# Patient Record
Sex: MALE | Race: WHITE | Employment: OTHER | URBAN - METROPOLITAN AREA
[De-identification: names, ages, dates, MRNs, and addresses within clinical notes are randomized per-mention and may not be internally consistent; named-entity substitution may affect disease eponyms.]

---

## 2017-01-07 ENCOUNTER — OFFICE VISIT (OUTPATIENT)
Dept: INTERNAL MEDICINE CLINIC | Facility: CLINIC | Age: 52
End: 2017-01-07

## 2017-01-07 VITALS — TEMPERATURE: 99 F | HEART RATE: 72 BPM | RESPIRATION RATE: 17 BRPM

## 2017-01-07 DIAGNOSIS — M25.571 ACUTE BILATERAL ANKLE PAIN: Primary | ICD-10-CM

## 2017-01-07 DIAGNOSIS — M25.473 ANKLE SWELLING, UNSPECIFIED LATERALITY: ICD-10-CM

## 2017-01-07 DIAGNOSIS — M25.572 ACUTE BILATERAL ANKLE PAIN: Primary | ICD-10-CM

## 2017-01-07 DIAGNOSIS — Z91.81 RISK FOR FALLS: ICD-10-CM

## 2017-01-07 DIAGNOSIS — R26.89 FUNCTIONAL GAIT ABNORMALITY: ICD-10-CM

## 2017-01-07 PROCEDURE — 99214 OFFICE O/P EST MOD 30 MIN: CPT | Performed by: INTERNAL MEDICINE

## 2017-01-07 PROCEDURE — 96372 THER/PROPH/DIAG INJ SC/IM: CPT | Performed by: INTERNAL MEDICINE

## 2017-01-07 RX ORDER — METHYLPREDNISOLONE SODIUM SUCCINATE 125 MG/2ML
125 INJECTION, POWDER, LYOPHILIZED, FOR SOLUTION INTRAMUSCULAR; INTRAVENOUS ONCE
Status: COMPLETED | OUTPATIENT
Start: 2017-01-07 | End: 2017-01-07

## 2017-01-07 RX ORDER — KETOROLAC TROMETHAMINE 30 MG/ML
60 INJECTION, SOLUTION INTRAMUSCULAR; INTRAVENOUS ONCE
Status: COMPLETED | OUTPATIENT
Start: 2017-01-07 | End: 2017-01-07

## 2017-01-07 RX ORDER — METHYLPREDNISOLONE 4 MG/1
TABLET ORAL
Qty: 1 KIT | Refills: 0 | Status: SHIPPED | OUTPATIENT
Start: 2017-01-07 | End: 2017-02-07

## 2017-01-07 RX ADMIN — KETOROLAC TROMETHAMINE 60 MG: 30 INJECTION, SOLUTION INTRAMUSCULAR; INTRAVENOUS at 13:10:00

## 2017-01-07 RX ADMIN — METHYLPREDNISOLONE SODIUM SUCCINATE 125 MG: 125 INJECTION, POWDER, LYOPHILIZED, FOR SOLUTION INTRAMUSCULAR; INTRAVENOUS at 13:11:00

## 2017-01-07 NOTE — PROGRESS NOTES
Patient presents with: Ankle Pain: R ankle pain,   Edema: edema of b\legs      HPI: The pt presents today for acute onset of B ankle pain and swelling over the last few days. It's gotten to the point that it's difficult to walk b/c of pain and swelling. Swelling    Comment:TABS  Toprol Xl [Metoprol*    Hives, Itching  Toprol Xl [Metoprol*    Swelling    Comment:TB24  Hydralazine Hcl             Comment:TABS Drowsiness  Pravachol [Pravasta*    Hives, Itching    Medications:  Reviewed and per the scanned E

## 2017-01-09 ENCOUNTER — OFFICE VISIT (OUTPATIENT)
Dept: NEPHROLOGY | Facility: CLINIC | Age: 52
End: 2017-01-09

## 2017-01-09 VITALS
BODY MASS INDEX: 42 KG/M2 | WEIGHT: 315 LBS | DIASTOLIC BLOOD PRESSURE: 109 MMHG | SYSTOLIC BLOOD PRESSURE: 164 MMHG | RESPIRATION RATE: 18 BRPM | HEART RATE: 76 BPM

## 2017-01-09 DIAGNOSIS — R60.0 LOCALIZED EDEMA: ICD-10-CM

## 2017-01-09 DIAGNOSIS — I10 ESSENTIAL HYPERTENSION: ICD-10-CM

## 2017-01-09 DIAGNOSIS — Z94.0 STATUS POST KIDNEY TRANSPLANT: ICD-10-CM

## 2017-01-09 DIAGNOSIS — N18.3 CKD (CHRONIC KIDNEY DISEASE), STAGE 3 (MODERATE): Primary | ICD-10-CM

## 2017-01-09 PROCEDURE — 99214 OFFICE O/P EST MOD 30 MIN: CPT | Performed by: INTERNAL MEDICINE

## 2017-01-09 RX ORDER — FUROSEMIDE 40 MG/1
40 TABLET ORAL DAILY
Qty: 30 TABLET | Refills: 11 | Status: SHIPPED | OUTPATIENT
Start: 2017-01-09 | End: 2017-12-07

## 2017-01-09 NOTE — PROGRESS NOTES
Nephrology Progress Note      Kevin Eden is a 46year old male. HPI:   Patient presents with:  Chronic Kidney Disease  Hypertension  Renal Transplant    Mr. Frances Appiah was seen in the nephrology clinic today follow-up for management of chronic kidn 2/4/2014   • Spinal stenosis in cervical region 10/17/2011   • Status post cervical spinal fusion 9/20/2012   • Hearing impairment      bilateral hearing aides   • Papillary thyroid carcinoma ([facility] [address]      Dx in 1/2015: tx with surgery and MANZO   • Postsurgica TABLET BY MOUTH TWICE DAILY Disp: 180 tablet Rfl: 1   DOXAZOSIN MESYLATE 8 MG Oral Tab TAKE 2 TABLETS BY MOUTH DAILY Disp: 180 tablet Rfl: 1   LOSARTAN POTASSIUM-HCTZ 100-25 MG Oral Tab TAKE 1 TABLET BY MOUTH EVERY DAY Disp: 90 tablet Rfl: 1   AMLODIPINE B capsules (2 mg total) by mouth 2 (two) times daily. Disp: 120 capsule Rfl: 11   Atorvastatin Calcium (LIPITOR) 10 MG Oral Tab Take 10 mg by mouth nightly. Disp:  Rfl:    Ranitidine HCl (ZANTAC) 150 MG Oral Tab Take 150 mg by mouth daily.  Disp:  Rfl: Results  Component Value Date   BUN 55* 12/02/2016   CREATSERUM 2.47* 12/02/2016   GFR 41* 05/24/2016   GFRNAA 29* 12/02/2016   GFRAA 34* 12/02/2016   CA 9.4 12/02/2016   ALKPHO 45 12/02/2016   AST 10 12/02/2016   ALT 12 12/02/2016   BILT 0.6 12/02/2016 have fluctuated although more recently his creatinine levels have been closer to 2.4-2.5 mg/dL or so over the last several months. 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   The mainstay of therapy remains o

## 2017-01-10 ENCOUNTER — HOSPITAL ENCOUNTER (OUTPATIENT)
Dept: GENERAL RADIOLOGY | Age: 52
Discharge: HOME | End: 2017-01-10
Attending: INTERNAL MEDICINE
Payer: MEDICARE

## 2017-01-10 DIAGNOSIS — M25.473 ANKLE SWELLING, UNSPECIFIED LATERALITY: ICD-10-CM

## 2017-01-10 DIAGNOSIS — M25.572 ACUTE BILATERAL ANKLE PAIN: ICD-10-CM

## 2017-01-10 DIAGNOSIS — M25.571 ACUTE BILATERAL ANKLE PAIN: ICD-10-CM

## 2017-01-10 PROCEDURE — 73610 X-RAY EXAM OF ANKLE: CPT

## 2017-02-07 ENCOUNTER — PATIENT MESSAGE (OUTPATIENT)
Dept: INTERNAL MEDICINE CLINIC | Facility: CLINIC | Age: 52
End: 2017-02-07

## 2017-02-07 DIAGNOSIS — M25.473 ANKLE SWELLING, UNSPECIFIED LATERALITY: ICD-10-CM

## 2017-02-07 DIAGNOSIS — M25.572 ACUTE BILATERAL ANKLE PAIN: Primary | ICD-10-CM

## 2017-02-07 DIAGNOSIS — M25.571 ACUTE BILATERAL ANKLE PAIN: Primary | ICD-10-CM

## 2017-02-07 RX ORDER — METHYLPREDNISOLONE 4 MG/1
TABLET ORAL
Qty: 1 KIT | Refills: 0 | Status: SHIPPED | OUTPATIENT
Start: 2017-02-07 | End: 2017-02-13 | Stop reason: ALTCHOICE

## 2017-02-07 NOTE — TELEPHONE ENCOUNTER
From: Roula Matta  To: Michelle Pineda MD  Sent: 2/7/2017 11:12 AM CST  Subject: Prescription Question    Hello Dr Gwendlyn Jeans,  I have a question. Unfortunately the gout that you treated back on January 7th has returned and it's hit me hard again.  Yesterday I

## 2017-02-09 ENCOUNTER — PRIOR ORIGINAL RECORDS (OUTPATIENT)
Dept: OTHER | Age: 52
End: 2017-02-09

## 2017-02-13 ENCOUNTER — OFFICE VISIT (OUTPATIENT)
Dept: INTERNAL MEDICINE CLINIC | Facility: CLINIC | Age: 52
End: 2017-02-13

## 2017-02-13 VITALS
OXYGEN SATURATION: 98 % | SYSTOLIC BLOOD PRESSURE: 140 MMHG | DIASTOLIC BLOOD PRESSURE: 88 MMHG | WEIGHT: 308.5 LBS | TEMPERATURE: 98 F | RESPIRATION RATE: 15 BRPM | HEIGHT: 74 IN | HEART RATE: 67 BPM | BODY MASS INDEX: 39.59 KG/M2

## 2017-02-13 DIAGNOSIS — Z96.652 HISTORY OF TOTAL LEFT KNEE REPLACEMENT: ICD-10-CM

## 2017-02-13 DIAGNOSIS — K21.9 GASTROESOPHAGEAL REFLUX DISEASE WITHOUT ESOPHAGITIS: ICD-10-CM

## 2017-02-13 DIAGNOSIS — I25.10 CORONARY ARTERY DISEASE INVOLVING NATIVE CORONARY ARTERY OF NATIVE HEART WITHOUT ANGINA PECTORIS: ICD-10-CM

## 2017-02-13 DIAGNOSIS — M1A.09X0 IDIOPATHIC CHRONIC GOUT OF MULTIPLE SITES WITHOUT TOPHUS: ICD-10-CM

## 2017-02-13 DIAGNOSIS — Z92.25 PERSONAL HISTORY OF IMMUNOSUPRESSION THERAPY: ICD-10-CM

## 2017-02-13 DIAGNOSIS — Z79.02 LONG TERM CURRENT USE OF ANTITHROMBOTICS/ANTIPLATELETS: ICD-10-CM

## 2017-02-13 DIAGNOSIS — N18.30 CKD (CHRONIC KIDNEY DISEASE) STAGE 3, GFR 30-59 ML/MIN (HCC): ICD-10-CM

## 2017-02-13 DIAGNOSIS — M17.11 PRIMARY OSTEOARTHRITIS OF RIGHT KNEE: ICD-10-CM

## 2017-02-13 DIAGNOSIS — D63.8 ANEMIA OF CHRONIC DISEASE: ICD-10-CM

## 2017-02-13 DIAGNOSIS — Z91.81 RISK FOR FALLS: ICD-10-CM

## 2017-02-13 DIAGNOSIS — G47.33 OSA ON CPAP: ICD-10-CM

## 2017-02-13 DIAGNOSIS — M48.02 SPINAL STENOSIS IN CERVICAL REGION: ICD-10-CM

## 2017-02-13 DIAGNOSIS — M17.12 PRIMARY OSTEOARTHRITIS OF LEFT KNEE: ICD-10-CM

## 2017-02-13 DIAGNOSIS — R60.9 PERIPHERAL EDEMA: ICD-10-CM

## 2017-02-13 DIAGNOSIS — M25.462 KNEE EFFUSION, LEFT: ICD-10-CM

## 2017-02-13 DIAGNOSIS — R53.82 CHRONIC FATIGUE SYNDROME: ICD-10-CM

## 2017-02-13 DIAGNOSIS — C73 PAPILLARY THYROID CARCINOMA (HCC): ICD-10-CM

## 2017-02-13 DIAGNOSIS — I10 ESSENTIAL HYPERTENSION, BENIGN: Primary | ICD-10-CM

## 2017-02-13 DIAGNOSIS — M06.9 RHEUMATOID ARTHRITIS INVOLVING MULTIPLE SITES, UNSPECIFIED RHEUMATOID FACTOR PRESENCE: ICD-10-CM

## 2017-02-13 DIAGNOSIS — E89.0 POSTSURGICAL HYPOTHYROIDISM: ICD-10-CM

## 2017-02-13 DIAGNOSIS — E66.9 OBESITY (BMI 30-39.9): ICD-10-CM

## 2017-02-13 DIAGNOSIS — Z94.0 RENAL TRANSPLANT RECIPIENT: ICD-10-CM

## 2017-02-13 DIAGNOSIS — Z99.89 OSA ON CPAP: ICD-10-CM

## 2017-02-13 DIAGNOSIS — R26.89 FUNCTIONAL GAIT ABNORMALITY: ICD-10-CM

## 2017-02-13 DIAGNOSIS — I27.20 PULMONARY HTN (HCC): ICD-10-CM

## 2017-02-13 DIAGNOSIS — M54.12 CHRONIC CERVICAL RADICULOPATHY: ICD-10-CM

## 2017-02-13 DIAGNOSIS — Z79.891 LONG-TERM CURRENT USE OF OPIATE ANALGESIC: ICD-10-CM

## 2017-02-13 PROBLEM — M10.9 GOUT: Status: ACTIVE | Noted: 2017-02-13

## 2017-02-13 PROCEDURE — 99214 OFFICE O/P EST MOD 30 MIN: CPT | Performed by: INTERNAL MEDICINE

## 2017-02-13 RX ORDER — BUPRENORPHINE HYDROCHLORIDE AND NALOXONE HYDROCHLORIDE 1.4; .36 MG/1; MG/1
TABLET, ORALLY DISINTEGRATING SUBLINGUAL
Refills: 0 | COMMUNITY
Start: 2017-02-10 | End: 2017-03-20

## 2017-02-13 NOTE — PROGRESS NOTES
Patient presents with: Follow - Up: 6 month follow up chronic medical issues      HPI: The pt presents today for 6-month f/u chronic medical conditions and disease burden status. In particular, his chronic medical conditions are as follows:    1.  HTN - S cane to walk. No recent falls. ROS: No CP, SOB, palps, abd pain. No fevers, chills. Stable mood. All other systems reviewed and negative.      Patient Active Problem List:     Spinal stenosis in cervical region     Esophageal reflux     Long-term cur furosemide 40 MG Oral Tab, Take 1 tablet (40 mg total) by mouth daily. , Disp: 30 tablet, Rfl: 11  •  CLONIDINE HCL 0.3 MG Oral Tab, TAKE 1 TABLET BY MOUTH TWICE DAILY, Disp: 180 tablet, Rfl: 1  •  DOXAZOSIN MESYLATE 8 MG Oral Tab, TAKE 2 TABLETS BY MOUTH D Alcohol Use: No                PE:  /88 mmHg  Pulse 67  Temp(Src) 98 °F (36.7 °C) (Oral)  Resp 15  Ht 74\"  Wt 308 lb 8 oz  BMI 39.59 kg/m2  SpO2 98%   Wt Readings from Last 6 Encounters:  02/13/17 : 308 lb 8 oz  02/08/17 : 300 lb  01/11/17 : 322 lb Cardiology. 2. CAD and long-term use of Plavix - Stable.  Continues on prescription medication w/ Dr. Luis Astorga. 3. Stage 3 CKD/Renal transplant recipient/Long-term immunosuppression - Stable.  Continues to see Dr. Taylor Anderson from Renal.  No new issues.   4. Group  [address] [facility],[address] Floor, [address], [facility], [address]  T: [id number]; F: [phone]     Meds & Refills for this Visit:  No prescriptions requested or ordered in this encounter       Imaging & Consults:  None

## 2017-02-13 NOTE — PATIENT INSTRUCTIONS
Gout Diet  Gout is a painful condition caused by an excess of uric acid, a waste product made by the body. Uric acid forms crystals that collect in the joints. The immune response to these crystals brings on symptoms of joint pain and swelling.  This is c · Dairy products that are low-fat or fat-free, such as cheese and yogurt  · Complex carbohydrate foods, including whole grains, brown rice, oats, and beans  · Coffee, in moderation  · Water, approximately 64 ounces per day  Follow-up care  Follow up with gina · Certain fish (anchovies, sardines, fish roes, herring, tuna, mussels, codfish, scallops, trout, and zoraida)  · Certain meats (red meat, processed meat, gan, turkey, wild game, and goose)  · Sauces and gravies made with meat  · Organ meats (such as lucy

## 2017-02-22 ENCOUNTER — LAB ENCOUNTER (OUTPATIENT)
Dept: LAB | Age: 52
End: 2017-02-22
Attending: INTERNAL MEDICINE
Payer: MEDICARE

## 2017-02-22 DIAGNOSIS — E89.0 POSTSURGICAL HYPOTHYROIDISM: ICD-10-CM

## 2017-02-22 DIAGNOSIS — C73 MALIGNANT NEOPLASM OF THYROID GLAND (HCC): Primary | ICD-10-CM

## 2017-02-22 PROCEDURE — 86800 THYROGLOBULIN ANTIBODY: CPT

## 2017-02-22 PROCEDURE — 36415 COLL VENOUS BLD VENIPUNCTURE: CPT

## 2017-02-22 PROCEDURE — 84443 ASSAY THYROID STIM HORMONE: CPT | Performed by: INTERNAL MEDICINE

## 2017-02-22 PROCEDURE — 84439 ASSAY OF FREE THYROXINE: CPT | Performed by: INTERNAL MEDICINE

## 2017-02-22 PROCEDURE — 84432 ASSAY OF THYROGLOBULIN: CPT

## 2017-02-23 LAB
ANTI-THYROGLOBULIN: <15 U/ML (ref ?–60)
THYROGLOBULIN, TUMOR MARKER: 2.33 NG/ML (ref 1.7–55.6)

## 2017-03-04 LAB
ABSOLUTE BASOPHILS: 49 CELLS/UL (ref 0–200)
ABSOLUTE EOSINOPHILS: 356 CELLS/UL (ref 15–500)
ABSOLUTE LYMPHOCYTES: 1936 CELLS/UL (ref 850–3900)
ABSOLUTE MONOCYTES: 648 CELLS/UL (ref 200–950)
ABSOLUTE NEUTROPHILS: 5111 CELLS/UL (ref 1500–7800)
ALBUMIN/GLOBULIN RATIO: 1.2 (CALC) (ref 1–2.5)
ALBUMIN: 3.9 G/DL (ref 3.6–5.1)
ALKALINE PHOSPHATASE: 49 U/L (ref 40–115)
ALT: 15 U/L (ref 9–46)
AST: 13 U/L (ref 10–35)
BASOPHILS: 0.6 %
BILIRUBIN, TOTAL: 0.7 MG/DL (ref 0.2–1.2)
BUN/CREATININE RATIO: 21 (CALC) (ref 6–22)
BUN: 54 MG/DL (ref 7–25)
CALCIUM: 9.6 MG/DL (ref 8.6–10.3)
CARBON DIOXIDE: 31 MMOL/L (ref 20–31)
CHLORIDE: 102 MMOL/L (ref 98–110)
CREATININE: 2.53 MG/DL (ref 0.7–1.33)
EGFR IF AFRICN AM: 33 ML/MIN/1.73M2
EGFR IF NONAFRICN AM: 28 ML/MIN/1.73M2
EOSINOPHILS: 4.4 %
GLOBULIN: 3.3 G/DL (CALC) (ref 1.9–3.7)
GLUCOSE: 99 MG/DL (ref 65–99)
HEMATOCRIT: 37.7 % (ref 38.5–50)
HEMOGLOBIN: 11.5 G/DL (ref 13.2–17.1)
LYMPHOCYTES: 23.9 %
MCH: 26.9 PG (ref 27–33)
MCHC: 30.5 G/DL (ref 32–36)
MCV: 88 FL (ref 80–100)
MONOCYTES: 8 %
MPV: 9.8 FL (ref 7.5–12.5)
NEUTROPHILS: 63.1 %
PLATELET COUNT: 128 THOUSAND/UL (ref 140–400)
POTASSIUM: 3.6 MMOL/L (ref 3.5–5.3)
PROTEIN, TOTAL: 7.2 G/DL (ref 6.1–8.1)
RDW: 16 % (ref 11–15)
RED BLOOD CELL COUNT: 4.29 MILLION/UL (ref 4.2–5.8)
SODIUM: 140 MMOL/L (ref 135–146)
TACROLIMUS, HIGHLY SENSITIVE, /MS/MS: 4.5 MCG/L
WHITE BLOOD CELL COUNT: 8.1 THOUSAND/UL (ref 3.8–10.8)

## 2017-03-17 PROCEDURE — 84432 ASSAY OF THYROGLOBULIN: CPT | Performed by: INTERNAL MEDICINE

## 2017-03-17 PROCEDURE — 86800 THYROGLOBULIN ANTIBODY: CPT | Performed by: INTERNAL MEDICINE

## 2017-03-20 PROCEDURE — 80307 DRUG TEST PRSMV CHEM ANLYZR: CPT | Performed by: INTERNAL MEDICINE

## 2017-04-10 ENCOUNTER — OFFICE VISIT (OUTPATIENT)
Dept: NEPHROLOGY | Facility: CLINIC | Age: 52
End: 2017-04-10

## 2017-04-10 VITALS
RESPIRATION RATE: 18 BRPM | BODY MASS INDEX: 41 KG/M2 | DIASTOLIC BLOOD PRESSURE: 112 MMHG | HEART RATE: 74 BPM | SYSTOLIC BLOOD PRESSURE: 144 MMHG | WEIGHT: 315 LBS

## 2017-04-10 DIAGNOSIS — I10 ESSENTIAL HYPERTENSION: ICD-10-CM

## 2017-04-10 DIAGNOSIS — N18.3 CKD (CHRONIC KIDNEY DISEASE), STAGE 3 (MODERATE): Primary | ICD-10-CM

## 2017-04-10 DIAGNOSIS — Z94.0 STATUS POST KIDNEY TRANSPLANT: ICD-10-CM

## 2017-04-10 PROCEDURE — 99214 OFFICE O/P EST MOD 30 MIN: CPT | Performed by: INTERNAL MEDICINE

## 2017-04-10 RX ORDER — ALLOPURINOL 100 MG/1
100 TABLET ORAL DAILY
Qty: 30 TABLET | Refills: 11 | Status: SHIPPED | OUTPATIENT
Start: 2017-04-10 | End: 2018-04-04

## 2017-04-10 RX ORDER — METHYLPREDNISOLONE 4 MG/1
4 TABLET ORAL DAILY
Qty: 21 TABLET | Refills: 0 | Status: SHIPPED | OUTPATIENT
Start: 2017-04-10 | End: 2017-05-12 | Stop reason: ALTCHOICE

## 2017-04-10 NOTE — PROGRESS NOTES
Nephrology Progress Note      Chelsea Mathis is a 46year old male. HPI:   Patient presents with:  Chronic Kidney Disease  Hypertension  Renal Transplant      Lucy Sarmientobret Hermelinda was seen in the nephrology clinic today follow-up for management of chronic ki • PONV (postoperative nausea and vomiting) 1997     s/p kidney transplant   • Coronary atherosclerosis 12/11/15     per angiogram   • Renal disorder    • Hyperlipidemia    • Atherosclerosis of coronary artery           Past Surgical History    APPENDECTO Sodium 200 MCG Oral Tab Take 1 tablet (200 mcg total) by mouth daily. Disp: 90 tablet Rfl: 1   furosemide 40 MG Oral Tab Take 1 tablet (40 mg total) by mouth daily.  Disp: 30 tablet Rfl: 11   CLONIDINE HCL 0.3 MG Oral Tab TAKE 1 TABLET BY MOUTH TWICE DAILY Swelling    Comment:TABS  Pravastatin Sodium      Swelling    Comment:TABS  Toprol Xl [Metoprol*    Hives, Itching  Toprol Xl [Metoprol*    Swelling    Comment:TB24  Hydralazine Hcl             Comment:TABS Drowsiness  Pravachol [Pravasta*    Hives, Itch 88.0 03/03/2017   MCH 26.9* 03/03/2017   MCHC 30.5* 03/03/2017   RDW 16.0* 03/03/2017   NEPRELIM 4.93 03/25/2016   NEUTABS 5111 03/03/2017   LYMPHABS 1936 03/03/2017   EOSABS 356 03/03/2017   BASABS 49 03/03/2017   NEUT 63.1 03/03/2017   LYMPH 23.9 03/03/2 regimen. #3.  Status post renal transplant-the creatinine has recently been closer to 2.5 mg/dL or so.  He will continue his current antirejection medication-Prograf and prednisone. #4. Gouty arthritis-Mr. Alma Rosa Bocanegra notes worsening left foot pain ove

## 2017-05-03 ENCOUNTER — TELEPHONE (OUTPATIENT)
Dept: NEPHROLOGY | Facility: CLINIC | Age: 52
End: 2017-05-03

## 2017-05-12 ENCOUNTER — OFFICE VISIT (OUTPATIENT)
Dept: INTERNAL MEDICINE CLINIC | Facility: CLINIC | Age: 52
End: 2017-05-12

## 2017-05-12 ENCOUNTER — TELEPHONE (OUTPATIENT)
Dept: NEPHROLOGY | Facility: CLINIC | Age: 52
End: 2017-05-12

## 2017-05-12 VITALS
TEMPERATURE: 98 F | OXYGEN SATURATION: 98 % | SYSTOLIC BLOOD PRESSURE: 164 MMHG | WEIGHT: 310 LBS | HEART RATE: 107 BPM | DIASTOLIC BLOOD PRESSURE: 100 MMHG | BODY MASS INDEX: 40 KG/M2

## 2017-05-12 DIAGNOSIS — M25.511 ACUTE PAIN OF RIGHT SHOULDER: ICD-10-CM

## 2017-05-12 DIAGNOSIS — M54.2 CERVICAL PAIN: Primary | ICD-10-CM

## 2017-05-12 PROCEDURE — 99214 OFFICE O/P EST MOD 30 MIN: CPT | Performed by: INTERNAL MEDICINE

## 2017-05-12 RX ORDER — CYCLOBENZAPRINE HCL 10 MG
10 TABLET ORAL 3 TIMES DAILY PRN
Qty: 30 TABLET | Refills: 0 | Status: SHIPPED | OUTPATIENT
Start: 2017-05-12 | End: 2017-06-01

## 2017-05-12 NOTE — PROGRESS NOTES
Kamlesh Ott is a 46year old male. HPI:   Patient presents with:  Neck Pain: since Monday (5/8/17). Shoulder Pain: since Monday (5/8/17). Patient presents with an acute musculoskeletal complaint.   Patient has been dealing with pain in right asia ZUBSOLV 1.4-0.36 MG Sublingual SL Tab, Place 1.4 mg under the tongue 3 (three) times daily. , Disp: 90 tablet, Rfl: 0  •  Levothyroxine Sodium 25 MCG Oral Tab, Take 200mcg tablet plus 25mcg tablet for a total of 225mcg daily, Disp: 90 tablet, Rfl: 1  •  Mota Core predniSONE 5 MG Oral Tab, Take 7.5 mg by mouth daily. , Disp: , Rfl:   •  Ferrous Sulfate 325 (65 FE) MG Oral Tab, Take 1 tablet (325 mg total) by mouth daily. , Disp: 30 tablet, Rfl: 0  •  Atorvastatin Calcium (LIPITOR) 10 MG Oral Tab, Take 10 mg by mouth n history; other surgical history (Right, 1997); colonoscopy; thyroidectomy; colonoscopy (N/A, 5/1/2015); cath pv (12/11/15); and knee surgery.   Family: family history includes bipolar disorder in his mother; glomerulonephritis in his maternal grandmother an patient indicates understanding of these issues and agrees to the plan. The patient is asked to return to clinic as needed with Dr. Robert Sam MD for follow up on chronic issues, or earlier if acute issues arise.     Severa Boop, MD

## 2017-05-12 NOTE — PATIENT INSTRUCTIONS
- Take cyclobenzaprine (muscle relaxant) every 8 hours as needed. It can make you drowsy, so be careful when driving.  - If you are not better within a few hours, go to the nearest emergency department. It was a pleasure seeing you in the clinic today.

## 2017-05-13 ENCOUNTER — HOSPITAL ENCOUNTER (EMERGENCY)
Facility: HOSPITAL | Age: 52
Discharge: HOME | End: 2017-05-13
Attending: EMERGENCY MEDICINE
Payer: MEDICARE

## 2017-05-13 ENCOUNTER — APPOINTMENT (OUTPATIENT)
Dept: GENERAL RADIOLOGY | Facility: HOSPITAL | Age: 52
End: 2017-05-13
Attending: EMERGENCY MEDICINE
Payer: MEDICARE

## 2017-05-13 VITALS
SYSTOLIC BLOOD PRESSURE: 156 MMHG | RESPIRATION RATE: 18 BRPM | HEART RATE: 88 BPM | BODY MASS INDEX: 40 KG/M2 | TEMPERATURE: 99 F | OXYGEN SATURATION: 98 % | WEIGHT: 308.63 LBS | DIASTOLIC BLOOD PRESSURE: 98 MMHG

## 2017-05-13 DIAGNOSIS — S16.1XXA CERVICAL STRAIN, INITIAL ENCOUNTER: Primary | ICD-10-CM

## 2017-05-13 PROCEDURE — 93010 ELECTROCARDIOGRAM REPORT: CPT

## 2017-05-13 PROCEDURE — 99285 EMERGENCY DEPT VISIT HI MDM: CPT

## 2017-05-13 PROCEDURE — 72050 X-RAY EXAM NECK SPINE 4/5VWS: CPT | Performed by: EMERGENCY MEDICINE

## 2017-05-13 PROCEDURE — 99284 EMERGENCY DEPT VISIT MOD MDM: CPT

## 2017-05-13 PROCEDURE — 93005 ELECTROCARDIOGRAM TRACING: CPT

## 2017-05-13 RX ORDER — HYDROCODONE BITARTRATE AND ACETAMINOPHEN 10; 325 MG/1; MG/1
1 TABLET ORAL EVERY 4 HOURS PRN
Qty: 20 TABLET | Refills: 0 | Status: SHIPPED | OUTPATIENT
Start: 2017-05-13 | End: 2017-05-31

## 2017-05-13 RX ORDER — HYDROCODONE BITARTRATE AND ACETAMINOPHEN 5; 325 MG/1; MG/1
2 TABLET ORAL ONCE
Status: COMPLETED | OUTPATIENT
Start: 2017-05-13 | End: 2017-05-13

## 2017-05-13 NOTE — ED PROVIDER NOTES
Patient Seen in: [facility] Emergency Department    History   Patient presents with:  Back Pain (musculoskeletal)    Stated Complaint: back pain/neck pain    HPI    This is a 22-year-old male who presents with right-sided posterior neck pain rating Osteoarthritis, knee 12/23/2013   • Postlaminectomy syndrome, cervical region 6/20/2013     Log Date: 12/12/2012    • Pulmonary HTN (Via Echo 1/28/14) 2/4/2014   • Spinal stenosis in cervical region 10/17/2011   • Status post cervical spinal fusion 9/20/20 tongue 3 (three) times daily. Levothyroxine Sodium 25 MCG Oral Tab,  Take 200mcg tablet plus 25mcg tablet for a total of 225mcg daily   Levothyroxine Sodium 200 MCG Oral Tab,  Take 1 tablet (200 mcg total) by mouth daily.    allopurinol 100 MG Oral Tab, Grandmother          Smoking Status: Never Smoker                      Smokeless Status: Never Used                        Alcohol Use: No                Review of Systems    Positive for stated complaint: back pain/neck pain  Other systems are as noted in A cervical spine x-rays, EKG, Norco was given I discussed if he is going to take Norco he should stop his other other chronic narcotic pain meds. The EKG shows normal sinus rhythm. There is no acute ST elevations or ischemic findings.   The rest of

## 2017-05-19 ENCOUNTER — PRIOR ORIGINAL RECORDS (OUTPATIENT)
Dept: OTHER | Age: 52
End: 2017-05-19

## 2017-05-25 DIAGNOSIS — Z94.0 RENAL TRANSPLANT RECIPIENT: Primary | ICD-10-CM

## 2017-05-25 LAB
CHOLESTEROL, TOTAL: 124 MG/DL
HDL CHOLESTEROL: 36 MG/DL
LDL CHOLESTEROL: 74 MG/DL
TRIGLYCERIDES: 70 MG/DL

## 2017-05-31 PROBLEM — Z98.1 HISTORY OF FUSION OF CERVICAL SPINE: Status: ACTIVE | Noted: 2017-05-31

## 2017-05-31 PROBLEM — T84.84XS PAINFUL TOTAL KNEE REPLACEMENT, SEQUELA: Status: ACTIVE | Noted: 2017-05-31

## 2017-05-31 PROBLEM — Z96.659 PAINFUL TOTAL KNEE REPLACEMENT, SEQUELA: Status: ACTIVE | Noted: 2017-05-31

## 2017-05-31 PROBLEM — M23.8X2: Status: ACTIVE | Noted: 2017-05-31

## 2017-06-06 ENCOUNTER — HOSPITAL ENCOUNTER (OUTPATIENT)
Dept: CT IMAGING | Age: 52
Discharge: HOME | End: 2017-06-06
Attending: INTERNAL MEDICINE
Payer: MEDICARE

## 2017-06-06 DIAGNOSIS — Z98.1 HISTORY OF FUSION OF CERVICAL SPINE: ICD-10-CM

## 2017-06-06 DIAGNOSIS — M54.2 CERVICALGIA: ICD-10-CM

## 2017-06-06 PROCEDURE — 72125 CT NECK SPINE W/O DYE: CPT | Performed by: INTERNAL MEDICINE

## 2017-06-13 ENCOUNTER — OFFICE VISIT (OUTPATIENT)
Dept: INTERNAL MEDICINE CLINIC | Facility: CLINIC | Age: 52
End: 2017-06-13

## 2017-06-13 VITALS
HEIGHT: 74 IN | RESPIRATION RATE: 17 BRPM | TEMPERATURE: 98 F | HEART RATE: 72 BPM | WEIGHT: 313 LBS | SYSTOLIC BLOOD PRESSURE: 148 MMHG | BODY MASS INDEX: 40.17 KG/M2 | DIASTOLIC BLOOD PRESSURE: 96 MMHG

## 2017-06-13 DIAGNOSIS — Z92.25 PERSONAL HISTORY OF IMMUNOSUPRESSION THERAPY: ICD-10-CM

## 2017-06-13 DIAGNOSIS — Z13.31 DEPRESSION SCREENING: ICD-10-CM

## 2017-06-13 DIAGNOSIS — M1A.09X0 IDIOPATHIC CHRONIC GOUT OF MULTIPLE SITES WITHOUT TOPHUS: ICD-10-CM

## 2017-06-13 DIAGNOSIS — M06.9 RHEUMATOID ARTHRITIS INVOLVING MULTIPLE SITES, UNSPECIFIED RHEUMATOID FACTOR PRESENCE: ICD-10-CM

## 2017-06-13 DIAGNOSIS — F11.20 OPIOID DEPENDENCE ON AGONIST THERAPY (HCC): ICD-10-CM

## 2017-06-13 DIAGNOSIS — M54.2 CHRONIC NECK PAIN: ICD-10-CM

## 2017-06-13 DIAGNOSIS — Z96.659 PAINFUL TOTAL KNEE REPLACEMENT, SEQUELA: ICD-10-CM

## 2017-06-13 DIAGNOSIS — N18.30 CKD (CHRONIC KIDNEY DISEASE) STAGE 3, GFR 30-59 ML/MIN (HCC): ICD-10-CM

## 2017-06-13 DIAGNOSIS — M54.12 CHRONIC CERVICAL RADICULOPATHY: ICD-10-CM

## 2017-06-13 DIAGNOSIS — M17.11 PRIMARY OSTEOARTHRITIS OF RIGHT KNEE: ICD-10-CM

## 2017-06-13 DIAGNOSIS — M48.02 SPINAL STENOSIS IN CERVICAL REGION: ICD-10-CM

## 2017-06-13 DIAGNOSIS — C73 PAPILLARY THYROID CARCINOMA (HCC): ICD-10-CM

## 2017-06-13 DIAGNOSIS — E66.01 MORBID OBESITY WITH BMI OF 40.0-44.9, ADULT (HCC): ICD-10-CM

## 2017-06-13 DIAGNOSIS — E89.0 POSTSURGICAL HYPOTHYROIDISM: ICD-10-CM

## 2017-06-13 DIAGNOSIS — Z94.0 RENAL TRANSPLANT RECIPIENT: ICD-10-CM

## 2017-06-13 DIAGNOSIS — Z91.81 RISK FOR FALLS: ICD-10-CM

## 2017-06-13 DIAGNOSIS — K21.9 GASTROESOPHAGEAL REFLUX DISEASE WITHOUT ESOPHAGITIS: ICD-10-CM

## 2017-06-13 DIAGNOSIS — M50.20 BULGING OF CERVICAL INTERVERTEBRAL DISC: ICD-10-CM

## 2017-06-13 DIAGNOSIS — Z98.1 HISTORY OF FUSION OF CERVICAL SPINE: ICD-10-CM

## 2017-06-13 DIAGNOSIS — D63.8 ANEMIA OF CHRONIC DISEASE: ICD-10-CM

## 2017-06-13 DIAGNOSIS — R60.9 PERIPHERAL EDEMA: ICD-10-CM

## 2017-06-13 DIAGNOSIS — Z99.89 OSA ON CPAP: ICD-10-CM

## 2017-06-13 DIAGNOSIS — R53.82 CHRONIC FATIGUE SYNDROME: ICD-10-CM

## 2017-06-13 DIAGNOSIS — G47.33 OSA ON CPAP: ICD-10-CM

## 2017-06-13 DIAGNOSIS — Z79.02 LONG TERM CURRENT USE OF ANTITHROMBOTICS/ANTIPLATELETS: ICD-10-CM

## 2017-06-13 DIAGNOSIS — I25.10 CORONARY ARTERY DISEASE INVOLVING NATIVE CORONARY ARTERY OF NATIVE HEART WITHOUT ANGINA PECTORIS: ICD-10-CM

## 2017-06-13 DIAGNOSIS — I27.20 PULMONARY HTN (HCC): ICD-10-CM

## 2017-06-13 DIAGNOSIS — I10 ESSENTIAL HYPERTENSION, BENIGN: ICD-10-CM

## 2017-06-13 DIAGNOSIS — M48.02 NEUROFORAMINAL STENOSIS OF CERVICAL SPINE: ICD-10-CM

## 2017-06-13 DIAGNOSIS — T84.84XS PAINFUL TOTAL KNEE REPLACEMENT, SEQUELA: ICD-10-CM

## 2017-06-13 DIAGNOSIS — R26.89 FUNCTIONAL GAIT ABNORMALITY: ICD-10-CM

## 2017-06-13 DIAGNOSIS — G89.29 CHRONIC NECK PAIN: ICD-10-CM

## 2017-06-13 DIAGNOSIS — M25.462 KNEE EFFUSION, LEFT: ICD-10-CM

## 2017-06-13 DIAGNOSIS — M47.812 CERVICAL SPINE ARTHRITIS: ICD-10-CM

## 2017-06-13 DIAGNOSIS — Z00.00 ENCOUNTER FOR ANNUAL HEALTH EXAMINATION: Primary | ICD-10-CM

## 2017-06-13 PROBLEM — M50.30 BULGING OF CERVICAL INTERVERTEBRAL DISC: Status: ACTIVE | Noted: 2017-06-13

## 2017-06-13 PROBLEM — M23.8X2: Status: RESOLVED | Noted: 2017-05-31 | Resolved: 2017-06-13

## 2017-06-13 PROCEDURE — G0444 DEPRESSION SCREEN ANNUAL: HCPCS | Performed by: INTERNAL MEDICINE

## 2017-06-13 PROCEDURE — 99214 OFFICE O/P EST MOD 30 MIN: CPT | Performed by: INTERNAL MEDICINE

## 2017-06-13 PROCEDURE — G0439 PPPS, SUBSEQ VISIT: HCPCS | Performed by: INTERNAL MEDICINE

## 2017-06-13 NOTE — PROGRESS NOTES
HPI:   Everett Washington is a 46year old male who presents for a Medicare Subsequent Annual Wellness visit (Pt already had Initial Annual Wellness) and 6-month f/u chronic medical conditions and disease burden status eval.  Specifically, his chronic medi Peripheral edema - Stable and no new issues. 16. Functional gait abnormality/fall risk - Uses a cane to walk.  No recent falls. 17. Opioid agonist therapy - Stable and continues to work w/ Dr. Mk Norton from [place] [address] [facility] [address] [place] Leelee Vasquez.     His last annual assessment Cholesterol Labs:     Lab Results  Component Value Date   CHOLEST 126 05/23/2016   HDL 43* 05/23/2016   LDL 69 05/23/2016   TRIG 69 05/23/2016          Last Chemistry Labs:     Lab Results  Component Value Date   AST 11 05/26/2017   ALT 11 05/26/2017   CA Oral Tablet Dispersible Take 1 tablet (4 mg total) by mouth daily as needed for Nausea. Cholecalciferol (VITAMIN D) 1000 UNITS Oral Tab Take 1,000 Int'l Units/day by mouth daily. Clopidogrel Bisulfate 75 MG Oral Tab Take 75 mg by mouth daily.    docusat (12/11/15); Renal disorder; Hyperlipidemia; Atherosclerosis of coronary artery; History of fusion of cervical spine (5/31/2017); Painful total knee replacement, sequela (5/31/2017); and Laxity of knee joint, left (5/31/2017).     He  has past surgical histo Left Eye Chart Acuity: 20/30   Both Eyes Visual Acuity: Uncorrected Both Eyes Chart Acuity: 20/25   Able To Tolerate Visual Acuity: Yes      General Appearance:  Alert, cooperative, no distress, appears stated age, morbidly obese   Head:  Normocephalic, wi OTHER RELEVANT CHRONIC CONDITIONS:   Orly Ochoa is a 46year old male who presents for a Medicare Assessment and 6-month f/u chronic medical conditions.     PLAN SUMMARY:   Diagnoses and all orders for this visit:    Essential hypertension, benign Epic. Discussed with patient and provided information      [address] [facility] on file in Epic:    Brien Recio does not have a Power of  for [employer] on file in [place].  Discussed with patient and provided information          PLAN: Peripheral edema - Stable and no new issues. 16. Functional gait abnormality/fall risk - Uses a cane to walk.  No recent falls. 17. Opioid agonist therapy - Stable and continues to work w/ Dr. Gayathri Varghese from [place] [address] [place] [address] [place]. Leelee Vasquez. 18. RTC 6 months.   Patient ve the last 12 months?: 1-Yes (knee issues)    Do you accidently lose urine?: 0-No    Do you have difficulty seeing?: 0-No    Do you have any difficulty walking or getting up?: 1-Yes    Do you have any tripping hazards?: 1-Yes (small dogs)    Are you on multi Colonoscopy Screen every 10 years Colonoscopy,10 Years due on 05/01/2025 Update Health Maintenance if applicable    Flex Sigmoidoscopy Screen every 10 years No results found for this or any previous visit. No flowsheet data found.      Fecal Occult Blood An Annually HGBA1C (%)   Date Value   06/17/2015 5.5       No flowsheet data found.     Creat/alb ratio  Annually      LDL  Annually LDL CHOLESTEROL (mg/dL)   Date Value   05/23/2016 69     LDL-CHOLESTEROL (mg/dL (calc))   Date Value   03/14/2016 61    No flow

## 2017-06-28 ENCOUNTER — TELEPHONE (OUTPATIENT)
Dept: NEPHROLOGY | Facility: CLINIC | Age: 52
End: 2017-06-28

## 2017-07-05 RX ORDER — PREDNISONE 1 MG/1
TABLET ORAL
Qty: 135 TABLET | Refills: 1 | Status: SHIPPED | OUTPATIENT
Start: 2017-07-05 | End: 2017-08-07

## 2017-07-14 DIAGNOSIS — Z94.0 RENAL TRANSPLANT RECIPIENT: Primary | ICD-10-CM

## 2017-07-31 PROCEDURE — 84432 ASSAY OF THYROGLOBULIN: CPT | Performed by: INTERNAL MEDICINE

## 2017-07-31 PROCEDURE — 86800 THYROGLOBULIN ANTIBODY: CPT | Performed by: INTERNAL MEDICINE

## 2017-08-02 LAB
ABSOLUTE BASOPHILS: 41 CELLS/UL (ref 0–200)
ABSOLUTE EOSINOPHILS: 583 CELLS/UL (ref 15–500)
ABSOLUTE LYMPHOCYTES: 1636 CELLS/UL (ref 850–3900)
ABSOLUTE MONOCYTES: 826 CELLS/UL (ref 200–950)
ABSOLUTE NEUTROPHILS: 5014 CELLS/UL (ref 1500–7800)
ALBUMIN/GLOBULIN RATIO: 1.3 (CALC) (ref 1–2.5)
ALBUMIN: 3.7 G/DL (ref 3.6–5.1)
ALKALINE PHOSPHATASE: 51 U/L (ref 40–115)
ALT: 11 U/L (ref 9–46)
AST: 12 U/L (ref 10–35)
BASOPHILS: 0.5 %
BILIRUBIN, TOTAL: 0.6 MG/DL (ref 0.2–1.2)
BUN/CREATININE RATIO: 27 (CALC) (ref 6–22)
BUN: 65 MG/DL (ref 7–25)
CALCIUM: 9.3 MG/DL (ref 8.6–10.3)
CARBON DIOXIDE: 26 MMOL/L (ref 20–31)
CHLORIDE: 104 MMOL/L (ref 98–110)
CREATININE: 2.45 MG/DL (ref 0.7–1.33)
EGFR IF AFRICN AM: 34 ML/MIN/1.73M2
EGFR IF NONAFRICN AM: 29 ML/MIN/1.73M2
EOSINOPHILS: 7.2 %
GLOBULIN: 2.9 G/DL (CALC) (ref 1.9–3.7)
GLUCOSE: 90 MG/DL (ref 65–99)
HEMATOCRIT: 29.7 % (ref 38.5–50)
HEMOGLOBIN: 9.6 G/DL (ref 13.2–17.1)
LYMPHOCYTES: 20.2 %
MCH: 28.4 PG (ref 27–33)
MCHC: 32.3 G/DL (ref 32–36)
MCV: 87.9 FL (ref 80–100)
MONOCYTES: 10.2 %
MPV: 11.1 FL (ref 7.5–12.5)
NEUTROPHILS: 61.9 %
PLATELET COUNT: 137 THOUSAND/UL (ref 140–400)
POTASSIUM: 3.5 MMOL/L (ref 3.5–5.3)
PROTEIN, TOTAL: 6.6 G/DL (ref 6.1–8.1)
RDW: 14.2 % (ref 11–15)
RED BLOOD CELL COUNT: 3.38 MILLION/UL (ref 4.2–5.8)
SODIUM: 140 MMOL/L (ref 135–146)
TACROLIMUS, HIGHLY SENSITIVE, /MS/MS: 4.3 MCG/L
WHITE BLOOD CELL COUNT: 8.1 THOUSAND/UL (ref 3.8–10.8)

## 2017-08-07 ENCOUNTER — OFFICE VISIT (OUTPATIENT)
Dept: NEPHROLOGY | Facility: CLINIC | Age: 52
End: 2017-08-07

## 2017-08-07 VITALS
HEART RATE: 76 BPM | RESPIRATION RATE: 20 BRPM | WEIGHT: 315 LBS | SYSTOLIC BLOOD PRESSURE: 150 MMHG | BODY MASS INDEX: 43 KG/M2 | DIASTOLIC BLOOD PRESSURE: 102 MMHG

## 2017-08-07 DIAGNOSIS — N18.30 ANEMIA IN STAGE 3 CHRONIC KIDNEY DISEASE (HCC): ICD-10-CM

## 2017-08-07 DIAGNOSIS — N18.30 CHRONIC KIDNEY DISEASE, STAGE III (MODERATE) (HCC): Primary | ICD-10-CM

## 2017-08-07 DIAGNOSIS — Z94.0 RENAL TRANSPLANT RECIPIENT: ICD-10-CM

## 2017-08-07 DIAGNOSIS — D63.8 ANEMIA OF CHRONIC DISEASE: ICD-10-CM

## 2017-08-07 DIAGNOSIS — I10 ESSENTIAL HYPERTENSION: ICD-10-CM

## 2017-08-07 DIAGNOSIS — D63.1 ANEMIA IN STAGE 3 CHRONIC KIDNEY DISEASE (HCC): ICD-10-CM

## 2017-08-07 PROCEDURE — 99214 OFFICE O/P EST MOD 30 MIN: CPT | Performed by: INTERNAL MEDICINE

## 2017-08-07 PROCEDURE — 96372 THER/PROPH/DIAG INJ SC/IM: CPT | Performed by: INTERNAL MEDICINE

## 2017-08-07 NOTE — PROGRESS NOTES
Nephrology Progress Note      Domenic Cantrell is a 46year old male. HPI:   Patient presents with:  Chronic Kidney Disease  Hypertension      Mr. Gonzalez Kat was seen in the nephrology clinic today in follow-up for management of chronic kidney disease st PONV (postoperative nausea and vomiting) 1997    s/p kidney transplant   • Postlaminectomy syndrome, cervical region 6/20/2013    Log Date: 12/12/2012    • Postsurgical hypothyroidism     Dx in 1/2015: tx with surgery and MANZO   • Pulmonary HTN (Via Echo 1/ (Active prior to today's visit):    Current Outpatient Prescriptions:  chlorzoxazone 500 MG Oral Tab Take 1 tablet (500 mg total) by mouth 3 (three) times daily as needed for Muscle spasms.  Disp: 40 tablet Rfl: 0   Chlorzoxazone (LORZONE) 750 MG Oral Tab 1 Take 100 mg by mouth 2 (two) times daily. Disp:  Rfl:    aspirin 81 MG Oral Tab EC Take 81 mg by mouth daily. Disp:  Rfl:    predniSONE 5 MG Oral Tab Take 7.5 mg by mouth daily.  Disp:  Rfl:    Ferrous Sulfate 325 (65 FE) MG Oral Tab Take 1 tablet (325 mg t organomegaly  Extremities: Trace edema of the left foot, left knee has a brace in place and is swollen.   Neurologic:  moving all extremities  Skin: Warm and dry, no rashes      LABS:       Lab Results  Component Value Date   BUN 65 (H) 08/01/2017   CORINE 03/14/2016   PEGGY NONE SEEN 03/14/2016   VARUNLUR NONE SEEN 03/14/2016     ASSESSMENT/PLAN:     #1.  Chronic kidney disease stage III-IV-Mr. Margarette Melgar has had long-standing chronic kidney disease since his renal transplant and although his creatinine levels

## 2017-08-22 ENCOUNTER — PRIOR ORIGINAL RECORDS (OUTPATIENT)
Dept: OTHER | Age: 52
End: 2017-08-22

## 2017-09-01 ENCOUNTER — PRIOR ORIGINAL RECORDS (OUTPATIENT)
Dept: OTHER | Age: 52
End: 2017-09-01

## 2017-09-06 ENCOUNTER — OFFICE VISIT (OUTPATIENT)
Dept: INTERNAL MEDICINE CLINIC | Facility: CLINIC | Age: 52
End: 2017-09-06

## 2017-09-06 ENCOUNTER — TELEPHONE (OUTPATIENT)
Dept: NEPHROLOGY | Facility: CLINIC | Age: 52
End: 2017-09-06

## 2017-09-06 VITALS
TEMPERATURE: 98 F | OXYGEN SATURATION: 99 % | RESPIRATION RATE: 20 BRPM | HEIGHT: 75 IN | WEIGHT: 315 LBS | SYSTOLIC BLOOD PRESSURE: 150 MMHG | DIASTOLIC BLOOD PRESSURE: 90 MMHG | HEART RATE: 70 BPM | BODY MASS INDEX: 39.17 KG/M2

## 2017-09-06 DIAGNOSIS — M06.9 RHEUMATOID ARTHRITIS INVOLVING MULTIPLE SITES, UNSPECIFIED RHEUMATOID FACTOR PRESENCE: ICD-10-CM

## 2017-09-06 DIAGNOSIS — F11.20 OPIOID DEPENDENCE ON AGONIST THERAPY (HCC): ICD-10-CM

## 2017-09-06 DIAGNOSIS — K21.9 GASTROESOPHAGEAL REFLUX DISEASE WITHOUT ESOPHAGITIS: ICD-10-CM

## 2017-09-06 DIAGNOSIS — I10 ESSENTIAL HYPERTENSION, BENIGN: ICD-10-CM

## 2017-09-06 DIAGNOSIS — J06.9 VIRAL UPPER RESPIRATORY ILLNESS: Primary | ICD-10-CM

## 2017-09-06 DIAGNOSIS — M54.12 CHRONIC CERVICAL RADICULOPATHY: ICD-10-CM

## 2017-09-06 LAB
ABSOLUTE BASOPHILS: 54 CELLS/UL (ref 0–200)
ABSOLUTE EOSINOPHILS: 549 CELLS/UL (ref 15–500)
ABSOLUTE LYMPHOCYTES: 1782 CELLS/UL (ref 850–3900)
ABSOLUTE MONOCYTES: 918 CELLS/UL (ref 200–950)
ABSOLUTE NEUTROPHILS: 5697 CELLS/UL (ref 1500–7800)
BASOPHILS: 0.6 %
EOSINOPHILS: 6.1 %
HEMATOCRIT: 32 % (ref 38.5–50)
HEMOGLOBIN: 10.6 G/DL (ref 13.2–17.1)
LYMPHOCYTES: 19.8 %
MCH: 28.4 PG (ref 27–33)
MCHC: 33.1 G/DL (ref 32–36)
MCV: 85.8 FL (ref 80–100)
MONOCYTES: 10.2 %
MPV: 11.1 FL (ref 7.5–12.5)
NEUTROPHILS: 63.3 %
PLATELET COUNT: 132 THOUSAND/UL (ref 140–400)
RDW: 14.4 % (ref 11–15)
RED BLOOD CELL COUNT: 3.73 MILLION/UL (ref 4.2–5.8)
WHITE BLOOD CELL COUNT: 9 THOUSAND/UL (ref 3.8–10.8)

## 2017-09-06 PROCEDURE — 99214 OFFICE O/P EST MOD 30 MIN: CPT | Performed by: INTERNAL MEDICINE

## 2017-09-06 RX ORDER — AZITHROMYCIN 250 MG/1
TABLET, FILM COATED ORAL
Qty: 6 TABLET | Refills: 0 | Status: SHIPPED | OUTPATIENT
Start: 2017-09-06 | End: 2017-09-26 | Stop reason: ALTCHOICE

## 2017-09-06 NOTE — PATIENT INSTRUCTIONS
- Your examination was normal today.   You likely have a viral infection.    - Your repeat blood pressure was 150/90.    -  If your symptoms worsen by the weekend, go ahead and fill the antibiotic prescription (azithromycin)  - You should be ok around your

## 2017-09-06 NOTE — PROGRESS NOTES
Shea Berumen is a 46year old male. HPI:   Patient presents with: Follow - Up: Not feeling well  Patient presents with several issues. He has been dealing with cough, congestion, especially in the morning. No fevers/chills/sweats.   His puppy was ZUBSOLV 1.4-0.36 MG Sublingual SL Tab, Place 1.4 mg of buprenorphine under the tongue 3 (three) times daily. , Disp: 90 tablet, Rfl: 0  •  chlorzoxazone 500 MG Oral Tab, Take 1 tablet (500 mg total) by mouth 3 (three) times daily as needed for Muscle spasms Tab, Take 7.5 mg by mouth daily. , Disp: , Rfl:   •  Ferrous Sulfate 325 (65 FE) MG Oral Tab, Take 1 tablet (325 mg total) by mouth daily. , Disp: 30 tablet, Rfl: 0  •  Atorvastatin Calcium (LIPITOR) 10 MG Oral Tab, Take 10 mg by mouth nightly.  , Disp: , Rf laminectomy,facetectomy,lumbar (05/29/2012); revise median n/carpal tunnel surg (2009); femur/knee surg unlisted (1994); other surgical history; other surgical history (Right, 1997); colonoscopy; thyroidectomy; colonoscopy (N/A, 5/1/2015); cath pv (12/11/1 radiculopathy, rheumatoid arthritis, chronic opioid agonist therapy  On Zubsolv. Following with Pain Management. Continue.   Was prescribed Norco by another physician at some point recently, ideally all narcotic/opioid medications should be prescribed by

## 2017-09-14 ENCOUNTER — PATIENT MESSAGE (OUTPATIENT)
Dept: INTERNAL MEDICINE CLINIC | Facility: CLINIC | Age: 52
End: 2017-09-14

## 2017-09-25 ENCOUNTER — NURSE ONLY (OUTPATIENT)
Dept: INTERNAL MEDICINE CLINIC | Facility: CLINIC | Age: 52
End: 2017-09-25

## 2017-09-25 DIAGNOSIS — Z23 NEED FOR VACCINATION: ICD-10-CM

## 2017-09-25 PROCEDURE — G0008 ADMIN INFLUENZA VIRUS VAC: HCPCS | Performed by: INTERNAL MEDICINE

## 2017-09-25 PROCEDURE — 90686 IIV4 VACC NO PRSV 0.5 ML IM: CPT | Performed by: INTERNAL MEDICINE

## 2017-10-02 RX ORDER — PREDNISONE 1 MG/1
7.5 TABLET ORAL DAILY
Qty: 135 TABLET | Refills: 3 | Status: SHIPPED | OUTPATIENT
Start: 2017-10-02 | End: 2017-12-07 | Stop reason: ALTCHOICE

## 2017-10-12 ENCOUNTER — OFFICE VISIT (OUTPATIENT)
Dept: INTERNAL MEDICINE CLINIC | Facility: CLINIC | Age: 52
End: 2017-10-12

## 2017-10-12 ENCOUNTER — APPOINTMENT (OUTPATIENT)
Dept: LAB | Age: 52
End: 2017-10-12
Attending: INTERNAL MEDICINE
Payer: MEDICARE

## 2017-10-12 VITALS
WEIGHT: 315 LBS | TEMPERATURE: 99 F | RESPIRATION RATE: 8 BRPM | SYSTOLIC BLOOD PRESSURE: 152 MMHG | BODY MASS INDEX: 39.17 KG/M2 | HEIGHT: 75 IN | DIASTOLIC BLOOD PRESSURE: 98 MMHG | HEART RATE: 66 BPM

## 2017-10-12 DIAGNOSIS — F11.20 OPIOID DEPENDENCE ON AGONIST THERAPY (HCC): ICD-10-CM

## 2017-10-12 DIAGNOSIS — R35.89 POLYURIA: ICD-10-CM

## 2017-10-12 DIAGNOSIS — R73.01 ELEVATED FASTING GLUCOSE: ICD-10-CM

## 2017-10-12 DIAGNOSIS — N18.30 CKD (CHRONIC KIDNEY DISEASE) STAGE 3, GFR 30-59 ML/MIN (HCC): ICD-10-CM

## 2017-10-12 DIAGNOSIS — I10 ESSENTIAL HYPERTENSION, BENIGN: ICD-10-CM

## 2017-10-12 DIAGNOSIS — R20.2 PARESTHESIA OF BOTH FEET: ICD-10-CM

## 2017-10-12 DIAGNOSIS — R63.2 POLYPHAGIA: ICD-10-CM

## 2017-10-12 DIAGNOSIS — R20.2 PARESTHESIA OF BOTH FEET: Primary | ICD-10-CM

## 2017-10-12 PROCEDURE — 83036 HEMOGLOBIN GLYCOSYLATED A1C: CPT

## 2017-10-12 PROCEDURE — 80053 COMPREHEN METABOLIC PANEL: CPT

## 2017-10-12 PROCEDURE — 82746 ASSAY OF FOLIC ACID SERUM: CPT

## 2017-10-12 PROCEDURE — 36415 COLL VENOUS BLD VENIPUNCTURE: CPT

## 2017-10-12 PROCEDURE — 99214 OFFICE O/P EST MOD 30 MIN: CPT | Performed by: INTERNAL MEDICINE

## 2017-10-12 PROCEDURE — 82607 VITAMIN B-12: CPT

## 2017-10-12 NOTE — PROGRESS NOTES
Helen Dumont is a 46year old male. HPI:   Patient presents with:  Tingling: both feet  Patient presents with complaint of paresthesias in his feet. This has been worsening over the past three weeks. Mostly affecting toes of both feet.   Denies nu tablet, Rfl: 1  •  DOXAZOSIN MESYLATE 8 MG Oral Tab, TAKE 2 TABLETS BY MOUTH DAILY, Disp: 180 tablet, Rfl: 1  •  KLOR-CON 10 10 MEQ Oral Tab CR, TAKE 2 TABLETS BY MOUTH THREE TIMES DAILY, Disp: 540 tablet, Rfl: 1  •  predniSONE 5 MG Oral Tab, Take 1.5 tabl history of Atherosclerosis of coronary artery; Brachial neuritis or radiculitis NOS (6/29/2012); Cervical radiculitis (9/20/2012); Cervical spondylosis with myelopathy (1/6/2014); Chronic kidney disease, stage III (moderate) (6/29/2012);  Chronic pain syndr mother. Social:  reports that he has never smoked. He has never used smokeless tobacco. He reports that he does not drink alcohol or use drugs.   Wt Readings from Last 6 Encounters:  10/12/17 : (!) 328 lb  09/29/17 : (!) 325 lb  09/26/17 : (!) 330 lb  09/0 MD as Consulting Physician ([id number] [facility])  Andrei Atwood MD as Consulting Physician (ENDOCRINOLOGY)  [facility]ide [facility] as Consulting Physician (CHIROPRACTOR)  Saritha Hernandez MD as Consulting Physician (ADDICTION MEDICINE)  The patient indicates understandi

## 2017-10-12 NOTE — PATIENT INSTRUCTIONS
- Get blood work done when possible. You do not need to be fasting for this  - Based on results, we may do an ultrasound of your legs to look at the blood flow. It was a pleasure seeing you in the clinic today.   Thank you for choosing the [address] [place] [address]

## 2017-10-16 ENCOUNTER — TELEPHONE (OUTPATIENT)
Dept: NEPHROLOGY | Facility: CLINIC | Age: 52
End: 2017-10-16

## 2017-10-23 PROCEDURE — 84432 ASSAY OF THYROGLOBULIN: CPT | Performed by: INTERNAL MEDICINE

## 2017-10-23 PROCEDURE — 86800 THYROGLOBULIN ANTIBODY: CPT | Performed by: INTERNAL MEDICINE

## 2017-10-30 ENCOUNTER — OFFICE VISIT (OUTPATIENT)
Dept: NEUROLOGY | Facility: CLINIC | Age: 52
End: 2017-10-30

## 2017-10-30 ENCOUNTER — TELEPHONE (OUTPATIENT)
Dept: NEPHROLOGY | Facility: CLINIC | Age: 52
End: 2017-10-30

## 2017-10-30 VITALS
RESPIRATION RATE: 18 BRPM | DIASTOLIC BLOOD PRESSURE: 96 MMHG | BODY MASS INDEX: 39.17 KG/M2 | SYSTOLIC BLOOD PRESSURE: 184 MMHG | HEIGHT: 75 IN | WEIGHT: 315 LBS | HEART RATE: 84 BPM

## 2017-10-30 DIAGNOSIS — R20.0 NUMBNESS AND TINGLING OF BOTH FEET: ICD-10-CM

## 2017-10-30 DIAGNOSIS — R20.2 NUMBNESS AND TINGLING OF BOTH FEET: ICD-10-CM

## 2017-10-30 DIAGNOSIS — G62.9 PERIPHERAL POLYNEUROPATHY: Primary | ICD-10-CM

## 2017-10-30 DIAGNOSIS — M54.12 CHRONIC CERVICAL RADICULOPATHY: ICD-10-CM

## 2017-10-30 PROCEDURE — 99205 OFFICE O/P NEW HI 60 MIN: CPT | Performed by: OTHER

## 2017-10-30 RX ORDER — NORTRIPTYLINE HYDROCHLORIDE 25 MG/1
25 CAPSULE ORAL NIGHTLY
Qty: 30 CAPSULE | Refills: 2 | Status: SHIPPED | OUTPATIENT
Start: 2017-10-30 | End: 2017-11-07

## 2017-10-30 NOTE — PATIENT INSTRUCTIONS
Refill policies:    • Allow 2-3 business days for refills; controlled substances may take longer.   • Contact your pharmacy at least 5 days prior to running out of medication and have them send an electronic request or submit request through the [facility] have a procedure or additional testing performed. [facility]) will contact your insurance carrier to obtain pre-certification or prior authorization.     Unfortunately, [employer] has seen an increase in denial of payment even though the p

## 2017-10-30 NOTE — PROGRESS NOTES
HPI:    Patient ID: Jorge Lai is a 46year old male.   Referring provider: Dr Gene York    HPI    Patient is a 46year old male with chronic kidney disease, status post renal transplant 20 years ago, cervical fusion surgery, chronic pain disorder, hype or chronic, with unspecified pathological lesion in kidney    • Osteoarthritis, knee 12/23/2013   • Painful total knee replacement, sequela 5/31/2017   • Papillary thyroid carcinoma ([facility] [address]     Dx in 1/2015: tx with surgery and MANZO   • PONV (postoperative na Grandmother       Smoking status: Never Smoker                                                              Smokeless tobacco: Never Used                      Alcohol use: No                 Review of Systems   Constitutional: Negative. HENT: Negative. Take 1 tablet (200 mcg total) by mouth daily. Disp: 90 tablet Rfl: 1   allopurinol 100 MG Oral Tab Take 1 tablet (100 mg total) by mouth daily. Disp: 30 tablet Rfl: 11   furosemide 40 MG Oral Tab Take 1 tablet (40 mg total) by mouth daily.  Disp: 30 tablet signs of inflammation  Neurological  Awake, alert and oriented to time, place and person. Normal speech and language. Cranial nerves:   II, III, IV, VI :Pupils round, equal and reactive to light  and accommodation bilaterally. Extraocular muscle intact. Sig: Take 1 capsule (25 mg total) by mouth nightly.            Imaging & Referrals:  None     XI#[id number]

## 2017-11-05 ENCOUNTER — HOSPITAL ENCOUNTER (INPATIENT)
Facility: HOSPITAL | Age: 52
LOS: 1 days | Discharge: HOME | DRG: 683 | End: 2017-11-07
Attending: EMERGENCY MEDICINE | Admitting: INTERNAL MEDICINE
Payer: MEDICARE

## 2017-11-05 DIAGNOSIS — R11.2 INTRACTABLE VOMITING WITH NAUSEA, UNSPECIFIED VOMITING TYPE: ICD-10-CM

## 2017-11-05 DIAGNOSIS — K52.9 GASTROENTERITIS: Primary | ICD-10-CM

## 2017-11-05 DIAGNOSIS — Z94.0 RENAL TRANSPLANT RECIPIENT: ICD-10-CM

## 2017-11-05 PROBLEM — E86.0 DEHYDRATION: Status: ACTIVE | Noted: 2017-11-05

## 2017-11-05 PROCEDURE — 99220 INITIAL OBSERVATION CARE,LEVL III: CPT | Performed by: INTERNAL MEDICINE

## 2017-11-05 RX ORDER — AMLODIPINE BESYLATE 5 MG/1
10 TABLET ORAL
Status: DISCONTINUED | OUTPATIENT
Start: 2017-11-06 | End: 2017-11-07

## 2017-11-05 RX ORDER — METOCLOPRAMIDE HYDROCHLORIDE 5 MG/ML
5 INJECTION INTRAMUSCULAR; INTRAVENOUS EVERY 8 HOURS PRN
Status: DISCONTINUED | OUTPATIENT
Start: 2017-11-05 | End: 2017-11-07

## 2017-11-05 RX ORDER — ALLOPURINOL 100 MG/1
100 TABLET ORAL DAILY
Status: DISCONTINUED | OUTPATIENT
Start: 2017-11-06 | End: 2017-11-07

## 2017-11-05 RX ORDER — NORTRIPTYLINE HYDROCHLORIDE 25 MG/1
25 CAPSULE ORAL NIGHTLY
Status: DISCONTINUED | OUTPATIENT
Start: 2017-11-05 | End: 2017-11-06

## 2017-11-05 RX ORDER — MELATONIN
325 DAILY
Status: DISCONTINUED | OUTPATIENT
Start: 2017-11-06 | End: 2017-11-07

## 2017-11-05 RX ORDER — TERAZOSIN 5 MG/1
10 CAPSULE ORAL NIGHTLY
Status: DISCONTINUED | OUTPATIENT
Start: 2017-11-06 | End: 2017-11-07

## 2017-11-05 RX ORDER — ATORVASTATIN CALCIUM 10 MG/1
10 TABLET, FILM COATED ORAL NIGHTLY
Status: DISCONTINUED | OUTPATIENT
Start: 2017-11-06 | End: 2017-11-07

## 2017-11-05 RX ORDER — ONDANSETRON 2 MG/ML
4 INJECTION INTRAMUSCULAR; INTRAVENOUS ONCE
Status: COMPLETED | OUTPATIENT
Start: 2017-11-05 | End: 2017-11-05

## 2017-11-05 RX ORDER — HEPARIN SODIUM 5000 [USP'U]/ML
5000 INJECTION, SOLUTION INTRAVENOUS; SUBCUTANEOUS EVERY 8 HOURS SCHEDULED
Status: DISCONTINUED | OUTPATIENT
Start: 2017-11-05 | End: 2017-11-06

## 2017-11-05 RX ORDER — MORPHINE SULFATE 4 MG/ML
1 INJECTION, SOLUTION INTRAMUSCULAR; INTRAVENOUS EVERY 2 HOUR PRN
Status: DISCONTINUED | OUTPATIENT
Start: 2017-11-05 | End: 2017-11-07

## 2017-11-05 RX ORDER — MORPHINE SULFATE 4 MG/ML
2 INJECTION, SOLUTION INTRAMUSCULAR; INTRAVENOUS EVERY 2 HOUR PRN
Status: DISCONTINUED | OUTPATIENT
Start: 2017-11-05 | End: 2017-11-07

## 2017-11-05 RX ORDER — ASPIRIN 81 MG/1
81 TABLET ORAL DAILY
Status: DISCONTINUED | OUTPATIENT
Start: 2017-11-06 | End: 2017-11-07

## 2017-11-05 RX ORDER — MORPHINE SULFATE 4 MG/ML
4 INJECTION, SOLUTION INTRAMUSCULAR; INTRAVENOUS EVERY 2 HOUR PRN
Status: DISCONTINUED | OUTPATIENT
Start: 2017-11-05 | End: 2017-11-07

## 2017-11-05 RX ORDER — LABETALOL HYDROCHLORIDE 5 MG/ML
10 INJECTION, SOLUTION INTRAVENOUS EVERY 4 HOURS PRN
Status: DISCONTINUED | OUTPATIENT
Start: 2017-11-05 | End: 2017-11-06

## 2017-11-05 RX ORDER — SODIUM CHLORIDE 9 MG/ML
INJECTION, SOLUTION INTRAVENOUS CONTINUOUS
Status: DISCONTINUED | OUTPATIENT
Start: 2017-11-05 | End: 2017-11-07

## 2017-11-05 RX ORDER — OXYCODONE HYDROCHLORIDE 5 MG/1
10 TABLET ORAL EVERY 4 HOURS PRN
Status: DISCONTINUED | OUTPATIENT
Start: 2017-11-05 | End: 2017-11-07

## 2017-11-05 RX ORDER — ONDANSETRON 2 MG/ML
4 INJECTION INTRAMUSCULAR; INTRAVENOUS EVERY 6 HOURS PRN
Status: DISCONTINUED | OUTPATIENT
Start: 2017-11-05 | End: 2017-11-07

## 2017-11-05 RX ORDER — TACROLIMUS 1 MG/1
2 CAPSULE ORAL 2 TIMES DAILY
Status: DISCONTINUED | OUTPATIENT
Start: 2017-11-05 | End: 2017-11-07

## 2017-11-05 RX ORDER — FAMOTIDINE 20 MG/1
20 TABLET ORAL DAILY
Status: DISCONTINUED | OUTPATIENT
Start: 2017-11-06 | End: 2017-11-07

## 2017-11-05 RX ORDER — LEVOTHYROXINE SODIUM 0.2 MG/1
200 TABLET ORAL
Status: DISCONTINUED | OUTPATIENT
Start: 2017-11-05 | End: 2017-11-07

## 2017-11-06 ENCOUNTER — APPOINTMENT (OUTPATIENT)
Dept: GENERAL RADIOLOGY | Facility: HOSPITAL | Age: 52
DRG: 683 | End: 2017-11-06
Attending: HOSPITALIST
Payer: MEDICARE

## 2017-11-06 PROCEDURE — 99225 SUBSEQUENT OBSERVATION CARE: CPT | Performed by: HOSPITALIST

## 2017-11-06 PROCEDURE — 99222 1ST HOSP IP/OBS MODERATE 55: CPT | Performed by: INTERNAL MEDICINE

## 2017-11-06 PROCEDURE — 74020 XR ABDOMEN, OBSTRUCTIVE SERIES (CPT=74020): CPT | Performed by: HOSPITALIST

## 2017-11-06 RX ORDER — HYDRALAZINE HYDROCHLORIDE 20 MG/ML
10 INJECTION INTRAMUSCULAR; INTRAVENOUS EVERY 4 HOURS PRN
Status: DISCONTINUED | OUTPATIENT
Start: 2017-11-06 | End: 2017-11-06

## 2017-11-06 RX ORDER — HEPARIN SODIUM 5000 [USP'U]/ML
7500 INJECTION, SOLUTION INTRAVENOUS; SUBCUTANEOUS EVERY 8 HOURS SCHEDULED
Status: DISCONTINUED | OUTPATIENT
Start: 2017-11-06 | End: 2017-11-07

## 2017-11-06 NOTE — ED PROVIDER NOTES
Patient Seen in: [facility] Emergency Department    History   Patient presents with:  Nausea/Vomiting/Diarrhea (gastrointestinal)    Stated Complaint: NV    HPI    31-year-old male presents emergency department who started having nausea vomiting and (Via Echo 1/28/14) 2/4/2014   • RA (rheumatoid arthritis) ([facility] [address]    • Renal disorder    • Spinal stenosis in cervical region 10/17/2011   • Status post cervical spinal fusion 9/20/2012   • Trigger finger (acquired)    • Unspecified essential hypertension HPI.    Physical Exam   ED Triage Vitals  BP: (!) 165/98 [11/05/17 2106]  Pulse: 89 [11/05/17 2101]  Resp: (P) 20 [11/05/17 2049]  Temp: (P) 99.9 °F (37.7 °C) [11/05/17 2049]  Temp src: (P) Temporal [11/05/17 2049]  SpO2: 99 % [11/05/17 2101]  O2 Device: N WITH DIFFERENTIAL WITH PLATELET.   Procedure                               Abnormality         Status                     ---------                               -----------         ------                     CBC W/ DIFFERENTIAL[[id number]]          Abnormal

## 2017-11-06 NOTE — PLAN OF CARE
[employer]. [place], bilateral hearing aids. Room air, lungs clear bilaterally. On tele, NSR with occasional PVCs. BP elevated, MD aware, medication adjustment. LLE non-pitting edema, pt states this is baseline with some tingling. Had BM this AM, soft and formed.  C/ dependence on agonist therapy ([facility] [address]     Gastroenteritis     Dehydration     Intractable vomiting with nausea, unspecified vomiting type       Active issue(s) requiring resolution prior to discharge: gastroenteritis    Anticipated discharge date: TBD Cur

## 2017-11-06 NOTE — PROGRESS NOTES
NURSING ADMISSION NOTE      Patient RECEIVED FROM THE ED via Cart AND ADMITTED TO ROOM 529 WITH DX OF GASTROENTERITIS. Oriented to room. Safety precautions initiated. Bed in low position. Call light in reach.

## 2017-11-06 NOTE — PROGRESS NOTES
[facility] Pharmacy Note:  Renal Dose Adjustment for Metoclopramide (REGLAN)    Everett Washington has been prescribed Metoclopramide (REGLAN) 10 mg every 8 hours as needed for nausea. Estimated Creatinine Clearance: 38.5 mL/min (based on SCr of 2.68 mg/dL (H)).

## 2017-11-06 NOTE — PROGRESS NOTES
[facility] Pharmacy Progress Note:  Anticoagulation Weight Dose Adjustment for heparin    Jackson  is a 46year old male who has been prescribed heparin 5000 units every 8 hrs for VTE prophylaxis.       Estimated Creatinine Clearance: 44.5 mL/min (based on

## 2017-11-06 NOTE — CONSULTS
[facility]  Report of Consultation    Shea Berumen Patient Status:  Observation    3/31/1965 MRN [id number]   [facility] 5NW-A Attending Yared Becker MD   Hosp Day # 0 PCP Erica Kat MD     Reason for Consultation:  ckd 4/renal Postsurgical hypothyroidism     Dx in 1/2015: tx with surgery and MANZO   • Pulmonary HTN (Via Echo 1/28/14) 2/4/2014   • RA (rheumatoid arthritis) ([facility] [address]    • Renal disorder    • Spinal stenosis in cervical region 10/17/2011   • Status post cervical spinal f swelling  Hydralazine             Hives  Monopril [Fosinopri*    Hives, Itching  Monopril [Fosinopri*    Swelling    Comment:TABS  Pravastatin Sodium      Swelling    Comment:TABS  Toprol Xl [Metoprol*    Hives, Itching  Toprol Xl [Metoprol*    Swelling prescriptions on file.     Review of Systems:  Denies fever/chills  Denies wt loss/gain  Denies HA or visual changes  Denies CP or palpitations  Denies SOB/cough/hemoptysis  Denies abd or flank pain  + N/V/D  Denies change in urinary habits or gross hematur (H)   11/05/2017 42 (H)   10/12/2017 45 (H)   ----------  Blood Urea Nitrogen (mg/dL)   Date Value   09/10/2015 48 (H)   ----------  UREA NITROGEN (BUN) (mg/dL)   Date Value   08/01/2017 65 (H)   05/26/2017 50 (H)   03/03/2017 54 (H)   ----------  Creatini

## 2017-11-06 NOTE — PROGRESS NOTES
KUSHAL'S B/P REMAINS ELEVATED /101 ON THE RIGHT ARM /109 ON HIS LEFT ARM. HE DENIES DIZZINESS AND/OR HEADACHE. PHARMACY WAS ASKED TO RESCHEDULE HIS NORVASC AND COZAAR FOR 0700. DR Anastasiya Lee WAS PAGED AND NOTIFIED AT 1153.   AWAITING CA

## 2017-11-06 NOTE — ED INITIAL ASSESSMENT (HPI)
Pt arrives from home with nausea, vomiting, and weakness since 1200 today. Denies fever at home, + chills. Denies any pain. Pt reports 2 episodes of loose stools today. Denies any pain. A&A.  Pt has hx of kidney transplant in 1997

## 2017-11-06 NOTE — PROGRESS NOTES
PATIENT WITH ELEVATED BLOOD PRESSURES - 201/114; 188/98. DISCUSSED WITH DR Shankar You AND JEREMY, PHARMACIST DUE TO PATIENT'S MULTIPLE ALLERGIES. WILL APPLY CATAPRES PATCH AS ORDERED.   WILL ALSO TRY TO MANAGE HIS PAIN TO HELP BRING HIS BLOOD PRESSURE

## 2017-11-06 NOTE — PROGRESS NOTES
[facility]  Progress Note     Adis Ovi Patient Status:  Observation    3/31/1965 MRN [id number]   [facility] 5NW-A Attending Babar Kurtz MD   Hosp Day # 0 PCP Robert Sam MD     Chief Complaint: V/D    S: Patient with per Sodium  200 mcg Oral Before breakfast   • ferrous sulfate  325 mg Oral Daily   • losartan-hydrochlorothiazide (HYZAAR 100/25) combination tablet   Oral Daily   • predniSONE  7.5 mg Oral Daily   • famoTIDine  20 mg Oral Daily   • tacrolimus  2 mg Oral BID

## 2017-11-06 NOTE — H&P
[facility]  History and Physical     Helen Tim Patient Status:  Emergency    3/31/1965 MRN [id number]   Location [address] [facility] Attending Nitesh Lu MD   Hosp Day # 0 PCP Major Laboy MD     Chief Complaint: N/V s/p kidney transplant   • Postlaminectomy syndrome, cervical region 6/20/2013    Log Date: 12/12/2012    • Postsurgical hypothyroidism     Dx in 1/2015: tx with surgery and MANZO   • Pulmonary HTN (Via Echo 1/28/14) 2/4/2014   • RA (rheumatoid arthritis) Swelling    Comment:TABS  Ciprofloxacin           Hives  Ciprofloxacin Hcl       Hives, Itching    Comment:TABS  Gabapentin              Swelling    Comment:Foot swelling  Hydralazine             Hives  Monopril [Fosinopri*    Hives, Itching  Monopril [Fos MG Oral Tab Take 1 tablet (100 mg total) by mouth daily. Disp: 30 tablet Rfl: 11   furosemide 40 MG Oral Tab Take 1 tablet (40 mg total) by mouth daily.  Disp: 30 tablet Rfl: 11   ondansetron 4 MG Oral Tablet Dispersible Take 1 tablet (4 mg total) by mouth PLT  120.0*       Recent Labs   Lab  11/05/17   2100   GLU  123*   BUN  42*   CREATSERUM  2.68*   CA  8.9   ALB  3.7   NA  141   K  4.5   CL  109   CO2  24.0   ALKPHO  64   AST  19   ALT  21   BILT  0.8   TP  7.6       Estimated Creatinine Clearance: 38.

## 2017-11-07 VITALS
BODY MASS INDEX: 39.17 KG/M2 | SYSTOLIC BLOOD PRESSURE: 178 MMHG | HEIGHT: 75 IN | WEIGHT: 315 LBS | HEART RATE: 92 BPM | OXYGEN SATURATION: 98 % | TEMPERATURE: 99 F | RESPIRATION RATE: 18 BRPM | DIASTOLIC BLOOD PRESSURE: 102 MMHG

## 2017-11-07 PROCEDURE — 99239 HOSP IP/OBS DSCHRG MGMT >30: CPT | Performed by: HOSPITALIST

## 2017-11-07 PROCEDURE — 99232 SBSQ HOSP IP/OBS MODERATE 35: CPT | Performed by: INTERNAL MEDICINE

## 2017-11-07 RX ORDER — METOCLOPRAMIDE HYDROCHLORIDE 5 MG/ML
10 INJECTION INTRAMUSCULAR; INTRAVENOUS EVERY 8 HOURS PRN
Status: DISCONTINUED | OUTPATIENT
Start: 2017-11-07 | End: 2017-11-07

## 2017-11-07 RX ORDER — POTASSIUM CHLORIDE 20 MEQ/1
40 TABLET, EXTENDED RELEASE ORAL ONCE
Status: COMPLETED | OUTPATIENT
Start: 2017-11-07 | End: 2017-11-07

## 2017-11-07 NOTE — PROGRESS NOTES
Pharmacy Note: Renal dose adjustment   Kevin Eden was originally prescribed Metoclopramide 10 mg every 8 hrs as needed which was renally dose adjusted at the time of the original order per P&T approved renal dosing protocol.   Renal function has now i

## 2017-11-07 NOTE — RESPIRATORY THERAPY NOTE
RAQUEL Equipment Usage Summary :            Set Mode :CPAP W FLEX          Usage in Hours:4;36          90% Pressure (EPAP) : 11           90% Insp Pressure (IPAP);           AHI : 5.4          Supplemental Oxygen :      LPM

## 2017-11-07 NOTE — PLAN OF CARE
CARDIOVASCULAR - ADULT    • Maintains optimal cardiac output and hemodynamic stability Progressing        GASTROINTESTINAL - ADULT    • Minimal or absence of nausea and vomiting Progressing        Patient/Family Goals    • Patient/Family Long Term Goal Pro

## 2017-11-07 NOTE — PROGRESS NOTES
[facility]  Nephrology Progress Note    Franklin Yoo Patient Status:  Inpatient    3/31/1965 MRN [id number]   [facility] 5NW-A Attending Deana Canada MD   Hosp Day # 1 PCP Annel Vo MD       SUBJECTIVE:  Feels better today, BP Medications:  CloNIDine HCl (CATAPRES) 0.3 MG/24HR 1 patch 1 patch Transdermal Weekly   Heparin Sodium (Porcine) 5000 UNIT/ML injection 7,500 Units 7,500 Units Subcutaneous Q8H [address]   Prochlorperazine Edisylate (COMPAZINE) injection 5 mg 5 mg Intravenous Q6 HTN- cont usual meds incl catapress patch. Limited by IV meds with allergy to beta blockers/hydralazine/ACE.  meds resumed and will follow    Questions/concerns were discussed with patient and/or family by bedside.           Ursula Or  11/7/2017  11:

## 2017-11-07 NOTE — PROGRESS NOTES
[facility]  Progress Note     Roula Matta Patient Status:  Observation    3/31/1965 MRN [id number]   [facility] 5NW-A Attending Alexa Arriaga MD   Hosp Day # 1 PCP Patsy Sosa MD     Chief Complaint: V/D    S: Patient without [address]   • allopurinol  100 mg Oral Daily   • AmLODIPine Besylate  10 mg Oral Daily   • aspirin  81 mg Oral Daily   • atorvastatin  10 mg Oral Nightly   • Terazosin HCl  10 mg Oral Nightly   • Levothyroxine Sodium  200 mcg Oral Before breakfast   • ferrous duckworth

## 2017-11-08 ENCOUNTER — PATIENT OUTREACH (OUTPATIENT)
Dept: CASE MANAGEMENT | Age: 52
End: 2017-11-08

## 2017-11-08 ENCOUNTER — TELEPHONE (OUTPATIENT)
Dept: NEUROLOGY | Facility: CLINIC | Age: 52
End: 2017-11-08

## 2017-11-08 DIAGNOSIS — Z02.9 ENCOUNTERS FOR ADMINISTRATIVE PURPOSE: ICD-10-CM

## 2017-11-08 RX ORDER — DULOXETIN HYDROCHLORIDE 20 MG/1
20 CAPSULE, DELAYED RELEASE ORAL 2 TIMES DAILY
Qty: 60 CAPSULE | Refills: 1 | Status: SHIPPED | OUTPATIENT
Start: 2017-11-08 | End: 2017-11-21

## 2017-11-08 NOTE — PROGRESS NOTES
Initial Post Discharge Follow Up   Discharge Date: 11/7/17  Contact Date: 11/8/2017    Consent Verification:  Assessment Completed With: Patient  HIPAA Verified?   Yes    Discharge Dx:  Gastroenteritis     General:   • How have you been since your discha ZUBSOLV 1.4-0.36 MG Sublingual SL Tab Place 1.4 mg of buprenorphine under the tongue 3 (three) times daily.  Disp: 90 tablet Rfl: 1   Levothyroxine Sodium 25 MCG Oral Tab Take 200mcg tablet plus 25mcg tablet for a total of 225mcg daily Disp: 90 tablet Rfl Sosa Rincon TCMCHFTEMP  COPD: .TCMCOPDTEMP  CVA: .TCMCVATEMP  CV Surgery:  . TCMCVSURGERY  Pneumonia: . TCMPNEUMONIATEMP  Ortho/Spine:  TCMORTHOSPINETEMP  Re-admit:  . TCMREADMITTEMP       Needs post D/C:   Now that you are home, are there any needs or concerns you need ([facility])    Dec 11, 2017  9:00 AM CST Exam - Establ[facility]    Dec 11, 2017 11:20 AM CST FOLLOW UP with Ton Guzman MD 31 Irena Uriostegui Yes      Have you made all of your follow up appointments? yes    Is there any reason as to why you cannot make your appointments? No     NCM Reviewed upcoming Specialist Appt with patient     Yes     Overall Rating:    How would you rate the care you rec

## 2017-11-08 NOTE — DISCHARGE SUMMARY
[facility] [place] [facility]OSPITAL[facility]  DISCHARGE SUMMARY     Yoni Morales Patient Status:  Inpatient    3/31/1965 MRN [id number]   [facility] 5NW-A Attending No att. providers found   [facility] Day # 1 PCP Christi Davis MD     Date of Admission: 2017  Date Prescription details   allopurinol 100 MG Tabs  Commonly known as:  ZYLOPRIM      Take 1 tablet (100 mg total) by mouth daily.    Quantity:  30 tablet  Refills:  11     AmLODIPine Besylate 10 MG Tabs  Commonly known as:  NORVASC      TAKE 1 TABLET BY MOUTH HCl IR 10 MG Tabs  Commonly known as:  ROXICODONE      1 tab po q6 hr prn sever breakthrough right knee pain   Quantity:  45 tablet  Refills:  0     predniSONE 5 MG Tabs  Commonly known as:  DELTASONE      Take 1.5 tablets (7.5 mg total) by mouth daily.

## 2017-11-09 RX ORDER — DULOXETIN HYDROCHLORIDE 20 MG/1
CAPSULE, DELAYED RELEASE ORAL
Qty: 180 CAPSULE | Refills: 1 | OUTPATIENT
Start: 2017-11-09

## 2017-11-09 NOTE — TELEPHONE ENCOUNTER
New medication, so 90-day supply not appropriate. Pt call about medication making him ill addressed in medication question TE dated 11/8/17.

## 2017-11-09 NOTE — TELEPHONE ENCOUNTER
Per separate TE, pt called the morning of 11/9/17 and stated that he is a kidney patient (had kidney transplant), and that the Cymbalta is made him ill. Pt states he took this medication in 2011 for about 17 days, and he had terrible hallucinations.   Pt d

## 2017-11-14 ENCOUNTER — OFFICE VISIT (OUTPATIENT)
Dept: INTERNAL MEDICINE CLINIC | Facility: CLINIC | Age: 52
End: 2017-11-14

## 2017-11-14 VITALS
OXYGEN SATURATION: 98 % | TEMPERATURE: 98 F | BODY MASS INDEX: 39.17 KG/M2 | HEART RATE: 66 BPM | RESPIRATION RATE: 13 BRPM | DIASTOLIC BLOOD PRESSURE: 94 MMHG | WEIGHT: 315 LBS | HEIGHT: 75 IN | SYSTOLIC BLOOD PRESSURE: 156 MMHG

## 2017-11-14 DIAGNOSIS — D69.6 THROMBOCYTOPENIA (HCC): ICD-10-CM

## 2017-11-14 DIAGNOSIS — N18.30 CKD (CHRONIC KIDNEY DISEASE) STAGE 3, GFR 30-59 ML/MIN (HCC): ICD-10-CM

## 2017-11-14 DIAGNOSIS — K52.9 GASTROENTERITIS: Primary | ICD-10-CM

## 2017-11-14 PROBLEM — E86.0 DEHYDRATION: Status: RESOLVED | Noted: 2017-11-05 | Resolved: 2017-11-14

## 2017-11-14 PROBLEM — R11.2 INTRACTABLE VOMITING WITH NAUSEA, UNSPECIFIED VOMITING TYPE: Status: RESOLVED | Noted: 2017-11-05 | Resolved: 2017-11-14

## 2017-11-14 PROCEDURE — 99496 TRANSJ CARE MGMT HIGH F2F 7D: CPT | Performed by: INTERNAL MEDICINE

## 2017-11-15 PROBLEM — D69.6 THROMBOCYTOPENIA: Status: ACTIVE | Noted: 2017-11-15

## 2017-11-15 PROBLEM — D69.6 THROMBOCYTOPENIA (HCC): Status: ACTIVE | Noted: 2017-11-15

## 2017-11-15 NOTE — PROGRESS NOTES
HPI:    Tien Frazier is a 46year old male here today for hospital follow up.    He was discharged from [facility] to Home  Admission Date: 11/5/17   Discharge Date: 11/7/17  Hospital Discharge Diagnosis: Gastroenteritis, Acute kidney insufficiency issues.     Allergies:  He is allergic to carvedilol; carvedilol; ciprofloxacin; ciprofloxacin hcl; gabapentin; hydralazine; monopril [fosinopril sodium]; monopril [fosinopril sodium]; pravastatin sodium; toprol xl [metoprolol succinate]; toprol xl [metopro mouth daily. Atorvastatin Calcium (LIPITOR) 10 MG Oral Tab Take 10 mg by mouth nightly. Ranitidine HCl (ZANTAC) 150 MG Oral Tab Take 150 mg by mouth daily. No current facility-administered medications on file prior to visit.        HISTORY: reconc femur/knee surg unlisted (1994); other surgical history; other surgical history (Right, 1997); colonoscopy; thyroidectomy; colonoscopy (N/A, 5/1/2015); cath pv (12/11/15); and knee surgery.     He family history includes bipolar disorder in his mother; braeden nausea, no vomiting, and minimal diarrhea. Trying to keep himself hydrated and recover his energy. 2. Acute kidney insufficiency on top of Stage 3 CKD - He briefly had Stage 4 CKD, but this improved back to stage 3 CKD w/ proper IVF hydration.   He was cl

## 2017-11-20 ENCOUNTER — OFFICE VISIT (OUTPATIENT)
Dept: ELECTROPHYSIOLOGY | Facility: HOSPITAL | Age: 52
End: 2017-11-20
Attending: Other
Payer: MEDICARE

## 2017-11-20 DIAGNOSIS — G62.9 PERIPHERAL POLYNEUROPATHY: ICD-10-CM

## 2017-11-20 PROCEDURE — 95885 MUSC TST DONE W/NERV TST LIM: CPT | Performed by: OTHER

## 2017-11-20 PROCEDURE — 95886 MUSC TEST DONE W/N TEST COMP: CPT | Performed by: OTHER

## 2017-11-20 PROCEDURE — 95909 NRV CNDJ TST 5-6 STUDIES: CPT | Performed by: OTHER

## 2017-11-20 NOTE — PROCEDURES
[address] [facility], [address] [facility]  [phone]            Patient: Katie Arechiga Sex: Male  Patient ID: [id number] YOB: 1965      Visit Date: 11/20/2017 09:03  Patient Age On Visit Date: Needle EMG (Lower limb)         EMG Summary Table     Spontaneous MUAP Recruitment   Muscle Nerve Roots IA Fib PSW Fasc H.F. Amp Dur. PPP Pattern   R. Tibialis anterior Deep peroneal (Fibular) L4-L5 N None None None None N N N N   R.  Gastrocnemius (Medial

## 2017-11-27 ENCOUNTER — TELEPHONE (OUTPATIENT)
Dept: NEUROLOGY | Facility: CLINIC | Age: 52
End: 2017-11-27

## 2017-11-27 NOTE — TELEPHONE ENCOUNTER
----- Message from Alex Whittington MD sent at 11/22/2017  9:21 AM CST -----  EMG/NCS shows BLE axonal sensorimotor polyneuropathy. Likely related to CKD and RA      Relayed above to patient.  Will discuss results in more detail and next steps at upcoming

## 2017-12-04 ENCOUNTER — TELEPHONE (OUTPATIENT)
Dept: NEPHROLOGY | Facility: CLINIC | Age: 52
End: 2017-12-04

## 2017-12-04 ENCOUNTER — OFFICE VISIT (OUTPATIENT)
Dept: INTERNAL MEDICINE CLINIC | Facility: CLINIC | Age: 52
End: 2017-12-04

## 2017-12-04 ENCOUNTER — OFFICE VISIT (OUTPATIENT)
Dept: NEPHROLOGY | Facility: CLINIC | Age: 52
End: 2017-12-04

## 2017-12-04 VITALS — WEIGHT: 315 LBS | SYSTOLIC BLOOD PRESSURE: 176 MMHG | DIASTOLIC BLOOD PRESSURE: 100 MMHG | BODY MASS INDEX: 45 KG/M2

## 2017-12-04 VITALS
DIASTOLIC BLOOD PRESSURE: 110 MMHG | RESPIRATION RATE: 16 BRPM | WEIGHT: 315 LBS | HEIGHT: 75 IN | TEMPERATURE: 99 F | SYSTOLIC BLOOD PRESSURE: 180 MMHG | BODY MASS INDEX: 39.17 KG/M2 | HEART RATE: 85 BPM | OXYGEN SATURATION: 98 %

## 2017-12-04 DIAGNOSIS — I10 ESSENTIAL HYPERTENSION, BENIGN: ICD-10-CM

## 2017-12-04 DIAGNOSIS — F11.20 OPIOID DEPENDENCE ON AGONIST THERAPY (HCC): ICD-10-CM

## 2017-12-04 DIAGNOSIS — L02.419 AXILLARY ABSCESS: Primary | ICD-10-CM

## 2017-12-04 DIAGNOSIS — I10 ESSENTIAL HYPERTENSION: ICD-10-CM

## 2017-12-04 DIAGNOSIS — N18.30 CKD (CHRONIC KIDNEY DISEASE) STAGE 3, GFR 30-59 ML/MIN (HCC): ICD-10-CM

## 2017-12-04 DIAGNOSIS — Z94.0 RENAL TRANSPLANT RECIPIENT: ICD-10-CM

## 2017-12-04 DIAGNOSIS — R60.0 LOCALIZED EDEMA: ICD-10-CM

## 2017-12-04 DIAGNOSIS — N18.30 CHRONIC KIDNEY DISEASE, STAGE III (MODERATE) (HCC): Primary | ICD-10-CM

## 2017-12-04 PROCEDURE — 99214 OFFICE O/P EST MOD 30 MIN: CPT | Performed by: INTERNAL MEDICINE

## 2017-12-04 RX ORDER — AMOXICILLIN AND CLAVULANATE POTASSIUM 875; 125 MG/1; MG/1
1 TABLET, FILM COATED ORAL 2 TIMES DAILY
Qty: 20 TABLET | Refills: 0 | Status: SHIPPED | OUTPATIENT
Start: 2017-12-04 | End: 2017-12-14

## 2017-12-04 RX ORDER — FUROSEMIDE 40 MG/1
80 TABLET ORAL 2 TIMES DAILY
Qty: 120 TABLET | Refills: 11 | Status: SHIPPED | OUTPATIENT
Start: 2017-12-04 | End: 2018-02-15 | Stop reason: DRUGHIGH

## 2017-12-04 NOTE — PATIENT INSTRUCTIONS
- Start antibiotic today (Augmentin). Take 1 tablet twice daily with food. - I sent in 10 days worth; take at least 7 days worth. If in 1 week, the swelling is gone, you can stop antibiotics.     - If your swelling is not improving in 3-4 days, follow

## 2017-12-04 NOTE — PROGRESS NOTES
Leigha Lund is a 46year old male. HPI:   Patient presents with:  Abscess (integumentary): under left auxillary, x5 days   Patient presents with left axillary swelling. First noticed it five days ago.   Swelling, discomfort, erythema in left lower Therapy Pack, As directed., Disp: 1 kit, Rfl: 0  •  Elastic Bandages & Supports (KNEE BRACE/HINGED BARS 3XL) Does not apply Misc, Use as directed to left knee when ambulating only, Disp: 1 each, Rfl: 0  •  ZUBSOLV 1.4-0.36 MG Sublingual SL Tab, Place 1.4 m •  aspirin 81 MG Oral Tab EC, Take 81 mg by mouth daily. , Disp: , Rfl:   •  Ferrous Sulfate 325 (65 FE) MG Oral Tab, Take 1 tablet (325 mg total) by mouth daily. , Disp: 30 tablet, Rfl: 0  •  Atorvastatin Calcium (LIPITOR) 10 MG Oral Tab, Take 10 mg by mo and Unspecified sleep apnea.   Surgical:  has a past surgical history that includes appendectomy (2003); cholecystectomy (2003); laminectomy,facetectomy,lumbar (05/29/2012); revise median n/carpal tunnel surg (2009); femur/knee surg unlisted (1994); other s 10 mg qd, losartan-hctz 100-25 mg qd.      3/4. Chronic kidney disease, renal transplant recipient  Saw Nephrology today. As above, diuretics were increased. Also follows with transplant team.  On immunosuppression. GFR 30+ last month.       5.  Opioid

## 2017-12-04 NOTE — PROGRESS NOTES
Nephrology Progress Note      John Godinez is a 46year old male. HPI:   Patient presents with:  Chronic Kidney Disease  Renal Transplant  Hypertension    Mr. Margarette Melgar was seen in the nephrology clinic today in follow-up for management of chronic k PONV (postoperative nausea and vomiting) 1997    s/p kidney transplant   • Postlaminectomy syndrome, cervical region 6/20/2013    Log Date: 12/12/2012    • Postsurgical hypothyroidism     Dx in 1/2015: tx with surgery and MANZO   • Pulmonary HTN (Via Echo 1/ today's visit):    Current Outpatient Prescriptions:  methylPREDNISolone (MEDROL) 4 MG Oral Tablet Therapy Pack As directed.  Disp: 1 kit Rfl: 0   Elastic Bandages & Supports (KNEE BRACE/HINGED BARS 3XL) Does not apply Misc Use as directed to left knee when Disp:  Rfl:    aspirin 81 MG Oral Tab EC Take 81 mg by mouth daily. Disp:  Rfl:    Ferrous Sulfate 325 (65 FE) MG Oral Tab Take 1 tablet (325 mg total) by mouth daily.  Disp: 30 tablet Rfl: 0   Atorvastatin Calcium (LIPITOR) 10 MG Oral Tab Take 10 mg by devyn with area of tenderness and fluctuance approximately 3 x 2 cm  Extremities: 2-3+ edema to the knees bilaterally  Neurologic: moving all extremities, he ambulate with a cane  Skin: Warm and dry, no rashes      LABS:       Lab Results  Component Value Date 03/14/2016   RBCUR NONE SEEN 03/14/2016   EPIUR NONE SEEN 03/14/2016   BACUR NONE SEEN 03/14/2016   HYLUR NONE SEEN 03/14/2016     ASSESSMENT/PLAN:     #1.  Chronic kidney disease stage III-IV-Mr. Ricci Shen has had long-standing chronic kidney disease [facility]

## 2017-12-07 ENCOUNTER — OFFICE VISIT (OUTPATIENT)
Dept: NEUROLOGY | Facility: CLINIC | Age: 52
End: 2017-12-07

## 2017-12-07 VITALS
RESPIRATION RATE: 16 BRPM | HEIGHT: 73 IN | SYSTOLIC BLOOD PRESSURE: 178 MMHG | WEIGHT: 315 LBS | TEMPERATURE: 98 F | DIASTOLIC BLOOD PRESSURE: 110 MMHG | BODY MASS INDEX: 41.75 KG/M2 | HEART RATE: 88 BPM | OXYGEN SATURATION: 96 %

## 2017-12-07 DIAGNOSIS — G62.89 AXONAL SENSORIMOTOR NEUROPATHY: Primary | ICD-10-CM

## 2017-12-07 PROCEDURE — 99213 OFFICE O/P EST LOW 20 MIN: CPT | Performed by: OTHER

## 2017-12-07 NOTE — PROGRESS NOTES
HPI:    Patient ID: Trey London is a 46year old male. HPI      Interim history  Lolita Shanks returns to the clinic for follow up for peripheral neuropathy. EMG/NCS confirms bilateral LE sensorimotor axonal neuropathy.  He was given Nortriptyline for neur mary   • History of fusion of cervical spine 5/31/2017   • Hyperlipidemia    • Kidney replaced by transplant 1997   • Laxity of knee joint, left 5/31/2017   • Long-term current use of opiate analgesic 8/23/2012   • Mood disorder due to a general medical c Comment: renal transplant - Done at [facility]  2009: REVISE MEDIAN N/CARPAL TUNNEL SURG      Comment: neuroplasty decompression median   No date: THYROIDECTOMY      Comment: On 2/2/2015: total thyroidectomy for papillary               thyroid carcinoma   Famil CLONIDINE HCL 0.3 MG Oral Tab TAKE 1 TABLET BY MOUTH TWICE DAILY Disp: 180 tablet Rfl: 1   DOXAZOSIN MESYLATE 8 MG Oral Tab TAKE 2 TABLETS BY MOUTH DAILY Disp: 180 tablet Rfl: 1   KLOR-CON 10 10 MEQ Oral Tab CR TAKE 2 TABLETS BY MOUTH THREE TIMES DAILY D Comment:TB24  Duloxetine Hcl          Confusion, Hallucinations  Hydralazine Hcl             Comment:TABS Drowsiness  Nortriptyline Hcl       Diarrhea, Nausea and vomiting  Pravachol [Pravasta*    Hives, Itching   PHYSICAL EXAM:   Physical Exam      Vitals

## 2017-12-14 ENCOUNTER — TELEPHONE (OUTPATIENT)
Dept: INTERNAL MEDICINE CLINIC | Facility: CLINIC | Age: 52
End: 2017-12-14

## 2017-12-14 ENCOUNTER — OFFICE VISIT (OUTPATIENT)
Dept: INTERNAL MEDICINE CLINIC | Facility: CLINIC | Age: 52
End: 2017-12-14

## 2017-12-14 VITALS
TEMPERATURE: 98 F | SYSTOLIC BLOOD PRESSURE: 168 MMHG | HEIGHT: 75 IN | RESPIRATION RATE: 16 BRPM | BODY MASS INDEX: 39.17 KG/M2 | DIASTOLIC BLOOD PRESSURE: 98 MMHG | HEART RATE: 72 BPM | WEIGHT: 315 LBS | OXYGEN SATURATION: 98 %

## 2017-12-14 DIAGNOSIS — M50.20 BULGING OF CERVICAL INTERVERTEBRAL DISC: ICD-10-CM

## 2017-12-14 DIAGNOSIS — M54.2 CHRONIC NECK PAIN: ICD-10-CM

## 2017-12-14 DIAGNOSIS — Z79.02 LONG TERM CURRENT USE OF ANTITHROMBOTICS/ANTIPLATELETS: ICD-10-CM

## 2017-12-14 DIAGNOSIS — T84.84XS PAINFUL TOTAL KNEE REPLACEMENT, SEQUELA: ICD-10-CM

## 2017-12-14 DIAGNOSIS — Z91.81 RISK FOR FALLS: ICD-10-CM

## 2017-12-14 DIAGNOSIS — Z98.1 HISTORY OF FUSION OF CERVICAL SPINE: ICD-10-CM

## 2017-12-14 DIAGNOSIS — R60.9 PERIPHERAL EDEMA: ICD-10-CM

## 2017-12-14 DIAGNOSIS — G62.9 PERIPHERAL POLYNEUROPATHY: ICD-10-CM

## 2017-12-14 DIAGNOSIS — Z94.0 RENAL TRANSPLANT RECIPIENT: ICD-10-CM

## 2017-12-14 DIAGNOSIS — M54.12 CHRONIC CERVICAL RADICULOPATHY: ICD-10-CM

## 2017-12-14 DIAGNOSIS — F11.20 OPIOID DEPENDENCE ON AGONIST THERAPY (HCC): ICD-10-CM

## 2017-12-14 DIAGNOSIS — M06.9 RHEUMATOID ARTHRITIS INVOLVING MULTIPLE SITES, UNSPECIFIED RHEUMATOID FACTOR PRESENCE: ICD-10-CM

## 2017-12-14 DIAGNOSIS — E66.01 MORBID OBESITY WITH BMI OF 40.0-44.9, ADULT (HCC): ICD-10-CM

## 2017-12-14 DIAGNOSIS — M48.02 NEUROFORAMINAL STENOSIS OF CERVICAL SPINE: ICD-10-CM

## 2017-12-14 DIAGNOSIS — Z23 NEED FOR VACCINATION FOR STREP PNEUMONIAE: ICD-10-CM

## 2017-12-14 DIAGNOSIS — M47.812 CERVICAL SPINE ARTHRITIS: ICD-10-CM

## 2017-12-14 DIAGNOSIS — M17.11 PRIMARY OSTEOARTHRITIS OF RIGHT KNEE: ICD-10-CM

## 2017-12-14 DIAGNOSIS — Z96.659 PAINFUL TOTAL KNEE REPLACEMENT, SEQUELA: ICD-10-CM

## 2017-12-14 DIAGNOSIS — C73 PAPILLARY THYROID CARCINOMA (HCC): ICD-10-CM

## 2017-12-14 DIAGNOSIS — M48.02 SPINAL STENOSIS IN CERVICAL REGION: ICD-10-CM

## 2017-12-14 DIAGNOSIS — K21.9 GASTROESOPHAGEAL REFLUX DISEASE WITHOUT ESOPHAGITIS: ICD-10-CM

## 2017-12-14 DIAGNOSIS — E89.0 POSTSURGICAL HYPOTHYROIDISM: ICD-10-CM

## 2017-12-14 DIAGNOSIS — I27.20 PULMONARY HTN (HCC): ICD-10-CM

## 2017-12-14 DIAGNOSIS — Z92.25 PERSONAL HISTORY OF IMMUNOSUPRESSION THERAPY: ICD-10-CM

## 2017-12-14 DIAGNOSIS — R26.89 FUNCTIONAL GAIT ABNORMALITY: ICD-10-CM

## 2017-12-14 DIAGNOSIS — R53.82 CHRONIC FATIGUE SYNDROME: ICD-10-CM

## 2017-12-14 DIAGNOSIS — I10 ESSENTIAL HYPERTENSION, BENIGN: Primary | ICD-10-CM

## 2017-12-14 DIAGNOSIS — D69.6 THROMBOCYTOPENIA (HCC): ICD-10-CM

## 2017-12-14 DIAGNOSIS — I25.10 CORONARY ARTERY DISEASE INVOLVING NATIVE CORONARY ARTERY OF NATIVE HEART WITHOUT ANGINA PECTORIS: ICD-10-CM

## 2017-12-14 DIAGNOSIS — G89.29 CHRONIC NECK PAIN: ICD-10-CM

## 2017-12-14 DIAGNOSIS — G47.33 OSA ON CPAP: ICD-10-CM

## 2017-12-14 DIAGNOSIS — N18.30 CKD (CHRONIC KIDNEY DISEASE) STAGE 3, GFR 30-59 ML/MIN (HCC): ICD-10-CM

## 2017-12-14 DIAGNOSIS — Z99.89 OSA ON CPAP: ICD-10-CM

## 2017-12-14 DIAGNOSIS — D63.8 ANEMIA OF CHRONIC DISEASE: ICD-10-CM

## 2017-12-14 DIAGNOSIS — M1A.09X0 IDIOPATHIC CHRONIC GOUT OF MULTIPLE SITES WITHOUT TOPHUS: ICD-10-CM

## 2017-12-14 PROCEDURE — G0009 ADMIN PNEUMOCOCCAL VACCINE: HCPCS | Performed by: INTERNAL MEDICINE

## 2017-12-14 PROCEDURE — 99214 OFFICE O/P EST MOD 30 MIN: CPT | Performed by: INTERNAL MEDICINE

## 2017-12-14 PROCEDURE — 90670 PCV13 VACCINE IM: CPT | Performed by: INTERNAL MEDICINE

## 2017-12-14 NOTE — PATIENT INSTRUCTIONS
Shy Lyon, in 8 weeks from now you will need a Pneumonia-23 vaccine. Then you will see me again in about 4 months.

## 2017-12-14 NOTE — PROGRESS NOTES
Patient presents with: Follow - Up: 6 months       HPI: The pt presents today for 6-month f/u chronic medical conditions as follows:    1. HTN - Stable on prescription medication.  No medication SEs.   Continues to work with Dr. Linda Schumacher from Cardiology.   2. Stable and per Neurology. No new issues. 20. RTC 6 months. Patient verbally agrees to and understands the plan as outlined above. Patient was also afforded the time and opportunity to ask questions, which were then answered to the best of my ability. polyneuropathy        Carvedilol              Hives, Itching  Carvedilol              Swelling    Comment:TABS  Ciprofloxacin           Hives  Ciprofloxacin Hcl       Hives, Itching    Comment:TABS  Gabapentin              Swelling    Comment:Foot swelling TAKE 2 TABLETS BY MOUTH DAILY, Disp: 180 tablet, Rfl: 1  •  KLOR-CON 10 10 MEQ Oral Tab CR, TAKE 2 TABLETS BY MOUTH THREE TIMES DAILY, Disp: 540 tablet, Rfl: 1  •  allopurinol 100 MG Oral Tab, Take 1 tablet (100 mg total) by mouth daily. , Disp: 30 tablet, artery disease involving native coronary artery of native heart without angina pectoris  Long term current use of antithrombotics/antiplatelets  Ckd (chronic kidney disease) stage 3, gfr 30-59 ml/min  Renal transplant recipient  Personal history of immunos syndrome - Still tired and continues w/ global support measures.  No new issues. 8. RAQUEL on CPAP - Stable and no new issues.   9. GERD - Stable on prescription medication.  Reports less AM nausea than in the past.  10. Papillary thyroid CA and post-surgical

## 2017-12-15 NOTE — TELEPHONE ENCOUNTER
Order placed. Bethany Nielson. Severo Obey, MD  Diplomate, American Board of Internal Medicine  [facility]  [address] [facility],[address], [address], [facility], [address] [place] [address]  T: [id number]; F: Fermin 5

## 2017-12-26 ENCOUNTER — APPOINTMENT (OUTPATIENT)
Dept: LAB | Facility: HOSPITAL | Age: 52
End: 2017-12-26
Attending: INTERNAL MEDICINE
Payer: MEDICARE

## 2017-12-26 ENCOUNTER — APPOINTMENT (OUTPATIENT)
Dept: NUCLEAR MEDICINE | Facility: HOSPITAL | Age: 52
End: 2017-12-26
Attending: EMERGENCY MEDICINE
Payer: MEDICARE

## 2017-12-26 ENCOUNTER — HOSPITAL ENCOUNTER (EMERGENCY)
Facility: HOSPITAL | Age: 52
Discharge: HOME | End: 2017-12-26
Attending: EMERGENCY MEDICINE
Payer: MEDICARE

## 2017-12-26 ENCOUNTER — OFFICE VISIT (OUTPATIENT)
Dept: INTERNAL MEDICINE CLINIC | Facility: CLINIC | Age: 52
End: 2017-12-26

## 2017-12-26 ENCOUNTER — HOSPITAL ENCOUNTER (OUTPATIENT)
Dept: GENERAL RADIOLOGY | Facility: HOSPITAL | Age: 52
Discharge: HOME | End: 2017-12-26
Attending: NURSE PRACTITIONER
Payer: MEDICARE

## 2017-12-26 VITALS
WEIGHT: 315 LBS | BODY MASS INDEX: 42 KG/M2 | SYSTOLIC BLOOD PRESSURE: 160 MMHG | TEMPERATURE: 98 F | DIASTOLIC BLOOD PRESSURE: 96 MMHG | OXYGEN SATURATION: 99 % | HEART RATE: 82 BPM | RESPIRATION RATE: 20 BRPM

## 2017-12-26 VITALS
HEIGHT: 76 IN | RESPIRATION RATE: 13 BRPM | HEART RATE: 78 BPM | BODY MASS INDEX: 38.36 KG/M2 | TEMPERATURE: 99 F | OXYGEN SATURATION: 98 % | DIASTOLIC BLOOD PRESSURE: 93 MMHG | SYSTOLIC BLOOD PRESSURE: 164 MMHG | WEIGHT: 315 LBS

## 2017-12-26 DIAGNOSIS — N18.9 ACUTE RENAL FAILURE SUPERIMPOSED ON CHRONIC KIDNEY DISEASE, UNSPECIFIED CKD STAGE, UNSPECIFIED ACUTE RENAL FAILURE TYPE (HCC): Primary | ICD-10-CM

## 2017-12-26 DIAGNOSIS — R06.02 SHORTNESS OF BREATH: ICD-10-CM

## 2017-12-26 DIAGNOSIS — R06.00 DYSPNEA, UNSPECIFIED TYPE: ICD-10-CM

## 2017-12-26 DIAGNOSIS — R06.02 SHORTNESS OF BREATH: Primary | ICD-10-CM

## 2017-12-26 DIAGNOSIS — N17.9 ACUTE RENAL FAILURE SUPERIMPOSED ON CHRONIC KIDNEY DISEASE, UNSPECIFIED CKD STAGE, UNSPECIFIED ACUTE RENAL FAILURE TYPE (HCC): Primary | ICD-10-CM

## 2017-12-26 PROCEDURE — 99285 EMERGENCY DEPT VISIT HI MDM: CPT

## 2017-12-26 PROCEDURE — 71020 XR CHEST PA + LAT CHEST (CPT=71020): CPT | Performed by: NURSE PRACTITIONER

## 2017-12-26 PROCEDURE — 84484 ASSAY OF TROPONIN QUANT: CPT | Performed by: EMERGENCY MEDICINE

## 2017-12-26 PROCEDURE — 93010 ELECTROCARDIOGRAM REPORT: CPT

## 2017-12-26 PROCEDURE — 78582 LUNG VENTILAT&PERFUS IMAGING: CPT | Performed by: EMERGENCY MEDICINE

## 2017-12-26 PROCEDURE — 99213 OFFICE O/P EST LOW 20 MIN: CPT | Performed by: NURSE PRACTITIONER

## 2017-12-26 PROCEDURE — 85025 COMPLETE CBC W/AUTO DIFF WBC: CPT | Performed by: EMERGENCY MEDICINE

## 2017-12-26 PROCEDURE — 80053 COMPREHEN METABOLIC PANEL: CPT | Performed by: EMERGENCY MEDICINE

## 2017-12-26 PROCEDURE — 36415 COLL VENOUS BLD VENIPUNCTURE: CPT

## 2017-12-26 PROCEDURE — 83880 ASSAY OF NATRIURETIC PEPTIDE: CPT

## 2017-12-26 PROCEDURE — 85378 FIBRIN DEGRADE SEMIQUANT: CPT

## 2017-12-26 PROCEDURE — 93005 ELECTROCARDIOGRAM TRACING: CPT

## 2017-12-26 RX ORDER — CLONIDINE HYDROCHLORIDE 0.1 MG/1
0.3 TABLET ORAL 2 TIMES DAILY
Status: DISCONTINUED | OUTPATIENT
Start: 2017-12-26 | End: 2017-12-26

## 2017-12-26 NOTE — PROGRESS NOTES
CHIEF COMPLAINT:     Patient presents with:  Breathing Problem: Pt states he can't catch his breath unless he yawns. pt breathes heavily through the nose      HPI:   Alisson Maynard is a 46year old male with significant medical history.  Pt states he has by mouth daily. Disp: 30 tablet Rfl: 11   ondansetron 4 MG Oral Tablet Dispersible Take 1 tablet (4 mg total) by mouth daily as needed for Nausea.  Disp: 90 tablet Rfl: 1   Cholecalciferol (VITAMIN D) 1000 UNITS Oral Tab Take 1,000 Int'l Units/day by mouth cervical region 6/20/2013    Log Date: 12/12/2012    • Postsurgical hypothyroidism     Dx in 1/2015: tx with surgery and MANZO   • Pulmonary HTN (Via Echo 1/28/14) 2/4/2014   • RA (rheumatoid arthritis) ([facility] [address]    • Renal disorder    • Spinal stenosis in cervi murmur  EXTREMITIES: no cyanosis, clubbing or edema  LYMPH:  No lymphadenopathy. Physical Exam    ASSESSMENT AND PLAN:   Medical hx + for RAQUEL, CAD, Pulmonary HTN, uncontrolled HTN, CKD and chronic pain    1. Shortness of breath  - D-DIMER;  Future  - X

## 2017-12-27 ENCOUNTER — OFFICE VISIT (OUTPATIENT)
Dept: NEPHROLOGY | Facility: CLINIC | Age: 52
End: 2017-12-27

## 2017-12-27 ENCOUNTER — TELEPHONE (OUTPATIENT)
Dept: NEPHROLOGY | Facility: CLINIC | Age: 52
End: 2017-12-27

## 2017-12-27 VITALS — SYSTOLIC BLOOD PRESSURE: 172 MMHG | DIASTOLIC BLOOD PRESSURE: 96 MMHG | WEIGHT: 315 LBS | BODY MASS INDEX: 41 KG/M2

## 2017-12-27 DIAGNOSIS — N18.30 CHRONIC KIDNEY DISEASE, STAGE III (MODERATE) (HCC): ICD-10-CM

## 2017-12-27 DIAGNOSIS — I10 ESSENTIAL HYPERTENSION: ICD-10-CM

## 2017-12-27 DIAGNOSIS — N17.9 ACUTE KIDNEY INJURY (HCC): Primary | ICD-10-CM

## 2017-12-27 DIAGNOSIS — R60.0 LOCALIZED EDEMA: ICD-10-CM

## 2017-12-27 DIAGNOSIS — Z94.0 RENAL TRANSPLANT RECIPIENT: ICD-10-CM

## 2017-12-27 PROCEDURE — 99214 OFFICE O/P EST MOD 30 MIN: CPT | Performed by: INTERNAL MEDICINE

## 2017-12-27 NOTE — ED PROVIDER NOTES
Patient Seen in: [facility] Emergency Department    History   Patient presents with:  Abnormal Result (metabolic, cardiac)    Stated Complaint: abd normal labs elevated dimer     HPI    22-year-old male complaining of shortness of breath.   The patie vomiting) 1997    s/p kidney transplant   • Postlaminectomy syndrome, cervical region 6/20/2013    Log Date: 12/12/2012    • Postsurgical hypothyroidism     Dx in 1/2015: tx with surgery and MANZO   • Pulmonary HTN (Via Echo 1/28/14) 2/4/2014   • RA (rheumat Vitals [12/26/17 2002]  BP: (!) 175/102  Pulse: 102  Resp: 19  Temp: 99.3 °F (37.4 °C)  Temp src: Temporal  SpO2: 99 %  O2 Device: None (Room air)    Current:BP (!) 164/93   Pulse 78   Temp 99.3 °F (37.4 °C) (Temporal)   Resp 13   Ht 193 cm (6' 4\")   Wt ( infarct age undetermined this is an abnormal EKG           ED Course as of Dec 26 2233  ------------------------------------------------------------       MDM   The patient's BUN was 80 and creatinine was 4.23 prior creatinine was around 2 the patient's ha

## 2017-12-27 NOTE — ED INITIAL ASSESSMENT (HPI)
Complaint of SOB x 4 days. Denies chest pain. Saw his PCP. Had blood work done. Was told his D dimer was elevated.

## 2017-12-27 NOTE — PROGRESS NOTES
Nephrology Progress Note      Domenic Cantrell is a 46year old male.     HPI:   Patient presents with:  Chronic Kidney Disease  Hypertension  Renal Transplant    Queta Paz was seen in the nephrology clinic today in follow-up for management of chronic current use of opiate analgesic 8/23/2012   • Mood disorder due to a general medical condition 8/23/2012   • Morbid obesity with BMI of 40.0-44.9, adult ([facility] [address] 1/21/2014   • Needs flu shot 10/10/2012   • Nephritis and nephropathy, not specified as acute or On 2/2/2015: total thyroidectomy for papillary               thyroid carcinoma   Family History   Problem Relation Age of Onset   • bipolar disorder [OTHER] Mother    • glomerulonephritis [OTHER] Mother    • glomerulonephritis [OTHER] Maternal Grandmother Calcium (LIPITOR) 10 MG Oral Tab Take 10 mg by mouth nightly.    Disp:  Rfl:    Elastic Bandages & Supports (KNEE BRACE/HINGED BARS 3XL) Does not apply Misc Use as directed to left knee when ambulating only Disp: 1 each Rfl: 0   ZUBSOLV 1.4-0.36 MG Sublingu 339 lb  12/14/17 : (!) 345 lb 4 oz  12/11/17 : (!) 340 lb  12/07/17 : (!) 351 lb      General: Alert and oriented in no apparent distress, he appears weak  HEENT: No scleral icterus, MMM  Neck: Supple, no LISY or thyromegaly  Cardiac: Regular rate and rhyth 11/06/2017   SPECGRAVITY 1.016 11/06/2017   GLUUR Negative 11/06/2017   BILUR Negative 11/06/2017   KETUR Negative 11/06/2017   BLOODURINE Negative 11/06/2017   PHURINE 5.0 11/06/2017   PROUR Negative 11/06/2017   UROBILINOGEN <2.0 11/06/2017   NITRITE Neg with shortness of breath. He wears CPAP at night and has been compliant with this.   There is a degree of anemia but not so severe that I would have anticipated significant shortness of breath and he would not be a candidate for erythropoietin stimulating

## 2018-01-04 LAB
ABSOLUTE BASOPHILS: 38 CELLS/UL (ref 0–200)
ABSOLUTE EOSINOPHILS: 778 CELLS/UL (ref 15–500)
ABSOLUTE LYMPHOCYTES: 1997 CELLS/UL (ref 850–3900)
ABSOLUTE MONOCYTES: 979 CELLS/UL (ref 200–950)
ABSOLUTE NEUTROPHILS: 5808 CELLS/UL (ref 1500–7800)
BASOPHILS: 0.4 %
EOSINOPHILS: 8.1 %
HEMATOCRIT: 28.5 % (ref 38.5–50)
HEMOGLOBIN: 9.5 G/DL (ref 13.2–17.1)
LYMPHOCYTES: 20.8 %
MCH: 28.3 PG (ref 27–33)
MCHC: 33.3 G/DL (ref 32–36)
MCV: 84.8 FL (ref 80–100)
MONOCYTES: 10.2 %
MPV: 11.6 FL (ref 7.5–12.5)
NEUTROPHILS: 60.5 %
PLATELET COUNT: 127 THOUSAND/UL (ref 140–400)
RDW: 15.2 % (ref 11–15)
RED BLOOD CELL COUNT: 3.36 MILLION/UL (ref 4.2–5.8)
WHITE BLOOD CELL COUNT: 9.6 THOUSAND/UL (ref 3.8–10.8)

## 2018-01-08 ENCOUNTER — LAB ENCOUNTER (OUTPATIENT)
Dept: LAB | Facility: HOSPITAL | Age: 53
End: 2018-01-08
Attending: INTERNAL MEDICINE
Payer: MEDICARE

## 2018-01-08 ENCOUNTER — OFFICE VISIT (OUTPATIENT)
Dept: NEPHROLOGY | Facility: CLINIC | Age: 53
End: 2018-01-08

## 2018-01-08 VITALS — SYSTOLIC BLOOD PRESSURE: 168 MMHG | BODY MASS INDEX: 45 KG/M2 | DIASTOLIC BLOOD PRESSURE: 96 MMHG | WEIGHT: 315 LBS

## 2018-01-08 DIAGNOSIS — Z94.0 RENAL TRANSPLANT RECIPIENT: ICD-10-CM

## 2018-01-08 DIAGNOSIS — N18.4 ANEMIA IN STAGE 4 CHRONIC KIDNEY DISEASE (HCC): ICD-10-CM

## 2018-01-08 DIAGNOSIS — N18.4 CHRONIC KIDNEY DISEASE, STAGE IV (SEVERE) (HCC): ICD-10-CM

## 2018-01-08 DIAGNOSIS — N18.30 CHRONIC KIDNEY DISEASE, STAGE III (MODERATE) (HCC): ICD-10-CM

## 2018-01-08 DIAGNOSIS — R60.0 LOCALIZED EDEMA: ICD-10-CM

## 2018-01-08 DIAGNOSIS — I10 ESSENTIAL HYPERTENSION: ICD-10-CM

## 2018-01-08 DIAGNOSIS — N17.9 ACUTE KIDNEY INJURY (HCC): Primary | ICD-10-CM

## 2018-01-08 DIAGNOSIS — N17.9 ACUTE KIDNEY INJURY (HCC): ICD-10-CM

## 2018-01-08 DIAGNOSIS — D63.1 ANEMIA IN STAGE 4 CHRONIC KIDNEY DISEASE (HCC): ICD-10-CM

## 2018-01-08 LAB
ALBUMIN SERPL-MCNC: 3.4 G/DL (ref 3.5–4.8)
ALP LIVER SERPL-CCNC: 50 U/L (ref 45–117)
ALT SERPL-CCNC: 18 U/L (ref 17–63)
AST SERPL-CCNC: 9 U/L (ref 15–41)
BASOPHILS # BLD AUTO: 0.03 X10(3) UL (ref 0–0.1)
BASOPHILS NFR BLD AUTO: 0.3 %
BILIRUB SERPL-MCNC: 0.5 MG/DL (ref 0.1–2)
BUN BLD-MCNC: 65 MG/DL (ref 8–20)
CALCIUM BLD-MCNC: 9.1 MG/DL (ref 8.3–10.3)
CHLORIDE: 106 MMOL/L (ref 101–111)
CO2: 25 MMOL/L (ref 22–32)
CREAT BLD-MCNC: 2.84 MG/DL (ref 0.7–1.3)
EOSINOPHIL # BLD AUTO: 0.62 X10(3) UL (ref 0–0.3)
EOSINOPHIL NFR BLD AUTO: 6.8 %
ERYTHROCYTE [DISTWIDTH] IN BLOOD BY AUTOMATED COUNT: 14.7 % (ref 11.5–16)
GLUCOSE BLD-MCNC: 99 MG/DL (ref 70–99)
HCT VFR BLD AUTO: 29 % (ref 37–53)
HGB BLD-MCNC: 9.3 G/DL (ref 13–17)
IMMATURE GRANULOCYTE COUNT: 0.04 X10(3) UL (ref 0–1)
IMMATURE GRANULOCYTE RATIO %: 0.4 %
LYMPHOCYTES # BLD AUTO: 1.69 X10(3) UL (ref 0.9–4)
LYMPHOCYTES NFR BLD AUTO: 18.4 %
M PROTEIN MFR SERPL ELPH: 7.5 G/DL (ref 6.1–8.3)
MCH RBC QN AUTO: 28.1 PG (ref 27–33.2)
MCHC RBC AUTO-ENTMCNC: 32.1 G/DL (ref 31–37)
MCV RBC AUTO: 87.6 FL (ref 80–99)
MONOCYTES # BLD AUTO: 1.12 X10(3) UL (ref 0.1–0.6)
MONOCYTES NFR BLD AUTO: 12.2 %
NEUTROPHIL ABS PRELIM: 5.67 X10 (3) UL (ref 1.3–6.7)
NEUTROPHILS # BLD AUTO: 5.67 X10(3) UL (ref 1.3–6.7)
NEUTROPHILS NFR BLD AUTO: 61.9 %
PLATELET # BLD AUTO: 127 10(3)UL (ref 150–450)
POTASSIUM SERPL-SCNC: 3.5 MMOL/L (ref 3.6–5.1)
RBC # BLD AUTO: 3.31 X10(6)UL (ref 4.3–5.7)
RED CELL DISTRIBUTION WIDTH-SD: 47.8 FL (ref 35.1–46.3)
SODIUM SERPL-SCNC: 142 MMOL/L (ref 136–144)
WBC # BLD AUTO: 9.2 X10(3) UL (ref 4–13)

## 2018-01-08 PROCEDURE — 80053 COMPREHEN METABOLIC PANEL: CPT

## 2018-01-08 PROCEDURE — 80197 ASSAY OF TACROLIMUS: CPT

## 2018-01-08 PROCEDURE — 36415 COLL VENOUS BLD VENIPUNCTURE: CPT

## 2018-01-08 PROCEDURE — 85025 COMPLETE CBC W/AUTO DIFF WBC: CPT

## 2018-01-08 PROCEDURE — 99214 OFFICE O/P EST MOD 30 MIN: CPT | Performed by: INTERNAL MEDICINE

## 2018-01-08 PROCEDURE — 96372 THER/PROPH/DIAG INJ SC/IM: CPT | Performed by: INTERNAL MEDICINE

## 2018-01-08 NOTE — PROGRESS NOTES
Nephrology Progress Note      Lalito Christy is a 46year old male. HPI:   Patient presents with:  Chronic Kidney Disease  Hypertension  Anemia  Renal Transplant      Mr. Wing Alva was seen in the nephrology clinic today in follow-up for management of unspecified pathological lesion in kidney    • Osteoarthritis, knee 12/23/2013   • Painful total knee replacement, sequela 5/31/2017   • Papillary thyroid carcinoma ([facility])     Dx in 1/2015: tx with surgery and MANZO   • PONV (postoperative nausea and vomiting Paternal Uncle      prostate CA   • Heart Disorder Paternal Uncle      CAD/Aortic disease      Social History: Smoking status: Never Smoker                                                              Smokeless tobacco: Never Used                      Alco Tab Take 1,000 Int'l Units/day by mouth daily. Disp:  Rfl:    docusate sodium 100 MG Oral Cap Take 100 mg by mouth 2 (two) times daily. Disp:  Rfl:    aspirin 81 MG Oral Tab EC Take 81 mg by mouth daily.  Disp:  Rfl:    Ferrous Sulfate 325 (65 FE) MG Oral T wheezes, rales, rhonchi. Abdomen: Soft, non-tender. + bowel sounds, no palpable organomegaly  Extremities: 1-2+ bilateral lower extremity edema.   Left arm AV fistula has an excellent bruit and thrill  Neurologic: Alert and oriented, normal affect, crani <2.0 11/06/2017   NITRITE Negative 11/06/2017   LEUUR Negative 11/06/2017   NMIC Microscopic not indicated 11/06/2017   WBCUR NONE SEEN 03/14/2016   RBCUR NONE SEEN 03/14/2016   EPIUR NONE SEEN 03/14/2016   BACUR NONE SEEN 03/14/2016   HYLUR NONE SEEN 03/1 [facility] he does not appear to have pulmonary edema and did not have evidence of pulmonary embolism on recent imaging. He will follow with his cardiologist.      Thank you again for allowing me to participate in care of your patient.   Please do not hesit

## 2018-01-09 LAB — TACROLIMUS: 3.9 NG/ML

## 2018-01-16 ENCOUNTER — TELEPHONE (OUTPATIENT)
Dept: INTERNAL MEDICINE CLINIC | Facility: CLINIC | Age: 53
End: 2018-01-16

## 2018-01-16 ENCOUNTER — HOSPITAL ENCOUNTER (EMERGENCY)
Facility: HOSPITAL | Age: 53
Discharge: STILL ADMITTED OR ADMINISTRATIVE | End: 2018-01-18
Payer: MEDICARE

## 2018-01-16 NOTE — TELEPHONE ENCOUNTER
Dr. Hoa Virk from [facility]  Immediate care called wanting to speak to Dr. Toma Barker, refused to speak to nurse and wanted to wait for Dr. Toma Barker.

## 2018-01-16 NOTE — TELEPHONE ENCOUNTER
Patient called to speak with nurse. Patient states he had an EKG done due to to high BP. EKG was normal and he was admitted to [facility]. Pain management DrLucy and patient agreed it is not cardiac pain but could be pulled muscle.  Please call patient back

## 2018-01-16 NOTE — TELEPHONE ENCOUNTER
I called the patient and I also spoke w/ Dr. Chente Worthy. He is having uncontrolled HTN and chest discomfort. I called the charge nurse @ [facility].  I told Omero Ferguson that I do not have direct admitting privileges @ [facility] so he'd have to go thru our ER.   He

## 2018-01-17 ENCOUNTER — OFFICE VISIT (OUTPATIENT)
Dept: INTERNAL MEDICINE CLINIC | Facility: CLINIC | Age: 53
End: 2018-01-17

## 2018-01-17 VITALS
TEMPERATURE: 98 F | DIASTOLIC BLOOD PRESSURE: 96 MMHG | BODY MASS INDEX: 39.17 KG/M2 | WEIGHT: 315 LBS | SYSTOLIC BLOOD PRESSURE: 170 MMHG | HEIGHT: 75 IN | RESPIRATION RATE: 22 BRPM | HEART RATE: 88 BPM

## 2018-01-17 DIAGNOSIS — R07.89 LEFT-SIDED CHEST WALL PAIN: ICD-10-CM

## 2018-01-17 DIAGNOSIS — M79.89 AXILLARY SWELLING: ICD-10-CM

## 2018-01-17 DIAGNOSIS — I10 ESSENTIAL HYPERTENSION, BENIGN: ICD-10-CM

## 2018-01-17 DIAGNOSIS — L29.9 PRURITUS: Primary | ICD-10-CM

## 2018-01-17 DIAGNOSIS — F11.20 OPIOID DEPENDENCE ON AGONIST THERAPY (HCC): ICD-10-CM

## 2018-01-17 PROCEDURE — 99214 OFFICE O/P EST MOD 30 MIN: CPT | Performed by: INTERNAL MEDICINE

## 2018-01-17 PROCEDURE — 96372 THER/PROPH/DIAG INJ SC/IM: CPT | Performed by: INTERNAL MEDICINE

## 2018-01-17 RX ORDER — METHYLPREDNISOLONE SODIUM SUCCINATE 125 MG/2ML
60 INJECTION, POWDER, LYOPHILIZED, FOR SOLUTION INTRAMUSCULAR; INTRAVENOUS ONCE
Status: COMPLETED | OUTPATIENT
Start: 2018-01-17 | End: 2018-01-17

## 2018-01-17 RX ADMIN — METHYLPREDNISOLONE SODIUM SUCCINATE 60 MG: 125 INJECTION, POWDER, LYOPHILIZED, FOR SOLUTION INTRAMUSCULAR; INTRAVENOUS at 14:08:00

## 2018-01-17 NOTE — PATIENT INSTRUCTIONS
- We gave you a steroid injection today  - If you are not better by tomorrow morning, or if your symptoms come back, follow up with Dermatology ASAP  - If you are better, you should still eventually follow up with Dermatology for your armpit lesions.

## 2018-01-18 ENCOUNTER — TELEPHONE (OUTPATIENT)
Dept: NEPHROLOGY | Facility: CLINIC | Age: 53
End: 2018-01-18

## 2018-01-22 ENCOUNTER — TELEPHONE (OUTPATIENT)
Dept: NEPHROLOGY | Facility: CLINIC | Age: 53
End: 2018-01-22

## 2018-01-22 ENCOUNTER — PRIOR ORIGINAL RECORDS (OUTPATIENT)
Dept: OTHER | Age: 53
End: 2018-01-22

## 2018-01-23 NOTE — TELEPHONE ENCOUNTER
I spoke with Bill Rowell from [facility] yesterday and spoke with pt today.   Labetalol is ok and may need to incr dose

## 2018-01-29 ENCOUNTER — LAB ENCOUNTER (OUTPATIENT)
Dept: LAB | Age: 53
End: 2018-01-29
Attending: INTERNAL MEDICINE
Payer: MEDICARE

## 2018-01-29 DIAGNOSIS — I10 ESSENTIAL HYPERTENSION, MALIGNANT: Primary | ICD-10-CM

## 2018-01-29 LAB
BUN BLD-MCNC: 94 MG/DL (ref 8–20)
CALCIUM BLD-MCNC: 8.8 MG/DL (ref 8.3–10.3)
CHLORIDE: 106 MMOL/L (ref 101–111)
CO2: 30 MMOL/L (ref 22–32)
CREAT BLD-MCNC: 3.79 MG/DL (ref 0.7–1.3)
GLUCOSE BLD-MCNC: 102 MG/DL (ref 70–99)
POTASSIUM SERPL-SCNC: 4.2 MMOL/L (ref 3.6–5.1)
SODIUM SERPL-SCNC: 138 MMOL/L (ref 136–144)

## 2018-01-29 PROCEDURE — 36415 COLL VENOUS BLD VENIPUNCTURE: CPT

## 2018-01-29 PROCEDURE — 80048 BASIC METABOLIC PNL TOTAL CA: CPT

## 2018-01-30 ENCOUNTER — PRIOR ORIGINAL RECORDS (OUTPATIENT)
Dept: OTHER | Age: 53
End: 2018-01-30

## 2018-01-30 LAB
BUN: 94 MG/DL
CALCIUM: 8.8 MG/DL
CHLORIDE: 106 MEQ/L
CREATININE, SERUM: 3.79 MG/DL
GLUCOSE: 102 MG/DL
POTASSIUM, SERUM: 4.2 MEQ/L
SODIUM: 138 MEQ/L

## 2018-02-09 ENCOUNTER — LAB ENCOUNTER (OUTPATIENT)
Dept: LAB | Age: 53
End: 2018-02-09
Attending: INTERNAL MEDICINE
Payer: MEDICARE

## 2018-02-09 DIAGNOSIS — Z94.0 RENAL TRANSPLANT RECIPIENT: ICD-10-CM

## 2018-02-09 DIAGNOSIS — I10 ESSENTIAL HYPERTENSION: ICD-10-CM

## 2018-02-09 DIAGNOSIS — N18.4 ANEMIA IN STAGE 4 CHRONIC KIDNEY DISEASE (HCC): ICD-10-CM

## 2018-02-09 DIAGNOSIS — N17.9 ACUTE KIDNEY INJURY (HCC): ICD-10-CM

## 2018-02-09 DIAGNOSIS — R60.0 LOCALIZED EDEMA: ICD-10-CM

## 2018-02-09 DIAGNOSIS — D63.1 ANEMIA IN STAGE 4 CHRONIC KIDNEY DISEASE (HCC): ICD-10-CM

## 2018-02-09 DIAGNOSIS — N18.30 CHRONIC KIDNEY DISEASE, STAGE III (MODERATE) (HCC): ICD-10-CM

## 2018-02-09 LAB
ALBUMIN SERPL-MCNC: 3.7 G/DL (ref 3.5–4.8)
ALP LIVER SERPL-CCNC: 51 U/L (ref 45–117)
ALT SERPL-CCNC: 23 U/L (ref 17–63)
AST SERPL-CCNC: 15 U/L (ref 15–41)
BASOPHILS # BLD AUTO: 0.03 X10(3) UL (ref 0–0.1)
BASOPHILS NFR BLD AUTO: 0.3 %
BILIRUB SERPL-MCNC: 0.6 MG/DL (ref 0.1–2)
BUN BLD-MCNC: 81 MG/DL (ref 8–20)
CALCIUM BLD-MCNC: 9.4 MG/DL (ref 8.3–10.3)
CHLORIDE: 106 MMOL/L (ref 101–111)
CO2: 29 MMOL/L (ref 22–32)
CREAT BLD-MCNC: 4.57 MG/DL (ref 0.7–1.3)
EOSINOPHIL # BLD AUTO: 0.86 X10(3) UL (ref 0–0.3)
EOSINOPHIL NFR BLD AUTO: 8.9 %
ERYTHROCYTE [DISTWIDTH] IN BLOOD BY AUTOMATED COUNT: 15.2 % (ref 11.5–16)
GLUCOSE BLD-MCNC: 112 MG/DL (ref 70–99)
HCT VFR BLD AUTO: 31.1 % (ref 37–53)
HGB BLD-MCNC: 9.8 G/DL (ref 13–17)
IMMATURE GRANULOCYTE COUNT: 0.05 X10(3) UL (ref 0–1)
IMMATURE GRANULOCYTE RATIO %: 0.5 %
LYMPHOCYTES # BLD AUTO: 1.7 X10(3) UL (ref 0.9–4)
LYMPHOCYTES NFR BLD AUTO: 17.6 %
M PROTEIN MFR SERPL ELPH: 7.4 G/DL (ref 6.1–8.3)
MCH RBC QN AUTO: 27.6 PG (ref 27–33.2)
MCHC RBC AUTO-ENTMCNC: 31.5 G/DL (ref 31–37)
MCV RBC AUTO: 87.6 FL (ref 80–99)
MONOCYTES # BLD AUTO: 1.16 X10(3) UL (ref 0.1–1)
MONOCYTES NFR BLD AUTO: 12 %
NEUTROPHIL ABS PRELIM: 5.88 X10 (3) UL (ref 1.3–6.7)
NEUTROPHILS # BLD AUTO: 5.88 X10(3) UL (ref 1.3–6.7)
NEUTROPHILS NFR BLD AUTO: 60.7 %
PLATELET # BLD AUTO: 105 10(3)UL (ref 150–450)
POTASSIUM SERPL-SCNC: 4.2 MMOL/L (ref 3.6–5.1)
RBC # BLD AUTO: 3.55 X10(6)UL (ref 4.3–5.7)
RED CELL DISTRIBUTION WIDTH-SD: 48.5 FL (ref 35.1–46.3)
SODIUM SERPL-SCNC: 142 MMOL/L (ref 136–144)
WBC # BLD AUTO: 9.7 X10(3) UL (ref 4–13)

## 2018-02-09 PROCEDURE — 80197 ASSAY OF TACROLIMUS: CPT

## 2018-02-09 PROCEDURE — 80053 COMPREHEN METABOLIC PANEL: CPT

## 2018-02-09 PROCEDURE — 36415 COLL VENOUS BLD VENIPUNCTURE: CPT

## 2018-02-09 PROCEDURE — 85025 COMPLETE CBC W/AUTO DIFF WBC: CPT

## 2018-02-11 ENCOUNTER — HOSPITAL ENCOUNTER (OUTPATIENT)
Age: 53
Discharge: ANOTHER HOSPITAL | End: 2018-02-11
Attending: FAMILY MEDICINE
Payer: MEDICARE

## 2018-02-11 ENCOUNTER — HOSPITAL ENCOUNTER (EMERGENCY)
Facility: HOSPITAL | Age: 53
Discharge: HOME | End: 2018-02-11
Attending: EMERGENCY MEDICINE
Payer: MEDICARE

## 2018-02-11 VITALS
WEIGHT: 315 LBS | OXYGEN SATURATION: 98 % | SYSTOLIC BLOOD PRESSURE: 176 MMHG | HEIGHT: 75 IN | TEMPERATURE: 98 F | DIASTOLIC BLOOD PRESSURE: 113 MMHG | HEART RATE: 98 BPM | BODY MASS INDEX: 39.17 KG/M2 | RESPIRATION RATE: 20 BRPM

## 2018-02-11 VITALS
HEART RATE: 106 BPM | RESPIRATION RATE: 20 BRPM | SYSTOLIC BLOOD PRESSURE: 169 MMHG | OXYGEN SATURATION: 97 % | DIASTOLIC BLOOD PRESSURE: 104 MMHG | TEMPERATURE: 98 F

## 2018-02-11 DIAGNOSIS — K59.00 CONSTIPATION, UNSPECIFIED CONSTIPATION TYPE: Primary | ICD-10-CM

## 2018-02-11 DIAGNOSIS — K56.41 FECAL IMPACTION (HCC): Primary | ICD-10-CM

## 2018-02-11 DIAGNOSIS — R10.9 ABDOMINAL PAIN, ACUTE: ICD-10-CM

## 2018-02-11 LAB — TACROLIMUS: 4.6 NG/ML

## 2018-02-11 PROCEDURE — 99283 EMERGENCY DEPT VISIT LOW MDM: CPT

## 2018-02-11 PROCEDURE — 99203 OFFICE O/P NEW LOW 30 MIN: CPT

## 2018-02-11 PROCEDURE — 99215 OFFICE O/P EST HI 40 MIN: CPT

## 2018-02-11 NOTE — ED PROVIDER NOTES
Patient Seen in: [facility] Emergency Department    History   Patient presents with:  Constipation (gastrointestinal)    Stated Complaint: constipation    HPI    Patient is here for help with constipation.   He has been having rectal pain that was  w 9/20/2012   • Trigger finger (acquired)    • Unspecified essential hypertension    • Unspecified hemorrhoids without mention of complication 9/12/1203   • Unspecified sleep apnea     CPAP       Past Surgical History:  2003: APPENDECTOMY  12/11/15: CATH PV time. Pt appears well-developed and well-nourished. He is obese  Head: Normocephalic and atraumatic. Eyes: Conjunctivae are normal. Pupils are equal, round, and reactive to light. Neck: Normal range of motion. Neck supple.    Cardiovascular: Normal rat

## 2018-02-11 NOTE — ED NOTES
Pt states he was able to have bowel movement that has relieved his symptoms. No bleeding.   Feels well enough to go home

## 2018-02-11 NOTE — ED NOTES
Dr. Isidoro Barney notified of elevated BP. Pt states he is in process of changing BP med as his current one gave his side effects of not having him feel well. So he has been inconsisent in taking his BP med.   Ok to discharge pt

## 2018-02-11 NOTE — ED INITIAL ASSESSMENT (HPI)
Patient presents with constipation and abdominal pain. His last normal bowel movement was Friday; normal bowel habits are 2-3x per day. He struggled this morning attempting and has been unsuccessful. He reports rectal pressure and pain at this time.

## 2018-02-12 NOTE — ED PROVIDER NOTES
Patient Seen in: [address] [facility] END    History   Patient presents with:  Abdomen/Flank Pain (GI/)    Stated Complaint: BATHROOM ISSUES X1 DAY     HPI    46year old male presents for constipation and lower abdominal pain.  States last • Spinal stenosis in cervical region 10/17/2011   • Status post cervical spinal fusion 9/20/2012   • Trigger finger (acquired)    • Unspecified essential hypertension    • Unspecified hemorrhoids without mention of complication 3/10/3389   • Unspecified Physical Exam   Constitutional: He is oriented to person, place, and time. He appears well-developed and well-nourished. Cardiovascular: Normal rate, regular rhythm, normal heart sounds and intact distal pulses.     Pulmonary/Chest: Effort normal

## 2018-02-13 DIAGNOSIS — R11.0 NAUSEA: ICD-10-CM

## 2018-02-13 RX ORDER — ONDANSETRON 4 MG/1
TABLET, ORALLY DISINTEGRATING ORAL
Qty: 90 TABLET | Refills: 0 | Status: SHIPPED | OUTPATIENT
Start: 2018-02-13 | End: 2018-09-13

## 2018-02-14 ENCOUNTER — NURSE ONLY (OUTPATIENT)
Dept: INTERNAL MEDICINE CLINIC | Facility: CLINIC | Age: 53
End: 2018-02-14

## 2018-02-14 DIAGNOSIS — Z23 NEED FOR VACCINATION FOR STREP PNEUMONIAE: Primary | ICD-10-CM

## 2018-02-14 PROCEDURE — G0009 ADMIN PNEUMOCOCCAL VACCINE: HCPCS | Performed by: INTERNAL MEDICINE

## 2018-02-14 PROCEDURE — 90732 PPSV23 VACC 2 YRS+ SUBQ/IM: CPT | Performed by: INTERNAL MEDICINE

## 2018-02-15 ENCOUNTER — OFFICE VISIT (OUTPATIENT)
Dept: NEPHROLOGY | Facility: CLINIC | Age: 53
End: 2018-02-15

## 2018-02-15 VITALS — BODY MASS INDEX: 43 KG/M2 | WEIGHT: 315 LBS | DIASTOLIC BLOOD PRESSURE: 86 MMHG | SYSTOLIC BLOOD PRESSURE: 168 MMHG

## 2018-02-15 DIAGNOSIS — N18.4 ANEMIA IN STAGE 4 CHRONIC KIDNEY DISEASE (HCC): ICD-10-CM

## 2018-02-15 DIAGNOSIS — Z94.0 RENAL TRANSPLANT RECIPIENT: ICD-10-CM

## 2018-02-15 DIAGNOSIS — D63.1 ANEMIA IN STAGE 4 CHRONIC KIDNEY DISEASE (HCC): ICD-10-CM

## 2018-02-15 DIAGNOSIS — N17.9 ACUTE KIDNEY INJURY (HCC): ICD-10-CM

## 2018-02-15 DIAGNOSIS — I10 ESSENTIAL HYPERTENSION: ICD-10-CM

## 2018-02-15 DIAGNOSIS — R60.0 LOCALIZED EDEMA: ICD-10-CM

## 2018-02-15 DIAGNOSIS — N18.4 CHRONIC KIDNEY DISEASE, STAGE IV (SEVERE) (HCC): Primary | ICD-10-CM

## 2018-02-15 PROCEDURE — 99214 OFFICE O/P EST MOD 30 MIN: CPT | Performed by: INTERNAL MEDICINE

## 2018-02-15 PROCEDURE — 96372 THER/PROPH/DIAG INJ SC/IM: CPT | Performed by: INTERNAL MEDICINE

## 2018-02-15 RX ORDER — FUROSEMIDE 40 MG/1
120 TABLET ORAL 2 TIMES DAILY
COMMUNITY
End: 2019-03-14 | Stop reason: DRUGHIGH

## 2018-02-15 RX ORDER — MINOXIDIL 2.5 MG/1
2.5 TABLET ORAL 2 TIMES DAILY
Qty: 60 TABLET | Refills: 11 | Status: SHIPPED | OUTPATIENT
Start: 2018-02-15 | End: 2018-04-23 | Stop reason: ALTCHOICE

## 2018-02-15 NOTE — PROGRESS NOTES
Nephrology Progress Note      Yunier Miles is a 46year old male. HPI:   Patient presents with:  Chronic Kidney Disease  Hypertension  Renal Transplant  Anemia    Mr. Zari Gipson was seen in the nephrology clinic today in follow-up for management of m nephropathy, not specified as acute or chronic, with unspecified pathological lesion in kidney    • Osteoarthritis, knee 12/23/2013   • Painful total knee replacement, sequela 5/31/2017   • Papillary thyroid carcinoma ([facility] [address]     Dx in 1/2015: tx with surger glomerulonephritis [OTHER] Maternal Grandmother    • Cancer Paternal Uncle      prostate CA   • Heart Disorder Paternal Uncle      CAD/Aortic disease      Social History: Smoking status: Never Smoker 1000 UNITS Oral Tab Take 1,000 Int'l Units/day by mouth daily. Disp:  Rfl:    docusate sodium 100 MG Oral Cap Take 100 mg by mouth 2 (two) times daily. Disp:  Rfl:    aspirin 81 MG Oral Tab EC Take 81 mg by mouth daily.  Disp:  Rfl:    Ferrous Sulfate 325 ( S2 normal, no murmur or rub  Lungs: Clear without wheezes, rales, rhonchi. Abdomen: Soft, non-tender. + bowel sounds, no palpable organomegaly  Extremities: Trace to 1+ bilateral lower extremity edema.   Left knee is in a brace  Neurologic: Alert and compa UROBILINOGEN <2.0 11/06/2017   NITRITE neg 01/16/2018   LEUUR Negative 11/06/2017   NMIC Microscopic not indicated 11/06/2017   WBCUR Rare 01/16/2018   RBCUR None Seen 01/16/2018   EPIUR NONE SEEN 03/14/2016   BACUR 1+ (A) 01/16/2018   HYLUR NONE SEEN 03 will continue his current other antihypertensives. #3.  Edema-this has improved with an increase in furosemide although as mentioned his renal function is significantly worse.   His calcium channel blocker may be contributing we will discontinue amlodipi

## 2018-02-19 ENCOUNTER — TELEPHONE (OUTPATIENT)
Dept: NEPHROLOGY | Facility: CLINIC | Age: 53
End: 2018-02-19

## 2018-02-19 DIAGNOSIS — M25.571 ACUTE BILATERAL ANKLE PAIN: ICD-10-CM

## 2018-02-19 DIAGNOSIS — M25.572 ACUTE BILATERAL ANKLE PAIN: ICD-10-CM

## 2018-02-19 DIAGNOSIS — M25.473 ANKLE SWELLING, UNSPECIFIED LATERALITY: ICD-10-CM

## 2018-02-19 RX ORDER — CLONIDINE HYDROCHLORIDE 0.3 MG/1
0.3 TABLET ORAL 3 TIMES DAILY
Qty: 90 TABLET | Refills: 11 | Status: SHIPPED | OUTPATIENT
Start: 2018-02-19 | End: 2018-03-21

## 2018-02-20 RX ORDER — METHYLPREDNISOLONE 4 MG/1
TABLET ORAL
Qty: 21 TABLET | Refills: 0 | Status: SHIPPED | OUTPATIENT
Start: 2018-02-20 | End: 2018-04-04 | Stop reason: ALTCHOICE

## 2018-02-21 ENCOUNTER — LAB ENCOUNTER (OUTPATIENT)
Dept: LAB | Age: 53
End: 2018-02-21
Attending: INTERNAL MEDICINE
Payer: MEDICARE

## 2018-02-21 ENCOUNTER — PRIOR ORIGINAL RECORDS (OUTPATIENT)
Dept: OTHER | Age: 53
End: 2018-02-21

## 2018-02-21 DIAGNOSIS — E78.00 PURE HYPERCHOLESTEROLEMIA: ICD-10-CM

## 2018-02-21 DIAGNOSIS — I25.10 CORONARY ATHEROSCLEROSIS OF NATIVE CORONARY ARTERY: Primary | ICD-10-CM

## 2018-02-21 LAB
ALBUMIN SERPL-MCNC: 3.3 G/DL (ref 3.5–4.8)
ALP LIVER SERPL-CCNC: 47 U/L (ref 45–117)
ALT SERPL-CCNC: 18 U/L (ref 17–63)
AST SERPL-CCNC: 11 U/L (ref 15–41)
BILIRUB SERPL-MCNC: 0.4 MG/DL (ref 0.1–2)
BUN BLD-MCNC: 96 MG/DL (ref 8–20)
CALCIUM BLD-MCNC: 8.7 MG/DL (ref 8.3–10.3)
CHLORIDE: 105 MMOL/L (ref 101–111)
CHOLEST SMN-MCNC: 88 MG/DL (ref ?–200)
CO2: 22 MMOL/L (ref 22–32)
CREAT BLD-MCNC: 6.01 MG/DL (ref 0.7–1.3)
GLUCOSE BLD-MCNC: 99 MG/DL (ref 70–99)
HDLC SERPL-MCNC: 34 MG/DL (ref 45–?)
HDLC SERPL: 2.59 {RATIO} (ref ?–4.97)
LDLC SERPL CALC-MCNC: 43 MG/DL (ref ?–130)
M PROTEIN MFR SERPL ELPH: 6.6 G/DL (ref 6.1–8.3)
NONHDLC SERPL-MCNC: 54 MG/DL (ref ?–130)
POTASSIUM SERPL-SCNC: 5.1 MMOL/L (ref 3.6–5.1)
SODIUM SERPL-SCNC: 136 MMOL/L (ref 136–144)
TRIGL SERPL-MCNC: 53 MG/DL (ref ?–150)
VLDLC SERPL CALC-MCNC: 11 MG/DL (ref 5–40)

## 2018-02-21 PROCEDURE — 36415 COLL VENOUS BLD VENIPUNCTURE: CPT

## 2018-02-21 PROCEDURE — 80053 COMPREHEN METABOLIC PANEL: CPT

## 2018-02-21 PROCEDURE — 80061 LIPID PANEL: CPT

## 2018-02-22 ENCOUNTER — PRIOR ORIGINAL RECORDS (OUTPATIENT)
Dept: OTHER | Age: 53
End: 2018-02-22

## 2018-02-22 ENCOUNTER — TELEPHONE (OUTPATIENT)
Dept: NEPHROLOGY | Facility: CLINIC | Age: 53
End: 2018-02-22

## 2018-02-22 DIAGNOSIS — N18.4 CKD (CHRONIC KIDNEY DISEASE) STAGE 4, GFR 15-29 ML/MIN (HCC): Primary | ICD-10-CM

## 2018-02-22 LAB
ALKALINE PHOSPHATATE(ALK PHOS): 47 IU/L
BILIRUBIN TOTAL: 0.4 MG/DL
BUN: 96 MG/DL
CALCIUM: 8.7 MG/DL
CHLORIDE: 105 MEQ/L
CHOLESTEROL, TOTAL: 88 MG/DL
CREATININE, SERUM: 6.01 MG/DL
GLUCOSE: 99 MG/DL
HDL CHOLESTEROL: 34 MG/DL
LDL CHOLESTEROL: 43 MG/DL
POTASSIUM, SERUM: 5.1 MEQ/L
PROTEIN, TOTAL: 6.6 G/DL
SGOT (AST): 11 IU/L
SGPT (ALT): 18 IU/L
SODIUM: 136 MEQ/L
TRIGLYCERIDES: 53 MG/DL

## 2018-02-22 NOTE — TELEPHONE ENCOUNTER
I called the patient today and left a voicemail. His creatinine is increased to 6.01 mg/dL. He had also called and complained of feeling \"lousy\" after minoxidil was initiated.   Interestingly his blood pressures at home would appear now to be in the nor

## 2018-02-23 ENCOUNTER — PATIENT MESSAGE (OUTPATIENT)
Dept: INTERNAL MEDICINE CLINIC | Facility: CLINIC | Age: 53
End: 2018-02-23

## 2018-02-26 NOTE — TELEPHONE ENCOUNTER
From: Saad Valiente  To: Shahriar Andres MD  Sent: 2/23/2018 3:41 PM CST  Subject: Other    Hello Dr. Aurelio Coulter,   I have a problem. Dr. Marcial Navarro tried to lower my BP so he prescribed minoxidil. I took it for 5 days but it made me feel terrible.  Dr. Naif Cunningham

## 2018-02-27 ENCOUNTER — PATIENT MESSAGE (OUTPATIENT)
Dept: INTERNAL MEDICINE CLINIC | Facility: CLINIC | Age: 53
End: 2018-02-27

## 2018-02-27 ENCOUNTER — PRIOR ORIGINAL RECORDS (OUTPATIENT)
Dept: OTHER | Age: 53
End: 2018-02-27

## 2018-02-27 ENCOUNTER — APPOINTMENT (OUTPATIENT)
Dept: LAB | Age: 53
End: 2018-02-27
Attending: INTERNAL MEDICINE
Payer: MEDICARE

## 2018-02-27 DIAGNOSIS — N18.4 CKD (CHRONIC KIDNEY DISEASE) STAGE 4, GFR 15-29 ML/MIN (HCC): ICD-10-CM

## 2018-02-27 LAB
BUN BLD-MCNC: 81 MG/DL (ref 8–20)
CALCIUM BLD-MCNC: 9.1 MG/DL (ref 8.3–10.3)
CHLORIDE: 110 MMOL/L (ref 101–111)
CO2: 24 MMOL/L (ref 22–32)
CREAT BLD-MCNC: 3.69 MG/DL (ref 0.7–1.3)
GLUCOSE BLD-MCNC: 92 MG/DL (ref 70–99)
POTASSIUM SERPL-SCNC: 4.2 MMOL/L (ref 3.6–5.1)
SODIUM SERPL-SCNC: 143 MMOL/L (ref 136–144)

## 2018-02-27 PROCEDURE — 80048 BASIC METABOLIC PNL TOTAL CA: CPT

## 2018-02-27 PROCEDURE — 36415 COLL VENOUS BLD VENIPUNCTURE: CPT

## 2018-02-28 ENCOUNTER — TELEPHONE (OUTPATIENT)
Dept: NEPHROLOGY | Facility: CLINIC | Age: 53
End: 2018-02-28

## 2018-02-28 DIAGNOSIS — N18.4 CKD (CHRONIC KIDNEY DISEASE) STAGE 4, GFR 15-29 ML/MIN (HCC): Primary | ICD-10-CM

## 2018-02-28 NOTE — TELEPHONE ENCOUNTER
From: Beba Julien  To: Brittanie Reyes MD  Sent: 2/27/2018 7:14 PM CST  Subject: Test Results Question    Dr. Josue Espinoza,   Just to keep you informed. My creatinine, which was 6 fell back to 3.6 .  So hopefully with a better diet and a shift in some meds I can

## 2018-03-01 ENCOUNTER — PRIOR ORIGINAL RECORDS (OUTPATIENT)
Dept: OTHER | Age: 53
End: 2018-03-01

## 2018-03-05 LAB
BUN: 81 MG/DL
CALCIUM: 9.1 MG/DL
CHLORIDE: 110 MEQ/L
CREATININE, SERUM: 3.69 MG/DL
GLUCOSE: 92 MG/DL
POTASSIUM, SERUM: 4.2 MEQ/L
SODIUM: 143 MEQ/L

## 2018-03-07 ENCOUNTER — OFFICE VISIT (OUTPATIENT)
Dept: INTERNAL MEDICINE CLINIC | Facility: CLINIC | Age: 53
End: 2018-03-07

## 2018-03-07 VITALS
OXYGEN SATURATION: 98 % | BODY MASS INDEX: 39.17 KG/M2 | SYSTOLIC BLOOD PRESSURE: 154 MMHG | DIASTOLIC BLOOD PRESSURE: 92 MMHG | HEIGHT: 75 IN | TEMPERATURE: 98 F | RESPIRATION RATE: 17 BRPM | HEART RATE: 72 BPM | WEIGHT: 315 LBS

## 2018-03-07 DIAGNOSIS — E66.01 MORBID OBESITY WITH BMI OF 40.0-44.9, ADULT (HCC): ICD-10-CM

## 2018-03-07 DIAGNOSIS — D17.1 LIPOMA OF TORSO: Primary | ICD-10-CM

## 2018-03-07 DIAGNOSIS — I10 ESSENTIAL HYPERTENSION, BENIGN: ICD-10-CM

## 2018-03-07 DIAGNOSIS — F11.20 OPIOID DEPENDENCE ON AGONIST THERAPY (HCC): ICD-10-CM

## 2018-03-07 PROCEDURE — 99214 OFFICE O/P EST MOD 30 MIN: CPT | Performed by: INTERNAL MEDICINE

## 2018-03-07 NOTE — PATIENT INSTRUCTIONS
- Keep an eye on the bump for the next 3-4 weeks  - If it is not gone by then, follow up with General Surgery. - They are here in this clinic on Mondays and Tuesdays. It was a pleasure seeing you in the clinic today.   Thank you for choosing the [facility]

## 2018-03-07 NOTE — PROGRESS NOTES
Abhishek Lafleur is a 46year old male. HPI:   Patient presents with:  Bump  Patient presents with acute dermatological complaint. He has noticed a lump/mass on his left lower back. First noticed it six weeks ago. Not painful.   Mildly tender to palpa daily., Disp: 90 tablet, Rfl: 11  •  furosemide 80 MG Oral Tab, Take 120 mg by mouth daily. , Disp: , Rfl:   •  CloNIDine HCl 0.3 MG/24HR Transdermal Patch Weekly, Place 1 patch onto the skin once a week., Disp: , Rfl:   •  minoxidil 2.5 MG Oral Tab, Take 1 (two) times daily. , Disp: , Rfl:   •  aspirin 81 MG Oral Tab EC, Take 81 mg by mouth daily. , Disp: , Rfl:   •  Ferrous Sulfate 325 (65 FE) MG Oral Tab, Take 1 tablet (325 mg total) by mouth daily. , Disp: 30 tablet, Rfl: 0  •  Atorvastatin Calcium (LIPITOR) appendectomy (2003); cholecystectomy (2003); laminectomy,facetectomy,lumbar (05/29/2012); revise median n/carpal tunnel surg (2009); femur/knee surg unlisted (1994); other surgical history; other surgical history (Right, 1997); colonoscopy; thyroidectomy; significant lifestyle changes, diet and exercise. Frustrated by lack of weight loss. Encouraged patient to continue with lifestyle modifications, and discussed that it may take several months for him to show effects of lifestyle modification.       4.  Op

## 2018-03-12 ENCOUNTER — TELEPHONE (OUTPATIENT)
Dept: NEPHROLOGY | Facility: CLINIC | Age: 53
End: 2018-03-12

## 2018-03-12 NOTE — TELEPHONE ENCOUNTER
Pt cancelled 1 month f-u. His mother in law in [place] is in hospice so he has to travel there.   He will call when he returns to make an appt

## 2018-03-14 ENCOUNTER — APPOINTMENT (OUTPATIENT)
Dept: LAB | Age: 53
End: 2018-03-14
Attending: INTERNAL MEDICINE
Payer: MEDICARE

## 2018-03-14 DIAGNOSIS — N18.4 CKD (CHRONIC KIDNEY DISEASE) STAGE 4, GFR 15-29 ML/MIN (HCC): ICD-10-CM

## 2018-03-14 LAB
ALBUMIN SERPL-MCNC: 3.9 G/DL (ref 3.5–4.8)
ALP LIVER SERPL-CCNC: 52 U/L (ref 45–117)
ALT SERPL-CCNC: 22 U/L (ref 17–63)
AST SERPL-CCNC: 13 U/L (ref 15–41)
BILIRUB SERPL-MCNC: 0.9 MG/DL (ref 0.1–2)
BUN BLD-MCNC: 70 MG/DL (ref 8–20)
CALCIUM BLD-MCNC: 9.1 MG/DL (ref 8.3–10.3)
CHLORIDE: 103 MMOL/L (ref 101–111)
CO2: 27 MMOL/L (ref 22–32)
CREAT BLD-MCNC: 3.38 MG/DL (ref 0.7–1.3)
GLUCOSE BLD-MCNC: 100 MG/DL (ref 70–99)
M PROTEIN MFR SERPL ELPH: 7.1 G/DL (ref 6.1–8.3)
POTASSIUM SERPL-SCNC: 3.8 MMOL/L (ref 3.6–5.1)
SODIUM SERPL-SCNC: 138 MMOL/L (ref 136–144)

## 2018-03-14 PROCEDURE — 36415 COLL VENOUS BLD VENIPUNCTURE: CPT

## 2018-03-14 PROCEDURE — 80053 COMPREHEN METABOLIC PANEL: CPT

## 2018-04-04 ENCOUNTER — OFFICE VISIT (OUTPATIENT)
Dept: INTERNAL MEDICINE CLINIC | Facility: CLINIC | Age: 53
End: 2018-04-04

## 2018-04-04 VITALS
TEMPERATURE: 98 F | DIASTOLIC BLOOD PRESSURE: 108 MMHG | SYSTOLIC BLOOD PRESSURE: 174 MMHG | HEIGHT: 75 IN | BODY MASS INDEX: 39.17 KG/M2 | HEART RATE: 72 BPM | WEIGHT: 315 LBS

## 2018-04-04 DIAGNOSIS — F11.20 OPIOID DEPENDENCE ON AGONIST THERAPY (HCC): ICD-10-CM

## 2018-04-04 DIAGNOSIS — I10 ESSENTIAL HYPERTENSION, BENIGN: ICD-10-CM

## 2018-04-04 DIAGNOSIS — L02.429 BOIL OF UPPER ARM AND FOREARM: ICD-10-CM

## 2018-04-04 DIAGNOSIS — M79.605 LEFT LEG PAIN: Primary | ICD-10-CM

## 2018-04-04 DIAGNOSIS — M79.89 LEFT LEG SWELLING: ICD-10-CM

## 2018-04-04 DIAGNOSIS — E79.0 HYPERURICEMIA: Primary | ICD-10-CM

## 2018-04-04 DIAGNOSIS — M54.12 CHRONIC CERVICAL RADICULOPATHY: ICD-10-CM

## 2018-04-04 PROCEDURE — 99214 OFFICE O/P EST MOD 30 MIN: CPT | Performed by: INTERNAL MEDICINE

## 2018-04-04 RX ORDER — AMOXICILLIN AND CLAVULANATE POTASSIUM 875; 125 MG/1; MG/1
1 TABLET, FILM COATED ORAL 2 TIMES DAILY
Qty: 14 TABLET | Refills: 0 | Status: SHIPPED | OUTPATIENT
Start: 2018-04-04 | End: 2018-04-11

## 2018-04-04 RX ORDER — FLUTICASONE PROPIONATE 50 MCG
2 SPRAY, SUSPENSION (ML) NASAL DAILY
Qty: 1 BOTTLE | Refills: 3 | Status: SHIPPED | OUTPATIENT
Start: 2018-04-04 | End: 2018-07-13 | Stop reason: ALTCHOICE

## 2018-04-04 RX ORDER — POTASSIUM CHLORIDE 750 MG/1
80 TABLET, EXTENDED RELEASE ORAL DAILY
COMMUNITY
Start: 2018-01-23 | End: 2018-04-16

## 2018-04-04 NOTE — PATIENT INSTRUCTIONS
- Schedule ultrasound of leg  - Start antibiotics (Augmentin) for the the arm boil. Take 1 capsule twice daily with food for the next week. It was a pleasure seeing you in the clinic today.   Thank you for choosing the [facility]

## 2018-04-04 NOTE — PROGRESS NOTES
John Godinez is a 48year old male. HPI:   Patient presents with:  Lower Extremity Injury (musculoskeletal): L thigh swollen   Abscess (integumentary): Boil R arm   Patient presents left thigh swelling. Some mild pain as well.   First noticed at pain Disp: , Rfl:   •  Amoxicillin-Pot Clavulanate 875-125 MG Oral Tab, Take 1 tablet by mouth 2 (two) times daily. , Disp: 14 tablet, Rfl: 0  •  Fluticasone Propionate 50 MCG/ACT Nasal Suspension, 2 sprays by Each Nare route daily. , Disp: 1 Bottle, Rfl: 3  •  Z total) by mouth daily. , Disp: 30 tablet, Rfl: 11  •  Cholecalciferol (VITAMIN D) 1000 UNITS Oral Tab, Take 1,000 Int'l Units/day by mouth daily. , Disp: , Rfl:   •  docusate sodium 100 MG Oral Cap, Take 100 mg by mouth 2 (two) times daily. , Disp: , Rfl:   • (9/20/2012); Trigger finger (acquired); Unspecified essential hypertension; Unspecified hemorrhoids without mention of complication (7/67/1202); and Unspecified sleep apnea.   Surgical:  has a past surgical history that includes appendectomy (2003); cholecy plane ride. No worsening shortness of breath. Will check US to rule out DVT. 3.  Boil of upper arm and forearm  Erythematous, tender. Similar lesions in axilla earlier in year which resolved with oral antibiotic therapy.   Augmentin 875 BID x 7 days

## 2018-04-05 ENCOUNTER — HOSPITAL ENCOUNTER (OUTPATIENT)
Dept: ULTRASOUND IMAGING | Age: 53
Discharge: HOME | End: 2018-04-05
Attending: INTERNAL MEDICINE
Payer: MEDICARE

## 2018-04-05 DIAGNOSIS — M79.89 LEFT LEG SWELLING: ICD-10-CM

## 2018-04-05 DIAGNOSIS — M79.605 LEFT LEG PAIN: ICD-10-CM

## 2018-04-05 PROCEDURE — 93971 EXTREMITY STUDY: CPT | Performed by: INTERNAL MEDICINE

## 2018-04-06 RX ORDER — ALLOPURINOL 100 MG/1
TABLET ORAL
Qty: 90 TABLET | Refills: 1 | Status: SHIPPED | OUTPATIENT
Start: 2018-04-06 | End: 2018-09-13

## 2018-04-06 RX ORDER — POTASSIUM CHLORIDE 750 MG/1
TABLET, FILM COATED, EXTENDED RELEASE ORAL
Qty: 540 TABLET | Refills: 1 | Status: ON HOLD | OUTPATIENT
Start: 2018-04-06 | End: 2018-07-10

## 2018-04-06 RX ORDER — TACROLIMUS 1 MG/1
CAPSULE ORAL
Qty: 360 CAPSULE | Refills: 1 | Status: SHIPPED | OUTPATIENT
Start: 2018-04-06 | End: 2018-09-13

## 2018-04-06 RX ORDER — LOSARTAN POTASSIUM AND HYDROCHLOROTHIAZIDE 25; 100 MG/1; MG/1
1 TABLET ORAL
Qty: 90 TABLET | Refills: 1 | Status: ON HOLD | OUTPATIENT
Start: 2018-04-06 | End: 2018-06-22

## 2018-04-09 ENCOUNTER — TELEPHONE (OUTPATIENT)
Dept: NEPHROLOGY | Facility: CLINIC | Age: 53
End: 2018-04-09

## 2018-04-09 RX ORDER — ALLOPURINOL 100 MG/1
100 TABLET ORAL DAILY
Qty: 90 TABLET | Refills: 3 | Status: SHIPPED | OUTPATIENT
Start: 2018-04-09 | End: 2018-04-16

## 2018-04-16 ENCOUNTER — OFFICE VISIT (OUTPATIENT)
Dept: INTERNAL MEDICINE CLINIC | Facility: CLINIC | Age: 53
End: 2018-04-16

## 2018-04-16 VITALS
RESPIRATION RATE: 16 BRPM | SYSTOLIC BLOOD PRESSURE: 160 MMHG | DIASTOLIC BLOOD PRESSURE: 102 MMHG | WEIGHT: 315 LBS | HEIGHT: 75 IN | TEMPERATURE: 99 F | BODY MASS INDEX: 39.17 KG/M2 | HEART RATE: 64 BPM

## 2018-04-16 DIAGNOSIS — Z99.89 OSA ON CPAP: ICD-10-CM

## 2018-04-16 DIAGNOSIS — I27.20 PULMONARY HTN (HCC): ICD-10-CM

## 2018-04-16 DIAGNOSIS — I10 ESSENTIAL HYPERTENSION, BENIGN: Primary | ICD-10-CM

## 2018-04-16 DIAGNOSIS — D63.8 ANEMIA OF CHRONIC DISEASE: ICD-10-CM

## 2018-04-16 DIAGNOSIS — M54.12 CHRONIC CERVICAL RADICULOPATHY: ICD-10-CM

## 2018-04-16 DIAGNOSIS — G47.33 OSA ON CPAP: ICD-10-CM

## 2018-04-16 DIAGNOSIS — M17.11 PRIMARY OSTEOARTHRITIS OF RIGHT KNEE: ICD-10-CM

## 2018-04-16 DIAGNOSIS — K21.9 GASTROESOPHAGEAL REFLUX DISEASE WITHOUT ESOPHAGITIS: ICD-10-CM

## 2018-04-16 DIAGNOSIS — I25.10 CORONARY ARTERY DISEASE INVOLVING NATIVE CORONARY ARTERY OF NATIVE HEART WITHOUT ANGINA PECTORIS: ICD-10-CM

## 2018-04-16 DIAGNOSIS — C73 PAPILLARY THYROID CARCINOMA (HCC): ICD-10-CM

## 2018-04-16 DIAGNOSIS — D84.9 IMMUNOSUPPRESSED STATUS (HCC): ICD-10-CM

## 2018-04-16 DIAGNOSIS — E66.01 MORBID OBESITY WITH BMI OF 40.0-44.9, ADULT (HCC): ICD-10-CM

## 2018-04-16 DIAGNOSIS — Z79.02 LONG TERM CURRENT USE OF ANTITHROMBOTICS/ANTIPLATELETS: ICD-10-CM

## 2018-04-16 DIAGNOSIS — R60.9 PERIPHERAL EDEMA: ICD-10-CM

## 2018-04-16 DIAGNOSIS — Z94.0 RENAL TRANSPLANT RECIPIENT: ICD-10-CM

## 2018-04-16 DIAGNOSIS — G62.9 PERIPHERAL POLYNEUROPATHY: ICD-10-CM

## 2018-04-16 DIAGNOSIS — Z91.81 RISK FOR FALLS: ICD-10-CM

## 2018-04-16 DIAGNOSIS — R53.82 CHRONIC FATIGUE SYNDROME: ICD-10-CM

## 2018-04-16 DIAGNOSIS — D69.6 THROMBOCYTOPENIA (HCC): ICD-10-CM

## 2018-04-16 DIAGNOSIS — G89.29 CHRONIC NECK PAIN: ICD-10-CM

## 2018-04-16 DIAGNOSIS — M50.20 BULGING OF CERVICAL INTERVERTEBRAL DISC: ICD-10-CM

## 2018-04-16 DIAGNOSIS — E89.0 POSTSURGICAL HYPOTHYROIDISM: ICD-10-CM

## 2018-04-16 DIAGNOSIS — M48.02 NEUROFORAMINAL STENOSIS OF CERVICAL SPINE: ICD-10-CM

## 2018-04-16 DIAGNOSIS — M1A.09X0 IDIOPATHIC CHRONIC GOUT OF MULTIPLE SITES WITHOUT TOPHUS: ICD-10-CM

## 2018-04-16 DIAGNOSIS — M54.2 CHRONIC NECK PAIN: ICD-10-CM

## 2018-04-16 DIAGNOSIS — N18.30 CKD (CHRONIC KIDNEY DISEASE) STAGE 3, GFR 30-59 ML/MIN (HCC): ICD-10-CM

## 2018-04-16 DIAGNOSIS — M47.812 CERVICAL SPINE ARTHRITIS: ICD-10-CM

## 2018-04-16 DIAGNOSIS — M06.9 RHEUMATOID ARTHRITIS INVOLVING MULTIPLE SITES, UNSPECIFIED RHEUMATOID FACTOR PRESENCE: ICD-10-CM

## 2018-04-16 DIAGNOSIS — F11.20 OPIOID DEPENDENCE ON AGONIST THERAPY (HCC): ICD-10-CM

## 2018-04-16 DIAGNOSIS — Z98.1 HISTORY OF FUSION OF CERVICAL SPINE: ICD-10-CM

## 2018-04-16 DIAGNOSIS — R26.89 FUNCTIONAL GAIT ABNORMALITY: ICD-10-CM

## 2018-04-16 DIAGNOSIS — T84.84XS PAINFUL TOTAL KNEE REPLACEMENT, SEQUELA: ICD-10-CM

## 2018-04-16 DIAGNOSIS — Z96.659 PAINFUL TOTAL KNEE REPLACEMENT, SEQUELA: ICD-10-CM

## 2018-04-16 DIAGNOSIS — M25.462 EFFUSION, LEFT KNEE: ICD-10-CM

## 2018-04-16 PROCEDURE — 99214 OFFICE O/P EST MOD 30 MIN: CPT | Performed by: INTERNAL MEDICINE

## 2018-04-16 RX ORDER — CLONIDINE HYDROCHLORIDE 0.3 MG/1
TABLET ORAL
Refills: 1 | COMMUNITY
Start: 2018-04-04 | End: 2018-04-16

## 2018-04-16 NOTE — PROGRESS NOTES
Patient presents with: Follow - Up: 6-month f/u chronic medical conditions      HPI: The pt presents today for 6-month f/u chronic medical conditions as follows:    1. HTN - Stable on prescription medication.  No medication SEs.   Continues to work with  Stable and per Neurology. No new issues. Review of Systems   Constitutional: Positive for malaise/fatigue. Negative for chills, fever and weight loss. HENT: Negative. Eyes: Negative. Respiratory: Negative for shortness of breath.     John Watson Swelling    Comment:Foot swelling  Hydralazine             Hives  Monopril [Fosinopri*    Hives, Itching  Monopril [Fosinopri*    Swelling    Comment:TABS  Pravastatin Sodium      Swelling    Comment:TABS  Toprol Xl [Metoprol*    Hives, Itching  Toprol Oral Tab, TAKE 2 TABLETS BY MOUTH DAILY, Disp: 180 tablet, Rfl: 1  •  Levothyroxine Sodium 25 MCG Oral Tab, Take 200mcg tablet plus 25mcg tablet for a total of 225mcg daily, Disp: 90 tablet, Rfl: 1  •  Levothyroxine Sodium 200 MCG Oral Tab, Take 1 tablet ( diagnosis)  Coronary artery disease involving native coronary artery of native heart without angina pectoris  Long term current use of antithrombotics/antiplatelets  Ckd (chronic kidney disease) stage 3, gfr 30-59 ml/min  Renal transplant recipient  Immuno tired and continues w/ global support measures.  No new issues. 8. RAQUEL on CPAP - Stable and no new issues.   9. GERD - Stable on prescription medication.  Reports less AM nausea than in the past.  10. Papillary thyroid CA and post-surgical hypothyroidism -

## 2018-04-17 PROBLEM — M25.462 EFFUSION, LEFT KNEE: Status: ACTIVE | Noted: 2018-04-17

## 2018-04-17 PROBLEM — D84.9 IMMUNOSUPPRESSED STATUS (HCC): Status: ACTIVE | Noted: 2018-04-17

## 2018-04-19 ENCOUNTER — LAB ENCOUNTER (OUTPATIENT)
Dept: LAB | Age: 53
End: 2018-04-19
Attending: INTERNAL MEDICINE
Payer: MEDICARE

## 2018-04-19 DIAGNOSIS — Z94.0 RENAL TRANSPLANT RECIPIENT: ICD-10-CM

## 2018-04-19 DIAGNOSIS — I10 ESSENTIAL HYPERTENSION: ICD-10-CM

## 2018-04-19 DIAGNOSIS — N18.4 ANEMIA IN STAGE 4 CHRONIC KIDNEY DISEASE (HCC): ICD-10-CM

## 2018-04-19 DIAGNOSIS — N18.30 CHRONIC KIDNEY DISEASE, STAGE III (MODERATE) (HCC): ICD-10-CM

## 2018-04-19 DIAGNOSIS — D63.1 ANEMIA IN STAGE 4 CHRONIC KIDNEY DISEASE (HCC): ICD-10-CM

## 2018-04-19 DIAGNOSIS — N17.9 ACUTE KIDNEY INJURY (HCC): ICD-10-CM

## 2018-04-19 DIAGNOSIS — Z94.0 STATUS POST KIDNEY TRANSPLANT: ICD-10-CM

## 2018-04-19 DIAGNOSIS — N18.4 CHRONIC KIDNEY DISEASE, STAGE IV (SEVERE) (HCC): Primary | ICD-10-CM

## 2018-04-19 DIAGNOSIS — R60.0 LOCALIZED EDEMA: ICD-10-CM

## 2018-04-19 PROCEDURE — 85025 COMPLETE CBC W/AUTO DIFF WBC: CPT

## 2018-04-19 PROCEDURE — 84550 ASSAY OF BLOOD/URIC ACID: CPT | Performed by: INTERNAL MEDICINE

## 2018-04-19 PROCEDURE — 36415 COLL VENOUS BLD VENIPUNCTURE: CPT

## 2018-04-19 PROCEDURE — 80197 ASSAY OF TACROLIMUS: CPT

## 2018-04-19 PROCEDURE — 80053 COMPREHEN METABOLIC PANEL: CPT

## 2018-04-23 ENCOUNTER — OFFICE VISIT (OUTPATIENT)
Dept: NEPHROLOGY | Facility: CLINIC | Age: 53
End: 2018-04-23

## 2018-04-23 VITALS — DIASTOLIC BLOOD PRESSURE: 98 MMHG | BODY MASS INDEX: 43 KG/M2 | WEIGHT: 315 LBS | SYSTOLIC BLOOD PRESSURE: 180 MMHG

## 2018-04-23 DIAGNOSIS — I10 ESSENTIAL HYPERTENSION: Primary | ICD-10-CM

## 2018-04-23 DIAGNOSIS — Z94.0 RENAL TRANSPLANT RECIPIENT: ICD-10-CM

## 2018-04-23 DIAGNOSIS — N18.4 CHRONIC KIDNEY DISEASE, STAGE IV (SEVERE) (HCC): ICD-10-CM

## 2018-04-23 DIAGNOSIS — R60.0 LOCALIZED EDEMA: ICD-10-CM

## 2018-04-23 DIAGNOSIS — D63.8 ANEMIA OF CHRONIC DISEASE: ICD-10-CM

## 2018-04-23 PROCEDURE — 99214 OFFICE O/P EST MOD 30 MIN: CPT | Performed by: INTERNAL MEDICINE

## 2018-04-23 PROCEDURE — 96372 THER/PROPH/DIAG INJ SC/IM: CPT | Performed by: INTERNAL MEDICINE

## 2018-04-23 NOTE — PROGRESS NOTES
Nephrology Progress Note      Shea Berumen is a 48year old male. HPI:   Patient presents with:  Chronic Kidney Disease  Hypertension  Renal Transplant  Anemia    Mr. Jake Fonseca was seen in the nephrology clinic today in follow-up for management of c thyroid carcinoma ([facility] [address]     Dx in 1/2015: tx with surgery and MANZO   • PONV (postoperative nausea and vomiting) 1997    s/p kidney transplant   • Postlaminectomy syndrome, cervical region 6/20/2013    Log Date: 12/12/2012    • Postsurgical hypothyroidism Smokeless tobacco: Never Used                      Alcohol use:  No                 Medications (Active prior to today's visit):    Current Outpatient Prescriptions:  ALLOPURINOL 100 MG Oral Tab TAKE 1 TAB directed to left knee when ambulating only Disp: 1 each Rfl: 0   Cholecalciferol (VITAMIN D) 1000 UNITS Oral Tab Take 1,000 Int'l Units/day by mouth daily. Disp:  Rfl:    docusate sodium 100 MG Oral Cap Take 100 mg by mouth 2 (two) times daily.  Disp:  Rfl: Supple, no LISY or thyromegaly  Cardiac: Regular rate and rhythm, S1, S2 normal, no murmur or rub  Lungs: Clear without wheezes, rales, rhonchi.     Abdomen: Soft, non-tender. + bowel sounds, no palpable organomegaly  Extremities: 1+ bilateral lower extremit Negative 11/06/2017   PHURINE 5.5 01/16/2018   PROUR Negative 11/06/2017   UROBILINOGEN <2.0 11/06/2017   NITRITE neg 01/16/2018   LEUUR Negative 11/06/2017   [address] Microscopic not indicated 11/06/2017   WBCUR Rare 01/16/2018   RBCUR None Seen 01/16/2018 concerns.     Robin Cha MD  4/23/2018  9:42 AM

## 2018-05-21 ENCOUNTER — LAB ENCOUNTER (OUTPATIENT)
Dept: LAB | Age: 53
End: 2018-05-21
Attending: INTERNAL MEDICINE
Payer: MEDICARE

## 2018-05-21 DIAGNOSIS — N17.9 ACUTE KIDNEY INJURY (HCC): ICD-10-CM

## 2018-05-21 DIAGNOSIS — I10 ESSENTIAL HYPERTENSION: ICD-10-CM

## 2018-05-21 DIAGNOSIS — R60.0 LOCALIZED EDEMA: ICD-10-CM

## 2018-05-21 DIAGNOSIS — N18.4 ANEMIA IN STAGE 4 CHRONIC KIDNEY DISEASE (HCC): ICD-10-CM

## 2018-05-21 DIAGNOSIS — Z94.0 RENAL TRANSPLANT RECIPIENT: ICD-10-CM

## 2018-05-21 DIAGNOSIS — D63.1 ANEMIA IN STAGE 4 CHRONIC KIDNEY DISEASE (HCC): ICD-10-CM

## 2018-05-21 DIAGNOSIS — Z94.0 STATUS POST KIDNEY TRANSPLANT: ICD-10-CM

## 2018-05-21 DIAGNOSIS — N18.4 CHRONIC KIDNEY DISEASE, STAGE IV (SEVERE) (HCC): ICD-10-CM

## 2018-05-21 PROCEDURE — 36415 COLL VENOUS BLD VENIPUNCTURE: CPT

## 2018-05-21 PROCEDURE — 85025 COMPLETE CBC W/AUTO DIFF WBC: CPT

## 2018-05-21 PROCEDURE — 80197 ASSAY OF TACROLIMUS: CPT | Performed by: INTERNAL MEDICINE

## 2018-05-21 PROCEDURE — 80048 BASIC METABOLIC PNL TOTAL CA: CPT

## 2018-05-21 PROCEDURE — 84550 ASSAY OF BLOOD/URIC ACID: CPT

## 2018-05-22 PROCEDURE — 80197 ASSAY OF TACROLIMUS: CPT | Performed by: INTERNAL MEDICINE

## 2018-05-25 ENCOUNTER — PATIENT MESSAGE (OUTPATIENT)
Dept: INTERNAL MEDICINE CLINIC | Facility: CLINIC | Age: 53
End: 2018-05-25

## 2018-05-25 NOTE — TELEPHONE ENCOUNTER
From: Fausto Dohrety  To: Maggie Hall MD  Sent: 5/25/2018 2:10 PM CDT  Subject: Non-Urgent Rajat Bowers,   I picked up the signed letter for Hay Gordon this morning however I could not get an answer to the question of : Did you fax john

## 2018-05-30 ENCOUNTER — OFFICE VISIT (OUTPATIENT)
Dept: INTERNAL MEDICINE CLINIC | Facility: CLINIC | Age: 53
End: 2018-05-30

## 2018-05-30 VITALS
DIASTOLIC BLOOD PRESSURE: 120 MMHG | TEMPERATURE: 99 F | WEIGHT: 315 LBS | OXYGEN SATURATION: 97 % | RESPIRATION RATE: 16 BRPM | SYSTOLIC BLOOD PRESSURE: 190 MMHG | BODY MASS INDEX: 39.17 KG/M2 | HEIGHT: 75 IN | HEART RATE: 76 BPM

## 2018-05-30 DIAGNOSIS — I16.0 HYPERTENSIVE URGENCY: Primary | ICD-10-CM

## 2018-05-30 DIAGNOSIS — R60.0 LEG EDEMA, LEFT: ICD-10-CM

## 2018-05-30 DIAGNOSIS — S81.802A UNSPECIFIED OPEN WOUND, LEFT LOWER LEG, INITIAL ENCOUNTER: ICD-10-CM

## 2018-05-30 DIAGNOSIS — N18.4 CKD (CHRONIC KIDNEY DISEASE) STAGE 4, GFR 15-29 ML/MIN (HCC): ICD-10-CM

## 2018-05-30 DIAGNOSIS — E66.01 MORBID OBESITY WITH BMI OF 40.0-44.9, ADULT (HCC): ICD-10-CM

## 2018-05-30 PROCEDURE — 99214 OFFICE O/P EST MOD 30 MIN: CPT | Performed by: PHYSICIAN ASSISTANT

## 2018-05-30 NOTE — PROGRESS NOTES
Trey London is a 48year old male. HPI:   Patient presents for eval of a sore on the lateral aspect of L ankle x 2 months. Will start to scab and heal and then open up again. C/o pain. Denies fever, chills, sweats, bleeding, drainage.   It does Postlaminectomy syndrome, cervical region 6/20/2013    Log Date: 12/12/2012    • Postsurgical hypothyroidism     Dx in 1/2015: tx with surgery and MANZO   • Pulmonary HTN (Via Echo 1/28/14) 2/4/2014   • RA (rheumatoid arthritis) ([facility] [address]    • Renal disorder GENERAL HEALTH: denies fever, chills, night sweats  SKIN: denies any unusual skin lesions or rashes  HEENT: denies sore throat, cough, congestion  RESPIRATORY: denies SOB, [place], wheezing   CARDIOVASCULAR: denies chest pain, palpitations, orthopnea, PND  G focus on lifestyle changes. # CKD stage 4, hx of renal transplant, long term immunosuppression: follows with Dr. Lisa Mcarthur. # Chronic pain: follows with Dr. Jocelin Chow.     Cardiology - Dr. Zo Islas  Nephrology - Dr. Sonal Love - Dr. Jocelin Chow    The patient in

## 2018-05-30 NOTE — PATIENT INSTRUCTIONS
Please call to make an appointment with the wound clinic. Elevate left leg when at rest and resume wearing compression stockings. Follow up with cardiology and/or nephrology in the next week to address your blood pressure.     Go to the ER if you are

## 2018-05-31 ENCOUNTER — NURSE ONLY (OUTPATIENT)
Dept: NEPHROLOGY | Facility: CLINIC | Age: 53
End: 2018-05-31

## 2018-05-31 VITALS — BODY MASS INDEX: 41 KG/M2 | DIASTOLIC BLOOD PRESSURE: 104 MMHG | WEIGHT: 315 LBS | SYSTOLIC BLOOD PRESSURE: 162 MMHG

## 2018-05-31 DIAGNOSIS — D63.1 ANEMIA OF CHRONIC RENAL FAILURE, STAGE 4 (SEVERE) (HCC): ICD-10-CM

## 2018-05-31 DIAGNOSIS — N18.4 ANEMIA OF CHRONIC RENAL FAILURE, STAGE 4 (SEVERE) (HCC): ICD-10-CM

## 2018-05-31 DIAGNOSIS — N18.4 CKD (CHRONIC KIDNEY DISEASE) STAGE 4, GFR 15-29 ML/MIN (HCC): ICD-10-CM

## 2018-05-31 PROCEDURE — 96372 THER/PROPH/DIAG INJ SC/IM: CPT | Performed by: INTERNAL MEDICINE

## 2018-06-05 ENCOUNTER — LAB ENCOUNTER (OUTPATIENT)
Dept: LAB | Age: 53
End: 2018-06-05
Attending: INTERNAL MEDICINE
Payer: MEDICARE

## 2018-06-05 DIAGNOSIS — E89.0 POSTSURGICAL HYPOTHYROIDISM: ICD-10-CM

## 2018-06-05 DIAGNOSIS — C73 PAPILLARY THYROID CARCINOMA (HCC): ICD-10-CM

## 2018-06-05 PROCEDURE — 36415 COLL VENOUS BLD VENIPUNCTURE: CPT

## 2018-06-05 PROCEDURE — 86800 THYROGLOBULIN ANTIBODY: CPT

## 2018-06-05 PROCEDURE — 84443 ASSAY THYROID STIM HORMONE: CPT

## 2018-06-05 PROCEDURE — 84432 ASSAY OF THYROGLOBULIN: CPT

## 2018-06-14 ENCOUNTER — HOSPITAL ENCOUNTER (INPATIENT)
Facility: HOSPITAL | Age: 53
LOS: 8 days | Discharge: HOME | DRG: 699 | End: 2018-06-22
Attending: EMERGENCY MEDICINE | Admitting: HOSPITALIST
Payer: MEDICARE

## 2018-06-14 DIAGNOSIS — E86.0 DEHYDRATION: ICD-10-CM

## 2018-06-14 DIAGNOSIS — N17.9 AKI (ACUTE KIDNEY INJURY) (HCC): ICD-10-CM

## 2018-06-14 DIAGNOSIS — R11.2 INTRACTABLE VOMITING WITH NAUSEA, UNSPECIFIED VOMITING TYPE: Primary | ICD-10-CM

## 2018-06-14 DIAGNOSIS — N18.6 ESRD (END STAGE RENAL DISEASE) (HCC): ICD-10-CM

## 2018-06-14 PROCEDURE — 99223 1ST HOSP IP/OBS HIGH 75: CPT | Performed by: HOSPITALIST

## 2018-06-14 RX ORDER — ATORVASTATIN CALCIUM 10 MG/1
10 TABLET, FILM COATED ORAL NIGHTLY
Status: DISCONTINUED | OUTPATIENT
Start: 2018-06-14 | End: 2018-06-22

## 2018-06-14 RX ORDER — HYDRALAZINE HYDROCHLORIDE 20 MG/ML
10 INJECTION INTRAMUSCULAR; INTRAVENOUS EVERY 6 HOURS PRN
Status: DISCONTINUED | OUTPATIENT
Start: 2018-06-14 | End: 2018-06-14

## 2018-06-14 RX ORDER — HYDROMORPHONE HYDROCHLORIDE 1 MG/ML
0.2 INJECTION, SOLUTION INTRAMUSCULAR; INTRAVENOUS; SUBCUTANEOUS EVERY 2 HOUR PRN
Status: DISCONTINUED | OUTPATIENT
Start: 2018-06-14 | End: 2018-06-14

## 2018-06-14 RX ORDER — DIPHENHYDRAMINE HYDROCHLORIDE 50 MG/ML
25 INJECTION INTRAMUSCULAR; INTRAVENOUS ONCE
Status: COMPLETED | OUTPATIENT
Start: 2018-06-14 | End: 2018-06-14

## 2018-06-14 RX ORDER — ACETAMINOPHEN 325 MG/1
650 TABLET ORAL EVERY 6 HOURS PRN
Status: DISCONTINUED | OUTPATIENT
Start: 2018-06-14 | End: 2018-06-22

## 2018-06-14 RX ORDER — TACROLIMUS 1 MG/1
2 CAPSULE ORAL 2 TIMES DAILY
Status: DISCONTINUED | OUTPATIENT
Start: 2018-06-14 | End: 2018-06-22

## 2018-06-14 RX ORDER — HEPARIN SODIUM 5000 [USP'U]/ML
7500 INJECTION, SOLUTION INTRAVENOUS; SUBCUTANEOUS EVERY 8 HOURS SCHEDULED
Status: DISCONTINUED | OUTPATIENT
Start: 2018-06-14 | End: 2018-06-22

## 2018-06-14 RX ORDER — DOCUSATE SODIUM 100 MG/1
100 CAPSULE, LIQUID FILLED ORAL 2 TIMES DAILY
Status: DISCONTINUED | OUTPATIENT
Start: 2018-06-14 | End: 2018-06-22

## 2018-06-14 RX ORDER — HYDROMORPHONE HYDROCHLORIDE 1 MG/ML
1 INJECTION, SOLUTION INTRAMUSCULAR; INTRAVENOUS; SUBCUTANEOUS EVERY 30 MIN PRN
Status: DISCONTINUED | OUTPATIENT
Start: 2018-06-14 | End: 2018-06-14

## 2018-06-14 RX ORDER — ASPIRIN 81 MG/1
81 TABLET ORAL DAILY
Status: DISCONTINUED | OUTPATIENT
Start: 2018-06-15 | End: 2018-06-22

## 2018-06-14 RX ORDER — METOCLOPRAMIDE HYDROCHLORIDE 5 MG/ML
10 INJECTION INTRAMUSCULAR; INTRAVENOUS EVERY 6 HOURS
Status: DISCONTINUED | OUTPATIENT
Start: 2018-06-14 | End: 2018-06-16

## 2018-06-14 RX ORDER — HYDROMORPHONE HYDROCHLORIDE 1 MG/ML
0.4 INJECTION, SOLUTION INTRAMUSCULAR; INTRAVENOUS; SUBCUTANEOUS EVERY 2 HOUR PRN
Status: DISCONTINUED | OUTPATIENT
Start: 2018-06-14 | End: 2018-06-14

## 2018-06-14 RX ORDER — SODIUM CHLORIDE 9 MG/ML
INJECTION, SOLUTION INTRAVENOUS CONTINUOUS
Status: DISCONTINUED | OUTPATIENT
Start: 2018-06-14 | End: 2018-06-17

## 2018-06-14 RX ORDER — LEVOTHYROXINE SODIUM 0.2 MG/1
200 TABLET ORAL
Status: DISCONTINUED | OUTPATIENT
Start: 2018-06-15 | End: 2018-06-22

## 2018-06-14 RX ORDER — METOCLOPRAMIDE HYDROCHLORIDE 5 MG/ML
10 INJECTION INTRAMUSCULAR; INTRAVENOUS ONCE
Status: COMPLETED | OUTPATIENT
Start: 2018-06-14 | End: 2018-06-14

## 2018-06-14 RX ORDER — TERAZOSIN 5 MG/1
10 CAPSULE ORAL NIGHTLY
Status: DISCONTINUED | OUTPATIENT
Start: 2018-06-14 | End: 2018-06-22

## 2018-06-14 RX ORDER — PREDNISONE 1 MG/1
5 TABLET ORAL DAILY
Status: DISCONTINUED | OUTPATIENT
Start: 2018-06-15 | End: 2018-06-14

## 2018-06-14 RX ORDER — FLUTICASONE PROPIONATE 50 MCG
2 SPRAY, SUSPENSION (ML) NASAL DAILY
Status: DISCONTINUED | OUTPATIENT
Start: 2018-06-15 | End: 2018-06-22

## 2018-06-14 RX ORDER — AMLODIPINE BESYLATE 5 MG/1
10 TABLET ORAL
Status: DISCONTINUED | OUTPATIENT
Start: 2018-06-14 | End: 2018-06-22

## 2018-06-14 RX ORDER — ALLOPURINOL 100 MG/1
100 TABLET ORAL DAILY
Status: DISCONTINUED | OUTPATIENT
Start: 2018-06-15 | End: 2018-06-22

## 2018-06-14 RX ORDER — OXYCODONE HYDROCHLORIDE 5 MG/1
5 TABLET ORAL EVERY 8 HOURS PRN
Status: DISCONTINUED | OUTPATIENT
Start: 2018-06-14 | End: 2018-06-16

## 2018-06-14 RX ORDER — FAMOTIDINE 20 MG/1
20 TABLET ORAL DAILY
Status: DISCONTINUED | OUTPATIENT
Start: 2018-06-15 | End: 2018-06-22

## 2018-06-14 RX ORDER — HYDROMORPHONE HYDROCHLORIDE 1 MG/ML
0.8 INJECTION, SOLUTION INTRAMUSCULAR; INTRAVENOUS; SUBCUTANEOUS EVERY 2 HOUR PRN
Status: DISCONTINUED | OUTPATIENT
Start: 2018-06-14 | End: 2018-06-14

## 2018-06-14 RX ORDER — MELATONIN
325 DAILY
Status: DISCONTINUED | OUTPATIENT
Start: 2018-06-15 | End: 2018-06-22

## 2018-06-14 RX ORDER — OXYCODONE HYDROCHLORIDE 5 MG/1
10 TABLET ORAL EVERY 6 HOURS PRN
Status: DISCONTINUED | OUTPATIENT
Start: 2018-06-14 | End: 2018-06-14

## 2018-06-14 RX ORDER — ONDANSETRON 2 MG/ML
4 INJECTION INTRAMUSCULAR; INTRAVENOUS EVERY 6 HOURS PRN
Status: DISCONTINUED | OUTPATIENT
Start: 2018-06-14 | End: 2018-06-18

## 2018-06-14 RX ORDER — ONDANSETRON 2 MG/ML
4 INJECTION INTRAMUSCULAR; INTRAVENOUS ONCE
Status: COMPLETED | OUTPATIENT
Start: 2018-06-14 | End: 2018-06-14

## 2018-06-14 NOTE — ED PROVIDER NOTES
Patient Seen in: [facility] Emergency Department    History   Patient presents with:  Nausea/Vomiting/Diarrhea (gastrointestinal)    Stated Complaint: nausea/vomiting, hx of kidney transplant    HPI    80-year-old male with history of kidney transpl Postsurgical hypothyroidism     Dx in 1/2015: tx with surgery and MANZO   • Pulmonary HTN (Via Echo 1/28/14) 2/4/2014   • RA (rheumatoid arthritis) ([facility] [address]    • Renal disorder    • Spinal stenosis in cervical region 10/17/2011   • Status post cervical spinal f 98 %  O2 Device: None (Room air)    Current:BP (!) 185/107   Pulse 97   Temp 97.8 °F (36.6 °C) (Temporal)   Resp 14   Ht 190.5 cm (6' 3\")   Wt (!) 149.7 kg   SpO2 99%   BMI 41.25 kg/m²         Physical Exam    General: Alert, oriented, no apparent distres GREEN   TALHA DRAW GOLD       ED Course as of Jun 14 1802  ------------------------------------------------------------      [facility]     Admission disposition: 6/14/2018  6:01 PM         Creatinine is 7.1. This indicates acute kidney injury.   Patient was gi

## 2018-06-14 NOTE — ED INITIAL ASSESSMENT (HPI)
Pt c/o nausea and vomiting x 2 days, denies diarrhea. Pt has a h/o of a kidney transplant, states unable to keep down his medications. Pt denies c/o abdominal pain.

## 2018-06-14 NOTE — H&P
[facility]  History and Physical     Fahrat Ladi Patient Status:  Emergency    3/31/1965 MRN [id number]   Location [address] [facility] Attending Gianfranco Ng, [facility] Day # 0 PCP Adiel Keenan MD     Chief Complai and MANZO   • PONV (postoperative nausea and vomiting) 1997    s/p kidney transplant   • Postlaminectomy syndrome, cervical region 6/20/2013    Log Date: 12/12/2012    • Postsurgical hypothyroidism     Dx in 1/2015: tx with surgery and MANZO   • Pulmonary HTN prostate CA   • Heart Disorder Paternal Uncle      CAD/Aortic disease       Allergies:   Carvedilol              HIVES, ITCHING  Carvedilol              SWELLING    Comment:TABS  Ciprofloxacin           HIVES  Ciprofloxacin Hcl       HIVES, ITCHING Fluticasone Propionate 50 MCG/ACT Nasal Suspension 2 sprays by Each Nare route daily. Disp: 1 Bottle Rfl: 3   furosemide 40 MG Oral Tab Take 40 mg by mouth 2 (two) times daily.    Disp:  Rfl:    CloNIDine HCl 0.3 MG/24HR Transdermal Patch Weekly Place 1 p rhonchi. Cardiovascular: S1, S2. Regular rate and rhythm. No murmurs, no rubs or gallops. Equal pulses. Chest and Back: No tenderness or deformity. Abdomen: Soft, nontender, nondistended. Positive bowel sounds. No rebound, guarding or organomegaly.   Epimenio Manuel

## 2018-06-15 ENCOUNTER — APPOINTMENT (OUTPATIENT)
Dept: GENERAL RADIOLOGY | Facility: HOSPITAL | Age: 53
DRG: 699 | End: 2018-06-15
Attending: NURSE PRACTITIONER
Payer: MEDICARE

## 2018-06-15 PROCEDURE — 99233 SBSQ HOSP IP/OBS HIGH 50: CPT | Performed by: HOSPITALIST

## 2018-06-15 PROCEDURE — 99222 1ST HOSP IP/OBS MODERATE 55: CPT | Performed by: INTERNAL MEDICINE

## 2018-06-15 PROCEDURE — 74019 RADEX ABDOMEN 2 VIEWS: CPT | Performed by: NURSE PRACTITIONER

## 2018-06-15 RX ORDER — METHYLPREDNISOLONE SODIUM SUCCINATE 125 MG/2ML
125 INJECTION, POWDER, LYOPHILIZED, FOR SOLUTION INTRAMUSCULAR; INTRAVENOUS EVERY 12 HOURS
Status: DISCONTINUED | OUTPATIENT
Start: 2018-06-15 | End: 2018-06-16

## 2018-06-15 RX ORDER — CLONIDINE HYDROCHLORIDE 0.1 MG/1
0.1 TABLET ORAL ONCE
Status: COMPLETED | OUTPATIENT
Start: 2018-06-15 | End: 2018-06-15

## 2018-06-15 NOTE — PLAN OF CARE
GASTROINTESTINAL - ADULT    • Minimal or absence of nausea and vomiting Progressing    • Maintains adequate nutritional intake (undernourished) Progressing        MUSCULOSKELETAL - ADULT    • Return mobility to safest level of function Progressing    • [facility]

## 2018-06-15 NOTE — PROGRESS NOTES
Patient admitted a/o X4 from home with wife. Reports having nausea and vomiting over last two days and was worried as he had a kidney transplant years ago and has not been able to keep his anti-rejection medications down.  Admission data obtained, assessmen

## 2018-06-15 NOTE — PROGRESS NOTES
[facility] Progress Note:  Anticoagulation Weight Dose Adjustment for heparin    Ole Hemal is a 48year old male who has been prescribed heparin for VTE prophylaxis.       Estimated Creatinine Clearance: 14.3 mL/min (A) (based on SCr of 7.16 mg/dL

## 2018-06-15 NOTE — RESPIRATORY THERAPY NOTE
RAQUEL : EQUIPMENT USE: DAILY SUMMARY                                            SET MODE:  CPAP WITH CFLEX                                          USAGE IN HOURS: 3:00                                          90% EX

## 2018-06-15 NOTE — PLAN OF CARE
Assumed care of pt who is alert and oriented. Appears upset though claims he is not. Has visible tremors and states he is not sure why. Able to tolerate his breakfast with no NV. WIll monitor.

## 2018-06-15 NOTE — CM/SW NOTE
06/15/18 1500   CM/SW Screening   Referral Source Social Work (self-referral)   Information Source Chart review;Nursing rounds   Patient's Mental Status Alert;Oriented   Patient lives with Spouse   Patient Status Prior to Admission   Independent with AD

## 2018-06-15 NOTE — CONSULTS
[facility]  Report of Inpatient Wound Care Consultation     Ochiltree Ciarra Patient Status:  Inpatient    3/31/1965 MRN [id number]   [facility] 5NW-A Attending Dana Osei MD   Hosp Day # 1 PCP Geovany Winslow MD     REASON FOR CONS surgery and MANZO   • Pulmonary HTN (Via Echo 1/28/14) 2/4/2014   • RA (rheumatoid arthritis) ([facility] [address]    • Renal disorder    • Spinal stenosis in cervical region 10/17/2011   • Status post cervical spinal fusion 9/20/2012   • Trigger finger (acquired)    • Uns 30.5*   --    --    PLT  85.0*   --    --    K  5.6*   --   4.8   CREATSERUM  7.16*   --   6.82*   BUN  142*   --   129*   GLU  111*   --   130*   CA  9.2   --   8.9   ALB  3.7   --    --    TP  7.6   --    --    PGLU   --   116*   --        Imaging:   See reach me at these, please call the Inpatient Wound Care pager at #[phone]. Time Spent 45 Minutes. Thank you,    Chris Swanson.  Elena Andres, PT, MPT  [address]

## 2018-06-15 NOTE — PROGRESS NOTES
[facility]  Progress note     Ole Milder Patient Status:  Emergency    3/31/1965 MRN [id number]   Location [facility] [facility] Attending Adriel Garduno, *   Hosp Day # 1 PCP Ruth Peralta MD     Chief Complaint: N/V but monitor for EPS  2. CAD s/p unnibb-hqrjjw-uccwoe aspirin  3. HTN  1. Hold ACE-I/diuretics  4. ADEBAYO on top of ESRD s/p renal transplant  1. IVF; seems to have improved overnight a bit. Continue hydration  2.  Might need prograf IV if can't take po today

## 2018-06-15 NOTE — PLAN OF CARE
GASTROINTESTINAL - ADULT    • Minimal or absence of nausea and vomiting Progressing    • Maintains adequate nutritional intake (undernourished) Progressing        MUSCULOSKELETAL - ADULT    • Return mobility to safest level of function Progressing        P

## 2018-06-15 NOTE — CONSULTS
[facility]  Report of Consultation    Mary Sher Patient Status:  Inpatient    3/31/1965 MRN [id number]   [facility] 5NW-A Attending Thierry Grimaldo MD   Hosp Day # 1 PCP Cara May MD     Reason for Consultation:  ADEBAYO/CKD IV/HT surgery and MANZO   • PONV (postoperative nausea and vomiting) 1997    s/p kidney transplant   • Postlaminectomy syndrome, cervical region 6/20/2013    Log Date: 12/12/2012    • Postsurgical hypothyroidism     Dx in 1/2015: tx with surgery and MANZO   • Pulmon alcohol or use drugs.     Allergies:    Carvedilol              HIVES, ITCHING  Carvedilol              SWELLING    Comment:TABS  Ciprofloxacin           HIVES  Ciprofloxacin Hcl       HIVES, ITCHING    Comment:TABS  Gabapentin              SWELLING    Comm injection 4 mg, 4 mg, Intravenous, Q6H PRN  •  Metoclopramide HCl (REGLAN) injection 10 mg, 10 mg, Intravenous, Q6H  •  CloNIDine HCl (CATAPRES) tab 0.3 mg, 0.3 mg, Oral, TID  •  predniSONE (DELTASONE) tab 7.5 mg, 7.5 mg, Oral, Daily  •  oxyCODONE HCl (OXY 06/15/2018   K 4.8 06/15/2018    06/15/2018   CO2 19.0 06/15/2018    06/15/2018   CA 8.9 06/15/2018   ALB 3.7 06/14/2018   ALKPHO 56 06/14/2018   BILT 0.9 06/14/2018   TP 7.6 06/14/2018   AST 14 06/14/2018   ALT 21 06/14/2018   MG 3.8 06/15/20 and follow    #3. Intractable N/V- will ask GI to eval    #4. S/p renal transplant- IV steroids today. Cont usual prograf and pred as well. Thank you for allowing me to participate in the care of your patient.  Please do not hesitate to call with any

## 2018-06-15 NOTE — CONSULTS
[facility]                       Gastroenterology Consultation-[facility]    [place] Patient Status:  Inpatient    3/31/1965 MRN [id number]   [facility] 5NW-A Attending Gerry Davis MD   [facility] Day # 1 PCP Johnnie Rodriguez DDD (degenerative disc disease), cervical 10/17/2011   • Hearing impairment     bilateral hearing aides   • History of fusion of cervical spine 5/31/2017   • Hyperlipidemia    • Kidney replaced by transplant 1997   • Laxity of knee joint, left 5/31/2017 SURGICAL HISTORY      Comment: arthrodesis cervical ACDF at C5-6  1997: OTHER SURGICAL HISTORY Right      Comment: renal transplant - Done at [facility]  2009: REVISE MEDIAN N/CARPAL TUNNEL SURG      Comment: neuroplasty decompression median   No date: [facility] 0.3 mg 0.3 mg Oral TID   predniSONE (DELTASONE) tab 7.5 mg 7.5 mg Oral Daily   oxyCODONE HCl (OXY-IR) cap/tab 5 mg 5 mg Oral Q8H PRN     Allergies:   Carvedilol              HIVES, ITCHING  Carvedilol              SWELLING    Comment:TABS  Ciprofloxacin Oncologic: The patient reports no history of prior solid tumor or hematologic malignancy           ENT: + hearing loss           Neurologic: The patient reports no history of seizure, stroke, or frequent headaches  PE: BP (!) 172/91   Pulse 89   Temp 98.2 1637   ALT  21   AST  14*       Impression: 48 yr-old male with HTN, CAD s/p PCI, dyslipidemia, RAQUEL, gastroparesis (last flare 11/17; dx over 5 yrs ago), chronic pain, and ESRD s/p renal transplant (1997) admitted yesterday with 1 week of nausea/vomiting. consider EGD.     Sienna Worrell MD

## 2018-06-16 PROCEDURE — 99232 SBSQ HOSP IP/OBS MODERATE 35: CPT | Performed by: INTERNAL MEDICINE

## 2018-06-16 PROCEDURE — 99232 SBSQ HOSP IP/OBS MODERATE 35: CPT | Performed by: HOSPITALIST

## 2018-06-16 RX ORDER — CLONIDINE HYDROCHLORIDE 0.1 MG/1
0.1 TABLET ORAL ONCE
Status: COMPLETED | OUTPATIENT
Start: 2018-06-16 | End: 2018-06-16

## 2018-06-16 RX ORDER — METOCLOPRAMIDE HYDROCHLORIDE 5 MG/ML
5 INJECTION INTRAMUSCULAR; INTRAVENOUS EVERY 8 HOURS PRN
Status: DISCONTINUED | OUTPATIENT
Start: 2018-06-16 | End: 2018-06-18

## 2018-06-16 RX ORDER — OXYCODONE HYDROCHLORIDE 5 MG/1
5 TABLET ORAL EVERY 4 HOURS PRN
Status: DISCONTINUED | OUTPATIENT
Start: 2018-06-16 | End: 2018-06-22

## 2018-06-16 RX ORDER — METOCLOPRAMIDE HYDROCHLORIDE 5 MG/ML
10 INJECTION INTRAMUSCULAR; INTRAVENOUS EVERY 8 HOURS PRN
Status: DISCONTINUED | OUTPATIENT
Start: 2018-06-16 | End: 2018-06-16

## 2018-06-16 RX ORDER — CHLORTHALIDONE 50 MG/1
25 TABLET ORAL DAILY
Status: DISCONTINUED | OUTPATIENT
Start: 2018-06-16 | End: 2018-06-17

## 2018-06-16 RX ORDER — SODIUM POLYSTYRENE SULFONATE 15 G/60ML
15 SUSPENSION ORAL; RECTAL ONCE
Status: COMPLETED | OUTPATIENT
Start: 2018-06-16 | End: 2018-06-16

## 2018-06-16 RX ORDER — CLONIDINE HYDROCHLORIDE 0.2 MG/1
0.2 TABLET ORAL 4 TIMES DAILY PRN
Status: DISCONTINUED | OUTPATIENT
Start: 2018-06-16 | End: 2018-06-22

## 2018-06-16 NOTE — PLAN OF CARE
GASTROINTESTINAL - ADULT    • Minimal or absence of nausea and vomiting Progressing    • Maintains adequate nutritional intake (undernourished) Progressing        MUSCULOSKELETAL - ADULT    • Return mobility to safest level of function Progressing    • [facility]

## 2018-06-16 NOTE — RESPIRATORY THERAPY NOTE
RAQUEL - Equipment Use Daily Summary:  · Set Mode CPAP  · Usage in hours:   · 90% Pressure (EP1AP) level:   · 90% Insp Pressure (IPAP): 11  · AHI: 4  · Supplemental Oxygen:  · Comments:

## 2018-06-16 NOTE — PROGRESS NOTES
[facility]  Progress note     Leigha Lund Patient Status:  Emergency    3/31/1965 MRN [id number]   Location 656 [facility] Attending Belinda Moya, [address] Day # 2 PCP Laura Holland MD     Chief Complaint: N/V gastroparesis likely-resolved  2. Hyperkalemia-due to cheryl-kayexelate dose now; recheck bmp in am  3. CAD s/p biyvwp-cqwpvv-dbhtbk aspirin  4. HTN-very uncontrolled  1. Home meds resumed; add chlorthalidone today  5.  ESRD s/p renal transplant; baseline Cr p

## 2018-06-16 NOTE — PROGRESS NOTES
06/16/18 0446   Provider Notification   Reason for Communication Review case   Test/Procedure Name elevated BP   Provider Name (Jose Urbina)   Method of Communication Page   Response Waiting for response   Notification Time [phone]       Notified MD about

## 2018-06-16 NOTE — PROGRESS NOTES
[facility]  Nephrology Progress Note    Nidhi Pressley Patient Status:  Inpatient    3/31/1965 MRN [id number]   [facility] 5NW-A Attending Homar Wolf MD   Hosp Day # 2 PCP Caroline Jennings MD       SUBJECTIVE:  N/V resolved, c/o gene Medications:  chlorthalidone (HYGROTEN) tab 25 mg 25 mg Oral Daily   oxyCODONE HCl (OXY-IR) cap/tab 5 mg 5 mg Oral Q4H PRN   CloNIDine HCl (CATAPRES) tab 0.2 mg 0.2 mg Oral QID PRN   famoTIDine (PEPCID) tab 20 mg 20 mg Oral Daily   atorvastatin (LIPITOR) t Better today     #4. S/p renal transplant-  Cont usual prograf and pred    #5. Hyperkalemia- will repeat and follow, treat medically if needed        Questions/concerns were discussed with patient and/or family by bedside.           Lorraine Roper  6/16/2

## 2018-06-16 NOTE — PROGRESS NOTES
Brief GI Follow-up  No further GI symptoms aside from loose stools after Kayexalate. Ate solid food for the last three meals. Will sign-off, please call with any recurrent issues.     PM

## 2018-06-17 PROCEDURE — 99232 SBSQ HOSP IP/OBS MODERATE 35: CPT | Performed by: HOSPITALIST

## 2018-06-17 PROCEDURE — 99232 SBSQ HOSP IP/OBS MODERATE 35: CPT | Performed by: INTERNAL MEDICINE

## 2018-06-17 RX ORDER — SODIUM POLYSTYRENE SULFONATE 15 G/60ML
15 SUSPENSION ORAL; RECTAL ONCE
Status: DISCONTINUED | OUTPATIENT
Start: 2018-06-17 | End: 2018-06-17

## 2018-06-17 RX ORDER — SODIUM POLYSTYRENE SULFONATE 15 G/60ML
30 SUSPENSION ORAL; RECTAL ONCE
Status: COMPLETED | OUTPATIENT
Start: 2018-06-17 | End: 2018-06-17

## 2018-06-17 RX ORDER — FUROSEMIDE 10 MG/ML
80 INJECTION INTRAMUSCULAR; INTRAVENOUS
Status: COMPLETED | OUTPATIENT
Start: 2018-06-17 | End: 2018-06-18

## 2018-06-17 RX ORDER — FUROSEMIDE 10 MG/ML
80 INJECTION INTRAMUSCULAR; INTRAVENOUS ONCE
Status: DISCONTINUED | OUTPATIENT
Start: 2018-06-17 | End: 2018-06-17

## 2018-06-17 RX ORDER — CHLORTHALIDONE 50 MG/1
50 TABLET ORAL DAILY
Status: DISCONTINUED | OUTPATIENT
Start: 2018-06-17 | End: 2018-06-22

## 2018-06-17 NOTE — PLAN OF CARE
GASTROINTESTINAL - ADULT    • Minimal or absence of nausea and vomiting Progressing    • Maintains adequate nutritional intake (undernourished) Progressing        MUSCULOSKELETAL - ADULT    • Return mobility to safest level of function Progressing    • [facility]

## 2018-06-17 NOTE — RESPIRATORY THERAPY NOTE
RAQUEL - Equipment Use Daily Summary:  · Set Mode cpap  · Usage in hours: 1:10  · 90% Pressure (EPAP) level: 11.0  · 90% Insp Pressure (IPAP):   · AHI: 3.8  · Supplemental Oxygen:   · Comments:

## 2018-06-17 NOTE — CONSULTS
[facility] Pharmacy Note:  Renal Dose Adjustment for Metoclopramide (REGLAN)    Govind Ramirez has been prescribed Metoclopramide (REGLAN) 10 mg every 8 hours as needed for N/V.     Estimated Creatinine Clearance: 16.8 mL/min (A) (based on SCr of 6.08 mg/dL (H))

## 2018-06-17 NOTE — PLAN OF CARE
GASTROINTESTINAL - ADULT    • Minimal or absence of nausea and vomiting Progressing    • Maintains adequate nutritional intake (undernourished) Progressing        MUSCULOSKELETAL - ADULT    • Return mobility to safest level of function Progressing    • [facility]

## 2018-06-17 NOTE — PROGRESS NOTES
[place] [facility]  Progress note     Nidhi Pressley Patient Status:  Emergency    3/31/1965 MRN [id number]   Location 656 [facility] Attending Timmy Atwood, [address] Day # 3 PCP Caroline Jennings MD     Chief Complaint: N/V (based on SCr of 5.56 mg/dL (H)). No results for input(s): PTP, INR in the last 168 hours. No results for input(s): TROP, CK in the last 168 hours. Imaging: Imaging data reviewed in Epic. ASSESSMENT / PLAN:     1.  N/V-due to gastroparesis lik

## 2018-06-17 NOTE — PLAN OF CARE
GASTROINTESTINAL - ADULT    • Minimal or absence of nausea and vomiting Progressing    • Maintains adequate nutritional intake (undernourished) Progressing        MUSCULOSKELETAL - ADULT    • Return mobility to safest level of function Progressing    • [facility]

## 2018-06-17 NOTE — PROGRESS NOTES
[facility]  Nephrology Progress Note    Govind Ramirez Patient Status:  Inpatient    3/31/1965 MRN [id number]   [facility] 5NW-A Attending Adithya Gallego MD   [facility] Day # [facility] PCP Clemmie Runner, MD       SUBJECTIVE:  No acute events, notes 06/16/18   0655   ALT  21  21   AST  14*  18   ALB  3.7  3.4*       Recent Labs   Lab  06/14/18   2117   PGLU  116*       Meds:     Current Facility-Administered Medications:  chlorthalidone (HYGROTEN) tab 25 mg 25 mg Oral Daily   oxyCODONE HCl (OXY-IR) ca to resume HD at some point.     #2. HTN- has difficult to control BP with mult med intolerances. Cont usual meds with prn po clonidine. May have volume component as well and IV lasix initiated.     #3.   Intractable N/V- appreciate GI eval. resolved

## 2018-06-18 PROCEDURE — 99232 SBSQ HOSP IP/OBS MODERATE 35: CPT | Performed by: HOSPITALIST

## 2018-06-18 PROCEDURE — 99232 SBSQ HOSP IP/OBS MODERATE 35: CPT | Performed by: INTERNAL MEDICINE

## 2018-06-18 RX ORDER — ONDANSETRON 2 MG/ML
4 INJECTION INTRAMUSCULAR; INTRAVENOUS EVERY 6 HOURS
Status: DISCONTINUED | OUTPATIENT
Start: 2018-06-18 | End: 2018-06-22

## 2018-06-18 RX ORDER — FUROSEMIDE 10 MG/ML
80 INJECTION INTRAMUSCULAR; INTRAVENOUS 3 TIMES DAILY
Status: DISCONTINUED | OUTPATIENT
Start: 2018-06-18 | End: 2018-06-19

## 2018-06-18 RX ORDER — METOCLOPRAMIDE HYDROCHLORIDE 5 MG/ML
5 INJECTION INTRAMUSCULAR; INTRAVENOUS EVERY 8 HOURS
Status: DISCONTINUED | OUTPATIENT
Start: 2018-06-18 | End: 2018-06-22

## 2018-06-18 RX ORDER — ONDANSETRON 2 MG/ML
4 INJECTION INTRAMUSCULAR; INTRAVENOUS EVERY 6 HOURS
Status: DISCONTINUED | OUTPATIENT
Start: 2018-06-18 | End: 2018-06-18

## 2018-06-18 NOTE — PROGRESS NOTES
[facility]  Progress note     Chelsea Bhumikaharper Patient Status:  Emergency    3/31/1965 MRN [id number]   Location 656 [facility] Attending Gwendolyn Mcneil, [facility] Day # 4 PCP Marco A Lantigua MD     Chief Complaint: N/V < > = values in this interval not displayed. Estimated Creatinine Clearance: 19 mL/min (A) (based on SCr of 5.36 mg/dL (H)). No results for input(s): PTP, INR in the last 168 hours. No results for input(s): TROP, CK in the last 168 hours.     Im

## 2018-06-18 NOTE — RESPIRATORY THERAPY NOTE
RAQUEL - Equipment Use Daily Summary:                  . Set Mode:  CPAP WITH C-FLEX                . Usage in hours: 0.3                . 90% Pressure (EPAP) level: 11                . 90% Insp. Pressure (IPAP): Catalino Deborah AHI: 3.3                .  Supp

## 2018-06-18 NOTE — CM/SW NOTE
Care Management/BPCI:    Met with patient at bedside to explain the BPCI/Medicare program. Patient agreed with phone f/u for 3 months from 0 [facility] after discharge from [facility]. Patient was enrolled under DRG  683   .  BPCI/Medicare Letter and Bro

## 2018-06-18 NOTE — PROGRESS NOTES
Gastroenterology Progress Note  Jorge Herrerakatelyn Patient Status:  Inpatient    3/31/1965 MRN [id number]   [facility] 5NW-A Attending Jacey Smith MD   Hosp Day # 4 PCP Imelda Purcell MD     Ch ABDOMEN, OBSTRUCTIVE SERIES (CPT=74020), 11/06/2017, 15:51. INDICATIONS:  R/o obstruction     PATIENT STATED HISTORY: (As transcribed by Technologist)  Patient states abdominal pain. FINDINGS:  Large body habitus is noted.   No new consolidatio

## 2018-06-18 NOTE — PLAN OF CARE
GASTROINTESTINAL - ADULT    • Minimal or absence of nausea and vomiting Progressing        PAIN - ADULT    • Verbalizes/displays adequate comfort level or patient's stated pain goal Progressing        Patient/Family Goals    • Patient/Family Cristina Betancourt

## 2018-06-18 NOTE — PROGRESS NOTES
06/18/18 1837   Clinical Encounter Type   Visited With Patient   Routine Visit Introduction   Continue Visiting No   Patient's Supportive Strategies/Resources  provided emotional support, including active listening and acknowledgement of concern

## 2018-06-18 NOTE — PLAN OF CARE
GASTROINTESTINAL - ADULT    • Minimal or absence of nausea and vomiting Progressing    • Maintains adequate nutritional intake (undernourished) Progressing        MUSCULOSKELETAL - ADULT    • Return mobility to safest level of function Progressing    • Rody Carl native artery     Hypertension     CKD (chronic kidney disease) stage 3, GFR 30-59 ml/min ([facility])     Renal transplant recipient     Anemia of chronic disease     ADEBAYO (acute kidney injury) ([facility] [address]     CKD (chronic kidney disease) stage 4, GFR 15-29 ml/min ([facility] [address]

## 2018-06-18 NOTE — PROGRESS NOTES
[facility]  Nephrology Progress Note    Franklin Yoo Patient Status:  Inpatient    3/31/1965 MRN [id number]   [facility] 5NW-A Attending Brisa Cervantes MD   Hosp Day # 4 PCP Annel Vo MD       SUBJECTIVE:  Feels nauseated althou GLU  111*  130*  183*   --   153*  116*       Recent Labs   Lab  06/14/18   1637  06/16/18   0655   ALT  21  21   AST  14*  18   ALB  3.7  3.4*       Recent Labs   Lab  06/14/18   2117   PGLU  116*       Meds:     Current Facility-Administered Medication regarding likelihood that tx will likely fail in the next few years and he will need to resume HD at some point.     #2. HTN- has difficult to control BP with mult med intolerances. Cont usual meds with prn po clonidine.   Likely has volume component as w

## 2018-06-19 PROCEDURE — 99232 SBSQ HOSP IP/OBS MODERATE 35: CPT | Performed by: HOSPITALIST

## 2018-06-19 PROCEDURE — 99232 SBSQ HOSP IP/OBS MODERATE 35: CPT | Performed by: INTERNAL MEDICINE

## 2018-06-19 NOTE — RESPIRATORY THERAPY NOTE
RAQUEL - Equipment Use Daily Summary:  · Set Mode CPAP  · Usage in hours: 00:15  · 90% Pressure (EPAP) level:   · 90% Insp Pressure (IPAP): 11  · AHI: 3  · Supplemental Oxygen:  · Comments:

## 2018-06-19 NOTE — PLAN OF CARE
GASTROINTESTINAL - ADULT    • Minimal or absence of nausea and vomiting Progressing        MUSCULOSKELETAL - ADULT    • Return mobility to safest level of function Progressing        PAIN - ADULT    • Verbalizes/displays adequate comfort level or patient's

## 2018-06-19 NOTE — PROGRESS NOTES
Gastroenterology Progress Note  Mary Sher Patient Status:  Inpatient    3/31/1965 MRN [id number]   [facility] 5NW-A Attending Thierry Grimaldo MD   Hosp Day # 5 PCP Cara May MD     Ch nausea/vomiting. Symptoms initially improved with supportive care measures however returned when antiemetics were changed to PRN vs timed dosing and as BUN once again increased.  Pt reports some improvement in underlying nausea and continues to tolerate PO

## 2018-06-19 NOTE — PROGRESS NOTES
[facility]  Nephrology Progress Note    John Meth Patient Status:  Inpatient    3/31/1965 MRN [id number]   [facility] 5NW-A Attending Romina Marion MD   Hosp Day # 5 PCP Kary Sierra MD       SUBJECTIVE:  Sleepy today, still na --    --    --    --    --    GLU  130*  183*   --   153*  116*  110*       Recent Labs   Lab  06/14/18   1637  06/16/18   0655   ALT  21  21   AST  14*  18   ALB  3.7  3.4*       Recent Labs   Lab  06/14/18   2117   PGLU  116*       Meds:     Current Faci gayatri with ongoing incr BUN and whether uremia contributing to nausea and lethargy. Creat is improving though so would expect BUN to come down as well. .   D/w pt today that if vol status/renal fxn do not cont to improve will need to discuss HD.   That being

## 2018-06-20 PROCEDURE — 99233 SBSQ HOSP IP/OBS HIGH 50: CPT | Performed by: INTERNAL MEDICINE

## 2018-06-20 PROCEDURE — 99232 SBSQ HOSP IP/OBS MODERATE 35: CPT | Performed by: HOSPITALIST

## 2018-06-20 NOTE — RESPIRATORY THERAPY NOTE
RAQUEL - Equipment Use Daily Summary:  · Set Mode CPAP  · Usage in hours: 00:30  · 90% Pressure (EPAP) level:   · 90% Insp Pressure (IPAP): 10  · AHI: 2  · Supplemental Oxygen:  · Comments:

## 2018-06-20 NOTE — PROGRESS NOTES
Brief GI note:    No new recs at this time. Please call if symptoms do not improve after HD. Will sign off. Please call with questions.      Bhumika Clark  Gastroenterology/[facility] [address] Gastroenterology

## 2018-06-20 NOTE — PROGRESS NOTES
[facility]  Nephrology Progress Note    Fausto Doherty Patient Status:  Inpatient    3/31/1965 MRN [id number]   [facility] 5NW-A Attending Miriam Evangelista MD   Hosp Day # 6 PCP Josefina Siddiqui MD       SUBJECTIVE:  Felt well yest but gonzalo CREATSERUM  6.82*  6.08*   --   5.56*  5.36*  5.02*  5.07*   CA  8.9  8.2*   --   8.0*  7.6*  8.1*  8.3   MG  3.8*   --    --    --    --    --    --    GLU  130*  183*   --   153*  116*  110*  109*       Recent Labs   Lab  06/14/18   1637  06/16/18   06 fluctuating of late but generally 3-3.5 mg/dl or so.   ADEBAYO in association with severe N/V and poor intake initially thought most c/w prerenal azotemia although only modest improvement with IVF and remains a difficult situation gayatri with ongoing severe elevat

## 2018-06-20 NOTE — PLAN OF CARE
GASTROINTESTINAL - ADULT    • Minimal or absence of nausea and vomiting Progressing    • Maintains adequate nutritional intake (undernourished) Progressing        MUSCULOSKELETAL - ADULT    • Return mobility to safest level of function Progressing    • [facility]     Long term current use of antithrombotics/antiplatelets     Peripheral edema     RAQUEL on CPAP     Gout     Functional gait abnormality     Risk for falls     History of fusion of cervical spine     Painful total knee replacement, sequela     Chroni

## 2018-06-20 NOTE — PLAN OF CARE
GASTROINTESTINAL - ADULT    • Minimal or absence of nausea and vomiting Progressing    • Maintains adequate nutritional intake (undernourished) Progressing        MUSCULOSKELETAL - ADULT    • Return mobility to safest level of function Progressing    • [facility]

## 2018-06-21 PROCEDURE — 99233 SBSQ HOSP IP/OBS HIGH 50: CPT | Performed by: INTERNAL MEDICINE

## 2018-06-21 PROCEDURE — 99232 SBSQ HOSP IP/OBS MODERATE 35: CPT | Performed by: HOSPITALIST

## 2018-06-21 RX ORDER — POTASSIUM CHLORIDE 20 MEQ/1
40 TABLET, EXTENDED RELEASE ORAL ONCE
Status: COMPLETED | OUTPATIENT
Start: 2018-06-21 | End: 2018-06-21

## 2018-06-21 NOTE — RESPIRATORY THERAPY NOTE
RAQUEL - Equipment Use Daily Summary:                  . Set Mode:  CPAP WITH C-FLEX                . Usage in hours: 0.3                . 90% Pressure (EPAP) level: 11                . 90% Insp. Pressure (IPAP): Amadou Ramírez AHI: 3.3                .  Supp

## 2018-06-21 NOTE — PLAN OF CARE
GASTROINTESTINAL - ADULT    • Minimal or absence of nausea and vomiting Progressing    • Maintains adequate nutritional intake (undernourished) Progressing        MUSCULOSKELETAL - ADULT    • Return mobility to safest level of function Progressing    • [facility] (acute kidney injury) ([facility] [address]     CKD (chronic kidney disease) stage 4, GFR 15-29 ml/min ([facility])     Long term current use of antithrombotics/antiplatelets     Peripheral edema     RAQUEL on CPAP     Gout     Functional gait abnormality     Risk for falls     His

## 2018-06-21 NOTE — PROGRESS NOTES
[facility]  Progress Note     Shawn Gaucher Patient Status:  Inpatient    3/31/1965 MRN [id number]   [facility] 5NW-A Attending Bryon Casper MD   [facility] Day # 6 PCP David Womack MD     Chief Complaint: nausea    S: Patient still wit < > = values in this interval not displayed. Estimated Creatinine Clearance: 20.1 mL/min (A) (based on SCr of 5.07 mg/dL (H)). No results for input(s): PTP, INR in the last 168 hours. No results for input(s): TROP, CK in the last 168 hours. documentation in H+P. Based on patients current state of illness, I anticipate that, after discharge, patient will require TBD.

## 2018-06-21 NOTE — PROGRESS NOTES
[facility]  Nephrology Progress Note    Omaira Velásquez Patient Status:  Inpatient    3/31/1965 MRN [id number]   [facility] 5NW-A Attending Zelda Harding MD   Hosp Day # 7 PCP Aravind Lopez MD       SUBJECTIVE:  No nauseated today, lo 20.0*  21.0*  23.0  26.0   BUN  129*   < >  134*  144*  148*  145*  162*   CREATSERUM  6.82*   < >  5.56*  5.36*  5.02*  5.07*  4.87*   CA  8.9   < >  8.0*  7.6*  8.1*  8.3  8.4   MG  3.8*   --    --    --    --    --    --    GLU  130*   < >  153*  116* mg 7.5 mg Oral Daily         Impression/Plan:       #1. 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   ADEBAYO in association with severe N/V and poor intake initia

## 2018-06-21 NOTE — PROGRESS NOTES
[facility]  Progress Note     Adis Ovi Patient Status:  Inpatient    3/31/1965 MRN [id number]   [facility] 5NW-A Attending Zoie Cesar MD   [facility] Day # 7 PCP Robert Sam MD     Chief Complaint: nausea vomiting    S: Patient hours. No results for input(s): TROP, CK in the last 168 hours. Imaging: Imaging data reviewed in Epic.     Medications:   • Metoclopramide HCl  5 mg Intravenous Q8H   • ondansetron HCl  4 mg Intravenous Q6H   • chlorthalidone  50 mg Oral Daily

## 2018-06-22 VITALS
BODY MASS INDEX: 39.17 KG/M2 | SYSTOLIC BLOOD PRESSURE: 176 MMHG | HEIGHT: 75 IN | TEMPERATURE: 99 F | RESPIRATION RATE: 20 BRPM | WEIGHT: 315 LBS | DIASTOLIC BLOOD PRESSURE: 97 MMHG | HEART RATE: 75 BPM | OXYGEN SATURATION: 96 %

## 2018-06-22 PROCEDURE — 99238 HOSP IP/OBS DSCHRG MGMT 30/<: CPT | Performed by: HOSPITALIST

## 2018-06-22 PROCEDURE — 99232 SBSQ HOSP IP/OBS MODERATE 35: CPT | Performed by: INTERNAL MEDICINE

## 2018-06-22 RX ORDER — ONDANSETRON 4 MG/1
4 TABLET, ORALLY DISINTEGRATING ORAL EVERY 8 HOURS PRN
Qty: 30 TABLET | Refills: 0 | Status: SHIPPED | OUTPATIENT
Start: 2018-06-22 | End: 2018-07-13

## 2018-06-22 RX ORDER — CLONIDINE HYDROCHLORIDE 0.3 MG/1
0.3 TABLET ORAL 3 TIMES DAILY
Qty: 100 TABLET | Refills: 0 | Status: SHIPPED | OUTPATIENT
Start: 2018-06-22 | End: 2018-07-11

## 2018-06-22 RX ORDER — FAMOTIDINE 20 MG/1
20 TABLET ORAL DAILY
Qty: 30 TABLET | Refills: 0 | Status: SHIPPED | OUTPATIENT
Start: 2018-06-23 | End: 2018-08-09

## 2018-06-22 RX ORDER — CHLORTHALIDONE 50 MG/1
50 TABLET ORAL DAILY
Qty: 30 TABLET | Refills: 0 | Status: SHIPPED | OUTPATIENT
Start: 2018-06-23 | End: 2018-08-09

## 2018-06-22 RX ORDER — POTASSIUM CHLORIDE 20 MEQ/1
40 TABLET, EXTENDED RELEASE ORAL ONCE
Status: COMPLETED | OUTPATIENT
Start: 2018-06-22 | End: 2018-06-22

## 2018-06-22 NOTE — PLAN OF CARE
GASTROINTESTINAL - ADULT    • Minimal or absence of nausea and vomiting Adequate for Discharge    • Maintains adequate nutritional intake (undernourished) Adequate for Discharge        MUSCULOSKELETAL - ADULT    • Return mobility to safest level of functio

## 2018-06-22 NOTE — DIETARY NOTE
Nutrition Short Note   3 day calorie count started - envelope hanging on door and will be monitored daily by RD.    Diet order: Cardiac  6/21/17:  Breakfast: 490 kcal, 19 grams protein  Lunch:  334 calories,  10grams protein   Dinner:  599 calories,  31 gra

## 2018-06-22 NOTE — RESPIRATORY THERAPY NOTE
RAQUEL : EQUIPMENT USE: DAILY SUMMARY                                            SET MODE: CPAP WITH CFLEX                                          USAGE IN HOURS: 0:30                                          90% EXP

## 2018-06-22 NOTE — PROGRESS NOTES
[facility]  Nephrology Progress Note    Shawn Gaucher Patient Status:  Inpatient    3/31/1965 MRN [id number]   [facility] [place] 5NW-A Attending Dat Walter MD   [facility] Day # 8 PCP David Womack MD       SUBJECTIVE:  Mattie Becker better\" 26.0   BUN  144*  148*  145*  162*  145*   CREATSERUM  5.36*  5.02*  5.07*  4.87*  4.95*   CA  7.6*  8.1*  8.3  8.4  8.8   GLU  116*  110*  109*  111*  104*       Recent Labs   Lab  06/16/18   0655  06/20/18   0625  06/21/18   0634   ALT  21  24  24   AST although only modest improvement with IVF and remains a difficult situation gayatri with ongoing severe elevation in BUN and whether uremia causing to nausea and lethargy.   Lower leg edema improved although thigh edema persists and has been longstanding partic

## 2018-06-22 NOTE — DISCHARGE SUMMARY
[facility] [place] [facility]ITAL[facility]  DISCHARGE SUMMARY     Adis Dior Patient Status:  Inpatient    3/31/1965 MRN [id number]   [facility] 5NW-A Attending Zoie Cesar MD   [address] Day # 8 PCP Robert Sam MD     Date of Admission: 2018  Date of Disch ·     Consultants:  • nephrology    Discharge Medication List:     Discharge Medications      START taking these medications      Instructions Prescription details   CloNIDine HCl 0.3 MG Tabs  Commonly known as:  CATAPRES  Replaces:  CloNIDine HCl 0.3 MG Quantity:  90 tablet  Refills:  1     ondansetron 4 MG Tbdp  Commonly known as:  ZOFRAN-ODT      DISSOLVE 1 TABLET(4 MG) ON THE TONGUE DAILY AS NEEDED FOR NAUSEA   Quantity:  90 tablet  Refills:  0     predniSONE 5 MG Tabs  Commonly known as:  Annalisa Bitter [id number]  [phone]    In 1 week      Amber Fournier MD  [address] [facility]  [phone]      follow up BMP in 10 days; July 2nd      Vital signs:  Temp:  [98.1 °F (36.7 °C)-98.8 °F (37.1 °C)] 98.8 °F (37.1 °C)  Pulse:  [11-87

## 2018-06-22 NOTE — PLAN OF CARE
GASTROINTESTINAL - ADULT    • Minimal or absence of nausea and vomiting Progressing    • Maintains adequate nutritional intake (undernourished) Progressing        MUSCULOSKELETAL - ADULT    • Return mobility to safest level of function Progressing    • Health system Postsurgical hypothyroidism     Papillary thyroid carcinoma (HCC)     Primary osteoarthritis of right knee     CAD in native artery     Hypertension     CKD (chronic kidney disease) stage 3, GFR 30-59 ml/min (HCC)     Renal transplant recipient     Anemia

## 2018-06-22 NOTE — PLAN OF CARE
GASTROINTESTINAL - ADULT    • Minimal or absence of nausea and vomiting Progressing    • Maintains adequate nutritional intake (undernourished) Progressing        MUSCULOSKELETAL - ADULT    • Return mobility to safest level of function Progressing    • [facility]

## 2018-06-22 NOTE — PROGRESS NOTES
NURSING DISCHARGE NOTE    Discharged Home via Wheelchair. Accompanied by Spouse  Belongings Taken by patient/family.       Pt discharged home with personal belongings and stable vital signs; MD sanchez and MD lucas are aware pt has been hypertensive;

## 2018-06-24 ENCOUNTER — APPOINTMENT (OUTPATIENT)
Dept: MRI IMAGING | Facility: HOSPITAL | Age: 53
DRG: 040 | End: 2018-06-24
Attending: ORTHOPAEDIC SURGERY
Payer: MEDICARE

## 2018-06-24 ENCOUNTER — APPOINTMENT (OUTPATIENT)
Dept: CT IMAGING | Facility: HOSPITAL | Age: 53
DRG: 040 | End: 2018-06-24
Attending: Other
Payer: MEDICARE

## 2018-06-24 ENCOUNTER — APPOINTMENT (OUTPATIENT)
Dept: CT IMAGING | Facility: HOSPITAL | Age: 53
DRG: 040 | End: 2018-06-24
Attending: NURSE PRACTITIONER
Payer: MEDICARE

## 2018-06-24 ENCOUNTER — PRIOR ORIGINAL RECORDS (OUTPATIENT)
Dept: OTHER | Age: 53
End: 2018-06-24

## 2018-06-24 ENCOUNTER — APPOINTMENT (OUTPATIENT)
Dept: CV DIAGNOSTICS | Facility: HOSPITAL | Age: 53
DRG: 040 | End: 2018-06-24
Attending: Other
Payer: MEDICARE

## 2018-06-24 ENCOUNTER — HOSPITAL ENCOUNTER (INPATIENT)
Facility: HOSPITAL | Age: 53
LOS: 16 days | Discharge: HOME HEALTH | DRG: 040 | End: 2018-07-10
Attending: INTERNAL MEDICINE | Admitting: INTERNAL MEDICINE
Payer: MEDICARE

## 2018-06-24 DIAGNOSIS — I27.20 PULMONARY HTN (HCC): Primary | ICD-10-CM

## 2018-06-24 DIAGNOSIS — I25.10 CAD IN NATIVE ARTERY: ICD-10-CM

## 2018-06-24 DIAGNOSIS — Z22.322 MRSA (METHICILLIN RESISTANT STAPH AUREUS) CULTURE POSITIVE: ICD-10-CM

## 2018-06-24 DIAGNOSIS — S06.5X9A SUBDURAL HEMATOMA (HCC): ICD-10-CM

## 2018-06-24 LAB
ALBUMIN SERPL-MCNC: 3.6 G/DL (ref 3.5–4.8)
ALP LIVER SERPL-CCNC: 52 U/L (ref 45–117)
ALT SERPL-CCNC: 50 U/L (ref 17–63)
ANTIBODY SCREEN: NEGATIVE
APTT PPP: 38.5 SECONDS (ref 26.1–34.6)
AST SERPL-CCNC: 34 U/L (ref 15–41)
ATRIAL RATE: 97 BPM
BASOPHILS # BLD AUTO: 0 X10(3) UL (ref 0–0.1)
BASOPHILS NFR BLD AUTO: 0 %
BILIRUB SERPL-MCNC: 0.9 MG/DL (ref 0.1–2)
BILIRUB UR QL STRIP.AUTO: NEGATIVE
BUN BLD-MCNC: 125 MG/DL (ref 8–20)
CALCIUM BLD-MCNC: 9 MG/DL (ref 8.3–10.3)
CHLORIDE: 101 MMOL/L (ref 101–111)
CHOLEST SMN-MCNC: 104 MG/DL (ref ?–200)
CLARITY UR REFRACT.AUTO: CLEAR
CO2: 29 MMOL/L (ref 22–32)
COLOR UR AUTO: YELLOW
CREAT BLD-MCNC: 4.06 MG/DL (ref 0.7–1.3)
EOSINOPHIL # BLD AUTO: 0.02 X10(3) UL (ref 0–0.3)
EOSINOPHIL NFR BLD AUTO: 0.2 %
ERYTHROCYTE [DISTWIDTH] IN BLOOD BY AUTOMATED COUNT: 16.7 % (ref 11.5–16)
EST. AVERAGE GLUCOSE BLD GHB EST-MCNC: 114 MG/DL (ref 68–126)
GLUCOSE BLD-MCNC: 136 MG/DL (ref 70–99)
GLUCOSE BLD-MCNC: 143 MG/DL (ref 65–99)
GLUCOSE BLD-MCNC: 164 MG/DL (ref 65–99)
GLUCOSE BLD-MCNC: 177 MG/DL (ref 65–99)
GLUCOSE UR STRIP.AUTO-MCNC: NEGATIVE MG/DL
HAV IGM SER QL: 3 MG/DL (ref 1.8–2.5)
HBA1C MFR BLD HPLC: 5.6 % (ref ?–5.7)
HCT VFR BLD AUTO: 28.2 % (ref 37–53)
HDLC SERPL-MCNC: 34 MG/DL (ref 45–?)
HDLC SERPL: 3.06 {RATIO} (ref ?–4.97)
HGB BLD-MCNC: 9.4 G/DL (ref 13–17)
IMMATURE GRANULOCYTE COUNT: 0.14 X10(3) UL (ref 0–1)
IMMATURE GRANULOCYTE RATIO %: 1.3 %
INR BLD: 1.1 (ref 0.9–1.1)
KETONES UR STRIP.AUTO-MCNC: NEGATIVE MG/DL
LDLC SERPL CALC-MCNC: 57 MG/DL (ref ?–130)
LEUKOCYTE ESTERASE UR QL STRIP.AUTO: NEGATIVE
LYMPHOCYTES # BLD AUTO: 0.55 X10(3) UL (ref 0.9–4)
LYMPHOCYTES NFR BLD AUTO: 5 %
M PROTEIN MFR SERPL ELPH: 7.3 G/DL (ref 6.1–8.3)
MCH RBC QN AUTO: 27.6 PG (ref 27–33.2)
MCHC RBC AUTO-ENTMCNC: 33.3 G/DL (ref 31–37)
MCV RBC AUTO: 82.7 FL (ref 80–99)
MONOCYTES # BLD AUTO: 1.21 X10(3) UL (ref 0.1–1)
MONOCYTES NFR BLD AUTO: 11.1 %
NEUTROPHIL ABS PRELIM: 9.02 X10 (3) UL (ref 1.3–6.7)
NEUTROPHILS # BLD AUTO: 9.02 X10(3) UL (ref 1.3–6.7)
NEUTROPHILS NFR BLD AUTO: 82.4 %
NITRITE UR QL STRIP.AUTO: NEGATIVE
NONHDLC SERPL-MCNC: 70 MG/DL (ref ?–130)
P AXIS: 46 DEGREES
P-R INTERVAL: 138 MS
PH UR STRIP.AUTO: 5 [PH] (ref 4.5–8)
PHOSPHATE SERPL-MCNC: 4.2 MG/DL (ref 2.5–4.9)
PLATELET # BLD AUTO: 84 10(3)UL (ref 150–450)
POTASSIUM SERPL-SCNC: 3.2 MMOL/L (ref 3.6–5.1)
POTASSIUM SERPL-SCNC: 3.7 MMOL/L (ref 3.6–5.1)
PROT UR STRIP.AUTO-MCNC: NEGATIVE MG/DL
PSA SERPL DL<=0.01 NG/ML-MCNC: 14.6 SECONDS (ref 12.4–14.7)
Q-T INTERVAL: 344 MS
QRS DURATION: 100 MS
QTC CALCULATION (BEZET): 436 MS
R AXIS: 15 DEGREES
RBC # BLD AUTO: 3.41 X10(6)UL (ref 4.3–5.7)
RBC UR QL AUTO: NEGATIVE
RED CELL DISTRIBUTION WIDTH-SD: 50.1 FL (ref 35.1–46.3)
RH BLOOD TYPE: POSITIVE
SODIUM SERPL-SCNC: 138 MMOL/L (ref 136–144)
SP GR UR STRIP.AUTO: 1.01 (ref 1–1.03)
T AXIS: 41 DEGREES
TRIGL SERPL-MCNC: 66 MG/DL (ref ?–150)
TROPONIN: <0.046 NG/ML (ref ?–0.05)
UROBILINOGEN UR STRIP.AUTO-MCNC: <2 MG/DL
VENTRICULAR RATE: 97 BPM
VLDLC SERPL CALC-MCNC: 13 MG/DL (ref 5–40)
WBC # BLD AUTO: 10.9 X10(3) UL (ref 4–13)

## 2018-06-24 PROCEDURE — 93306 TTE W/DOPPLER COMPLETE: CPT | Performed by: OTHER

## 2018-06-24 PROCEDURE — 5A09357 ASSISTANCE WITH RESPIRATORY VENTILATION, LESS THAN 24 CONSECUTIVE HOURS, CONTINUOUS POSITIVE AIRWAY PRESSURE: ICD-10-PCS | Performed by: HOSPITALIST

## 2018-06-24 PROCEDURE — 72141 MRI NECK SPINE W/O DYE: CPT | Performed by: ORTHOPAEDIC SURGERY

## 2018-06-24 PROCEDURE — 70450 CT HEAD/BRAIN W/O DYE: CPT | Performed by: OTHER

## 2018-06-24 PROCEDURE — 99223 1ST HOSP IP/OBS HIGH 75: CPT | Performed by: INTERNAL MEDICINE

## 2018-06-24 PROCEDURE — 72125 CT NECK SPINE W/O DYE: CPT | Performed by: OTHER

## 2018-06-24 PROCEDURE — 99223 1ST HOSP IP/OBS HIGH 75: CPT | Performed by: OTHER

## 2018-06-24 PROCEDURE — 70450 CT HEAD/BRAIN W/O DYE: CPT | Performed by: NURSE PRACTITIONER

## 2018-06-24 PROCEDURE — 99221 1ST HOSP IP/OBS SF/LOW 40: CPT | Performed by: NEUROLOGICAL SURGERY

## 2018-06-24 RX ORDER — PREDNISONE 2.5 MG
7.5 TABLET ORAL DAILY
Status: DISCONTINUED | OUTPATIENT
Start: 2018-06-24 | End: 2018-07-10

## 2018-06-24 RX ORDER — HYDROMORPHONE HYDROCHLORIDE 1 MG/ML
0.4 INJECTION, SOLUTION INTRAMUSCULAR; INTRAVENOUS; SUBCUTANEOUS EVERY 2 HOUR PRN
Status: DISCONTINUED | OUTPATIENT
Start: 2018-06-24 | End: 2018-07-03

## 2018-06-24 RX ORDER — FUROSEMIDE 40 MG/1
40 TABLET ORAL
Status: DISCONTINUED | OUTPATIENT
Start: 2018-06-24 | End: 2018-06-24

## 2018-06-24 RX ORDER — FLUTICASONE PROPIONATE 50 MCG
2 SPRAY, SUSPENSION (ML) NASAL DAILY
Status: DISCONTINUED | OUTPATIENT
Start: 2018-06-24 | End: 2018-07-10

## 2018-06-24 RX ORDER — ACETAMINOPHEN 650 MG/1
650 SUPPOSITORY RECTAL EVERY 4 HOURS PRN
Status: DISCONTINUED | OUTPATIENT
Start: 2018-06-24 | End: 2018-07-10

## 2018-06-24 RX ORDER — LORAZEPAM 2 MG/ML
1 INJECTION INTRAMUSCULAR
Status: DISCONTINUED | OUTPATIENT
Start: 2018-06-24 | End: 2018-07-10

## 2018-06-24 RX ORDER — CHLORTHALIDONE 50 MG/1
50 TABLET ORAL DAILY
Status: DISCONTINUED | OUTPATIENT
Start: 2018-06-24 | End: 2018-06-24

## 2018-06-24 RX ORDER — FAMOTIDINE 20 MG/1
20 TABLET ORAL DAILY
Status: DISCONTINUED | OUTPATIENT
Start: 2018-06-24 | End: 2018-07-10

## 2018-06-24 RX ORDER — MELATONIN
325 DAILY
Status: DISCONTINUED | OUTPATIENT
Start: 2018-06-24 | End: 2018-07-10

## 2018-06-24 RX ORDER — ALLOPURINOL 100 MG/1
100 TABLET ORAL DAILY
Status: DISCONTINUED | OUTPATIENT
Start: 2018-06-24 | End: 2018-07-10

## 2018-06-24 RX ORDER — ONDANSETRON 2 MG/ML
4 INJECTION INTRAMUSCULAR; INTRAVENOUS EVERY 6 HOURS PRN
Status: DISCONTINUED | OUTPATIENT
Start: 2018-06-24 | End: 2018-07-05

## 2018-06-24 RX ORDER — POLYETHYLENE GLYCOL 3350 17 G/17G
17 POWDER, FOR SOLUTION ORAL DAILY PRN
Status: DISCONTINUED | OUTPATIENT
Start: 2018-06-24 | End: 2018-07-05

## 2018-06-24 RX ORDER — SENNOSIDES 8.6 MG
17.2 TABLET ORAL NIGHTLY
Status: DISCONTINUED | OUTPATIENT
Start: 2018-06-24 | End: 2018-07-10

## 2018-06-24 RX ORDER — DESMOPRESSIN ACETATE 4 UG/ML
0.4 INJECTION, SOLUTION INTRAVENOUS; SUBCUTANEOUS ONCE
Status: COMPLETED | OUTPATIENT
Start: 2018-06-24 | End: 2018-06-24

## 2018-06-24 RX ORDER — ACETAMINOPHEN 325 MG/1
650 TABLET ORAL EVERY 4 HOURS PRN
Status: DISCONTINUED | OUTPATIENT
Start: 2018-06-24 | End: 2018-07-10

## 2018-06-24 RX ORDER — TERAZOSIN 2 MG/1
2 CAPSULE ORAL NIGHTLY
Status: DISCONTINUED | OUTPATIENT
Start: 2018-06-25 | End: 2018-07-03

## 2018-06-24 RX ORDER — BISACODYL 10 MG
10 SUPPOSITORY, RECTAL RECTAL
Status: DISCONTINUED | OUTPATIENT
Start: 2018-06-24 | End: 2018-07-05

## 2018-06-24 RX ORDER — HYDROMORPHONE HYDROCHLORIDE 1 MG/ML
0.2 INJECTION, SOLUTION INTRAMUSCULAR; INTRAVENOUS; SUBCUTANEOUS EVERY 2 HOUR PRN
Status: DISCONTINUED | OUTPATIENT
Start: 2018-06-24 | End: 2018-07-03

## 2018-06-24 RX ORDER — TACROLIMUS 0.5 MG/1
2 CAPSULE ORAL 2 TIMES DAILY
Status: DISCONTINUED | OUTPATIENT
Start: 2018-06-24 | End: 2018-06-28

## 2018-06-24 RX ORDER — LEVOTHYROXINE SODIUM 0.2 MG/1
200 TABLET ORAL
Status: DISCONTINUED | OUTPATIENT
Start: 2018-06-24 | End: 2018-07-10

## 2018-06-24 RX ORDER — METOCLOPRAMIDE HYDROCHLORIDE 5 MG/ML
5 INJECTION INTRAMUSCULAR; INTRAVENOUS EVERY 8 HOURS PRN
Status: DISCONTINUED | OUTPATIENT
Start: 2018-06-24 | End: 2018-07-10

## 2018-06-24 RX ORDER — AMLODIPINE BESYLATE 10 MG/1
10 TABLET ORAL
Status: DISCONTINUED | OUTPATIENT
Start: 2018-06-24 | End: 2018-07-10

## 2018-06-24 RX ORDER — PHENYLEPHRINE HCL IN 0.9% NACL 50MG/250ML
PLASTIC BAG, INJECTION (ML) INTRAVENOUS CONTINUOUS PRN
Status: DISCONTINUED | OUTPATIENT
Start: 2018-06-24 | End: 2018-06-27 | Stop reason: ALTCHOICE

## 2018-06-24 RX ORDER — ATORVASTATIN CALCIUM 10 MG/1
10 TABLET, FILM COATED ORAL NIGHTLY
Status: DISCONTINUED | OUTPATIENT
Start: 2018-06-24 | End: 2018-07-10

## 2018-06-24 RX ORDER — LORAZEPAM 2 MG/ML
1 INJECTION INTRAMUSCULAR ONCE
Status: COMPLETED | OUTPATIENT
Start: 2018-06-24 | End: 2018-06-24

## 2018-06-24 RX ORDER — SODIUM PHOSPHATE, DIBASIC AND SODIUM PHOSPHATE, MONOBASIC 7; 19 G/133ML; G/133ML
1 ENEMA RECTAL ONCE AS NEEDED
Status: DISCONTINUED | OUTPATIENT
Start: 2018-06-24 | End: 2018-07-05

## 2018-06-24 NOTE — H&P
[facility]  History and Physical     John Meth Patient Status:  Inpatient    3/31/1965 MRN [id number]   [facility] 6NE-A Attending Nancy Omer MD   Hosp Day # 0 PCP Kary Sierra MD     Chief Complaint: syncope    History replacement, sequela 5/31/2017   • Papillary thyroid carcinoma ([facility] [address]     Dx in 1/2015: tx with surgery and MANZO   • PONV (postoperative nausea and vomiting) 1997    s/p kidney transplant   • Postlaminectomy syndrome, cervical region 6/20/2013    Log Date: 1 glomerulonephritis [OTHER] Mother    • glomerulonephritis [OTHER] Maternal Grandmother    • Cancer Paternal Uncle      prostate CA   • Heart Disorder Paternal Uncle      CAD/Aortic disease       Allergies:   Carvedilol              HIVES, ITCHING  Carvedil pain (Patient taking differently: 5 mg every 8 (eight) hours as needed.  1 tab po q6 hr prn sever breakthrough right knee pain ) Disp: 45 tablet Rfl: 0   ZUBSOLV 1.4-0.36 MG Sublingual SL Tab Place 1.4 mg of buprenorphine under the tongue 3 (three) times da 100.4 °F (38 °C) (Temporal)   General: No acute distress. Alert and oriented x 3. HEENT: Normocephalic atraumatic. Moist mucous membranes. EOM-I. PERRLA. Anicteric. Neck: No lymphadenopathy. No JVD. No carotid bruits.   Respiratory: Clear to auscultation consult  4. Essential HTN - resume home meds, monitor Cr closely  5. Gastroparesis - zofran PRN  6. CAD s/p PCI- hold ASA  7. DJD chronic pain   8.  Obesity hypoventilation syndrome     Quality:  · DVT Prophylaxis: SCD  · CODE status: full  · Daniels:none

## 2018-06-24 NOTE — SLP NOTE
6/24/2018    Attempted to see pt for BSSE. Pt NPO for possible surgery. RN to page if cleared for PO. Will re-attempt if able.

## 2018-06-24 NOTE — PLAN OF CARE
Assumed care 1400. Neuro checks Q2. A&O X4, PENA, flat affect. Monitor shows NSR/ST. Lung sounds clear/diminished. Pain controlled with medication, see MAR. Echo tech to bedside. Speech to bedside, appropriate for PO food. Diminished urine production.  Dr. Flores Torres

## 2018-06-24 NOTE — PLAN OF CARE
Admitted direct from [facility] ER s/p fall sustaining epidural and subdural hematoma. Patient with c-collar on. He is alert and oriented x4, moving all extremities 5/5. Patient denies any numbness, no tingling sensations. Patient has tremors on b

## 2018-06-24 NOTE — CONSULTS
[facility]  Neurosurgery Consult    Dariana Shukla Patient Status:  Inpatient    3/31/1965 MRN [id number]   [facility] 6NE-A Attending Serenity Dorantes MD   Hosp Day # 0 PCP Rere Ohara MD     REASON FOR CONSULTATION:  SDH    HISTOR PONV (postoperative nausea and vomiting) 1997    s/p kidney transplant   • Postlaminectomy syndrome, cervical region 6/20/2013    Log Date: 12/12/2012    • Postsurgical hypothyroidism     Dx in 1/2015: tx with surgery and MANZO   • Pulmonary HTN (Via Echo 1/ HIVES, ITCHING  Carvedilol              SWELLING    Comment:TABS  Ciprofloxacin           HIVES  Ciprofloxacin Hcl       HIVES, ITCHING    Comment:TABS  Gabapentin              SWELLING    Comment:Foot swelling  Hydralazine             HIVES  Mono daily. Disp: 90 tablet Rfl: 1 Past Week at Unknown time   ALLOPURINOL 100 MG Oral Tab TAKE 1 TABLET(100 MG) BY MOUTH DAILY Disp: 90 tablet Rfl: 1 6/14/2018 at 0800   tacrolimus 1 MG Oral Cap TAKE 2 CAPSULES(2 MG) BY MOUTH TWICE DAILY Disp: 360 capsule Rfl: 650 mg Oral Q4H PRN   Or      acetaminophen (TYLENOL) 650 MG rectal suppository 650 mg 650 mg Rectal Q4H PRN   phenylephrine in NaCl (LOUIS-SYNEPHRINE) 50 mg/250 ml premix infusion SOLN 100-200 mcg/min Intravenous Continuous PRN   Senna (SENOKOT) tab TABS 17 are noted in HPI.       PHYSICAL EXAMINATION:  VITAL SIGNS: /59 (BP Location: Right arm)   Pulse 112   Temp 98.4 °F (36.9 °C) (Temporal)   Resp 20   Wt (!) 328 lb 14.8 oz   SpO2 95%   BMI 41.11 kg/m²   GENERAL:  Patient is a 48year old male in no acu and examined with pa  Case discussed  47 yo male with multiple med issues  Recently d/c'd  C[facility] after passing out and hitting head  Pt found to have sdh  Pt tx'd to [employer] ct reviewed  Will monitor

## 2018-06-24 NOTE — CONSULTS
[facility]  Report of Consultation    Shea Berumen Patient Status:  Inpatient    3/31/1965 MRN [id number]   [facility] 6NE-A Attending Neymar Gordon MD   Hosp Day # 0 PCP Erica Kat MD       Assessment / Plan:    1) ADEBAYO- has i disc disease), cervical 10/17/2011   • Hearing impairment     bilateral hearing aides   • History of fusion of cervical spine 5/31/2017   • Hyperlipidemia    • Kidney replaced by transplant 1997   • Laxity of knee joint, left 5/31/2017   • Long-term curren Comment: arthrodesis cervical ACDF at C5-6  1997: OTHER SURGICAL HISTORY Right      Comment: renal transplant - Done at [facility]  2009: REVISE MEDIAN N/CARPAL TUNNEL SURG      Comment: neuroplasty decompression median   No date: THYROIDECTOMY      Comment:  On Nightly  •  PEG 3350 (MIRALAX) powder packet 17 g, 17 g, Oral, Daily PRN  •  magnesium hydroxide (MILK OF MAGNESIA) 400 MG/5ML suspension 30 mL, 30 mL, Oral, Daily PRN  •  bisacodyl (DULCOLAX) rectal suppository 10 mg, 10 mg, Rectal, Daily PRN  •  FLEET EN file.    Review of Systems:  Please see HPI for pertinent positives. 10 point review of systems otherwise reviewed and negative.      Physical Exam:  /60   Pulse 105   Temp 98.8 °F (37.1 °C) (Oral)   Resp 15   Wt (!) 328 lb 14.8 oz   SpO2 95%   BMI 41

## 2018-06-24 NOTE — PROGRESS NOTES
[facility]  Progress Note     Trey Catrachoaleida Patient Status:  Inpatient    3/31/1965 MRN [id number]   [facility] 6NE-A Attending Yahir Méndez MD   Hosp Day # 0 PCP Natalia Gordon MD     Chief Complaint: syncope    S: Patient wit Imaging: Imaging data reviewed in Epic.     Medications:   • Senna  17.2 mg Oral Nightly   • allopurinol  100 mg Oral Daily   • AmLODIPine Besylate  10 mg Oral Daily   • atorvastatin  10 mg Oral Nightly   • chlorthalidone  50 mg Oral Daily   • cholecalc Cryotherapy Text: The wound bed was treated with cryotherapy after the biopsy was performed.

## 2018-06-24 NOTE — CONSULTS
[facility]  Neurocritical Care       Subjective: Aurelia Santiago is a(n) 48year old man with ESRD s/p renal transplant, HTN, and chronic back pain with SDH s/p fall.  Pt was admitted recently, discharged 2 days prior to fall for n/v an 490 ml   Output             1000 ml   Net             -510 ml       Exam    HEENT: NC/AT  Lungs:  Clear b/l  Heart: -MRG  Abd: Soft, NTND    NEUROLOGICAL EXAMINATION:    Language and cognition grossly intact. Pt awake and alert.   In cervical col on 6/23/2018. The mastoids and paranasal sinuses are clear. No skull fracture is identified. CONCLUSION:  1.  Increased extra-axial blood along the right tentorium and falx with increased midline shift to the left and development of right uncal encro C6-C7: Mild degenerative disc bulge/osteophyte complex without significant central or foraminal stenosis. C7-T1:  No significant disc/facet abnormality, spinal stenosis, or foraminal narrowing.   CRANIOCERVICAL AREA:  Normal foramen magnum with no Chiari m OBSTRUCTIVE SERIES (CPT=74020), 11/06/2017, 15:51. INDICATIONS:  R/o obstruction  PATIENT STATED HISTORY: (As transcribed by Technologist)  Patient states abdominal pain. FINDINGS:  Large body habitus is noted. No new consolidation at the lung bases. Medications:  acetaminophen (TYLENOL) tab 650 mg 650 mg Oral Q4H PRN   Or      acetaminophen (TYLENOL) 650 MG rectal suppository 650 mg 650 mg Rectal Q4H PRN   phenylephrine in NaCl (LOUIS-SYNEPHRINE) 50 mg/250 ml premix infusion SOLN 100-200 mcg/min Intrave chloride 0.9 % 250 mL IVPB 40 mEq Intravenous Once   LORazepam (ATIVAN) injection 1 mg 1 mg Intravenous Once       Assesment/Plan:   Active Problems:    Subdural hematoma (HCC)    Syncope    Coronary artery disease involving native coronary artery of nativ instability. This involved direct patient intervention, complex decision making, and/or extensive discussions with the patient, family, and clinical staff. Thank you for allowing me to participate in the care of this patient.      Anastasiia Alvarez MD  Ne

## 2018-06-24 NOTE — PROGRESS NOTES
[facility] Pharmacy Note:  Renal Dose Adjustment for Metoclopramide (REGLAN)    Omaira Velásquez has been prescribed Metoclopramide (REGLAN) 10 mg every 8 hours as needed for nausea/vomiting. Estimated Creatinine Clearance: 25.1 mL/min (A) (based on SCr of 4. 0

## 2018-06-24 NOTE — CONSULTS
[facility]  Report of Consultation    [facility] Patient Status:  Inpatient    3/31/1965 MRN [id number]   [facility] 6NE-A Attending Charmaine Mcmillan MD   Hosp Day # 0 PCP Letitia Mustafa MD     Reason for Consultation: syncope    H general medical condition 8/23/2012   • Morbid obesity with BMI of 40.0-44.9, adult ([facility] 1/21/2014   • Needs flu shot 10/10/2012   • Nephritis and nephropathy, not specified as acute or chronic, with unspecified pathological lesion in kidney    • Osteoart carcinoma  Family History   Problem Relation Age of Onset   • bipolar disorder [OTHER] Mother    • glomerulonephritis [OTHER] Mother    • glomerulonephritis [OTHER] Maternal Grandmother    • Cancer Paternal Uncle      prostate CA   • Heart Disorder Sonia Kimble mg 4 mg Intravenous Q6H PRN   Or      Metoclopramide HCl (REGLAN) injection 5 mg 5 mg Intravenous Q8H PRN   LORazepam (ATIVAN) injection 1 mg 1 mg Intravenous Q15 Min PRN   allopurinol (ZYLOPRIM) tab 100 mg 100 mg Oral Daily   AmLODIPine Besylate (NORVASC) non-tender. Extremities: no edema  Neurologic: Alert and oriented, normal affect. Skin: Warm and dry.      Laboratories and Data:    Labs:     Lab Results  Component Value Date   WBC 10.9 06/24/2018   HGB 9.4 06/24/2018   HCT 28.2 06/24/2018   PLT 84.0 0 immunosuppressive medication     Chronic L knee effusion     Intractable vomiting with nausea     ESRD (end stage renal disease) (HCC)     Intractable vomiting with nausea, unspecified vomiting type     Dehydration     Gastroparesis     Renal transplant di

## 2018-06-24 NOTE — H&P
CC: neck pain, left knee pain, both chronic, s/p fall on outstretched hands last night, fell forward on his chest, face. Had syncope. Neck feeling much better now. Takes chronic pain medication, administered through [facility], Dr. Sophie Irwin.   Nichelle Sullivan as acute or chronic, with unspecified pathological lesion in kidney     • Osteoarthritis, knee 12/23/2013   • Painful total knee replacement, sequela 5/31/2017   • Papillary thyroid carcinoma ([facility]       Dx in 1/2015: tx with surgery and MANZO   • PONV (post alcohol or use drugs.     Family History:          Family History   Problem Relation Age of Onset   • bipolar disorder [OTHER] Mother     • glomerulonephritis [OTHER] Mother     • glomerulonephritis [OTHER] Maternal Grandmother     • Cancer Paternal Uncle Take 1 tablet (200 mcg total) by mouth daily. Disp: 90 tablet Rfl: 1   OxyCODONE HCl IR 10 MG Oral Tab 1 tab po q6 hr prn sever breakthrough right knee pain (Patient taking differently: 5 mg every 8 (eight) hours as needed.  1 tab po q6 hr prn sever breakth of Systems:   A comprehensive 14 point review of systems was completed. Pertinent positives and negatives noted in the HPI.     Physical Exam:    Temp 100.4 °F (38 °C) (Temporal)   General: No acute distress. Alert and oriented x 3.   HEENT: Normocephali    C2 epidural hematoma. Non-compressive. Keep Aspen collar on for 2 weeks. No surgical intervention expected. No blood thinners or Lovenox/Eliquis, etc.  F/u in office in 2 weeks, sooner if problems. Thank you for this interesting consult.

## 2018-06-24 NOTE — PROGRESS NOTES
[facility]  Neurocritical Care APRN Progress Note    NAME: Yoni Morales - ROOM: 8144/3718-Z - MRN: [id number] - Age: 48year old - :3/31/1965    History Of Present Illness:  Yoni Morales is a 48year old male with PMHx signifi • Hyperlipidemia    • Kidney replaced by transplant 1997   • Laxity of knee joint, left 5/31/2017   • Long-term current use of opiate analgesic 8/23/2012   • Mood disorder due to a general medical condition 8/23/2012   • Morbid obesity with BMI of 40.0-44. Abdomen: Soft, non-tender, bowel sounds active all four quadrants, no masses, no organomegaly  Extremities: Extremities normal, atraumatic, no cyanosis or edema, capillary refill <3 sec.     Pulses: 2+ and symmetric all extremities  Skin: Skin color, textur D- CAM-ICU negative  E- Early mobility, pt/ot consult  F- Family engagement, wife at bedside   G- [employer] NO / Daniels NO     Proph:  -No a/c at this time d/t evidence of bleeding/thrombocytopenia/ anemia  -SCD  -Pepcid    Dispo:   -Full code    Plan of

## 2018-06-24 NOTE — PHYSICAL THERAPY NOTE
Orders received, chart reviewed. Attempted to see pt but as per order set and RN, patient just came in and still on bed rest. Will follow up as appropriate.

## 2018-06-24 NOTE — PLAN OF CARE
SKIN/TISSUE INTEGRITY - ADULT    • Skin integrity remains intact Adequate for Discharge    • Incision(s), wounds(s) or drain site(s) healing without S/S of infection Adequate for Discharge    • Oral mucous membranes remain intact Adequate for Discharge

## 2018-06-24 NOTE — SLP NOTE
ADULT SWALLOWING EVALUATION    ASSESSMENT    ASSESSMENT/OVERALL IMPRESSION:  Pt awake and in bed. Very fidgety and appearing uncomfortable. Pt given PO trials of puree, thin and hard cracker with no s/s of aspiration.     Batista Assessment of Swallow Functi thyroid carcinoma ([facility]     Dx in 1/2015: tx with surgery and MANZO   • PONV (postoperative nausea and vomiting) 1997    s/p kidney transplant   • Postlaminectomy syndrome, cervical region 6/20/2013    Log Date: 12/12/2012    • Postsurgical hypothyroidism pannus is considered less likely. 3. These findings were 1st discussed with Mercedes MIRELES at 2150 hr by [facility]. SUBJECTIVE       OBJECTIVE   ORAL MOTOR EXAMINATION  Dentition: Functional  Symmetry: Within Functional Limits  Strength:  Withi

## 2018-06-25 LAB
APTT PPP: 36.9 SECONDS (ref 26.1–34.6)
BASOPHILS # BLD AUTO: 0.01 X10(3) UL (ref 0–0.1)
BASOPHILS NFR BLD AUTO: 0.1 %
BUN BLD-MCNC: 132 MG/DL (ref 8–20)
CALCIUM BLD-MCNC: 8.2 MG/DL (ref 8.3–10.3)
CHLORIDE: 99 MMOL/L (ref 101–111)
CO2: 25 MMOL/L (ref 22–32)
CREAT BLD-MCNC: 5.42 MG/DL (ref 0.7–1.3)
DEPRECATED HBV CORE AB SER IA-ACNC: 265.7 NG/ML (ref 30–530)
EOSINOPHIL # BLD AUTO: 0.1 X10(3) UL (ref 0–0.3)
EOSINOPHIL NFR BLD AUTO: 1 %
ERYTHROCYTE [DISTWIDTH] IN BLOOD BY AUTOMATED COUNT: 17.3 % (ref 11.5–16)
GLUCOSE BLD-MCNC: 166 MG/DL (ref 65–99)
GLUCOSE BLD-MCNC: 166 MG/DL (ref 70–99)
GLUCOSE BLD-MCNC: 170 MG/DL (ref 65–99)
GLUCOSE BLD-MCNC: 181 MG/DL (ref 65–99)
GLUCOSE BLD-MCNC: 209 MG/DL (ref 65–99)
HAV IGM SER QL: 2.9 MG/DL (ref 1.8–2.5)
HCT VFR BLD AUTO: 24.6 % (ref 37–53)
HGB BLD-MCNC: 8 G/DL (ref 13–17)
HGB BLD-MCNC: 8.1 G/DL (ref 13–17)
IMMATURE GRANULOCYTE COUNT: 0.07 X10(3) UL (ref 0–1)
IMMATURE GRANULOCYTE RATIO %: 0.7 %
INR BLD: 1.23 (ref 0.9–1.1)
IRON SATURATION: 11 % (ref 20–50)
IRON: 27 UG/DL (ref 45–182)
LYMPHOCYTES # BLD AUTO: 0.69 X10(3) UL (ref 0.9–4)
LYMPHOCYTES NFR BLD AUTO: 7 %
MCH RBC QN AUTO: 27.3 PG (ref 27–33.2)
MCHC RBC AUTO-ENTMCNC: 32.5 G/DL (ref 31–37)
MCV RBC AUTO: 84 FL (ref 80–99)
MONOCYTES # BLD AUTO: 1.22 X10(3) UL (ref 0.1–1)
MONOCYTES NFR BLD AUTO: 12.3 %
NEUTROPHIL ABS PRELIM: 7.81 X10 (3) UL (ref 1.3–6.7)
NEUTROPHILS # BLD AUTO: 7.81 X10(3) UL (ref 1.3–6.7)
NEUTROPHILS NFR BLD AUTO: 78.9 %
PLATELET # BLD AUTO: 82 10(3)UL (ref 150–450)
POTASSIUM SERPL-SCNC: 3.8 MMOL/L (ref 3.6–5.1)
PSA SERPL DL<=0.01 NG/ML-MCNC: 16 SECONDS (ref 12.4–14.7)
RBC # BLD AUTO: 2.93 X10(6)UL (ref 4.3–5.7)
RED CELL DISTRIBUTION WIDTH-SD: 53.4 FL (ref 35.1–46.3)
SODIUM SERPL-SCNC: 135 MMOL/L (ref 136–144)
TOTAL IRON BINDING CAPACITY: 255 UG/DL (ref 240–450)
TRANSFERRIN: 171 MG/DL (ref 200–360)
WBC # BLD AUTO: 9.9 X10(3) UL (ref 4–13)

## 2018-06-25 PROCEDURE — 99233 SBSQ HOSP IP/OBS HIGH 50: CPT | Performed by: INTERNAL MEDICINE

## 2018-06-25 PROCEDURE — 99231 SBSQ HOSP IP/OBS SF/LOW 25: CPT | Performed by: NEUROLOGICAL SURGERY

## 2018-06-25 RX ORDER — ALBUMIN (HUMAN) 12.5 G/50ML
100 SOLUTION INTRAVENOUS AS NEEDED
Status: DISCONTINUED | OUTPATIENT
Start: 2018-06-25 | End: 2018-07-10

## 2018-06-25 RX ORDER — HEPARIN SODIUM 1000 [USP'U]/ML
1.5 INJECTION, SOLUTION INTRAVENOUS; SUBCUTANEOUS
Status: DISCONTINUED | OUTPATIENT
Start: 2018-06-25 | End: 2018-07-10

## 2018-06-25 RX ORDER — DEXTROSE AND SODIUM CHLORIDE 5; .9 G/100ML; G/100ML
INJECTION, SOLUTION INTRAVENOUS CONTINUOUS
Status: DISCONTINUED | OUTPATIENT
Start: 2018-06-25 | End: 2018-06-26

## 2018-06-25 NOTE — OCCUPATIONAL THERAPY NOTE
OCCUPATIONAL THERAPY EVALUATION - INPATIENT     Room Number: 8250/6362-I  Evaluation Date: 6/25/2018  Type of Evaluation: Initial  Presenting Problem: Subdural hematoma    Physician Order: IP Consult to Occupational Therapy  Reason for Therapy: ADL/IADL Dy atherosclerosis 12/11/15    per angiogram   • DDD (degenerative disc disease), cervical 10/17/2011   • Hearing impairment     bilateral hearing aides   • History of fusion of cervical spine 5/31/2017   • Hyperlipidemia    • Kidney replaced by transplant 19 LAMINECTOMY,FACETECTOMY,LUMBAR      Comment: foraminotomy cervic seg  No date: OTHER SURGICAL HISTORY      Comment: arthrodesis cervical ACDF at C5-6  1997: OTHER SURGICAL HISTORY Right      Comment: renal transplant - Done at [facility]  2009: REVISE MEDIAN N/C - ROM assessed to 90 degrees for bilateral shoulder flexion, R UE ROM approx.  75 degrees    Upper extremity strength is within functional limits  MMT not assessed,  strength WNL bilaterally    COORDINATION  Gross Motor    WFL -ROM less than 90 degrees 6/24/2018 for subdural hematoma following a syncope and collapse at home. Complete medical history and occupational profile noted above. Functional outcome measures completed include UE ROM.  In this OT evaluation patient presents with the following perform Meet Established Goals: 5    ADL Goals   Patient will perform grooming: with min assist  Patient will perform upper body dressing:  with min assist  Patient will perform lower body dressing:  with min assist  Patient will perform toileting: with min assist

## 2018-06-25 NOTE — PROGRESS NOTES
[facility]  Neurosurgery Progress Note  2018    Shawn Gaucher Patient Status:  Inpatient    3/31/1965 MRN [id number]   [facility] 6NE-A Attending MD Luis Alberto Rasheed Day # 1 PCP David Womack MD     SUBJECTIVE:  Jaqui Clark 27.0 - 33.2 pg   MCHC 32.5 31.0 - 37.0 g/dL   RDW 17.3 (H) 11.5 - 16.0 %   RDW-SD 53.4 (H) 35.1 - 46.3 fL   Neutrophil Absolute Prelim 7.81 (H) 1.30 - 6.70 x10 (3) uL   Neutrophil Absolute 7.81 (H) 1.30 - 6.70 x10(3) uL   Lymphocyte Absolute 0.69 (L) 0.90

## 2018-06-25 NOTE — RESPIRATORY THERAPY NOTE
I received patient off his CPAP @2300. RN informed me that he took it off himself. We will continue to monitor and possibly try again later.

## 2018-06-25 NOTE — CM/SW NOTE
MSW attempted to meet with the patient who is currently unable to complete assessment. MSW also left a message for the patient's wife Shay Samson to complete dc planning assessment. Recommendations are for acute rehab.     Zack Krishnamurthy, KAMIW

## 2018-06-25 NOTE — HISTORICAL OFFICE NOTE
LEONIE WADE  : 1965  ACCOUNT:  [id number]  [phone]  PCP: Dr. Josefina Siddiqui     TODAY'S DATE: 2018  DICTATED BY:  [Dr. Eli Min: [Followup of CAD, of native vessels and Followup of Hypertension.]    HPI:    [On  ALLERGIES: Carvedilol - Oral, Hives, Cipro - Oral, Hives, Fosinopril Sodium - Oral, Gabapentin - Oral, HydrALAZINE HCl - Injection, Toprol XL - Oral and Hives    MEDICATIONS: Selected prescriptions see below    VITAL SIGNS: [B/P - 184/96 , Pulse - 78, 08/22/17 Levothyroxine Sodium  200MCG    225 daily                                02/09/17 Zubsolv               1.4-0.36  as directed                              10/12/15 Docusate Sodium       100MG     2 capsules twice daily

## 2018-06-25 NOTE — PLAN OF CARE
Patient is alert and oriented x4, moving all extremities. Patient with c- spine collar on. He complained of pain at the back of his neck rated 3/10 which is tolerable. Patient's lower extremities are swollen making it difficult for his legs to move. Patient

## 2018-06-25 NOTE — PROGRESS NOTES
[facility]  Progress Note    Shawn Gaucher Patient Status:  Inpatient    3/31/1965 MRN [id number]   [facility] 6NE-A Attending Shelly Leon MD   [facility] Day # 1 PCP David Womack MD     Tired  + nausea  No appetite  Denies headaches HYDROmorphone HCl (DILAUDID) 1 MG/ML injection 0.4 mg 0.4 mg Intravenous Q2H PRN   niCARdipine HCl (CARDENE) 50 mg in sodium chloride 0.9 % 250 mL infusion 5-15 mg/hr Intravenous Continuous PRN   Buprenorphine HCl-Naloxone HCl 1.4-0.36 MG SUBL 1.4 mg of baseline cr ~ 3-4. Ongoing n/v - suspect related to evolving uremia  Currently appears near euvolemic  - continue gentle fluids  - may need to initiate HD during this admit if labs continue to worsen; discussed with patient and he is agreeable    2.  S/p f

## 2018-06-25 NOTE — PROGRESS NOTES
[facility]  Progress Note     Trey Surya Patient Status:  Inpatient    3/31/1965 MRN [id number]   [facility] 6NE-A Attending Yahir Méndez MD   Hosp Day # 1 PCP Natalia Gordon MD     Chief Complaint: syncope    S: Patient had 0.8   --   0.9   --    --    TP  6.8  7.0   --   7.3   --    --        Estimated Creatinine Clearance: 18.8 mL/min (A) (based on SCr of 5.42 mg/dL (H)).     Recent Labs   Lab  06/24/18   0140  06/25/18   0440   PTP  14.6  16.0*   INR  1.10  1.23*       Rece is expected to be discharge to: home    Plan of care discussed with patient, RN    Sherryle Linea, MD

## 2018-06-25 NOTE — RESPIRATORY THERAPY NOTE
RAQUEL - Equipment Use Daily Summary:                  . Set Mode:  CPAP WITH C-FLEX                . Usage in hours: 0:27                . 90% Pressure (EPAP) level: 11                . 90% Insp. Pressure (IPAP): Jarred Jacques AHI: 8.7                .  Sup

## 2018-06-25 NOTE — PHYSICAL THERAPY NOTE
PHYSICAL THERAPY EVALUATION - INPATIENT     Room Number: 7893/7134-N  Evaluation Date: 6/25/2018  Type of Evaluation: Initial  Physician Order: PT Eval and Treat    Presenting Problem: syncope with SDH and epidural hematoma  Reason for Therapy: [facility] unspecified pathological lesion in kidney    • Osteoarthritis, knee 12/23/2013   • Painful total knee replacement, sequela 5/31/2017   • Papillary thyroid carcinoma ([facility]     Dx in 1/2015: tx with surgery and MANZO   • PONV (postoperative nausea and vomiting Equipment: Cane  Patient Regularly Uses: Reading glasses    Prior Level of [place]: PTA lives at home in apt wife works and Ind during day with use of cane in community, drives    SUBJECTIVE  \"I feel sleepy\"    Patient self-stated goal is go home (including adjusting bedclothes, sheets and blankets)?: A Little   -   Sitting down on and standing up from a chair with arms (e.g., wheelchair, bedside commode, etc.): A Lot   -   Moving from lying on back to sitting on the side of the bed?: A Little   Ho admit with fall. In this PT evaluation, the patient presents with the following impairments decreased alertness, cognition, balance, endurance, force generating ability and activity tolerance. Functional outcome measures completed include AMPAC.   Based o

## 2018-06-25 NOTE — PROCEDURES
ELECTROENCEPHALOGRAM REPORT      Patient Name: Mary Sher  Chart ID: [id number]  Ordering Physician: Sneha Wilcox. Date of Test: 6/25/2018    A routine EEG was obtained. No sedation was given.     DX: SUBDURAL HEMATOMA  HX: PT IS A 52YO MALE WHO PRESENT

## 2018-06-25 NOTE — CONSULTS
[facility]  Neurocritical Care       Subjective: Nenita Frias is a(n) 48year old man with ESRD s/p renal transplant, HTN, and chronic back pain with SDH s/p fall.    6/25/2018 - Dizziness today, on Cardene to keep SBP < 150, may get OR HEAD (41038)  COMPARISON:  EZEQUIEL, CT SPINE CERVICAL (CPT=72125), 6/06/2017, 8:52. External Exams, CT SPINE CERVICAL (CPT=72125), 6/23/2018, 20:36. External Exams, CT BRAIN OR HEAD (85240), 6/23/2018, 20:34.   INDICATIONS:  f/u ich  TECHNIQUE:  No is considered less likely. 3. These findings were 1st discussed with Kendrick MIRELES at 9490 hr by [facility].     Dictated by: Billy Segura MD on 6/24/2018 at 9:36     Approved by: Billy Segura MD            Mri Spine Cervical (cpt=72141)    Resul better seen on the recent CT  scan than on today's MRI likely due to motion artifact. The hemorrhage does extend from the mid dens as inferior as C4-5.   The thickness of the hematoma is approximately 7 mm which is not significantly changed since the CT fr Lab Review       Lab Results  Component Value Date   WBC 9.9 06/25/2018   HGB 8.0 06/25/2018   HCT 24.6 06/25/2018   PLT 82.0 06/25/2018   CREATSERUM 5.42 06/25/2018    06/25/2018    06/25/2018   K 3.8 06/25/2018   CL 99 06/25/2018   CO2 (MIRALAX) powder packet 17 g 17 g Oral Daily PRN   magnesium hydroxide (MILK OF MAGNESIA) 400 MG/5ML suspension 30 mL 30 mL Oral Daily PRN   bisacodyl (DULCOLAX) rectal suppository 10 mg 10 mg Rectal Daily PRN   FLEET ENEMA (FLEET) 7-19 GM/118ML enema 133 fall    Plan:    Neuro:  - SDH and ?EDH s/p fall stable on subsequent scanning  - ASA reversed with DDAVP  - Pt neurologically stable  - Moderate cervical EDH without cord compression or neurological symptoms followed by spine surgery  - Neuro checks Q 2hr complex decision making, and/or extensive discussions with the patient, family, and clinical staff.   Thank you for allowing me to take care of this patient,    Nimco Young MD  Medical Director - [facility]

## 2018-06-25 NOTE — SLP NOTE
SPEECH DAILY NOTE - INPATIENT    ASSESSMENT & PLAN   ASSESSMENT  Pt seen for dysphagia tx to assess tolerance with recommended diet, ensure appropriate utilization of aspiration precautions and provide pt/family education.  Pt found sitting up in chair at s

## 2018-06-25 NOTE — PROGRESS NOTES
Having a very hard time staying awake. He does deny chest pain or shortness of breath, palpitations or prior episodes of near syncope although I am not sure how much he is really understanding our conversation.      Objective:  /68 (BP Location: Rig Days   • Senna  17.2 mg Oral Nightly   • allopurinol  100 mg Oral Daily   • AmLODIPine Besylate  10 mg Oral Daily   • atorvastatin  10 mg Oral Nightly   • cholecalciferol  1,000 Units Oral Daily   • CloNIDine HCl  0.3 mg Oral TID   • Terazosin HCl  2 mg Or initiation of dialysis   Brandie Schumacher

## 2018-06-26 ENCOUNTER — APPOINTMENT (OUTPATIENT)
Dept: INTERVENTIONAL RADIOLOGY/VASCULAR | Facility: HOSPITAL | Age: 53
DRG: 040 | End: 2018-06-26
Attending: INTERNAL MEDICINE
Payer: MEDICARE

## 2018-06-26 ENCOUNTER — APPOINTMENT (OUTPATIENT)
Dept: GENERAL RADIOLOGY | Facility: HOSPITAL | Age: 53
DRG: 040 | End: 2018-06-26
Attending: INTERNAL MEDICINE
Payer: MEDICARE

## 2018-06-26 LAB
APTT PPP: 35.4 SECONDS (ref 26.1–34.6)
BASOPHILS # BLD AUTO: 0.02 X10(3) UL (ref 0–0.1)
BASOPHILS NFR BLD AUTO: 0.2 %
BUN BLD-MCNC: 135 MG/DL (ref 8–20)
CALCIUM BLD-MCNC: 8.5 MG/DL (ref 8.3–10.3)
CHLORIDE: 98 MMOL/L (ref 101–111)
CO2: 23 MMOL/L (ref 22–32)
CREAT BLD-MCNC: 5.68 MG/DL (ref 0.7–1.3)
EOSINOPHIL # BLD AUTO: 0.29 X10(3) UL (ref 0–0.3)
EOSINOPHIL NFR BLD AUTO: 2.4 %
ERYTHROCYTE [DISTWIDTH] IN BLOOD BY AUTOMATED COUNT: 17.3 % (ref 11.5–16)
GLUCOSE BLD-MCNC: 129 MG/DL (ref 65–99)
GLUCOSE BLD-MCNC: 135 MG/DL (ref 70–99)
GLUCOSE BLD-MCNC: 137 MG/DL (ref 65–99)
GLUCOSE BLD-MCNC: 137 MG/DL (ref 65–99)
GLUCOSE BLD-MCNC: 152 MG/DL (ref 65–99)
HAV IGM SER QL: 3 MG/DL (ref 1.8–2.5)
HBV SURFACE AB SER QL: REACTIVE
HBV SURFACE AB SERPL IA-ACNC: 80.13 MIU/ML
HBV SURFACE AG SERPL QL IA: NONREACTIVE
HCT VFR BLD AUTO: 24.7 % (ref 37–53)
HEPATITIS B SURFACE ANTIGEN INDEX: <0.1
HGB BLD-MCNC: 8.1 G/DL (ref 13–17)
IMMATURE GRANULOCYTE COUNT: 0.15 X10(3) UL (ref 0–1)
IMMATURE GRANULOCYTE RATIO %: 1.3 %
INR BLD: 1.13 (ref 0.9–1.1)
LYMPHOCYTES # BLD AUTO: 0.97 X10(3) UL (ref 0.9–4)
LYMPHOCYTES NFR BLD AUTO: 8.1 %
MCH RBC QN AUTO: 27.6 PG (ref 27–33.2)
MCHC RBC AUTO-ENTMCNC: 32.8 G/DL (ref 31–37)
MCV RBC AUTO: 84.3 FL (ref 80–99)
MONOCYTES # BLD AUTO: 1.36 X10(3) UL (ref 0.1–1)
MONOCYTES NFR BLD AUTO: 11.4 %
NEUTROPHIL ABS PRELIM: 9.15 X10 (3) UL (ref 1.3–6.7)
NEUTROPHILS # BLD AUTO: 9.15 X10(3) UL (ref 1.3–6.7)
NEUTROPHILS NFR BLD AUTO: 76.6 %
PLATELET # BLD AUTO: 106 10(3)UL (ref 150–450)
POTASSIUM SERPL-SCNC: 3.6 MMOL/L (ref 3.6–5.1)
PSA SERPL DL<=0.01 NG/ML-MCNC: 15 SECONDS (ref 12.4–14.7)
RBC # BLD AUTO: 2.93 X10(6)UL (ref 4.3–5.7)
RED CELL DISTRIBUTION WIDTH-SD: 54.1 FL (ref 35.1–46.3)
SODIUM SERPL-SCNC: 133 MMOL/L (ref 136–144)
URIC ACID: 10.9 MG/DL (ref 2.4–8.7)
WBC # BLD AUTO: 11.9 X10(3) UL (ref 4–13)

## 2018-06-26 PROCEDURE — 99233 SBSQ HOSP IP/OBS HIGH 50: CPT | Performed by: INTERNAL MEDICINE

## 2018-06-26 PROCEDURE — 05HM33Z INSERTION OF INFUSION DEVICE INTO RIGHT INTERNAL JUGULAR VEIN, PERCUTANEOUS APPROACH: ICD-10-PCS | Performed by: RADIOLOGY

## 2018-06-26 PROCEDURE — 73610 X-RAY EXAM OF ANKLE: CPT | Performed by: INTERNAL MEDICINE

## 2018-06-26 PROCEDURE — 99232 SBSQ HOSP IP/OBS MODERATE 35: CPT | Performed by: INTERNAL MEDICINE

## 2018-06-26 PROCEDURE — B543ZZA ULTRASONOGRAPHY OF RIGHT JUGULAR VEINS, GUIDANCE: ICD-10-PCS | Performed by: RADIOLOGY

## 2018-06-26 PROCEDURE — 5A1D70Z PERFORMANCE OF URINARY FILTRATION, INTERMITTENT, LESS THAN 6 HOURS PER DAY: ICD-10-PCS | Performed by: INTERNAL MEDICINE

## 2018-06-26 PROCEDURE — 99233 SBSQ HOSP IP/OBS HIGH 50: CPT | Performed by: OTHER

## 2018-06-26 RX ORDER — MIDAZOLAM HYDROCHLORIDE 1 MG/ML
INJECTION INTRAMUSCULAR; INTRAVENOUS
Status: COMPLETED
Start: 2018-06-26 | End: 2018-06-26

## 2018-06-26 RX ORDER — LIDOCAINE HYDROCHLORIDE 10 MG/ML
INJECTION, SOLUTION EPIDURAL; INFILTRATION; INTRACAUDAL; PERINEURAL
Status: COMPLETED
Start: 2018-06-26 | End: 2018-06-26

## 2018-06-26 RX ORDER — LIDOCAINE HYDROCHLORIDE AND EPINEPHRINE 15; 5 MG/ML; UG/ML
INJECTION, SOLUTION EPIDURAL
Status: COMPLETED
Start: 2018-06-26 | End: 2018-06-26

## 2018-06-26 RX ORDER — HEPARIN SODIUM 5000 [USP'U]/ML
INJECTION, SOLUTION INTRAVENOUS; SUBCUTANEOUS
Status: COMPLETED
Start: 2018-06-26 | End: 2018-06-26

## 2018-06-26 RX ORDER — HYDROCODONE BITARTRATE AND ACETAMINOPHEN 5; 325 MG/1; MG/1
1 TABLET ORAL EVERY 6 HOURS PRN
Status: DISCONTINUED | OUTPATIENT
Start: 2018-06-26 | End: 2018-07-07

## 2018-06-26 RX ORDER — CEFAZOLIN SODIUM/WATER 2 G/20 ML
2 SYRINGE (ML) INTRAVENOUS EVERY 24 HOURS
Status: DISCONTINUED | OUTPATIENT
Start: 2018-06-26 | End: 2018-06-27

## 2018-06-26 RX ORDER — CEFAZOLIN SODIUM 1 G/3ML
INJECTION, POWDER, FOR SOLUTION INTRAMUSCULAR; INTRAVENOUS
Status: COMPLETED
Start: 2018-06-26 | End: 2018-06-26

## 2018-06-26 NOTE — SLP NOTE
Attempted to see for follow up however pt currently NPO for perm cath placement. Will re-attempt as available and appropriate.     Gagandeep Reese MA, [phone] [facility]  Pager

## 2018-06-26 NOTE — CM/SW NOTE
MSW left a second message for the patient's wife Enrique Crawford. Awaiting return call. MSW consulted PMR and sent referral via [facility] to [facility] for notification. Choice of facility will still be provided once MSW is able to complete assessment.     Jordon Jimenez LCSW

## 2018-06-26 NOTE — CONSULTS
[address] [facility] Patient Status:  Inpatient    3/31/1965 MRN [id number]   [facility] 6NE-A Attending Anne-Marie Elliott MD   [facility] Day # 2 PCP Clemmie Runner, MD     Patient Identification  Govind Ramirez is a 48year old ma on top of CKD stage IV status post transplant. He had a permacath placed today and started on dialysis today. PT and OT initiated. Denies any headaches or neck pain. Has some generalized weakness and decreased balance.   Gait is difficult and endurance due to a general medical condition 8/23/2012   • Morbid obesity with BMI of 40.0-44.9, adult ([facility] [address] 1/21/2014   • Needs flu shot 10/10/2012   • Nephritis and nephropathy, not specified as acute or chronic, with unspecified pathological lesion in kidney    • thyroid carcinoma      HYDROcodone-acetaminophen (NORCO) 5-325 MG per tab 1 tablet 1 tablet Oral Q6H PRN   ceFAZolin sodium (ANCEF/KEFZOL) 2 GM/20ML premix IV syringe 2 g 2 g Intravenous Q24H   [COMPLETED] lidocaine-EPINEPHrine 1.5 %-1:307078 injection Terazosin HCl (HYTRIN) cap 2 mg 2 mg Oral Nightly   famoTIDine (PEPCID) tab 20 mg 20 mg Oral Daily   ferrous sulfate EC tab 325 mg 325 mg Oral Daily   Fluticasone Propionate (FLONASE) 50 MCG/ACT nasal spray 2 spray 2 spray Each Nare Daily   Levothyroxine Comment:TABS Drowsiness  Minoxidil               OTHER (SEE COMMENTS)    Comment:Chest pain  Nortriptyline Hcl       DIARRHEA, NAUSEA AND VOMITING  Pravachol [Pravasta*    HIVES, ITCHING  Labetalol               NAUSEA ONLY          Lab Results  Component Right Upper Extremity:  Strength is 5. ROM WNL. Left Upper Extremity:  Strength is 5. ROM WNL. Right Lower Extremity:  Strength  is 5. ROM WNL. Left Lower Extremity: Strength  is 5. ROM WNL. Neuro: CNII-XII are grossly intact.  Sens care is 2 weeks. The patient has good potential to achieve the goal to return home at modified independent level of function. Advance directives reviewed and noted.           Would be happy to reassess should medical and/o

## 2018-06-26 NOTE — PROGRESS NOTES
[employer]  Neurocritical Care       Subjective: Leigha Lund is a(n) 48year old man with ESRD s/p renal transplant, HTN, and chronic back pain with SDH s/p fall.    6/25/2018 - Dizziness today, on Cardene to keep SBP < 150, may get Head ([id number])    Result Date: 6/24/2018  PROCEDURE:  CT BRAIN OR HEAD (52233)  COMPARISON:  EZEQUIEL, CT SPINE CERVICAL (CPT=72125), 6/06/2017, 8:52. External Exams, CT SPINE CERVICAL (CPT=72125), 6/23/2018, 20:36.   External Exams, CT BRAIN OR HEAD (704 6/23/2018. This is increased from 6/6/2017, therefore pannus is considered less likely. 3. These findings were 1st discussed with Nelsy MIRELES at 2150 hr by [facility].     Dictated by: Josue Adamson MD on 6/24/2018 at 9:36     Approved by: Frankey Bowl is consistent with an area of epidural hemorrhage that is better seen on the recent CT  scan than on today's MRI likely due to motion artifact. The hemorrhage does extend from the mid dens as inferior as C4-5.   The thickness of the hematoma is approximate 6/15/2018 at 16:52     Approved by: Rashawn Chaidez MD              Lab Review       Lab Results  Component Value Date   WBC 11.9 06/26/2018   HGB 8.1 06/26/2018   HCT 24.7 06/26/2018   .0 06/26/2018   CREATSERUM 5.68 06/26/2018    06/26/2018   N injection 10,000 Units 10,000 Units Intravenous Once in dialysis   heparin sodium 1000 UNIT/ML injection 1,500 Units 1.5 mL Intravenous PRN Dialysis   Albumin Human (ALBUMINAR) 25 % solution 100 mL 100 mL Intravenous PRN   acetaminophen (TYLENOL) tab 650 m (CARDENE) 50 mg in sodium chloride 0.9 % 250 mL infusion 5-15 mg/hr Intravenous Continuous PRN       Assesment/Plan:   Active Problems:    Subdural hematoma (HCC)    Syncope    Coronary artery disease involving native coronary artery of native heart withou hemorrhage    ABCDEF:  A: Pain score 2-3/10  B: RA   C: RASS 0  D: CAM ICU negative  E: PT/OT not indiicated  F: No family at bedside.   Updated patient   G: No dawson or central lines    Goals of the Day: SBP goal 100-160 off Cardene, Psych consult  Ok to t

## 2018-06-26 NOTE — OCCUPATIONAL THERAPY NOTE
OCCUPATIONAL THERAPY TREATMENT NOTE - INPATIENT     Room Number: 1234  Session: 1   Number of Visits to Meet Established Goals: 5    Presenting Problem: Subdural hematoma    History related to current admission: Pt is a 48year old male admitted on 6/24/18 cervical 10/17/2011   • Hearing impairment     bilateral hearing aides   • History of fusion of cervical spine 5/31/2017   • Hyperlipidemia    • Kidney replaced by transplant 1997   • Laxity of knee joint, left 5/31/2017   • Long-term current use of opiate HISTORY      Comment: arthrodesis cervical ACDF at C5-6  1997: OTHER SURGICAL HISTORY Right      Comment: renal transplant - Done at [facility]  2009: REVISE MEDIAN N/CARPAL TUNNEL SURG      Comment: neuroplasty decompression median   No date: THYROIDECTOMY a 48year old male admitted 6/24/2018 for SDH.   In OT session 1 of 5 patient is progressing with the following impairments: decreased activity tolerance, decreased strength, decreased endurance, decreased standing balance, decreased functional mobility and

## 2018-06-26 NOTE — PROGRESS NOTES
6/26/2018 1557    Called Dr Tish Chavez and spoke about removing C-collar for Permanent Dialysis Catheter Insertion. Per Dr Irwin Hazel, we are able to remove C-Collar and turn patients head in order for procedure to be done, VO/RB.

## 2018-06-26 NOTE — PLAN OF CARE
Drowsy nausea poor appetite. Dizziness with walk this am. Cervical collar in place. For perm cath tomorrow unable to do today. Dialysis for tomorrow after insertion . Wife updated with care plan. Decrease urine output. Up in chair better with dizziness.  Ne

## 2018-06-26 NOTE — PHYSICAL THERAPY NOTE
PHYSICAL THERAPY TREATMENT NOTE - INPATIENT    Room Number: 6353   Session: 1  Number of Visits to Meet Established Goals: 5    Presenting Problem: syncope with SDH and epidural hematoma   PMH: Pt is a 48 y.o. Male admitted 6/24/18 s/p syncope.  CT head re 12/12/2012    • Postsurgical hypothyroidism     Dx in 1/2015: tx with surgery and MANZO   • Pulmonary HTN (Via Echo 1/28/14) 2/4/2014   • RA (rheumatoid arthritis) ([facility] [address]    • Renal disorder    • Spinal stenosis in cervical region 10/17/2011   • Status post c Standing: Poor +    ACTIVITY TOLERANCE  Room air  Shortness of breath    AM-PAC '6-Clicks' INPATIENT SHORT FORM - BASIC MOBILITY  How much difficulty does the patient currently have. ..  -   Turning over in bed (including adjusting bedclothes, sheets and bl and concerns addressed    ASSESSMENT   Pt progressing with functional mobility and activity tolerance. Pt limited by L LE pain.  Pt continues with the following impairments decreased alertness, cognition, balance, endurance, force generating ability and act

## 2018-06-26 NOTE — PROGRESS NOTES
[facility]  Progress Note     Annejesus Lund Patient Status:  Inpatient    3/31/1965 MRN [id number]   [facility] 6NE-A Attending Davis Fofana MD   Hosp Day # 2 PCP Laura Holland MD     Chief Complaint: syncope    S: Patient wit 23.0   ALKPHO  47  46   --   52   --    --    --    AST  15  14*   --   34   --    --    --    ALT  24  24   --   50   --    --    --    BILT  0.8  0.8   --   0.9   --    --    --    TP  6.8  7.0   --   7.3   --    --    --     < > = values in this interva today    Quality:  · DVT Prophylaxis: SCD  · CODE status: Full      Estimated date of discharge: TBD  Discharge is dependent on: progress  At this point Mr. Alma Rosa Bocanegra is expected to be discharge to: home    Plan of care discussed with patient, RN  Dr. Erica Vegas

## 2018-06-26 NOTE — RESPIRATORY THERAPY NOTE
RAQUEL - Equipment Use Daily Summary:  · Set Mode cpap  · Usage in hours: 1:30  · 90% Pressure (EPAP) level: 11.0  · 90% Insp Pressure (IPAP):  · AHI: 1.3  · Supplemental Oxygen:   · Comments:

## 2018-06-26 NOTE — CONSULTS
INFECTIOUS DISEASE CONSULTATION    Roula Matta Patient Status:  Inpatient    3/31/1965 MRN [id number]   [facility] 6NE-A Attending Adolfo Capone MD   [facility] Day # 2 PCP Patsy Sosa, Postlaminectomy syndrome, cervical region 6/20/2013    Log Date: 12/12/2012    • Postsurgical hypothyroidism     Dx in 1/2015: tx with surgery and MANZO   • Pulmonary HTN (Via Echo 1/28/14) 2/4/2014   • RA (rheumatoid arthritis) ([facility] [address]    • Renal disorder SWELLING    Comment:TABS  Ciprofloxacin           HIVES  Ciprofloxacin Hcl       HIVES, ITCHING    Comment:TABS  Gabapentin              SWELLING    Comment:Foot swelling  Hydralazine             HIVES  Monopril [Fosinopri*    HIVES, ITCHING  Monopr Intravenous, Q8H PRN  •  LORazepam (ATIVAN) injection 1 mg, 1 mg, Intravenous, Q15 Min PRN  •  allopurinol (ZYLOPRIM) tab 100 mg, 100 mg, Oral, Daily  •  AmLODIPine Besylate (NORVASC) tab 10 mg, 10 mg, Oral, Daily  •  atorvastatin (LIPITOR) tab 10 mg, 10 m 2.93*   HGB  8.1*   HCT  24.7*   MCV  84.3   MCH  27.6   MCHC  32.8   RDW  17.3*   NEPRELIM  9.15*   WBC  11.9   PLT  106.0*       Recent Labs   Lab  06/20/18   0625  06/21/18   0634   06/24/18   0140  06/24/18   1542  06/25/18   0440  06/26/18   0432   GL abnormality     Risk for falls     History of fusion of cervical spine     Painful total knee replacement, sequela     Chronic neck pain     Cervical spine arthritis (HCC)     Bulging of cervical intervertebral disc     Neuroforaminal stenosis of cervical

## 2018-06-26 NOTE — PROGRESS NOTES
[facility]  Progress Note    Jey Castaneda Patient Status:  Inpatient    3/31/1965 MRN [id number]   [facility] 6NE-A Attending Charmaine Mcmillan MD   [facility] Day # 2 PCP Letitia Mustafa MD       Remains quite lethargic and difficult to marcin epoetin uvaldo  10,000 Units Subcutaneous Q7 Days   • Senna  17.2 mg Oral Nightly   • allopurinol  100 mg Oral Daily   • AmLODIPine Besylate  10 mg Oral Daily   • atorvastatin  10 mg Oral Nightly   • cholecalciferol  1,000 Units Oral Daily   • CloNIDine HCl

## 2018-06-26 NOTE — PLAN OF CARE
NEUROLOGICAL - ADULT    • Achieves stable or improved neurological status Progressing    • Absence of seizures Progressing    • Achieves maximal functionality and self care Progressing        Patient/Family Goals    • Patient/Family Long Term Goal Progress

## 2018-06-26 NOTE — CM/SW NOTE
06/26/18 1300   CM/SW Referral Data   Referral Source Physician   Reason for Referral Discharge planning   Informant Spouse   Social History   Recreational Drug/Alcohol Use no   Major Changes Last 6 Months no   Domestic/Partner Violence no   Suicidal Id

## 2018-06-26 NOTE — CONSULTS
[facility]  Report of Inpatient Wound Care Consultation     Omaira Velásquez Patient Status:  Inpatient    3/31/1965 MRN [id number]   [facility] 6NE-A Attending Radha Knox MD   Hosp Day # 2 PCP Aravind Lopez MD     [facility] Postsurgical hypothyroidism     Dx in 1/2015: tx with surgery and MANZO   • Pulmonary HTN (Via Echo 1/28/14) 2/4/2014   • RA (rheumatoid arthritis) ([facility] [address]    • Renal disorder    • Spinal stenosis in cervical region 10/17/2011   • Status post cervical spinal f 0140   06/24/18   1542   06/25/18   0440   06/25/18   1400  06/25/18   1649  06/25/18   2335  06/26/18   0432  06/26/18   0718   WBC   --    --    --   10.9   --    --    --   9.9   --    --    --    --   11.9   --    HGB   --    --    < >  9.4*   --    -- purple hue to it, no odor, no redness noted, scant drainage. (L) lateral middle: (0.2cm x 0.2cm x 0) with with 100% yellow, periwound with purple hue to it, no odor, no redness noted, scant drainage.      Cleansed the wounds, foam dressing applied.  Recom

## 2018-06-26 NOTE — PROGRESS NOTES
[facility]  Progress Note    Shea Book Patient Status:  Inpatient    3/31/1965 MRN [id number]   [facility] 6NE-A Attending Neymar Gordon MD   [facility] Day # 2 PCP Corinne Canales MD     No acute issues  Marginal UOP  Denies n/v  Paulina Fluticasone Propionate (FLONASE) 50 MCG/ACT nasal spray 2 spray 2 spray Each Nare Daily   Levothyroxine Sodium (SYNTHROID) tab 200 mcg 200 mcg Oral Before breakfast   predniSONE (DELTASONE) tab 7.5 mg 7.5 mg Oral Daily   tacrolimus (PROGRAF) cap 2 mg 2 m 125*  --  132* 135*   CREATSERUM 4.06*  --  5.42* 5.68*   CA 9.0  --  8.2* 8.5   MG 3.0*  --  2.9* 3.0*   PHOS 4.2  --   --   --          Recent Labs   06/24/18  0140   ALT 50   AST 34   ALB 3.6       Assessment / Plan:    1.  ESRD due to Alport's syndrome

## 2018-06-27 LAB
ALBUMIN SERPL-MCNC: 3.3 G/DL (ref 3.5–4.8)
ALP LIVER SERPL-CCNC: 51 U/L (ref 45–117)
ALT SERPL-CCNC: 33 U/L (ref 17–63)
AST SERPL-CCNC: 26 U/L (ref 15–41)
BILIRUB SERPL-MCNC: 0.8 MG/DL (ref 0.1–2)
BILIRUB UR QL STRIP.AUTO: NEGATIVE
BUN BLD-MCNC: 90 MG/DL (ref 8–20)
CALCIUM BLD-MCNC: 8.7 MG/DL (ref 8.3–10.3)
CHLORIDE: 102 MMOL/L (ref 101–111)
CO2: 24 MMOL/L (ref 22–32)
COLOR UR AUTO: YELLOW
CREAT BLD-MCNC: 4.39 MG/DL (ref 0.7–1.3)
ERYTHROCYTE [DISTWIDTH] IN BLOOD BY AUTOMATED COUNT: 17.6 % (ref 11.5–16)
GLUCOSE BLD-MCNC: 111 MG/DL (ref 70–99)
GLUCOSE BLD-MCNC: 120 MG/DL (ref 65–99)
GLUCOSE BLD-MCNC: 121 MG/DL (ref 65–99)
GLUCOSE BLD-MCNC: 153 MG/DL (ref 65–99)
GLUCOSE UR STRIP.AUTO-MCNC: NEGATIVE MG/DL
HBV SURFACE AG SERPL QL IA: NONREACTIVE
HCT VFR BLD AUTO: 26.9 % (ref 37–53)
HEPATITIS B SURFACE ANTIGEN INDEX: <0.1
HGB BLD-MCNC: 8.7 G/DL (ref 13–17)
HYALINE CASTS #/AREA URNS AUTO: PRESENT /LPF
KETONES UR STRIP.AUTO-MCNC: NEGATIVE MG/DL
M PROTEIN MFR SERPL ELPH: 7 G/DL (ref 6.1–8.3)
MCH RBC QN AUTO: 27.6 PG (ref 27–33.2)
MCHC RBC AUTO-ENTMCNC: 32.3 G/DL (ref 31–37)
MCV RBC AUTO: 85.4 FL (ref 80–99)
NITRITE UR QL STRIP.AUTO: NEGATIVE
PH UR STRIP.AUTO: 5 [PH] (ref 4.5–8)
PLATELET # BLD AUTO: 109 10(3)UL (ref 150–450)
POTASSIUM SERPL-SCNC: 3.5 MMOL/L (ref 3.6–5.1)
PROT UR STRIP.AUTO-MCNC: 30 MG/DL
RBC # BLD AUTO: 3.15 X10(6)UL (ref 4.3–5.7)
RED CELL DISTRIBUTION WIDTH-SD: 53.9 FL (ref 35.1–46.3)
SODIUM SERPL-SCNC: 137 MMOL/L (ref 136–144)
SP GR UR STRIP.AUTO: 1.01 (ref 1–1.03)
UROBILINOGEN UR STRIP.AUTO-MCNC: 2 MG/DL
WBC # BLD AUTO: 16.4 X10(3) UL (ref 4–13)
WBC #/AREA URNS AUTO: >50 /HPF
WBC CLUMPS UR QL AUTO: PRESENT

## 2018-06-27 PROCEDURE — 90935 HEMODIALYSIS ONE EVALUATION: CPT | Performed by: INTERNAL MEDICINE

## 2018-06-27 PROCEDURE — 99233 SBSQ HOSP IP/OBS HIGH 50: CPT | Performed by: OTHER

## 2018-06-27 PROCEDURE — 99232 SBSQ HOSP IP/OBS MODERATE 35: CPT | Performed by: HOSPITALIST

## 2018-06-27 NOTE — RESPIRATORY THERAPY NOTE
RAQUEL - Equipment Use Daily Summary:                  . Set Mode:  CPAP WITH C-FLEX                . Usage in hours: 0:02                . 90% Pressure (EPAP) level: 11                . 90% Insp. Pressure (IPAP): Francisco Gao AHI: 0                .  Suppl

## 2018-06-27 NOTE — PLAN OF CARE
Pt transferred from  6603 at 2330. Pt a/ox4, neuro wnl. SBP > 160, Dr. Kaylee Hawkins made aware. Denies pain. NSR per tele. RA, cpap at night. Right IJ permacath intact. HD today.   CARDIOVASCULAR - ADULT    • Maintains optimal cardiac output and hemodynamic sta

## 2018-06-27 NOTE — PROGRESS NOTES
[facility]  Cardiology Progress Note    Subjective:  No chest pain or shortness of breath.     Objective:  BP (!) 184/108 (BP Location: Left arm)   Pulse 94   Temp 98.7 °F (37.1 °C) (Oral)   Resp 16   Wt (!) 354 lb (160.6 kg)   SpO2 96%   BMI 44.25 kg/ r/t #2  6. Moderate Pericardial effusion  7. HTN- Still elevated, , on amlodipine clonidine and terzosin, many drug allergies causing difficulty to control BP.  Started on dialysis yesterday, BP should improve with fluid removal.  8. Anemia/thrombocy

## 2018-06-27 NOTE — PROGRESS NOTES
[facility]  Neurology  Notes  Affiliated with [facility]  2018, 11:17 AM     Lalito Christy Patient Status:  Inpatient    3/31/1965 MRN [id number]   [facility] 7NE-A Attending Kandace Rangel MD 3.41*  2.93*   --   2.93*  3.15*   HGB  9.4*  8.0*  8.1*  8.1*  8.7*   HCT  28.2*  24.6*   --   24.7*  26.9*   MCV  82.7  84.0   --   84.3  85.4   MCH  27.6  27.3   --   27.6  27.6   MCHC  33.3  32.5   --   32.8  32.3   RDW  16.7*  17.3*   --   17.3*  17.6 failure    (   ) De-escalation of treatment - DNR    (   ) Acute neurologic changes    (   ) Others: New Rx    (   ) ICU >35 minutes total time   Available within moments call in hospital  PROCEDURE DONE    (   ) see notes

## 2018-06-27 NOTE — PROGRESS NOTES
[facility]  Progress Note     Shea Book Patient Status:  Inpatient    3/31/1965 MRN [id number]   [facility] 6NE-A Attending Neymar Gordon MD   Hosp Day # 3 PCP Erica Kat MD     Chief Complaint: syncope    S: Patient has CO2  26.0   < >  29.0   --   25.0  23.0  24.0   ALKPHO  46   --   52   --    --    --   51   AST  14*   --   34   --    --    --   26   ALT  24   --   50   --    --    --   33   BILT  0.8   --   0.9   --    --    --   0.8   TP  7.0   --   7.3   --    -- PRN  8. Bacteremia  1. E.coli  2. Source unclear  3. MRI ankle today  4. If negative will obtain CT abdo/ pelvis  9. Left ankle abrasion and possible infection vs gout  1. On empiric antibiotics  2. MRI today  10. CAD s/p PCI- hold ASA  11.  DJD chronic ryan

## 2018-06-27 NOTE — CM/SW NOTE
Pt evaluated by physiatrist and acute rehab is recommended. Per physiatry eval pts preference is to return home however pts spouse works full time and pt would be agreeable to acute rehab if unable to care for himself.  sw to follow      ADDENDUM:  New outp

## 2018-06-27 NOTE — PLAN OF CARE
Asssumed care at 0730. Pt to had dialysis this am and tolerated well. BP slightly elevated during dialysis, oral bp meds given when dialysis completed. C/o of headache and neck discomfort. Medicated with Tylenol per physician order.   Nausea after dialy

## 2018-06-27 NOTE — PROGRESS NOTES
[facility]  Progress Note    Orly Ochoa Patient Status:  Inpatient    3/31/1965 MRN [id number]   [facility] 6NE-A Attending Jerrica Steen MD   [facility] Day # 3 PCP Nancy England MD     Feels better  Tolerated HD yesterday w/o issue Propionate (FLONASE) 50 MCG/ACT nasal spray 2 spray 2 spray Each Nare Daily   Levothyroxine Sodium (SYNTHROID) tab 200 mcg 200 mcg Oral Before breakfast   predniSONE (DELTASONE) tab 7.5 mg 7.5 mg Oral Daily   tacrolimus (PROGRAF) cap 2 mg 2 mg Oral BID   H 135* 90*   CREATSERUM  --  5.42* 5.68* 4.39*   CA  --  8.2* 8.5 8.7   MG  --  2.9* 3.0*  --          Recent Labs   06/27/18  0538   ALT 33   AST 26   ALB 3.3*         Assessment / Plan:    1.  ESRD due to Alport's syndrome s/p kidney transplant >20 yrs ago;

## 2018-06-27 NOTE — PROGRESS NOTES
[facility]                INFECTIOUS DISEASE PROGRESS NOTE    Everett Erwin Patient Status:  Inpatient    3/31/1965 MRN [id number]   [facility] 7NE-A Attending Eliud Grewal MD   Hosp Day # 3 PCP Radha Castellanos MD     Antibiotic 24.0   ALKPHO  46   --   52   --    --    --   51   AST  14*   --   34   --    --    --   26   ALT  24   --   50   --    --    --   33   BILT  0.8   --   0.9   --    --    --   0.8   TP  7.0   --   7.3   --    --    --   7.0    < > = values in this interva cervical spine     Morbid obesity with BMI of 40.0-44.9, adult (HCC)     Opioid dependence on agonist therapy (HCC)     Thrombocytopenia ([facility] [address]     Peripheral polyneuropathy     Immunosuppressed status ([facility] [address] - Long-term current use of immunosuppressive medi

## 2018-06-27 NOTE — PROGRESS NOTES
Assumed care of patient at 0. Patient A/Ox4 and neurologically intact. See flowsheet for full neuro assessment. Neuros Q4. Cervical collar in place. Patient receiving dialysis via perm a cath without complication. 2L taken off with dialysis.  Wound on le

## 2018-06-28 ENCOUNTER — APPOINTMENT (OUTPATIENT)
Dept: WOUND CARE | Facility: HOSPITAL | Age: 53
End: 2018-06-28
Attending: NURSE PRACTITIONER
Payer: MEDICARE

## 2018-06-28 ENCOUNTER — APPOINTMENT (OUTPATIENT)
Dept: MRI IMAGING | Facility: HOSPITAL | Age: 53
DRG: 040 | End: 2018-06-28
Attending: INTERNAL MEDICINE
Payer: MEDICARE

## 2018-06-28 ENCOUNTER — APPOINTMENT (OUTPATIENT)
Dept: CV DIAGNOSTICS | Facility: HOSPITAL | Age: 53
DRG: 040 | End: 2018-06-28
Attending: INTERNAL MEDICINE
Payer: MEDICARE

## 2018-06-28 ENCOUNTER — TELEPHONE (OUTPATIENT)
Dept: INTERNAL MEDICINE CLINIC | Facility: CLINIC | Age: 53
End: 2018-06-28

## 2018-06-28 LAB
BUN BLD-MCNC: 65 MG/DL (ref 8–20)
CALCIUM BLD-MCNC: 8.7 MG/DL (ref 8.3–10.3)
CHLORIDE: 99 MMOL/L (ref 101–111)
CO2: 29 MMOL/L (ref 22–32)
CREAT BLD-MCNC: 4.26 MG/DL (ref 0.7–1.3)
GLUCOSE BLD-MCNC: 111 MG/DL (ref 70–99)
GLUCOSE BLD-MCNC: 115 MG/DL (ref 65–99)
GLUCOSE BLD-MCNC: 120 MG/DL (ref 65–99)
GLUCOSE BLD-MCNC: 123 MG/DL (ref 65–99)
GLUCOSE BLD-MCNC: 144 MG/DL (ref 65–99)
GLUCOSE BLD-MCNC: 153 MG/DL (ref 65–99)
HAV IGM SER QL: 2.7 MG/DL (ref 1.8–2.5)
POTASSIUM SERPL-SCNC: 3.7 MMOL/L (ref 3.6–5.1)
SODIUM SERPL-SCNC: 137 MMOL/L (ref 136–144)

## 2018-06-28 PROCEDURE — 90935 HEMODIALYSIS ONE EVALUATION: CPT | Performed by: INTERNAL MEDICINE

## 2018-06-28 PROCEDURE — 73721 MRI JNT OF LWR EXTRE W/O DYE: CPT | Performed by: INTERNAL MEDICINE

## 2018-06-28 PROCEDURE — 99232 SBSQ HOSP IP/OBS MODERATE 35: CPT | Performed by: HOSPITALIST

## 2018-06-28 PROCEDURE — 99233 SBSQ HOSP IP/OBS HIGH 50: CPT | Performed by: OTHER

## 2018-06-28 RX ORDER — TACROLIMUS 0.5 MG/1
1 CAPSULE ORAL 2 TIMES DAILY
Status: DISCONTINUED | OUTPATIENT
Start: 2018-06-28 | End: 2018-07-10

## 2018-06-28 RX ORDER — CEFAZOLIN SODIUM/WATER 2 G/20 ML
2 SYRINGE (ML) INTRAVENOUS EVERY 24 HOURS
Status: DISCONTINUED | OUTPATIENT
Start: 2018-06-28 | End: 2018-07-09

## 2018-06-28 NOTE — PROGRESS NOTES
[facility]  Progress Note     Annejesus Lund Patient Status:  Inpatient    3/31/1965 MRN [id number]   [facility] 6NE-A Attending Davis Fofana MD   [address] Day # 4 PCP Laura Holland MD     Chief Complaint: syncope    S: Patient has --   7.0   --     < > = values in this interval not displayed. Estimated Creatinine Clearance: 24 mL/min (A) (based on SCr of 4.26 mg/dL (H)).     Recent Labs   Lab  06/24/18   0140  06/25/18   0440  06/26/18   0432   PTP  14.6  16.0*  15.0*   INR  1 gout  1. On empiric antibiotics  2. MRI today  10. CAD s/p PCI- hold ASA  11. DJD chronic pain   12.  Obesity hypoventilation syndrome     Plan of care:     Quality:  · DVT Prophylaxis: SCD  · CODE status: Full      Estimated date of discharge: TBD  Dischar

## 2018-06-28 NOTE — SLP NOTE
SPEECH DAILY NOTE - INPATIENT    ASSESSMENT & PLAN   ASSESSMENT  Pt seen for dysphagia tx to assess tolerance with recommended diet, ensure appropriate utilization of aspiration precautions and provide pt/family education.  Pt found sitting up in bed in carito

## 2018-06-28 NOTE — PROGRESS NOTES
[facility]  Cardiology Progress Note    Subjective:  No chest pain or shortness of breath.     Objective:  BP (!) 162/90 (BP Location: Left arm)   Pulse 71   Temp 99.8 °F (37.7 °C) (Oral)   Resp 18   Wt (!) 354 lb (160.6 kg)   SpO2 98%   BMI 44.25 kg/m difficulty to control BP. Started on dialysis 2 days ago, BP continues to improve with fluid removal.  8. Anemia/thrombocytopenia- acute on chronic, hgb stable, plt improved  9.  ESRD: d/t Alports syndrome, hx renal transplant over 20 years ago, 2nd day of

## 2018-06-28 NOTE — TELEPHONE ENCOUNTER
NotedShital Chung MD  Diplomate, American Board of Internal Medicine  [address] [facility],[address]  [address]  [facility],[address], [address], [address]  T: [id number]; F: Fermin 5

## 2018-06-28 NOTE — CM/SW NOTE
sw met with pt regarding dc planning.  Pt confirms his plan is to go to [facility] upon DC. sw to follow for further dc planning    JUAN ANTONIO jimenez spoke to [facility] and the [facility] who states it would be best for bed availability/admission for pt to be plac

## 2018-06-28 NOTE — PLAN OF CARE
Assumed care at [address]. Pt a/ox4. Neuro intact. C-collar in place. VSS, nsr per tele, RA, cpap at night. Denies pain. Left ankle dressing changed. IV zosyn given per order. Plan: HD today.   CARDIOVASCULAR - ADULT    • Maintains optimal cardiac output and hem

## 2018-06-28 NOTE — PROGRESS NOTES
[facility]                INFECTIOUS DISEASE PROGRESS NOTE    Jose Gaucher Patient Status:  Inpatient    3/31/1965 MRN [id number]   [facility] 7NE-A Attending Letty Garcia MD   Hosp Day # 4 PCP David Womack MD     Antibiotic displayed.      Microbiology    Blood Culture FREQ X 2 [[id number]] Collected: 06/26/18 1216   Order Status: Completed Lab Status: Preliminary result Updated: 06/28/18 0620   Specimen: Blood from Blood,peripheral     Blood Culture Result No Growth 2 Days Radiology    No new films    Problem list reviewed:  Patient Active Problem List:     Esophageal reflux     Pulmonary HTN (Via Echo 1/28/14)     RA (rheumatoid arthritis) (HCC)     Chronic fatigue syndrome     Chronic cervical radiculopathy     Yanelis Llamas been completed, to be done today asap  -ortho consult  Cefazolin, ecoli sesnitive    2. A/CRF, on HD, was immunosupresed sp renal tx    3.  Fall/SDH      Clinton Chappell MD  [facility]  [phone]

## 2018-06-28 NOTE — PROGRESS NOTES
[id number] [facility] Neurology Progress Note    Heidi Rangel Patient Status:  Inpatient    3/31/1965 MRN [id number]   [facility] 7NE-A Attending Inderjit Juarez MD   [address] Day # 4 PCP Beatriz Rg MD         Subjective:  Heidi Rangel Discussed case with care team.      Discussion/Recommendations:   47 yo with Alport Syndrome and CKD on Dialysis admitted because of sudden collapse, found to have SDH interhemispheric and tentorial and C2 epidural hematoma    Stable neurologically

## 2018-06-28 NOTE — PLAN OF CARE
SKIN/TISSUE INTEGRITY - ADULT    • Skin integrity remains intact Progressing    • Incision(s), wounds(s) or drain site(s) healing without S/S of infection Progressing    • Oral mucous membranes remain intact Progressing            Received report at 0700.

## 2018-06-28 NOTE — PHYSICAL THERAPY NOTE
PHYSICAL THERAPY TREATMENT NOTE - INPATIENT    Room Number: 5047/9757-A  Session: 2  Number of Visits to Meet Established Goals: 5    Presenting Problem: syncope with SDH and epidural hematoma   PMH: Pt is a 48 y.o. Male admitted 6/24/18 s/p syncope.  CT h 6/20/2013    Log Date: 12/12/2012    • Postsurgical hypothyroidism     Dx in 1/2015: tx with surgery and MANZO   • Pulmonary HTN (Via Echo 1/28/14) 2/4/2014   • RA (rheumatoid arthritis) ([facility] [address]    • Renal disorder    • Spinal stenosis in cervical region 10/17 -           Static Standing: Poor +  Dynamic Standing: Poor +    ACTIVITY TOLERANCE  Room air  Shortness of breath    AM-PAC '6-Clicks' INPATIENT SHORT FORM - BASIC MOBILITY  How much difficulty does the patient currently have. ..  -   Turning over in bed ( Daily Mobility Short Form for the patient is 57.70% degree of basic mobility impairment. At this time, Pt. presents with decreased balance, impaired strength, difficulty with gait/transfers resulting in downgrade of overall functional mobility.   Due to ab

## 2018-06-28 NOTE — TELEPHONE ENCOUNTER
Patient's spouse called to inform Dr Shakira Fonseca and Nurse that patient has kidney failure and is in dialysis. Patient was in ICU from fall and bleeding in the brain.

## 2018-06-28 NOTE — PROGRESS NOTES
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 Ongoing n/v - suspect related to evolving uremia  - 3rd HD today  - SW consulted for o/p placement  - continue tacro/prednisone    2.  S/p fall; SDH - per ne

## 2018-06-29 ENCOUNTER — APPOINTMENT (OUTPATIENT)
Dept: CV DIAGNOSTICS | Facility: HOSPITAL | Age: 53
DRG: 040 | End: 2018-06-29
Attending: INTERNAL MEDICINE
Payer: MEDICARE

## 2018-06-29 LAB
BASOPHILS # BLD AUTO: 0.02 X10(3) UL (ref 0–0.1)
BASOPHILS NFR BLD AUTO: 0.2 %
BUN BLD-MCNC: 54 MG/DL (ref 8–20)
CALCIUM BLD-MCNC: 8.1 MG/DL (ref 8.3–10.3)
CHLORIDE: 102 MMOL/L (ref 101–111)
CO2: 27 MMOL/L (ref 22–32)
CREAT BLD-MCNC: 4.14 MG/DL (ref 0.7–1.3)
EOSINOPHIL # BLD AUTO: 0.1 X10(3) UL (ref 0–0.3)
EOSINOPHIL NFR BLD AUTO: 0.8 %
ERYTHROCYTE [DISTWIDTH] IN BLOOD BY AUTOMATED COUNT: 17 % (ref 11.5–16)
GLUCOSE BLD-MCNC: 102 MG/DL (ref 65–99)
GLUCOSE BLD-MCNC: 103 MG/DL (ref 70–99)
GLUCOSE BLD-MCNC: 126 MG/DL (ref 65–99)
GLUCOSE BLD-MCNC: 133 MG/DL (ref 65–99)
GLUCOSE BLD-MCNC: 98 MG/DL (ref 65–99)
HAV IGM SER QL: 2.5 MG/DL (ref 1.8–2.5)
HCT VFR BLD AUTO: 27.1 % (ref 37–53)
HGB BLD-MCNC: 8.9 G/DL (ref 13–17)
IMMATURE GRANULOCYTE COUNT: 0.13 X10(3) UL (ref 0–1)
IMMATURE GRANULOCYTE RATIO %: 1.1 %
LYMPHOCYTES # BLD AUTO: 0.77 X10(3) UL (ref 0.9–4)
LYMPHOCYTES NFR BLD AUTO: 6.4 %
MCH RBC QN AUTO: 27.6 PG (ref 27–33.2)
MCHC RBC AUTO-ENTMCNC: 32.8 G/DL (ref 31–37)
MCV RBC AUTO: 84.2 FL (ref 80–99)
MONOCYTES # BLD AUTO: 1.1 X10(3) UL (ref 0.1–1)
MONOCYTES NFR BLD AUTO: 9.2 %
NEUTROPHIL ABS PRELIM: 9.88 X10 (3) UL (ref 1.3–6.7)
NEUTROPHILS # BLD AUTO: 9.88 X10(3) UL (ref 1.3–6.7)
NEUTROPHILS NFR BLD AUTO: 82.3 %
PLATELET # BLD AUTO: 97 10(3)UL (ref 150–450)
POTASSIUM SERPL-SCNC: 3.5 MMOL/L (ref 3.6–5.1)
RBC # BLD AUTO: 3.22 X10(6)UL (ref 4.3–5.7)
RED CELL DISTRIBUTION WIDTH-SD: 51 FL (ref 35.1–46.3)
SODIUM SERPL-SCNC: 141 MMOL/L (ref 136–144)
WBC # BLD AUTO: 12 X10(3) UL (ref 4–13)

## 2018-06-29 PROCEDURE — 93308 TTE F-UP OR LMTD: CPT | Performed by: INTERNAL MEDICINE

## 2018-06-29 PROCEDURE — 99232 SBSQ HOSP IP/OBS MODERATE 35: CPT | Performed by: OTHER

## 2018-06-29 PROCEDURE — 99232 SBSQ HOSP IP/OBS MODERATE 35: CPT | Performed by: HOSPITALIST

## 2018-06-29 PROCEDURE — 99233 SBSQ HOSP IP/OBS HIGH 50: CPT | Performed by: INTERNAL MEDICINE

## 2018-06-29 RX ORDER — NIFEDIPINE 30 MG/1
30 TABLET, EXTENDED RELEASE ORAL ONCE
Status: COMPLETED | OUTPATIENT
Start: 2018-06-29 | End: 2018-06-29

## 2018-06-29 RX ORDER — NIFEDIPINE 60 MG/1
60 TABLET, EXTENDED RELEASE ORAL DAILY
Status: DISCONTINUED | OUTPATIENT
Start: 2018-06-29 | End: 2018-06-30 | Stop reason: DRUGHIGH

## 2018-06-29 NOTE — CONSULTS
[facility]    PATIENT'S NAME: Alana Live   ATTENDING PHYSICIAN: Patricia Resendez M.D.   Casimiro Kathleen PHYSICIAN: Corry Burgess M.D.    PATIENT ACCOUNT#:   [de-identified]    LOCATION:  [address] [place] [address]  MEDICAL RECORD #:   [id number]       DATE OF BIRTH: collection in the subcutaneous tissues. There was a nonspecific finding of bone marrow edema involving the calcaneus. Today, patient has excellent range of motion. He has good strength.   He does have some fluctuance present along the anterolateral aspec Toprol-XL, duloxetine, hydralazine, minoxidil, nortriptyline, labetalol. REVIEW OF SYSTEMS:  Negative. PHYSICAL EXAMINATION:    GENERAL:  Patient is an elderly male. He is obese. He is in a cervical collar. HEENT:  Unremarkable. LUNGS:  Clear.

## 2018-06-29 NOTE — PROGRESS NOTES
[id number] Noelle Kumar Neurology Progress Note    Kevin Samanogladys Patient Status:  Inpatient    3/31/1965 MRN [id number]   [facility] 7NE-A Attending Jaci Campa MD   [facility] Day # 5 PCP Jarred Mcwilliams MD     Subjective:  Kevin Eden is a Romberg: absent  Gait: defer    Labs:    Lab Results  Component Value Date   CREATSERUM 4.14 06/29/2018   BUN 54 06/29/2018    06/29/2018   K 3.5 06/29/2018    06/29/2018   CO2 27.0 06/29/2018    06/29/2018   CA 8.1 06/29/2018   MG 2.5 0 [employer]

## 2018-06-29 NOTE — PROGRESS NOTES
[facility]  Progress Note     Shawn Gaucher Patient Status:  Inpatient    3/31/1965 MRN [id number]   [facility] 6NE-A Attending Shelly Leon MD   [address] Day # 5 PCP David Womack MD     Chief Complaint: syncope    S: Patient has --    ALT  50   --   33   --    --    BILT  0.9   --   0.8   --    --    TP  7.3   --   7.0   --    --     < > = values in this interval not displayed. Estimated Creatinine Clearance: 24.7 mL/min (A) (based on SCr of 4.14 mg/dL (H)).     Recent Lab from ankle? Follow cultures  3. If different bacteria in ankle Cx may need to obtain CT abdo/ pelvis  9. Left ankle abrasion / abscess   1. On empiric antibiotics  10. CAD s/p PCI- hold ASA  11. DJD chronic pain   12.  Obesity hypoventilation syndrome     P

## 2018-06-29 NOTE — PLAN OF CARE
Assumed care @ 0700. A&Ox4, VSS on RA, NSR on tele  -180s. Dr. Yara Whitt updated. Nifedipine ordered daily, additional one time dose of nifedipine given  R facial droop noted. Tremors to all four extremities and tingling to BLE, baseline per pt.  Per

## 2018-06-29 NOTE — WOUND PROGRESS NOTE
[facility]  Report of Inpatient Wound Care Progress Note    Franklin Yoo Patient Status:  Inpatient    3/31/1965 MRN [id number]   [facility] 7NE-A Attending Norman Clay MD   Hosp Day # 5 PCP Annel Vo MD       SUBJECTIVE:  No mention of complication 1/29/2409   • Unspecified sleep apnea     CPAP     Family History   Problem Relation Age of Onset   • bipolar disorder [OTHER] Mother    • glomerulonephritis [OTHER] Mother    • glomerulonephritis [OTHER] Maternal Grandmother    • C 24.7*   --   26.9*   --    --    --    --    --    --    --    PLT  84.0*   --   82.0*   --    --    --   106.0*   --   109.0*   --    --    --    --    --    --    --    K  3.2*   < >  3.8   --    --    --   3.6   --   3.5*   --   3.7   --    --    --   3 Small punctate and linear scattered foci of subtle bone marrow reactive edema scattered throughout the calcaneus. MUSCLULATURE:  No strain, edema, or atrophy.   TENDONS:  Achilles, lateral peroneal tendons, medial and anterior tendons are intact without si Tuesday. WOUND CARE DISCHARGE RECOMMENDATIONS:   Continue care at [facility], will need follow up in clinic as well once discharged from [facility]. Thank you for this consultation and for allowing me to participate in the care of your patient.   KENDY

## 2018-06-29 NOTE — PROGRESS NOTES
SUBJECTIVE:  Left leg pain, generalized weakness and difficulty walking. No fevers or chills. CC: Left ankle pain and generalized weakness  Interval History: Patient with epidural hematoma still being treated conservatively.   He underwent aspiration of l 22 98 % -   06/28/18 2121 (!) 170/93 98.7 °F (37.1 °C) Oral 72 18 97 % -         Intake/Output:  {I/O choices:213Physical Therapy: Max to total assist for bed ability transfers gait and lower extremity dressing and bathing  LUNG: normal Breath Sounds, no w

## 2018-06-29 NOTE — OCCUPATIONAL THERAPY NOTE
Attempted to see pt. RN stated that BP was high and she is working to manage. Will try later if schedule allows.

## 2018-06-29 NOTE — PROGRESS NOTES
[facility]                INFECTIOUS DISEASE PROGRESS NOTE    Jorge Lai Patient Status:  Inpatient    3/31/1965 MRN [id number]   [facility] 7NE-A Attending Immanuel Carranza MD   Hosp Day # 5 PCP Gordy Castro MD     Antibiotic TP  7.3   --   7.0   --    --     < > = values in this interval not displayed.      Microbiology      Blood Culture FREQ X 2 [[id number]] Collected: 06/26/18 1216   Order Status: Completed Lab Status: Preliminary result Updated: 06/28/18 0620   Specimen: Blo Papillary thyroid carcinoma (HCC)     Primary osteoarthritis of right knee     CAD in native artery     Hypertension     CKD (chronic kidney disease) stage 3, GFR 30-59 ml/min (HCC)     Kidney transplant recipient     Anemia of chronic disease     ADEBAYO ( Archie Dobson MD  [facility]  [phone]

## 2018-06-29 NOTE — PROGRESS NOTES
[facility]  Progress Note    Shea Berumen Patient Status:  Inpatient    3/31/1965 MRN [id number]   [facility] 6NE-A Attending Neymar Gordon MD   [address] Day # 5 PCP Erica Kat MD     + ankle area pain  No cp/sob  No n/v  Denies f (SYNTHROID) tab 200 mcg 200 mcg Oral Before breakfast   predniSONE (DELTASONE) tab 7.5 mg 7.5 mg Oral Daily   HYDROmorphone HCl (DILAUDID) 1 MG/ML injection 0.2 mg 0.2 mg Intravenous Q2H PRN   Or      HYDROmorphone HCl (DILAUDID) 1 MG/ML injection 0.4 mg 0 neurology/neurosurgery    3. HTN - continue amlodipine/clonidine/terazosin - has multiple drug allergies  - add nifedipine 60 ER daily    4.  Anemia - acute on chronic ; s/p fall - has a sizable bruise on his chest  - monitor; transfuse for hgb <7  - MARCELA

## 2018-06-29 NOTE — PROGRESS NOTES
[facility]  Progress Note    Helen Dumont Patient Status:  Inpatient    3/31/1965 MRN [id number]   [facility] 7NE-A Attending Octavio Penn MD   Hosp Day # 5 PCP Major Laboy MD     Subjective:  Should see examined.   Chart revi %.  General: Alert and oriented in no apparent distress. HEENT: No focal deficits. Neck: No JVD, carotids 2+ no bruits. Cardiac: Normal S1-S2 no murmur no rub  Lungs: Clear to auscultation and percussion  Abdomen: Soft, non-tender.    Extremities: Withou 100 mg 100 mg Oral Daily   AmLODIPine Besylate (NORVASC) tab 10 mg 10 mg Oral Daily   atorvastatin (LIPITOR) tab 10 mg 10 mg Oral Nightly   cholecalciferol (VITAMIN D3) cap/tab 1,000 Units 1,000 Units Oral Daily   CloNIDine HCl (CATAPRES) tab 0.3 mg 0.3 mg [facility])     Peripheral polyneuropathy     Immunosuppressed status (HCC) - Long-term current use of immunosuppressive medication     Chronic L knee effusion     Intractable vomiting with nausea     ESRD (end stage renal disease) ([facility])     Intractable vomiting

## 2018-06-30 LAB
GLUCOSE BLD-MCNC: 100 MG/DL (ref 65–99)
GLUCOSE BLD-MCNC: 116 MG/DL (ref 65–99)
GLUCOSE BLD-MCNC: 141 MG/DL (ref 65–99)
GLUCOSE BLD-MCNC: 175 MG/DL (ref 65–99)

## 2018-06-30 PROCEDURE — 99232 SBSQ HOSP IP/OBS MODERATE 35: CPT | Performed by: OTHER

## 2018-06-30 PROCEDURE — 90935 HEMODIALYSIS ONE EVALUATION: CPT | Performed by: INTERNAL MEDICINE

## 2018-06-30 PROCEDURE — 99232 SBSQ HOSP IP/OBS MODERATE 35: CPT | Performed by: HOSPITALIST

## 2018-06-30 RX ORDER — NIFEDIPINE 30 MG/1
30 TABLET, EXTENDED RELEASE ORAL DAILY
Status: DISCONTINUED | OUTPATIENT
Start: 2018-06-30 | End: 2018-07-01

## 2018-06-30 RX ORDER — ASPIRIN 81 MG/1
81 TABLET, CHEWABLE ORAL DAILY
Status: DISCONTINUED | OUTPATIENT
Start: 2018-06-30 | End: 2018-07-10

## 2018-06-30 NOTE — CONSULTS
[facility] dosing service    Initial Pharmacokinetic Consult for Vancomycin Dosing     Franklin Yoo is a 48year old male admitted on 6/24/2018 who is being treated for bacteremia and ankle abrasion/abscess .   Pharmacy has been asked to dose Vancomyc Nares - No MRSA isolated    Radiology:6/28: MRI Ankle - Diffuse subcutaneous edema. Subcutaneous, 1.9 cm focal fluid collection adjacent to the fibula. Subtle punctate and linear foci of nonspecific bone marrow reactive edema involves the calcaneus.   Give

## 2018-06-30 NOTE — PROGRESS NOTES
[facility]                INFECTIOUS DISEASE PROGRESS NOTE    Govind Ramirez Patient Status:  Inpatient    3/31/1965 MRN [id number]   [facility] 7NE-A Attending Rui Moore MD   [facility] Day # 6 PCP Clemmie Runner, MD     Antibiotic ALKPHO  52   --   51   --    --    AST  34   --   26   --    --    ALT  50   --   33   --    --    BILT  0.9   --   0.8   --    --    TP  7.3   --   7.0   --    --     < > = values in this interval not displayed.      Microbiology    MRSA Culture Only Once Trimethoprim/Sulfa <=20 \"><=20  Sensitive                Blood Culture Once [[id number]] Collected: 06/27/18 0931   Order Status: Completed Lab Status: Preliminary result Updated: 06/30/18 1000   Specimen: Blood from Blood,peripheral     Blood Culture Resu

## 2018-06-30 NOTE — PLAN OF CARE
Assumed care at [address]. Pt A&Ox4. C-Collar in place. PO meds for pain. L leg aspiration w/ gram + cocci. Dr. Jean-Claude Espinal notified, pt started on IV Vanco. Dialysis order called in. Pt resting in bed comfortably. Will continue to monitor.      CARDIOVASCULAR - ADULT

## 2018-06-30 NOTE — PROGRESS NOTES
[id number] Noelle Kumar Neurology Progress Note    Everett Washington Patient Status:  Inpatient    3/31/1965 MRN [id number]   [facility] 7NE-A Attending Eliud Grewal MD   [facility] Day # 6 PCP Radha Castellanos MD     Subjective:  Everett Felix is a Imaging:  No new imaging    Impression:  Syncope  Subdural Hematoma - ASA reversed with DDAVP  C2  epidrual hematoma  Pericardial Effusion - stable no need for pericardiocentesis  ESRD s/p renal transplant, on HD d/t Alport's syndrome  HTN  Vfib  L ank

## 2018-06-30 NOTE — RESPIRATORY THERAPY NOTE
RAQUEL - Equipment Use Daily Summary:                  . Set Mode:                . Usage in hours:                . 90% Pressure (EPAP) level:                . 90% Insp. Pressure (IPAP): Slater August AHI:                .  Supplemental Oxygen:    LPM

## 2018-06-30 NOTE — PROGRESS NOTES
[facility]  Progress Note    Yunier Miles Patient Status:  Inpatient    3/31/1965 MRN [id number]   [facility] 6NE-A Attending Devi Holland MD   [facility] Day # 6 PCP Doreen Rand MD     HD today; BP improved w/ increased UF (5 L tod atorvastatin (LIPITOR) tab 10 mg 10 mg Oral Nightly   cholecalciferol (VITAMIN D3) cap/tab 1,000 Units 1,000 Units Oral Daily   CloNIDine HCl (CATAPRES) tab 0.3 mg 0.3 mg Oral TID   Terazosin HCl (HYTRIN) cap 2 mg 2 mg Oral Nightly   famoTIDine (PEPCID) BUN 65* 54*   CREATSERUM 4.26* 4.14*   CA 8.7 8.1*   MG 2.7* 2.5       No results for input(s): ALT, AST, ALB, AMYLASE, LIPASE, LDH in the last 72 hours. Invalid input(s): ALPHOS, TBIL, DBIL, TPROT      Assessment / Plan:    1.  ESRD due to Alport's sy

## 2018-07-01 ENCOUNTER — APPOINTMENT (OUTPATIENT)
Dept: CT IMAGING | Facility: HOSPITAL | Age: 53
DRG: 040 | End: 2018-07-01
Attending: INTERNAL MEDICINE
Payer: MEDICARE

## 2018-07-01 ENCOUNTER — APPOINTMENT (OUTPATIENT)
Dept: CT IMAGING | Facility: HOSPITAL | Age: 53
DRG: 040 | End: 2018-07-01
Attending: NURSE PRACTITIONER
Payer: MEDICARE

## 2018-07-01 LAB
ALBUMIN SERPL-MCNC: 2.9 G/DL (ref 3.5–4.8)
ALP LIVER SERPL-CCNC: 52 U/L (ref 45–117)
ALT SERPL-CCNC: 11 U/L (ref 17–63)
AST SERPL-CCNC: 21 U/L (ref 15–41)
BASOPHILS # BLD AUTO: 0.02 X10(3) UL (ref 0–0.1)
BASOPHILS NFR BLD AUTO: 0.2 %
BILIRUB SERPL-MCNC: 0.7 MG/DL (ref 0.1–2)
BUN BLD-MCNC: 39 MG/DL (ref 8–20)
CALCIUM BLD-MCNC: 8.4 MG/DL (ref 8.3–10.3)
CHLORIDE: 103 MMOL/L (ref 101–111)
CO2: 26 MMOL/L (ref 22–32)
CREAT BLD-MCNC: 2.94 MG/DL (ref 0.7–1.3)
EOSINOPHIL # BLD AUTO: 0.33 X10(3) UL (ref 0–0.3)
EOSINOPHIL NFR BLD AUTO: 3.7 %
ERYTHROCYTE [DISTWIDTH] IN BLOOD BY AUTOMATED COUNT: 16.8 % (ref 11.5–16)
GLUCOSE BLD-MCNC: 108 MG/DL (ref 65–99)
GLUCOSE BLD-MCNC: 109 MG/DL (ref 65–99)
GLUCOSE BLD-MCNC: 122 MG/DL (ref 65–99)
GLUCOSE BLD-MCNC: 127 MG/DL (ref 65–99)
GLUCOSE BLD-MCNC: 97 MG/DL (ref 70–99)
HCT VFR BLD AUTO: 27.8 % (ref 37–53)
HGB BLD-MCNC: 8.9 G/DL (ref 13–17)
IMMATURE GRANULOCYTE COUNT: 0.09 X10(3) UL (ref 0–1)
IMMATURE GRANULOCYTE RATIO %: 1 %
LYMPHOCYTES # BLD AUTO: 1.53 X10(3) UL (ref 0.9–4)
LYMPHOCYTES NFR BLD AUTO: 17 %
M PROTEIN MFR SERPL ELPH: 6.8 G/DL (ref 6.1–8.3)
MCH RBC QN AUTO: 27.3 PG (ref 27–33.2)
MCHC RBC AUTO-ENTMCNC: 32 G/DL (ref 31–37)
MCV RBC AUTO: 85.3 FL (ref 80–99)
MONOCYTES # BLD AUTO: 1.29 X10(3) UL (ref 0.1–1)
MONOCYTES NFR BLD AUTO: 14.4 %
NEUTROPHIL ABS PRELIM: 5.72 X10 (3) UL (ref 1.3–6.7)
NEUTROPHILS # BLD AUTO: 5.72 X10(3) UL (ref 1.3–6.7)
NEUTROPHILS NFR BLD AUTO: 63.7 %
PLATELET # BLD AUTO: 111 10(3)UL (ref 150–450)
POTASSIUM SERPL-SCNC: 3.3 MMOL/L (ref 3.6–5.1)
RBC # BLD AUTO: 3.26 X10(6)UL (ref 4.3–5.7)
RED CELL DISTRIBUTION WIDTH-SD: 52.2 FL (ref 35.1–46.3)
SODIUM SERPL-SCNC: 139 MMOL/L (ref 136–144)
WBC # BLD AUTO: 9 X10(3) UL (ref 4–13)

## 2018-07-01 PROCEDURE — 74176 CT ABD & PELVIS W/O CONTRAST: CPT | Performed by: INTERNAL MEDICINE

## 2018-07-01 PROCEDURE — 70450 CT HEAD/BRAIN W/O DYE: CPT | Performed by: NURSE PRACTITIONER

## 2018-07-01 PROCEDURE — 99233 SBSQ HOSP IP/OBS HIGH 50: CPT | Performed by: INTERNAL MEDICINE

## 2018-07-01 PROCEDURE — 99232 SBSQ HOSP IP/OBS MODERATE 35: CPT | Performed by: HOSPITALIST

## 2018-07-01 PROCEDURE — 99232 SBSQ HOSP IP/OBS MODERATE 35: CPT | Performed by: OTHER

## 2018-07-01 RX ORDER — ARIPIPRAZOLE 15 MG/1
20 TABLET ORAL ONCE
Status: COMPLETED | OUTPATIENT
Start: 2018-07-01 | End: 2018-07-01

## 2018-07-01 RX ORDER — LABETALOL HYDROCHLORIDE 5 MG/ML
10 INJECTION, SOLUTION INTRAVENOUS EVERY 4 HOURS PRN
Status: DISCONTINUED | OUTPATIENT
Start: 2018-07-01 | End: 2018-07-10

## 2018-07-01 NOTE — PROGRESS NOTES
[id number] Noelle Kumar Neurology Progress Note    Domenic Cantrell Patient Status:  Inpatient    3/31/1965 MRN [id number]   [facility] 7NE-A Attending Hector Cameron MD   [facility] Day # 7 PCP Denton Floyd MD     Subjective:  Domenic Cantrell is a measuring 3-4 mm.     [facility] 6/24 - stable      Impression:  Syncope  Subdural Hematoma - ASA reversed with DDAVP and held for 5 days post bleed  C2  epidrual hematoma  Pericardial Effusion - stable no need for pericardiocentesis  ESRD s/p renal transplant, on

## 2018-07-01 NOTE — RESPIRATORY THERAPY NOTE
RAQUEL - Equipment Use Daily Summary:                  . Set Mode:                . Usage in hours:                . 90% Pressure (EPAP) level:                . 90% Insp. Pressure (IPAP): Catalino Cabrera AHI:                .  Supplemental Oxygen:    LPM

## 2018-07-01 NOTE — PROGRESS NOTES
[facility]                INFECTIOUS DISEASE PROGRESS NOTE    Aurelia Santiago Patient Status:  Inpatient    3/31/1965 MRN [id number]   [facility] [place] 7NE-A Attending Danelle Kim MD   [facility] Day # 7 PCP Mi Sharif MD     Antibiotic TP  7.0   --    --   6.8     Microbiology  MRSA Culture Only Once [[id number]] Collected: 06/24/18 0242   Order Status: Completed Lab Status: Final result Updated: 06/25/18 0616   Specimen: Other from Nares     Mrsa Culture No MRSA Isolated   Blood Culture Specimen: Blood from Blood,peripheral     Blood Culture Result No Growth 4 Days    Blood Culture Smear No organisms seen   Urine Culture, Routine Once [[id number]] Collected: 06/27/18 1146   Order Status: Completed Lab Status: Final result Updated: 06/28/18 HD catheter was placed on 6/26, same day as positive blood cx and start of ivabx  --may need catheter exchanged in future if repeat cx positive    D/w pt and with RN. Will follow.     Lucita Falconr, MD

## 2018-07-01 NOTE — PROGRESS NOTES
[facility]  Progress Note    [place] Lafle[place] Patient Status:  Inpatient    3/31/1965 MRN [id number]   [facility] 6NE-A Attending Donald Chung MD   [address] Day # 7 PCP Geovany Winslow MD     Feels better today  Denies headache   no n/v mg Oral Daily   AmLODIPine Besylate (NORVASC) tab 10 mg 10 mg Oral Daily   atorvastatin (LIPITOR) tab 10 mg 10 mg Oral Nightly   cholecalciferol (VITAMIN D3) cap/tab 1,000 Units 1,000 Units Oral Daily   CloNIDine HCl (CATAPRES) tab 0.3 mg 0.3 mg Oral TID 97.0* 111.0*         Recent Labs   06/29/18  0549 07/01/18  0835    139   K 3.5* 3.3*    103   CO2 27.0 26.0   BUN 54* 39*   CREATSERUM 4.14* 2.94*   CA 8.1* 8.4   MG 2.5  --          Recent Labs   07/01/18  0835   ALT 11*   AST 21   ALB 2.9*

## 2018-07-01 NOTE — PROGRESS NOTES
[facility]  Progress Note     Fausto Doherty Patient Status:  Inpatient    3/31/1965 MRN [id number]   [facility] 6NE-A Attending Nel Madsen MD   [facility] Day # 7 PCP Josefina Siddiqui MD     Chief Complaint: Septicemia    S: Patient 0440  06/26/18   0432   PTP  16.0*  15.0*   INR  1.23*  1.13*       No results for input(s): TROP, CK in the last 168 hours. Imaging: Imaging data reviewed in Epic.     Medications:   • aspirin  81 mg Oral Daily   • valproate  500 mg Intravenous Q8H MITCHEL Dela Cruz MD

## 2018-07-01 NOTE — PLAN OF CARE
Assumed care at [address]. Pt A&Ox4. Neuro intact. Bp's elevated 170s. Dr. Estiven Maldonado notified, PRN labetalol ordered. Pt refused \"makes me feel horrible. \" AM clonidine administered. Moderate pain. Norco PRN. Resting in bed comfortably. Will continue to monitor.

## 2018-07-01 NOTE — PLAN OF CARE
Assumed care at 0700. Prn [place] and dilaudid for HA and left ankle pain. Depacon added for HA relief. Aspen collar in place. BP meds adjusted this am, still on higher side. Left ankle redressed per order. Site is very tender. LLE edema > right.    P

## 2018-07-01 NOTE — PLAN OF CARE
Assumed care at 0700. Prn [place] and Dilaudid for HA and ankle pain. Nifedipine dose increased, clonidine patch added for tighter BP control. HD in am, fresenius notified. Pt is a/ox 4  RA  NSR on tele. Left ankle dressing changed/intact.  LLE edema

## 2018-07-02 LAB
BUN BLD-MCNC: 43 MG/DL (ref 8–20)
CALCIUM BLD-MCNC: 8.1 MG/DL (ref 8.3–10.3)
CHLORIDE: 102 MMOL/L (ref 101–111)
CO2: 27 MMOL/L (ref 22–32)
CREAT BLD-MCNC: 2.83 MG/DL (ref 0.7–1.3)
GLUCOSE BLD-MCNC: 100 MG/DL (ref 65–99)
GLUCOSE BLD-MCNC: 119 MG/DL (ref 65–99)
GLUCOSE BLD-MCNC: 130 MG/DL (ref 65–99)
GLUCOSE BLD-MCNC: 91 MG/DL (ref 70–99)
GLUCOSE BLD-MCNC: 99 MG/DL (ref 65–99)
POTASSIUM SERPL-SCNC: 3.4 MMOL/L (ref 3.6–5.1)
SODIUM SERPL-SCNC: 138 MMOL/L (ref 136–144)

## 2018-07-02 PROCEDURE — 99232 SBSQ HOSP IP/OBS MODERATE 35: CPT | Performed by: INTERNAL MEDICINE

## 2018-07-02 PROCEDURE — 90935 HEMODIALYSIS ONE EVALUATION: CPT | Performed by: INTERNAL MEDICINE

## 2018-07-02 PROCEDURE — 99232 SBSQ HOSP IP/OBS MODERATE 35: CPT | Performed by: OTHER

## 2018-07-02 RX ORDER — DIVALPROEX SODIUM 500 MG/1
500 TABLET, EXTENDED RELEASE ORAL 2 TIMES DAILY
Status: DISCONTINUED | OUTPATIENT
Start: 2018-07-02 | End: 2018-07-04

## 2018-07-02 NOTE — PROGRESS NOTES
[facility] Patient Status:  Inpatient    3/31/1965 MRN [id number]   [facility] 7NE-A Attending Donald Chung MD   [facility] Day # 8 PCP Geovany Winslow MD     Abhishek Lafleur is a 48year old male patient.     Patient Ac Date Noted: 02/13/2017      Functional gait abnormality         Date Noted: 02/13/2017      Risk for falls         Date Noted: 02/13/2017      Peripheral edema         Date Noted: 08/15/2016      Long term current use of antithrombotics/antiplatelets 10/10/2012   • Nephritis and nephropathy, not specified as acute or chronic, with unspecified pathological lesion in kidney    • Osteoarthritis, knee 12/23/2013   • Painful total knee replacement, sequela 5/31/2017   • Papillary thyroid carcinoma ([facility] [address] Uremia    Pericardial effusion    Alport syndrome    Uncontrolled hypertension    Acute intractable tension-type headache    MRSA (methicillin resistant staph aureus) culture positive    Septicemia (HCC)      Blood pressure (!) 159/91, pulse 90, temperatur

## 2018-07-02 NOTE — PROGRESS NOTES
[facility]                INFECTIOUS DISEASE PROGRESS NOTE    Trey London Patient Status:  Inpatient    3/31/1965 MRN [id number]   [facility] 7NE-A Attending Caroline Byrd MD   [address] Day # 8 PCP Natalia Gordon MD     Antibiotic Blood Culture FREQ X 2 [[id number]] Collected: 06/26/18 1216   Order Status: Completed Lab Status: Final result Updated: 07/01/18 1300   Specimen: Blood from Blood,peripheral     Blood Culture Result No Growth 5 Days    Blood Culture Smear No organisms seen Urine Culture No Growth at 18-24 hrs. Body Fluid Cult Aerobic and Anaerobic Once [[id number]] (Abnormal)  Collected: 06/29/18 1027   Order Status: Completed Lab Status: Preliminary result Updated: 07/01/18 0721   Specimen:  Body fluid, unspecified from [place] Cervical spine arthritis (HCC)     Bulging of cervical intervertebral disc     Neuroforaminal stenosis of cervical spine     Morbid obesity with BMI of 40.0-44.9, adult (HCC)     Opioid dependence on agonist therapy (HCC)     Thrombocytopenia ([facility] [address]

## 2018-07-02 NOTE — PLAN OF CARE
Assumed care at 1930  AOx4, VSS on RA  NSR per tele  Neuro checks as ordered, no changes noted. BP within parameters overnight. C/o of neck pain, relieved with NORCO. Bilateral lower extremity edema, L > R.   Due medications given, needs attended, isolat

## 2018-07-02 NOTE — PROGRESS NOTES
[id number] Noelle Kumar Neurology Progress Note    Farhat Bledsoe Patient Status:  Inpatient    3/31/1965 MRN [id number]   [facility] 7NE-A Attending Chuck Singer MD   [facility] Day # 8 PCP Adiel Keenan MD     CC: Unresponsiveness    Madelaine Abdullahi Assessment/Plan:  · SDH  · ASA reversed with DDAVP  · Hold anticoagulation for 5 days- if stable, could resume on 6/30  · NS following  · PT/OT recommends Acute Rehab  · C2 epidural hematoma  · Collar in place   · Ortho spine saw and patient is not a s

## 2018-07-02 NOTE — PROGRESS NOTES
[facility]  Progress Note     Beba Wily Patient Status:  Inpatient    3/31/1965 MRN [id number]   [facility] 6NE-A Attending Andrea Cotton MD   [facility] Day # 8 PCP Hailey Colon MD     Chief Complaint: Septicemia    S: Patient --    --   6.8   --     < > = values in this interval not displayed. Estimated Creatinine Clearance: 36.1 mL/min (A) (based on SCr of 2.83 mg/dL (H)).     Recent Labs   Lab  06/26/18   0432   PTP  15.0*   INR  1.13*       No results for input(s): TROP 15. Obesity hypoventilation syndrome     Quality:  · DVT Prophylaxis: SCD given SDH  · CODE status: Full    Estimated date of discharge: TBD  Discharge is dependent on: Clinical course  At this point Mr. Nicole Shannon is expected to be discharge to: TBD    P

## 2018-07-02 NOTE — PROGRESS NOTES
[facility]  Progress Note    Farhat Bledsoe Patient Status:  Inpatient    3/31/1965 MRN [id number]   [facility] 6NE-A Attending Chuck Singer MD   [address] Day # 8 PCP Adiel Keenan MD     Feels better today  On HD today; BP overall be injection 1 mg 1 mg Intravenous Q15 Min PRN   allopurinol (ZYLOPRIM) tab 100 mg 100 mg Oral Daily   AmLODIPine Besylate (NORVASC) tab 10 mg 10 mg Oral Daily   atorvastatin (LIPITOR) tab 10 mg 10 mg Oral Nightly   cholecalciferol (VITAMIN D3) cap/tab 1,000 06/29/18  1535 07/01/18  0835   WBC 12.0 9.0   HGB 8.9* 8.9*   MCV 84.2 85.3   PLT 97.0* 111.0*         Recent Labs   07/01/18  0835 07/02/18  0718    138   K 3.3* 3.4*    102   CO2 26.0 27.0   BUN 39* 43*   CREATSERUM 2.94* 2.83*   CA 8.4 8.1*

## 2018-07-02 NOTE — PLAN OF CARE
Pt A/Ox4, c-collar in place at all times,  on RA, NSR per tele  Pt denies any pain, looks uncomfortable at times, declines any medication.    Pt up with 1 assist to bathroom  HD completed with 5L removed  Ankle dressing changed, Ortho re-consulted  PLAN: wi

## 2018-07-02 NOTE — OCCUPATIONAL THERAPY NOTE
Attempted to see pt this AM.  Pt currently on hemodialysis. Will attempt again as schedule allows. Update: attempt to see pt this PM, Pt awaiting ortho re-consult.   Will attempt again as pt is medically appropriate.     -Luis Desai, MOT, OTR/L

## 2018-07-02 NOTE — PROGRESS NOTES
[facility]  Cardiology Progress Note    Subjective:  No chest pain or shortness of breath. Left ankle region still somewhat painful. Has some reported pustular drainage from left ankle region today - ortho reconsulted.     Objective:  BP (!) 165/95 ( management, no surgical intervention needed. · E. Coli bacteremia  · LLE cellulitis w/ associated abscess due to MRSA  · NSVT- ? Due to uremic pericarditis? No recurrent runs. Continue to follow. Echo with preserved LVEF without mention of wma.  Not on b

## 2018-07-03 LAB
BUN BLD-MCNC: 29 MG/DL (ref 8–20)
CALCIUM BLD-MCNC: 8.5 MG/DL (ref 8.3–10.3)
CHLORIDE: 102 MMOL/L (ref 101–111)
CO2: 27 MMOL/L (ref 22–32)
CREAT BLD-MCNC: 2.59 MG/DL (ref 0.7–1.3)
GLUCOSE BLD-MCNC: 122 MG/DL (ref 65–99)
GLUCOSE BLD-MCNC: 125 MG/DL (ref 65–99)
GLUCOSE BLD-MCNC: 162 MG/DL (ref 65–99)
GLUCOSE BLD-MCNC: 93 MG/DL (ref 70–99)
GLUCOSE BLD-MCNC: 98 MG/DL (ref 65–99)
HAV IGM SER QL: 2.2 MG/DL (ref 1.8–2.5)
POTASSIUM SERPL-SCNC: 3.5 MMOL/L (ref 3.6–5.1)
SODIUM SERPL-SCNC: 138 MMOL/L (ref 136–144)

## 2018-07-03 PROCEDURE — 99232 SBSQ HOSP IP/OBS MODERATE 35: CPT | Performed by: INTERNAL MEDICINE

## 2018-07-03 PROCEDURE — 99232 SBSQ HOSP IP/OBS MODERATE 35: CPT | Performed by: OTHER

## 2018-07-03 RX ORDER — MORPHINE SULFATE 4 MG/ML
1 INJECTION, SOLUTION INTRAMUSCULAR; INTRAVENOUS EVERY 2 HOUR PRN
Status: DISCONTINUED | OUTPATIENT
Start: 2018-07-03 | End: 2018-07-10

## 2018-07-03 RX ORDER — HEPARIN SODIUM 1000 [USP'U]/ML
1.5 INJECTION, SOLUTION INTRAVENOUS; SUBCUTANEOUS ONCE
Status: DISCONTINUED | OUTPATIENT
Start: 2018-07-03 | End: 2018-07-10

## 2018-07-03 RX ORDER — TERAZOSIN 5 MG/1
5 CAPSULE ORAL NIGHTLY
Status: DISCONTINUED | OUTPATIENT
Start: 2018-07-03 | End: 2018-07-10

## 2018-07-03 RX ORDER — MORPHINE SULFATE 4 MG/ML
2 INJECTION, SOLUTION INTRAMUSCULAR; INTRAVENOUS EVERY 2 HOUR PRN
Status: DISCONTINUED | OUTPATIENT
Start: 2018-07-03 | End: 2018-07-10

## 2018-07-03 NOTE — WOUND PROGRESS NOTE
[facility]  Inpatient Wound Care Contact Note    Tien Frazier Patient Status:  Inpatient    3/31/1965 MRN [id number]   [facility] 7NE-A Attending Caren Cha MD   Hosp Day # 5 PCP Puma Regalado MD     Patient scheduled for I&D

## 2018-07-03 NOTE — PROGRESS NOTES
[facility]  Cardiology Progress Note    Subjective:  Feeling \"stronger\" each day. Still with neck pain.     Objective:  /83 (BP Location: Left arm)   Pulse 89   Temp 98.3 °F (36.8 °C) (Oral)   Resp 18   Wt (!) 317 lb 10.9 oz (144.1 kg)   SpO2 pericarditis? No recurrent runs. Continue to follow. Carol Oleary with preserved LVEF without mention of wma. Not on bb with toprol/coreg/labetalol all listed as allergies.   · Moderate PEF by echo 6/24, repeat 6/29 with no significant change  · CAD w/ hx PCI 5/20

## 2018-07-03 NOTE — PROGRESS NOTES
[facility]  Progress Note     Yoni Morales Patient Status:  Inpatient    3/31/1965 MRN [id number]   [facility] 6NE-A Attending Majo Ortiz MD   [facility] Day # 5 PCP Christi Davis MD     Chief Complaint: Septicemia    S: Patient (A) (based on SCr of 2.59 mg/dL (H)). No results for input(s): PTP, INR in the last 168 hours. No results for input(s): TROP, CK in the last 168 hours. Imaging: Imaging data reviewed in Epic.     Medications:   • divalproex Sodium ER  500 mg O dependent on: Clinical course  At this point Mr. Carlos Stephens is expected to be discharge to: TBD    Plan of care discussed with patient, RN    Sherryle Linea MD

## 2018-07-03 NOTE — PLAN OF CARE
Assumed care at 1930  AOx4, VSS on RA  NSR per tele  No neuro changes noted or reported. C/o of neck pain relieved with NORCO  L ankle dressing intact. IV antibiotics given  Due medications given, needs attended, will continue to monitor.     Plan: I&D on

## 2018-07-03 NOTE — OCCUPATIONAL THERAPY NOTE
OCCUPATIONAL THERAPY TREATMENT NOTE - INPATIENT     Room Number: 2003/4080-U  Session: 2   Number of Visits to Meet Established Goals: 5    Presenting Problem: Subdural hematoma    History related to current admission: Pt is a 48year old male admitted on replacement, sequela 5/31/2017   • Papillary thyroid carcinoma ([facility] [address]     Dx in 1/2015: tx with surgery and MANZO   • PONV (postoperative nausea and vomiting) 1997    s/p kidney transplant   • Postlaminectomy syndrome, cervical region 6/20/2013    Log Date: 1 ASSESSMENT  Ratin  Location: neck  Management Techniques: Breathing techniques;Relaxation; Body mechanics     ACTIVITY TOLERANCE  Room air  No shortness of breath    ACTIVITIES OF DAILY LIVING ASSESSMENT  AM-PAC ‘6-Clicks’ Inpatient Daily Activity Short skilled inpatient OT to address the above deficits, maximizing patient's ability to return to prior level of function. OT Discharge Recommendations: Acute rehabilitation; However, pending continued progress patient may benefit from outpatient services.

## 2018-07-03 NOTE — PROGRESS NOTES
[facility]                INFECTIOUS DISEASE PROGRESS NOTE    Orly Ochoa Patient Status:  Inpatient    3/31/1965 MRN [id number]   [facility] 7NE-A Attending Abby Moran MD   [facility] Day # 9 PCP Nancy England MD     Antibiotic Order Status: Completed Lab Status: Final result Updated: 07/01/18 1300   Specimen: Blood from Blood,peripheral     Blood Culture Result No Growth 5 Days    Blood Culture Smear No organisms seen       Blood Culture FREQ X 2 [[id number]] (Abnormal)  Collecte Body Fluid Cult Aerobic and Anaerobic Once [[id number]] (Abnormal)  Collected: 06/29/18 1027   Order Status: Completed Lab Status: Preliminary result Updated: 07/01/18 0721   Specimen:  Body fluid, unspecified from Synovial fluid,ankle     Body Fluid Culture Bulging of cervical intervertebral disc     Neuroforaminal stenosis of cervical spine     Morbid obesity with BMI of 40.0-44.9, adult (HCC)     Opioid dependence on agonist therapy (HCC)     Thrombocytopenia (HCC)     Peripheral polyneuropathy     Immun

## 2018-07-03 NOTE — PROGRESS NOTES
[address] Noelle Kumar Neurology Progress Note    [place] Patient Status:  Inpatient    3/31/1965 MRN [id number]   [facility] 7NE-A Attending Yaneli Kohli MD   [address] Day # 9 PCP Janiya Morrow MD     CC: Unresponsiveness    Catherine Brock reversed with DDAVP  ? Hold anticoagulation for 5 days- if stable, could resume on 6/30  ? NS following  ? PT/OT recommends Acute Rehab  ? SBP goal <160mmhg  · C2 epidural hematoma  ? Collar in place   ?  Ortho spine saw and patient is not a surgical candid

## 2018-07-03 NOTE — PROGRESS NOTES
[facility]  Nephrology Progress Note    Govind Ramirez Patient Status:  Inpatient    3/31/1965 MRN [id number]   [facility] 7NE-A Attending Anne-Marie Elliott MD   [facility] Day # 9 PCP Clemmie Runner, MD       SUBJECTIVE:  No complaints this A PLT  109.0*  97.0*  111.0*       Recent Labs   Lab  06/28/18   0519  06/29/18   0549  07/01/18   0835  07/02/18   0718  07/03/18   0529   NA  137  141  139  138  138   K  3.7  3.5*  3.3*  3.4*  3.5*   CL  99*  102  103  102  102   CO2  29.0  27.0  26.0 (DULCOLAX) rectal suppository 10 mg 10 mg Rectal Daily PRN   FLEET ENEMA (FLEET) 7-19 GM/118ML enema 133 mL 1 enema Rectal Once PRN   ondansetron HCl (ZOFRAN) injection 4 mg 4 mg Intravenous Q6H PRN   Or      Metoclopramide HCl (REGLAN) injection 5 mg 5 mg

## 2018-07-03 NOTE — PLAN OF CARE
Pt A/Ox4, c-collar on place at all times, on RA, NSR per tele, pt denies any pain  Pt up with 1 assist to bathroom, ambulating with PT in the halls  Lt ankle dressing changed  PLAN: I&D planned for 7/5  Will cont to monitor    CARDIOVASCULAR - ADULT    • M

## 2018-07-03 NOTE — CM/SW NOTE
Late Entry:    SW spoke with [facility] liaison, Jane Oliveira regarding discharge plans and need for dialysis. Jane Oliveira again confirms pt would need to be on a T,TH,S schedule for acute rehab placement/transfer.  / to remain available for support a

## 2018-07-03 NOTE — PHYSICAL THERAPY NOTE
PHYSICAL THERAPY TREATMENT NOTE - INPATIENT    Room Number: 1558/3755-I  Session: 3  Number of Visits to Meet Established Goals: 5    Presenting Problem: syncope with SDH and epidural hematoma   PMH: Pt is a 48 y.o. Male admitted 6/24/18 s/p syncope.  CT h Osteoarthritis, knee 12/23/2013   • Painful total knee replacement, sequela 5/31/2017   • Papillary thyroid carcinoma ([facility] [address]     Dx in 1/2015: tx with surgery and MANZO   • PONV (postoperative nausea and vomiting) 1997    s/p kidney transplant   • Postlaminec PAIN ASSESSMENT   Ratin  Location: unable to specifiy  Management Techniques:  Activity promotion;Repositioning    BALANCE                                                                                                                 Static Sitting with call light in reach. RN informed of session. Mobility Guidelines updated in Pt room for nursing staff.        THERAPEUTIC EXERCISES  Lower Extremity Ankle pumps  Glut sets  Knee extension  Quad sets     Upper Extremity Hand pumps  Shoulder flexion walker - rolling at assistance level: minimum assistance      Goal #4     Goal #5     Goal #6     Goal Comments: Goals established on 6/25/2018; goals in progress 7/3/2018

## 2018-07-04 ENCOUNTER — ANESTHESIA EVENT (OUTPATIENT)
Dept: SURGERY | Facility: HOSPITAL | Age: 53
End: 2018-07-04

## 2018-07-04 LAB
GLUCOSE BLD-MCNC: 128 MG/DL (ref 65–99)
GLUCOSE BLD-MCNC: 170 MG/DL (ref 65–99)
GLUCOSE BLD-MCNC: 99 MG/DL (ref 65–99)
VANCOMYCIN TROUGH: 9.7 UG/ML (ref 10–20)

## 2018-07-04 PROCEDURE — 90935 HEMODIALYSIS ONE EVALUATION: CPT | Performed by: INTERNAL MEDICINE

## 2018-07-04 PROCEDURE — 99232 SBSQ HOSP IP/OBS MODERATE 35: CPT | Performed by: INTERNAL MEDICINE

## 2018-07-04 NOTE — PLAN OF CARE
Assumed care at 1930  AOx4, VSS on RA, patient appears down. NSR per tele  No neuro changes noted or reported. C/o of neck pain relieved with NORCO  L ankle dressing intact.   IV antibiotics given  Due medications given, needs attended, will continue to m

## 2018-07-04 NOTE — PROGRESS NOTES
[facility]  Progress Note     Aurelia Santiago Patient Status:  Inpatient    3/31/1965 MRN [id number]   [facility] 6NE-A Attending Laura Stovall MD   [facility] Day # 8 PCP Mi Sharif MD     Chief Complaint: Septicemia    S: Patient Once in dialysis   • heparin sodium  1.5 mL Intravenous Once   • divalproex Sodium ER  500 mg Oral BID   • CloNIDine HCl  1 patch Transdermal Weekly   • NIFEdipine ER Osmotic Release  90 mg Oral Daily   • aspirin  81 mg Oral Daily   • tacrolimus  1 mg Oral

## 2018-07-04 NOTE — PLAN OF CARE
Assumed care of patient at 0730  AOx4, drowsy at times, RA, NSR on tele  Reports occasional neck pain but denies need for pain meds currently  HD this morning  Foam dressing C/D/I to left ankle  C collar in place to neck at all times  Up with 1 assist  Con

## 2018-07-04 NOTE — ANESTHESIA PREPROCEDURE EVALUATION
PRE-OP EVALUATION    Patient Name: Helen Dumont    Pre-op Diagnosis: INPT      Procedure(s):  IRRIGATION AND DEBRIDEMENT  LEFT ANKLE      Surgeon(s) and Role:     Sherley Dela Cruz MD - Primary    Pre-op vitals reviewed.   Temp: 99.5 °F (37.5 °C)  Pulse mg/kg (Adjusted) Intravenous PRN Dialysis   [COMPLETED] epoetin uvaldo (EPOGEN,PROCRIT) injection 10,000 Units 10,000 Units Intravenous Once in dialysis   tacrolimus (PROGRAF) cap 1 mg 1 mg Oral BID   ceFAZolin sodium (ANCEF/KEFZOL) 2 GM/20ML premix IV syrin Oral Daily   CloNIDine HCl (CATAPRES) tab 0.3 mg 0.3 mg Oral TID   famoTIDine (PEPCID) tab 20 mg 20 mg Oral Daily   ferrous sulfate EC tab 325 mg 325 mg Oral Daily   Fluticasone Propionate (FLONASE) 50 MCG/ACT nasal spray 2 spray 2 spray Each Nare Daily Shemar Huerta MD;  Location: [facility] ENDOSCOPY  1994: FEMUR/KNEE SURG UNLISTED      Comment: right knee repair of ligament cruciate                anterior  No date: KNEE SURGERY  05/29/2012: LAMINECTOMY,FACETECTOMY,LUMBAR      Comment: foraminotomy cervic seg mother                Present on Admission:  **None**

## 2018-07-04 NOTE — PROGRESS NOTES
[facility]  Nephrology Progress Note    Helen Dumont Patient Status:  Inpatient    3/31/1965 MRN [id number]   [facility] 7NE-A Attending Juan Jose Liriano MD   Hosp Day # 8 PCP Vivian Thomas MD       SUBJECTIVE:  Pt seen on dialysis 8.9*   MCV  84.2  85.3   PLT  97.0*  111.0*       Recent Labs   Lab  06/28/18   0519  06/29/18   0549  07/01/18   0835  07/02/18   0718  07/03/18   0529   NA  137  141  139  138  138   K  3.7  3.5*  3.3*  3.4*  3.5*   CL  99*  102  103  102  102   CO2  29. acetaminophen (TYLENOL) tab 650 mg 650 mg Oral Q4H PRN   Or      acetaminophen (TYLENOL) 650 MG rectal suppository 650 mg 650 mg Rectal Q4H PRN   Senna (SENOKOT) tab TABS 17.2 mg 17.2 mg Oral Nightly   PEG 3350 (MIRALAX) powder packet 17 g 17 g Oral Paul and/or family by bedside.           Jodie Dawn MD  7/4/2018  8:05 AM

## 2018-07-04 NOTE — CONSULTS
Pharmacy dosing service    Follow-up Pharmacokinetic Consult for Vancomycin Dosing     Yoni Morales is a 48year old male who is being treated for bacteremia and ankle abrasion/abscess .   Patient is on day 6 of Vancomycin 1 gm IV after dialysis after t follow him. We appreciate the opportunity to assist in his care.     Dee Cabello PharmD  7/4/2018  9:44 AM  Pharmacy Extension: [phone]

## 2018-07-04 NOTE — PROGRESS NOTES
Increased BP with dialysis. Only slight improvement with IVP Labetalol. SBP in the 180's. Dr Deejay Hernandez paged for orders.  Awaiting return call    Scheduled AM BP meds given and 1300 [address]ap[address]

## 2018-07-05 ENCOUNTER — ANESTHESIA (OUTPATIENT)
Dept: SURGERY | Facility: HOSPITAL | Age: 53
End: 2018-07-05

## 2018-07-05 LAB
GLUCOSE BLD-MCNC: 102 MG/DL (ref 65–99)
GLUCOSE BLD-MCNC: 113 MG/DL (ref 65–99)
GLUCOSE BLD-MCNC: 123 MG/DL (ref 65–99)
GLUCOSE BLD-MCNC: 136 MG/DL (ref 65–99)
GLUCOSE BLD-MCNC: 161 MG/DL (ref 65–99)

## 2018-07-05 PROCEDURE — 0S9G3ZZ DRAINAGE OF LEFT ANKLE JOINT, PERCUTANEOUS APPROACH: ICD-10-PCS | Performed by: ORTHOPAEDIC SURGERY

## 2018-07-05 PROCEDURE — 99232 SBSQ HOSP IP/OBS MODERATE 35: CPT | Performed by: INTERNAL MEDICINE

## 2018-07-05 PROCEDURE — 0JDR0ZZ EXTRACTION OF LEFT FOOT SUBCUTANEOUS TISSUE AND FASCIA, OPEN APPROACH: ICD-10-PCS | Performed by: ORTHOPAEDIC SURGERY

## 2018-07-05 RX ORDER — SODIUM PHOSPHATE, DIBASIC AND SODIUM PHOSPHATE, MONOBASIC 7; 19 G/133ML; G/133ML
1 ENEMA RECTAL ONCE AS NEEDED
Status: DISCONTINUED | OUTPATIENT
Start: 2018-07-05 | End: 2018-07-10

## 2018-07-05 RX ORDER — ONDANSETRON 2 MG/ML
4 INJECTION INTRAMUSCULAR; INTRAVENOUS EVERY 6 HOURS PRN
Status: DISCONTINUED | OUTPATIENT
Start: 2018-07-05 | End: 2018-07-10

## 2018-07-05 RX ORDER — MORPHINE SULFATE 4 MG/ML
INJECTION, SOLUTION INTRAMUSCULAR; INTRAVENOUS
Status: COMPLETED
Start: 2018-07-05 | End: 2018-07-05

## 2018-07-05 RX ORDER — NIFEDIPINE 30 MG/1
30 TABLET, EXTENDED RELEASE ORAL ONCE
Status: COMPLETED | OUTPATIENT
Start: 2018-07-05 | End: 2018-07-05

## 2018-07-05 RX ORDER — DEXAMETHASONE SODIUM PHOSPHATE 4 MG/ML
4 VIAL (ML) INJECTION AS NEEDED
Status: DISCONTINUED | OUTPATIENT
Start: 2018-07-05 | End: 2018-07-05 | Stop reason: HOSPADM

## 2018-07-05 RX ORDER — SODIUM CHLORIDE, SODIUM LACTATE, POTASSIUM CHLORIDE, CALCIUM CHLORIDE 600; 310; 30; 20 MG/100ML; MG/100ML; MG/100ML; MG/100ML
INJECTION, SOLUTION INTRAVENOUS CONTINUOUS
Status: DISCONTINUED | OUTPATIENT
Start: 2018-07-05 | End: 2018-07-05 | Stop reason: HOSPADM

## 2018-07-05 RX ORDER — DIPHENHYDRAMINE HYDROCHLORIDE 50 MG/ML
12.5 INJECTION INTRAMUSCULAR; INTRAVENOUS AS NEEDED
Status: DISCONTINUED | OUTPATIENT
Start: 2018-07-05 | End: 2018-07-05 | Stop reason: HOSPADM

## 2018-07-05 RX ORDER — HEPARIN SODIUM 5000 [USP'U]/ML
5000 INJECTION, SOLUTION INTRAVENOUS; SUBCUTANEOUS EVERY 12 HOURS SCHEDULED
Status: DISCONTINUED | OUTPATIENT
Start: 2018-07-05 | End: 2018-07-10

## 2018-07-05 RX ORDER — MIDAZOLAM HYDROCHLORIDE 1 MG/ML
1 INJECTION INTRAMUSCULAR; INTRAVENOUS EVERY 5 MIN PRN
Status: DISCONTINUED | OUTPATIENT
Start: 2018-07-05 | End: 2018-07-05 | Stop reason: HOSPADM

## 2018-07-05 RX ORDER — ONDANSETRON 2 MG/ML
4 INJECTION INTRAMUSCULAR; INTRAVENOUS AS NEEDED
Status: DISCONTINUED | OUTPATIENT
Start: 2018-07-05 | End: 2018-07-05 | Stop reason: HOSPADM

## 2018-07-05 RX ORDER — POLYETHYLENE GLYCOL 3350 17 G/17G
17 POWDER, FOR SOLUTION ORAL DAILY PRN
Status: DISCONTINUED | OUTPATIENT
Start: 2018-07-05 | End: 2018-07-10

## 2018-07-05 RX ORDER — NALOXONE HYDROCHLORIDE 0.4 MG/ML
80 INJECTION, SOLUTION INTRAMUSCULAR; INTRAVENOUS; SUBCUTANEOUS AS NEEDED
Status: DISCONTINUED | OUTPATIENT
Start: 2018-07-05 | End: 2018-07-05 | Stop reason: HOSPADM

## 2018-07-05 RX ORDER — MORPHINE SULFATE 4 MG/ML
2 INJECTION, SOLUTION INTRAMUSCULAR; INTRAVENOUS EVERY 5 MIN PRN
Status: DISCONTINUED | OUTPATIENT
Start: 2018-07-05 | End: 2018-07-05 | Stop reason: HOSPADM

## 2018-07-05 RX ORDER — HEPARIN SODIUM 1000 [USP'U]/ML
1.5 INJECTION, SOLUTION INTRAVENOUS; SUBCUTANEOUS ONCE
Status: COMPLETED | OUTPATIENT
Start: 2018-07-05 | End: 2018-07-06

## 2018-07-05 RX ORDER — NIFEDIPINE 60 MG/1
120 TABLET, EXTENDED RELEASE ORAL DAILY
Status: DISCONTINUED | OUTPATIENT
Start: 2018-07-06 | End: 2018-07-10

## 2018-07-05 RX ORDER — BISACODYL 10 MG
10 SUPPOSITORY, RECTAL RECTAL
Status: DISCONTINUED | OUTPATIENT
Start: 2018-07-05 | End: 2018-07-10

## 2018-07-05 NOTE — RESPIRATORY THERAPY NOTE
RAQUEL - Equipment Use Daily Summary:  · Set Mode   · Usage in hours:   · 90% Pressure (EPAP) level:   · 90% Insp Pressure (IPAP):   · AHI:   · Supplemental Oxygen:   · Comments: PT DID NOT WEAR

## 2018-07-05 NOTE — PROGRESS NOTES
[facility]  Progress Note     Stephens Goad Patient Status:  Inpatient    3/31/1965 MRN [id number]   [facility] 6NE-A Attending Donald Chung MD   [address] Day # 6 PCP Geovany Winslow MD     Chief Complaint: Septicemia    S: Patient Once   • CloNIDine HCl  1 patch Transdermal Weekly   • NIFEdipine ER Osmotic Release  90 mg Oral Daily   • aspirin  81 mg Oral Daily   • tacrolimus  1 mg Oral BID   • ceFAZolin  2 g Intravenous Q24H   • Senna  17.2 mg Oral Nightly   • allopurinol  100 mg O

## 2018-07-05 NOTE — PROGRESS NOTES
[facility]  Nephrology Progress Note    Helen Dumont Patient Status:  Inpatient    3/31/1965 MRN [id number]   [facility] 7NE-A Attending Juan Jose Liriano MD   [facility] Day # 6 PCP Major Laboy MD       SUBJECTIVE:  No acute events, aw PLT  97.0*  111.0*       Recent Labs   Lab  06/29/18   0549  07/01/18   0835  07/02/18   0718  07/03/18   0529   NA  141  139  138  138   K  3.5*  3.3*  3.4*  3.5*   CL  102  103  102  102   CO2  27.0  26.0  27.0  27.0   BUN  54*  39*  43*  29*   CREKAIDENE Daily PRN   magnesium hydroxide (MILK OF MAGNESIA) 400 MG/5ML suspension 30 mL 30 mL Oral Daily PRN   bisacodyl (DULCOLAX) rectal suppository 10 mg 10 mg Rectal Daily PRN   FLEET ENEMA (FLEET) 7-19 GM/118ML enema 133 mL 1 enema Rectal Once PRN   ondansetro

## 2018-07-05 NOTE — PLAN OF CARE
Assumed patient care 0730. Patient alert/orientated. Patient alert/orientated. Neck collar in place. Patient diet NPO okay for sips of water with meds per Dr. Rita Laughlin. Therapy worked with patient today.  Complaints of neck discomfort, managed with PRN Morphin

## 2018-07-05 NOTE — PHYSICAL THERAPY NOTE
PHYSICAL THERAPY TREATMENT NOTE - INPATIENT    Room Number: 2852/1891-M  Session: 5/5 (extended 3 additional sessions)  Number of Visits to Meet Established Goals: 3    Presenting Problem: syncope with SDH and epidural hematoma   PMH: Pt is a 48 y.o.  Male pathological lesion in kidney    • Osteoarthritis, knee 12/23/2013   • Painful total knee replacement, sequela 5/31/2017   • Papillary thyroid carcinoma ([facility] [address]     Dx in 1/2015: tx with surgery and MANZO   • PONV (postoperative nausea and vomiting) 1997    s/ BEARING RESTRICTION  Weight Bearing Restriction: None                PAIN ASSESSMENT   Ratin  Location: LLE, neck and back  Management Techniques: Activity promotion; Body mechanics;Repositioning;Relaxation;Breathing techniques    BALANCE positioning.,activity. Patient End of Session: Up in chair;Needs met;Call light within reach;RN aware of session/findings; All patient questions and concerns addressed    ASSESSMENT   Pt progressing with functional mobility and activity tolerance.  P

## 2018-07-05 NOTE — OCCUPATIONAL THERAPY NOTE
OCCUPATIONAL THERAPY TREATMENT NOTE - INPATIENT     Room Number: 4267/9409-P  Session: 3   Number of Visits to Meet Established Goals: 5    Presenting Problem: Subdural hematoma    History related to current admission: Pt is a 48year old male admitted on replacement, sequela 5/31/2017   • Papillary thyroid carcinoma ([facility] [address]     Dx in 1/2015: tx with surgery and MANZO   • PONV (postoperative nausea and vomiting) 1997    s/p kidney transplant   • Postlaminectomy syndrome, cervical region 6/20/2013    Log Date: 1 ASSESSMENT  Ratin  Location: neck  Management Techniques: Breathing techniques;Relaxation; Body mechanics     ACTIVITY TOLERANCE  Room air  No shortness of breath    ACTIVITIES OF DAILY LIVING ASSESSMENT  AM-PAC ‘6-Clicks’ Inpatient Daily Activity Short to return to prior level of function. OT Discharge Recommendations: Home   OT Device Recommendations: TBD    PLAN  OT Treatment Plan: Balance activities; Energy conservation/work simplification techniques;ADL training;IADL training;Functional transfer tr

## 2018-07-05 NOTE — PROGRESS NOTES
[facility]                INFECTIOUS DISEASE PROGRESS NOTE    Roula Matta Patient Status:  Inpatient    3/31/1965 MRN [id number]   [facility] 7NE-A Attending Wyatt Simmons MD   [facility] Day # 6 PCP Patsy Sosa MD     Antibioti Blood Culture Smear No organisms seen       Blood Culture FREQ X 2 [[id number]] (Abnormal)  Collected: 06/26/18 1216   Order Status: Completed Lab Status: Preliminary result Updated: 06/28/18 1622   Specimen: Blood from Blood,peripheral     Blood Culture R Specimen: Body fluid, unspecified from Synovial fluid,ankle     Body Fluid Culture Result 3+ growth Staphylococcus aureus, MRSA (A)    Comment: Isolate was tested for inducible resistance to Clindamycin and found to be negative.        Body Fld Smear Gram P Thrombocytopenia (HCC)     Peripheral polyneuropathy     Immunosuppressed status (HCC) - Long-term current use of immunosuppressive medication     Chronic L knee effusion     Intractable vomiting with nausea     ESRD (end stage renal disease) ([facility] [address]

## 2018-07-05 NOTE — BRIEF OP NOTE
Pre-Operative Diagnosis: INPT       Post-Operative Diagnosis: * No post-op diagnosis entered *      Procedure Performed:   Procedure(s):  IRRIGATION AND DEBRIDEMENT  LEFT ANKLE    ASPIRATION LEFT ANKLE  Surgeon(s) and Role:     Josie Carcamo MD - Ken Gentle

## 2018-07-05 NOTE — PLAN OF CARE
CARDIOVASCULAR - ADULT    • Absence of cardiac arrhythmias or at baseline Progressing        NEUROLOGICAL - ADULT    • Achieves stable or improved neurological status Progressing        Patient/Family Goals    • Patient/Family Long Term Goal Progressing

## 2018-07-06 LAB
GLUCOSE BLD-MCNC: 108 MG/DL (ref 65–99)
GLUCOSE BLD-MCNC: 141 MG/DL (ref 65–99)
GLUCOSE BLD-MCNC: 147 MG/DL (ref 65–99)
GLUCOSE BLD-MCNC: 193 MG/DL (ref 65–99)
VANCOMYCIN RANDOM: 8.7 UG/ML

## 2018-07-06 PROCEDURE — 99232 SBSQ HOSP IP/OBS MODERATE 35: CPT | Performed by: INTERNAL MEDICINE

## 2018-07-06 PROCEDURE — 90935 HEMODIALYSIS ONE EVALUATION: CPT | Performed by: INTERNAL MEDICINE

## 2018-07-06 NOTE — PHYSICAL THERAPY NOTE
Attempted PT session, pt s/p I and D of L ankle 7/5/18 under general anesthesia and will need new PT/OT orders when medically appropriate. Discussed with RN.

## 2018-07-06 NOTE — PROGRESS NOTES
[facility]                INFECTIOUS DISEASE PROGRESS NOTE    Anasco Ciarra Patient Status:  Inpatient    3/31/1965 MRN [id number]   [facility] 7NE-A Attending Dana Porter MD   [address] Day # 15 PCP Geovany Winslow MD     Antibioti Blood Culture FREQ X 2 [[id number]] (Abnormal)  Collected: 06/26/18 1216   Order Status: Completed Lab Status: Preliminary result Updated: 06/28/18 1622   Specimen: Blood from Blood,peripheral     Blood Culture Result Escherichia coli (A)    Blood Culture S Erythromycin >=8  Resistant    Gentamicin <=0.5  Sensitive    Levofloxacin >=8  Resistant    Oxacillin >=4  Resistant    Tetracycline <=1  Sensitive    Trimethoprim/Sulfa <=10  Sensitive    Vancomycin <=0.5  Sensitive            Tissue Aerobic Culture [226 Neuroforaminal stenosis of cervical spine     Morbid obesity with BMI of 40.0-44.9, adult (HCC)     Opioid dependence on agonist therapy (HCC)     Thrombocytopenia (HCC)     Peripheral polyneuropathy     Immunosuppressed status ([place]) - Long-term current

## 2018-07-06 NOTE — ANESTHESIA POSTPROCEDURE EVALUATION
[facility] Patient Status:  Inpatient   Age/Gender 48year old male MRN [id number]   [facility] SURGERY Attending Ad Patel, [address] Day # 6 PCP Vickie Sanchez MD       Anesthesia Post-op Note    Procedure(s):

## 2018-07-06 NOTE — RESPIRATORY THERAPY NOTE
RAQUEL - Equipment Use Daily Summary:                  . Set Mode:                . Usage in hours:                . 90% Pressure (EPAP) level:                . 90% Insp. Pressure (IPAP): Jani Singh AHI:                .  Supplemental Oxygen:    LPM

## 2018-07-06 NOTE — PROGRESS NOTES
[facility]  Progress Note     Nidhi Pressley Patient Status:  Inpatient    3/31/1965 MRN [id number]   [facility] 6NE-A Attending Emmanuel Gilbert MD   [address] Day # 15 PCP Caroline Jennings MD     Chief Complaint: Septicemia    S: Patient Units Intravenous Once in dialysis   • heparin sodium  1.5 mL Intravenous Once   • Heparin Sodium (Porcine)  5,000 Units Subcutaneous 2 times per day   • Terazosin HCl  5 mg Oral Nightly   • heparin sodium  1.5 mL Intravenous Once   • CloNIDine HCl  1 patc discussed with patient, MITCHEL Howard MD

## 2018-07-06 NOTE — OCCUPATIONAL THERAPY NOTE
Attempted OT session, pt s/p I and D of L ankle 7/5/18 under general anesthesia and will need new PT/OT orders when medically appropriate.  Discussed with RN -Petar Robbins, JOVANY, OTR/L

## 2018-07-06 NOTE — PROGRESS NOTES
[facility]  Nephrology Progress Note    Farhat Bledsoe Patient Status:  Inpatient    3/31/1965 MRN [id number]   [facility] 7NE-A Attending Chuck Singer MD   Hosp Day # 15 PCP Adiel Keenan MD       SUBJECTIVE:  Pt seen on dialysis 1535  07/01/18   0835   WBC  12.0  9.0   HGB  8.9*  8.9*   MCV  84.2  85.3   PLT  97.0*  111.0*       Recent Labs   Lab  07/01/18   0835  07/02/18   0718  07/03/18   0529   NA  139  138  138   K  3.3*  3.4*  3.5*   CL  103  102  102   CO2  26.0  27.0  27. 0 tacrolimus (PROGRAF) cap 1 mg 1 mg Oral BID   ceFAZolin sodium (ANCEF/KEFZOL) 2 GM/20ML premix IV syringe 2 g 2 g Intravenous Q24H   HYDROcodone-acetaminophen (NORCO) 5-325 MG per tab 1 tablet 1 tablet Oral Q6H PRN   heparin sodium 1000 UNIT/ML injection were discussed with patient and/or family by bedside.         Marbella Santana MD  7/6/2018  10:57 AM

## 2018-07-06 NOTE — CM/SW NOTE
Had I&D of the left ankle yesterday. Pt will have HD today. Pt will go to  with HD when ready for d/c.

## 2018-07-06 NOTE — PLAN OF CARE
Assumed care at 2000. AOx4. Pt came back from I&D procedure. Ace wrap to left foot. Dressing C/D/I and elevated with pillow. Bilateral pedal pulses palpable. Instructed pt to perform ankle pumps per order. Pain meds given for incision site pain.   HD o

## 2018-07-06 NOTE — PLAN OF CARE
Patient had elevated blood pressures while upon receiving him at 1030a. Lebetelol given x 2 by off going nurse. Had not received morning BP meds yet. Recheck of blood pressure after HD showed BP already down. Patient spiked a fever.  Obtaining blood culture

## 2018-07-06 NOTE — WOUND PROGRESS NOTE
[facility]  Inpatient Wound Care Contact Note    Dariana Shukla Patient Status:  Inpatient    3/31/1965 MRN [id number]   [facility] 7NE-A Attending Serenity Dorantes MD   [facility] Day # 15 PCP Rere Ohara MD     Patient underwent I&D L a

## 2018-07-06 NOTE — PLAN OF CARE
Received pt a/ox4, RA, NSR on tele at 0700  Pt has ace wrap on left foot, dressing C/D/I and elevated  Pt having pain 7/10, relieved with pain medications from [facility]  Pt having HD, will continue to monitor  Gave report at 1130 to VALERIO Dang 119

## 2018-07-06 NOTE — OPERATIVE REPORT
[facility]    PATIENT'S NAME: LEONIE Humphrey   ATTENDING PHYSICIAN: Jerilee Lefort, MD   OPERATING PHYSICIAN: German Underwood M.D.    PATIENT ACCOUNT#:   [de-identified]    LOCATION:  PACU 1404 [facility] PACU 2 EDWP 10  MEDICAL RECORD #:   [id number]       DATE OF BIR packed open with quarter-inch iodoform gauze. A bulky dressing was applied. The tourniquet was released after 11 minutes. There was good capillary refill following release of the tourniquet. Blood loss was only 2 mL.   The only specimens were the cultur

## 2018-07-07 LAB
GLUCOSE BLD-MCNC: 114 MG/DL (ref 65–99)
GLUCOSE BLD-MCNC: 129 MG/DL (ref 65–99)
GLUCOSE BLD-MCNC: 133 MG/DL (ref 65–99)
GLUCOSE BLD-MCNC: 147 MG/DL (ref 65–99)

## 2018-07-07 PROCEDURE — 99232 SBSQ HOSP IP/OBS MODERATE 35: CPT | Performed by: INTERNAL MEDICINE

## 2018-07-07 PROCEDURE — 99232 SBSQ HOSP IP/OBS MODERATE 35: CPT | Performed by: HOSPITALIST

## 2018-07-07 RX ORDER — HYDROCODONE BITARTRATE AND ACETAMINOPHEN 10; 325 MG/1; MG/1
1 TABLET ORAL EVERY 4 HOURS PRN
Status: DISCONTINUED | OUTPATIENT
Start: 2018-07-07 | End: 2018-07-10

## 2018-07-07 NOTE — PROGRESS NOTES
[facility]  Nephrology Progress Note    Kevin Pattersondeirdre Patient Status:  Inpatient    3/31/1965 MRN [id number]   [facility] 7NE-A Attending Jacqui Bergeron MD   [facility] Day # 15 PCP Jarred Mcwilliams MD       SUBJECTIVE:  BP has been relativ Labs   Lab  07/01/18   0835   WBC  9.0   HGB  8.9*   MCV  85.3   PLT  111.0*       Recent Labs   Lab  07/01/18   0835  07/02/18   0718  07/03/18   0529   NA  139  138  138   K  3.3*  3.4*  3.5*   CL  103  102  102   CO2  26.0  27.0  27.0   BUN  39*  43*  2 heparin sodium 1000 UNIT/ML injection 1,500 Units 1.5 mL Intravenous PRN Dialysis   Albumin Human (ALBUMINAR) 25 % solution 100 mL 100 mL Intravenous PRN   acetaminophen (TYLENOL) tab 650 mg 650 mg Oral Q4H PRN   Or      acetaminophen (TYLENOL) 650 MG re

## 2018-07-07 NOTE — PLAN OF CARE
Assumed care at [address]. AOx4. C/o left ankle incision pain. Pain meds given. Left leg elevated with pillow. Keep remaining pt to perform \"ankle pumps\". Plan of care discussed with pt. Call light within reach.     CARDIOVASCULAR - ADULT    • Maintains op

## 2018-07-07 NOTE — PROGRESS NOTES
[facility]                INFECTIOUS DISEASE PROGRESS NOTE    Kanawha Goad Patient Status:  Inpatient    3/31/1965 MRN [id number]   [facility] 7NE-A Attending Dana Porter MD   [address] Day # 15 PCP Geovany Winslow MD     Antibioti Blood Culture FREQ X 2 [[id number]] (Abnormal)  Collected: 06/26/18 1216   Order Status: Completed Lab Status: Preliminary result Updated: 06/28/18 1622   Specimen: Blood from Blood,peripheral     Blood Culture Result Escherichia coli (A)    Blood Culture S Erythromycin >=8  Resistant    Gentamicin <=0.5  Sensitive    Levofloxacin >=8  Resistant    Oxacillin >=4  Resistant    Tetracycline <=1  Sensitive    Trimethoprim/Sulfa <=10  Sensitive    Vancomycin <=0.5  Sensitive            Tissue Aerobic Culture [226 Neuroforaminal stenosis of cervical spine     Morbid obesity with BMI of 40.0-44.9, adult (HCC)     Opioid dependence on agonist therapy (HCC)     Thrombocytopenia (HCC)     Peripheral polyneuropathy     Immunosuppressed status ([facility] [address] - Long-term current

## 2018-07-07 NOTE — PHYSICAL THERAPY NOTE
PHYSICAL THERAPY RE- EVALUATION - INPATIENT     Room Number: 9017/1346-Y  Evaluation Date: 7/7/2018  Type of Evaluation: Re-evaluation  Physician Order: PT Eval and Treat    Presenting Problem: syncope with SDH and epidural hematoma  Reason for JOSE LUO [employer] • Morbid obesity with BMI of 40.0-44.9, adult ([facility] [address] 1/21/2014   • Needs flu shot 10/10/2012   • Nephritis and nephropathy, not specified as acute or chronic, with unspecified pathological lesion in kidney    • Osteoarthritis, knee 12/23/2013   • Painful carcinoma    HOME SITUATION  Type of Home: Apartment   Home Layout: One level  Stairs to Enter : 0     Stairs to [employer]: 0       Lives With: Spouse  Drives: Yes  Patient Owned Equipment: Cane  Patient Regularly Uses: Reading glasses; Hearing aides    Prior from a chair with arms (e.g., wheelchair, bedside commode, etc.): None   -   Moving from lying on back to sitting on the side of the bed?: None   How much help from another person does the patient currently need. ..   -   Moving to and from a bed to a chair prolonged hospitalization, chronic pain. Functional outcome measures completed include AMPAC. The AM-PAC '6-Clicks' Inpatient Basic Mobility Short Form was completed and this patient is demonstrating a 21% degree of impairment in mobility.  Research suppo

## 2018-07-07 NOTE — PROGRESS NOTES
[facility]  Progress Note     Nenita Frias Patient Status:  Inpatient    3/31/1965 MRN [id number]   [facility] 6NE-A Attending Alisha Marley MD   [address] Day [address] 15 PCP Blaise Coles MD     Chief Complaint: Septicemia    S: no comp heparin sodium  1.5 mL Intravenous Once   • CloNIDine HCl  1 patch Transdermal Weekly   • aspirin  81 mg Oral Daily   • tacrolimus  1 mg Oral BID   • ceFAZolin  2 g Intravenous Q24H   • Senna  17.2 mg Oral Nightly   • allopurinol  100 mg Oral Daily   • AmL

## 2018-07-07 NOTE — PLAN OF CARE
Impaired Activities of Daily Living    • Achieve highest/safest level of independence in self care Progressing          Impaired Functional Mobility    • Achieve highest/safest level of mobility/gait Progressing            Received report at 0700.  Patient

## 2018-07-07 NOTE — CM/SW NOTE
sw notified that therapy is now recommending [facility] for pt. Order for home health care received. Home care liaison to assess patient for home healthcare.      Monday SW to follow up on outpt HD schedule as there is not yet a confirmed chair time/schedule/clini

## 2018-07-08 LAB
BASOPHILS # BLD AUTO: 0.03 X10(3) UL (ref 0–0.1)
BASOPHILS NFR BLD AUTO: 0.4 %
BUN BLD-MCNC: 38 MG/DL (ref 8–20)
CALCIUM BLD-MCNC: 8.8 MG/DL (ref 8.3–10.3)
CHLORIDE: 101 MMOL/L (ref 101–111)
CO2: 24 MMOL/L (ref 22–32)
CREAT BLD-MCNC: 3.07 MG/DL (ref 0.7–1.3)
EOSINOPHIL # BLD AUTO: 0.41 X10(3) UL (ref 0–0.3)
EOSINOPHIL NFR BLD AUTO: 5.8 %
ERYTHROCYTE [DISTWIDTH] IN BLOOD BY AUTOMATED COUNT: 16.9 % (ref 11.5–16)
GLUCOSE BLD-MCNC: 109 MG/DL (ref 65–99)
GLUCOSE BLD-MCNC: 111 MG/DL (ref 70–99)
GLUCOSE BLD-MCNC: 117 MG/DL (ref 65–99)
GLUCOSE BLD-MCNC: 121 MG/DL (ref 65–99)
GLUCOSE BLD-MCNC: 127 MG/DL (ref 65–99)
HCT VFR BLD AUTO: 25.7 % (ref 37–53)
HGB BLD-MCNC: 8.3 G/DL (ref 13–17)
IMMATURE GRANULOCYTE COUNT: 0.09 X10(3) UL (ref 0–1)
IMMATURE GRANULOCYTE RATIO %: 1.3 %
LYMPHOCYTES # BLD AUTO: 1.32 X10(3) UL (ref 0.9–4)
LYMPHOCYTES NFR BLD AUTO: 18.8 %
MCH RBC QN AUTO: 26.9 PG (ref 27–33.2)
MCHC RBC AUTO-ENTMCNC: 32.3 G/DL (ref 31–37)
MCV RBC AUTO: 83.2 FL (ref 80–99)
MONOCYTES # BLD AUTO: 1.18 X10(3) UL (ref 0.1–1)
MONOCYTES NFR BLD AUTO: 16.8 %
NEUTROPHIL ABS PRELIM: 3.99 X10 (3) UL (ref 1.3–6.7)
NEUTROPHILS # BLD AUTO: 3.99 X10(3) UL (ref 1.3–6.7)
NEUTROPHILS NFR BLD AUTO: 56.9 %
PLATELET # BLD AUTO: 149 10(3)UL (ref 150–450)
POTASSIUM SERPL-SCNC: 3.4 MMOL/L (ref 3.6–5.1)
RBC # BLD AUTO: 3.09 X10(6)UL (ref 4.3–5.7)
RED CELL DISTRIBUTION WIDTH-SD: 50.8 FL (ref 35.1–46.3)
SODIUM SERPL-SCNC: 136 MMOL/L (ref 136–144)
WBC # BLD AUTO: 7 X10(3) UL (ref 4–13)

## 2018-07-08 PROCEDURE — 99232 SBSQ HOSP IP/OBS MODERATE 35: CPT | Performed by: INTERNAL MEDICINE

## 2018-07-08 PROCEDURE — 99232 SBSQ HOSP IP/OBS MODERATE 35: CPT | Performed by: HOSPITALIST

## 2018-07-08 RX ORDER — HEPARIN SODIUM 1000 [USP'U]/ML
1.5 INJECTION, SOLUTION INTRAVENOUS; SUBCUTANEOUS ONCE
Status: COMPLETED | OUTPATIENT
Start: 2018-07-08 | End: 2018-07-09

## 2018-07-08 NOTE — HOME CARE LIAISON
Referral received from [facility], met with patient to discuss home health care services recommended when discharged home. Patient is agreeable to [facility] for RN PT services. Patient very overwhelmed during visit.   Reassured patient that home h

## 2018-07-08 NOTE — PLAN OF CARE
Patient awake, alert and oriented x4  Presents with flat affect   Telemetry, Sinus Rhythm   C/o pain in Left foot to surgical site and generalized chronic left knee and rheumatoid arthritis pain   PRN Macie with some relief; ice/heat therapy offered, patie without S/S of infection Progressing    • Oral mucous membranes remain intact Progressing

## 2018-07-08 NOTE — PLAN OF CARE
Assumed care at 1900. Alert and oriented x4. Telemetry monitor reading SR. ABX given. Pain managed with Lawn PO. Call light in reach at thew bedside. Will continue to monitor.     CARDIOVASCULAR - ADULT    • Maintains optimal cardiac output and hemodynamic

## 2018-07-08 NOTE — PROGRESS NOTES
[facility]  Nephrology Progress Note    Yunier Miles Patient Status:  Inpatient    3/31/1965 MRN [id number]   [facility] 7NE-A Attending Devi Holland MD   [address] PCP Doreen Rand MD       SUBJECTIVE:  Neck brace off, no Labs   Lab  07/08/18   0531   WBC  7.0   HGB  8.3*   MCV  83.2   PLT  149.0*       Recent Labs   Lab  07/02/18   0718  07/03/18   0529  07/08/18   0531   NA  138  138  136   K  3.4*  3.5*  3.4*   CL  102  102  101   CO2  27.0  27.0  24.0   BUN  43*  29*  3 (ANCEF/KEFZOL) 2 GM/20ML premix IV syringe 2 g 2 g Intravenous Q24H   heparin sodium 1000 UNIT/ML injection 1,500 Units 1.5 mL Intravenous PRN Dialysis   Albumin Human (ALBUMINAR) 25 % solution 100 mL 100 mL Intravenous PRN   acetaminophen (TYLENOL) tab 65

## 2018-07-08 NOTE — PROGRESS NOTES
[facility]  Progress Note     Aurelia Santiago Patient Status:  Inpatient    3/31/1965 MRN [id number]   [facility] 6NE-A Attending Laura Stovall MD   [facility] Day [address] [facility] PCP Lissa Pearce MD     Chief Complaint: Septicemia    S: no comp CloNIDine HCl  1 patch Transdermal Weekly   • aspirin  81 mg Oral Daily   • tacrolimus  1 mg Oral BID   • ceFAZolin  2 g Intravenous Q24H   • Senna  17.2 mg Oral Nightly   • allopurinol  100 mg Oral Daily   • AmLODIPine Besylate  10 mg Oral Daily   • atorv

## 2018-07-09 LAB
GLUCOSE BLD-MCNC: 106 MG/DL (ref 65–99)
GLUCOSE BLD-MCNC: 111 MG/DL (ref 65–99)
GLUCOSE BLD-MCNC: 142 MG/DL (ref 65–99)
GLUCOSE BLD-MCNC: 171 MG/DL (ref 65–99)
VANCOMYCIN RANDOM: 8.2 UG/ML

## 2018-07-09 PROCEDURE — 99232 SBSQ HOSP IP/OBS MODERATE 35: CPT | Performed by: HOSPITALIST

## 2018-07-09 PROCEDURE — 90935 HEMODIALYSIS ONE EVALUATION: CPT | Performed by: INTERNAL MEDICINE

## 2018-07-09 NOTE — CM/SW NOTE
PT now recommending HHPT. Pt will no longer need to go to [facility]. Pt will again need outpt hemodialysis set up. Called [facility] who said they never received the HD information that was faxed to them last week.   Faxed pt's clinical info to [address]

## 2018-07-09 NOTE — RESPIRATORY THERAPY NOTE
RAQUEL - Equipment Use Daily Summary:                  . Set Mode:  CPAP WITH C-FLEX                . Usage in hours: 2:44                . 90% Pressure (EPAP) level: 11                . 90% Insp. Pressure (IPAP): Aracely Spar AHI: 2.6                .  Sup

## 2018-07-09 NOTE — PHYSICAL THERAPY NOTE
Attempted to pt for PT treatment session this AM.  Pt receiving hemodialysis at this time will attempt again as schedule allows.  CM, PT, DPT

## 2018-07-09 NOTE — PROGRESS NOTES
[facility]  Progress Note     Jorge Lai Patient Status:  Inpatient    3/31/1965 MRN [id number]   [facility] 6NE-A Attending Cyntha Leventhal, MD   [address] PCP Gordy Castro MD     Chief Complaint: Septicemia    S: no comp BID   • ceFAZolin  2 g Intravenous Q24H   • Senna  17.2 mg Oral Nightly   • allopurinol  100 mg Oral Daily   • AmLODIPine Besylate  10 mg Oral Daily   • atorvastatin  10 mg Oral Nightly   • cholecalciferol  1,000 Units Oral Daily   • CloNIDine HCl  0.3 mg

## 2018-07-09 NOTE — OCCUPATIONAL THERAPY NOTE
Attempted to pt for OT treatment session this AM.  Pt receiving hemodialysis at this time will attempt again as schedule allows.  Rusty West, JOVANY, OTR/L

## 2018-07-09 NOTE — PROGRESS NOTES
Ritika Menon                INFECTIOUS DISEASE PROGRESS NOTE    Kamlesh Ott Patient Status:  Inpatient    3/31/1965 MRN [id number]   [facility] 7NE-A Attending Ellen Mehta MD   [address] PCP Alex Carrasquillo MD     Antibioti Blood Culture Smear Gram Negative Rods    Comment: Smear and PCR Identification performed by Joss Acosta         Escherichia coli by PCR       Susceptibility      Escherichia coli     Not Specified (Preliminary)    Ampicillin <=2  Sensitive    Cefazolin =4  Se Tissue Aerobic Culture [[id number]] (Abnormal)  Collected: 07/05/18 7976   Order Status: Completed Lab Status: Final result Updated: 07/08/18 0895   Specimen: Tissue from Ankle, left     Tissue Culture Result 1+ growth Staphylococcus aureus, MRSA (A)    Com Immunosuppressed status ([facility] - Long-term current use of immunosuppressive medication     Chronic L knee effusion     Intractable vomiting with nausea     ESRD (end stage renal disease) (HCC)     Intractable vomiting with nausea, unspecified vomiting ty

## 2018-07-09 NOTE — PROGRESS NOTES
[address] [facility] Patient Status:  Inpatient    3/31/1965 MRN [id number]   [facility] 7NE-A Attending Wyatt Simmons MD   [address] PCP Patsy Sosa MD     Roula Matta is a 48year old male patient.     Patient Act knee replacement, sequela         Date Noted: 05/31/2017      RAQUEL on CPAP         Date Noted: 02/13/2017      Gout         Date Noted: 02/13/2017      Functional gait abnormality         Date Noted: 02/13/2017      Risk for falls         Date Noted: 02/13/ general medical condition 8/23/2012   • Morbid obesity with BMI of 40.0-44.9, adult ([facility] 1/21/2014   • Needs flu shot 10/10/2012   • Nephritis and nephropathy, not specified as acute or chronic, with unspecified pathological lesion in kidney    • Osteoart Coronary artery disease involving native coronary artery of native heart without angina pectoris    Epidural hematoma (HCC)    Uremia    Pericardial effusion    Alport syndrome    Uncontrolled hypertension    Acute intractable tension-type headache    MRSA

## 2018-07-09 NOTE — CONSULTS
[facility]  Report of Inpatient Wound Care Consultation     Helen Dumont Patient Status:  Inpatient    3/31/1965 MRN [id number]   [facility] 7NE-A Attending Octavio Penn MD   [facility] Day # 13 PCP Major Laboy MD     REASON FOR CON hypothyroidism     Dx in 1/2015: tx with surgery and MANZO   • Pulmonary HTN ([facility] 1/28/14) 2/4/2014   • RA (rheumatoid arthritis) ([facility]    • Renal disorder    • Spinal stenosis in cervical region 10/17/2011   • Status post cervical spinal fusion 9/20/20 07/09/18 0819 07/09/18   1228   WBC   --    --   7.0   --    --    --    --    HGB   --    --   8.3*   --    --    --    --    HCT   --    --   25.7*   --    --    --    --    PLT   --    --   149.0*   --    --    --    --    K  3.5*   --   3.4*   --

## 2018-07-09 NOTE — PLAN OF CARE
Pt A/O X4. RA. Pt used CPAP for short period of time. NSR on tele. VSS. LT foot surgical dressing in place, CDI. LT foot, LT knee and RT neck pain- Norco given. Pt is sleeping comfortably. Will continue to monitor.      CARDIOVASCULAR - ADULT    • Maintains

## 2018-07-09 NOTE — PROGRESS NOTES
[facility]  Nephrology Progress Note    Tien Frazier Patient Status:  Inpatient    3/31/1965 MRN [id number]   [facility] 7NE-A Attending Caren Cha MD   [facility] Day # 13 PCP Puma Regalado MD       SUBJECTIVE:  Dialysis note, no a extremities  Skin: Warm and dry, no rash        Labs:     Recent Labs   Lab  07/08/18   0531   WBC  7.0   HGB  8.3*   MCV  83.2   PLT  149.0*       Recent Labs   Lab  07/03/18   0529  07/08/18   0531   NA  138  136   K  3.5*  3.4*   CL  102  101   CO2  27. (CATAPRES) 0.3 MG/24HR 1 patch 1 patch Transdermal Weekly   aspirin chewable tab 81 mg 81 mg Oral Daily   tacrolimus (PROGRAF) cap 1 mg 1 mg Oral BID   ceFAZolin sodium (ANCEF/KEFZOL) 2 GM/20ML premix IV syringe 2 g 2 g Intravenous Q24H   heparin sodium 10 discussed with patient and/or family by bedside.       Heather Curran MD  7/9/2018  9:57 AM

## 2018-07-10 VITALS
OXYGEN SATURATION: 97 % | HEART RATE: 85 BPM | TEMPERATURE: 98 F | RESPIRATION RATE: 18 BRPM | BODY MASS INDEX: 36 KG/M2 | SYSTOLIC BLOOD PRESSURE: 135 MMHG | DIASTOLIC BLOOD PRESSURE: 84 MMHG | WEIGHT: 290 LBS

## 2018-07-10 LAB
GLUCOSE BLD-MCNC: 116 MG/DL (ref 65–99)
GLUCOSE BLD-MCNC: 131 MG/DL (ref 65–99)

## 2018-07-10 PROCEDURE — 99239 HOSP IP/OBS DSCHRG MGMT >30: CPT | Performed by: HOSPITALIST

## 2018-07-10 PROCEDURE — 99232 SBSQ HOSP IP/OBS MODERATE 35: CPT | Performed by: INTERNAL MEDICINE

## 2018-07-10 RX ORDER — NIFEDIPINE 60 MG/1
120 TABLET, FILM COATED, EXTENDED RELEASE ORAL DAILY
Qty: 60 TABLET | Refills: 0 | Status: SHIPPED | OUTPATIENT
Start: 2018-07-11 | End: 2018-08-01

## 2018-07-10 NOTE — WOUND PROGRESS NOTE
[facility]  Report of Inpatient Wound Care Progress Note    Roula Matta Patient Status:  Inpatient    3/31/1965 MRN [id number]   [facility] 7NE-A Attending Wyatt Simmons MD   [facility] Day # 12 PCP Patsy Sosa MD       SUBJECTIVE:  Rizwan Giordano hemorrhoids without mention of complication 9/99/1689   • Unspecified sleep apnea     CPAP     Family History   Problem Relation Age of Onset   • bipolar disorder [OTHER] Mother    • glomerulonephritis [OTHER] Mother    • glomerulonephritis Fabienne Tafoya < >  171*  116*  131*    < > = values in this interval not displayed.        Follow up Imaging and Microbiology results:   See EMR    ASSESSMENT/ RECOMMENDATIONS:  Patient seen bedside, Observation: wound stable, hematoma at the base, no odor, no s/s of

## 2018-07-10 NOTE — OCCUPATIONAL THERAPY NOTE
OCCUPATIONAL THERAPY QUICK EVALUATION - INPATIENT    Room Number: 8091/5662-V  Evaluation Date: 7/10/2018     Type of Evaluation: Initial  Presenting Problem: Subdural hematoma    Physician Order: IP Consult to Occupational Therapy  Reason for Therapy:  AD nephropathy, not specified as acute or chronic, with unspecified pathological lesion in kidney    • Osteoarthritis, knee 12/23/2013   • Painful total knee replacement, sequela 5/31/2017   • Papillary thyroid carcinoma ([facility]     Dx in 1/2015: tx with surger Spouse    Toilet and Equipment: Standard height toilet  Shower/Tub and Equipment: Tub-shower combo  Other Equipment: Other (Comment) (cane)    Occupation/Status: retired  Hand Dominance: Right  Drives: Yes  Patient Regularly Uses: Reading glasses; Hearing a TRANSFER ASSESSMENT  Supine to Sit : Supervision  Sit to Stand: Supervision    Skilled Therapy Provided: Pt was received up in chair for session, therapist completed MMT and ROM on pt, pt was able to amb x1 in room and hallway and bathroom with supervision safety with ADLS: safely and independently

## 2018-07-10 NOTE — RESPIRATORY THERAPY NOTE
RAQUEL - Equipment Use Daily Summary:                  . Set Mode:                . Usage in hours:                . 90% Pressure (EPAP) level:                . 90% Insp. Pressure (IPAP): [place] AHI:                .  Supplemental Oxygen:    LPM

## 2018-07-10 NOTE — PLAN OF CARE
NURSING DISCHARGE NOTE    Discharged Home via Wheelchair. Accompanied by Support staff  Belongings Taken by patient/family. Discharge instructions given. Verbalized understanding. All questions answered to satisfaction.  IV removed without s/s of comp

## 2018-07-10 NOTE — PROGRESS NOTES
[facility]  Progress Note     Govind Ramirez Patient Status:  Inpatient    3/31/1965 MRN [id number]   [facility] 6NE-A Attending Anne-Marie Elliott MD   [address] 12 PCP Clemmie Runner, MD     Chief Complaint: Septicemia    S: no comp Daily   • atorvastatin  10 mg Oral Nightly   • cholecalciferol  1,000 Units Oral Daily   • CloNIDine HCl  0.3 mg Oral TID   • famoTIDine  20 mg Oral Daily   • ferrous sulfate  325 mg Oral Daily   • Fluticasone Propionate  2 spray Each Nare Daily   • Justyn

## 2018-07-10 NOTE — DISCHARGE SUMMARY
[facility] [place] HOSPITALIST  DISCHARGE SUMMARY     Govind Ramirez Patient Status:  Inpatient    3/31/1965 MRN [id number]   [facility] 7NE-A Attending Rui Moore MD   [address] 12 PCP Clemmie Runner, MD     Date of Admission: 2018  Date of D spine collar and seen by neurosurgery as well as orthospine. No surgical intervention was recommended. He remained in the intensive care on a Cardene drip for blood pressure control due to hypertensive crisis.  He has multiple allergies to antihypertensiv dialysis session).    Stop taking on:  8/3/2018  Quantity:  25 Bag  Refills:  0        CHANGE how you take these medications      Instructions Prescription details   OxyCODONE HCl IR 10 MG Tabs  Commonly known as:  Manisha Anaya  What changed:  · how much to t Susp  Commonly known as:  FLONASE      2 sprays by Each Nare route daily. Quantity:  1 Bottle  Refills:  3     furosemide 40 MG Tabs  Commonly known as:  LASIX  Notes to patient:  Not given while in the hospital. [id number] [address]ema to resume.       Take 40 mg by mouth 2 JENN VASQUEZ, Rivka Miguel Ángel [facility] [phone]    Phone:  [phone]   · NIFEdipine ER 60 MG Tb24     Please  your prescriptions at the location directed by your doctor or nurse    Bring a paper prescription for each of these medications  · sodium chloride 0

## 2018-07-10 NOTE — PLAN OF CARE
Pt A/O X4. Seizure precautions maintained. RA. Refused CPAP. NSR on tele. VSS. LT foot incision- oozing at site, dressing changed. LT foot, neck pain- Rodessa and Morphine IVP given. Pt is sleeping comfortably. Will continue to monitor.      CARDIOVASCULAR -

## 2018-07-10 NOTE — PLAN OF CARE
Pain controlled with Norco. BP elevated in am while getting HD and awaiting medications. Gave IV labetelol at one point which helped BP during HD. He was symptomatic at the time and had a small emesis of 100ML. Patient was up in chair for dinner.  Surgical

## 2018-07-10 NOTE — CM/SW NOTE
Carissa from [facility] called back to say that they have a final HD schedule for pt - pt will go to [facility] on TTS at 10:15am with a start of care on Thurs 7/12. Pt will be d/c'd home today with [facility].       Final confirmation letter with HD schedule

## 2018-07-10 NOTE — PROGRESS NOTES
[facility]  Nephrology Progress Note    Everett Felix Patient Status:  Inpatient    3/31/1965 MRN [id number]   [facility] 7NE-A Attending Pasha Duque MD   [facility] Day # 12 PCP Radha Castellanos MD       SUBJECTIVE:  Bleeding noted L an extremities  Skin: Warm and dry, no rash        Labs:     Recent Labs   Lab  07/08/18   0531   WBC  7.0   HGB  8.3*   MCV  83.2   PLT  149.0*       Recent Labs   Lab  07/08/18 0531   NA  136   K  3.4*   CL  101   CO2  24.0   BUN  38*   CREATSERUM  3.07* (ALBUMINAR) 25 % solution 100 mL 100 mL Intravenous PRN   acetaminophen (TYLENOL) tab 650 mg 650 mg Oral Q4H PRN   Or      acetaminophen (TYLENOL) 650 MG rectal suppository 650 mg 650 mg Rectal Q4H PRN   Senna (SENOKOT) tab TABS 17.2 mg 17.2 mg Oral Nightl

## 2018-07-10 NOTE — DIETARY NOTE
Nutrition Short Note    Dietitian consult received for renal/HD diet education. Appropriate education and handout provided.  All questions answered, pt receptive, expect may need reinforcement, encouraged pt to follow up with outpt RD, contact information

## 2018-07-10 NOTE — CM/SW NOTE
Pt to get outpt IV ABX with his hemodialysis. Faxed IV ABX order and script to [facility] clinic. They will administer the drug with dialysis starting 7/12.

## 2018-07-11 ENCOUNTER — PATIENT OUTREACH (OUTPATIENT)
Dept: CASE MANAGEMENT | Age: 53
End: 2018-07-11

## 2018-07-11 ENCOUNTER — TELEPHONE (OUTPATIENT)
Dept: INTERNAL MEDICINE CLINIC | Facility: CLINIC | Age: 53
End: 2018-07-11

## 2018-07-11 DIAGNOSIS — A41.9 SEPTICEMIA (HCC): ICD-10-CM

## 2018-07-11 DIAGNOSIS — Z99.2 ESRD ON HEMODIALYSIS (HCC): ICD-10-CM

## 2018-07-11 DIAGNOSIS — T86.12 RENAL TRANSPLANT FAILURE AND REJECTION: ICD-10-CM

## 2018-07-11 DIAGNOSIS — N18.6 ESRD ON HEMODIALYSIS (HCC): ICD-10-CM

## 2018-07-11 DIAGNOSIS — Q87.81 ALPORT SYNDROME: ICD-10-CM

## 2018-07-11 DIAGNOSIS — I10 UNCONTROLLED HYPERTENSION: ICD-10-CM

## 2018-07-11 DIAGNOSIS — G44.201 ACUTE INTRACTABLE TENSION-TYPE HEADACHE: ICD-10-CM

## 2018-07-11 DIAGNOSIS — N19 UREMIA: ICD-10-CM

## 2018-07-11 DIAGNOSIS — D63.1 ANEMIA IN ESRD (END-STAGE RENAL DISEASE) (HCC): ICD-10-CM

## 2018-07-11 DIAGNOSIS — T86.11 RENAL TRANSPLANT FAILURE AND REJECTION: ICD-10-CM

## 2018-07-11 DIAGNOSIS — N18.6 ANEMIA IN ESRD (END-STAGE RENAL DISEASE) (HCC): ICD-10-CM

## 2018-07-11 DIAGNOSIS — Z22.322 MRSA (METHICILLIN RESISTANT STAPH AUREUS) CULTURE POSITIVE: ICD-10-CM

## 2018-07-11 RX ORDER — CLONIDINE HYDROCHLORIDE 0.3 MG/1
0.3 TABLET ORAL 3 TIMES DAILY
Qty: 270 TABLET | Refills: 1 | Status: SHIPPED | OUTPATIENT
Start: 2018-07-11 | End: 2019-02-05

## 2018-07-11 NOTE — PROGRESS NOTES
Initial Post Discharge Follow Up   Discharge Date: 7/10/18  Contact Date: 7/11/2018    Consent Verification:  Assessment Completed With: Patient  HIPAA Verified?   Yes    Discharge Dx:     Acute subdural hematoma  Hypertensive crisis  CAD with history of questions about your diagnoses? No  • Are you able to perform normal daily activities of living as you have prior to your hospital stay? yes  • Were you given a specific diet to follow at discharge?   yes  o Which diet?   Renal diet  - Do you need more info Oral Tab TAKE 1 TABLET(100 MG) BY MOUTH DAILY Disp: 90 tablet Rfl: 1   tacrolimus 1 MG Oral Cap TAKE 2 CAPSULES(2 MG) BY MOUTH TWICE DAILY Disp: 360 capsule Rfl: 1   Fluticasone Propionate 50 MCG/ACT Nasal Suspension 2 sprays by Each Nare route daily.  Disp continue it per his pain doctor. Referrals/orders at D/C:  Home Health ordered at D/C? Yes   Has HH been set up? Yes   If Yes: With Whom: [facility]   When: 7/11/18    DME ordered at D/C? No    Services ordered at D/C?   No          Needs post D/C: Associates  [address] [facility] [address] [facility] at [address] [facility]  [address]  [facility] [id number] [facility] at [address] [facility] states he will also be following up with the wound clinic. Interventions by NCM:  NCM reviewed medications, discharge instructions, S&S of infection/blood clots, when to call the doctor and when to call 911.  Patient verbalized understanding of the

## 2018-07-11 NOTE — TELEPHONE ENCOUNTER
Nancie Hermosillo from [facility] called to notify that patient has been admitted to home care.  Wanted to verify that MD will sign orders    Ph# [phone]

## 2018-07-12 NOTE — TELEPHONE ENCOUNTER
Marek Castro MD  Diplomate, American Board of Internal Medicine  [facility]  [address] [facility], [address], [address]  T: [phone]; F: Fermin [address]

## 2018-07-13 ENCOUNTER — OFFICE VISIT (OUTPATIENT)
Dept: INTERNAL MEDICINE CLINIC | Facility: CLINIC | Age: 53
End: 2018-07-13

## 2018-07-13 VITALS
RESPIRATION RATE: 16 BRPM | HEIGHT: 75 IN | HEART RATE: 80 BPM | TEMPERATURE: 99 F | WEIGHT: 271 LBS | BODY MASS INDEX: 33.69 KG/M2 | DIASTOLIC BLOOD PRESSURE: 82 MMHG | SYSTOLIC BLOOD PRESSURE: 118 MMHG | OXYGEN SATURATION: 99 %

## 2018-07-13 DIAGNOSIS — T86.11 RENAL TRANSPLANT FAILURE AND REJECTION: ICD-10-CM

## 2018-07-13 DIAGNOSIS — D84.9 IMMUNOSUPPRESSED STATUS (HCC): ICD-10-CM

## 2018-07-13 DIAGNOSIS — S06.5X9A SUBDURAL HEMATOMA (HCC): Primary | ICD-10-CM

## 2018-07-13 DIAGNOSIS — S06.4X9A EPIDURAL HEMATOMA (HCC): ICD-10-CM

## 2018-07-13 DIAGNOSIS — I10 BENIGN ESSENTIAL HTN: ICD-10-CM

## 2018-07-13 DIAGNOSIS — Z99.2 ESRD ON HEMODIALYSIS (HCC): ICD-10-CM

## 2018-07-13 DIAGNOSIS — N18.6 ESRD ON HEMODIALYSIS (HCC): ICD-10-CM

## 2018-07-13 DIAGNOSIS — Z22.322 MRSA (METHICILLIN RESISTANT STAPH AUREUS) CULTURE POSITIVE: ICD-10-CM

## 2018-07-13 DIAGNOSIS — T86.12 RENAL TRANSPLANT FAILURE AND REJECTION: ICD-10-CM

## 2018-07-13 PROBLEM — R11.2 INTRACTABLE VOMITING WITH NAUSEA, UNSPECIFIED VOMITING TYPE: Status: RESOLVED | Noted: 2018-06-14 | Resolved: 2018-07-13

## 2018-07-13 PROBLEM — E86.0 DEHYDRATION: Status: RESOLVED | Noted: 2018-06-14 | Resolved: 2018-07-13

## 2018-07-13 PROBLEM — R11.2 INTRACTABLE VOMITING WITH NAUSEA: Status: RESOLVED | Noted: 2018-06-14 | Resolved: 2018-07-13

## 2018-07-13 PROCEDURE — 99496 TRANSJ CARE MGMT HIGH F2F 7D: CPT | Performed by: INTERNAL MEDICINE

## 2018-07-13 RX ORDER — NIFEDIPINE 60 MG/1
TABLET, EXTENDED RELEASE ORAL
Refills: 0 | COMMUNITY
Start: 2018-07-10 | End: 2018-07-13

## 2018-07-13 NOTE — PROGRESS NOTES
HPI:    Dariana Shukla is a 48year old male here today for hospital follow up.    He was discharged from Inpatient hospital, [facility] to Home   Admit Date: 6/24/18  Discharge Date: 7/10/18  Hospital Discharge Diagnosis:    Subdural hematoma and ep issues. 4.  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  No new issues. Will be seeing transplant team at [facility] soon.     Allergies:  He is a ONDANSETRON 4 MG Oral Tablet Dispersible DISSOLVE 1 TABLET(4 MG) ON THE TONGUE DAILY AS NEEDED FOR NAUSEA   predniSONE 5 MG Oral Tab Take 7.5 mg by mouth daily.      Elastic Bandages & Supports (KNEE BRACE/HINGED BARS 3XL) Does not apply Misc Use as direc Spinal stenosis in cervical region (10/17/2011); Status post cervical spinal fusion (9/20/2012); Trigger finger (acquired); Unspecified essential hypertension;  Unspecified hemorrhoids without mention of complication (0/28/7177); and Unspecified sleep apnea conjunctiva are clear  LUNGS: clear to auscultation  CARDIO: RRR without murmur  GI: good BS's, no masses, HSM or tenderness  EXTREMITIES: no cyanosis, clubbing or edema  NEURO: Oriented times three, cranial nerves are intact, motor and sensory are grossly Possible Diagnoses and/or Management Options: severe  · Amount and/or Complexity of Data to Be Reviewed: severe  · Risk of Significant Complications, Morbidity, and/or Mortality: severe    Overall Risk:   severe    Patient seen within 7 days from date of d

## 2018-07-16 ENCOUNTER — OFFICE VISIT (OUTPATIENT)
Dept: NEUROLOGY | Facility: CLINIC | Age: 53
End: 2018-07-16

## 2018-07-16 VITALS
BODY MASS INDEX: 33.57 KG/M2 | WEIGHT: 270 LBS | HEART RATE: 78 BPM | DIASTOLIC BLOOD PRESSURE: 86 MMHG | SYSTOLIC BLOOD PRESSURE: 134 MMHG | HEIGHT: 75 IN | RESPIRATION RATE: 16 BRPM

## 2018-07-16 DIAGNOSIS — S06.5X9A SUBDURAL HEMATOMA (HCC): Primary | ICD-10-CM

## 2018-07-16 PROCEDURE — 99214 OFFICE O/P EST MOD 30 MIN: CPT | Performed by: OTHER

## 2018-07-16 RX ORDER — BUPRENORPHINE HYDROCHLORIDE AND NALOXONE HYDROCHLORIDE 1.4; .36 MG/1; MG/1
TABLET, ORALLY DISINTEGRATING SUBLINGUAL
COMMUNITY
Start: 2018-07-11 | End: 2018-09-12 | Stop reason: ALTCHOICE

## 2018-07-16 NOTE — PROGRESS NOTES
Neurology H&P    Chelsea Mathis Patient Status:  No patient class for patient encounter    3/31/1965 MRN [id number]   Location [facility], [address], [address] Attending No att. providers found   [facility] Day # 0 PCP Long Salazar MD     Sub 90 tablet Rfl: 1   Levothyroxine Sodium 200 MCG Oral Tab Take 1 tablet (200 mcg total) by mouth daily.  Disp: 90 tablet Rfl: 1   OxyCODONE HCl IR 10 MG Oral Tab 1 tab po q6 hr prn sever breakthrough right knee pain (Patient taking differently: 5 mg every 8 History of fusion of cervical spine     Painful total knee replacement, sequela     Chronic neck pain     Cervical spine arthritis (HCC)     Bulging of cervical intervertebral disc     Neuroforaminal stenosis of cervical spine     Morbid obesity with BMI o 5/31/2017   • Papillary thyroid carcinoma ([facility]     Dx in 1/2015: tx with surgery and MANZO   • PONV (postoperative nausea and vomiting) 1997    s/p kidney transplant   • Postlaminectomy syndrome, cervical region 6/20/2013    Log Date: 12/12/2012    • Postsu glomerulonephritis [OTHER] Mother    • glomerulonephritis [OTHER] Maternal Grandmother    • Cancer Paternal Uncle      prostate CA   • Heart Disorder Paternal Uncle      CAD/Aortic disease       ROS:  Gen: no unexplained weight loss  Vision: no vision rodriguez cane but able to walk unassisted    Coordination:       FTN: intact    Labs:       Imaging:  [facility] 6/24/18  =====  CONCLUSION:    1.  Increased extra-axial blood along the right tentorium and falx with increased midline shift to the left and development of ri per patient. Plan:  1.  SDH and ED hematoma  - Recent CTH reviewed with patient   - Needs to follow up with NSGY as discussed as inpatent  - No headache medications at this time  - Ok to continue to take ASA as was restarted as inpatient    Approximat

## 2018-07-16 NOTE — PATIENT INSTRUCTIONS
Refill policies:    • Allow 2-3 business days for refills; controlled substances may take longer.   • Contact your pharmacy at least 5 days prior to running out of medication and have them send an electronic request or submit request through the “request re entire amount billed. Precertification and Prior Authorizations: If your physician has recommended that you have a procedure or additional testing performed.   [facility]) will contact your insurance carrier to obtain pre-certi

## 2018-07-17 ENCOUNTER — APPOINTMENT (OUTPATIENT)
Dept: WOUND CARE | Facility: HOSPITAL | Age: 53
End: 2018-07-17
Attending: NURSE PRACTITIONER
Payer: COMMERCIAL

## 2018-07-18 ENCOUNTER — HOSPITAL ENCOUNTER (OUTPATIENT)
Dept: CT IMAGING | Age: 53
End: 2018-07-18
Attending: NURSE PRACTITIONER
Payer: MEDICARE

## 2018-07-18 ENCOUNTER — HOSPITAL ENCOUNTER (OUTPATIENT)
Dept: CT IMAGING | Age: 53
Discharge: HOME | End: 2018-07-18
Attending: NURSE PRACTITIONER
Payer: MEDICARE

## 2018-07-18 DIAGNOSIS — S06.5X9A SUBDURAL HEMATOMA (HCC): ICD-10-CM

## 2018-07-18 PROCEDURE — 70450 CT HEAD/BRAIN W/O DYE: CPT | Performed by: NURSE PRACTITIONER

## 2018-07-20 ENCOUNTER — OFFICE VISIT (OUTPATIENT)
Dept: WOUND CARE | Facility: HOSPITAL | Age: 53
End: 2018-07-20
Attending: INTERNAL MEDICINE
Payer: MEDICARE

## 2018-07-20 DIAGNOSIS — N18.6 END STAGE RENAL DISEASE (HCC): ICD-10-CM

## 2018-07-20 DIAGNOSIS — S81.802D UNSPECIFIED OPEN WOUND, LEFT LOWER LEG, SUBSEQUENT ENCOUNTER: Primary | ICD-10-CM

## 2018-07-20 PROCEDURE — 97607 NEG PRS WND THR NDME<=50SQCM: CPT

## 2018-07-20 PROCEDURE — 11042 DBRDMT SUBQ TIS 1ST 20SQCM/<: CPT

## 2018-07-20 PROCEDURE — 99213 OFFICE O/P EST LOW 20 MIN: CPT

## 2018-07-20 NOTE — PROGRESS NOTES
Chief Complaint  This information was obtained from the patient  Patient is here for a wound to he LLE that he has had for about 3 months.  Patient states he had a sore that wouldn't heal. Patients wife states he saw he primary and they referred him to th spine (5/31/2017); Hyperlipidemia; Kidney replaced by transplant (1997); Laxity of knee joint, left (5/31/2017); Long-term current use of opiate analgesic (8/23/2012); Mood disorder due to a general medical condition (8/23/2012);  Morbid obesity with BMI of Disease - No History, Mental Illness - Mother, Stroke - No History, Thyroid Problems - No History, Tuberculosis - No History, Seizures - No History, Autoimmune Disease - No History, Other - No History, None - No History    Social History  This information tablet oral tablet oral  atorvastatin 10 mg tablet oral tablet oral  clonidine HCl 0.3 mg tablet oral tablet oral  amlodipine 10 mg tablet oral tablet oral  nifedipine ER 60 mg tablet,extended release oral tablet extended release oral  prednisone 5 mg tabl Normal.    Lower Extremity Assessment  Edema Assessment:  Left Extremity: Edema is present  Compression Device In Use: Yes  Device Used Correctly: No  Compression Device Used: Coban  Calf Measurement 40 cm from heel with left measurement of 40.4 cm  Ankle curette. Pain control was achieved using 4% Lido. A time out was not conducted prior to the start of the procedure. A minimal amount of bleeding was controlled with pressure.  The procedure was tolerated well with a pain level of 5 throughout and a pain lev

## 2018-07-21 ENCOUNTER — HOSPITAL ENCOUNTER (OUTPATIENT)
Dept: ULTRASOUND IMAGING | Age: 53
Discharge: HOME | End: 2018-07-21
Attending: SURGERY
Payer: MEDICARE

## 2018-07-21 ENCOUNTER — HOSPITAL ENCOUNTER (OUTPATIENT)
Dept: ULTRASOUND IMAGING | Age: 53
End: 2018-07-21
Attending: SURGERY
Payer: MEDICARE

## 2018-07-21 DIAGNOSIS — Z99.2 ACUTE RENAL FAILURE WITH OTHER SPECIFIED PATHOLOGICAL KIDNEY LESION SUPERIMPOSED ON CHRONIC KIDNEY DISEASE, ON CHRONIC DIALYSIS (HCC): ICD-10-CM

## 2018-07-21 DIAGNOSIS — M79.89 LEFT ARM SWELLING: ICD-10-CM

## 2018-07-21 DIAGNOSIS — N17.8 ACUTE RENAL FAILURE WITH OTHER SPECIFIED PATHOLOGICAL KIDNEY LESION SUPERIMPOSED ON CHRONIC KIDNEY DISEASE, ON CHRONIC DIALYSIS (HCC): ICD-10-CM

## 2018-07-21 DIAGNOSIS — N18.9 ACUTE RENAL FAILURE WITH OTHER SPECIFIED PATHOLOGICAL KIDNEY LESION SUPERIMPOSED ON CHRONIC KIDNEY DISEASE, ON CHRONIC DIALYSIS (HCC): ICD-10-CM

## 2018-07-21 PROCEDURE — 93970 EXTREMITY STUDY: CPT | Performed by: SURGERY

## 2018-07-21 PROCEDURE — 93930 UPPER EXTREMITY STUDY: CPT | Performed by: SURGERY

## 2018-07-21 NOTE — IMAGING NOTE
Clarified with Tram Gonsales in Dr Darrell Varghese office to confirm vein mapping and arterial duplex BLEs.

## 2018-07-27 ENCOUNTER — OFFICE VISIT (OUTPATIENT)
Dept: WOUND CARE | Facility: HOSPITAL | Age: 53
End: 2018-07-27
Attending: INTERNAL MEDICINE
Payer: MEDICARE

## 2018-07-27 DIAGNOSIS — S81.802D UNSPECIFIED OPEN WOUND, LEFT LOWER LEG, SUBSEQUENT ENCOUNTER: Primary | ICD-10-CM

## 2018-07-27 PROCEDURE — 97607 NEG PRS WND THR NDME<=50SQCM: CPT

## 2018-07-27 PROCEDURE — 97597 DBRDMT OPN WND 1ST 20 CM/<: CPT

## 2018-07-27 NOTE — PROGRESS NOTES
Chief Complaint  This information was obtained from the patient  Patient is here for a wound care follow up. He continues to have some intermittent pain that he rates at 2/10.      Allergies  carvedilol (Severity: Severe, Reaction: Hives, itching), ciprof use of opiate analgesic (8/23/2012); Mood disorder due to a general medical condition (8/23/2012); Morbid obesity with BMI of 40.0-44.9, adult ([facility] [address] (1/21/2014); Needs flu shot (10/10/2012);  Nephritis and nephropathy, not specified as acute or chronic, wit (General Health)  Eyes  Ear/Nose/Mouth/Throat  Respiratory  Cardiovascular (Central/Peripheral)  Gastrointestinal (GI)    General Notes:  negative except HPI    Additional Information  Medication reconciliation completed at today's visit. : Yes        Obje - End stage renal disease  (Encounter Diagnosis) I10 - Essential (primary) hypertension  (Encounter Diagnosis) E78.5 - Hyperlipidemia, unspecified        Procedures    Wound #1  Wound #1 (Venous Ulcer) is located on the left, lateral lower leg.  A selective

## 2018-07-30 ENCOUNTER — ANESTHESIA (OUTPATIENT)
Dept: CARDIAC SURGERY | Facility: HOSPITAL | Age: 53
End: 2018-07-30
Payer: MEDICARE

## 2018-07-30 ENCOUNTER — OFFICE VISIT (OUTPATIENT)
Dept: NEPHROLOGY | Facility: CLINIC | Age: 53
End: 2018-07-30

## 2018-07-30 ENCOUNTER — HOSPITAL ENCOUNTER (OUTPATIENT)
Facility: HOSPITAL | Age: 53
LOS: 1 days | Discharge: HOME | End: 2018-07-30
Attending: SURGERY | Admitting: SURGERY
Payer: MEDICARE

## 2018-07-30 ENCOUNTER — ANESTHESIA EVENT (OUTPATIENT)
Dept: CARDIAC SURGERY | Facility: HOSPITAL | Age: 53
End: 2018-07-30
Payer: MEDICARE

## 2018-07-30 ENCOUNTER — PRIOR ORIGINAL RECORDS (OUTPATIENT)
Dept: OTHER | Age: 53
End: 2018-07-30

## 2018-07-30 VITALS — WEIGHT: 276 LBS | DIASTOLIC BLOOD PRESSURE: 76 MMHG | BODY MASS INDEX: 35 KG/M2 | SYSTOLIC BLOOD PRESSURE: 148 MMHG

## 2018-07-30 VITALS
RESPIRATION RATE: 16 BRPM | HEART RATE: 78 BPM | WEIGHT: 277.25 LBS | TEMPERATURE: 98 F | DIASTOLIC BLOOD PRESSURE: 94 MMHG | BODY MASS INDEX: 35.58 KG/M2 | SYSTOLIC BLOOD PRESSURE: 150 MMHG | HEIGHT: 74 IN | OXYGEN SATURATION: 100 %

## 2018-07-30 DIAGNOSIS — N18.6 ESRD ON HEMODIALYSIS (HCC): Primary | ICD-10-CM

## 2018-07-30 DIAGNOSIS — Z99.2 ESRD ON HEMODIALYSIS (HCC): Primary | ICD-10-CM

## 2018-07-30 DIAGNOSIS — I10 ESSENTIAL HYPERTENSION: ICD-10-CM

## 2018-07-30 LAB
ANION GAP SERPL CALC-SCNC: 10 MMOL/L (ref 0–18)
BUN BLD-MCNC: 35 MG/DL (ref 8–20)
BUN/CREAT SERPL: 9.5 (ref 10–20)
CALCIUM BLD-MCNC: 9.4 MG/DL (ref 8.3–10.3)
CHLORIDE SERPL-SCNC: 94 MMOL/L (ref 101–111)
CO2 SERPL-SCNC: 30 MMOL/L (ref 22–32)
CREAT BLD-MCNC: 3.68 MG/DL (ref 0.7–1.3)
GLUCOSE BLD-MCNC: 109 MG/DL (ref 70–99)
OSMOLALITY SERPL CALC.SUM OF ELEC: 287 MOSM/KG (ref 275–295)
POTASSIUM SERPL-SCNC: 3 MMOL/L (ref 3.6–5.1)
SODIUM SERPL-SCNC: 134 MMOL/L (ref 136–144)

## 2018-07-30 PROCEDURE — 03180AF BYPASS LEFT BRACHIAL ARTERY TO LOWER ARM VEIN WITH AUTOLOGOUS ARTERIAL TISSUE, OPEN APPROACH: ICD-10-PCS | Performed by: SURGERY

## 2018-07-30 PROCEDURE — 80048 BASIC METABOLIC PNL TOTAL CA: CPT | Performed by: SURGERY

## 2018-07-30 RX ORDER — HYDROCODONE BITARTRATE AND ACETAMINOPHEN 5; 325 MG/1; MG/1
TABLET ORAL
Status: DISCONTINUED
Start: 2018-07-30 | End: 2018-07-30

## 2018-07-30 RX ORDER — ONDANSETRON 2 MG/ML
4 INJECTION INTRAMUSCULAR; INTRAVENOUS AS NEEDED
Status: DISCONTINUED | OUTPATIENT
Start: 2018-07-30 | End: 2018-07-30

## 2018-07-30 RX ORDER — METOCLOPRAMIDE HYDROCHLORIDE 5 MG/ML
10 INJECTION INTRAMUSCULAR; INTRAVENOUS AS NEEDED
Status: DISCONTINUED | OUTPATIENT
Start: 2018-07-30 | End: 2018-07-30

## 2018-07-30 RX ORDER — SODIUM CHLORIDE, SODIUM LACTATE, POTASSIUM CHLORIDE, CALCIUM CHLORIDE 600; 310; 30; 20 MG/100ML; MG/100ML; MG/100ML; MG/100ML
INJECTION, SOLUTION INTRAVENOUS CONTINUOUS
Status: DISCONTINUED | OUTPATIENT
Start: 2018-07-30 | End: 2018-07-30

## 2018-07-30 RX ORDER — LABETALOL HYDROCHLORIDE 5 MG/ML
INJECTION, SOLUTION INTRAVENOUS
Status: COMPLETED
Start: 2018-07-30 | End: 2018-07-30

## 2018-07-30 RX ORDER — MIDAZOLAM HYDROCHLORIDE 1 MG/ML
1 INJECTION INTRAMUSCULAR; INTRAVENOUS EVERY 5 MIN PRN
Status: DISCONTINUED | OUTPATIENT
Start: 2018-07-30 | End: 2018-07-30

## 2018-07-30 RX ORDER — MORPHINE SULFATE 2 MG/ML
2 INJECTION, SOLUTION INTRAMUSCULAR; INTRAVENOUS EVERY 5 MIN PRN
Status: DISCONTINUED | OUTPATIENT
Start: 2018-07-30 | End: 2018-07-30

## 2018-07-30 RX ORDER — METOCLOPRAMIDE HYDROCHLORIDE 5 MG/ML
5 INJECTION INTRAMUSCULAR; INTRAVENOUS AS NEEDED
Status: DISCONTINUED | OUTPATIENT
Start: 2018-07-30 | End: 2018-07-30

## 2018-07-30 RX ORDER — HYDROCODONE BITARTRATE AND ACETAMINOPHEN 5; 325 MG/1; MG/1
1 TABLET ORAL AS NEEDED
Status: COMPLETED | OUTPATIENT
Start: 2018-07-30 | End: 2018-07-30

## 2018-07-30 RX ORDER — MEPERIDINE HYDROCHLORIDE 25 MG/ML
12.5 INJECTION INTRAMUSCULAR; INTRAVENOUS; SUBCUTANEOUS AS NEEDED
Status: DISCONTINUED | OUTPATIENT
Start: 2018-07-30 | End: 2018-07-30

## 2018-07-30 RX ORDER — NALOXONE HYDROCHLORIDE 0.4 MG/ML
80 INJECTION, SOLUTION INTRAMUSCULAR; INTRAVENOUS; SUBCUTANEOUS AS NEEDED
Status: DISCONTINUED | OUTPATIENT
Start: 2018-07-30 | End: 2018-07-30

## 2018-07-30 RX ORDER — ACETAMINOPHEN 500 MG
1000 TABLET ORAL ONCE AS NEEDED
Status: DISCONTINUED | OUTPATIENT
Start: 2018-07-30 | End: 2018-07-30

## 2018-07-30 RX ORDER — DEXAMETHASONE SODIUM PHOSPHATE 4 MG/ML
4 VIAL (ML) INJECTION AS NEEDED
Status: DISCONTINUED | OUTPATIENT
Start: 2018-07-30 | End: 2018-07-30

## 2018-07-30 RX ORDER — BUPIVACAINE HYDROCHLORIDE 5 MG/ML
INJECTION, SOLUTION EPIDURAL; INTRACAUDAL AS NEEDED
Status: DISCONTINUED | OUTPATIENT
Start: 2018-07-30 | End: 2018-07-30 | Stop reason: HOSPADM

## 2018-07-30 RX ORDER — SODIUM CHLORIDE 9 MG/ML
INJECTION, SOLUTION INTRAVENOUS CONTINUOUS
Status: DISCONTINUED | OUTPATIENT
Start: 2018-07-30 | End: 2018-07-30

## 2018-07-30 RX ORDER — LABETALOL HYDROCHLORIDE 5 MG/ML
5 INJECTION, SOLUTION INTRAVENOUS EVERY 5 MIN PRN
Status: DISCONTINUED | OUTPATIENT
Start: 2018-07-30 | End: 2018-07-30

## 2018-07-30 RX ORDER — HYDROCODONE BITARTRATE AND ACETAMINOPHEN 5; 325 MG/1; MG/1
2 TABLET ORAL AS NEEDED
Status: COMPLETED | OUTPATIENT
Start: 2018-07-30 | End: 2018-07-30

## 2018-07-30 NOTE — OPERATIVE REPORT
[facility]    PATIENT'S NAME: LEONIE Ash   ATTENDING PHYSICIAN: Eduardo Del Rio M.D. OPERATING PHYSICIAN: Eduardo Del Rio M.D.    PATIENT ACCOUNT#:   [de-identified]    LOCATION:  PACU [address] [place] [address] PACU 3 EDWP 10  MEDICAL RECORD #:   [id number]       DATE OF BIR preoperatively. PROCEDURE:  The patient placed supine on the procedure table and underwent the procedure with general anesthesia. He received preoperative antibiotics. He had SCDs on both lower legs with a warming blanket.   The patient had his left ar anastomosis. At the end of the ulnar artery, radial artery slightly diminished at the palmar arch. The patient had a few more 7-0 Prolene sutures. Gelfoam, thrombin, hemoclips, and Bovie coagulation used for hemostasis.   Once hemostasis was obtained, th

## 2018-07-30 NOTE — OPERATIVE REPORT
Pre-operative Diagnosis: Stage 5 Renal Failure/ previous left arm fistula. Post-operative diagnosis: Same , Procedure: Exploration left Basilic vein, Creation left Brachial artery- cephalic vein upper arm fistula. Surgeon: Colby Roberts  Asst: Nikolas Franklin.    EBL

## 2018-07-30 NOTE — PROGRESS NOTES
Mr. Carlos Stephens was seen in the nephrology clinic today. Since his last clinic visit he was hospitalized and had worsening renal function prompting reinitiation of hemodialysis.   He is currently dialyzing on a Tuesday/Thursday/Saturday basis at [facility]

## 2018-07-30 NOTE — ANESTHESIA PREPROCEDURE EVALUATION
PRE-OP EVALUATION    Patient Name: Kamlesh Ott    Pre-op Diagnosis: end stage renal failure    Procedure(s):  left arteriovenous fistula creation  Rhys GLASER    Surgeon(s) and Role:     Jenny Sierra MD - Primary    Pre-op vitals reviewed. Temp: 98. BY MOUTH DAILY Disp: 90 tablet Rfl: 1   tacrolimus 1 MG Oral Cap TAKE 2 CAPSULES(2 MG) BY MOUTH TWICE DAILY Disp: 360 capsule Rfl: 1   furosemide 40 MG Oral Tab Take 40 mg by mouth 2 (two) times daily.    Disp:  Rfl:    predniSONE 5 MG Oral Tab Take 7.5 mg There was no evidence of hemodynamic     compromise. Impressions:  This study is compared with previous dated 06/24/2018:  Compared to the prior study, there has been no significant interval change    ECG reviewed.           (+) obesity       (+) CAD angioplasty.   2003: CHOLECYSTECTOMY  No date: COLONOSCOPY      Comment: 2003?  5/1/2015: COLONOSCOPY N/A      Comment: Procedure: COLONOSCOPY;  Surgeon: Mariana Jin MD;  Location: [address] [place] [address] ENDOSCOPY  1994: [address] benefits of anesthesia as outlined in the anesthesia consent were reviewed with the patient. The consent was signed without further questions.        Present on Admission:  **None**

## 2018-07-30 NOTE — PROGRESS NOTES
Here for AV fistula creation with Dr. Mark Cha. S/p fall recently, and with hospitalization. No complaints.

## 2018-08-01 RX ORDER — NIFEDIPINE 60 MG/1
120 TABLET, FILM COATED, EXTENDED RELEASE ORAL DAILY
Qty: 180 TABLET | Refills: 1 | Status: ON HOLD | OUTPATIENT
Start: 2018-08-01 | End: 2018-11-09

## 2018-08-02 NOTE — ANESTHESIA POSTPROCEDURE EVALUATION
[address] Patient Status:  Outpatient in a Bed   Age/Gender 48year old male MRN [id number]   Location [address] Attending No att. providers found   [facility] Day # 1 PCP Corinne Canales MD       Anesthesia P

## 2018-08-03 ENCOUNTER — HOSPITAL ENCOUNTER (EMERGENCY)
Facility: HOSPITAL | Age: 53
Discharge: HOME | End: 2018-08-03
Attending: EMERGENCY MEDICINE
Payer: MEDICARE

## 2018-08-03 ENCOUNTER — OFFICE VISIT (OUTPATIENT)
Dept: WOUND CARE | Facility: HOSPITAL | Age: 53
End: 2018-08-03
Attending: INTERNAL MEDICINE
Payer: MEDICARE

## 2018-08-03 VITALS
TEMPERATURE: 99 F | SYSTOLIC BLOOD PRESSURE: 159 MMHG | HEIGHT: 74 IN | BODY MASS INDEX: 34.65 KG/M2 | WEIGHT: 270 LBS | OXYGEN SATURATION: 98 % | RESPIRATION RATE: 18 BRPM | HEART RATE: 99 BPM | DIASTOLIC BLOOD PRESSURE: 102 MMHG

## 2018-08-03 DIAGNOSIS — S81.802D UNSPECIFIED OPEN WOUND, LEFT LOWER LEG, SUBSEQUENT ENCOUNTER: Primary | ICD-10-CM

## 2018-08-03 DIAGNOSIS — K59.03 DRUG-INDUCED CONSTIPATION: Primary | ICD-10-CM

## 2018-08-03 PROCEDURE — 99214 OFFICE O/P EST MOD 30 MIN: CPT

## 2018-08-03 PROCEDURE — 99283 EMERGENCY DEPT VISIT LOW MDM: CPT

## 2018-08-03 PROCEDURE — 11042 DBRDMT SUBQ TIS 1ST 20SQCM/<: CPT

## 2018-08-03 NOTE — PROGRESS NOTES
Chief Complaint  This information was obtained from the patient  Patient is here for a follow up visit for a left leg wound.     Allergies  carvedilol (Severity: Severe, Reaction: Hives, itching), ciprofloxacin (Severity: Severe, Reaction: Hives), gabapen impairment; History of fusion of cervical spine (5/31/2017); Hyperlipidemia; Kidney replaced by transplant (1997); Laxity of knee joint, left (5/31/2017); Long-term current use of opiate analgesic (8/23/2012);  Mood disorder due to a general medical conditi Health)  Eyes  Ear/Nose/Mouth/Throat  Respiratory  Cardiovascular (Central/Peripheral)  Gastrointestinal (GI)    General Notes:  negative except HPI    Additional Information  Medication reconciliation completed at today's visit. : Yes        Objective Warm  Capillary Refill: < 3 seconds  Erythema: No          Assessment    Active Problems    ICD-10  (Encounter Diagnosis) S81.802D - Unspecified open wound, left lower leg, subsequent encounter  (Encounter Diagnosis) N18.6 - End stage renal disease  (Encou for wound care. - no dressing changes till next visit. Supplement with a daily multivitamin. Increase dietary protein    Care summary  Discussed the Plan of Care at bedside with patient.  The patient verbally acknowledges understanding of all instruction

## 2018-08-03 NOTE — ED INITIAL ASSESSMENT (HPI)
Pt had fistula placed to left upper arm on Monday, has been taking norco for pain. Here today for constipation. Last bowel movement was 2 days ago.

## 2018-08-04 NOTE — ED PROVIDER NOTES
Patient Seen in: [facility] Emergency Department    History   Patient presents with:  Constipation (gastrointestinal)    Stated Complaint: constipation    HPI    Neel Walker is a 26-year-old male who presents today for evaluation of constipation.   His last • Painful total knee replacement, sequela 5/31/2017   • Papillary thyroid carcinoma ([facility] [address]     Dx in 1/2015: tx with surgery and MANZO   • PONV (postoperative nausea and vomiting) 1997    s/p kidney transplant   • Postlaminectomy syndrome, cervical region 6 Review of Systems    Positive for stated complaint: constipation  Other systems are as noted in HPI. Constitutional and vital signs reviewed. All other systems reviewed and negative except as noted above.     Physical Exam   ED Triage Vitals [08 plenty of water. The patient is encouraged to return if any concerning symptoms arise. Additional verbal discharge instructions are given and discussed. Discharge medications are discussed.  The patient is in good condition throughout the visit today and

## 2018-08-07 ENCOUNTER — PATIENT MESSAGE (OUTPATIENT)
Dept: INTERNAL MEDICINE CLINIC | Facility: CLINIC | Age: 53
End: 2018-08-07

## 2018-08-07 NOTE — TELEPHONE ENCOUNTER
From: Shea Book  To: Michelle Lindsay MD  Sent: 8/7/2018 3:47 PM CDT  Subject: Veryl Chase Dr. Jolene Aponte  During dialysis I told the Nephrologist Dr. Efren Castleman about my constipation. She prescribed lactulose 10gm/15ml.    I also see a prescr

## 2018-08-08 ENCOUNTER — OFFICE VISIT (OUTPATIENT)
Dept: WOUND CARE | Facility: HOSPITAL | Age: 53
End: 2018-08-08
Attending: INTERNAL MEDICINE
Payer: MEDICARE

## 2018-08-08 ENCOUNTER — TELEPHONE (OUTPATIENT)
Dept: INTERNAL MEDICINE CLINIC | Facility: CLINIC | Age: 53
End: 2018-08-08

## 2018-08-08 DIAGNOSIS — S81.802D UNSPECIFIED OPEN WOUND, LEFT LOWER LEG, SUBSEQUENT ENCOUNTER: Primary | ICD-10-CM

## 2018-08-08 PROCEDURE — 99214 OFFICE O/P EST MOD 30 MIN: CPT

## 2018-08-08 NOTE — PROGRESS NOTES
Chief Complaint  This information was obtained from the patient  Patient is here for a wound care follow up. He denies any concerns or pain to the wound.     Allergies  carvedilol (Severity: Severe, Reaction: Hives, itching), ciprofloxacin (Severity: Kaylah (6/29/2012); Coronary atherosclerosis (12/11/15); DDD (degenerative disc disease), cervical (10/17/2011); Hearing impairment; History of fusion of cervical spine (5/31/2017); Hyperlipidemia; Kidney replaced by transplant (1997);  Laxity of knee joint, left the patient  Patient denies complaints or symptoms related to:   Constitutional Symptoms (General Health)  Eyes  Ear/Nose/Mouth/Throat  Respiratory  Cardiovascular (Central/Peripheral)  Gastrointestinal (GI)    General Notes:  negative except HPI    Additi encounter  (Encounter Diagnosis) N18.6 - End stage renal disease  (Encounter Diagnosis) I10 - Essential (primary) hypertension  (Encounter Diagnosis) E78.5 - Hyperlipidemia, unspecified        Plan    Wound Orders:  Wound #1 Left, Lateral Lower Leg     Top

## 2018-08-08 NOTE — TELEPHONE ENCOUNTER
[facility]. Copy faxed to fx# provided.

## 2018-08-08 NOTE — TELEPHONE ENCOUNTER
Christel Cummings from [address] dialysis would like pt immunization record faxed to them Fax: [phone]

## 2018-08-09 ENCOUNTER — OFFICE VISIT (OUTPATIENT)
Dept: SURGERY | Facility: CLINIC | Age: 53
End: 2018-08-09
Payer: MEDICARE

## 2018-08-09 VITALS — HEART RATE: 88 BPM | SYSTOLIC BLOOD PRESSURE: 120 MMHG | DIASTOLIC BLOOD PRESSURE: 70 MMHG

## 2018-08-09 DIAGNOSIS — S06.5X9A SUBDURAL HEMATOMA (HCC): Primary | ICD-10-CM

## 2018-08-09 PROCEDURE — 1111F DSCHRG MED/CURRENT MED MERGE: CPT | Performed by: NEUROLOGICAL SURGERY

## 2018-08-09 PROCEDURE — 99213 OFFICE O/P EST LOW 20 MIN: CPT | Performed by: NEUROLOGICAL SURGERY

## 2018-08-09 RX ORDER — RANITIDINE 150 MG/1
150 CAPSULE ORAL DAILY
COMMUNITY
End: 2019-10-22

## 2018-08-09 NOTE — PATIENT INSTRUCTIONS
Refill policies:    • Allow 2-3 business days for refills; controlled substances may take longer.   • Contact your pharmacy at least 5 days prior to running out of medication and have them send an electronic request or submit request through the “request re entire amount billed. Precertification and Prior Authorizations: If your physician has recommended that you have a procedure or additional testing performed.   [facility]) will contact your insurance carrier to obtain pre-certi

## 2018-08-09 NOTE — PROGRESS NOTES
Neurosurgery Clinic Visit  2018    Yoni Morales PCP:  Christi Davis MD    3/31/1965 MRN [id number]     HISTORY OF PRESENT ILLNESS:  Yoni Morales is a(n) 48year old male status post fall with subdural hematoma  He is doing well  He recently ha

## 2018-08-10 ENCOUNTER — TELEPHONE (OUTPATIENT)
Dept: INTERNAL MEDICINE CLINIC | Facility: CLINIC | Age: 53
End: 2018-08-10

## 2018-08-10 DIAGNOSIS — A49.02 MRSA INFECTION: Primary | ICD-10-CM

## 2018-08-10 NOTE — TELEPHONE ENCOUNTER
Mika from [facility] called requesting verbal orders to extend home health PT starting next week for 2 weeks, 2 times a week.    # [phone]

## 2018-08-10 NOTE — TELEPHONE ENCOUNTER
OK to give verbal.    Caio Niño. Deepa Hayden MD  Diplomate, American Board of Internal Medicine  [address] [address] [address]  [address] [facility],4Th Floor, [address], [facility], [address] [place] [address]  T: [phone]; F: [address]

## 2018-08-13 ENCOUNTER — APPOINTMENT (OUTPATIENT)
Dept: LAB | Age: 53
End: 2018-08-13
Attending: INTERNAL MEDICINE
Payer: MEDICARE

## 2018-08-13 DIAGNOSIS — A49.02 MRSA INFECTION: ICD-10-CM

## 2018-08-13 PROCEDURE — 87081 CULTURE SCREEN ONLY: CPT

## 2018-08-14 ENCOUNTER — APPOINTMENT (OUTPATIENT)
Dept: LAB | Age: 53
End: 2018-08-14
Attending: INTERNAL MEDICINE
Payer: MEDICARE

## 2018-08-14 DIAGNOSIS — A49.02 MRSA INFECTION: ICD-10-CM

## 2018-08-14 PROCEDURE — 87081 CULTURE SCREEN ONLY: CPT

## 2018-08-15 ENCOUNTER — APPOINTMENT (OUTPATIENT)
Dept: LAB | Age: 53
End: 2018-08-15
Attending: INTERNAL MEDICINE
Payer: MEDICARE

## 2018-08-15 ENCOUNTER — OFFICE VISIT (OUTPATIENT)
Dept: WOUND CARE | Facility: HOSPITAL | Age: 53
End: 2018-08-15
Attending: INTERNAL MEDICINE
Payer: MEDICARE

## 2018-08-15 DIAGNOSIS — S81.802D UNSPECIFIED OPEN WOUND, LEFT LOWER LEG, SUBSEQUENT ENCOUNTER: Primary | ICD-10-CM

## 2018-08-15 DIAGNOSIS — N18.6 END STAGE RENAL DISEASE (HCC): ICD-10-CM

## 2018-08-15 DIAGNOSIS — A49.02 MRSA INFECTION: ICD-10-CM

## 2018-08-15 PROCEDURE — 87081 CULTURE SCREEN ONLY: CPT

## 2018-08-15 PROCEDURE — 99214 OFFICE O/P EST MOD 30 MIN: CPT

## 2018-08-15 NOTE — PROGRESS NOTES
Chief Complaint  This information was obtained from the patient  Patient is here for a wound care follow up. He denies any concerns or pain to the wound.     Allergies  carvedilol (Severity: Severe, Reaction: Hives, itching), ciprofloxacin (Severity: Kaylah Chronic pain syndrome (6/29/2012); Coronary atherosclerosis (12/11/15); DDD (degenerative disc disease), cervical (10/17/2011); Hearing impairment; History of fusion of cervical spine (5/31/2017); Hyperlipidemia; Kidney replaced by transplant (1997);  Laxit information was obtained from the patient  Patient denies complaints or symptoms related to:   Constitutional Symptoms (General Health)  Eyes  Ear/Nose/Mouth/Throat  Respiratory  Cardiovascular (Central/Peripheral)  Gastrointestinal (GI)    General Notes: left lower leg, subsequent encounter  (Encounter Diagnosis) N18.6 - End stage renal disease  (Encounter Diagnosis) I10 - Essential (primary) hypertension  (Encounter Diagnosis) E78.5 - Hyperlipidemia, unspecified        Plan    Wound Orders:  Wound #1 Left

## 2018-08-16 ENCOUNTER — PATIENT MESSAGE (OUTPATIENT)
Dept: INTERNAL MEDICINE CLINIC | Facility: CLINIC | Age: 53
End: 2018-08-16

## 2018-08-16 DIAGNOSIS — Z22.322 MRSA COLONIZATION: Primary | ICD-10-CM

## 2018-08-16 NOTE — TELEPHONE ENCOUNTER
Treatment protocol for MRSA Decolonization is as follows:    1. Nasal decolonization = Bactroban ointment applied into each nare TID x 10 days. Script sent to pharmacy. 2. Skin antiseptic solution = Chlorhexidine daily x 14 days.   Applied to the whole dominick

## 2018-08-16 NOTE — TELEPHONE ENCOUNTER
From: Maricruz Barton  To: Frida Evangelista MD  Sent: 8/16/2018 7:20 AM CDT  Subject: Test Results Haley Valerio,  Another question is will you contact Dr. Shayy Barton or Dr. Alvino Escobar about the MRCA so they can start me back on antibiotics while Im on d

## 2018-08-16 NOTE — TELEPHONE ENCOUNTER
Yes, OK to do regular tube with same instructions. Heraclio Clark. Raf Moreno MD  Diplomate, American Board of Internal Medicine  [address] [address] [address]  Critical access hospital0 [facility],4Th Floor, [address], [facility] & [facility], [address]  T: [phone]; F: [address]

## 2018-08-16 NOTE — TELEPHONE ENCOUNTER
From: Aurelia Santiago  To: Nancy Boyer MD  Sent: 8/16/2018 5:13 PM CDT  Subject: Prescription Question    Hello Doctor G,  I see you prescribed me a ointment to use in my nose.  I guess insurance had a tough time with the original ointment you prescribed

## 2018-08-16 NOTE — TELEPHONE ENCOUNTER
Nasal Bactroban not in stock - pharmacist calling asking if the regular tube can be given - states this is what is usually given as the nasal one is never covered?     [address] [phone]

## 2018-08-21 PROBLEM — Z09 EXAMINATION FOLLOWING SURGERY: Status: ACTIVE | Noted: 2018-08-21

## 2018-08-22 ENCOUNTER — OFFICE VISIT (OUTPATIENT)
Dept: WOUND CARE | Facility: HOSPITAL | Age: 53
End: 2018-08-22
Attending: INTERNAL MEDICINE
Payer: MEDICARE

## 2018-08-22 DIAGNOSIS — S81.802D UNSPECIFIED OPEN WOUND, LEFT LOWER LEG, SUBSEQUENT ENCOUNTER: Primary | ICD-10-CM

## 2018-08-22 PROCEDURE — 99214 OFFICE O/P EST MOD 30 MIN: CPT

## 2018-08-22 NOTE — PROGRESS NOTES
Chief Complaint  This information was obtained from the patient  Patient is here for a wound care follow up. He denies any concerns or pain to the wound.     Allergies  carvedilol (Severity: Severe, Reaction: Hives, itching), ciprofloxacin (Severity: Kaylah with myelopathy (1/6/2014); Chronic kidney disease, stage III (moderate) ([facility] [address] (6/29/2012); Chronic pain syndrome (6/29/2012); Coronary atherosclerosis (12/11/15); DDD (degenerative disc disease), cervical (10/17/2011); Hearing impairment;  History of fusio reports that he does not drink alcohol or use drugs.     Complaints and Symptoms  This information was obtained from the patient  Patient denies complaints or symptoms related to:   Constitutional Symptoms (General Health)  Eyes  Ear/Nose/Mouth/Throat  Resp Problems    ICD-10  (Encounter Diagnosis) S81.802D - Unspecified open wound, left lower leg, subsequent encounter  (Encounter Diagnosis) N18.6 - End stage renal disease  (Encounter Diagnosis) I10 - Essential (primary) hypertension  (Encounter Diagnosis) E7

## 2018-08-29 ENCOUNTER — OFFICE VISIT (OUTPATIENT)
Dept: WOUND CARE | Facility: HOSPITAL | Age: 53
End: 2018-08-29
Attending: INTERNAL MEDICINE
Payer: MEDICARE

## 2018-08-29 DIAGNOSIS — S81.802D UNSPECIFIED OPEN WOUND, LEFT LOWER LEG, SUBSEQUENT ENCOUNTER: Primary | ICD-10-CM

## 2018-08-29 PROCEDURE — 99213 OFFICE O/P EST LOW 20 MIN: CPT

## 2018-08-29 NOTE — PROGRESS NOTES
Chief Complaint  This information was obtained from the patient  Patient is here for a follow up of the LLE.      Allergies  carvedilol (Severity: Severe, Reaction: Hives, itching), ciprofloxacin (Severity: Severe, Reaction: Hives), gabapentin (Severity: (1/6/2014); Chronic kidney disease, stage III (moderate) ([facility] [address] (6/29/2012); Chronic pain syndrome (6/29/2012); Coronary atherosclerosis (12/11/15); DDD (degenerative disc disease), cervical (10/17/2011); Hearing impairment;  History of fusion of cervical sp does not drink alcohol or use drugs.     Complaints and Symptoms  This information was obtained from the patient  Patient denies complaints or symptoms related to:   Constitutional Symptoms (General Health)  Eyes  Ear/Nose/Mouth/Throat  Respiratory  Cardiov left lower leg, subsequent encounter  (Encounter Diagnosis) N18.6 - End stage renal disease  (Encounter Diagnosis) I10 - Essential (primary) hypertension  (Encounter Diagnosis) E78.5 - Hyperlipidemia, unspecified        Plan    Wound Orders:  Wound #1 Left

## 2018-08-30 ENCOUNTER — PATIENT MESSAGE (OUTPATIENT)
Dept: INTERNAL MEDICINE CLINIC | Facility: CLINIC | Age: 53
End: 2018-08-30

## 2018-08-30 DIAGNOSIS — A49.02 MRSA INFECTION: Primary | ICD-10-CM

## 2018-08-30 NOTE — TELEPHONE ENCOUNTER
He needs to do the 3-day swabs. Lieutenant Busby. Mayur Crawford MD  Diplomate, American Board of Internal Medicine  [facility]  [address]  [address] [facility],4Th Floor, [address], [facility], [address] [place] [address]  T: [phone]; F: [address]

## 2018-08-30 NOTE — TELEPHONE ENCOUNTER
From: Jorge Lai  To: Kwabena Estrada MD  Sent: 8/30/2018 8:21 AM CDT  Subject: Littie Flderrcik Dr Simon Amthew been applying the mupirocin 3x a day for 2 weeks now.  I was wondering if you could set up another 3 day nose swab and I will start

## 2018-08-30 NOTE — TELEPHONE ENCOUNTER
2 MRSA culture only orders pended as there is a 3rd MRSA culture only pended in the other pt email. Please advise if ok for pt to do the 3 day swab or if you would like pt to do something else.

## 2018-08-31 ENCOUNTER — PRIOR ORIGINAL RECORDS (OUTPATIENT)
Dept: OTHER | Age: 53
End: 2018-08-31

## 2018-08-31 ENCOUNTER — MYAURORA ACCOUNT LINK (OUTPATIENT)
Dept: OTHER | Age: 53
End: 2018-08-31

## 2018-09-04 ENCOUNTER — HOSPITAL ENCOUNTER (OUTPATIENT)
Dept: ULTRASOUND IMAGING | Age: 53
Discharge: HOME | End: 2018-09-04
Attending: SURGERY
Payer: MEDICARE

## 2018-09-04 ENCOUNTER — APPOINTMENT (OUTPATIENT)
Dept: LAB | Age: 53
End: 2018-09-04
Attending: INTERNAL MEDICINE
Payer: MEDICARE

## 2018-09-04 DIAGNOSIS — N17.8 ACUTE RENAL FAILURE WITH OTHER SPECIFIED PATHOLOGICAL KIDNEY LESION SUPERIMPOSED ON CHRONIC KIDNEY DISEASE, ON CHRONIC DIALYSIS (HCC): ICD-10-CM

## 2018-09-04 DIAGNOSIS — N18.9 ACUTE RENAL FAILURE WITH OTHER SPECIFIED PATHOLOGICAL KIDNEY LESION SUPERIMPOSED ON CHRONIC KIDNEY DISEASE, ON CHRONIC DIALYSIS (HCC): ICD-10-CM

## 2018-09-04 DIAGNOSIS — Z99.2 ACUTE RENAL FAILURE WITH OTHER SPECIFIED PATHOLOGICAL KIDNEY LESION SUPERIMPOSED ON CHRONIC KIDNEY DISEASE, ON CHRONIC DIALYSIS (HCC): ICD-10-CM

## 2018-09-04 DIAGNOSIS — A49.02 MRSA INFECTION: ICD-10-CM

## 2018-09-04 DIAGNOSIS — M79.89 LEFT ARM SWELLING: ICD-10-CM

## 2018-09-04 PROCEDURE — 87081 CULTURE SCREEN ONLY: CPT

## 2018-09-04 PROCEDURE — 93990 DOPPLER FLOW TESTING: CPT | Performed by: SURGERY

## 2018-09-05 ENCOUNTER — APPOINTMENT (OUTPATIENT)
Dept: LAB | Age: 53
End: 2018-09-05
Attending: INTERNAL MEDICINE
Payer: MEDICARE

## 2018-09-05 DIAGNOSIS — A49.02 MRSA INFECTION: ICD-10-CM

## 2018-09-05 PROCEDURE — 87081 CULTURE SCREEN ONLY: CPT

## 2018-09-06 ENCOUNTER — LAB ENCOUNTER (OUTPATIENT)
Dept: LAB | Age: 53
End: 2018-09-06
Attending: INTERNAL MEDICINE
Payer: MEDICARE

## 2018-09-06 DIAGNOSIS — Z22.322 CARRIER OR SUSPECTED CARRIER OF METHICILLIN RESISTANT STAPHYLOCOCCUS AUREUS: Primary | ICD-10-CM

## 2018-09-06 PROCEDURE — 87081 CULTURE SCREEN ONLY: CPT

## 2018-09-07 ENCOUNTER — PATIENT MESSAGE (OUTPATIENT)
Dept: INTERNAL MEDICINE CLINIC | Facility: CLINIC | Age: 53
End: 2018-09-07

## 2018-09-07 NOTE — TELEPHONE ENCOUNTER
From: Alisson Maynard  To: Delano Mujica MD  Sent: 9/7/2018 9:00 AM CDT  Subject: Visit Follow-up Question    Dr Sindy Osuna,  So what you are saying is the blood work showed the MRSA “ colonized” only in my nose and nowhere else And with this mupirocin ointment I h

## 2018-09-10 LAB
BUN: 35 MG/DL
CALCIUM: 9.4 MG/DL
CHLORIDE: 94 MEQ/L
CREATININE, SERUM: 3.68 MG/DL
GLUCOSE: 109 MG/DL
POTASSIUM, SERUM: 3 MEQ/L
SODIUM: 134 MEQ/L

## 2018-09-12 ENCOUNTER — OFFICE VISIT (OUTPATIENT)
Dept: INTERNAL MEDICINE CLINIC | Facility: CLINIC | Age: 53
End: 2018-09-12
Payer: MEDICARE

## 2018-09-12 ENCOUNTER — HOSPITAL ENCOUNTER (OUTPATIENT)
Dept: GENERAL RADIOLOGY | Age: 53
Discharge: HOME | End: 2018-09-12
Attending: INTERNAL MEDICINE
Payer: MEDICARE

## 2018-09-12 VITALS
HEART RATE: 88 BPM | OXYGEN SATURATION: 97 % | HEIGHT: 75 IN | RESPIRATION RATE: 16 BRPM | BODY MASS INDEX: 32.61 KG/M2 | TEMPERATURE: 99 F | DIASTOLIC BLOOD PRESSURE: 80 MMHG | SYSTOLIC BLOOD PRESSURE: 148 MMHG | WEIGHT: 262.25 LBS

## 2018-09-12 DIAGNOSIS — I10 BENIGN ESSENTIAL HTN: ICD-10-CM

## 2018-09-12 DIAGNOSIS — R07.89 ATYPICAL CHEST PAIN: ICD-10-CM

## 2018-09-12 DIAGNOSIS — I31.3 PERICARDIAL EFFUSION: ICD-10-CM

## 2018-09-12 DIAGNOSIS — N18.6 ESRD ON HEMODIALYSIS (HCC): ICD-10-CM

## 2018-09-12 DIAGNOSIS — R06.02 SHORTNESS OF BREATH: Primary | ICD-10-CM

## 2018-09-12 DIAGNOSIS — Z99.2 ESRD ON HEMODIALYSIS (HCC): ICD-10-CM

## 2018-09-12 DIAGNOSIS — R06.02 SHORTNESS OF BREATH: ICD-10-CM

## 2018-09-12 PROCEDURE — 99214 OFFICE O/P EST MOD 30 MIN: CPT | Performed by: INTERNAL MEDICINE

## 2018-09-12 PROCEDURE — 71046 X-RAY EXAM CHEST 2 VIEWS: CPT | Performed by: INTERNAL MEDICINE

## 2018-09-12 NOTE — PROGRESS NOTES
Fausto Doherty is a 48year old male. HPI:   Patient presents with:  Shortness Of Breath  Chest Pain  Patient presents with complaints of shortness of breath and chest pain. He always has chest pressure/pain during dialysis.   Occurs during every dial Comment:Chest pain  Nortriptyline Hcl       DIARRHEA, NAUSEA AND VOMITING  Labetalol               NAUSEA ONLY  PAST HISTORY:     Current Outpatient Medications:   •  ZUBSOLV 1.4-0.36 MG Sublingual SL Tab, Place 1.4 mg of buprenorphine under the tongue 3 ( MG Oral Tab EC, Take 81 mg by mouth daily. , Disp: , Rfl:   •  Atorvastatin Calcium (LIPITOR) 10 MG Oral Tab, Take 10 mg by mouth nightly.  , Disp: , Rfl:   Medical:  has a past medical history of Alport syndrome, Atherosclerosis of coronary artery, Brachia surgical history; other surgical history (Right, 1997); colonoscopy; thyroidectomy; cath pv (12/11/15); knee surgery; anesth,kidney transplant; cath percutaneous  transluminal coronary angioplasty (2016); A.V. FISTULA (Left, 7/30/2018);  EXTREMITY LOWER IRR pulmonary HTN, pericardial effusion, ESRD. Could be from fluid shifts with dialysis. No major constitutional symptoms. Skin infection in left lower leg resolved to visual inspection.   Will check XR chest.  If negative for acute findings, will have patie

## 2018-09-12 NOTE — PATIENT INSTRUCTIONS
- We will check a chest x-ray today  - If results are normal, check in with Dr. Tonja Mckoy office about your symptoms. It was a pleasure seeing you in the clinic today.   Thank you for choosing the [place] office for your healthc

## 2018-09-13 ENCOUNTER — APPOINTMENT (OUTPATIENT)
Dept: GENERAL RADIOLOGY | Facility: HOSPITAL | Age: 53
End: 2018-09-13
Attending: EMERGENCY MEDICINE
Payer: MEDICARE

## 2018-09-13 ENCOUNTER — PRIOR ORIGINAL RECORDS (OUTPATIENT)
Dept: OTHER | Age: 53
End: 2018-09-13

## 2018-09-13 ENCOUNTER — HOSPITAL ENCOUNTER (OUTPATIENT)
Facility: HOSPITAL | Age: 53
Setting detail: OBSERVATION
Discharge: HOME | End: 2018-09-14
Attending: EMERGENCY MEDICINE | Admitting: HOSPITALIST
Payer: MEDICARE

## 2018-09-13 DIAGNOSIS — R07.9 ACUTE CHEST PAIN: Primary | ICD-10-CM

## 2018-09-13 DIAGNOSIS — N18.6 ESRD ON HEMODIALYSIS (HCC): ICD-10-CM

## 2018-09-13 DIAGNOSIS — Z99.2 ESRD ON HEMODIALYSIS (HCC): ICD-10-CM

## 2018-09-13 PROBLEM — D64.9 ANEMIA: Status: ACTIVE | Noted: 2018-09-13

## 2018-09-13 PROBLEM — E87.6 HYPOKALEMIA: Status: ACTIVE | Noted: 2018-09-13

## 2018-09-13 PROBLEM — N17.9 ACUTE RENAL FAILURE (ARF): Status: ACTIVE | Noted: 2018-09-13

## 2018-09-13 PROBLEM — N17.9 ACUTE KIDNEY INJURY (HCC): Status: ACTIVE | Noted: 2018-09-13

## 2018-09-13 PROBLEM — N17.9 ACUTE KIDNEY INJURY: Status: ACTIVE | Noted: 2018-09-13

## 2018-09-13 PROBLEM — N17.9 ACUTE RENAL FAILURE (ARF) (HCC): Status: ACTIVE | Noted: 2018-09-13

## 2018-09-13 PROCEDURE — 99214 OFFICE O/P EST MOD 30 MIN: CPT | Performed by: INTERNAL MEDICINE

## 2018-09-13 PROCEDURE — 71045 X-RAY EXAM CHEST 1 VIEW: CPT | Performed by: EMERGENCY MEDICINE

## 2018-09-13 PROCEDURE — 99220 INITIAL OBSERVATION CARE,LEVL III: CPT | Performed by: HOSPITALIST

## 2018-09-13 RX ORDER — BISACODYL 10 MG
10 SUPPOSITORY, RECTAL RECTAL
Status: DISCONTINUED | OUTPATIENT
Start: 2018-09-13 | End: 2018-09-14

## 2018-09-13 RX ORDER — POLYETHYLENE GLYCOL 3350 17 G/17G
17 POWDER, FOR SOLUTION ORAL DAILY PRN
Status: DISCONTINUED | OUTPATIENT
Start: 2018-09-13 | End: 2018-09-14

## 2018-09-13 RX ORDER — TACROLIMUS 1 MG/1
2 CAPSULE ORAL 2 TIMES DAILY
COMMUNITY
End: 2019-02-05

## 2018-09-13 RX ORDER — METOCLOPRAMIDE HYDROCHLORIDE 5 MG/ML
5 INJECTION INTRAMUSCULAR; INTRAVENOUS EVERY 8 HOURS PRN
Status: DISCONTINUED | OUTPATIENT
Start: 2018-09-13 | End: 2018-09-14

## 2018-09-13 RX ORDER — OXYCODONE HYDROCHLORIDE 5 MG/1
10 TABLET ORAL EVERY 6 HOURS PRN
Status: DISCONTINUED | OUTPATIENT
Start: 2018-09-13 | End: 2018-09-14

## 2018-09-13 RX ORDER — ONDANSETRON 4 MG/1
4 TABLET, ORALLY DISINTEGRATING ORAL EVERY 8 HOURS PRN
COMMUNITY
End: 2019-04-18

## 2018-09-13 RX ORDER — NITROGLYCERIN 0.4 MG/1
0.4 TABLET SUBLINGUAL ONCE
Status: COMPLETED | OUTPATIENT
Start: 2018-09-13 | End: 2018-09-13

## 2018-09-13 RX ORDER — FAMOTIDINE 20 MG/1
20 TABLET ORAL DAILY
Status: DISCONTINUED | OUTPATIENT
Start: 2018-09-14 | End: 2018-09-14

## 2018-09-13 RX ORDER — TERAZOSIN 10 MG/1
20 CAPSULE ORAL NIGHTLY
Status: DISCONTINUED | OUTPATIENT
Start: 2018-09-13 | End: 2018-09-14

## 2018-09-13 RX ORDER — ONDANSETRON 2 MG/ML
4 INJECTION INTRAMUSCULAR; INTRAVENOUS EVERY 6 HOURS PRN
Status: DISCONTINUED | OUTPATIENT
Start: 2018-09-13 | End: 2018-09-14

## 2018-09-13 RX ORDER — SODIUM PHOSPHATE, DIBASIC AND SODIUM PHOSPHATE, MONOBASIC 7; 19 G/133ML; G/133ML
1 ENEMA RECTAL ONCE AS NEEDED
Status: DISCONTINUED | OUTPATIENT
Start: 2018-09-13 | End: 2018-09-14

## 2018-09-13 RX ORDER — FUROSEMIDE 40 MG/1
40 TABLET ORAL
Status: DISCONTINUED | OUTPATIENT
Start: 2018-09-13 | End: 2018-09-14

## 2018-09-13 RX ORDER — LEVOTHYROXINE SODIUM 0.03 MG/1
225 TABLET ORAL
COMMUNITY
End: 2018-10-30

## 2018-09-13 RX ORDER — HEPARIN SODIUM 5000 [USP'U]/ML
5000 INJECTION, SOLUTION INTRAVENOUS; SUBCUTANEOUS EVERY 8 HOURS SCHEDULED
Status: DISCONTINUED | OUTPATIENT
Start: 2018-09-13 | End: 2018-09-14

## 2018-09-13 RX ORDER — ALLOPURINOL 100 MG/1
100 TABLET ORAL DAILY
Status: DISCONTINUED | OUTPATIENT
Start: 2018-09-14 | End: 2018-09-14

## 2018-09-13 RX ORDER — NIFEDIPINE 30 MG/1
120 TABLET, EXTENDED RELEASE ORAL DAILY
Status: DISCONTINUED | OUTPATIENT
Start: 2018-09-14 | End: 2018-09-14

## 2018-09-13 RX ORDER — HEPARIN SODIUM 1000 [USP'U]/ML
1.5 INJECTION, SOLUTION INTRAVENOUS; SUBCUTANEOUS ONCE
Status: DISCONTINUED | OUTPATIENT
Start: 2018-09-14 | End: 2018-09-14

## 2018-09-13 RX ORDER — AMLODIPINE BESYLATE 10 MG/1
10 TABLET ORAL DAILY
Status: ON HOLD | COMMUNITY
End: 2018-09-14

## 2018-09-13 RX ORDER — LABETALOL HYDROCHLORIDE 5 MG/ML
10 INJECTION, SOLUTION INTRAVENOUS EVERY 4 HOURS PRN
Status: DISCONTINUED | OUTPATIENT
Start: 2018-09-13 | End: 2018-09-14

## 2018-09-13 RX ORDER — TACROLIMUS 1 MG/1
2 CAPSULE ORAL 2 TIMES DAILY
Status: DISCONTINUED | OUTPATIENT
Start: 2018-09-13 | End: 2018-09-14

## 2018-09-13 RX ORDER — AMLODIPINE BESYLATE 5 MG/1
10 TABLET ORAL DAILY
Status: DISCONTINUED | OUTPATIENT
Start: 2018-09-13 | End: 2018-09-14

## 2018-09-13 RX ORDER — DOCUSATE SODIUM 100 MG/1
100 CAPSULE, LIQUID FILLED ORAL 2 TIMES DAILY
Status: DISCONTINUED | OUTPATIENT
Start: 2018-09-13 | End: 2018-09-14

## 2018-09-13 RX ORDER — DOXAZOSIN 8 MG/1
8 TABLET ORAL 2 TIMES DAILY
COMMUNITY
End: 2019-06-20 | Stop reason: ALTCHOICE

## 2018-09-13 RX ORDER — ACETAMINOPHEN 325 MG/1
650 TABLET ORAL EVERY 6 HOURS PRN
Status: DISCONTINUED | OUTPATIENT
Start: 2018-09-13 | End: 2018-09-14

## 2018-09-13 RX ORDER — ALLOPURINOL 100 MG/1
100 TABLET ORAL DAILY
COMMUNITY
End: 2019-06-20

## 2018-09-13 RX ORDER — ASPIRIN 81 MG/1
81 TABLET ORAL DAILY
Status: DISCONTINUED | OUTPATIENT
Start: 2018-09-14 | End: 2018-09-14

## 2018-09-13 RX ORDER — HEPARIN SODIUM 1000 [USP'U]/ML
1.5 INJECTION, SOLUTION INTRAVENOUS; SUBCUTANEOUS ONCE
Status: DISCONTINUED | OUTPATIENT
Start: 2018-09-13 | End: 2018-09-13

## 2018-09-13 RX ORDER — DOCUSATE SODIUM 100 MG/1
100 CAPSULE, LIQUID FILLED ORAL 2 TIMES DAILY
Status: DISCONTINUED | OUTPATIENT
Start: 2018-09-13 | End: 2018-09-13

## 2018-09-13 RX ORDER — ASPIRIN 81 MG/1
324 TABLET, CHEWABLE ORAL ONCE
Status: COMPLETED | OUTPATIENT
Start: 2018-09-13 | End: 2018-09-13

## 2018-09-13 RX ORDER — ATORVASTATIN CALCIUM 10 MG/1
10 TABLET, FILM COATED ORAL NIGHTLY
Status: DISCONTINUED | OUTPATIENT
Start: 2018-09-13 | End: 2018-09-14

## 2018-09-13 RX ORDER — LEVOTHYROXINE SODIUM 0.2 MG/1
200 TABLET ORAL
COMMUNITY
End: 2019-06-13

## 2018-09-13 NOTE — CONSULTS
[facility]  Report of Consultation    Heidi Rangel Patient Status:  Emergency    3/31/1965 MRN [id number]   Location [address] [facility] Attending Brenton Lantigua MD   Hosp Day # 0 PCP Beatriz Rg MD     Reason for Consultation • PONV (postoperative nausea and vomiting) 1997    s/p kidney transplant   • Postlaminectomy syndrome, cervical region 6/20/2013    Log Date: 12/12/2012    • Postsurgical hypothyroidism     Dx in 1/2015: tx with surgery and MANZO   • Pulmonary HTN ([employer] 2/2/2015: total thyroidectomy for papillary thyroid                carcinoma  2/2/2015: THYROIDECTOMY; Bilateral      Comment:  Performed by Juan Manuel Mead MD at [facility] MAIN OR  Family History   Problem Relation Age of Onset   • Other (bipolar disorder) Mo 25mcg+043oxv=649nbc total   ondansetron 4 MG Oral Tablet Dispersible Take 4 mg by mouth every 8 (eight) hours as needed for Nausea. ZUBSOLV 1.4-0.36 MG Sublingual SL Tab Place 1.4 mg of buprenorphine under the tongue 3 (three) times daily.    OxyCODONE HC murmur or rub  Lungs: Clear without wheezes, rales, rhonchi. Abdomen: Soft, non-tender. + bowel sounds, no palpable organomegaly  Extremities: Without clubbing, cyanosis or edema.  L upper arm AVF with bruit/thrill  Neurologic: moving all extremities  Sk yesterday. eval ongoing    #2. ESRD- due to Alport's with failed transplant. HD tomorrow per usual routine    #3. HTN- has had very difficult to control BP in past but much better of late. Cont usual antihypertensive agents    #4. Anemia- due to ESRD.

## 2018-09-13 NOTE — PROGRESS NOTES
NURSING ADMISSION NOTE      Patient admitted via Wheelchair  Oriented to room. Safety precautions initiated. Bed in low position. Call light in reach. Assumed care of patient @ [phone].  Pt complaining of CP 6/10, non radiating, discomfort for breath

## 2018-09-13 NOTE — PROGRESS NOTES
[facility] Pharmacy Note:  Renal Dose Adjustment for Metoclopramide (REGLAN)    Leigha Lund has been prescribed Metoclopramide (REGLAN) 10 mg every 8 hours as needed for N/V.     Estimated Creatinine Clearance: 30.8 mL/min (A) (based on SCr of 3.23 mg/dL (H))

## 2018-09-13 NOTE — ED NOTES
Sats noted to intermittently drop to the 80s. States has sleep apnea. Placed on 2L NC. Continuous pulse oximetry remains in place.

## 2018-09-13 NOTE — CONSULTS
[facility]  Report of Consultation    Govind Ramirez Patient Status:  Observation    3/31/1965 MRN [id number]   [facility] 8NE-A Attending Adelita [address]  [place] Day # 0 PCP Clemmie Runner, MD     Reason for Consultation:  Ernesto Bowie circumferential to the heart. There was no evidence of hemodynamic     compromise.     Impressions:  This study is compared with previous dated 06/24/2018:  Compared to the prior study, there has been no significant interval change  in pericardial effusion 10/17/2011   • Status post cervical spinal fusion 9/20/2012   • Trigger finger (acquired)    • Unspecified essential hypertension    • Unspecified hemorrhoids without mention of complication 2/98/5716   • Unspecified sleep apnea     CPAP     Past Surgical Uncle         prostate CA   • Heart Disorder Paternal Uncle         CAD/Aortic disease      reports that  has never smoked. he has never used smokeless tobacco. He reports that he does not drink alcohol or use drugs.     Allergies:    Carvedilol Buprenorphine HCl-Naloxone HCl 1.4-0.36 MG SUBL 1.4 mg of buprenorphine, 1.4 mg of buprenorphine, Sublingual, TID  •  tacrolimus (PROGRAF) cap 2 mg, 2 mg, Oral, BID  •  RaNITidine HCl (ZANTAC) cap 150 mg, 150 mg, Oral, Daily  •  predniSONE (DELTASONE) tab affect. Skin: Warm and dry. + thrill/bruit to fistula on AMADO.   Permacath dressing in place to right upper chest.        Laboratories and Data:    Imaging:  Xr Chest Pa + Lat Chest (cpt=71046)    Result Date: 9/12/2018  PROCEDURE:  XR CHEST PA + LAT CHES reidentified and demonstrates satisfactory appearance with no fracture through the hardware and evidence of osseous incorporation of the interbody graft into the endplate margins.  There is new mild grade 1 anterolisthesis C6-C7 measuring about 2 mm, not pr IMPRESSION: 1. Spondylotic changes that are similar to the previous exam. 2. There is new minimal grade 1 anterolisthesis C6-C7 and slightly greater retrolisthesis C4-C5 compared to the 6/24/2018 study. 3. No definite residual epidural hematoma.  4. Int centimeters/second Mid forearm cephalic vein:  8 mm, 11 cm/sec Distal cephalic for an:  5 mm, 26 centimeters/seconds  Proximal basilic vein:  8 mm, 43 centimeters/second Mid basilic vein:  3 mm, 12 centimeters/second Distal basilic vein:  Nonvisualization. heaviness since dialysis yesterday in patient with coronary angioplasty 2016 and also chronic pericardiac effusion. Troponin (-). No ischemia in ECG. 2. CAD and PCI of LCx/OM with drug-coated stents 05/2016 - Troponin (-). No ischemia in ECG.   3. Chronic

## 2018-09-13 NOTE — H&P
[facility]  History and Physical     Omairadiego Velásquez Patient Status:  Emergency    3/31/1965 MRN [id number]   Location [address] [facility] Attending Shoshana Pennington MD   Hosp Day # 0 PCP Aravind Lopez MD     Chief Complaint: SO 5/31/2017   • Long-term current use of opiate analgesic 8/23/2012   • Mood disorder due to a general medical condition 8/23/2012   • Morbid obesity with BMI of 40.0-44.9, adult [facility] 1/21/2014   • Needs flu shot 10/10/2012   • Nephritis and nephropathy, no ligament cruciate anterior  No date: KNEE SURGERY  3/21/2016: KNEE TOTAL REPLACEMENT;  Left      Comment:  Performed by Anjali Kennedy MD at [address]  05/29/2012: Trever Reasons      Comment:  foraminotomy cervic seg  No date: OTHER SURGICAL ONLY    Medications:    No current facility-administered medications on file prior to encounter.    Current Outpatient Medications on File Prior to Encounter:  ZUBSOLV 1.4-0.36 MG Sublingual SL Tab Place 1.4 mg of buprenorphine under the tongue 3 (three) ti 10 MG Oral Tab Take 10 mg by mouth nightly. Disp:  Rfl:        Review of Systems:   A comprehensive 14 point review of systems was completed. Pertinent positives and negatives noted in the HPI.     Physical Exam:    /88   Pulse 76   Temp 97.4 °F for HD. 3. Hypertension-we will continue on nifedipine, clonidine, amlodipine. 4. Renal transplant Failed and back on HD. Will continue tacrolimus. 5. CAD- Will continue on statin. 6. Hypothyroidism- Will continue on levothyroxine.   7. Gouty arthropat

## 2018-09-13 NOTE — ED PROVIDER NOTES
Patient Seen in: [facility] Emergency Department    History   Patient presents with:  Dyspnea ARJUN SOB (respiratory)    Stated Complaint:     HPI    This is a 51-year-old male complaining of shortness of breath the patient describes his shortness of thyroid carcinoma ([facility]     Dx in 1/2015: tx with surgery and MANZO   • PONV (postoperative nausea and vomiting) 1997    s/p kidney transplant   • Postlaminectomy syndrome, cervical region 6/20/2013    Log Date: 12/12/2012    • Postsurgical hypothyroidism decompression median   No date: THYROIDECTOMY      Comment:  On 2/2/2015: total thyroidectomy for papillary thyroid                carcinoma  2/2/2015: THYROIDECTOMY; Bilateral      Comment:  Performed by Arley Cochran MD at [address] [place] [address] HGB 11.2 (*)     HCT 33.6 (*)     .0 (*)     Eosinophil Absolute 0.52 (*)     All other components within normal limits   TROPONIN I - Normal   CBC WITH DIFFERENTIAL WITH PLATELET    Narrative:      The following orders were created for panel orde

## 2018-09-13 NOTE — HISTORICAL OFFICE NOTE
LEONIE WADE  : 1965  ACCOUNT:  [id number]  [phone]  PCP: Dr. Long Salazar     TODAY'S DATE: 2018  DICTATED BY:  [Dr. Kaushik Billings: [Followup of CAD, of native vessels and Followup of Hypertension.]    HPI:    [On  ALLERGIES: Carvedilol - Oral, Hives, Cipro - Oral, Hives, Fosinopril Sodium - Oral, Gabapentin - Oral, HydrALAZINE HCl - Injection, Toprol XL - Oral and Hives    MEDICATIONS: Selected prescriptions see below    VITAL SIGNS: [B/P - 184/96 , Pulse - 78, 08/22/17 Levothyroxine Sodium  200MCG    225 daily                                02/09/17 Zubsolv               1.4-0.36  as directed                              10/12/15 Docusate Sodium       100MG     2 capsules twice daily abnormality. Patient had nonsustained VT, Toprol, Coreg, labetalol all listed as allergies. He has prior history of CAD with PCI in 2016. Aspirin was placed on hold during his hospitalization for his subdural hematoma.     He has end-stage renal dise 74 , BMI - 34.3 ]    CONS: well developed, overweight and stocky. WEIGHT: BMI parameters reviewed and discussed. HEAD/FACE: no trauma and normocephalic. EYES: conjunctivae not injected and no xanthelasma.  ENT: mucosa pink and moist. NECK: jugular venous pr daily                                    08/22/17 Ferrous Sulfate       325 (65   daily                                    08/22/17 Levothyroxine Sodium  200MCG    225 daily                                02/09/17 Zubsolv               1.4-0.36  as directe

## 2018-09-14 ENCOUNTER — APPOINTMENT (OUTPATIENT)
Dept: CV DIAGNOSTICS | Facility: HOSPITAL | Age: 53
End: 2018-09-14
Attending: INTERNAL MEDICINE
Payer: MEDICARE

## 2018-09-14 VITALS
TEMPERATURE: 98 F | BODY MASS INDEX: 33.79 KG/M2 | HEIGHT: 74 IN | RESPIRATION RATE: 20 BRPM | DIASTOLIC BLOOD PRESSURE: 81 MMHG | SYSTOLIC BLOOD PRESSURE: 132 MMHG | OXYGEN SATURATION: 100 % | HEART RATE: 77 BPM | WEIGHT: 263.25 LBS

## 2018-09-14 PROCEDURE — 93306 TTE W/DOPPLER COMPLETE: CPT | Performed by: INTERNAL MEDICINE

## 2018-09-14 PROCEDURE — 5A1D70Z PERFORMANCE OF URINARY FILTRATION, INTERMITTENT, LESS THAN 6 HOURS PER DAY: ICD-10-PCS | Performed by: INTERNAL MEDICINE

## 2018-09-14 PROCEDURE — 99217 OBSERVATION CARE DISCHARGE: CPT | Performed by: HOSPITALIST

## 2018-09-14 PROCEDURE — 90935 HEMODIALYSIS ONE EVALUATION: CPT | Performed by: INTERNAL MEDICINE

## 2018-09-14 RX ORDER — LABETALOL 100 MG/1
100 TABLET, FILM COATED ORAL
Status: DISCONTINUED | OUTPATIENT
Start: 2018-09-14 | End: 2018-09-14

## 2018-09-14 RX ORDER — LABETALOL 100 MG/1
100 TABLET, FILM COATED ORAL 2 TIMES DAILY
Qty: 60 TABLET | Refills: 11 | Status: SHIPPED | OUTPATIENT
Start: 2018-09-14 | End: 2019-02-05

## 2018-09-14 RX ORDER — HEPARIN SODIUM 1000 [USP'U]/ML
1500 INJECTION, SOLUTION INTRAVENOUS; SUBCUTANEOUS ONCE
Status: COMPLETED | OUTPATIENT
Start: 2018-09-14 | End: 2018-09-14

## 2018-09-14 RX ORDER — LABETALOL 100 MG/1
100 TABLET, FILM COATED ORAL EVERY 12 HOURS SCHEDULED
Status: DISCONTINUED | OUTPATIENT
Start: 2018-09-14 | End: 2018-09-14

## 2018-09-14 NOTE — PROGRESS NOTES
09/14/18 1447   Clinical Encounter Type   Visited With Patient and family together   Sacramental Encounters   Sacrament of Sick-Anointing Anointed   The patient was seen by Joe Dinero.  Received prayer, Scripture, support and Sacrament of t

## 2018-09-14 NOTE — CONSULTS
[facility]    PATIENT'S NAME: LEONIE Castañeda   ATTENDING PHYSICIAN: Maki Reyes D.O.   CONSULTING PHYSICIAN: Gala Perez M.D.    PATIENT ACCOUNT#:   [de-identified]    LOCATION:  [address] [place] [address]  MEDICAL RECORD #:   [id number]       DATE OF BIR Multiple medications including carvedilol, Cipro, gabapentin, hydralazine, metoprolol. SOCIAL HISTORY:  He states he has no EtOH abuse, drug abuse, or tobacco abuse. FAMILY HISTORY:  Bipolar disorder with his mother and glomerulonephritis.     Tim Cast a question and they have difficulty with the depth of the fistula, then may try to mobilize even further to the skin but may be more difficult.   Treatment per primary service and nephrologist.    Dictated By Molly Espinal M.D.  d: 09/14/2018 13:18:09  t:

## 2018-09-14 NOTE — PROGRESS NOTES
[facility]  Progress Note     [place] Patient Status:  Observation    3/31/1965 MRN [id number]   [facility] 8NE-A Attending Jill [facility] Day # 0 PCP Karmen Douglas MD     Chief Complaint: chest pain    S: P • allopurinol  100 mg Oral Daily   • AmLODIPine Besylate  10 mg Oral Daily   • tacrolimus  2 mg Oral BID   • famoTIDine  20 mg Oral Daily   • predniSONE  7.5 mg Oral Daily with breakfast   • aspirin  81 mg Oral Daily   • atorvastatin  10 mg Oral Nightly

## 2018-09-14 NOTE — PROGRESS NOTES
[facility]  Nephrology Progress Note    [place]  Patient Status:  Observation    3/31/1965 MRN [id number]   [facility] 8NE-A Attending Valarie [facility] Day # 0 PCP Sakina Markham MD       SUBJECTIVE:  Pt seen on [facility] Medications:  heparin sodium 1000 UNIT/ML injection 1,500 Units 1,500 Units Intravenous Once   Heparin Sodium (Porcine) 5000 UNIT/ML injection 5,000 Units 5,000 Units Subcutaneous Q8H [facility] & half-way   acetaminophen (TYLENOL) tab 650 mg 650 mg Oral Q6H PRN   ondansetr evaluating    #2. ESRD- due to Alport's with failed transplant. HD today per usual routine     #3. HTN- has had very difficult to control BP in past but much better of late. Cont usual antihypertensive agents with UF as needed     #4.   Anemia- due to ES

## 2018-09-14 NOTE — PROGRESS NOTES
09/14/18 0928   Clinical Encounter Type   Referral From Nurse   Referral To ([facility] as per the request/consult. )

## 2018-09-14 NOTE — PROGRESS NOTES
· [facility] Cardiology Progress Note     Subjective:  Still with intermittent chest pain and dyspnea.   Feels that pain was improved after Labetolol and improvement in BP    Objective:  169/111 this morning  Afebrile  SR  I/O incomplete  BUN/cr 30/3.56 change. The patient was admitted with atypical chest pain, and no dyspnea, most likely related to volume overload and and high BP related to it. No acute ECG changes and all troponins were (-).  F/u echo showed stable, even a bit less mild to moderate pe

## 2018-09-17 ENCOUNTER — PATIENT OUTREACH (OUTPATIENT)
Dept: CASE MANAGEMENT | Age: 53
End: 2018-09-17

## 2018-09-18 NOTE — DISCHARGE SUMMARY
[facility]  DISCHARGE SUMMARY     Adis Dior Patient Status:  Observation    3/31/1965 MRN [id number]   [facility] 8NE-A Attending No att. providers found   Hosp Day # 0 PCP Robert Sam MD     Date of Admission: 2018  Raul chest pain is likely more related to blood pressure . Echocardiogram showed grade 1 diastolic dysfunction with normal EF of 55-60%. This was consulted to find out fistula can be used with dialysis. Dialysis catheter fistula.   Nephrology was consulted fo 0     Levothyroxine Sodium 25 MCG Tabs  Commonly known as:  SYNTHROID, LEVOTHROID      Take 225 mcg by mouth before breakfast. 25mcg+383whw=138tye total   Refills:  0     NIFEdipine ER 60 MG Tb24  Commonly known as:  ADALAT CC      Take 2 tablets (120 mg rubs or gallops. Abdomen: Soft, nontender, nondistended. Positive bowel sounds. No rebound or guarding. Neurologic: No focal neurological deficits. Musculoskeletal: Moves all extremities. Extremities: No edema.   -------------------------------------

## 2018-09-25 ENCOUNTER — TELEPHONE (OUTPATIENT)
Dept: INTERNAL MEDICINE CLINIC | Facility: CLINIC | Age: 53
End: 2018-09-25

## 2018-09-25 NOTE — TELEPHONE ENCOUNTER
Agreed. This was done exclusively via MyChart communication documentation. Patient did receive 10 days of Bactroban TID dosing for nasal decolonization and 14 days of Chlorhexidine skin antiseptic solution. Rodrigo Strauss.  [address] [facility]

## 2018-09-25 NOTE — TELEPHONE ENCOUNTER
Spoke with Lynne at  [facility]. Informed no clinical notes available. Requesting MRSA lab results. Results faxed. Confirmation received.

## 2018-09-25 NOTE — TELEPHONE ENCOUNTER
I do not see clinical notes to send to Joaquin Aguilera regarding treatment for positive MRSA only MyChart messages. I can only send lab results.

## 2018-09-25 NOTE — TELEPHONE ENCOUNTER
Maxine Valles calling from [facility].  Message: Pt has been battling MRSA, and Dr Azeb Bowers was treating his condition. [facility], [facility] seeking medical notes pertaining to treatment and resolution of MRSA.         Questions please call: [phone]  Fax: [phone]

## 2018-09-26 ENCOUNTER — TELEPHONE (OUTPATIENT)
Dept: NEUROLOGY | Facility: CLINIC | Age: 53
End: 2018-09-26

## 2018-09-27 ENCOUNTER — HOSPITAL ENCOUNTER (OUTPATIENT)
Dept: CV DIAGNOSTICS | Facility: HOSPITAL | Age: 53
Discharge: HOME | End: 2018-09-27
Attending: INTERNAL MEDICINE
Payer: MEDICARE

## 2018-09-27 DIAGNOSIS — R07.9 ACUTE CHEST PAIN: ICD-10-CM

## 2018-09-27 PROCEDURE — 78452 HT MUSCLE IMAGE SPECT MULT: CPT | Performed by: INTERNAL MEDICINE

## 2018-09-27 PROCEDURE — 93018 CV STRESS TEST I&R ONLY: CPT | Performed by: INTERNAL MEDICINE

## 2018-09-27 PROCEDURE — 93017 CV STRESS TEST TRACING ONLY: CPT | Performed by: INTERNAL MEDICINE

## 2018-09-27 NOTE — TELEPHONE ENCOUNTER
Lina from [facility] [place] would like patients most recent Mrsa results faxed over to [phone].  She states someone was suppose to fax this but she never received it and would like to know if she can get it faxed to her please

## 2018-09-28 ENCOUNTER — PRIOR ORIGINAL RECORDS (OUTPATIENT)
Dept: OTHER | Age: 53
End: 2018-09-28

## 2018-10-07 ENCOUNTER — PATIENT MESSAGE (OUTPATIENT)
Dept: INTERNAL MEDICINE CLINIC | Facility: CLINIC | Age: 53
End: 2018-10-07

## 2018-10-08 NOTE — TELEPHONE ENCOUNTER
From: Shawn Gaucher  To: Valentín Sigala MD  Sent: 10/7/2018 11:07 AM CDT  Subject: Test Results Question    I have a question about NUCLEAR MEDICINE LEXISCAN TEST resulted on 9/29/18, 7:22 PM.   Dr. Mekhi Carolina I received the results back from my stress test. Are a

## 2018-10-09 ENCOUNTER — TELEPHONE (OUTPATIENT)
Dept: NEPHROLOGY | Facility: CLINIC | Age: 53
End: 2018-10-09

## 2018-10-09 NOTE — TELEPHONE ENCOUNTER
Pt called. Wants Patel Loredo to update med list & wants Dr Hema Parra to know that Dr Deshawn Salmon \"cut\" his vitamin D (calcium issue) and has him taking a protein whey powder.

## 2018-10-18 ENCOUNTER — OFFICE VISIT (OUTPATIENT)
Dept: INTERNAL MEDICINE CLINIC | Facility: CLINIC | Age: 53
End: 2018-10-18
Payer: MEDICARE

## 2018-10-18 VITALS
SYSTOLIC BLOOD PRESSURE: 131 MMHG | RESPIRATION RATE: 16 BRPM | BODY MASS INDEX: 32 KG/M2 | OXYGEN SATURATION: 99 % | HEART RATE: 103 BPM | TEMPERATURE: 97 F | DIASTOLIC BLOOD PRESSURE: 92 MMHG | WEIGHT: 249 LBS

## 2018-10-18 DIAGNOSIS — E66.09 CLASS 1 OBESITY DUE TO EXCESS CALORIES WITH SERIOUS COMORBIDITY AND BODY MASS INDEX (BMI) OF 31.0 TO 31.9 IN ADULT: Primary | ICD-10-CM

## 2018-10-18 PROBLEM — N17.9 ACUTE RENAL FAILURE (ARF) (HCC): Status: RESOLVED | Noted: 2018-09-13 | Resolved: 2018-10-18

## 2018-10-18 PROBLEM — E87.6 HYPOKALEMIA: Status: RESOLVED | Noted: 2018-09-13 | Resolved: 2018-10-18

## 2018-10-18 PROBLEM — N17.9 ACUTE KIDNEY INJURY (HCC): Status: RESOLVED | Noted: 2018-09-13 | Resolved: 2018-10-18

## 2018-10-18 PROBLEM — D64.9 ANEMIA: Status: RESOLVED | Noted: 2018-09-13 | Resolved: 2018-10-18

## 2018-10-18 PROBLEM — Z09 EXAMINATION FOLLOWING SURGERY: Status: RESOLVED | Noted: 2018-08-21 | Resolved: 2018-10-18

## 2018-10-18 PROBLEM — E66.811 CLASS 1 OBESITY DUE TO EXCESS CALORIES WITH SERIOUS COMORBIDITY AND BODY MASS INDEX (BMI) OF 31.0 TO 31.9 IN ADULT: Status: ACTIVE | Noted: 2017-06-13

## 2018-10-18 PROBLEM — R07.9 ACUTE CHEST PAIN: Status: RESOLVED | Noted: 2018-09-13 | Resolved: 2018-10-18

## 2018-10-18 PROBLEM — N17.9 ACUTE KIDNEY INJURY: Status: RESOLVED | Noted: 2018-09-13 | Resolved: 2018-10-18

## 2018-10-18 PROBLEM — N17.9 ACUTE RENAL FAILURE (ARF): Status: RESOLVED | Noted: 2018-09-13 | Resolved: 2018-10-18

## 2018-10-18 PROCEDURE — 99213 OFFICE O/P EST LOW 20 MIN: CPT | Performed by: INTERNAL MEDICINE

## 2018-10-18 NOTE — PROGRESS NOTES
Patient presents with:  Weight Check: obesity      HPI: Milena Rodrigueztock presents today for obesity. He's been losing weight w/ intention over the last few months. He is not on any prescription medication for his obesity dx. Just doing lifestyle measures.     Review Sodium; Toprol Xl [Metoprolol Succinate];  Pravastatin; Duloxetine Hcl; Hydralazine Hcl; Minoxidil; Nortriptyline Hcl      Current Outpatient Medications:   •  OxyCODONE HCl IR 10 MG Oral Tab, 1 tab po q6 hr prn sever breakthrough left knee pain, Disp: 45 t Tab, Take 10 mg by mouth nightly.  , Disp: , Rfl:   •  PEG 3350-KCl-NaBcb-NaCl-NaSulf (PEG 3350/ELECTROLYTES) 240 g Oral Recon Soln, Take as directed by physician., Disp: 4000 mL, Rfl: 0    Social History    Tobacco Use      Smoking status: Never Smoker

## 2018-10-23 ENCOUNTER — ANESTHESIA EVENT (OUTPATIENT)
Dept: ENDOSCOPY | Facility: HOSPITAL | Age: 53
End: 2018-10-23
Payer: MEDICARE

## 2018-10-23 ENCOUNTER — HOSPITAL ENCOUNTER (OUTPATIENT)
Facility: HOSPITAL | Age: 53
Setting detail: HOSPITAL OUTPATIENT SURGERY
Discharge: HOME | End: 2018-10-23
Attending: INTERNAL MEDICINE | Admitting: INTERNAL MEDICINE
Payer: MEDICARE

## 2018-10-23 ENCOUNTER — ANESTHESIA (OUTPATIENT)
Dept: ENDOSCOPY | Facility: HOSPITAL | Age: 53
End: 2018-10-23
Payer: MEDICARE

## 2018-10-23 VITALS
TEMPERATURE: 99 F | WEIGHT: 250 LBS | HEIGHT: 74 IN | SYSTOLIC BLOOD PRESSURE: 140 MMHG | RESPIRATION RATE: 18 BRPM | BODY MASS INDEX: 32.08 KG/M2 | DIASTOLIC BLOOD PRESSURE: 83 MMHG | HEART RATE: 77 BPM | OXYGEN SATURATION: 97 %

## 2018-10-23 DIAGNOSIS — Z86.010 HISTORY OF COLON POLYPS: ICD-10-CM

## 2018-10-23 PROCEDURE — 80048 BASIC METABOLIC PNL TOTAL CA: CPT

## 2018-10-23 PROCEDURE — 88305 TISSUE EXAM BY PATHOLOGIST: CPT | Performed by: INTERNAL MEDICINE

## 2018-10-23 PROCEDURE — 0DBN8ZX EXCISION OF SIGMOID COLON, VIA NATURAL OR ARTIFICIAL OPENING ENDOSCOPIC, DIAGNOSTIC: ICD-10-PCS | Performed by: INTERNAL MEDICINE

## 2018-10-23 RX ORDER — SODIUM CHLORIDE, SODIUM LACTATE, POTASSIUM CHLORIDE, CALCIUM CHLORIDE 600; 310; 30; 20 MG/100ML; MG/100ML; MG/100ML; MG/100ML
INJECTION, SOLUTION INTRAVENOUS CONTINUOUS
Status: ACTIVE | OUTPATIENT
Start: 2018-10-23

## 2018-10-23 RX ORDER — NALOXONE HYDROCHLORIDE 0.4 MG/ML
80 INJECTION, SOLUTION INTRAMUSCULAR; INTRAVENOUS; SUBCUTANEOUS AS NEEDED
Status: ACTIVE | OUTPATIENT
Start: 2018-10-23 | End: 2018-10-24

## 2018-10-23 RX ORDER — SODIUM CHLORIDE 9 MG/ML
INJECTION, SOLUTION INTRAVENOUS CONTINUOUS
Status: DISCONTINUED | OUTPATIENT
Start: 2018-10-23 | End: 2018-10-23

## 2018-10-23 NOTE — H&P
[address] [facility] Patient Status:  Hospital Outpatient Surgery    3/31/1965 MRN [id number]   [facility] ENDOSCOPY Attending Winifred Sorensen MD   Hosp Day # 0 PCP Blaise Coles MD     CC: screening    Histo vomiting) 1997    s/p kidney transplant   • Postlaminectomy syndrome, cervical region 6/20/2013    Log Date: 12/12/2012    • Postsurgical hypothyroidism     Dx in 1/2015: tx with surgery and MANZO   • Pulmonary HTN (Via Echo 1/28/14) 2/4/2014   • RA (rheumat papillary thyroid carcinoma   • THYROIDECTOMY Bilateral 2/2/2015    Performed by Maria Luisa Dorantes MD at [facility] MAIN OR     Family History   Problem Relation Age of Onset   • Breast Cancer Mother    • Other (bipolar disorder) Mother    • Other (glomerulonephr and dry.   Laboratory Data:  Lab Results   Component Value Date    CREATSERUM 3.17 10/23/2018    BUN 22 10/23/2018     10/23/2018    K 3.2 10/23/2018     10/23/2018    CO2 31.0 10/23/2018    GLU 99 10/23/2018    CA 9.5 10/23/2018         Assessm

## 2018-10-23 NOTE — ANESTHESIA POSTPROCEDURE EVALUATION
[facility] Patient Status:  Hospital Outpatient Surgery   Age/Gender 48year old male MRN [id number]   Location [address] [place] [address]. Attending Sumit Rogers MD   Hosp Day # 0 PCP Caroline Jennings MD       Anesthesia Post-op Not

## 2018-10-23 NOTE — OPERATIVE REPORT
Ole Milder Patient Status:  Hospital Outpatient Surgery    3/31/1965 MRN [id number]   [facility] ENDOSCOPY Attending Destinee Hennessy MD   Hosp Day # 0 PCP Ruth Peralta MD     PREOPERATIVE DIAGNOSIS/INDICATION: H/o colon polyps  PO colonoscopy in 5 years.   Marin Granda  10/23/2018  8:07 AM

## 2018-10-30 ENCOUNTER — OFFICE VISIT (OUTPATIENT)
Dept: NEUROLOGY | Facility: CLINIC | Age: 53
End: 2018-10-30
Payer: MEDICARE

## 2018-10-30 VITALS
BODY MASS INDEX: 32.34 KG/M2 | WEIGHT: 252 LBS | RESPIRATION RATE: 14 BRPM | HEART RATE: 78 BPM | SYSTOLIC BLOOD PRESSURE: 122 MMHG | HEIGHT: 74 IN | DIASTOLIC BLOOD PRESSURE: 72 MMHG

## 2018-10-30 DIAGNOSIS — G44.309 POST-TRAUMATIC HEADACHE, NOT INTRACTABLE, UNSPECIFIED CHRONICITY PATTERN: Primary | ICD-10-CM

## 2018-10-30 PROCEDURE — 99213 OFFICE O/P EST LOW 20 MIN: CPT | Performed by: OTHER

## 2018-10-30 RX ORDER — LIDOCAINE AND PRILOCAINE 25; 25 MG/G; MG/G
CREAM TOPICAL
Refills: 3 | COMMUNITY
Start: 2018-10-19

## 2018-10-30 NOTE — PROGRESS NOTES
Date: 10/30/2018    To: Heidi Rangel  : 3/31/1965    I hope this letter finds you doing well. I am writing to inform you of the following:        The biopsies obtained from your recent colonoscopy indicate that the polyp was adenomatous which, altho

## 2018-10-30 NOTE — PROGRESS NOTES
Neurology H&P    Domenic Cantrell Patient Status:  No patient class for patient encounter    3/31/1965 MRN [id number]   Location ED [facility] ER, [address], [address] Attending No att. providers found   [facility] Day # 0 PCP Denton Floyd MD     Sub Rfl: NIFEdipine ER 60 MG Oral Tablet 24 Hr Take 2 tablets (120 mg total) by mouth daily. Disp: 180 tablet Rfl: 1   CloNIDine HCl 0.3 MG Oral Tab Take 1 tablet (0.3 mg total) by mouth 3 (three) times daily.  Disp: 270 tablet Rfl: 1   furosemide 40 MG Oral involving native coronary artery of native heart without angina pectoris     Pericardial effusion     Alport syndrome     ESRD on hemodialysis ([facility]     Renal transplant failure and rejection     Anemia in ESRD (end-stage renal disease) ([facility]      PMHx:  P Log Date: 12/12/2012    • Postsurgical hypothyroidism     Dx in 1/2015: tx with surgery and MANZO   • Pulmonary HTN (Via Echo 1/28/14) 2/4/2014   • RA (rheumatoid arthritis) ([facility]    • Renal disorder    • Spinal stenosis in cervical region 10/17/2011   • Sta 2009    neuroplasty decompression median    • THYROIDECTOMY      On 2/2/2015: total thyroidectomy for papillary thyroid carcinoma   • THYROIDECTOMY Bilateral 2/2/2015    Performed by Aundrea Torres MD at [facility] MAIN OR       SocHx:  Social History    Tobac approximation, tongue is midline, shrug is intact     MSK:        RUE: Delt: 5/5, Bi: 5/5, Tri: 5/5, : 5/5       LUE: Delt: 5/5, Bi: 5/5, Tri: 5/5, : 5/5       RLE: HF: 5/5, HE: 5/5, KF: 5/5, KE: 5/5, DF: 5/5, PF: 5/5       LLE: HF: 5/5, HE: 5/5, K areas of hemorrhage are   seen. Trace midline shift to the left measuring 3-4 mm. Continued followup is suggested. Assessment: This is a 49 y/o male with multiple medical issues and h/o SDH and epidural hematoma.  He is no longer having any headaches

## 2018-10-30 NOTE — PATIENT INSTRUCTIONS
Refill policies:    • Allow 2-3 business days for refills; controlled substances may take longer.   • Contact your pharmacy at least 5 days prior to running out of medication and have them send an electronic request or submit request through the “request re entire amount billed. Precertification and Prior Authorizations: If your physician has recommended that you have a procedure or additional testing performed.   [facility]) will contact your insurance carrier to obtain pre-certi

## 2018-11-01 ENCOUNTER — OFFICE VISIT (OUTPATIENT)
Dept: NEPHROLOGY | Facility: CLINIC | Age: 53
End: 2018-11-01
Payer: MEDICARE

## 2018-11-01 VITALS — DIASTOLIC BLOOD PRESSURE: 86 MMHG | SYSTOLIC BLOOD PRESSURE: 138 MMHG | BODY MASS INDEX: 32 KG/M2 | WEIGHT: 249 LBS

## 2018-11-01 DIAGNOSIS — I10 ESSENTIAL HYPERTENSION: ICD-10-CM

## 2018-11-01 DIAGNOSIS — Z99.2 ESRD ON HEMODIALYSIS (HCC): Primary | ICD-10-CM

## 2018-11-01 DIAGNOSIS — N18.6 ESRD ON HEMODIALYSIS (HCC): Primary | ICD-10-CM

## 2018-11-01 RX ORDER — PREDNISONE 1 MG/1
TABLET ORAL
Qty: 135 TABLET | Refills: 1 | Status: SHIPPED | OUTPATIENT
Start: 2018-11-01 | End: 2018-12-27

## 2018-11-01 RX ORDER — TACROLIMUS 1 MG/1
CAPSULE ORAL
Qty: 360 CAPSULE | Refills: 1 | Status: SHIPPED | OUTPATIENT
Start: 2018-11-01 | End: 2018-12-27

## 2018-11-01 NOTE — PROGRESS NOTES
Went over all instructions for AV fistula surgery, NPO after midnight, take thyroid med am of with a sip of water, shower before coming in, bring cpap make and tubing, will need a ride home. Please call us for any questions.

## 2018-11-01 NOTE — PROGRESS NOTES
Mr. Erica Ratliff was seen in the nephrology clinic today in follow-up for management of hypertension in the setting of end-stage renal disease.   He has an AV fistula in the left arm which has been attempted to be used but has been unsuccessful and he will fol

## 2018-11-08 ENCOUNTER — HOSPITAL ENCOUNTER (INPATIENT)
Facility: HOSPITAL | Age: 53
LOS: 1 days | Discharge: HOME | DRG: 252 | End: 2018-11-09
Attending: SURGERY | Admitting: SURGERY
Payer: MEDICARE

## 2018-11-08 PROBLEM — E10.22 HYPERTENSION ASSOCIATED WITH CHRONIC KIDNEY DISEASE DUE TO TYPE 1 DIABETES MELLITUS (HCC): Status: ACTIVE | Noted: 2018-11-08

## 2018-11-08 PROBLEM — E10.22: Status: ACTIVE | Noted: 2018-11-08

## 2018-11-08 PROBLEM — I12.9 HYPERTENSION ASSOCIATED WITH CHRONIC KIDNEY DISEASE DUE TO TYPE 1 DIABETES MELLITUS (HCC): Status: ACTIVE | Noted: 2018-11-08

## 2018-11-08 PROBLEM — E10.22 HYPERTENSION ASSOCIATED WITH CHRONIC KIDNEY DISEASE DUE TO TYPE 1 DIABETES MELLITUS: Status: ACTIVE | Noted: 2018-11-08

## 2018-11-08 PROBLEM — I12.9: Status: ACTIVE | Noted: 2018-11-08

## 2018-11-08 PROBLEM — I12.9 HYPERTENSION ASSOCIATED WITH CHRONIC KIDNEY DISEASE DUE TO TYPE 1 DIABETES MELLITUS: Status: ACTIVE | Noted: 2018-11-08

## 2018-11-08 PROCEDURE — 03Q80ZZ REPAIR LEFT BRACHIAL ARTERY, OPEN APPROACH: ICD-10-PCS | Performed by: SURGERY

## 2018-11-08 PROCEDURE — 5A09357 ASSISTANCE WITH RESPIRATORY VENTILATION, LESS THAN 24 CONSECUTIVE HOURS, CONTINUOUS POSITIVE AIRWAY PRESSURE: ICD-10-PCS | Performed by: SURGERY

## 2018-11-08 PROCEDURE — 99223 1ST HOSP IP/OBS HIGH 75: CPT | Performed by: HOSPITALIST

## 2018-11-08 PROCEDURE — 05BF0ZZ EXCISION OF LEFT CEPHALIC VEIN, OPEN APPROACH: ICD-10-PCS | Performed by: SURGERY

## 2018-11-08 PROCEDURE — 99222 1ST HOSP IP/OBS MODERATE 55: CPT | Performed by: INTERNAL MEDICINE

## 2018-11-08 RX ORDER — NIFEDIPINE 60 MG/1
120 TABLET, EXTENDED RELEASE ORAL DAILY
Status: DISCONTINUED | OUTPATIENT
Start: 2018-11-08 | End: 2018-11-09

## 2018-11-08 RX ORDER — MEPERIDINE HYDROCHLORIDE 25 MG/ML
12.5 INJECTION INTRAMUSCULAR; INTRAVENOUS; SUBCUTANEOUS AS NEEDED
Status: DISCONTINUED | OUTPATIENT
Start: 2018-11-08 | End: 2018-11-08 | Stop reason: HOSPADM

## 2018-11-08 RX ORDER — OXYCODONE HYDROCHLORIDE 5 MG/1
10 TABLET ORAL EVERY 6 HOURS PRN
Status: DISCONTINUED | OUTPATIENT
Start: 2018-11-08 | End: 2018-11-09

## 2018-11-08 RX ORDER — MIDAZOLAM HYDROCHLORIDE 1 MG/ML
INJECTION INTRAMUSCULAR; INTRAVENOUS
Status: COMPLETED
Start: 2018-11-08 | End: 2018-11-08

## 2018-11-08 RX ORDER — FUROSEMIDE 40 MG/1
40 TABLET ORAL
Status: DISCONTINUED | OUTPATIENT
Start: 2018-11-09 | End: 2018-11-09

## 2018-11-08 RX ORDER — CEFAZOLIN SODIUM/WATER 2 G/20 ML
SYRINGE (ML) INTRAVENOUS
Status: DISCONTINUED | OUTPATIENT
Start: 2018-11-08 | End: 2018-11-08 | Stop reason: HOSPADM

## 2018-11-08 RX ORDER — ACETAMINOPHEN 325 MG/1
650 TABLET ORAL EVERY 6 HOURS PRN
Status: DISCONTINUED | OUTPATIENT
Start: 2018-11-08 | End: 2018-11-09

## 2018-11-08 RX ORDER — DOCUSATE SODIUM 100 MG/1
100 CAPSULE, LIQUID FILLED ORAL 2 TIMES DAILY
Status: DISCONTINUED | OUTPATIENT
Start: 2018-11-08 | End: 2018-11-09

## 2018-11-08 RX ORDER — HEPARIN SODIUM 1000 [USP'U]/ML
1.5 INJECTION, SOLUTION INTRAVENOUS; SUBCUTANEOUS
Status: DISCONTINUED | OUTPATIENT
Start: 2018-11-08 | End: 2018-11-09

## 2018-11-08 RX ORDER — MIDAZOLAM HYDROCHLORIDE 1 MG/ML
1 INJECTION INTRAMUSCULAR; INTRAVENOUS EVERY 5 MIN PRN
Status: DISCONTINUED | OUTPATIENT
Start: 2018-11-08 | End: 2018-11-08 | Stop reason: HOSPADM

## 2018-11-08 RX ORDER — MORPHINE SULFATE 2 MG/ML
2 INJECTION, SOLUTION INTRAMUSCULAR; INTRAVENOUS EVERY 5 MIN PRN
Status: DISCONTINUED | OUTPATIENT
Start: 2018-11-08 | End: 2018-11-08 | Stop reason: HOSPADM

## 2018-11-08 RX ORDER — NIFEDIPINE 60 MG/1
60 TABLET, EXTENDED RELEASE ORAL DAILY
Status: DISCONTINUED | OUTPATIENT
Start: 2018-11-08 | End: 2018-11-09

## 2018-11-08 RX ORDER — PREDNISONE 1 MG/1
7.5 TABLET ORAL DAILY
Status: DISCONTINUED | OUTPATIENT
Start: 2018-11-08 | End: 2018-11-09

## 2018-11-08 RX ORDER — DIPHENHYDRAMINE HYDROCHLORIDE 50 MG/ML
12.5 INJECTION INTRAMUSCULAR; INTRAVENOUS ONCE AS NEEDED
Status: COMPLETED | OUTPATIENT
Start: 2018-11-08 | End: 2018-11-08

## 2018-11-08 RX ORDER — MORPHINE SULFATE 4 MG/ML
4 INJECTION, SOLUTION INTRAMUSCULAR; INTRAVENOUS EVERY 2 HOUR PRN
Status: DISCONTINUED | OUTPATIENT
Start: 2018-11-08 | End: 2018-11-09

## 2018-11-08 RX ORDER — METOCLOPRAMIDE HYDROCHLORIDE 5 MG/ML
10 INJECTION INTRAMUSCULAR; INTRAVENOUS AS NEEDED
Status: DISCONTINUED | OUTPATIENT
Start: 2018-11-08 | End: 2018-11-08 | Stop reason: HOSPADM

## 2018-11-08 RX ORDER — NITROGLYCERIN 20 MG/100ML
INJECTION INTRAVENOUS CONTINUOUS
Status: DISCONTINUED | OUTPATIENT
Start: 2018-11-08 | End: 2018-11-09

## 2018-11-08 RX ORDER — HYDROCODONE BITARTRATE AND ACETAMINOPHEN 5; 325 MG/1; MG/1
1 TABLET ORAL AS NEEDED
Status: DISCONTINUED | OUTPATIENT
Start: 2018-11-08 | End: 2018-11-08 | Stop reason: HOSPADM

## 2018-11-08 RX ORDER — ONDANSETRON 2 MG/ML
4 INJECTION INTRAMUSCULAR; INTRAVENOUS AS NEEDED
Status: DISCONTINUED | OUTPATIENT
Start: 2018-11-08 | End: 2018-11-08 | Stop reason: HOSPADM

## 2018-11-08 RX ORDER — SODIUM CHLORIDE 9 MG/ML
INJECTION, SOLUTION INTRAVENOUS CONTINUOUS
Status: DISCONTINUED | OUTPATIENT
Start: 2018-11-08 | End: 2018-11-08 | Stop reason: HOSPADM

## 2018-11-08 RX ORDER — ONDANSETRON 2 MG/ML
INJECTION INTRAMUSCULAR; INTRAVENOUS
Status: COMPLETED
Start: 2018-11-08 | End: 2018-11-08

## 2018-11-08 RX ORDER — METOCLOPRAMIDE HYDROCHLORIDE 5 MG/ML
INJECTION INTRAMUSCULAR; INTRAVENOUS
Status: DISCONTINUED
Start: 2018-11-08 | End: 2018-11-08 | Stop reason: WASHOUT

## 2018-11-08 RX ORDER — LABETALOL HYDROCHLORIDE 5 MG/ML
20 INJECTION, SOLUTION INTRAVENOUS EVERY 4 HOURS PRN
Status: DISCONTINUED | OUTPATIENT
Start: 2018-11-08 | End: 2018-11-09

## 2018-11-08 RX ORDER — NALOXONE HYDROCHLORIDE 0.4 MG/ML
80 INJECTION, SOLUTION INTRAMUSCULAR; INTRAVENOUS; SUBCUTANEOUS AS NEEDED
Status: DISCONTINUED | OUTPATIENT
Start: 2018-11-08 | End: 2018-11-08 | Stop reason: HOSPADM

## 2018-11-08 RX ORDER — BUPIVACAINE HYDROCHLORIDE 5 MG/ML
INJECTION, SOLUTION EPIDURAL; INTRACAUDAL AS NEEDED
Status: DISCONTINUED | OUTPATIENT
Start: 2018-11-08 | End: 2018-11-08 | Stop reason: HOSPADM

## 2018-11-08 RX ORDER — FUROSEMIDE 10 MG/ML
80 INJECTION INTRAMUSCULAR; INTRAVENOUS ONCE
Status: COMPLETED | OUTPATIENT
Start: 2018-11-08 | End: 2018-11-08

## 2018-11-08 RX ORDER — CLONIDINE HYDROCHLORIDE 0.1 MG/1
0.3 TABLET ORAL 2 TIMES DAILY
Status: DISCONTINUED | OUTPATIENT
Start: 2018-11-08 | End: 2018-11-08

## 2018-11-08 RX ORDER — CLONIDINE HYDROCHLORIDE 0.1 MG/1
0.3 TABLET ORAL 3 TIMES DAILY
Status: DISCONTINUED | OUTPATIENT
Start: 2018-11-08 | End: 2018-11-08

## 2018-11-08 RX ORDER — HYDROCODONE BITARTRATE AND ACETAMINOPHEN 5; 325 MG/1; MG/1
2 TABLET ORAL AS NEEDED
Status: DISCONTINUED | OUTPATIENT
Start: 2018-11-08 | End: 2018-11-08 | Stop reason: HOSPADM

## 2018-11-08 RX ORDER — LABETALOL 200 MG/1
100 TABLET, FILM COATED ORAL
Status: DISCONTINUED | OUTPATIENT
Start: 2018-11-08 | End: 2018-11-09

## 2018-11-08 RX ORDER — FUROSEMIDE 40 MG/1
40 TABLET ORAL
Status: DISCONTINUED | OUTPATIENT
Start: 2018-11-08 | End: 2018-11-08

## 2018-11-08 RX ORDER — CLONIDINE HYDROCHLORIDE 0.1 MG/1
0.2 TABLET ORAL ONCE
Status: COMPLETED | OUTPATIENT
Start: 2018-11-08 | End: 2018-11-08

## 2018-11-08 RX ORDER — TERAZOSIN 10 MG/1
10 CAPSULE ORAL NIGHTLY
Status: DISCONTINUED | OUTPATIENT
Start: 2018-11-08 | End: 2018-11-09

## 2018-11-08 RX ORDER — SODIUM CHLORIDE, SODIUM LACTATE, POTASSIUM CHLORIDE, CALCIUM CHLORIDE 600; 310; 30; 20 MG/100ML; MG/100ML; MG/100ML; MG/100ML
INJECTION, SOLUTION INTRAVENOUS CONTINUOUS
Status: DISCONTINUED | OUTPATIENT
Start: 2018-11-08 | End: 2018-11-08

## 2018-11-08 RX ORDER — LABETALOL 100 MG/1
100 TABLET, FILM COATED ORAL EVERY 12 HOURS SCHEDULED
Status: DISCONTINUED | OUTPATIENT
Start: 2018-11-08 | End: 2018-11-08

## 2018-11-08 RX ORDER — CEFAZOLIN SODIUM/WATER 2 G/20 ML
2 SYRINGE (ML) INTRAVENOUS EVERY 8 HOURS
Status: COMPLETED | OUTPATIENT
Start: 2018-11-08 | End: 2018-11-09

## 2018-11-08 RX ORDER — MORPHINE SULFATE 4 MG/ML
2 INJECTION, SOLUTION INTRAMUSCULAR; INTRAVENOUS EVERY 2 HOUR PRN
Status: DISCONTINUED | OUTPATIENT
Start: 2018-11-08 | End: 2018-11-09

## 2018-11-08 RX ORDER — LABETALOL HYDROCHLORIDE 5 MG/ML
10 INJECTION, SOLUTION INTRAVENOUS EVERY 10 MIN PRN
Status: DISCONTINUED | OUTPATIENT
Start: 2018-11-08 | End: 2018-11-08 | Stop reason: HOSPADM

## 2018-11-08 RX ORDER — HYDROCODONE BITARTRATE AND ACETAMINOPHEN 5; 325 MG/1; MG/1
1 TABLET ORAL EVERY 6 HOURS PRN
Status: DISCONTINUED | OUTPATIENT
Start: 2018-11-08 | End: 2018-11-09

## 2018-11-08 RX ORDER — HYDROCODONE BITARTRATE AND ACETAMINOPHEN 5; 325 MG/1; MG/1
2 TABLET ORAL EVERY 6 HOURS PRN
Status: DISCONTINUED | OUTPATIENT
Start: 2018-11-08 | End: 2018-11-09

## 2018-11-08 RX ORDER — MORPHINE SULFATE 4 MG/ML
6 INJECTION, SOLUTION INTRAMUSCULAR; INTRAVENOUS ONCE
Status: COMPLETED | OUTPATIENT
Start: 2018-11-08 | End: 2018-11-08

## 2018-11-08 RX ORDER — DIPHENHYDRAMINE HYDROCHLORIDE 50 MG/ML
INJECTION INTRAMUSCULAR; INTRAVENOUS
Status: COMPLETED
Start: 2018-11-08 | End: 2018-11-08

## 2018-11-08 RX ORDER — DEXTROSE AND SODIUM CHLORIDE 5; .45 G/100ML; G/100ML
INJECTION, SOLUTION INTRAVENOUS CONTINUOUS
Status: DISCONTINUED | OUTPATIENT
Start: 2018-11-08 | End: 2018-11-08

## 2018-11-08 RX ORDER — TACROLIMUS 1 MG/1
2 CAPSULE ORAL 2 TIMES DAILY
Status: DISCONTINUED | OUTPATIENT
Start: 2018-11-08 | End: 2018-11-09

## 2018-11-08 RX ORDER — MORPHINE SULFATE 4 MG/ML
8 INJECTION, SOLUTION INTRAMUSCULAR; INTRAVENOUS EVERY 2 HOUR PRN
Status: DISCONTINUED | OUTPATIENT
Start: 2018-11-08 | End: 2018-11-09

## 2018-11-08 RX ORDER — ONDANSETRON 2 MG/ML
4 INJECTION INTRAMUSCULAR; INTRAVENOUS EVERY 6 HOURS PRN
Status: DISCONTINUED | OUTPATIENT
Start: 2018-11-08 | End: 2018-11-09

## 2018-11-08 RX ORDER — CLONIDINE HYDROCHLORIDE 0.1 MG/1
0.3 TABLET ORAL 3 TIMES DAILY
Status: DISCONTINUED | OUTPATIENT
Start: 2018-11-08 | End: 2018-11-09

## 2018-11-08 NOTE — H&P
[facility]    PATIENT'S NAME: LEONIE Ash   ATTENDING PHYSICIAN: Eduardo Del Rio M.D.    PATIENT ACCOUNT#:   [de-identified]    LOCATION:  [address]  MEDICAL RECORD #:   [id number]       YOB: 1965  ADMISSION DATE:       11/08/2 Toprol, minoxidil, pravastatin. FAMILY HISTORY:  There is a family history of bipolar disorder and breast cancer. PHYSICAL EXAMINATION:    GENERAL:  He is awake, alert, appropriate. He is in no acute distress.     VITAL SIGNS:  Blood pressure 136/

## 2018-11-08 NOTE — CONSULTS
[facility]  Report of Consultation    Abhishek Lafleur Patient Status:  Observation    3/31/1965 MRN [id number]   [facility] 6NE-A Attending Nadia De Oliveira MD   Hosp Day # 1 PCP Geovany Winslow MD     Reason for Consultation:  HTN/ESRD with BMI of 40.0-44.9, adult ([facility] 1/21/2014   • Needs flu shot 10/10/2012   • Nephritis and nephropathy, not specified as acute or chronic, with unspecified pathological lesion in kidney    • Neuropathy     bilateral feet   • Osteoarthritis, knee 12/23/20 Performed by Jr Silva MD at [facility] MAIN OR   • FEMUR/KNEE SURG UNLISTED  1994    right knee repair of ligament cruciate anterior   • KNEE REPLACEMENT SURGERY     • KNEE SURGERY     • KNEE TOTAL REPLACEMENT Left 3/21/2016    Performed by Marylene Burrow, MD COMMENTS)    Comment:Chest pain  Nortriptyline Hcl       DIARRHEA, NAUSEA AND VOMITING    Medications:    Current Facility-Administered Medications:   •  dextrose 5 %-0.45 % NaCl infusion, , Intravenous, Continuous  •  acetaminophen (TYLENOL) tab 650 mg, 6 0914 : 250 lb  11/01/18 0936 : 249 lb  10/30/18 1005 : 252 lb  10/23/18 0645 : 250 lb  10/09/18 1202 : 250 lb  10/18/18 0957 : 249 lb    General: Alert and oriented in no apparent distress.   HEENT: No scleral icterus, MMM  Neck: Supple, no LISY or thyromega hours. Impression/Plan:    #1. HTN- he has had very difficult to control BP prior to starting HD but has lost approx 100# and the BP has been much better with attempt at weaning antihypertensives. Currently on nitro gtt and will try to wean.  IV la

## 2018-11-08 NOTE — PROGRESS NOTES
Called from Dr. Aundrea Kellogg- had difficulties controlling blood pressure- was going to discharge home, but pressure worsened. Currently patient resting- no cardiac/cerebrovascular symptoms. Blood pressure- [phone] systolic, [phone] diastolic.  Will admit

## 2018-11-08 NOTE — OPERATIVE REPORT
Pre-operative diagnosis: ESRD with difficult to cannulate access. Post-operative diagnosis: Stenotic segment of venous outflow tract/depth of fistula.  Procedure: mobilization of venous outflow/ resection of stenotic segment of fistula with end to end repai

## 2018-11-08 NOTE — PROGRESS NOTES
Laverne Cox is a 48year old male with a history of kidney transplant for which he takes tacrolimus (Prograf).   The transplant medications and doses have been verified with the outpati

## 2018-11-09 VITALS
HEIGHT: 74 IN | OXYGEN SATURATION: 97 % | BODY MASS INDEX: 32.31 KG/M2 | WEIGHT: 251.75 LBS | DIASTOLIC BLOOD PRESSURE: 87 MMHG | SYSTOLIC BLOOD PRESSURE: 133 MMHG | HEART RATE: 98 BPM | TEMPERATURE: 97 F | RESPIRATION RATE: 20 BRPM

## 2018-11-09 PROCEDURE — 99232 SBSQ HOSP IP/OBS MODERATE 35: CPT | Performed by: INTERNAL MEDICINE

## 2018-11-09 PROCEDURE — 90935 HEMODIALYSIS ONE EVALUATION: CPT | Performed by: INTERNAL MEDICINE

## 2018-11-09 PROCEDURE — 5A1D70Z PERFORMANCE OF URINARY FILTRATION, INTERMITTENT, LESS THAN 6 HOURS PER DAY: ICD-10-PCS | Performed by: INTERNAL MEDICINE

## 2018-11-09 RX ORDER — NIFEDIPINE 60 MG/1
60 TABLET, FILM COATED, EXTENDED RELEASE ORAL DAILY
Qty: 30 TABLET | Refills: 0 | Status: SHIPPED | OUTPATIENT
Start: 2018-11-09 | End: 2019-04-18 | Stop reason: ALTCHOICE

## 2018-11-09 RX ORDER — CLONIDINE HYDROCHLORIDE 0.1 MG/1
0.2 TABLET ORAL EVERY 4 HOURS PRN
Status: DISCONTINUED | OUTPATIENT
Start: 2018-11-09 | End: 2018-11-09

## 2018-11-09 NOTE — PROGRESS NOTES
No complaints/ blood pressure controlled. Wound clean/dry. Fistula patent. Pulses present.  Discussed wound care/activity/follow up

## 2018-11-09 NOTE — OPERATIVE REPORT
[facility]    PATIENT'S NAME: LEONIE Quintanilla   ATTENDING PHYSICIAN: Clarita Iverson M.D. OPERATING PHYSICIAN: Clarita Iverson M.D.    PATIENT ACCOUNT#:   [de-identified]    LOCATION:  [address]  MEDICAL RECORD #:   [id number]       DATE OF BIRTH: congestive heart failure, future thrombosis, future failure of the fistula, the possibility for future prosthetic graft. The patient understood and agreed. All questions answered.     OPERATIVE TECHNIQUE:  The patient placed supine on the procedure table vein had to be placed slightly just more medial due to this resection, but it did not appear to be under any tension. The fatty tissue had been removed all the way down to the Shy fascia so it would hopefully be a more superficial puncture site.   The p

## 2018-11-09 NOTE — PROGRESS NOTES
[facility]  Nephrology Progress Note    Nidhi Pressley Patient Status:  Inpatient    3/31/1965 MRN [id number]   [facility] ([place], [place]) 6NE-A Attending Elie Mcgee MD   Hosp Day # 1 PCP Caroline Jennings MD       SUBJECTIVE:  Pt seen on dialysis, o Medications:  acetaminophen (TYLENOL) tab 650 mg 650 mg Oral Q6H PRN   ondansetron HCl (ZOFRAN) injection 4 mg 4 mg Intravenous Q6H PRN   morphINE sulfate (PF) 4 MG/ML injection 2 mg 2 mg Intravenous Q2H PRN   Or      morphINE sulfate (PF) 4 MG/ML injectio component related to pain and may have volume excess contributing as well. incr UF with HD to 4L this AM.  Pain mgmt per primary service. outpt meds were adjusted down due to hypotension and will review and adjust doses as needed. Follow closely     #2.  E

## 2018-11-09 NOTE — PLAN OF CARE
Maintains optimal cardiac output and hemodynamic stability Progressing      Minimal or absence of nausea and vomiting Progressing      Free from bleeding injury Progressing      Hemodynamic stability and optimal renal function maintained Progressing      P

## 2018-11-09 NOTE — PROGRESS NOTES
Called for hematoma left arm/ J-P drain that clotted. Patient currently no complaints. Denies chest pain/ischemic hand/neurologic weakness. Wounds intact- ecchymosis where fistula mobilized upward. Fistula functioning. Neurologically intact.  Soft swelling

## 2018-11-09 NOTE — PROGRESS NOTES
[facility]  Progress Note    Govind Ramirez Patient Status:  Inpatient    3/31/1965 MRN [id number]   [facility] ([place], [place]) 6NE-A Attending Yoko Ji MD   Hosp Day # 1 PCP Clemmie Runner, MD     CC: Medical management    SUBJECTIVE:  Denies an WBC 10.2 11/08/2018    HGB 11.7 11/08/2018    HCT 33.7 11/08/2018    .0 11/08/2018    CREATSERUM 3.33 11/08/2018    BUN 20 11/08/2018     11/08/2018    K 3.7 11/08/2018     11/08/2018    CO2 25.0 11/08/2018     11/08/2018    CA 1.4 mg of buprenorphine Sublingual TID     Facility-Administered Medications Ordered in Other Encounters:  lactated ringers infusion  Intravenous Continuous       ASSESSMENT / PLAN:    Mobilization of left brachial artery cephalic vein fistula to a more duckworth

## 2018-11-09 NOTE — CONSULTS
[facility]  [address] Patient Status:  Observation    3/31/1965 MRN [id number]   [facility] 6NE-A Attending Nichelle Childers MD   Hosp Day # 1 PCP Jarred Mcwilliams MD     Reason for consult: Medical management    Requ Morbid obesity with BMI of 40.0-44.9, adult ([address] 1/21/2014   • Needs flu shot 10/10/2012   • Nephritis and nephropathy, not specified as acute or chronic, with unspecified pathological lesion in kidney    • Neuropathy     bilateral feet   • Osteoarthritis IRRIGATION & DEBRIDEMENT Left 7/5/2018    Performed by Charlet Gowers, MD at [facility] MAIN OR   • FEMUR/KNEE SURG UNLISTED  1994    right knee repair of ligament cruciate anterior   • KNEE REPLACEMENT SURGERY     • KNEE SURGERY     • KNEE TOTAL REPLACEMENT Left Hcl             Comment:TABS Drowsiness  Minoxidil               OTHER (SEE COMMENTS)    Comment:Chest pain  Nortriptyline Hcl       DIARRHEA, NAUSEA AND VOMITING    Medications:    Current Facility-Administered Medications on File Prior to Encounter:  lac COVER WITH PLASTIC WRAP. Disp:  Rfl: 3   ondansetron 4 MG Oral Tablet Dispersible Take 4 mg by mouth every 8 (eight) hours as needed for Nausea. Disp:  Rfl:    Cholecalciferol (VITAMIN D) 1000 UNITS Oral Tab Take 1,000 Int'l Units/day by mouth daily.  Disp: syndrome status post recently failed transplant on multiple medicines and currently on hemodialysis on Mondays Wednesdays and Fridays who presented with left arm fistula that was not functioning well.   2. Postop hypertensive emergency-we will resume all ho

## 2018-11-09 NOTE — PROGRESS NOTES
11/09/18 1352   Clinical Encounter Type   Visited With Patient   Sacramental Encounters   Sacrament of Sick-Anointing Visiting  anointed patient   The patient was seen by Faith Anderson.  Received prayer, Scripture, support and Sacrament

## 2018-11-09 NOTE — RESPIRATORY THERAPY NOTE
RAQUEL - Equipment Use Daily Summary:                  . Set Mode:  CPAP WITH C-FLEX                . Usage in hours: 1:49                . 90% Pressure (EPAP) level: 11                . 90% Insp. Pressure (IPAP): Rudolph Valentin AHI: 15                .  Supp

## 2018-11-12 ENCOUNTER — PATIENT OUTREACH (OUTPATIENT)
Dept: CASE MANAGEMENT | Age: 53
End: 2018-11-12

## 2018-11-12 ENCOUNTER — TELEPHONE (OUTPATIENT)
Dept: INTERNAL MEDICINE CLINIC | Facility: CLINIC | Age: 53
End: 2018-11-12

## 2018-11-12 DIAGNOSIS — Z99.2 ESRD ON HEMODIALYSIS (HCC): ICD-10-CM

## 2018-11-12 DIAGNOSIS — N18.6 ESRD ON HEMODIALYSIS (HCC): ICD-10-CM

## 2018-11-12 DIAGNOSIS — Z02.9 ENCOUNTERS FOR ADMINISTRATIVE PURPOSE: ICD-10-CM

## 2018-11-12 PROCEDURE — 1111F DSCHRG MED/CURRENT MED MERGE: CPT

## 2018-11-12 NOTE — DISCHARGE SUMMARY
[facility]    PATIENT'S NAME: LEONIE Dorman   ATTENDING PHYSICIAN: Piyush Reyes M.D.    PATIENT ACCOUNT#:   [de-identified]    LOCATION:  [address] [place] [address]  MEDICAL RECORD #:   [id number]       YOB: 1965  ADMISSION DATE:       11/08/201

## 2018-11-12 NOTE — PROGRESS NOTES
Initial Post Discharge Follow Up   Discharge Date: 11/9/18  Contact Date: 11/12/2018    Consent Verification:  Assessment Completed With: Patient  HIPAA Verified?   Yes    Discharge Dx:    Autogenous fistula too deep, S/P mobilization of left brachial ce breakthrough left knee pain Disp: 45 tablet Rfl: 0   ZUBSOLV 1.4-0.36 MG Sublingual SL Tab Place 1.4 mg of buprenorphine under the tongue 3 (three) times daily.  Disp: 90 tablet Rfl: 1   Labetalol HCl 100 MG Oral Tab Take 1 tablet (100 mg total) by mouth 2 ordered at D/C?   No, not at this time          Needs post D/C:   Now that you are home, are there any needs or concerns you need addressed before your next visit with your PCP?  (DME, meds, disease concerns, Etc): No     Follow up appointments:       Your [address] [place] [address]  [phone]          PCP TCM/HFU appointment: scheduled at D/C within 7-14 days no     NCM Reviewed/scheduled/rescheduled PCP TCM/HFU appointment with pt:  NCM attempted to schedule TCM HFU, patient states that he wa

## 2018-11-12 NOTE — TELEPHONE ENCOUNTER
Patient discharged home on 11/9/18 and is at high risk of readmission and recommended to have a TCM HFU by 11/16/18, no later than 11/23/18.  NCM attempted to schedule TCM HFU, patient states that he was recently in to see Dr. Aurelio Coulter and does not want to jefe

## 2018-11-13 NOTE — TELEPHONE ENCOUNTER
NotedEliezer Crazier D. Rockne Najjar, MD  Diplomate, American Board of Internal Medicine  [address] [address] [address]  [facility]0 [facility],4Th Floor, [address], [facility] & [facility], [address] [place] [address]  T: [phone]; F: Fermin 5

## 2018-12-04 ENCOUNTER — LAB ENCOUNTER (OUTPATIENT)
Dept: LAB | Age: 53
End: 2018-12-04
Attending: INTERNAL MEDICINE
Payer: MEDICARE

## 2018-12-04 DIAGNOSIS — E89.0 POSTSURGICAL HYPOTHYROIDISM: ICD-10-CM

## 2018-12-04 DIAGNOSIS — C73 PAPILLARY THYROID CARCINOMA (HCC): ICD-10-CM

## 2018-12-04 PROCEDURE — 84432 ASSAY OF THYROGLOBULIN: CPT

## 2018-12-04 PROCEDURE — 84443 ASSAY THYROID STIM HORMONE: CPT

## 2018-12-04 PROCEDURE — 86800 THYROGLOBULIN ANTIBODY: CPT

## 2018-12-04 PROCEDURE — 36415 COLL VENOUS BLD VENIPUNCTURE: CPT

## 2018-12-04 PROCEDURE — 84439 ASSAY OF FREE THYROXINE: CPT

## 2018-12-20 ENCOUNTER — HOSPITAL ENCOUNTER (OUTPATIENT)
Dept: ULTRASOUND IMAGING | Facility: HOSPITAL | Age: 53
Discharge: HOME | End: 2018-12-20
Attending: SURGERY
Payer: MEDICARE

## 2018-12-20 DIAGNOSIS — M79.89 ARM SWELLING: ICD-10-CM

## 2018-12-20 DIAGNOSIS — N18.6 ESRD (END STAGE RENAL DISEASE) (HCC): ICD-10-CM

## 2018-12-20 DIAGNOSIS — N18.6 END STAGE RENAL DISEASE (HCC): ICD-10-CM

## 2018-12-20 DIAGNOSIS — M79.603 PAIN OF UPPER EXTREMITY, UNSPECIFIED LATERALITY: ICD-10-CM

## 2018-12-20 PROCEDURE — 93990 DOPPLER FLOW TESTING: CPT | Performed by: SURGERY

## 2018-12-27 ENCOUNTER — OFFICE VISIT (OUTPATIENT)
Dept: INTERNAL MEDICINE CLINIC | Facility: CLINIC | Age: 53
End: 2018-12-27
Payer: MEDICARE

## 2018-12-27 VITALS
RESPIRATION RATE: 16 BRPM | BODY MASS INDEX: 31.44 KG/M2 | TEMPERATURE: 98 F | WEIGHT: 245 LBS | SYSTOLIC BLOOD PRESSURE: 128 MMHG | HEIGHT: 74 IN | DIASTOLIC BLOOD PRESSURE: 90 MMHG

## 2018-12-27 DIAGNOSIS — Z99.2 ESRD ON HEMODIALYSIS (HCC): ICD-10-CM

## 2018-12-27 DIAGNOSIS — E89.0 POSTSURGICAL HYPOTHYROIDISM: ICD-10-CM

## 2018-12-27 DIAGNOSIS — R51.9 ACUTE NONINTRACTABLE HEADACHE, UNSPECIFIED HEADACHE TYPE: Primary | ICD-10-CM

## 2018-12-27 DIAGNOSIS — N18.6 ESRD ON HEMODIALYSIS (HCC): ICD-10-CM

## 2018-12-27 DIAGNOSIS — I10 BENIGN ESSENTIAL HTN: ICD-10-CM

## 2018-12-27 PROCEDURE — 99214 OFFICE O/P EST MOD 30 MIN: CPT | Performed by: INTERNAL MEDICINE

## 2018-12-27 RX ORDER — FLUTICASONE PROPIONATE 50 MCG
2 SPRAY, SUSPENSION (ML) NASAL DAILY
Qty: 1 BOTTLE | Refills: 1 | Status: SHIPPED | OUTPATIENT
Start: 2018-12-27 | End: 2019-08-22

## 2018-12-27 RX ORDER — AMOXICILLIN AND CLAVULANATE POTASSIUM 500; 125 MG/1; MG/1
1 TABLET, FILM COATED ORAL DAILY
Qty: 7 TABLET | Refills: 0 | Status: SHIPPED | OUTPATIENT
Start: 2018-12-27 | End: 2019-01-03

## 2018-12-27 NOTE — PROGRESS NOTES
Nenita Frias is a 48year old male. HPI:   Patient presents with:  Headache: Diabetic Eye Exam due in January 2019 - [facility] [place]  Patient presents with acute complaint of headache. Intermittent headaches for the past week.   Pain mostly across f 0  •  OxyCODONE HCl IR 10 MG Oral Tab, 1 tab po q6 hr prn sever breakthrough left knee pain, Disp: 45 tablet, Rfl: 0  •  ZUBSOLV 1.4-0.36 MG Sublingual SL Tab, Place 1.4 mg of buprenorphine under the tongue 3 (three) times daily. , Disp: 90 tablet, Rfl: 1 spondylosis with myelopathy (1/6/2014), Chronic kidney disease, stage III (moderate) ([facility] (6/29/2012), Chronic pain syndrome (6/29/2012), Constipation, Coronary atherosclerosis, DDD (degenerative disc disease), cervical (10/17/2011), Dialysis patient [facility] surgery; anesth,kidney transplant; cath percutaneous  transluminal coronary angioplasty (2016); appendectomy; knee replacement surgery; organ transplant; A.V. FISTULA (Left, 11/8/2018); COLONOSCOPY (N/A, 10/23/2018); A.V. FISTULA (Left, 7/30/2018);  EXTREMI 500 mg qd). Recommended steroid nasal sprays as well. May consider imaging/CT scan if symptoms persist.    2. Benign essential HTN  Reasonable, diastolic at 90. Continue current regimen. 3. ESRD on hemodialysis [facility])  Following with nephrology.   Hx o

## 2018-12-27 NOTE — PATIENT INSTRUCTIONS
- Start antibiotic (Augmentin). Take 1 capsule daily for the next week. - Use fluticasone nasal spray twice daily for next week  - Let us know if your headache is not better after finishing antibiotics.     It was a pleasure seeing you in the clinic today

## 2019-01-10 ENCOUNTER — OFFICE VISIT (OUTPATIENT)
Dept: NEPHROLOGY | Facility: CLINIC | Age: 54
End: 2019-01-10
Payer: MEDICARE

## 2019-01-10 ENCOUNTER — PRIOR ORIGINAL RECORDS (OUTPATIENT)
Dept: OTHER | Age: 54
End: 2019-01-10

## 2019-01-10 ENCOUNTER — MYAURORA ACCOUNT LINK (OUTPATIENT)
Dept: OTHER | Age: 54
End: 2019-01-10

## 2019-01-10 VITALS — DIASTOLIC BLOOD PRESSURE: 76 MMHG | SYSTOLIC BLOOD PRESSURE: 124 MMHG | BODY MASS INDEX: 32 KG/M2 | WEIGHT: 247 LBS

## 2019-01-10 DIAGNOSIS — N18.6 ESRD (END STAGE RENAL DISEASE) (HCC): Primary | ICD-10-CM

## 2019-01-10 LAB
ALKALINE PHOSPHATATE(ALK PHOS): 52 IU/L
BILIRUBIN TOTAL: 0.9 MG/DL
BUN: 125 MG/DL
CALCIUM: 9 MG/DL
CHLORIDE: 101 MEQ/L
CHOLESTEROL, TOTAL: 104 MG/DL
CREATININE, SERUM: 4.06 MG/DL
GLUCOSE: 136 MG/DL
HDL CHOLESTEROL: 34 MG/DL
LDL CHOLESTEROL: 57 MG/DL
POTASSIUM, SERUM: 3.2 MEQ/L
PROTEIN, TOTAL: 7.3 G/DL
SGOT (AST): 34 IU/L
SGPT (ALT): 50 IU/L
SODIUM: 138 MEQ/L
TRIGLYCERIDES: 66 MG/DL

## 2019-01-10 NOTE — PROGRESS NOTES
Lucy Denise Adrien was seen in the nephrology clinic today in follow-up for management of end-stage renal disease.   Interestingly he reports that his urine output has increased significantly despite being on dialysis since June and he recently did a 24-hour uri

## 2019-01-15 ENCOUNTER — OFFICE VISIT (OUTPATIENT)
Dept: INTERNAL MEDICINE CLINIC | Facility: CLINIC | Age: 54
End: 2019-01-15
Payer: MEDICARE

## 2019-01-15 VITALS
BODY MASS INDEX: 32.6 KG/M2 | HEIGHT: 73 IN | SYSTOLIC BLOOD PRESSURE: 120 MMHG | RESPIRATION RATE: 16 BRPM | HEART RATE: 64 BPM | DIASTOLIC BLOOD PRESSURE: 76 MMHG | WEIGHT: 246 LBS | TEMPERATURE: 98 F

## 2019-01-15 DIAGNOSIS — H92.03 ACUTE EAR PAIN, BILATERAL: Primary | ICD-10-CM

## 2019-01-15 PROCEDURE — 99213 OFFICE O/P EST LOW 20 MIN: CPT | Performed by: INTERNAL MEDICINE

## 2019-01-15 RX ORDER — AMOXICILLIN AND CLAVULANATE POTASSIUM 500; 125 MG/1; MG/1
1 TABLET, FILM COATED ORAL DAILY
Qty: 14 TABLET | Refills: 0 | Status: SHIPPED | OUTPATIENT
Start: 2019-01-15 | End: 2019-01-29

## 2019-01-15 RX ORDER — POTASSIUM CHLORIDE 20 MEQ/1
20 TABLET, EXTENDED RELEASE ORAL 2 TIMES DAILY
Refills: 0 | COMMUNITY
Start: 2019-01-07 | End: 2019-09-26 | Stop reason: ALTCHOICE

## 2019-01-15 NOTE — PROGRESS NOTES
Jorge Lai is a 48year old male. HPI:   Patient presents with: Other: ear aches   Patient presents with recurrent ear pain/aching. He was seen last month for headaches/similar symptoms - prescribed Augmentin, which seemed to help.   However once MG Oral Tablet 24 Hr, Take 1 tablet (60 mg total) by mouth daily. , Disp: 30 tablet, Rfl: 0  •  lidocaine-prilocaine 2.5-2.5 % External Cream, NAM 1 HOUR PRIOR TO DIALYSIS AND COVER WITH PLASTIC WRAP., Disp: , Rfl: 3  •  Labetalol HCl 100 MG Oral Tab, Take (degenerative disc disease), cervical (10/17/2011), Dialysis patient [facility]), Disorder of thyroid, Dizziness, Easy bruising, Exposure to medical diagnostic radiation, Frequent use of laxatives, Gout, Hearing impairment, Hearing loss, History of blood transfu 11/8/2018); COLONOSCOPY (N/A, 10/23/2018); A.V. FISTULA (Left, 7/30/2018); EXTREMITY LOWER IRRIGATION & DEBRIDEMENT (Left, 7/5/2018); KNEE TOTAL REPLACEMENT (Left, 3/21/2016); COLONOSCOPY (N/A, 5/1/2015); and THYROIDECTOMY (Bilateral, 2/2/2015).   Family: f Consulting Physician (Shikha Danielson)  Gisela Cook MD as Consulting Physician ([address] [place] [facility])  Izabella Livingston MD as Consulting Physician (ENDOCRINOLOGY)  Bhavesh Irvin as Consulting Physician (CHIROPRACTOR)  Marisela Perez MD as Consulting Phys

## 2019-01-15 NOTE — PATIENT INSTRUCTIONS
- We will do another round of antibiotics. Take daily for next 2 weeks. - If ear pain does not improve, follow up with ENT specialist.    It was a pleasure seeing you in the clinic today.   Thank you for choosing the [facility]

## 2019-01-29 ENCOUNTER — LAB ENCOUNTER (OUTPATIENT)
Dept: LAB | Age: 54
End: 2019-01-29
Attending: INTERNAL MEDICINE
Payer: MEDICARE

## 2019-01-29 DIAGNOSIS — E89.0 POSTSURGICAL HYPOTHYROIDISM: ICD-10-CM

## 2019-01-29 DIAGNOSIS — C73 PAPILLARY THYROID CARCINOMA (HCC): ICD-10-CM

## 2019-01-29 LAB — TSI SER-ACNC: 0.53 MIU/ML (ref 0.35–5.5)

## 2019-01-29 PROCEDURE — 36415 COLL VENOUS BLD VENIPUNCTURE: CPT

## 2019-01-29 PROCEDURE — 84443 ASSAY THYROID STIM HORMONE: CPT

## 2019-02-05 RX ORDER — CLONIDINE HYDROCHLORIDE 0.3 MG/1
TABLET ORAL
Qty: 270 TABLET | Refills: 1 | Status: SHIPPED | OUTPATIENT
Start: 2019-02-05 | End: 2019-04-18

## 2019-02-05 RX ORDER — TACROLIMUS 1 MG/1
2 CAPSULE ORAL 2 TIMES DAILY
Qty: 360 CAPSULE | Refills: 1 | Status: SHIPPED | OUTPATIENT
Start: 2019-02-05 | End: 2019-07-18

## 2019-02-05 RX ORDER — LABETALOL 100 MG/1
100 TABLET, FILM COATED ORAL 2 TIMES DAILY
Qty: 180 TABLET | Refills: 1 | Status: SHIPPED | OUTPATIENT
Start: 2019-02-05 | End: 2019-09-26 | Stop reason: DRUGHIGH

## 2019-02-07 PROCEDURE — 80307 DRUG TEST PRSMV CHEM ANLYZR: CPT | Performed by: INTERNAL MEDICINE

## 2019-02-12 ENCOUNTER — TELEPHONE (OUTPATIENT)
Dept: INTERNAL MEDICINE CLINIC | Facility: CLINIC | Age: 54
End: 2019-02-12

## 2019-02-12 NOTE — TELEPHONE ENCOUNTER
Pt called w c/o of R breast pain by \"nipple area\". Pt denied any discharge/ swelling/ redness/ sob/ chest tightness or deeper pain/ tingling or numbness on any limb or face. Pt has an upcoming ov w Dr Susan Guzman for tomorrow.  And advised pt to proceed t

## 2019-02-13 ENCOUNTER — OFFICE VISIT (OUTPATIENT)
Dept: INTERNAL MEDICINE CLINIC | Facility: CLINIC | Age: 54
End: 2019-02-13
Payer: MEDICARE

## 2019-02-13 VITALS
TEMPERATURE: 98 F | HEART RATE: 86 BPM | DIASTOLIC BLOOD PRESSURE: 70 MMHG | HEIGHT: 73 IN | WEIGHT: 249 LBS | RESPIRATION RATE: 16 BRPM | BODY MASS INDEX: 33 KG/M2 | SYSTOLIC BLOOD PRESSURE: 114 MMHG

## 2019-02-13 DIAGNOSIS — N64.4 BREAST PAIN IN MALE: Primary | ICD-10-CM

## 2019-02-13 DIAGNOSIS — N64.4 NIPPLE TENDERNESS: ICD-10-CM

## 2019-02-13 PROCEDURE — 99213 OFFICE O/P EST LOW 20 MIN: CPT | Performed by: INTERNAL MEDICINE

## 2019-02-13 NOTE — PATIENT INSTRUCTIONS
- Schedule mammogram ([phone])  - We will decide next steps based on mammogram results. It was a pleasure seeing you in the clinic today. [facility].  Please call at

## 2019-02-13 NOTE — PROGRESS NOTES
Aurelia Santiago is a 48year old male. HPI:   Patient presents with:  Chest Pain: Right side chest pain   Patient presents with complaint of right chest/nipple pain. This has been going on for several weeks.   Pain is constant, aching in quality, modera Disp: 90 tablet, Rfl: 1  •  CLONIDINE HCL 0.3 MG Oral Tab, TAKE 1 TABLET(0.3 MG) BY MOUTH THREE TIMES DAILY, Disp: 270 tablet, Rfl: 1  •  Labetalol HCl 100 MG Oral Tab, Take 1 tablet (100 mg total) by mouth 2 (two) times daily. , Disp: 180 tablet, Rfl: 1  • Cervical spondylosis with myelopathy (1/6/2014), Chronic kidney disease, stage III (moderate) (HCC) (6/29/2012), Chronic pain syndrome (6/29/2012), Constipation, Coronary atherosclerosis, DDD (degenerative disc disease), cervical (10/17/2011), Dialysis pat knee surgery; anesth,kidney transplant; cath percutaneous  transluminal coronary angioplasty (2016); appendectomy; knee replacement surgery; organ transplant; back surgery;  A.V. FISTULA (Left, 11/8/2018); COLONOSCOPY (N/A, 10/23/2018); A.V. FISTULA (Left, this as I would not expect medication side effect to cause such pinpoint tenderness. No nipple discharge/drainage, no cellulitis. No constitutional symptoms. Will check diagnostic mammogram - if normal, may consider chest x-ray.    - [facility] DIAGNOSTIC RIGHT

## 2019-02-22 ENCOUNTER — HOSPITAL ENCOUNTER (OUTPATIENT)
Dept: MAMMOGRAPHY | Age: 54
Discharge: HOME | End: 2019-02-22
Attending: INTERNAL MEDICINE
Payer: MEDICARE

## 2019-02-22 ENCOUNTER — HOSPITAL ENCOUNTER (OUTPATIENT)
Dept: ULTRASOUND IMAGING | Age: 54
Discharge: HOME | End: 2019-02-22
Attending: INTERNAL MEDICINE
Payer: MEDICARE

## 2019-02-22 DIAGNOSIS — N64.4 NIPPLE TENDERNESS: ICD-10-CM

## 2019-02-22 DIAGNOSIS — N64.4 BREAST PAIN IN MALE: ICD-10-CM

## 2019-02-22 PROCEDURE — 77065 DX MAMMO INCL CAD UNI: CPT | Performed by: INTERNAL MEDICINE

## 2019-02-22 PROCEDURE — 77061 BREAST TOMOSYNTHESIS UNI: CPT | Performed by: INTERNAL MEDICINE

## 2019-02-22 PROCEDURE — 76642 ULTRASOUND BREAST LIMITED: CPT | Performed by: INTERNAL MEDICINE

## 2019-02-28 VITALS
WEIGHT: 315 LBS | HEIGHT: 74 IN | DIASTOLIC BLOOD PRESSURE: 96 MMHG | HEART RATE: 78 BPM | SYSTOLIC BLOOD PRESSURE: 184 MMHG | BODY MASS INDEX: 40.43 KG/M2

## 2019-02-28 VITALS
WEIGHT: 315 LBS | BODY MASS INDEX: 40.43 KG/M2 | SYSTOLIC BLOOD PRESSURE: 150 MMHG | HEART RATE: 86 BPM | HEIGHT: 74 IN | DIASTOLIC BLOOD PRESSURE: 95 MMHG

## 2019-02-28 VITALS
BODY MASS INDEX: 40.43 KG/M2 | SYSTOLIC BLOOD PRESSURE: 148 MMHG | HEART RATE: 72 BPM | DIASTOLIC BLOOD PRESSURE: 90 MMHG | HEIGHT: 74 IN | WEIGHT: 315 LBS

## 2019-02-28 VITALS
HEIGHT: 74 IN | WEIGHT: 315 LBS | BODY MASS INDEX: 40.43 KG/M2 | SYSTOLIC BLOOD PRESSURE: 170 MMHG | DIASTOLIC BLOOD PRESSURE: 98 MMHG | HEART RATE: 70 BPM

## 2019-02-28 VITALS
HEIGHT: 74 IN | DIASTOLIC BLOOD PRESSURE: 76 MMHG | BODY MASS INDEX: 31.95 KG/M2 | SYSTOLIC BLOOD PRESSURE: 120 MMHG | WEIGHT: 249 LBS

## 2019-02-28 VITALS
WEIGHT: 267 LBS | BODY MASS INDEX: 34.27 KG/M2 | SYSTOLIC BLOOD PRESSURE: 130 MMHG | DIASTOLIC BLOOD PRESSURE: 74 MMHG | HEART RATE: 80 BPM | HEIGHT: 74 IN

## 2019-03-01 VITALS
HEIGHT: 74 IN | HEART RATE: 68 BPM | SYSTOLIC BLOOD PRESSURE: 150 MMHG | BODY MASS INDEX: 39.53 KG/M2 | DIASTOLIC BLOOD PRESSURE: 86 MMHG | WEIGHT: 308 LBS

## 2019-03-05 ENCOUNTER — OFFICE VISIT (OUTPATIENT)
Dept: TRANSPLANT | Age: 54
End: 2019-03-05

## 2019-03-05 ENCOUNTER — HOSPITAL (OUTPATIENT)
Dept: OTHER | Age: 54
End: 2019-03-05
Attending: INTERNAL MEDICINE

## 2019-03-05 ENCOUNTER — PRIOR ORIGINAL RECORDS (OUTPATIENT)
Dept: HEALTH INFORMATION MANAGEMENT | Age: 54
End: 2019-03-05

## 2019-03-05 VITALS
DIASTOLIC BLOOD PRESSURE: 85 MMHG | WEIGHT: 247 LBS | HEIGHT: 76 IN | HEART RATE: 81 BPM | SYSTOLIC BLOOD PRESSURE: 134 MMHG | TEMPERATURE: 98.1 F | RESPIRATION RATE: 16 BRPM | BODY MASS INDEX: 30.08 KG/M2

## 2019-03-05 VITALS
RESPIRATION RATE: 16 BRPM | TEMPERATURE: 98.1 F | DIASTOLIC BLOOD PRESSURE: 85 MMHG | HEART RATE: 81 BPM | WEIGHT: 247 LBS | BODY MASS INDEX: 30.08 KG/M2 | HEIGHT: 76 IN | SYSTOLIC BLOOD PRESSURE: 134 MMHG

## 2019-03-05 DIAGNOSIS — T86.12 FAILED KIDNEY TRANSPLANT: ICD-10-CM

## 2019-03-05 DIAGNOSIS — N18.6 END STAGE RENAL DISEASE (CMD): Primary | ICD-10-CM

## 2019-03-05 DIAGNOSIS — Z01.818 PRE-TRANSPLANT EVALUATION FOR KIDNEY TRANSPLANT: ICD-10-CM

## 2019-03-05 DIAGNOSIS — Q87.81 ALPORT'S SYNDROME: ICD-10-CM

## 2019-03-05 DIAGNOSIS — I10 ESSENTIAL HYPERTENSION: ICD-10-CM

## 2019-03-05 DIAGNOSIS — M19.90 ARTHRITIS: ICD-10-CM

## 2019-03-05 DIAGNOSIS — Z01.818 PRE-TRANSPLANT EVALUATION FOR KIDNEY TRANSPLANT: Primary | ICD-10-CM

## 2019-03-05 DIAGNOSIS — I10 BENIGN ESSENTIAL HTN: ICD-10-CM

## 2019-03-05 DIAGNOSIS — Z99.2 DEPENDENCE ON RENAL DIALYSIS (CMD): ICD-10-CM

## 2019-03-05 LAB
ABO + RH BLD: NORMAL
ANNOTATION COMMENT IMP: NEGATIVE
APCR PPP: 2.54 RATIO (ref 1.95–2.89)
APTT PPP: 29 SEC (ref 22–32)
APTT PPP: 29 SECONDS (ref 22–32)
APTT PPP: NORMAL S
AT III AG ACT/NOR PPP IA: 98 % (ref 78–131)
CMV IGG SERPL IA-ACNC: 3.04 ISR
EBV VCA IGG SER-ACNC: >8 AI (ref 0–0.8)
GAMMA INTERFERON BACKGROUND BLD IA-ACNC: 0.03 UNIT/ML
GOH % PRA SCREEN: NORMAL
GOH % PRA SCREEN: NORMAL
GOH ABO TYPING: NORMAL
GOH ABO TYPING: NORMAL
GOH CFC-XM: NORMAL
GOH HLA CLASS 1&2: NORMAL
HAV IGG+IGM SER QL: NEGATIVE
HBV CORE IGG+IGM SER QL: NEGATIVE
HBV SURFACE AB SER QL: POSITIVE
HBV SURFACE AG SER QL: NEGATIVE
HCV AB SERPL QL IA: NEGATIVE
HCV RNA SERPL NAA+PROBE-ACNC: NOT DETECTED UNIT/ML
HCV RNA SERPL NAA+PROBE-LOG IU: NOT DETECTED UNIT/ML
HIV ANTIGEN/ANTIBODY SCREEN: NONREACTIVE
INR PPP: 1
INR PPP: 1
INR PPP: NORMAL
M TB IFN-G CD4+ BCKGRND COR BLD-ACNC: 0.01 UNIT/ML
M TB IFN-G CD4+CD8+ BCKGRND COR BLD-ACNC: 0.01 UNIT/ML
MEV IGG SER QL IA: NORMAL
MITOGEN IGNF BCKGRD COR BLD-ACNC: 0.5 UNIT/ML
MUV IGG SER IA-ACNC: 1.74 OD
PHOSPHATE SERPL-MCNC: 3 MG/DL (ref 2.4–4.7)
PHOSPHATE SERPL-MCNC: 3 MG/DL (ref 2.4–4.7)
PROT C ACT/NOR PPP CHRO: 118 % (ref 65–121)
PROT S ACT/NOR PPP: 83 % (ref 65–125)
PROTHROMBIN TIME (PRT2): NORMAL
PROTHROMBIN TIME: 10.5 SEC (ref 9.7–11.8)
PROTHROMBIN TIME: 10.5 SECONDS (ref 9.7–11.8)
PROTHROMBIN TIME: NORMAL
PSA SERPL-MCNC: 0.37 NG/ML
PTH-INTACT SERPL-MCNC: 161 PG/ML (ref 19–88)
RPR SER QL: NONREACTIVE
RUBV IGG SERPL IA-ACNC: >500 UNIT/ML
TSH SERPL-ACNC: 0.12 MCUNIT/ML (ref 0.35–5)
TSH SERPL-ACNC: 0.12 MCUNITS/ML (ref 0.35–5)
VARICELLA ZOSTER IGG: NORMAL

## 2019-03-05 PROCEDURE — 99205 OFFICE O/P NEW HI 60 MIN: CPT | Performed by: TRANSPLANT SURGERY

## 2019-03-05 PROCEDURE — 99215 OFFICE O/P EST HI 40 MIN: CPT | Performed by: INTERNAL MEDICINE

## 2019-03-05 RX ORDER — ALLOPURINOL 100 MG/1
100 TABLET ORAL DAILY
COMMUNITY

## 2019-03-05 SDOH — HEALTH STABILITY: MENTAL HEALTH: HOW OFTEN DO YOU HAVE A DRINK CONTAINING ALCOHOL?: NEVER

## 2019-03-05 ASSESSMENT — ENCOUNTER SYMPTOMS
FATIGUE: 0
BLOOD IN STOOL: 0
SEIZURES: 0
CHEST TIGHTNESS: 0
NERVOUS/ANXIOUS: 0
APNEA: 1
ROS GI COMMENTS: COLITIS
TREMORS: 0
HEADACHES: 0
WHEEZING: 0
CONSTIPATION: 1
COUGH: 0
SHORTNESS OF BREATH: 0
FEVER: 0
UNEXPECTED WEIGHT CHANGE: 0
BACK PAIN: 1
FACIAL ASYMMETRY: 0
POLYDIPSIA: 0
DIARRHEA: 0
BRUISES/BLEEDS EASILY: 0
VOMITING: 0
ABDOMINAL PAIN: 0
NAUSEA: 0
SORE THROAT: 0
WEAKNESS: 0
CHILLS: 0

## 2019-03-06 LAB
AT III AG ACT/NOR PPP IA: 98 % (ref 78–131)
CMV IGG SERPL IA-ACNC: 3.04 ISR
CMV IGG SERPL IA-ACNC: ABNORMAL
EBV VCA IGG SER-ACNC: >8 AI (ref 0–0.8)
EBV VCA IGG SER-ACNC: ABNORMAL
GOH CFC-XM: NORMAL
HAV IGG+IGM SER QL: NEGATIVE
HBV CORE IGG+IGM SER QL: NEGATIVE
HBV SURFACE AB SER QL: POSITIVE
HBV SURFACE AG SER QL: NEGATIVE
HCV AB SER QL: NEGATIVE
HCV RNA SERPL NAA+PROBE-ACNC: NOT DETECTED IUNITS/ML
HCV RNA SERPL NAA+PROBE-LOG IU: NORMAL {LOG_IU}/ML
HCV RNA SERPL NAA+PROBE-LOG IU: NOT DETECTED IUNITS/ML
HIV 1+2 AB+HIV1 P24 AG SERPL QL IA: NONREACTIVE
MUV IGG SER IA-ACNC: 1.74 OD RATIO
MUV IGG SER IA-ACNC: NORMAL
PSA SERPL-MCNC: 0.37 NG/ML
PSA SERPL-MCNC: NORMAL NG/ML
PTH-INTACT SERPL-MCNC: 161 PG/ML (ref 19–88)
RPR SER QL: NONREACTIVE
RPR SER QL: NORMAL
RUBV IGG SERPL IA-ACNC: >500 UNITS/ML
RUBV IGG SERPL IA-ACNC: NORMAL

## 2019-03-06 ASSESSMENT — ENCOUNTER SYMPTOMS
CHILLS: 0
SHORTNESS OF BREATH: 0
COUGH: 0
ABDOMINAL PAIN: 0
CONSTIPATION: 0
NAUSEA: 0
TREMORS: 0
HEADACHES: 0
VOMITING: 0
DIARRHEA: 0
ENDOCRINE NEGATIVE: 1
FEVER: 0

## 2019-03-07 LAB
ANNOTATION COMMENT IMP: NEGATIVE
ANNOTATION COMMENT IMP: NORMAL
ANNOTATION COMMENT IMP: NORMAL
APCR PPP: 2.54 RATIO (ref 1.95–2.89)
APCR PPP: NORMAL {RATIO}
GAMMA INTERFERON BACKGROUND BLD IA-ACNC: 0.03 IU/ML
M TB IFN-G CD4+ BCKGRND COR BLD-ACNC: 0.01 IU/ML
M TB IFN-G CD4+CD8+ BCKGRND COR BLD-ACNC: 0.01 IU/ML
MEV IGG SER QL IA: NORMAL
MEV IGG SER QL IA: NORMAL
MITOGEN IGNF BCKGRD COR BLD-ACNC: 0.5 IU/ML
PROT C ACT/NOR PPP CHRO: 118 % (ref 65–121)
PROT S ACT/NOR PPP: 83 % (ref 65–125)
VZV IGG SER IA-ACNC: NORMAL
VZV IGG SER IA-ACNC: NORMAL

## 2019-03-14 ENCOUNTER — OFFICE VISIT (OUTPATIENT)
Dept: NEPHROLOGY | Facility: CLINIC | Age: 54
End: 2019-03-14

## 2019-03-14 VITALS — DIASTOLIC BLOOD PRESSURE: 94 MMHG | BODY MASS INDEX: 33 KG/M2 | SYSTOLIC BLOOD PRESSURE: 160 MMHG | WEIGHT: 249 LBS

## 2019-03-14 DIAGNOSIS — Z99.2 ESRD ON HEMODIALYSIS (HCC): Primary | ICD-10-CM

## 2019-03-14 DIAGNOSIS — N18.6 ESRD ON HEMODIALYSIS (HCC): Primary | ICD-10-CM

## 2019-03-14 DIAGNOSIS — I10 ESSENTIAL HYPERTENSION: ICD-10-CM

## 2019-03-14 RX ORDER — FUROSEMIDE 20 MG/1
120 TABLET ORAL DAILY
COMMUNITY
End: 2021-12-23 | Stop reason: ALTCHOICE

## 2019-03-14 NOTE — PROGRESS NOTES
Pt seen today in followup. BP has been sig better, lower at end of HD. Will stop nifedipine and resume losartan 100 daily.   Cont to follow

## 2019-03-21 LAB
ABO + RH BLD: NORMAL
CHOLEST SERPL-MCNC: 148 MG/DL
CHOLEST SERPL-MCNC: 148 MG/DL
CHOLEST SERPL-MCNC: NORMAL MG/DL
CHOLEST/HDLC SERPL: 2.8 {RATIO}
CHOLEST/HDLC SERPL: 2.8 {RATIO}
HDLC SERPL-MCNC: 52 MG/DL
HDLC SERPL-MCNC: 52 MG/DL
HDLC SERPL-MCNC: NORMAL MG/DL
LDLC SERPL CALC-MCNC: 81 MG/DL
LDLC SERPL CALC-MCNC: 81 MG/DL
LDLC SERPL CALC-MCNC: NORMAL MG/DL
NONHDLC SERPL-MCNC: 96 MG/DL
NONHDLC SERPL-MCNC: 96 MG/DL
NONHDLC SERPL-MCNC: NORMAL MG/DL
TRIGL SERPL-MCNC: 75 MG/DL
TRIGL SERPL-MCNC: NORMAL MG/DL
TRIGLYCERIDE (TRIGP): 75 MG/DL

## 2019-04-01 ENCOUNTER — HOSPITAL (OUTPATIENT)
Dept: OTHER | Age: 54
End: 2019-04-01
Attending: INTERNAL MEDICINE

## 2019-04-06 NOTE — PROGRESS NOTES
I'm doing a chart audit. Edna Leos is NOT diabetic. This was proven on A1c test dated 10/12/17 and value was 5.5%. Kashif Zaragoza. Mavis Wyman MD  Diplomate, American Board of Internal Medicine  [address] [address] [address]  [address] [facility],4Th Floor, [address], [facility] & [facility] ([place], [place])

## 2019-04-09 ENCOUNTER — TELEPHONE (OUTPATIENT)
Dept: TRANSPLANT | Age: 54
End: 2019-04-09

## 2019-04-10 DIAGNOSIS — R11.0 NAUSEA: ICD-10-CM

## 2019-04-10 RX ORDER — ONDANSETRON 4 MG/1
TABLET, ORALLY DISINTEGRATING ORAL
Qty: 90 TABLET | Refills: 0 | Status: SHIPPED | OUTPATIENT
Start: 2019-04-10 | End: 2019-10-17

## 2019-04-10 NOTE — TELEPHONE ENCOUNTER
Failed protocol.     Entered historically  Ondansetron 4 mg   Last rx from Dr Richard Agarwal 2/13/2018 #90 NR    LOV 2/13/2019 Dr Hailey Francois RTC in 3-6 months with Dr Narda Mcgraw: 4/18/2019 Dr Richard Agarwal

## 2019-04-10 NOTE — PROGRESS NOTES
NotedVirgil Chris Nagel MD  Diplomate, American Board of Internal Medicine  [address] [address] [address]  [facility]0 [facility],4Th Floor, [address], [facility] & [facility], [address] [place] [address]  T: [phone]; F: Fermin 5

## 2019-04-11 ENCOUNTER — TELEPHONE (OUTPATIENT)
Dept: INTERNAL MEDICINE CLINIC | Facility: CLINIC | Age: 54
End: 2019-04-11

## 2019-04-11 ENCOUNTER — PATIENT OUTREACH (OUTPATIENT)
Dept: INTERNAL MEDICINE CLINIC | Facility: CLINIC | Age: 54
End: 2019-04-11

## 2019-04-11 NOTE — PROGRESS NOTES
DM:  A1c (at goal / uncontrolled): controlled 5.6 on 6/24/18  BP (at goal / uncontrolled):  Uncontrolled, ESRD  Nephro screen (done / not done and date): No  On ACE/ARB (yes/no): Yes  On statin: (yes/no): No  Date of last eye exam: ?  Staff: Pt is due for D

## 2019-04-16 ENCOUNTER — TELEPHONE (OUTPATIENT)
Dept: TRANSPLANT | Age: 54
End: 2019-04-16

## 2019-04-18 ENCOUNTER — OFFICE VISIT (OUTPATIENT)
Dept: INTERNAL MEDICINE CLINIC | Facility: CLINIC | Age: 54
End: 2019-04-18
Payer: MEDICARE

## 2019-04-18 VITALS
RESPIRATION RATE: 16 BRPM | WEIGHT: 245.5 LBS | SYSTOLIC BLOOD PRESSURE: 106 MMHG | HEART RATE: 78 BPM | TEMPERATURE: 99 F | DIASTOLIC BLOOD PRESSURE: 88 MMHG | HEIGHT: 73 IN | BODY MASS INDEX: 32.54 KG/M2

## 2019-04-18 DIAGNOSIS — G47.33 OSA ON CPAP: ICD-10-CM

## 2019-04-18 DIAGNOSIS — M54.2 CHRONIC NECK PAIN: ICD-10-CM

## 2019-04-18 DIAGNOSIS — I25.10 CORONARY ARTERY DISEASE INVOLVING NATIVE CORONARY ARTERY OF NATIVE HEART WITHOUT ANGINA PECTORIS: ICD-10-CM

## 2019-04-18 DIAGNOSIS — T86.11 RENAL TRANSPLANT FAILURE AND REJECTION: ICD-10-CM

## 2019-04-18 DIAGNOSIS — E89.0 POSTSURGICAL HYPOTHYROIDISM: ICD-10-CM

## 2019-04-18 DIAGNOSIS — D69.6 THROMBOCYTOPENIA (HCC): ICD-10-CM

## 2019-04-18 DIAGNOSIS — Z99.89 OSA ON CPAP: ICD-10-CM

## 2019-04-18 DIAGNOSIS — K21.9 GASTROESOPHAGEAL REFLUX DISEASE WITHOUT ESOPHAGITIS: ICD-10-CM

## 2019-04-18 DIAGNOSIS — R60.9 PERIPHERAL EDEMA: ICD-10-CM

## 2019-04-18 DIAGNOSIS — C73 PAPILLARY THYROID CARCINOMA (HCC): ICD-10-CM

## 2019-04-18 DIAGNOSIS — T86.12 RENAL TRANSPLANT FAILURE AND REJECTION: ICD-10-CM

## 2019-04-18 DIAGNOSIS — G89.29 CHRONIC NECK PAIN: ICD-10-CM

## 2019-04-18 DIAGNOSIS — I10 BENIGN ESSENTIAL HTN: ICD-10-CM

## 2019-04-18 DIAGNOSIS — Z91.81 RISK FOR FALLS: ICD-10-CM

## 2019-04-18 DIAGNOSIS — Z79.02 LONG TERM CURRENT USE OF ANTITHROMBOTICS/ANTIPLATELETS: ICD-10-CM

## 2019-04-18 DIAGNOSIS — E66.09 CLASS 1 OBESITY DUE TO EXCESS CALORIES WITH SERIOUS COMORBIDITY AND BODY MASS INDEX (BMI) OF 32.0 TO 32.9 IN ADULT: ICD-10-CM

## 2019-04-18 DIAGNOSIS — D63.1 ANEMIA IN ESRD (END-STAGE RENAL DISEASE) (HCC): ICD-10-CM

## 2019-04-18 DIAGNOSIS — Z94.0 KIDNEY TRANSPLANT RECIPIENT: ICD-10-CM

## 2019-04-18 DIAGNOSIS — M06.9 RHEUMATOID ARTHRITIS INVOLVING MULTIPLE SITES, UNSPECIFIED RHEUMATOID FACTOR PRESENCE: ICD-10-CM

## 2019-04-18 DIAGNOSIS — Z98.1 HISTORY OF FUSION OF CERVICAL SPINE: ICD-10-CM

## 2019-04-18 DIAGNOSIS — M17.11 PRIMARY OSTEOARTHRITIS OF RIGHT KNEE: ICD-10-CM

## 2019-04-18 DIAGNOSIS — F11.20 OPIOID DEPENDENCE ON AGONIST THERAPY (HCC): ICD-10-CM

## 2019-04-18 DIAGNOSIS — Z99.2 ESRD ON HEMODIALYSIS (HCC): ICD-10-CM

## 2019-04-18 DIAGNOSIS — Z13.31 DEPRESSION SCREENING: ICD-10-CM

## 2019-04-18 DIAGNOSIS — D84.9 IMMUNOSUPPRESSED STATUS (HCC): ICD-10-CM

## 2019-04-18 DIAGNOSIS — Z00.00 ENCOUNTER FOR ANNUAL HEALTH EXAMINATION: Primary | ICD-10-CM

## 2019-04-18 DIAGNOSIS — R53.82 CHRONIC FATIGUE SYNDROME: ICD-10-CM

## 2019-04-18 DIAGNOSIS — R26.89 FUNCTIONAL GAIT ABNORMALITY: ICD-10-CM

## 2019-04-18 DIAGNOSIS — M25.462 EFFUSION, LEFT KNEE: ICD-10-CM

## 2019-04-18 DIAGNOSIS — I27.20 PULMONARY HTN (HCC): ICD-10-CM

## 2019-04-18 DIAGNOSIS — T84.84XS PAINFUL TOTAL KNEE REPLACEMENT, SEQUELA: ICD-10-CM

## 2019-04-18 DIAGNOSIS — G62.9 PERIPHERAL POLYNEUROPATHY: ICD-10-CM

## 2019-04-18 DIAGNOSIS — M1A.09X0 IDIOPATHIC CHRONIC GOUT OF MULTIPLE SITES WITHOUT TOPHUS: ICD-10-CM

## 2019-04-18 DIAGNOSIS — N18.6 ESRD ON HEMODIALYSIS (HCC): ICD-10-CM

## 2019-04-18 DIAGNOSIS — Z96.659 PAINFUL TOTAL KNEE REPLACEMENT, SEQUELA: ICD-10-CM

## 2019-04-18 DIAGNOSIS — N18.6 ANEMIA IN ESRD (END-STAGE RENAL DISEASE) (HCC): ICD-10-CM

## 2019-04-18 PROCEDURE — G0439 PPPS, SUBSEQ VISIT: HCPCS | Performed by: INTERNAL MEDICINE

## 2019-04-18 PROCEDURE — 99214 OFFICE O/P EST MOD 30 MIN: CPT | Performed by: INTERNAL MEDICINE

## 2019-04-18 PROCEDURE — G0444 DEPRESSION SCREEN ANNUAL: HCPCS | Performed by: INTERNAL MEDICINE

## 2019-04-18 RX ORDER — LOSARTAN POTASSIUM AND HYDROCHLOROTHIAZIDE 25; 100 MG/1; MG/1
1 TABLET ORAL DAILY
COMMUNITY
End: 2019-04-23

## 2019-04-18 RX ORDER — AMOXICILLIN 500 MG/1
CAPSULE ORAL
Refills: 0 | COMMUNITY
Start: 2019-04-11 | End: 2019-12-17 | Stop reason: ALTCHOICE

## 2019-04-18 RX ORDER — CLONIDINE HYDROCHLORIDE 0.3 MG/1
TABLET ORAL
Qty: 270 TABLET | Refills: 1 | Status: SHIPPED | OUTPATIENT
Start: 2019-04-18 | End: 2019-06-20 | Stop reason: DRUGHIGH

## 2019-04-19 ENCOUNTER — PATIENT MESSAGE (OUTPATIENT)
Dept: INTERNAL MEDICINE CLINIC | Facility: CLINIC | Age: 54
End: 2019-04-19

## 2019-04-19 PROBLEM — M48.02 NEUROFORAMINAL STENOSIS OF CERVICAL SPINE: Status: RESOLVED | Noted: 2017-06-13 | Resolved: 2019-04-19

## 2019-04-19 PROBLEM — G44.309 POST-TRAUMATIC HEADACHE: Status: RESOLVED | Noted: 2018-10-30 | Resolved: 2019-04-19

## 2019-04-19 PROBLEM — E66.811 CLASS 1 OBESITY DUE TO EXCESS CALORIES WITH SERIOUS COMORBIDITY AND BODY MASS INDEX (BMI) OF 32.0 TO 32.9 IN ADULT: Status: ACTIVE | Noted: 2017-06-13

## 2019-04-19 PROBLEM — M47.812 CERVICAL SPINE ARTHRITIS: Status: RESOLVED | Noted: 2017-06-13 | Resolved: 2019-04-19

## 2019-04-19 PROBLEM — M50.30 BULGING OF CERVICAL INTERVERTEBRAL DISC: Status: RESOLVED | Noted: 2017-06-13 | Resolved: 2019-04-19

## 2019-04-19 PROBLEM — E66.09 CLASS 1 OBESITY DUE TO EXCESS CALORIES WITH SERIOUS COMORBIDITY AND BODY MASS INDEX (BMI) OF 32.0 TO 32.9 IN ADULT: Status: ACTIVE | Noted: 2017-06-13

## 2019-04-19 PROBLEM — M50.20 BULGING OF CERVICAL INTERVERTEBRAL DISC: Status: RESOLVED | Noted: 2017-06-13 | Resolved: 2019-04-19

## 2019-04-20 NOTE — PROGRESS NOTES
HPI:   Rhona Monzon is a 47year old male who presents for a Medicare Subsequent AWV + 6-month f/u chronic medical conditions as follows:    1. HTN - Stable on prescription medication. No new issues.    2. CAD and on long-term antithrombotics/antiplate transplant list.  21. ESRD on HD - Stable and no new issues. 22. Anemia in ESRD - No new issues and per Renal service.     His last annual assessment has been over 1 year: Annual Physical due on 06/13/2018         Fall/Risk Assessment abnormal    He has be General (Internal Medicine)  Laeksha Oquendo MD as PCP - Justina Dandy, MD as Consulting Physician ([address])  Radha Paz MD as Consulting Physician ([facility])  Riddhi Helton MD as Consulting Physician ([address] [facility])  Andrea Rizzo, Last 3 Encounters:  04/18/19 : 245 lb 8 oz  04/04/19 : 251 lb  03/14/19 : 249 lb     Last Cholesterol Labs:   Lab Results   Component Value Date    CHOLEST 104 06/24/2018    HDL 34 (L) 06/24/2018    LDL 57 06/24/2018    TRIG 66 06/24/2018          Last Ernestine mouth 2 (two) times daily. Potassium Chloride ER 20 MEQ Oral Tab CR Take 20 mEq by mouth 2 (two) times daily. Fluticasone Propionate 50 MCG/ACT Nasal Suspension 2 sprays by Each Nare route daily.    lidocaine-prilocaine 2.5-2.5 % External Cream NAM 1 as acute or chronic, with unspecified pathological lesion in kidney, Neuropathy, Osteoarthritis, knee (12/23/2013), Pain in joints, Painful total knee replacement, sequela (5/31/2017), [facility] PONV (postoperative nausea and vomit Size: adult)   Pulse 78   Temp 98.6 °F (37 °C) (Oral)   Resp 16   Ht 73\"   Wt 245 lb 8 oz   BMI 32.39 kg/m²   Estimated body mass index is 32.39 kg/m² as calculated from the following:    Height as of this encounter: 73\".     Weight as of this encounter: esophagitis    Rheumatoid arthritis involving multiple sites, unspecified rheumatoid factor presence (HCC)    Papillary thyroid carcinoma (HCC)    Postsurgical hypothyroidism    Peripheral edema    Idiopathic chronic gout of multiple sites without tophus Peripheral edema - Stable on prescription medication. No new issues. 10. Gout - Stable on prescription medication. No new issues.    11. Chronic neck pain 2/2 inflammatory spondylopathy of cervical spine, neuroforaminal stenosis, radiculopathy, DDD & di Diabetes Screening      HbgA1C   Annually HgbA1C (%)   Date Value   06/24/2018 5.6       No flowsheet data found.     Fasting Blood Sugar (FSB)Annually Glucose (mg/dL)   Date Value   11/08/2018 123 (H)   01/16/2018 113 (H)   09/10/2015 104 (H)     GLUCOSE Tetanus Toxoid  Only covered with a cut with metal- TD and TDaP Not covered by Medicare Part B) No vaccine history found This may be covered with your prescription benefits, but Medicare does not cover unless Medically needed    Zoster   Not covered by

## 2019-04-20 NOTE — PATIENT INSTRUCTIONS
Joseph Prather's SCREENING SCHEDULE   Tests on this list are recommended by your physician but may not be covered, or covered at this frequency, by your insurer. Please check with your insurance carrier before scheduling to verify coverage.     Marley Jett patient record, not all encounters were searched. Please check Results Review for a complete set of results.  Limited to patients who meet one of the following criteria:   • Men who are 73-68 years old and have smoked more than 100 cigarettes in their lifet VIRUS VACCINE, PRESERV FREE, >=1YEARS OF AGE     *Note: Due to a large number of results and/or encounters for the requested time period, some results have not been displayed. A complete set of results can be found in Results Review.     Please get every y encounters were searched. Please check Results Review for a complete set of results. This may be covered with your pharmacy  prescription benefits     Recommended Websites for Advanced Directives    [email or web address]

## 2019-04-22 ENCOUNTER — PATIENT MESSAGE (OUTPATIENT)
Dept: INTERNAL MEDICINE CLINIC | Facility: CLINIC | Age: 54
End: 2019-04-22

## 2019-04-22 NOTE — PROGRESS NOTES
Please just send whatever info is needed. Gregorio Medal. Tiffany Clark MD  Diplomate, American Board of Internal Medicine  [address] [address] [address]  [facility]0 [facility],4Th Floor, [address], [facility] & [facility], [address]  T: [phone]; F: Jeeti 5

## 2019-04-22 NOTE — TELEPHONE ENCOUNTER
From: Yunier Miles  To: Kannan Sharma MD  Sent: 4/19/2019 4:39 PM CDT  Subject: Prescription Question    Hello Dr. Jose Pederson,  Thank you for the cpap prescription on Thursday however I called the cpap medical equipment company today and they said the fastes

## 2019-04-23 ENCOUNTER — TELEPHONE (OUTPATIENT)
Dept: INTERNAL MEDICINE CLINIC | Facility: CLINIC | Age: 54
End: 2019-04-23

## 2019-04-23 DIAGNOSIS — G47.33 OSA (OBSTRUCTIVE SLEEP APNEA): Primary | ICD-10-CM

## 2019-04-23 RX ORDER — LOSARTAN POTASSIUM AND HYDROCHLOROTHIAZIDE 25; 100 MG/1; MG/1
1 TABLET ORAL DAILY
Qty: 90 TABLET | Refills: 1 | Status: SHIPPED | OUTPATIENT
Start: 2019-04-23 | End: 2019-10-17

## 2019-04-23 NOTE — TELEPHONE ENCOUNTER
Siobhan Shaw at [facility] needs new script with Dr Glenny Devlin NPI, pressure setting parameters and dx code. On Dr Glenny Devlin desk for signature.

## 2019-04-26 NOTE — TELEPHONE ENCOUNTER
In my Outbox. Cecilio Padilla. Dariana Arellano MD  Diplomate, American Board of Internal Medicine  [address] [address] [address]  [facility]0 [facility],4Th Floor, [address], [facility], [address] [place] [address]  T: [phone]; F: Fermin 5

## 2019-05-01 RX ORDER — PREDNISONE 1 MG/1
TABLET ORAL
Qty: 135 TABLET | Refills: 1 | Status: SHIPPED | OUTPATIENT
Start: 2019-05-01 | End: 2019-10-22

## 2019-05-01 RX ORDER — ALLOPURINOL 100 MG/1
TABLET ORAL
Qty: 90 TABLET | Refills: 1 | Status: SHIPPED | OUTPATIENT
Start: 2019-05-01 | End: 2019-10-22

## 2019-05-01 RX ORDER — ATORVASTATIN CALCIUM 10 MG/1
TABLET, FILM COATED ORAL
Qty: 90 TABLET | Refills: 0 | Status: SHIPPED | OUTPATIENT
Start: 2019-05-01 | End: 2019-07-26 | Stop reason: SDUPTHER

## 2019-05-02 ENCOUNTER — TELEPHONE (OUTPATIENT)
Dept: INTERNAL MEDICINE CLINIC | Facility: CLINIC | Age: 54
End: 2019-05-02

## 2019-05-21 ENCOUNTER — PATIENT MESSAGE (OUTPATIENT)
Dept: INTERNAL MEDICINE CLINIC | Facility: CLINIC | Age: 54
End: 2019-05-21

## 2019-05-21 NOTE — TELEPHONE ENCOUNTER
MyChart message received from pt and sent to Dr Raf Moreno to inquire if Dr will make addendum to visit note.

## 2019-05-21 NOTE — TELEPHONE ENCOUNTER
Cinthia Barrera from [employer] in, stated the notes they received does not state that it is a replacement CPAP.     Please call Cinthia Barrera @ [phone]  Fax: [phone]

## 2019-05-21 NOTE — TELEPHONE ENCOUNTER
From: Adis Dior  To: Esteban Miles MD  Sent: 5/21/2019 4:27 PM CDT  Subject: Referral Request    Hello Dr. Isidoro Chung,  I'm having trouble with the company that's supplying me with a new cpap machine.  Medicare needs the face to face notes stating that [facility]

## 2019-05-24 NOTE — PROGRESS NOTES
Addendum written in the note. Caitlin Charles. Marysol Castro MD  Diplomate, American Board of Internal Medicine  [facility]  [address]  [address] [facility],4Th Floor, [address], [facility], [address] [place] [address]  T: [phone]; F: Jeeti 5

## 2019-06-06 ENCOUNTER — LAB ENCOUNTER (OUTPATIENT)
Dept: LAB | Age: 54
End: 2019-06-06
Attending: INTERNAL MEDICINE
Payer: MEDICARE

## 2019-06-06 DIAGNOSIS — C73 PAPILLARY THYROID CARCINOMA (HCC): ICD-10-CM

## 2019-06-06 DIAGNOSIS — E89.0 POSTSURGICAL HYPOTHYROIDISM: ICD-10-CM

## 2019-06-06 PROCEDURE — 84439 ASSAY OF FREE THYROXINE: CPT

## 2019-06-06 PROCEDURE — 84443 ASSAY THYROID STIM HORMONE: CPT

## 2019-06-06 PROCEDURE — 36415 COLL VENOUS BLD VENIPUNCTURE: CPT

## 2019-06-06 PROCEDURE — 84432 ASSAY OF THYROGLOBULIN: CPT

## 2019-06-06 PROCEDURE — 84481 FREE ASSAY (FT-3): CPT

## 2019-06-06 PROCEDURE — 86800 THYROGLOBULIN ANTIBODY: CPT

## 2019-06-11 ENCOUNTER — PATIENT MESSAGE (OUTPATIENT)
Dept: INTERNAL MEDICINE CLINIC | Facility: CLINIC | Age: 54
End: 2019-06-11

## 2019-06-11 NOTE — TELEPHONE ENCOUNTER
From: Beba Julien  To: Brittanie Reeys MD  Sent: 6/11/2019 3:00 AM CDT  Subject: Prescription Charolett Cable Dr. Josue Espinoza,   I have a problem. I finally got my new cpap machine after 6 weeks. Now the problem is they set my cpap at an 8 setting.  It's been

## 2019-06-20 ENCOUNTER — OFFICE VISIT (OUTPATIENT)
Dept: NEPHROLOGY | Facility: CLINIC | Age: 54
End: 2019-06-20
Payer: MEDICARE

## 2019-06-20 VITALS — SYSTOLIC BLOOD PRESSURE: 128 MMHG | WEIGHT: 242 LBS | BODY MASS INDEX: 32 KG/M2 | DIASTOLIC BLOOD PRESSURE: 80 MMHG

## 2019-06-20 DIAGNOSIS — N18.6 ESRD ON HEMODIALYSIS (HCC): Primary | ICD-10-CM

## 2019-06-20 DIAGNOSIS — Z99.2 ESRD ON HEMODIALYSIS (HCC): Primary | ICD-10-CM

## 2019-06-20 RX ORDER — CLONIDINE HYDROCHLORIDE 0.3 MG/1
0.3 TABLET ORAL AS NEEDED
COMMUNITY
End: 2019-08-22

## 2019-06-20 NOTE — PROGRESS NOTES
Patient here for routine follow-up for review of dialysis. Has been doing very well. He brought a copy of his labs and his albumin is stable, calcium and phosphorus are at goal and hemoglobin is at goal as well.   He continues to follow-up at [facility]

## 2019-07-09 ENCOUNTER — OFFICE VISIT (OUTPATIENT)
Dept: CARDIOLOGY | Age: 54
End: 2019-07-09

## 2019-07-09 ENCOUNTER — PATIENT MESSAGE (OUTPATIENT)
Dept: INTERNAL MEDICINE CLINIC | Facility: CLINIC | Age: 54
End: 2019-07-09

## 2019-07-09 VITALS
HEIGHT: 72 IN | DIASTOLIC BLOOD PRESSURE: 74 MMHG | HEART RATE: 76 BPM | BODY MASS INDEX: 32.51 KG/M2 | WEIGHT: 240 LBS | SYSTOLIC BLOOD PRESSURE: 118 MMHG

## 2019-07-09 DIAGNOSIS — I31.39 PERICARDIAL EFFUSION: ICD-10-CM

## 2019-07-09 DIAGNOSIS — Z95.5 PRESENCE OF STENT IN CORONARY ARTERY: Primary | ICD-10-CM

## 2019-07-09 DIAGNOSIS — Z01.810 PREOPERATIVE CARDIOVASCULAR EXAMINATION: ICD-10-CM

## 2019-07-09 PROCEDURE — 99205 OFFICE O/P NEW HI 60 MIN: CPT | Performed by: INTERNAL MEDICINE

## 2019-07-09 RX ORDER — LOSARTAN POTASSIUM AND HYDROCHLOROTHIAZIDE 25; 100 MG/1; MG/1
1 TABLET ORAL DAILY
COMMUNITY
End: 2020-02-01

## 2019-07-09 ASSESSMENT — ENCOUNTER SYMPTOMS
ENDOCRINE NEGATIVE: 1
CONSTITUTIONAL NEGATIVE: 1
EYES NEGATIVE: 1
GASTROINTESTINAL NEGATIVE: 1
PSYCHIATRIC NEGATIVE: 1
NEUROLOGICAL NEGATIVE: 1
RESPIRATORY NEGATIVE: 1

## 2019-07-10 NOTE — TELEPHONE ENCOUNTER
From: Maricruz Barton  To: Frida Evangelista MD  Sent: 7/9/2019 5:11 PM CDT  Subject: Other    Thank you Dr. Venkata Valerio!

## 2019-07-16 ENCOUNTER — LAB ENCOUNTER (OUTPATIENT)
Dept: LAB | Age: 54
End: 2019-07-16
Attending: INTERNAL MEDICINE
Payer: MEDICARE

## 2019-07-16 DIAGNOSIS — E78.00 PURE HYPERCHOLESTEROLEMIA: ICD-10-CM

## 2019-07-16 DIAGNOSIS — I25.10 CORONARY ATHEROSCLEROSIS OF NATIVE CORONARY ARTERY: Primary | ICD-10-CM

## 2019-07-16 LAB
ALBUMIN SERPL-MCNC: 3.5 G/DL
ALBUMIN SERPL-MCNC: 3.5 G/DL (ref 3.4–5)
ALBUMIN/GLOB SERPL: 0.9 {RATIO} (ref 1–2)
ALBUMIN/GLOB SERPL: NORMAL {RATIO}
ALP LIVER SERPL-CCNC: 76 U/L (ref 45–117)
ALP SERPL-CCNC: 76 U/L
ALT SERPL-CCNC: 15 U/L
ALT SERPL-CCNC: 15 U/L (ref 16–61)
ANION GAP SERPL CALC-SCNC: 6 MMOL/L (ref 0–18)
ANION GAP SERPL CALC-SCNC: NORMAL MMOL/L
AST SERPL-CCNC: 13 U/L
AST SERPL-CCNC: 13 U/L (ref 15–37)
BILIRUB SERPL-MCNC: 0.6 MG/DL
BILIRUB SERPL-MCNC: 0.6 MG/DL (ref 0.1–2)
BUN BLD-MCNC: 29 MG/DL (ref 7–18)
BUN SERPL-MCNC: 29 MG/DL
BUN/CREAT SERPL: 5.1 (ref 10–20)
BUN/CREAT SERPL: NORMAL
CALCIUM BLD-MCNC: 9.5 MG/DL (ref 8.5–10.1)
CALCIUM SERPL-MCNC: 9.5 MG/DL
CHLORIDE SERPL-SCNC: 101 MMOL/L
CHLORIDE SERPL-SCNC: 101 MMOL/L (ref 98–112)
CHOLEST SERPL-MCNC: 156 MG/DL
CHOLEST SMN-MCNC: 156 MG/DL (ref ?–200)
CHOLEST/HDLC SERPL: NORMAL {RATIO}
CO2 SERPL-SCNC: 29 MMOL/L (ref 21–32)
CO2 SERPL-SCNC: NORMAL MMOL/L
CREAT BLD-MCNC: 5.64 MG/DL (ref 0.7–1.3)
CREAT SERPL-MCNC: 5.64 MG/DL
GLOBULIN PLAS-MCNC: 3.7 G/DL (ref 2.8–4.4)
GLOBULIN SER-MCNC: 3.7 G/DL
GLUCOSE BLD-MCNC: 88 MG/DL (ref 70–99)
GLUCOSE SERPL-MCNC: 88 MG/DL
HDLC SERPL-MCNC: 53 MG/DL
HDLC SERPL-MCNC: 53 MG/DL (ref 40–59)
LDLC SERPL CALC-MCNC: 85 MG/DL
LDLC SERPL CALC-MCNC: 85 MG/DL (ref ?–100)
LENGTH OF FAST TIME PATIENT: NORMAL H
LENGTH OF FAST TIME PATIENT: NORMAL H
M PROTEIN MFR SERPL ELPH: 7.2 G/DL (ref 6.4–8.2)
NONHDLC SERPL-MCNC: 103 MG/DL (ref ?–130)
NONHDLC SERPL-MCNC: NORMAL MG/DL
OSMOLALITY SERPL CALC.SUM OF ELEC: 287 MOSM/KG (ref 275–295)
POTASSIUM SERPL-SCNC: 4.8 MMOL/L
POTASSIUM SERPL-SCNC: 4.8 MMOL/L (ref 3.5–5.1)
PROT SERPL-MCNC: 7.2 G/DL
SODIUM SERPL-SCNC: 136 MMOL/L
SODIUM SERPL-SCNC: 136 MMOL/L (ref 136–145)
TRIGL SERPL-MCNC: 88 MG/DL
TRIGL SERPL-MCNC: 88 MG/DL (ref 30–149)
VLDLC SERPL CALC-MCNC: 18 MG/DL (ref 0–30)
VLDLC SERPL CALC-MCNC: NORMAL MG/DL

## 2019-07-16 PROCEDURE — 80061 LIPID PANEL: CPT

## 2019-07-16 PROCEDURE — 80053 COMPREHEN METABOLIC PANEL: CPT

## 2019-07-16 PROCEDURE — 36415 COLL VENOUS BLD VENIPUNCTURE: CPT

## 2019-07-17 ENCOUNTER — TELEPHONE (OUTPATIENT)
Dept: CARDIOLOGY | Age: 54
End: 2019-07-17

## 2019-07-18 RX ORDER — TACROLIMUS 1 MG/1
2 CAPSULE ORAL 2 TIMES DAILY
Qty: 360 CAPSULE | Refills: 1 | Status: SHIPPED | OUTPATIENT
Start: 2019-07-18 | End: 2019-07-23

## 2019-07-19 ENCOUNTER — TELEPHONE (OUTPATIENT)
Dept: INFECTIOUS DISEASES | Age: 54
End: 2019-07-19

## 2019-07-22 ENCOUNTER — CLINICAL ABSTRACT (OUTPATIENT)
Dept: CARDIOLOGY | Age: 54
End: 2019-07-22

## 2019-07-22 ENCOUNTER — TELEPHONE (OUTPATIENT)
Dept: INFECTIOUS DISEASES | Age: 54
End: 2019-07-22

## 2019-07-23 RX ORDER — TACROLIMUS 1 MG/1
2 CAPSULE ORAL 2 TIMES DAILY
Qty: 360 CAPSULE | Refills: 1 | Status: SHIPPED | OUTPATIENT
Start: 2019-07-23 | End: 2020-01-16

## 2019-07-25 ENCOUNTER — LAB ENCOUNTER (OUTPATIENT)
Dept: LAB | Age: 54
End: 2019-07-25
Attending: INTERNAL MEDICINE
Payer: MEDICARE

## 2019-07-25 DIAGNOSIS — E89.0 POSTSURGICAL HYPOTHYROIDISM: ICD-10-CM

## 2019-07-25 DIAGNOSIS — C73 PAPILLARY THYROID CARCINOMA (HCC): ICD-10-CM

## 2019-07-25 LAB
T3FREE SERPL-MCNC: 2.18 PG/ML (ref 2.4–4.2)
T4 FREE SERPL-MCNC: 1.2 NG/DL (ref 0.8–1.7)
TSI SER-ACNC: 0.32 MIU/ML (ref 0.36–3.74)

## 2019-07-25 PROCEDURE — 36415 COLL VENOUS BLD VENIPUNCTURE: CPT

## 2019-07-25 PROCEDURE — 84439 ASSAY OF FREE THYROXINE: CPT

## 2019-07-25 PROCEDURE — 84481 FREE ASSAY (FT-3): CPT

## 2019-07-25 PROCEDURE — 84443 ASSAY THYROID STIM HORMONE: CPT

## 2019-07-25 NOTE — PROGRESS NOTES
Thyroid level improved however still high. Will change levothyroxine from 200mcg Mon-Sat and 100mcg (half of 200mcg tablet) on Sunday to 175mcg daily. Repeat TSH 6 weeks after dosage adjustment. Labs and rx already sent.

## 2019-07-26 NOTE — PROGRESS NOTES
Patient informed of Dr. Watters Quick result note. Patient verbalized understanding and agrees with plan.

## 2019-07-29 RX ORDER — ATORVASTATIN CALCIUM 10 MG/1
TABLET, FILM COATED ORAL
Qty: 90 TABLET | Refills: 3 | Status: SHIPPED | OUTPATIENT
Start: 2019-07-29 | End: 2020-07-02 | Stop reason: SDUPTHER

## 2019-07-30 ENCOUNTER — APPOINTMENT (OUTPATIENT)
Dept: CARDIOLOGY | Age: 54
End: 2019-07-30

## 2019-08-01 ENCOUNTER — OFFICE VISIT (OUTPATIENT)
Dept: INFECTIOUS DISEASES | Age: 54
End: 2019-08-01

## 2019-08-01 DIAGNOSIS — Z01.818 PRE-TRANSPLANT EVALUATION FOR KIDNEY TRANSPLANT: Primary | ICD-10-CM

## 2019-08-01 DIAGNOSIS — Z00.00 HEALTHCARE MAINTENANCE: ICD-10-CM

## 2019-08-01 PROCEDURE — 99203 OFFICE O/P NEW LOW 30 MIN: CPT | Performed by: INTERNAL MEDICINE

## 2019-08-01 ASSESSMENT — ENCOUNTER SYMPTOMS
HEADACHES: 0
FEVER: 0
ADENOPATHY: 0
AGITATION: 0
ABDOMINAL PAIN: 0
COUGH: 0
DIARRHEA: 0
SORE THROAT: 0
DIZZINESS: 0
CHILLS: 0
SHORTNESS OF BREATH: 0
CONFUSION: 0
DIAPHORESIS: 0
VOMITING: 0
NAUSEA: 0
FATIGUE: 0
RHINORRHEA: 0

## 2019-08-01 ASSESSMENT — PAIN SCALES - GENERAL: PAINLEVEL: 7-8

## 2019-08-02 ENCOUNTER — TELEPHONE (OUTPATIENT)
Dept: INTERNAL MEDICINE CLINIC | Facility: CLINIC | Age: 54
End: 2019-08-02

## 2019-08-02 DIAGNOSIS — Z23 NEED FOR TETANUS, DIPHTHERIA, AND ACELLULAR PERTUSSIS (TDAP) VACCINE: Primary | ICD-10-CM

## 2019-08-02 NOTE — TELEPHONE ENCOUNTER
Order signed. Nelida Schulz. Evelia Starks MD  Diplomate, American Board of Internal Medicine  [facility]  [address]  [address] [facility],4Th Floor, [address], [address]  T: [phone]; F: Jeeti 5

## 2019-08-05 ENCOUNTER — NURSE ONLY (OUTPATIENT)
Dept: INTERNAL MEDICINE CLINIC | Facility: CLINIC | Age: 54
End: 2019-08-05
Payer: MEDICARE

## 2019-08-05 PROCEDURE — 90471 IMMUNIZATION ADMIN: CPT | Performed by: INTERNAL MEDICINE

## 2019-08-05 PROCEDURE — 90715 TDAP VACCINE 7 YRS/> IM: CPT | Performed by: INTERNAL MEDICINE

## 2019-08-05 RX ORDER — OXYCODONE HYDROCHLORIDE 10 MG/1
TABLET ORAL
Refills: 0 | COMMUNITY
Start: 2019-07-05 | End: 2020-02-01 | Stop reason: SDUPTHER

## 2019-08-05 RX ORDER — TACROLIMUS 1 MG/1
2 CAPSULE ORAL DAILY
COMMUNITY
Start: 2019-07-23 | End: 2021-02-02

## 2019-08-05 RX ORDER — LEVOTHYROXINE SODIUM 175 UG/1
175 TABLET ORAL DAILY
COMMUNITY
Start: 2019-07-25 | End: 2020-08-18

## 2019-08-06 ENCOUNTER — OFFICE VISIT (OUTPATIENT)
Dept: CARDIOLOGY | Age: 54
End: 2019-08-06

## 2019-08-06 VITALS
HEIGHT: 74 IN | HEART RATE: 78 BPM | BODY MASS INDEX: 31.18 KG/M2 | SYSTOLIC BLOOD PRESSURE: 126 MMHG | WEIGHT: 243 LBS | DIASTOLIC BLOOD PRESSURE: 76 MMHG

## 2019-08-06 DIAGNOSIS — I31.39 PERICARDIAL EFFUSION: ICD-10-CM

## 2019-08-06 DIAGNOSIS — Z01.810 PREOPERATIVE CARDIOVASCULAR EXAMINATION: ICD-10-CM

## 2019-08-06 DIAGNOSIS — Z99.2 DIALYSIS PATIENT (CMD): ICD-10-CM

## 2019-08-06 DIAGNOSIS — Z95.5 PRESENCE OF STENT IN CORONARY ARTERY: Primary | ICD-10-CM

## 2019-08-06 PROCEDURE — 99214 OFFICE O/P EST MOD 30 MIN: CPT | Performed by: INTERNAL MEDICINE

## 2019-08-06 SDOH — HEALTH STABILITY: MENTAL HEALTH: HOW OFTEN DO YOU HAVE A DRINK CONTAINING ALCOHOL?: NEVER

## 2019-08-06 ASSESSMENT — ENCOUNTER SYMPTOMS
HEMOPTYSIS: 0
BRUISES/BLEEDS EASILY: 0
FEVER: 0
COUGH: 0
WEIGHT GAIN: 0
ALLERGIC/IMMUNOLOGIC COMMENTS: NO NEW FOOD ALLERGIES
HEMATOCHEZIA: 0
WEIGHT LOSS: 0
CHILLS: 0
SUSPICIOUS LESIONS: 0

## 2019-08-08 NOTE — PATIENT INSTRUCTIONS
Copy of immunization record has been faxed to [facility] infectious disease at [phone].

## 2019-08-13 ENCOUNTER — TELEPHONE (OUTPATIENT)
Dept: TRANSPLANT | Age: 54
End: 2019-08-13

## 2019-09-10 ENCOUNTER — LAB ENCOUNTER (OUTPATIENT)
Dept: LAB | Age: 54
End: 2019-09-10
Attending: INTERNAL MEDICINE
Payer: MEDICARE

## 2019-09-10 DIAGNOSIS — C73 PAPILLARY THYROID CARCINOMA (HCC): ICD-10-CM

## 2019-09-10 DIAGNOSIS — E89.0 POSTSURGICAL HYPOTHYROIDISM: ICD-10-CM

## 2019-09-10 LAB — TSI SER-ACNC: 0.59 MIU/ML (ref 0.36–3.74)

## 2019-09-10 PROCEDURE — 84443 ASSAY THYROID STIM HORMONE: CPT

## 2019-09-10 PROCEDURE — 36415 COLL VENOUS BLD VENIPUNCTURE: CPT

## 2019-09-26 ENCOUNTER — OFFICE VISIT (OUTPATIENT)
Dept: NEPHROLOGY | Facility: CLINIC | Age: 54
End: 2019-09-26
Payer: MEDICARE

## 2019-09-26 VITALS — WEIGHT: 247 LBS | SYSTOLIC BLOOD PRESSURE: 112 MMHG | DIASTOLIC BLOOD PRESSURE: 68 MMHG | BODY MASS INDEX: 33 KG/M2

## 2019-09-26 DIAGNOSIS — Z99.2 ESRD ON HEMODIALYSIS (HCC): Primary | ICD-10-CM

## 2019-09-26 DIAGNOSIS — N18.6 ESRD ON HEMODIALYSIS (HCC): Primary | ICD-10-CM

## 2019-09-26 RX ORDER — LABETALOL 200 MG/1
200 TABLET, FILM COATED ORAL 2 TIMES DAILY
COMMUNITY

## 2019-09-26 NOTE — PROGRESS NOTES
Mr. Kalia Whitt was seen in the nephrology clinic today in follow-up for management of ESRD. We discussed his blood pressure medications and the fact that his blood pressures have been excellent.   Review of his labs done at dialysis revealed moderate elevat

## 2019-10-17 ENCOUNTER — OFFICE VISIT (OUTPATIENT)
Dept: INTERNAL MEDICINE CLINIC | Facility: CLINIC | Age: 54
End: 2019-10-17
Payer: MEDICARE

## 2019-10-17 VITALS
RESPIRATION RATE: 16 BRPM | HEIGHT: 73 IN | SYSTOLIC BLOOD PRESSURE: 100 MMHG | HEART RATE: 64 BPM | DIASTOLIC BLOOD PRESSURE: 72 MMHG | TEMPERATURE: 99 F | BODY MASS INDEX: 32.87 KG/M2 | WEIGHT: 248 LBS

## 2019-10-17 DIAGNOSIS — R60.9 PERIPHERAL EDEMA: ICD-10-CM

## 2019-10-17 DIAGNOSIS — R26.89 FUNCTIONAL GAIT ABNORMALITY: ICD-10-CM

## 2019-10-17 DIAGNOSIS — Z99.89 OSA ON CPAP: ICD-10-CM

## 2019-10-17 DIAGNOSIS — Z98.1 HISTORY OF FUSION OF CERVICAL SPINE: ICD-10-CM

## 2019-10-17 DIAGNOSIS — R53.82 CHRONIC FATIGUE SYNDROME: ICD-10-CM

## 2019-10-17 DIAGNOSIS — C73 PAPILLARY THYROID CARCINOMA (HCC): ICD-10-CM

## 2019-10-17 DIAGNOSIS — I27.20 PULMONARY HTN (HCC): ICD-10-CM

## 2019-10-17 DIAGNOSIS — G89.29 CHRONIC NECK PAIN: ICD-10-CM

## 2019-10-17 DIAGNOSIS — K21.9 GASTROESOPHAGEAL REFLUX DISEASE WITHOUT ESOPHAGITIS: ICD-10-CM

## 2019-10-17 DIAGNOSIS — G47.33 OSA ON CPAP: ICD-10-CM

## 2019-10-17 DIAGNOSIS — D84.9 IMMUNOSUPPRESSED STATUS (HCC): ICD-10-CM

## 2019-10-17 DIAGNOSIS — N18.6 ESRD ON HEMODIALYSIS (HCC): ICD-10-CM

## 2019-10-17 DIAGNOSIS — E89.0 POSTSURGICAL HYPOTHYROIDISM: ICD-10-CM

## 2019-10-17 DIAGNOSIS — G62.9 PERIPHERAL POLYNEUROPATHY: ICD-10-CM

## 2019-10-17 DIAGNOSIS — T84.84XS PAINFUL TOTAL KNEE REPLACEMENT, SEQUELA: ICD-10-CM

## 2019-10-17 DIAGNOSIS — M06.9 RHEUMATOID ARTHRITIS INVOLVING MULTIPLE SITES, UNSPECIFIED RHEUMATOID FACTOR PRESENCE: ICD-10-CM

## 2019-10-17 DIAGNOSIS — D69.6 THROMBOCYTOPENIA (HCC): ICD-10-CM

## 2019-10-17 DIAGNOSIS — M17.11 PRIMARY OSTEOARTHRITIS OF RIGHT KNEE: ICD-10-CM

## 2019-10-17 DIAGNOSIS — F11.20 OPIOID DEPENDENCE ON AGONIST THERAPY (HCC): ICD-10-CM

## 2019-10-17 DIAGNOSIS — M25.462 EFFUSION, LEFT KNEE: ICD-10-CM

## 2019-10-17 DIAGNOSIS — Z94.0 KIDNEY TRANSPLANT RECIPIENT: ICD-10-CM

## 2019-10-17 DIAGNOSIS — Z91.81 RISK FOR FALLS: ICD-10-CM

## 2019-10-17 DIAGNOSIS — E66.09 CLASS 1 OBESITY DUE TO EXCESS CALORIES WITH SERIOUS COMORBIDITY AND BODY MASS INDEX (BMI) OF 32.0 TO 32.9 IN ADULT: ICD-10-CM

## 2019-10-17 DIAGNOSIS — N18.6 ANEMIA IN ESRD (END-STAGE RENAL DISEASE) (HCC): ICD-10-CM

## 2019-10-17 DIAGNOSIS — M54.2 CHRONIC NECK PAIN: ICD-10-CM

## 2019-10-17 DIAGNOSIS — M1A.09X0 IDIOPATHIC CHRONIC GOUT OF MULTIPLE SITES WITHOUT TOPHUS: ICD-10-CM

## 2019-10-17 DIAGNOSIS — Z96.659 PAINFUL TOTAL KNEE REPLACEMENT, SEQUELA: ICD-10-CM

## 2019-10-17 DIAGNOSIS — Z79.02 LONG TERM CURRENT USE OF ANTITHROMBOTICS/ANTIPLATELETS: ICD-10-CM

## 2019-10-17 DIAGNOSIS — Z99.2 ESRD ON HEMODIALYSIS (HCC): ICD-10-CM

## 2019-10-17 DIAGNOSIS — I10 BENIGN ESSENTIAL HTN: Primary | ICD-10-CM

## 2019-10-17 DIAGNOSIS — I25.10 CORONARY ARTERY DISEASE INVOLVING NATIVE CORONARY ARTERY OF NATIVE HEART WITHOUT ANGINA PECTORIS: ICD-10-CM

## 2019-10-17 DIAGNOSIS — D63.1 ANEMIA IN ESRD (END-STAGE RENAL DISEASE) (HCC): ICD-10-CM

## 2019-10-17 PROCEDURE — 99214 OFFICE O/P EST MOD 30 MIN: CPT | Performed by: INTERNAL MEDICINE

## 2019-10-17 RX ORDER — LOSARTAN POTASSIUM AND HYDROCHLOROTHIAZIDE 25; 100 MG/1; MG/1
1 TABLET ORAL DAILY
Qty: 90 TABLET | Refills: 1 | Status: SHIPPED | OUTPATIENT
Start: 2019-10-17 | End: 2020-01-09 | Stop reason: ALTCHOICE

## 2019-10-17 RX ORDER — OXYCODONE HYDROCHLORIDE 10 MG/1
TABLET ORAL
Qty: 45 TABLET | Refills: 0 | Status: CANCELLED | OUTPATIENT
Start: 2019-10-17

## 2019-10-17 RX ORDER — BUPRENORPHINE HYDROCHLORIDE 8 MG/1
TABLET SUBLINGUAL
Qty: 30 TABLET | Refills: 1 | Status: CANCELLED | OUTPATIENT
Start: 2019-10-17

## 2019-10-17 RX ORDER — ONDANSETRON 4 MG/1
TABLET, ORALLY DISINTEGRATING ORAL
Qty: 90 TABLET | Refills: 1 | Status: SHIPPED | OUTPATIENT
Start: 2019-10-17

## 2019-10-17 NOTE — PROGRESS NOTES
Patient presents with: Follow - Up: 6-months for chronic medical conditions      HPI: Ronancassidyjesus Domingeuzkellie presents for f/u chronic medical conditions as follows:    1. HTN - Stable on prescription medication. No new issues.    2. CAD and on long-term antithrombotics/ant ESRD - No new issues and per Renal service. Review of Systems   All other systems reviewed and are negative.       Patient Active Problem List:     Esophageal reflux     Pulmonary HTN ([facility] 1/28/14)     RA (rheumatoid arthritis) (HCC)     Chronic fat Film, Place 1 Film under the tongue 3 (three) times daily. , Disp: 90 Film, Rfl: 1  •  buprenorphine HCl 8 MG Sublingual SL Tab, 1/2 sl bid, Disp: 30 tablet, Rfl: 1  •  Levothyroxine Sodium 175 MCG Oral Tab, Take 1 tablet (175 mcg total) by mouth daily. Starlene Pill arm, Patient Position: Sitting, Cuff Size: adult)   Pulse 64   Temp 98.7 °F (37.1 °C) (Oral)   Resp 16   Ht 73\"   Wt 248 lb (112.5 kg)   BMI 32.72 kg/m²   Wt Readings from Last 6 Encounters:  10/17/19 : 248 lb (112.5 kg)  09/26/19 : 247 lb (112 kg)  08/22 (hcc)    1. HTN - Stable on prescription medication. No new issues. 2. CAD and on long-term antithrombotics/antiplatelets - Stable on prescription medication. No new issues. 3. Pulmonary HTN - Stable and no new issues.   4. RAQUEL on CPAP - Stable and no also afforded the time and opportunity to ask questions, which were then answered to the best of my ability. Sierra Castro MD  Diplomate, American Board of Internal Medicine  Member, [address] [place] [address] Group  [address]

## 2019-10-21 RX ORDER — LOSARTAN POTASSIUM AND HYDROCHLOROTHIAZIDE 25; 100 MG/1; MG/1
1 TABLET ORAL DAILY
Qty: 90 TABLET | Refills: 1 | Status: SHIPPED | OUTPATIENT
Start: 2019-10-21 | End: 2020-01-09 | Stop reason: ALTCHOICE

## 2019-10-23 RX ORDER — PREDNISONE 1 MG/1
TABLET ORAL
Qty: 135 TABLET | Refills: 1 | Status: SHIPPED | OUTPATIENT
Start: 2019-10-23 | End: 2020-01-09

## 2019-10-23 RX ORDER — ALLOPURINOL 100 MG/1
TABLET ORAL
Qty: 90 TABLET | Refills: 1 | Status: SHIPPED | OUTPATIENT
Start: 2019-10-23 | End: 2020-04-08

## 2019-12-12 ENCOUNTER — E-ADVICE (OUTPATIENT)
Dept: TRANSPLANT | Age: 54
End: 2019-12-12

## 2020-01-01 ENCOUNTER — EXTERNAL RECORD (OUTPATIENT)
Dept: HEALTH INFORMATION MANAGEMENT | Facility: OTHER | Age: 55
End: 2020-01-01

## 2020-01-09 ENCOUNTER — OFFICE VISIT (OUTPATIENT)
Dept: NEPHROLOGY | Facility: CLINIC | Age: 55
End: 2020-01-09
Payer: MEDICARE

## 2020-01-09 VITALS — BODY MASS INDEX: 33 KG/M2 | WEIGHT: 253 LBS | SYSTOLIC BLOOD PRESSURE: 118 MMHG | DIASTOLIC BLOOD PRESSURE: 74 MMHG

## 2020-01-09 DIAGNOSIS — N18.6 ESRD ON HEMODIALYSIS (HCC): Primary | ICD-10-CM

## 2020-01-09 DIAGNOSIS — Z99.2 ESRD ON HEMODIALYSIS (HCC): Primary | ICD-10-CM

## 2020-01-09 RX ORDER — PREDNISONE 1 MG/1
TABLET ORAL
Qty: 135 TABLET | Refills: 1 | Status: SHIPPED | OUTPATIENT
Start: 2020-01-09 | End: 2020-01-16

## 2020-01-16 RX ORDER — PREDNISONE 1 MG/1
TABLET ORAL
Qty: 135 TABLET | Refills: 1 | Status: SHIPPED | OUTPATIENT
Start: 2020-01-16 | End: 2020-01-17

## 2020-01-16 RX ORDER — TACROLIMUS 1 MG/1
2 CAPSULE ORAL 2 TIMES DAILY
Qty: 360 CAPSULE | Refills: 1 | Status: SHIPPED | OUTPATIENT
Start: 2020-01-16 | End: 2020-01-17

## 2020-01-17 RX ORDER — PREDNISONE 1 MG/1
TABLET ORAL
Qty: 135 TABLET | Refills: 1 | Status: SHIPPED | OUTPATIENT
Start: 2020-01-17 | End: 2020-07-02

## 2020-01-17 RX ORDER — TACROLIMUS 1 MG/1
2 CAPSULE ORAL 2 TIMES DAILY
Qty: 360 CAPSULE | Refills: 1 | Status: SHIPPED | OUTPATIENT
Start: 2020-01-17 | End: 2020-06-29

## 2020-01-30 ENCOUNTER — TELEPHONE (OUTPATIENT)
Dept: INTERNAL MEDICINE CLINIC | Facility: CLINIC | Age: 55
End: 2020-01-30

## 2020-02-05 NOTE — TELEPHONE ENCOUNTER
Faxed CPAP order from [address] (signed and dated by Dr. Josue Espinoza 2/3/2020) to #[phone].

## 2020-02-11 ENCOUNTER — OFFICE VISIT (OUTPATIENT)
Dept: CARDIOLOGY | Age: 55
End: 2020-02-11

## 2020-02-11 VITALS
BODY MASS INDEX: 32.47 KG/M2 | HEIGHT: 74 IN | SYSTOLIC BLOOD PRESSURE: 112 MMHG | DIASTOLIC BLOOD PRESSURE: 68 MMHG | WEIGHT: 253 LBS | HEART RATE: 82 BPM

## 2020-02-11 DIAGNOSIS — I25.10 CORONARY ARTERY DISEASE INVOLVING NATIVE CORONARY ARTERY OF NATIVE HEART WITHOUT ANGINA PECTORIS: ICD-10-CM

## 2020-02-11 DIAGNOSIS — Z99.2 DIALYSIS PATIENT (CMD): ICD-10-CM

## 2020-02-11 DIAGNOSIS — Q87.81 ALPORT'S SYNDROME: ICD-10-CM

## 2020-02-11 DIAGNOSIS — Z95.5 PRESENCE OF STENT IN CORONARY ARTERY: Primary | ICD-10-CM

## 2020-02-11 PROCEDURE — 99215 OFFICE O/P EST HI 40 MIN: CPT | Performed by: INTERNAL MEDICINE

## 2020-02-11 RX ORDER — LOSARTAN POTASSIUM 100 MG/1
100 TABLET ORAL DAILY
COMMUNITY
End: 2022-11-08 | Stop reason: ALTCHOICE

## 2020-02-11 ASSESSMENT — PATIENT HEALTH QUESTIONNAIRE - PHQ9
SUM OF ALL RESPONSES TO PHQ9 QUESTIONS 1 AND 2: 0
SUM OF ALL RESPONSES TO PHQ9 QUESTIONS 1 AND 2: 0
1. LITTLE INTEREST OR PLEASURE IN DOING THINGS: NOT AT ALL
2. FEELING DOWN, DEPRESSED OR HOPELESS: NOT AT ALL

## 2020-04-08 RX ORDER — ALLOPURINOL 100 MG/1
TABLET ORAL
Qty: 90 TABLET | Refills: 1 | Status: SHIPPED | OUTPATIENT
Start: 2020-04-08 | End: 2020-09-28

## 2020-04-09 ENCOUNTER — VIRTUAL PHONE E/M (OUTPATIENT)
Dept: NEPHROLOGY | Facility: CLINIC | Age: 55
End: 2020-04-09

## 2020-04-09 DIAGNOSIS — Z99.2 ESRD ON HEMODIALYSIS (HCC): Primary | ICD-10-CM

## 2020-04-09 DIAGNOSIS — N18.6 ESRD ON HEMODIALYSIS (HCC): Primary | ICD-10-CM

## 2020-04-09 PROCEDURE — G2012 BRIEF CHECK IN BY MD/QHP: HCPCS | Performed by: INTERNAL MEDICINE

## 2020-04-09 NOTE — PROGRESS NOTES
I called pt today and we discussed dialysis, reviewed his monthly labs. He is doing well overall. BP has been stable as has dry wt. Reviewed hgb, phos, alb, potassium and clearance.

## 2020-05-05 ENCOUNTER — APPOINTMENT (OUTPATIENT)
Dept: TRANSPLANT | Age: 55
End: 2020-05-05

## 2020-06-16 ENCOUNTER — OFFICE VISIT (OUTPATIENT)
Dept: TRANSPLANT | Age: 55
End: 2020-06-16

## 2020-06-16 DIAGNOSIS — T86.12 FAILED KIDNEY TRANSPLANT: ICD-10-CM

## 2020-06-16 DIAGNOSIS — Z99.2 DEPENDENCE ON RENAL DIALYSIS (CMD): ICD-10-CM

## 2020-06-16 DIAGNOSIS — Z01.818 PRE-TRANSPLANT EVALUATION FOR KIDNEY TRANSPLANT: Primary | ICD-10-CM

## 2020-06-16 DIAGNOSIS — Z99.2 DIALYSIS PATIENT (CMD): ICD-10-CM

## 2020-06-16 DIAGNOSIS — I10 ESSENTIAL HYPERTENSION: ICD-10-CM

## 2020-06-16 DIAGNOSIS — Z76.82 AWAITING TRANSPLANTATION OF ORGAN: ICD-10-CM

## 2020-06-16 DIAGNOSIS — I25.10 CORONARY ARTERY DISEASE INVOLVING NATIVE CORONARY ARTERY OF NATIVE HEART WITHOUT ANGINA PECTORIS: Primary | ICD-10-CM

## 2020-06-16 DIAGNOSIS — M19.90 ARTHRITIS: ICD-10-CM

## 2020-06-16 DIAGNOSIS — Q87.81 ALPORT'S SYNDROME: ICD-10-CM

## 2020-06-16 DIAGNOSIS — N18.6 END-STAGE RENAL DISEASE (CMD): ICD-10-CM

## 2020-06-16 DIAGNOSIS — I10 ESSENTIAL HYPERTENSION, BENIGN: ICD-10-CM

## 2020-06-16 DIAGNOSIS — I10 BENIGN ESSENTIAL HTN: ICD-10-CM

## 2020-06-16 DIAGNOSIS — N18.6 END STAGE RENAL DISEASE (CMD): Primary | ICD-10-CM

## 2020-06-16 PROCEDURE — 99215 OFFICE O/P EST HI 40 MIN: CPT | Performed by: INTERNAL MEDICINE

## 2020-06-16 PROCEDURE — 99214 OFFICE O/P EST MOD 30 MIN: CPT | Performed by: TRANSPLANT SURGERY

## 2020-06-16 RX ORDER — CALCIUM CARBONATE 500 MG/1
1 TABLET, CHEWABLE ORAL DAILY
COMMUNITY
End: 2022-11-08 | Stop reason: ALTCHOICE

## 2020-06-16 RX ORDER — PREDNISONE 5 MG/1
5 TABLET ORAL DAILY
Status: ON HOLD | COMMUNITY
Start: 2020-06-01 | End: 2023-02-12

## 2020-06-16 RX ORDER — OXYCODONE HYDROCHLORIDE 10 MG/1
10 TABLET ORAL EVERY 8 HOURS
COMMUNITY
Start: 2020-06-05 | End: 2021-11-04

## 2020-06-16 RX ORDER — CINACALCET 60 MG/1
60 TABLET, FILM COATED ORAL
COMMUNITY

## 2020-06-16 RX ORDER — BUPRENORPHINE 8 MG/1
4 TABLET SUBLINGUAL EVERY 8 HOURS
COMMUNITY
Start: 2020-06-02 | End: 2022-11-08 | Stop reason: ALTCHOICE

## 2020-06-16 RX ORDER — LABETALOL 200 MG/1
200 TABLET, FILM COATED ORAL 2 TIMES DAILY
COMMUNITY
Start: 2020-06-09 | End: 2022-11-08 | Stop reason: ALTCHOICE

## 2020-06-16 RX ORDER — FUROSEMIDE 20 MG/1
40 TABLET ORAL 3 TIMES DAILY
COMMUNITY
Start: 2020-04-08 | End: 2022-11-08 | Stop reason: ALTCHOICE

## 2020-06-16 RX ORDER — ONDANSETRON 4 MG/1
TABLET, ORALLY DISINTEGRATING ORAL
Status: ON HOLD | COMMUNITY
Start: 2019-10-17 | End: 2023-02-12

## 2020-06-16 RX ORDER — ALLOPURINOL 100 MG/1
TABLET ORAL
COMMUNITY
Start: 2020-04-08 | End: 2020-06-16 | Stop reason: SDUPTHER

## 2020-06-16 RX ORDER — FOLIC ACID/VIT B COMPLEX AND C 0.8 MG
1 TABLET ORAL
COMMUNITY
Start: 2020-05-29

## 2020-06-16 ASSESSMENT — ENCOUNTER SYMPTOMS
ROS GI COMMENTS: CHOLECYSTECTOMY
ENDOCRINE COMMENTS: THYROIDECTOMY
APNEA: 1

## 2020-06-16 ASSESSMENT — PAIN SCALES - GENERAL
PAINLEVEL: 7-8
PAINLEVEL: 7-8

## 2020-06-22 ENCOUNTER — TELEPHONE (OUTPATIENT)
Dept: TRANSPLANT | Age: 55
End: 2020-06-22

## 2020-06-24 ENCOUNTER — TELEPHONE (OUTPATIENT)
Dept: TRANSPLANT | Age: 55
End: 2020-06-24

## 2020-06-29 RX ORDER — TACROLIMUS 1 MG/1
2 CAPSULE ORAL 2 TIMES DAILY
Qty: 120 CAPSULE | Refills: 0 | Status: SHIPPED | OUTPATIENT
Start: 2020-06-29 | End: 2020-07-01

## 2020-06-30 ENCOUNTER — TELEPHONE (OUTPATIENT)
Dept: NEPHROLOGY | Facility: CLINIC | Age: 55
End: 2020-06-30

## 2020-06-30 NOTE — TELEPHONE ENCOUNTER
I called patient and left message to call office to reschedule appointment with Dr. Alex Clay on 7-16-20

## 2020-07-02 RX ORDER — ATORVASTATIN CALCIUM 10 MG/1
TABLET, FILM COATED ORAL
Qty: 90 TABLET | Refills: 3 | OUTPATIENT
Start: 2020-07-02

## 2020-07-02 RX ORDER — ATORVASTATIN CALCIUM 10 MG/1
10 TABLET, FILM COATED ORAL AT BEDTIME
Qty: 90 TABLET | Refills: 0 | Status: SHIPPED | OUTPATIENT
Start: 2020-07-02 | End: 2020-10-26 | Stop reason: DRUGHIGH

## 2020-07-02 RX ORDER — PREDNISONE 1 MG/1
TABLET ORAL
Qty: 135 TABLET | Refills: 1 | Status: SHIPPED | OUTPATIENT
Start: 2020-07-02 | End: 2020-12-16

## 2020-07-02 RX ORDER — TACROLIMUS 1 MG/1
2 CAPSULE ORAL 2 TIMES DAILY
Qty: 360 CAPSULE | Refills: 1 | Status: SHIPPED | OUTPATIENT
Start: 2020-07-02 | End: 2020-07-02

## 2020-07-02 RX ORDER — TACROLIMUS 1 MG/1
2 CAPSULE ORAL 2 TIMES DAILY
Qty: 360 CAPSULE | Refills: 1 | Status: SHIPPED | OUTPATIENT
Start: 2020-07-02 | End: 2020-09-09 | Stop reason: ALTCHOICE

## 2020-07-07 ENCOUNTER — TELEPHONE (OUTPATIENT)
Dept: INTERNAL MEDICINE CLINIC | Facility: CLINIC | Age: 55
End: 2020-07-07

## 2020-07-07 NOTE — TELEPHONE ENCOUNTER
Pt was called to notify him that his appt for July 30th will be cancelled, pt was offered to reschedule appt, pt was a little upset and is requesting to ask the provider if is ok to wait till September for an appt.  Pt has been rescheduled 3 x, pt mentioned

## 2020-07-14 ENCOUNTER — LAB ENCOUNTER (OUTPATIENT)
Dept: LAB | Age: 55
End: 2020-07-14
Attending: INTERNAL MEDICINE
Payer: MEDICARE

## 2020-07-14 DIAGNOSIS — E89.0 POSTSURGICAL HYPOTHYROIDISM: ICD-10-CM

## 2020-07-14 DIAGNOSIS — C73 PAPILLARY THYROID CARCINOMA (HCC): ICD-10-CM

## 2020-07-14 LAB — TSI SER-ACNC: 2.01 MIU/ML (ref 0.36–3.74)

## 2020-07-14 PROCEDURE — 84432 ASSAY OF THYROGLOBULIN: CPT

## 2020-07-14 PROCEDURE — 36415 COLL VENOUS BLD VENIPUNCTURE: CPT

## 2020-07-14 PROCEDURE — 86800 THYROGLOBULIN ANTIBODY: CPT

## 2020-07-14 PROCEDURE — 84443 ASSAY THYROID STIM HORMONE: CPT

## 2020-07-16 LAB
THYROGLOBULIN AB: <0.9 IU/ML
THYROGLOBULIN, SERUM OR PLASMA: 0.1 NG/ML

## 2020-07-21 ENCOUNTER — APPOINTMENT (OUTPATIENT)
Dept: CARDIOLOGY | Age: 55
End: 2020-07-21

## 2020-07-23 ENCOUNTER — VIRTUAL PHONE E/M (OUTPATIENT)
Dept: NEPHROLOGY | Facility: CLINIC | Age: 55
End: 2020-07-23
Payer: MEDICARE

## 2020-07-23 DIAGNOSIS — Z99.2 ESRD ON HEMODIALYSIS (HCC): ICD-10-CM

## 2020-07-23 DIAGNOSIS — N18.6 ESRD ON HEMODIALYSIS (HCC): ICD-10-CM

## 2020-07-23 DIAGNOSIS — I10 ESSENTIAL HYPERTENSION: Primary | ICD-10-CM

## 2020-07-23 PROCEDURE — G2012 BRIEF CHECK IN BY MD/QHP: HCPCS | Performed by: INTERNAL MEDICINE

## 2020-07-23 NOTE — PROGRESS NOTES
I called pt this AM and performed virtual telephone visit per pt request due to coronavirus pandemic. Time spent 10 minutes. We discussed dialysis labs and he has been stable in this regard. BP also stable on losartan and labetalol.   No recent illnesses

## 2020-08-04 ENCOUNTER — HOSPITAL (OUTPATIENT)
Dept: OTHER | Age: 55
End: 2020-08-04
Attending: INTERNAL MEDICINE

## 2020-08-04 ENCOUNTER — DIAGNOSTIC TRANS (OUTPATIENT)
Dept: OTHER | Age: 55
End: 2020-08-04

## 2020-08-04 LAB
HBV CORE IGG+IGM SER QL: NEGATIVE
HBV SURFACE AB SER QL: POSITIVE
HBV SURFACE AG SER QL: NEGATIVE
HCV AB SER QL: NEGATIVE
HIV 1+2 AB+HIV1 P24 AG SERPL QL IA: NONREACTIVE

## 2020-08-05 LAB
ANNOTATION COMMENT IMP: NEGATIVE
ANNOTATION COMMENT IMP: NORMAL
ANNOTATION COMMENT IMP: NORMAL
GAMMA INTERFERON BACKGROUND BLD IA-ACNC: 0.02 IU/ML
M TB IFN-G CD4+ BCKGRND COR BLD-ACNC: 0 IU/ML
M TB IFN-G CD4+CD8+ BCKGRND COR BLD-ACNC: 0 IU/ML
MITOGEN IGNF BCKGRD COR BLD-ACNC: 5.76 IU/ML
PSA SERPL-MCNC: 0.59 NG/ML
PSA SERPL-MCNC: NORMAL NG/ML
RPR SER QL: NONREACTIVE
RPR SER QL: NORMAL

## 2020-08-06 LAB
HCV RNA SERPL NAA+PROBE-ACNC: NOT DETECTED IUNITS/ML
HCV RNA SERPL NAA+PROBE-LOG IU: NORMAL {LOG_IU}/ML
HCV RNA SERPL NAA+PROBE-LOG IU: NOT DETECTED IUNITS/ML

## 2020-08-11 ENCOUNTER — HOSPITAL ENCOUNTER (OUTPATIENT)
Dept: GENERAL RADIOLOGY | Age: 55
Discharge: HOME | End: 2020-08-11
Payer: MEDICARE

## 2020-08-11 ENCOUNTER — OFFICE VISIT (OUTPATIENT)
Dept: CARDIOLOGY | Age: 55
End: 2020-08-11

## 2020-08-11 VITALS
BODY MASS INDEX: 32.47 KG/M2 | HEIGHT: 73 IN | DIASTOLIC BLOOD PRESSURE: 76 MMHG | WEIGHT: 245 LBS | SYSTOLIC BLOOD PRESSURE: 118 MMHG | HEART RATE: 74 BPM

## 2020-08-11 DIAGNOSIS — I25.10 CORONARY ARTERY DISEASE INVOLVING NATIVE CORONARY ARTERY OF NATIVE HEART WITHOUT ANGINA PECTORIS: ICD-10-CM

## 2020-08-11 DIAGNOSIS — Z95.5 PRESENCE OF STENT IN CORONARY ARTERY: ICD-10-CM

## 2020-08-11 DIAGNOSIS — R94.39 ABNORMAL STRESS TEST: ICD-10-CM

## 2020-08-11 DIAGNOSIS — I31.39 PERICARDIAL EFFUSION: Primary | ICD-10-CM

## 2020-08-11 DIAGNOSIS — R94.39 ABNORMAL STRESS TEST: Primary | ICD-10-CM

## 2020-08-11 PROCEDURE — 71046 X-RAY EXAM CHEST 2 VIEWS: CPT | Performed by: INTERNAL MEDICINE

## 2020-08-11 PROCEDURE — 99215 OFFICE O/P EST HI 40 MIN: CPT | Performed by: INTERNAL MEDICINE

## 2020-08-11 ASSESSMENT — ENCOUNTER SYMPTOMS
NEUROLOGICAL NEGATIVE: 1
RESPIRATORY NEGATIVE: 1
PSYCHIATRIC NEGATIVE: 1
CONSTITUTIONAL NEGATIVE: 1
EYES NEGATIVE: 1
ENDOCRINE NEGATIVE: 1
GASTROINTESTINAL NEGATIVE: 1

## 2020-08-12 DIAGNOSIS — R94.39 ABNORMAL STRESS TEST: ICD-10-CM

## 2020-08-12 DIAGNOSIS — I25.10 CORONARY ARTERY DISEASE INVOLVING NATIVE CORONARY ARTERY OF NATIVE HEART WITHOUT ANGINA PECTORIS: ICD-10-CM

## 2020-08-12 PROCEDURE — X1094 XR CHEST PA AND LATERAL 2 VIEWS: HCPCS | Performed by: INTERNAL MEDICINE

## 2020-08-14 ENCOUNTER — TELEPHONE (OUTPATIENT)
Dept: CARDIOLOGY | Age: 55
End: 2020-08-14

## 2020-08-18 ENCOUNTER — TELEPHONE (OUTPATIENT)
Dept: CARDIOLOGY | Age: 55
End: 2020-08-18

## 2020-08-22 ENCOUNTER — HOSPITAL (OUTPATIENT)
Dept: OTHER | Age: 55
End: 2020-08-22
Attending: INTERNAL MEDICINE

## 2020-08-24 DIAGNOSIS — Z01.812 ENCOUNTER FOR PREPROCEDURAL LABORATORY EXAMINATION: Primary | ICD-10-CM

## 2020-08-25 ENCOUNTER — LAB SERVICES (OUTPATIENT)
Dept: LAB | Age: 55
End: 2020-08-25

## 2020-08-25 DIAGNOSIS — Z01.812 ENCOUNTER FOR PREPROCEDURAL LABORATORY EXAMINATION: ICD-10-CM

## 2020-08-26 LAB
SARS-COV-2 RNA RESP QL NAA+PROBE: NOT DETECTED
SERVICE CMNT-IMP: NORMAL
SPECIMEN SOURCE: NORMAL

## 2020-08-27 ENCOUNTER — DIAGNOSTIC TRANS (OUTPATIENT)
Dept: OTHER | Age: 55
End: 2020-08-27

## 2020-08-27 LAB
ALBUMIN SERPL-MCNC: 3.4 G/DL (ref 3.6–5.1)
ALBUMIN/GLOB SERPL: 1 {RATIO} (ref 1–2.4)
ALP SERPL-CCNC: 45 UNITS/L (ref 45–117)
ALT SERPL-CCNC: 14 UNITS/L
ANALYZER ANC (IANC): ABNORMAL
ANION GAP SERPL CALC-SCNC: 12 MMOL/L (ref 10–20)
AST SERPL-CCNC: 10 UNITS/L
BASOPHILS # BLD: 0 K/MCL (ref 0–0.3)
BASOPHILS NFR BLD: 1 %
BILIRUB SERPL-MCNC: 0.7 MG/DL (ref 0.2–1)
BUN SERPL-MCNC: 34 MG/DL (ref 6–20)
BUN SERPL-MCNC: 35 MG/DL (ref 6–20)
BUN/CREAT SERPL: 4 (ref 7–25)
BUN/CREAT SERPL: 5 (ref 7–25)
CALCIUM SERPL-MCNC: 9 MG/DL (ref 8.4–10.2)
CHLORIDE SERPL-SCNC: 100 MMOL/L (ref 98–107)
CO2 SERPL-SCNC: 30 MMOL/L (ref 21–32)
CREAT SERPL-MCNC: 7.65 MG/DL (ref 0.67–1.17)
CREAT SERPL-MCNC: 7.77 MG/DL (ref 0.67–1.17)
DIFFERENTIAL METHOD BLD: ABNORMAL
EOSINOPHIL # BLD: 0 K/MCL (ref 0.1–0.5)
EOSINOPHIL NFR BLD: 0 %
ERYTHROCYTE [DISTWIDTH] IN BLOOD: 14.7 % (ref 11–15)
GLOBULIN SER-MCNC: 3.5 G/DL (ref 2–4)
GLUCOSE SERPL-MCNC: 159 MG/DL (ref 65–99)
HCT VFR BLD CALC: 32.9 % (ref 39–51)
HGB BLD-MCNC: 10.8 G/DL (ref 13–17)
IMM GRANULOCYTES # BLD AUTO: 0 K/MCL (ref 0–0.2)
IMM GRANULOCYTES NFR BLD: 0 %
LYMPHOCYTES # BLD: 0.9 K/MCL (ref 1–4)
LYMPHOCYTES NFR BLD: 17 %
MCH RBC QN AUTO: 32 PG (ref 26–34)
MCHC RBC AUTO-ENTMCNC: 32.8 G/DL (ref 32–36.5)
MCV RBC AUTO: 97.6 FL (ref 78–100)
MONOCYTES # BLD: 0.5 K/MCL (ref 0.3–0.9)
MONOCYTES NFR BLD: 9 %
NEUTROPHILS # BLD: 3.9 K/MCL (ref 1.8–7.7)
NEUTROPHILS NFR BLD: 73 %
NEUTS SEG NFR BLD: ABNORMAL %
NRBC (NRBCRE): 0 /100 WBC
PLATELET # BLD: 112 K/MCL (ref 140–450)
POTASSIUM SERPL-SCNC: 4 MMOL/L (ref 3.4–5.1)
PROT SERPL-MCNC: 6.9 G/DL (ref 6.4–8.2)
RBC # BLD: 3.37 MIL/MCL (ref 4.5–5.9)
SODIUM SERPL-SCNC: 138 MMOL/L (ref 135–145)
WBC # BLD: 5.3 K/MCL (ref 4.2–11)

## 2020-08-27 PROCEDURE — 93010 ELECTROCARDIOGRAM REPORT: CPT | Performed by: INTERNAL MEDICINE

## 2020-08-27 PROCEDURE — 92928 PRQ TCAT PLMT NTRAC ST 1 LES: CPT | Performed by: INTERNAL MEDICINE

## 2020-08-27 PROCEDURE — 99220 INITIAL OBSERVATION CARE,LEVL III: CPT | Performed by: INTERNAL MEDICINE

## 2020-08-27 PROCEDURE — 92978 ENDOLUMINL IVUS OCT C 1ST: CPT | Performed by: INTERNAL MEDICINE

## 2020-08-27 PROCEDURE — 93454 CORONARY ARTERY ANGIO S&I: CPT | Performed by: INTERNAL MEDICINE

## 2020-08-27 PROCEDURE — 99152 MOD SED SAME PHYS/QHP 5/>YRS: CPT | Performed by: INTERNAL MEDICINE

## 2020-08-28 LAB
ALBUMIN SERPL-MCNC: 3.1 G/DL (ref 3.6–5.1)
ALBUMIN/GLOB SERPL: 0.9 {RATIO} (ref 1–2.4)
ALP SERPL-CCNC: 50 UNITS/L (ref 45–117)
ALT SERPL-CCNC: 13 UNITS/L
ANALYZER ANC (IANC): ABNORMAL
ANION GAP SERPL CALC-SCNC: 14 MMOL/L (ref 10–20)
AST SERPL-CCNC: 9 UNITS/L
BILIRUB SERPL-MCNC: 0.6 MG/DL (ref 0.2–1)
BUN SERPL-MCNC: 49 MG/DL (ref 6–20)
BUN/CREAT SERPL: 5 (ref 7–25)
CALCIUM SERPL-MCNC: 8.5 MG/DL (ref 8.4–10.2)
CHLORIDE SERPL-SCNC: 99 MMOL/L (ref 98–107)
CO2 SERPL-SCNC: 29 MMOL/L (ref 21–32)
CREAT SERPL-MCNC: 9.84 MG/DL (ref 0.67–1.17)
ERYTHROCYTE [DISTWIDTH] IN BLOOD: 14.9 % (ref 11–15)
GLOBULIN SER-MCNC: 3.5 G/DL (ref 2–4)
GLUCOSE SERPL-MCNC: 91 MG/DL (ref 65–99)
HCT VFR BLD CALC: 30.9 % (ref 39–51)
HGB BLD-MCNC: 10.3 G/DL (ref 13–17)
MCH RBC QN AUTO: 32.7 PG (ref 26–34)
MCHC RBC AUTO-ENTMCNC: 33.3 G/DL (ref 32–36.5)
MCV RBC AUTO: 98.1 FL (ref 78–100)
NRBC BLD MANUAL-RTO: 0 /100 WBC
PLATELET # BLD: 123 K/MCL (ref 140–450)
POTASSIUM SERPL-SCNC: 3.5 MMOL/L (ref 3.4–5.1)
PROT SERPL-MCNC: 6.6 G/DL (ref 6.4–8.2)
RBC # BLD: 3.15 MIL/MCL (ref 4.5–5.9)
SODIUM SERPL-SCNC: 138 MMOL/L (ref 135–145)
WBC # BLD: 6.3 K/MCL (ref 4.2–11)

## 2020-08-28 PROCEDURE — 99217 OBSERVATION CARE DISCHARGE: CPT | Performed by: INTERNAL MEDICINE

## 2020-08-28 PROCEDURE — 99214 OFFICE O/P EST MOD 30 MIN: CPT | Performed by: CLINICAL NURSE SPECIALIST

## 2020-08-28 PROCEDURE — 93010 ELECTROCARDIOGRAM REPORT: CPT | Performed by: INTERNAL MEDICINE

## 2020-08-31 ENCOUNTER — TELEPHONE (OUTPATIENT)
Dept: TRANSPLANT | Age: 55
End: 2020-08-31

## 2020-08-31 ENCOUNTER — TELEPHONE (OUTPATIENT)
Dept: CARDIOLOGY | Age: 55
End: 2020-08-31

## 2020-09-01 ENCOUNTER — OFFICE VISIT (OUTPATIENT)
Dept: CARDIOLOGY | Age: 55
End: 2020-09-01

## 2020-09-01 VITALS
SYSTOLIC BLOOD PRESSURE: 122 MMHG | DIASTOLIC BLOOD PRESSURE: 80 MMHG | HEART RATE: 71 BPM | HEIGHT: 74 IN | WEIGHT: 246.5 LBS | BODY MASS INDEX: 31.63 KG/M2

## 2020-09-01 DIAGNOSIS — Z95.5 PRESENCE OF STENT IN CORONARY ARTERY: ICD-10-CM

## 2020-09-01 DIAGNOSIS — Z99.2 DIALYSIS PATIENT (CMD): ICD-10-CM

## 2020-09-01 DIAGNOSIS — I25.10 CORONARY ARTERY DISEASE INVOLVING NATIVE CORONARY ARTERY OF NATIVE HEART WITHOUT ANGINA PECTORIS: Primary | ICD-10-CM

## 2020-09-01 PROCEDURE — 99215 OFFICE O/P EST HI 40 MIN: CPT | Performed by: INTERNAL MEDICINE

## 2020-09-01 RX ORDER — AMLODIPINE BESYLATE 10 MG/1
10 TABLET ORAL DAILY
COMMUNITY
Start: 2020-08-31 | End: 2022-11-08 | Stop reason: ALTCHOICE

## 2020-09-01 RX ORDER — CLOPIDOGREL BISULFATE 75 MG/1
1 TABLET ORAL DAILY
COMMUNITY
Start: 2020-08-27 | End: 2020-11-23 | Stop reason: SDUPTHER

## 2020-09-01 SDOH — HEALTH STABILITY: MENTAL HEALTH: HOW OFTEN DO YOU HAVE A DRINK CONTAINING ALCOHOL?: NEVER

## 2020-09-01 SDOH — HEALTH STABILITY: PHYSICAL HEALTH: ON AVERAGE, HOW MANY DAYS PER WEEK DO YOU ENGAGE IN MODERATE TO STRENUOUS EXERCISE (LIKE A BRISK WALK)?: 4 DAYS

## 2020-09-01 SDOH — HEALTH STABILITY: PHYSICAL HEALTH: ON AVERAGE, HOW MANY MINUTES DO YOU ENGAGE IN EXERCISE AT THIS LEVEL?: 120 MIN

## 2020-09-01 ASSESSMENT — PATIENT HEALTH QUESTIONNAIRE - PHQ9
2. FEELING DOWN, DEPRESSED OR HOPELESS: NOT AT ALL
CLINICAL INTERPRETATION OF PHQ9 SCORE: NO FURTHER SCREENING NEEDED
CLINICAL INTERPRETATION OF PHQ2 SCORE: NO FURTHER SCREENING NEEDED
1. LITTLE INTEREST OR PLEASURE IN DOING THINGS: NOT AT ALL
SUM OF ALL RESPONSES TO PHQ9 QUESTIONS 1 AND 2: 0
SUM OF ALL RESPONSES TO PHQ9 QUESTIONS 1 AND 2: 0

## 2020-09-03 ENCOUNTER — TELEPHONE (OUTPATIENT)
Dept: CARDIOLOGY | Age: 55
End: 2020-09-03

## 2020-09-09 ENCOUNTER — NURSE ONLY (OUTPATIENT)
Dept: NEPHROLOGY | Facility: CLINIC | Age: 55
End: 2020-09-09

## 2020-09-09 RX ORDER — ATORVASTATIN CALCIUM 40 MG/1
40 TABLET, FILM COATED ORAL NIGHTLY
COMMUNITY

## 2020-09-24 ENCOUNTER — OFFICE VISIT (OUTPATIENT)
Dept: INTERNAL MEDICINE CLINIC | Facility: CLINIC | Age: 55
End: 2020-09-24
Payer: MEDICARE

## 2020-09-24 VITALS
BODY MASS INDEX: 32.17 KG/M2 | HEIGHT: 73.75 IN | TEMPERATURE: 98 F | HEART RATE: 78 BPM | DIASTOLIC BLOOD PRESSURE: 74 MMHG | SYSTOLIC BLOOD PRESSURE: 121 MMHG | WEIGHT: 248 LBS | RESPIRATION RATE: 16 BRPM

## 2020-09-24 DIAGNOSIS — I10 BENIGN ESSENTIAL HTN: ICD-10-CM

## 2020-09-24 DIAGNOSIS — Z23 NEED FOR SHINGLES VACCINE: ICD-10-CM

## 2020-09-24 DIAGNOSIS — Z12.5 SCREENING PSA (PROSTATE SPECIFIC ANTIGEN): ICD-10-CM

## 2020-09-24 DIAGNOSIS — Z94.0 KIDNEY TRANSPLANT RECIPIENT: ICD-10-CM

## 2020-09-24 DIAGNOSIS — N18.6 ESRD ON HEMODIALYSIS (HCC): ICD-10-CM

## 2020-09-24 DIAGNOSIS — D84.9 IMMUNOSUPPRESSED STATUS (HCC): ICD-10-CM

## 2020-09-24 DIAGNOSIS — Z00.00 ROUTINE GENERAL MEDICAL EXAMINATION AT A HEALTH CARE FACILITY: Primary | ICD-10-CM

## 2020-09-24 DIAGNOSIS — I25.10 CORONARY ARTERY DISEASE INVOLVING NATIVE CORONARY ARTERY OF NATIVE HEART WITHOUT ANGINA PECTORIS: ICD-10-CM

## 2020-09-24 DIAGNOSIS — Z99.2 ESRD ON HEMODIALYSIS (HCC): ICD-10-CM

## 2020-09-24 PROCEDURE — 90471 IMMUNIZATION ADMIN: CPT | Performed by: INTERNAL MEDICINE

## 2020-09-24 PROCEDURE — 90750 HZV VACC RECOMBINANT IM: CPT | Performed by: INTERNAL MEDICINE

## 2020-09-24 PROCEDURE — 99214 OFFICE O/P EST MOD 30 MIN: CPT | Performed by: INTERNAL MEDICINE

## 2020-09-24 RX ORDER — LACTULOSE 10 G/15ML
30 SOLUTION ORAL 2 TIMES DAILY PRN
COMMUNITY
Start: 2018-08-06

## 2020-09-24 RX ORDER — FOLIC ACID/VIT B COMPLEX AND C 0.8 MG
1 TABLET ORAL DAILY
COMMUNITY
Start: 2020-05-29

## 2020-09-24 RX ORDER — CINACALCET 60 MG/1
60 TABLET, FILM COATED ORAL
COMMUNITY

## 2020-09-24 RX ORDER — AMLODIPINE BESYLATE 10 MG/1
10 TABLET ORAL DAILY
Qty: 90 TABLET | Refills: 3 | Status: SHIPPED | OUTPATIENT
Start: 2020-09-24 | End: 2021-09-20

## 2020-09-24 NOTE — PROGRESS NOTES
Patient presents with:  CPX      HPI: Neel Walker presents today for annual CPX and 6-month f/u select chronic medical conditions (see below). Stable health for the most part. Continues to work with various medical sub-specialists.  Compliant w/ prescription medic effusion     Coronary artery disease involving native coronary artery of native heart without angina pectoris     Alport syndrome     ESRD on hemodialysis ([address]     Anemia in ESRD (end-stage renal disease) ([address]      Past Surgical History:   Procedure Late Oral Tab, Take 1 tablet by mouth daily. , Disp: , Rfl:   •  Cinacalcet HCl 60 MG Oral Tab, Take 60 mg by mouth., Disp: , Rfl:   •  lactulose 10 GM/15ML Oral Solution, Take 30 mL by mouth., Disp: , Rfl:   •  Lanthanum Carbonate 1000 MG Oral Powd Pack, Take 1 HCl-Naloxone HCl (SUBOXONE) 2-0.5 MG Sublingual Film, Place 1 Film under the tongue 3 (three) times daily.  (Patient not taking: Reported on 9/24/2020 ), Disp: 80 Film, Rfl: 1    Social History    Tobacco Use      Smoking status: Never Smoker      Smokeless NAD, obese  HEENT - PERRL, EOMI, OP is clear; TMs clear B  Neck - supple, no JVD, no thyromegaly  Lymph - no palp LAD  Lungs - CTAB  CV - RRR, nl s1, s2  Abd - soft, NABS, NT, ND  Ext - no c/c/e  Neuro - CNs 2-12 grossly intact, no focal deficits; 2+ DTRs afforded the time and opportunity to ask questions, which were then answered to the best of my ability. Anjali Contreras. Carl Meredith MD  Diplomate, American Board of Internal Medicine  Member, American College of [address]  [address] [place]

## 2020-09-28 RX ORDER — ALLOPURINOL 100 MG/1
TABLET ORAL
Qty: 90 TABLET | Refills: 1 | Status: SHIPPED | OUTPATIENT
Start: 2020-09-28 | End: 2021-03-24

## 2020-09-28 NOTE — TELEPHONE ENCOUNTER
Allopurinol 100mg Take 1 tablet daily.     Last filled-4/8/20 for 6 months    LOV-7/23/20  NOV-10/22/20

## 2020-10-01 ENCOUNTER — LAB ENCOUNTER (OUTPATIENT)
Dept: LAB | Age: 55
End: 2020-10-01
Attending: INTERNAL MEDICINE
Payer: MEDICARE

## 2020-10-01 DIAGNOSIS — Z12.5 SCREENING PSA (PROSTATE SPECIFIC ANTIGEN): ICD-10-CM

## 2020-10-01 DIAGNOSIS — Z00.00 ROUTINE GENERAL MEDICAL EXAMINATION AT A HEALTH CARE FACILITY: ICD-10-CM

## 2020-10-01 PROCEDURE — 36415 COLL VENOUS BLD VENIPUNCTURE: CPT

## 2020-10-01 PROCEDURE — 83036 HEMOGLOBIN GLYCOSYLATED A1C: CPT

## 2020-10-01 PROCEDURE — 80061 LIPID PANEL: CPT

## 2020-10-21 PROBLEM — E83.40 DISORDERS OF MAGNESIUM METABOLISM, UNSPECIFIED: Status: ACTIVE | Noted: 2018-07-10

## 2020-10-21 PROBLEM — Z95.5 PRESENCE OF STENT IN CORONARY ARTERY: Status: ACTIVE | Noted: 2019-07-09

## 2020-10-21 PROBLEM — E03.9 HYPOTHYROID: Status: ACTIVE | Noted: 2020-10-21

## 2020-10-21 PROBLEM — Z99.2 DIALYSIS PATIENT (HCC): Status: ACTIVE | Noted: 2018-08-02

## 2020-10-21 PROBLEM — E46 PROTEIN-CALORIE MALNUTRITION (HCC): Status: ACTIVE | Noted: 2018-07-10

## 2020-10-21 PROBLEM — D68.9 COAGULATION DEFECT, UNSPECIFIED (HCC): Status: ACTIVE | Noted: 2018-10-29

## 2020-10-21 PROBLEM — G62.9 NEUROPATHY: Status: ACTIVE | Noted: 2020-10-21

## 2020-10-21 PROBLEM — D50.9 IRON DEFICIENCY ANEMIA, UNSPECIFIED: Status: ACTIVE | Noted: 2018-07-10

## 2020-10-21 PROBLEM — Z99.2 DIALYSIS PATIENT: Status: ACTIVE | Noted: 2018-08-02

## 2020-10-21 PROBLEM — N25.81 SECONDARY HYPERPARATHYROIDISM OF RENAL ORIGIN (HCC): Status: ACTIVE | Noted: 2018-07-10

## 2020-10-22 ENCOUNTER — VIRTUAL PHONE E/M (OUTPATIENT)
Dept: NEPHROLOGY | Facility: CLINIC | Age: 55
End: 2020-10-22
Payer: MEDICARE

## 2020-10-22 DIAGNOSIS — I10 ESSENTIAL HYPERTENSION: Primary | ICD-10-CM

## 2020-10-22 DIAGNOSIS — Z99.2 ESRD ON HEMODIALYSIS (HCC): ICD-10-CM

## 2020-10-22 DIAGNOSIS — N18.6 ESRD ON HEMODIALYSIS (HCC): ICD-10-CM

## 2020-10-22 PROCEDURE — G2012 BRIEF CHECK IN BY MD/QHP: HCPCS | Performed by: INTERNAL MEDICINE

## 2020-10-22 NOTE — PROGRESS NOTES
I called pt this AM and performed virtual office visit per pt request due to ongoing [place] pandemic. Time spent 15 minutes. Dialysis labs were reviewed. We also discussed his BP and med adjustments to be made.   Fortunately he is doing well overall lucien

## 2020-10-26 RX ORDER — ATORVASTATIN CALCIUM 10 MG/1
40 TABLET, FILM COATED ORAL AT BEDTIME
Qty: 90 TABLET | Refills: 3 | Status: CANCELLED | OUTPATIENT
Start: 2020-10-26

## 2020-10-26 RX ORDER — ATORVASTATIN CALCIUM 40 MG/1
40 TABLET, FILM COATED ORAL AT BEDTIME
Qty: 90 TABLET | Refills: 3 | Status: SHIPPED | OUTPATIENT
Start: 2020-10-26

## 2020-10-26 RX ORDER — ATORVASTATIN CALCIUM 40 MG/1
40 TABLET, FILM COATED ORAL AT BEDTIME
COMMUNITY
End: 2020-10-26 | Stop reason: SDUPTHER

## 2020-11-02 RX ORDER — ATORVASTATIN CALCIUM 40 MG/1
TABLET, FILM COATED ORAL
Qty: 90 TABLET | OUTPATIENT
Start: 2020-11-02

## 2020-11-23 RX ORDER — CLOPIDOGREL BISULFATE 75 MG/1
75 TABLET ORAL DAILY
Qty: 90 TABLET | Refills: 3 | Status: SHIPPED | OUTPATIENT
Start: 2020-11-23 | End: 2021-11-04

## 2020-11-23 RX ORDER — CLOPIDOGREL BISULFATE 75 MG/1
TABLET ORAL
Qty: 90 TABLET | Refills: 3 | OUTPATIENT
Start: 2020-11-23

## 2020-12-08 ENCOUNTER — DOCUMENTATION (OUTPATIENT)
Dept: TRANSPLANT | Age: 55
End: 2020-12-08

## 2020-12-16 RX ORDER — PREDNISONE 1 MG/1
TABLET ORAL
Qty: 135 TABLET | Refills: 1 | Status: SHIPPED | OUTPATIENT
Start: 2020-12-16 | End: 2020-12-21

## 2020-12-21 RX ORDER — PREDNISONE 1 MG/1
7.5 TABLET ORAL DAILY
Qty: 135 TABLET | Refills: 1 | Status: SHIPPED | OUTPATIENT
Start: 2020-12-21 | End: 2020-12-21

## 2020-12-21 RX ORDER — PREDNISONE 1 MG/1
7.5 TABLET ORAL DAILY
Qty: 135 TABLET | Refills: 1 | Status: SHIPPED | OUTPATIENT
Start: 2020-12-21 | End: 2021-06-23

## 2021-01-21 ENCOUNTER — TELEPHONE (OUTPATIENT)
Dept: CARDIOLOGY | Age: 56
End: 2021-01-21

## 2021-01-21 ENCOUNTER — VIRTUAL PHONE E/M (OUTPATIENT)
Dept: NEPHROLOGY | Facility: CLINIC | Age: 56
End: 2021-01-21
Payer: MEDICARE

## 2021-01-21 DIAGNOSIS — I10 ESSENTIAL HYPERTENSION: Primary | ICD-10-CM

## 2021-01-21 DIAGNOSIS — N18.6 ESRD ON HEMODIALYSIS (HCC): ICD-10-CM

## 2021-01-21 DIAGNOSIS — Z99.2 ESRD ON HEMODIALYSIS (HCC): ICD-10-CM

## 2021-01-21 PROCEDURE — G2252 BRIEF CHKIN BY MD/QHP, 11-20: HCPCS | Performed by: INTERNAL MEDICINE

## 2021-01-21 NOTE — PROGRESS NOTES
I spoke with pt this AM and performed virtual office visit per his request due to ongoing covid pandemic. Time spent 15 minutes. We reviewed dialysis labs and his current clinical course.   He is overall doing well although c/o testicular pain and has hx

## 2021-02-02 ENCOUNTER — OFFICE VISIT (OUTPATIENT)
Dept: CARDIOLOGY | Age: 56
End: 2021-02-02

## 2021-02-02 VITALS
SYSTOLIC BLOOD PRESSURE: 146 MMHG | DIASTOLIC BLOOD PRESSURE: 82 MMHG | BODY MASS INDEX: 29.52 KG/M2 | HEIGHT: 74 IN | HEART RATE: 66 BPM | WEIGHT: 230 LBS

## 2021-02-02 DIAGNOSIS — I25.10 CORONARY ARTERY DISEASE INVOLVING NATIVE CORONARY ARTERY OF NATIVE HEART WITHOUT ANGINA PECTORIS: ICD-10-CM

## 2021-02-02 DIAGNOSIS — Z95.5 PRESENCE OF STENT IN CORONARY ARTERY: ICD-10-CM

## 2021-02-02 DIAGNOSIS — Q87.81 ALPORT'S SYNDROME: Primary | ICD-10-CM

## 2021-02-02 PROCEDURE — 99214 OFFICE O/P EST MOD 30 MIN: CPT | Performed by: INTERNAL MEDICINE

## 2021-02-02 RX ORDER — BUPRENORPHINE AND NALOXONE 2; .5 MG/1; MG/1
FILM, SOLUBLE BUCCAL; SUBLINGUAL
COMMUNITY
Start: 2020-10-29 | End: 2022-11-08 | Stop reason: DRUGHIGH

## 2021-02-02 RX ORDER — LEVOTHYROXINE SODIUM 175 UG/1
175 TABLET ORAL DAILY
COMMUNITY
Start: 2020-09-28

## 2021-02-02 SDOH — SOCIAL STABILITY: SOCIAL NETWORK: ARE YOU MARRIED, WIDOWED, DIVORCED, SEPARATED, NEVER MARRIED, OR LIVING WITH A PARTNER?: MARRIED

## 2021-02-02 SDOH — HEALTH STABILITY: PHYSICAL HEALTH: ON AVERAGE, HOW MANY DAYS PER WEEK DO YOU ENGAGE IN MODERATE TO STRENUOUS EXERCISE (LIKE A BRISK WALK)?: 4 DAYS

## 2021-02-02 SDOH — HEALTH STABILITY: MENTAL HEALTH: HOW OFTEN DO YOU HAVE A DRINK CONTAINING ALCOHOL?: NEVER

## 2021-02-02 SDOH — HEALTH STABILITY: PHYSICAL HEALTH: ON AVERAGE, HOW MANY MINUTES DO YOU ENGAGE IN EXERCISE AT THIS LEVEL?: 150+ MIN

## 2021-02-02 ASSESSMENT — PATIENT HEALTH QUESTIONNAIRE - PHQ9
SUM OF ALL RESPONSES TO PHQ9 QUESTIONS 1 AND 2: 0
CLINICAL INTERPRETATION OF PHQ9 SCORE: NO FURTHER SCREENING NEEDED
SUM OF ALL RESPONSES TO PHQ9 QUESTIONS 1 AND 2: 0
2. FEELING DOWN, DEPRESSED OR HOPELESS: NOT AT ALL
1. LITTLE INTEREST OR PLEASURE IN DOING THINGS: NOT AT ALL
CLINICAL INTERPRETATION OF PHQ2 SCORE: NO FURTHER SCREENING NEEDED

## 2021-02-04 ENCOUNTER — OFFICE VISIT (OUTPATIENT)
Dept: INTERNAL MEDICINE CLINIC | Facility: CLINIC | Age: 56
End: 2021-02-04
Payer: MEDICARE

## 2021-02-04 VITALS
WEIGHT: 230.38 LBS | HEART RATE: 68 BPM | OXYGEN SATURATION: 99 % | RESPIRATION RATE: 16 BRPM | SYSTOLIC BLOOD PRESSURE: 134 MMHG | BODY MASS INDEX: 29.88 KG/M2 | DIASTOLIC BLOOD PRESSURE: 70 MMHG | TEMPERATURE: 98 F | HEIGHT: 73.75 IN

## 2021-02-04 DIAGNOSIS — N50.82 SCROTAL PAIN: Primary | ICD-10-CM

## 2021-02-04 PROBLEM — Z99.89 OSA ON CPAP: Chronic | Status: ACTIVE | Noted: 2017-02-13

## 2021-02-04 PROBLEM — N25.81 SECONDARY HYPERPARATHYROIDISM OF RENAL ORIGIN (HCC): Chronic | Status: ACTIVE | Noted: 2018-07-10

## 2021-02-04 PROBLEM — M10.9 GOUT: Chronic | Status: ACTIVE | Noted: 2017-02-13

## 2021-02-04 PROBLEM — G47.33 OSA ON CPAP: Chronic | Status: ACTIVE | Noted: 2017-02-13

## 2021-02-04 PROCEDURE — 99214 OFFICE O/P EST MOD 30 MIN: CPT | Performed by: INTERNAL MEDICINE

## 2021-02-04 RX ORDER — DOXYCYCLINE 100 MG/1
100 CAPSULE ORAL 2 TIMES DAILY
Qty: 28 CAPSULE | Refills: 0 | Status: SHIPPED | OUTPATIENT
Start: 2021-02-04 | End: 2021-02-18

## 2021-02-04 NOTE — PROGRESS NOTES
Maricruz Barton is a 54year old male. HPI:   Patient presents with:  Establish Care  Groin Pain    Patient presents to discuss groin/testicular pain.   This is a chronic issue, has been under control for a while, but has been acutely worsening over the Rfl: 1  •  OxyCODONE HCl IR 10 MG Oral Tab, 1 tab po q6 hr prn sever breakthrough left knee pain-30 days, Disp: 90 tablet, Rfl: 0  •  predniSONE 5 MG Oral Tab, Take 1.5 tablets (7.5 mg total) by mouth daily. , Disp: 135 tablet, Rfl: 1  •  buprenorphine HCl- aspirin 81 MG Oral Tab EC, Take 81 mg by mouth daily. , Disp: , Rfl:   Medical:  has a past medical history of Alport syndrome, Anemia, Arthritis, Atherosclerosis of coronary artery, Back problem, Brachial neuritis or radiculitis NOS (6/29/2012), Cervical r appendectomy (2003); cholecystectomy (2003); laminectomy,facetectomy,lumbar (05/29/2012); revise median n/carpal tunnel surg (2009); femur/knee surg unlisted (1994); colonoscopy; thyroidectomy; colonoscopy (N/A, 5/1/2015); cath pv (12/11/15); knee surgery; worsening scrotal pain over past three weeks. No constitutional symptoms. No dysuria/polyuria, however of note patient makes little urine (on dialysis). No penile discharge. No genital rash/lesions noted on examination.   He has some mild scrotal swelli

## 2021-02-04 NOTE — PATIENT INSTRUCTIONS
- Start antibiotic (doxycycline). Take 1 tablet/capsule twice daily for two weeks. - Schedule ultrasound of scrotum. Call [phone] to schedule. - We may have you see a Urologist based on results.     It was a pleasure seeing you in the clinic toda

## 2021-02-05 NOTE — PROGRESS NOTES
PT IN BED. REPORTS PAIN TO LEFT LEG 9/10. MEDICATED WITH HS MEDS INCLUDING
OXYCODONE 5MG PO. USES BEDPAN, ROLLS WELL. HAS SL TO LEFT FOREARM. IS ALERT
AND ORIENTED X4. DRSG TO LEFT STUMP D/I, IMMOBILIZER ON. Yoni Morales is a 46year old male. HPI:   Patient presents with: Other: ithcy all over   Rash  Patient presents pruritis  Started this weekend. Back, legs, stomach, face, scalp. Has used OTC lotions, creams, no improvement in symptoms.     Was Prescriptions:   •  predniSONE 5 MG Oral Tab, , Disp: , Rfl:   •  OxyCODONE HCl IR 10 MG Oral Tab, 1 tab po q6 hr prn sever breakthrough right knee pain, Disp: 45 tablet, Rfl: 0  •  ZUBSOLV 1.4-0.36 MG Sublingual SL Tab, Place 1.4 mg of buprenorphine under sodium 100 MG Oral Cap, Take 100 mg by mouth 2 (two) times daily. , Disp: , Rfl:   •  aspirin 81 MG Oral Tab EC, Take 81 mg by mouth daily. , Disp: , Rfl:   •  Ferrous Sulfate 325 (65 FE) MG Oral Tab, Take 1 tablet (325 mg total) by mouth daily. , Disp: 30 ta apnea.  Surgical:  has a past surgical history that includes appendectomy (2003); cholecystectomy (2003); laminectomy,facetectomy,lumbar (05/29/2012); revise median n/carpal tunnel surg (2009); femur/knee surg unlisted (1994); other surgical history; other Information provided for local groups (Dr. Isidoro Arredondo, [address], [facility]). 2.  Left-sided chest wall pain  Pectoral muscle pain. Reproducible to palpation. Occurred after patient's dog yanked leash very hard. Musculoskeletal, unlikely cardiac.   Monitor

## 2021-02-07 ENCOUNTER — HOSPITAL ENCOUNTER (OUTPATIENT)
Dept: ULTRASOUND IMAGING | Age: 56
Discharge: HOME | End: 2021-02-07
Attending: INTERNAL MEDICINE
Payer: MEDICARE

## 2021-02-07 DIAGNOSIS — N50.82 SCROTAL PAIN: ICD-10-CM

## 2021-02-07 PROCEDURE — 76870 US EXAM SCROTUM: CPT | Performed by: INTERNAL MEDICINE

## 2021-02-07 PROCEDURE — 93975 VASCULAR STUDY: CPT | Performed by: INTERNAL MEDICINE

## 2021-02-23 ENCOUNTER — OFFICE VISIT (OUTPATIENT)
Dept: SURGERY | Facility: CLINIC | Age: 56
End: 2021-02-23
Payer: MEDICARE

## 2021-02-23 VITALS — TEMPERATURE: 97 F | SYSTOLIC BLOOD PRESSURE: 159 MMHG | HEART RATE: 76 BPM | DIASTOLIC BLOOD PRESSURE: 82 MMHG

## 2021-02-23 DIAGNOSIS — N50.82 SCROTAL PAIN: ICD-10-CM

## 2021-02-23 DIAGNOSIS — N45.1 LEFT EPIDIDYMITIS: ICD-10-CM

## 2021-02-23 DIAGNOSIS — R82.90 URINE FINDING: Primary | ICD-10-CM

## 2021-02-23 LAB
APPEARANCE: CLEAR
MULTISTIX LOT#: 5077 NUMERIC
PH, URINE: 6.5 (ref 4.5–8)
SPECIFIC GRAVITY: 1.01 (ref 1–1.03)
URINE-COLOR: YELLOW
UROBILINOGEN,SEMI-QN: 0.2 MG/DL (ref 0–1.9)

## 2021-02-23 PROCEDURE — 99203 OFFICE O/P NEW LOW 30 MIN: CPT | Performed by: UROLOGY

## 2021-02-23 PROCEDURE — 81003 URINALYSIS AUTO W/O SCOPE: CPT | Performed by: UROLOGY

## 2021-02-23 RX ORDER — DOXYCYCLINE 100 MG/1
1 TABLET ORAL DAILY
COMMUNITY
Start: 2021-02-03 | End: 2021-03-25

## 2021-02-23 NOTE — PROGRESS NOTES
Rooming Clinician:     Aurelia Santiago is a 54year old male. Patient presents with:  Consult: cyst on kidney        HPI:     Patient comes to the office complaints of pain in the left testicle for several weeks.   He has a history of chronic kidney disea Cinacalcet HCl 60 MG Oral Tab Take 60 mg by mouth as needed with dialysis. • lactulose 10 GM/15ML Oral Solution Take 30 mL by mouth 2 (two) times a day.        • Lanthanum Carbonate 1000 MG Oral Powd Pack Take 1 packet by mouth 3 (three) times daily w 9/20/2012   • Cervical spondylosis with myelopathy 1/6/2014   • Chronic kidney disease, stage III (moderate) 6/29/2012   • Chronic pain syndrome 6/29/2012   • Constipation    • Coronary atherosclerosis     2016   • DDD (degenerative disc disease), cervical CPAP   • Wears glasses      Past Surgical History:   Procedure Laterality Date   • A.V. FISTULA Left 11/8/2018    Performed by Augustine Mcfadden MD at [facility]   • A.V. FISTULA Left 7/30/2018    Performed by Augustine Mcfadden MD at [address] [place] [address] abdominal pain and denies heartburn  : see HPI  NEURO: no sensory or motor complaint    EXAM:     /82 (BP Location: Right arm, Patient Position: Sitting, Cuff Size: large)   Pulse 76   Temp 97.4 °F (36.3 °C)   GENERAL: well developed, well nourishe scrotal wall. A duplex scan with B-mode, Doppler color flow, and spectral analysis were also performed. PATIENT STATED HISTORY: (As transcribed by Technologist)  Patient has a history of scrotal pain.     FINDINGS:  TESTES:  The testes demonstrate normal a

## 2021-03-17 DIAGNOSIS — Z23 NEED FOR VACCINATION: ICD-10-CM

## 2021-03-23 RX ORDER — ALLOPURINOL 100 MG/1
TABLET ORAL
Qty: 90 TABLET | Refills: 1 | OUTPATIENT
Start: 2021-03-23

## 2021-03-24 RX ORDER — ALLOPURINOL 100 MG/1
100 TABLET ORAL DAILY
Qty: 90 TABLET | Refills: 1 | Status: SHIPPED | OUTPATIENT
Start: 2021-03-24

## 2021-03-24 NOTE — TELEPHONE ENCOUNTER
Patient called in to request refill of :    ALLOPURINOL 100 MG Oral Tab: 90 Tablets    To be sent to:  [facility] [id number] [facility], [address] [place] [address] 6, [phone], [phone]

## 2021-03-25 ENCOUNTER — OFFICE VISIT (OUTPATIENT)
Dept: INTERNAL MEDICINE CLINIC | Facility: CLINIC | Age: 56
End: 2021-03-25
Payer: MEDICARE

## 2021-03-25 VITALS
RESPIRATION RATE: 16 BRPM | SYSTOLIC BLOOD PRESSURE: 130 MMHG | HEIGHT: 73.75 IN | DIASTOLIC BLOOD PRESSURE: 70 MMHG | TEMPERATURE: 97 F | HEART RATE: 70 BPM | BODY MASS INDEX: 29.14 KG/M2 | WEIGHT: 224.63 LBS | OXYGEN SATURATION: 99 %

## 2021-03-25 DIAGNOSIS — M06.9 RHEUMATOID ARTHRITIS INVOLVING MULTIPLE SITES, UNSPECIFIED WHETHER RHEUMATOID FACTOR PRESENT (HCC): Chronic | ICD-10-CM

## 2021-03-25 DIAGNOSIS — M54.2 CHRONIC NECK PAIN: ICD-10-CM

## 2021-03-25 DIAGNOSIS — M17.11 PRIMARY OSTEOARTHRITIS OF RIGHT KNEE: Chronic | ICD-10-CM

## 2021-03-25 DIAGNOSIS — M1A.09X0 IDIOPATHIC CHRONIC GOUT OF MULTIPLE SITES WITHOUT TOPHUS: Chronic | ICD-10-CM

## 2021-03-25 DIAGNOSIS — Z12.5 SCREENING FOR MALIGNANT NEOPLASM OF PROSTATE: ICD-10-CM

## 2021-03-25 DIAGNOSIS — G47.33 OSA ON CPAP: Chronic | ICD-10-CM

## 2021-03-25 DIAGNOSIS — R73.01 ELEVATED FASTING GLUCOSE: ICD-10-CM

## 2021-03-25 DIAGNOSIS — N18.6 ANEMIA IN ESRD (END-STAGE RENAL DISEASE) (HCC): Chronic | ICD-10-CM

## 2021-03-25 DIAGNOSIS — F11.20 OPIOID DEPENDENCE ON AGONIST THERAPY (HCC): ICD-10-CM

## 2021-03-25 DIAGNOSIS — E89.0 POSTSURGICAL HYPOTHYROIDISM: Chronic | ICD-10-CM

## 2021-03-25 DIAGNOSIS — Z99.89 OSA ON CPAP: Chronic | ICD-10-CM

## 2021-03-25 DIAGNOSIS — Z99.2 ESRD ON HEMODIALYSIS (HCC): ICD-10-CM

## 2021-03-25 DIAGNOSIS — D63.1 ANEMIA IN ESRD (END-STAGE RENAL DISEASE) (HCC): Chronic | ICD-10-CM

## 2021-03-25 DIAGNOSIS — I10 BENIGN ESSENTIAL HTN: Primary | Chronic | ICD-10-CM

## 2021-03-25 DIAGNOSIS — Z00.00 PREVENTATIVE HEALTH CARE: ICD-10-CM

## 2021-03-25 DIAGNOSIS — I25.10 CORONARY ARTERY DISEASE INVOLVING NATIVE CORONARY ARTERY OF NATIVE HEART WITHOUT ANGINA PECTORIS: Chronic | ICD-10-CM

## 2021-03-25 DIAGNOSIS — N18.6 ESRD ON HEMODIALYSIS (HCC): ICD-10-CM

## 2021-03-25 DIAGNOSIS — G89.29 CHRONIC NECK PAIN: ICD-10-CM

## 2021-03-25 PROCEDURE — 99214 OFFICE O/P EST MOD 30 MIN: CPT | Performed by: INTERNAL MEDICINE

## 2021-03-25 NOTE — PROGRESS NOTES
Orlando Mills is a 54year old male. HPI:   Patient presents with: Follow - Up    Patient presents for follow up on chronic medical issues. Hypertension - at goal blood pressure. Coronary artery disease - no anginal symptoms.   Elevated fasting gluc daily., Disp: 90 tablet, Rfl: 1  •  buprenorphine HCl 8 MG Sublingual SL Tab, Place 0.5 tablets (4 mg total) under the tongue 2 (two) times daily. , Disp: 30 tablet, Rfl: 2  •  OxyCODONE HCl IR 10 MG Oral Tab, TAKE ONE TABLET BY MOUTH EVERY SIX HOURS AS NEE Medical:  has a past medical history of Alport syndrome, Chronic pain syndrome (6/29/2012), Dialysis patient [facility]), Exposure to medical diagnostic radiation, Hearing loss, History of blood transfusion, Hyperlipidemia, Kidney replaced by transplant (1997) kg)  09/24/20 : 248 lb (112.5 kg)  02/13/20 : 258 lb (117 kg)  01/09/20 : 253 lb (114.8 kg)    EXAM:   /70 (BP Location: Right arm, Patient Position: Sitting, Cuff Size: adult)   Pulse 70   Temp 97.2 °F (36.2 °C) (Axillary)   Resp 16   Ht 6' 1.75\" ( buprenorphine through Pain Management. His pain management specialist is leaving his practice. Patient will be establishing with a new pain management physician next month.   Advised patient I do not prescribe buprenorphine but could refill his oxycodone

## 2021-03-25 NOTE — PATIENT INSTRUCTIONS
- Continue current medications  - Follow up with Pain Management for pain medication refills.   We can temporarily refill oxycodone if needed before you see your new pain management doctor  - Follow up with dr. Aby Hyatt if your scrotal pain comes back  - CBS

## 2021-04-22 ENCOUNTER — OFFICE VISIT (OUTPATIENT)
Dept: NEPHROLOGY | Facility: CLINIC | Age: 56
End: 2021-04-22
Payer: MEDICARE

## 2021-04-22 VITALS — BODY MASS INDEX: 28 KG/M2 | WEIGHT: 220 LBS | DIASTOLIC BLOOD PRESSURE: 76 MMHG | SYSTOLIC BLOOD PRESSURE: 130 MMHG

## 2021-04-22 DIAGNOSIS — I10 ESSENTIAL HYPERTENSION: ICD-10-CM

## 2021-04-22 DIAGNOSIS — Z99.2 ESRD ON HEMODIALYSIS (HCC): Primary | ICD-10-CM

## 2021-04-22 DIAGNOSIS — N18.6 ESRD ON HEMODIALYSIS (HCC): Primary | ICD-10-CM

## 2021-05-04 PROBLEM — Z51.81 ENCOUNTER FOR MONITORING SUBOXONE MAINTENANCE THERAPY: Status: ACTIVE | Noted: 2021-05-04

## 2021-05-04 PROBLEM — Z79.899 ENCOUNTER FOR MONITORING SUBOXONE MAINTENANCE THERAPY: Status: ACTIVE | Noted: 2021-05-04

## 2021-05-04 PROBLEM — Z79.891 ENCOUNTER FOR MONITORING SUBOXONE MAINTENANCE THERAPY: Status: ACTIVE | Noted: 2021-05-04

## 2021-05-25 VITALS
HEART RATE: 69 BPM | WEIGHT: 245.04 LBS | HEART RATE: 81 BPM | WEIGHT: 252.21 LBS | RESPIRATION RATE: 17 BRPM | BODY MASS INDEX: 31.45 KG/M2 | DIASTOLIC BLOOD PRESSURE: 76 MMHG | DIASTOLIC BLOOD PRESSURE: 76 MMHG | TEMPERATURE: 98.8 F | HEIGHT: 74 IN | SYSTOLIC BLOOD PRESSURE: 135 MMHG | TEMPERATURE: 97.2 F | DIASTOLIC BLOOD PRESSURE: 76 MMHG | BODY MASS INDEX: 32.37 KG/M2 | RESPIRATION RATE: 17 BRPM | TEMPERATURE: 97.2 F | SYSTOLIC BLOOD PRESSURE: 117 MMHG | HEIGHT: 74 IN | OXYGEN SATURATION: 99 % | SYSTOLIC BLOOD PRESSURE: 117 MMHG | HEIGHT: 74 IN | HEART RATE: 69 BPM | WEIGHT: 251 LBS | BODY MASS INDEX: 32.21 KG/M2

## 2021-05-30 ENCOUNTER — HOSPITAL ENCOUNTER (EMERGENCY)
Facility: HOSPITAL | Age: 56
Discharge: HOME | End: 2021-05-30
Attending: EMERGENCY MEDICINE
Payer: MEDICARE

## 2021-05-30 ENCOUNTER — APPOINTMENT (OUTPATIENT)
Dept: GENERAL RADIOLOGY | Facility: HOSPITAL | Age: 56
End: 2021-05-30
Attending: EMERGENCY MEDICINE
Payer: MEDICARE

## 2021-05-30 VITALS
RESPIRATION RATE: 16 BRPM | SYSTOLIC BLOOD PRESSURE: 138 MMHG | BODY MASS INDEX: 28 KG/M2 | DIASTOLIC BLOOD PRESSURE: 88 MMHG | HEART RATE: 64 BPM | OXYGEN SATURATION: 98 % | TEMPERATURE: 99 F | WEIGHT: 215 LBS

## 2021-05-30 DIAGNOSIS — K59.00 CONSTIPATION, UNSPECIFIED CONSTIPATION TYPE: Primary | ICD-10-CM

## 2021-05-30 PROCEDURE — 99284 EMERGENCY DEPT VISIT MOD MDM: CPT

## 2021-05-30 PROCEDURE — 74018 RADEX ABDOMEN 1 VIEW: CPT | Performed by: EMERGENCY MEDICINE

## 2021-05-30 PROCEDURE — 99283 EMERGENCY DEPT VISIT LOW MDM: CPT

## 2021-05-30 NOTE — ED PROVIDER NOTES
Patient Seen in: [facility] Emergency Department      History   Patient presents with:  Constipation    Stated Complaint: consitpation    HPI/Subjective:   HPI    This is a 59-year-old male past medical history of end-stage renal disease on dialysis • BACK SURGERY     • CATH PERCUTANEOUS  TRANSLUMINAL CORONARY ANGIOPLASTY  2016   • CATH PV  12/11/15    Done at -[facility] states requires future angioplasty. • CHOLECYSTECTOMY  2003   • COLONOSCOPY      2003?    • COLONOSCOPY N/A 5/1/2015    Procedure Clear to auscultation bilaterally with no rales, no retractions, and no wheezing. HEART:  Regular rate and rhythm. S1 and S2. No murmurs, no rubs or gallops. ABDOMEN: Soft, nontender and nondistended. Normoactive bowel sounds. No rebound. No guarding. Tuesday. Patient discharged home in good condition with wife.     Disposition and Plan     Clinical Impression:  Constipation, unspecified constipation type  (primary encounter diagnosis)     Disposition:  Discharge  5/30/2021 11:00 am    Follow-up:  Kina Thurman

## 2021-06-01 ENCOUNTER — LAB ENCOUNTER (OUTPATIENT)
Dept: LAB | Age: 56
End: 2021-06-01
Attending: INTERNAL MEDICINE
Payer: MEDICARE

## 2021-06-01 DIAGNOSIS — E89.0 POSTSURGICAL HYPOTHYROIDISM: ICD-10-CM

## 2021-06-01 DIAGNOSIS — C73 PAPILLARY THYROID CARCINOMA (HCC): Primary | ICD-10-CM

## 2021-06-01 PROCEDURE — 84443 ASSAY THYROID STIM HORMONE: CPT

## 2021-06-01 PROCEDURE — 86376 MICROSOMAL ANTIBODY EACH: CPT

## 2021-06-01 PROCEDURE — 36415 COLL VENOUS BLD VENIPUNCTURE: CPT

## 2021-06-01 PROCEDURE — 86800 THYROGLOBULIN ANTIBODY: CPT

## 2021-06-08 PROBLEM — D22.9 NUMEROUS MOLES: Status: ACTIVE | Noted: 2021-06-08

## 2021-06-23 RX ORDER — PREDNISONE 1 MG/1
7.5 TABLET ORAL DAILY
Qty: 135 TABLET | Refills: 1 | Status: SHIPPED | OUTPATIENT
Start: 2021-06-23 | End: 2021-12-21

## 2021-07-20 PROBLEM — R63.4 WEIGHT LOSS: Status: ACTIVE | Noted: 2021-07-20

## 2021-08-19 ENCOUNTER — OFFICE VISIT (OUTPATIENT)
Dept: NEPHROLOGY | Facility: CLINIC | Age: 56
End: 2021-08-19
Payer: MEDICARE

## 2021-08-19 VITALS — BODY MASS INDEX: 27 KG/M2 | DIASTOLIC BLOOD PRESSURE: 80 MMHG | SYSTOLIC BLOOD PRESSURE: 130 MMHG | WEIGHT: 208.38 LBS

## 2021-08-19 DIAGNOSIS — I10 ESSENTIAL HYPERTENSION: ICD-10-CM

## 2021-08-19 DIAGNOSIS — Z99.2 ESRD ON HEMODIALYSIS (HCC): Primary | ICD-10-CM

## 2021-08-19 DIAGNOSIS — N18.6 ESRD ON HEMODIALYSIS (HCC): Primary | ICD-10-CM

## 2021-08-19 NOTE — PROGRESS NOTES
Mr. Nicole Shannon was seen in the nephrology clinic today. He continues to remain relatively stable on dialysis although recently has been noted to have worsening blood pressure control.   He was advised to begin taking his antihypertensive agents prior to his

## 2021-08-31 ENCOUNTER — APPOINTMENT (OUTPATIENT)
Dept: CARDIOLOGY | Age: 56
End: 2021-08-31

## 2021-09-02 ENCOUNTER — LAB ENCOUNTER (OUTPATIENT)
Dept: LAB | Age: 56
End: 2021-09-02
Attending: INTERNAL MEDICINE
Payer: MEDICARE

## 2021-09-02 DIAGNOSIS — N18.6 ESRD ON HEMODIALYSIS (HCC): ICD-10-CM

## 2021-09-02 DIAGNOSIS — C73 PAPILLARY THYROID CARCINOMA (HCC): ICD-10-CM

## 2021-09-02 DIAGNOSIS — R73.01 ELEVATED FASTING GLUCOSE: ICD-10-CM

## 2021-09-02 DIAGNOSIS — E89.0 POSTSURGICAL HYPOTHYROIDISM: ICD-10-CM

## 2021-09-02 DIAGNOSIS — Z00.00 PREVENTATIVE HEALTH CARE: ICD-10-CM

## 2021-09-02 DIAGNOSIS — Z12.5 SCREENING FOR MALIGNANT NEOPLASM OF PROSTATE: ICD-10-CM

## 2021-09-02 DIAGNOSIS — Z99.2 ESRD ON HEMODIALYSIS (HCC): ICD-10-CM

## 2021-09-02 LAB
CHOLEST SMN-MCNC: 169 MG/DL (ref ?–200)
COMPLEXED PSA SERPL-MCNC: 0.65 NG/ML (ref ?–4)
EST. AVERAGE GLUCOSE BLD GHB EST-MCNC: 88 MG/DL (ref 68–126)
HBA1C MFR BLD HPLC: 4.7 % (ref ?–5.7)
HDLC SERPL-MCNC: 55 MG/DL (ref 40–59)
LDLC SERPL CALC-MCNC: 104 MG/DL (ref ?–100)
NONHDLC SERPL-MCNC: 114 MG/DL (ref ?–130)
PATIENT FASTING Y/N/NP: YES
TRIGL SERPL-MCNC: 51 MG/DL (ref 30–149)
VLDLC SERPL CALC-MCNC: 9 MG/DL (ref 0–30)

## 2021-09-02 PROCEDURE — 36415 COLL VENOUS BLD VENIPUNCTURE: CPT

## 2021-09-02 PROCEDURE — 83036 HEMOGLOBIN GLYCOSYLATED A1C: CPT

## 2021-09-02 PROCEDURE — 86800 THYROGLOBULIN ANTIBODY: CPT

## 2021-09-02 PROCEDURE — 84432 ASSAY OF THYROGLOBULIN: CPT

## 2021-09-02 PROCEDURE — 80061 LIPID PANEL: CPT

## 2021-09-04 LAB
THYROGLOBULIN AB: <0.9 IU/ML
THYROGLOBULIN, SERUM OR PLASMA: 0.2 NG/ML

## 2021-09-07 NOTE — PLAN OF CARE
Please reschedule telephone visit to  9/13 at 1:30, please let the patient know that Dr. Salgado will be calling her between procedures and the time may vary a little.    Pt is alert and oriented. A lasix gtt was started. IV diuretic was also given. Chronic tremors. RA lungs clear , sats >92%. Plus 3 pitting edema noted to jose manuel lower extremities, worse on the left leg also wounds on the left leg. BP elevated clonidine given.

## 2021-09-09 ENCOUNTER — OFFICE VISIT (OUTPATIENT)
Dept: INTERNAL MEDICINE CLINIC | Facility: CLINIC | Age: 56
End: 2021-09-09
Payer: MEDICARE

## 2021-09-09 VITALS
WEIGHT: 203.81 LBS | TEMPERATURE: 98 F | HEIGHT: 73.25 IN | OXYGEN SATURATION: 99 % | DIASTOLIC BLOOD PRESSURE: 84 MMHG | HEART RATE: 68 BPM | SYSTOLIC BLOOD PRESSURE: 136 MMHG | BODY MASS INDEX: 26.72 KG/M2 | RESPIRATION RATE: 16 BRPM

## 2021-09-09 DIAGNOSIS — M06.9 RHEUMATOID ARTHRITIS INVOLVING MULTIPLE SITES, UNSPECIFIED WHETHER RHEUMATOID FACTOR PRESENT (HCC): Chronic | ICD-10-CM

## 2021-09-09 DIAGNOSIS — M1A.09X0 IDIOPATHIC CHRONIC GOUT OF MULTIPLE SITES WITHOUT TOPHUS: Chronic | ICD-10-CM

## 2021-09-09 DIAGNOSIS — N18.6 ANEMIA IN ESRD (END-STAGE RENAL DISEASE) (HCC): Chronic | ICD-10-CM

## 2021-09-09 DIAGNOSIS — Z00.00 ENCOUNTER FOR SUBSEQUENT ANNUAL WELLNESS VISIT (AWV) IN MEDICARE PATIENT: Primary | ICD-10-CM

## 2021-09-09 DIAGNOSIS — F11.20 OPIOID DEPENDENCE ON AGONIST THERAPY (HCC): ICD-10-CM

## 2021-09-09 DIAGNOSIS — G47.33 OSA ON CPAP: Chronic | ICD-10-CM

## 2021-09-09 DIAGNOSIS — Q87.81 ALPORT SYNDROME: Chronic | ICD-10-CM

## 2021-09-09 DIAGNOSIS — M17.11 PRIMARY OSTEOARTHRITIS OF RIGHT KNEE: Chronic | ICD-10-CM

## 2021-09-09 DIAGNOSIS — D84.9 IMMUNOSUPPRESSED STATUS (HCC): ICD-10-CM

## 2021-09-09 DIAGNOSIS — Z99.89 OSA ON CPAP: Chronic | ICD-10-CM

## 2021-09-09 DIAGNOSIS — I25.10 CORONARY ARTERY DISEASE INVOLVING NATIVE CORONARY ARTERY OF NATIVE HEART WITHOUT ANGINA PECTORIS: Chronic | ICD-10-CM

## 2021-09-09 DIAGNOSIS — N18.6 ESRD ON HEMODIALYSIS (HCC): Chronic | ICD-10-CM

## 2021-09-09 DIAGNOSIS — N25.81 SECONDARY HYPERPARATHYROIDISM OF RENAL ORIGIN (HCC): Chronic | ICD-10-CM

## 2021-09-09 DIAGNOSIS — E89.0 POSTSURGICAL HYPOTHYROIDISM: Chronic | ICD-10-CM

## 2021-09-09 DIAGNOSIS — I10 BENIGN ESSENTIAL HTN: Chronic | ICD-10-CM

## 2021-09-09 DIAGNOSIS — D63.1 ANEMIA IN ESRD (END-STAGE RENAL DISEASE) (HCC): Chronic | ICD-10-CM

## 2021-09-09 DIAGNOSIS — Z99.2 ESRD ON HEMODIALYSIS (HCC): Chronic | ICD-10-CM

## 2021-09-09 PROCEDURE — 99214 OFFICE O/P EST MOD 30 MIN: CPT | Performed by: INTERNAL MEDICINE

## 2021-09-09 PROCEDURE — G0439 PPPS, SUBSEQ VISIT: HCPCS | Performed by: INTERNAL MEDICINE

## 2021-09-09 RX ORDER — FUROSEMIDE 80 MG
1 TABLET ORAL
COMMUNITY
Start: 2021-09-08 | End: 2021-10-19

## 2021-09-09 NOTE — PATIENT INSTRUCTIONS
- LDL was a little bit high. We will continue atorvastatin at current dose for now  - All other labs look great  - Keep up the good work with diet and exercise  - Spot on right elbow looks fine. Will monitor for now. - Toes do not appear broken.   Can co

## 2021-09-09 NOTE — PROGRESS NOTES
HPI:   Yunier Miles is a 64year old male who presents for a Medicare Subsequent Annual Wellness visit (Pt already had Initial Annual Wellness). His last annual assessment has been over 1 year: Annual Physical due on 04/18/2020.   Preventative health - functional status. Vision Problems? : Yes   He has problems with Memory based on screening of functional status.    Memory Problems?: Yes       Depression Screening (PHQ-2/PHQ-9): Over the LAST 2 WEEKS   Little interest or pleasure in doing things: Not at chronic pain     Opioid dependence on agonist therapy (HCC)     Thrombocytopenia (HCC)     Peripheral polyneuropathy     Immunosuppressed status (HCC) - Long-term current use of immunosuppressive medication     Chronic L knee effusion     Coronary artery d mcg total) by mouth daily. predniSONE 5 MG Oral Tab, Take 1.5 tablets (7.5 mg total) by mouth daily. allopurinol 100 MG Oral Tab, Take 1 tablet (100 mg total) by mouth daily. Nephro-Leticia 0.8 MG Oral Tab, Take 1 tablet by mouth daily.   Cinacalcet HCl 60 complication (1/99/1426).     He  has a past surgical history that includes appendectomy (2003); cholecystectomy (2003); laminectomy,facetectomy,lumbar (05/29/2012); revise median n/carpal tunnel surg (2009); femur/knee surg unlisted (1994); colonoscopy; th following:    Height as of this encounter: 6' 1.25\" (1.861 m). Weight as of this encounter: 203 lb 12.8 oz (92.4 kg).     Medicare Hearing Assessment  (Required for AWV/SWV)    Assessed via questionnaire        Visual Acuity                         GENE Adjuvanted (Shingrix) 09/24/2020, 12/22/2020        ASSESSMENT AND OTHER RELEVANT CHRONIC CONDITIONS:   Helen Dumont is a 64year old male who presents for a Medicare Assessment. PLAN SUMMARY:   1.  Encounter for subsequent annual wellness visit (AW 9/9/2021     General Health     In the past six months, have you lost more than 10 pounds without trying?: 2 - No  Has your appetite been poor?: Yes  How does the patient maintain a good energy level?: Appropriate Exercise; Daily Walks; Stretching  How wou 10 years    Covered every 2 years if patient is at high risk or previous colonoscopy was abnormal 10/23/2018    Colonoscopy due on 10/23/2023    Flexible Sigmoidoscopy   Covered every 4 years -    Fecal Occult Blood Test Covered annually -   Prostate Cance

## 2021-09-20 RX ORDER — AMLODIPINE BESYLATE 10 MG/1
TABLET ORAL
Qty: 90 TABLET | Refills: 3 | OUTPATIENT
Start: 2021-09-20

## 2021-09-20 NOTE — TELEPHONE ENCOUNTER
LOV: 9/9/2021 with Dr. Ruth Weaver  RTC: 6 months  Last Relevant Labs: 9/2/2021  Filled: 9/24/2020    #90 with 3 refills    Future Appointments   Date Time Provider [address]   10/19/2021  1:30 PM Yevonne Mcburney, MD [facility] [address]   11/11/2021  8:40 AM Ernestine Cobos MD [facility] [place]   3/10/2022  8:00 AM Batool Kurtz MD [facility] [address] [facility] [place]   6/28/2022  9:45 AM Rogelio Fraser MD [address] [place] [address]

## 2021-11-01 ENCOUNTER — TELEPHONE (OUTPATIENT)
Dept: TRANSPLANT | Age: 56
End: 2021-11-01

## 2021-11-02 ENCOUNTER — TELEPHONE (OUTPATIENT)
Dept: TRANSPLANT | Age: 56
End: 2021-11-02

## 2021-11-03 ENCOUNTER — TELEPHONE (OUTPATIENT)
Dept: TRANSPLANT | Age: 56
End: 2021-11-03

## 2021-11-04 ENCOUNTER — OFFICE VISIT (OUTPATIENT)
Dept: TRANSPLANT | Age: 56
End: 2021-11-04

## 2021-11-04 VITALS
HEART RATE: 73 BPM | SYSTOLIC BLOOD PRESSURE: 136 MMHG | TEMPERATURE: 98.1 F | WEIGHT: 199 LBS | DIASTOLIC BLOOD PRESSURE: 81 MMHG | BODY MASS INDEX: 25.54 KG/M2 | RESPIRATION RATE: 16 BRPM | HEIGHT: 74 IN

## 2021-11-04 VITALS
WEIGHT: 199 LBS | DIASTOLIC BLOOD PRESSURE: 81 MMHG | HEART RATE: 73 BPM | BODY MASS INDEX: 25.54 KG/M2 | RESPIRATION RATE: 16 BRPM | TEMPERATURE: 98.1 F | SYSTOLIC BLOOD PRESSURE: 136 MMHG | HEIGHT: 74 IN

## 2021-11-04 DIAGNOSIS — Z76.82 AWAITING TRANSPLANTATION OF ORGAN: ICD-10-CM

## 2021-11-04 DIAGNOSIS — N18.6 END-STAGE RENAL DISEASE (CMD): ICD-10-CM

## 2021-11-04 DIAGNOSIS — I10 HYPERTENSION, ESSENTIAL: ICD-10-CM

## 2021-11-04 DIAGNOSIS — Z94.0 STATUS POST KIDNEY TRANSPLANT: ICD-10-CM

## 2021-11-04 DIAGNOSIS — N18.6 END STAGE RENAL DISEASE (CMD): Primary | ICD-10-CM

## 2021-11-04 DIAGNOSIS — Z99.2 DIALYSIS PATIENT (CMD): ICD-10-CM

## 2021-11-04 DIAGNOSIS — I10 ESSENTIAL HYPERTENSION, BENIGN: ICD-10-CM

## 2021-11-04 DIAGNOSIS — Q87.81 ALPORT'S SYNDROME: ICD-10-CM

## 2021-11-04 DIAGNOSIS — I25.10 CORONARY ARTERY DISEASE INVOLVING NATIVE CORONARY ARTERY OF NATIVE HEART WITHOUT ANGINA PECTORIS: Primary | ICD-10-CM

## 2021-11-04 DIAGNOSIS — N18.6 END STAGE RENAL DISEASE (CMD): ICD-10-CM

## 2021-11-04 DIAGNOSIS — Z01.818 PRE-TRANSPLANT EVALUATION FOR KIDNEY TRANSPLANT: ICD-10-CM

## 2021-11-04 DIAGNOSIS — Z95.5 PRESENCE OF STENT IN CORONARY ARTERY: ICD-10-CM

## 2021-11-04 DIAGNOSIS — I10 BENIGN ESSENTIAL HTN: ICD-10-CM

## 2021-11-04 DIAGNOSIS — N18.6 END-STAGE RENAL DISEASE (CMD): Primary | ICD-10-CM

## 2021-11-04 PROCEDURE — 99215 OFFICE O/P EST HI 40 MIN: CPT | Performed by: TRANSPLANT SURGERY

## 2021-11-04 PROCEDURE — 99215 OFFICE O/P EST HI 40 MIN: CPT | Performed by: INTERNAL MEDICINE

## 2021-11-04 ASSESSMENT — ENCOUNTER SYMPTOMS
GASTROINTESTINAL NEGATIVE: 1
ALLERGIC/IMMUNOLOGIC NEGATIVE: 1
EYES NEGATIVE: 1
NEUROLOGICAL NEGATIVE: 1
RESPIRATORY NEGATIVE: 1
CONSTITUTIONAL NEGATIVE: 1
HEMATOLOGIC/LYMPHATIC NEGATIVE: 1
ENDOCRINE NEGATIVE: 1
PSYCHIATRIC NEGATIVE: 1

## 2021-11-04 ASSESSMENT — PAIN SCALES - GENERAL
PAINLEVEL: 8
PAINLEVEL: 8

## 2021-11-08 ENCOUNTER — DOCUMENTATION (OUTPATIENT)
Dept: TRANSPLANT | Age: 56
End: 2021-11-08

## 2021-11-11 ENCOUNTER — OFFICE VISIT (OUTPATIENT)
Dept: NEPHROLOGY | Facility: CLINIC | Age: 56
End: 2021-11-11
Payer: MEDICARE

## 2021-11-11 VITALS — WEIGHT: 201.25 LBS | SYSTOLIC BLOOD PRESSURE: 130 MMHG | BODY MASS INDEX: 26 KG/M2 | DIASTOLIC BLOOD PRESSURE: 86 MMHG

## 2021-11-11 DIAGNOSIS — N18.6 ESRD ON HEMODIALYSIS (HCC): Primary | ICD-10-CM

## 2021-11-11 DIAGNOSIS — Z99.2 ESRD ON HEMODIALYSIS (HCC): Primary | ICD-10-CM

## 2021-11-11 NOTE — PROGRESS NOTES
Mr. Erica Ratliff was seen in the nephrology clinic today in follow-up for management of ESRD. He is currently on dialysis 3 times weekly at [place] [address]. Recent labs were reviewed and his phosphorus has improved.   Hemoglobin is stable and albumin is stab

## 2021-12-17 ENCOUNTER — TELEPHONE (OUTPATIENT)
Dept: INTERNAL MEDICINE CLINIC | Facility: CLINIC | Age: 56
End: 2021-12-17

## 2021-12-20 RX ORDER — PREDNISONE 5 MG/1
7.5 TABLET ORAL DAILY
Qty: 45 TABLET | Refills: 5 | OUTPATIENT
Start: 2021-12-20

## 2021-12-21 RX ORDER — PREDNISONE 1 MG/1
7.5 TABLET ORAL DAILY
Qty: 45 TABLET | Refills: 5 | Status: SHIPPED | OUTPATIENT
Start: 2021-12-21

## 2021-12-21 RX ORDER — PREDNISONE 1 MG/1
7.5 TABLET ORAL DAILY
Qty: 135 TABLET | Refills: 1 | Status: SHIPPED | OUTPATIENT
Start: 2021-12-21

## 2022-02-15 NOTE — TELEPHONE ENCOUNTER
Diabetes Mellitus and Standards of Medical Care  Living with and managing diabetes (diabetes mellitus) can be complicated. Your diabetes treatment may be managed by a team of health care providers, including:  · A physician who specializes in diabetes (endocrinologist). You might also have visits with a nurse practitioner or physician assistant.  · Nurses.  · A registered dietitian.  · A certified diabetes care and .  · An exercise specialist.  · A pharmacist.  · An eye doctor.  · A foot specialist (podiatrist).  · A dental care provider.  · A primary care provider.  · A mental health care provider.  How to manage your diabetes  You can do many things to successfully manage your diabetes. Your health care providers will follow guidelines to help you get the best quality of care. Here are general guidelines for your diabetes management plan. Your health care providers may give you more specific instructions.  Physical exams  When you are diagnosed with diabetes, and each year after that, your health care provider will ask about your medical and family history. You will have a physical exam, which may include:  · Measuring your height, weight, and body mass index (BMI).  · Checking your blood pressure. This will be done at every routine medical visit. Your target blood pressure may vary depending on your medical conditions, your age, and other factors.  · A thyroid exam.  · A skin exam.  · Screening for nerve damage (peripheral neuropathy). This may include checking the pulse in your legs and feet and the level of sensation in your hands and feet.  · A foot exam to inspect the structure and skin of your feet, including checking for cuts, bruises, redness, blisters, sores, or other problems.  · Screening for blood vessel (vascular) problems. This may include checking the pulse in your legs and feet and checking your temperature.  Blood tests  Depending on your treatment plan and your personal needs,  LEFT VM FOR PATIENT TO CALL BACK AND CONFIRM APPOINTMENT. MOVED PATIENT FROM 10/25 AT 1PM TO 10/24 AT 1:20PM.  DR. Viri Farmer DOING NEURO ICU CALL. Hugh Suarez 6119 ON 10/25 AND 10/26 you may have the following tests:  · Hemoglobin A1C (HbA1C). This test provides information about blood sugar (glucose) control over the previous 2-3 months. It is used to adjust your treatment plan, if needed. This test will be done:  ? At least 2 times a year, if you are meeting your treatment goals.  ? 4 times a year, if you are not meeting your treatment goals or if your goals have changed.  · Lipid testing, including total cholesterol, LDL and HDL cholesterol, and triglyceride levels.  ? The goal for LDL is less than 100 mg/dL (5.5 mmol/L). If you are at high risk for complications, the goal is less than 70 mg/dL (3.9 mmol/L).  ? The goal for HDL is 40 mg/dL (2.2 mmol/L) or higher for men, and 50 mg/dL (2.8 mmol/L) or higher for women. An HDL cholesterol of 60 mg/dL (3.3 mmol/L) or higher gives some protection against heart disease.  ? The goal for triglycerides is less than 150 mg/dL (8.3 mmol/L).  · Liver function tests.  · Kidney function tests.  · Thyroid function tests.    Dental and eye exams    · Visit your dentist two times a year.  · If you have type 1 diabetes, your health care provider may recommend an eye exam within 5 years after you are diagnosed, and then once a year after your first exam.  ? For children with type 1 diabetes, the health care provider may recommend an eye exam when your child is age 11 or older and has had diabetes for 3-5 years. After the first exam, your child should get an eye exam once a year.  · If you have type 2 diabetes, your health care provider may recommend an eye exam as soon as you are diagnosed, and then every 1-2 years after your first exam.    Immunizations  · A yearly flu (influenza) vaccine is recommended annually for everyone 6 months or older. This is especially important if you have diabetes.  · The pneumonia (pneumococcal) vaccine is recommended for everyone 2 years or older who has diabetes. If you are age 65 or older, you may get the pneumonia vaccine as a  series of two separate shots.  · The hepatitis B vaccine is recommended for adults shortly after being diagnosed with diabetes.  Adults and children with diabetes should receive all other vaccines according to age-specific recommendations from the Centers for Disease Control and Prevention (CDC).  Mental and emotional health  Screening for symptoms of eating disorders, anxiety, and depression is recommended at the time of diagnosis and after as needed. If your screening shows that you have symptoms, you may need more evaluation. You may work with a mental health care provider.  Follow these instructions at home:  Treatment plan  You will monitor your blood glucose levels and may give yourself insulin. Your treatment plan will be reviewed at every medical visit. You and your health care provider will discuss:  · How you are taking your medicines, including insulin.  · Any side effects you have.  · Your blood glucose level target goals.  · How often you monitor your blood glucose level.  · Lifestyle habits, such as activity level and tobacco, alcohol, and substance use.  Education  Your health care provider will assess how well you are monitoring your blood glucose levels and whether you are taking your insulin and medicines correctly. He or she may refer you to:  · A certified diabetes care and  to manage your diabetes throughout your life, starting at diagnosis.  · A registered dietitian who can create and review your personal nutrition plan.  · An exercise specialist who can discuss your activity level and exercise plan.  General instructions  · Take over-the-counter and prescription medicines only as told by your health care provider.  · Keep all follow-up visits. This is important.  Where to find support  There are many diabetes support networks, including:  · American Diabetes Association (ADA): diabetes.org  · Defeat Diabetes Foundation: defeatdiabetes.org  Where to find more  information  · American Diabetes Association (ADA): www.diabetes.org  · Association of Diabetes Care & Education Specialists (ADCES): diabeteseducator.org  · International Diabetes Federation (IDF): idf.org  Summary  · Managing diabetes (diabetes mellitus) can be complicated. Your diabetes treatment may be managed by a team of health care providers.  · Your health care providers follow guidelines to help you get the best quality care.  · You should have physical exams, blood tests, blood pressure monitoring, immunizations, and screening tests regularly. Stay updated on how to manage your diabetes.  · Your health care providers may also give you more specific instructions based on your individual health.  This information is not intended to replace advice given to you by your health care provider. Make sure you discuss any questions you have with your health care provider.  Document Revised: 06/24/2021 Document Reviewed: 06/24/2021  Elsevier Patient Education © 2021 Elsevier Inc.

## 2022-02-17 ENCOUNTER — OFFICE VISIT (OUTPATIENT)
Dept: NEPHROLOGY | Facility: CLINIC | Age: 57
End: 2022-02-17
Payer: MEDICARE

## 2022-02-17 VITALS — BODY MASS INDEX: 26 KG/M2 | SYSTOLIC BLOOD PRESSURE: 120 MMHG | WEIGHT: 200.38 LBS | DIASTOLIC BLOOD PRESSURE: 82 MMHG

## 2022-02-17 DIAGNOSIS — N18.6 ESRD ON HEMODIALYSIS (HCC): Primary | ICD-10-CM

## 2022-02-17 DIAGNOSIS — Z99.2 ESRD ON HEMODIALYSIS (HCC): Primary | ICD-10-CM

## 2022-02-17 NOTE — PROGRESS NOTES
Pt seen this morning. We discussed his ongoing dialysis treatments which have been doing well. Medication list was reviewed. Blood pressure is excellent as is his volume status. He remains on the [facility].   He will continue to follow-up every 3 months for

## 2022-03-10 ENCOUNTER — OFFICE VISIT (OUTPATIENT)
Dept: INTERNAL MEDICINE CLINIC | Facility: CLINIC | Age: 57
End: 2022-03-10
Payer: MEDICARE

## 2022-03-10 VITALS
HEART RATE: 68 BPM | DIASTOLIC BLOOD PRESSURE: 60 MMHG | TEMPERATURE: 98 F | RESPIRATION RATE: 16 BRPM | OXYGEN SATURATION: 100 % | HEIGHT: 73.25 IN | WEIGHT: 195.81 LBS | BODY MASS INDEX: 25.67 KG/M2 | SYSTOLIC BLOOD PRESSURE: 102 MMHG

## 2022-03-10 DIAGNOSIS — I10 BENIGN ESSENTIAL HTN: Primary | Chronic | ICD-10-CM

## 2022-03-10 DIAGNOSIS — M25.50 CHRONIC PAIN OF MULTIPLE JOINTS: ICD-10-CM

## 2022-03-10 DIAGNOSIS — Z99.2 ESRD ON HEMODIALYSIS (HCC): Chronic | ICD-10-CM

## 2022-03-10 DIAGNOSIS — N25.81 SECONDARY HYPERPARATHYROIDISM OF RENAL ORIGIN (HCC): Chronic | ICD-10-CM

## 2022-03-10 DIAGNOSIS — M06.9 RHEUMATOID ARTHRITIS INVOLVING MULTIPLE SITES, UNSPECIFIED WHETHER RHEUMATOID FACTOR PRESENT (HCC): Chronic | ICD-10-CM

## 2022-03-10 DIAGNOSIS — N18.6 ANEMIA IN ESRD (END-STAGE RENAL DISEASE) (HCC): Chronic | ICD-10-CM

## 2022-03-10 DIAGNOSIS — I27.20 PULMONARY HTN (HCC): Chronic | ICD-10-CM

## 2022-03-10 DIAGNOSIS — E83.40 DISORDERS OF MAGNESIUM METABOLISM, UNSPECIFIED: ICD-10-CM

## 2022-03-10 DIAGNOSIS — I25.10 CORONARY ARTERY DISEASE INVOLVING NATIVE CORONARY ARTERY OF NATIVE HEART WITHOUT ANGINA PECTORIS: Chronic | ICD-10-CM

## 2022-03-10 DIAGNOSIS — Q87.81 ALPORT SYNDROME: Chronic | ICD-10-CM

## 2022-03-10 DIAGNOSIS — G47.33 OSA ON CPAP: Chronic | ICD-10-CM

## 2022-03-10 DIAGNOSIS — Z99.89 OSA ON CPAP: Chronic | ICD-10-CM

## 2022-03-10 DIAGNOSIS — G89.29 CHRONIC PAIN OF MULTIPLE JOINTS: ICD-10-CM

## 2022-03-10 DIAGNOSIS — E89.0 POSTSURGICAL HYPOTHYROIDISM: Chronic | ICD-10-CM

## 2022-03-10 DIAGNOSIS — N18.6 ESRD ON HEMODIALYSIS (HCC): Chronic | ICD-10-CM

## 2022-03-10 DIAGNOSIS — D63.1 ANEMIA IN ESRD (END-STAGE RENAL DISEASE) (HCC): Chronic | ICD-10-CM

## 2022-03-10 PROBLEM — T88.6XXA ANAPHYLACTIC REACTION DUE TO ADVERSE EFFECT OF CORRECT DRUG OR MEDICAMENT PROPERLY ADMINISTERED, INITIAL ENCOUNTER: Status: ACTIVE | Noted: 2021-10-18

## 2022-03-10 PROCEDURE — 99214 OFFICE O/P EST MOD 30 MIN: CPT | Performed by: INTERNAL MEDICINE

## 2022-03-10 RX ORDER — CALCIUM CARBONATE 200(500)MG
1 TABLET,CHEWABLE ORAL DAILY
COMMUNITY
Start: 2021-12-06

## 2022-03-10 NOTE — PATIENT INSTRUCTIONS
- Switch your amlodipine to evening dosing; take losartan and labetalol in the morning, and second labetalol dose and amlodipine in the evening  - Let us know if dizziness/fogginess does not improve within the next couple of weeks  - You should be fine vaccination wise for now until flu shot in the fall  - Follow up with me in 6 months (September) for your Medicare Wellness visit; follow up earlier as needed. It was a pleasure seeing you in the clinic today. Thank you for choosing the [facility] office for your healthcare needs. Please call at [phone] with any questions or concerns.     Abbey Melgar MD

## 2022-03-17 ENCOUNTER — PATIENT MESSAGE (OUTPATIENT)
Dept: INTERNAL MEDICINE CLINIC | Facility: CLINIC | Age: 57
End: 2022-03-17

## 2022-03-17 ENCOUNTER — OFFICE VISIT (OUTPATIENT)
Dept: INTERNAL MEDICINE CLINIC | Facility: CLINIC | Age: 57
End: 2022-03-17
Payer: MEDICARE

## 2022-03-17 VITALS
WEIGHT: 198 LBS | SYSTOLIC BLOOD PRESSURE: 120 MMHG | HEART RATE: 67 BPM | HEIGHT: 73.5 IN | RESPIRATION RATE: 16 BRPM | BODY MASS INDEX: 25.68 KG/M2 | OXYGEN SATURATION: 98 % | TEMPERATURE: 99 F | DIASTOLIC BLOOD PRESSURE: 60 MMHG

## 2022-03-17 DIAGNOSIS — R30.0 DYSURIA: Primary | ICD-10-CM

## 2022-03-17 DIAGNOSIS — Z99.2 ESRD ON HEMODIALYSIS (HCC): ICD-10-CM

## 2022-03-17 DIAGNOSIS — N18.6 ESRD ON HEMODIALYSIS (HCC): ICD-10-CM

## 2022-03-17 PROCEDURE — 99214 OFFICE O/P EST MOD 30 MIN: CPT | Performed by: PHYSICIAN ASSISTANT

## 2022-03-17 RX ORDER — SULFAMETHOXAZOLE AND TRIMETHOPRIM 400; 80 MG/1; MG/1
1 TABLET ORAL DAILY
Qty: 5 TABLET | Refills: 0 | Status: SHIPPED | OUTPATIENT
Start: 2022-03-17 | End: 2022-03-22

## 2022-03-17 NOTE — PATIENT INSTRUCTIONS
Start antibiotic (Bactrim) - 1 tablet daily. On dialysis days take medication AFTER dialysis. Go to the ER if you develop fever, chills, sweats, blood in urine, lower abdominal or back pain, or any other new or worsening symptoms this weekend.

## 2022-03-23 ENCOUNTER — MED REC SCAN ONLY (OUTPATIENT)
Dept: NEPHROLOGY | Facility: CLINIC | Age: 57
End: 2022-03-23

## 2022-05-09 ENCOUNTER — MED REC SCAN ONLY (OUTPATIENT)
Dept: NEPHROLOGY | Facility: CLINIC | Age: 57
End: 2022-05-09

## 2022-05-19 ENCOUNTER — OFFICE VISIT (OUTPATIENT)
Dept: NEPHROLOGY | Facility: CLINIC | Age: 57
End: 2022-05-19
Payer: MEDICARE

## 2022-05-19 VITALS — DIASTOLIC BLOOD PRESSURE: 54 MMHG | BODY MASS INDEX: 25 KG/M2 | SYSTOLIC BLOOD PRESSURE: 90 MMHG | WEIGHT: 194.25 LBS

## 2022-05-19 DIAGNOSIS — N18.6 ESRD ON HEMODIALYSIS (HCC): Primary | ICD-10-CM

## 2022-05-19 DIAGNOSIS — Z99.2 ESRD ON HEMODIALYSIS (HCC): Primary | ICD-10-CM

## 2022-05-19 DIAGNOSIS — I10 ESSENTIAL HYPERTENSION: ICD-10-CM

## 2022-05-19 NOTE — PROGRESS NOTES
Mr. Maricruz Rios was seen in the nephrology clinic today to review ongoing dialysis treatments. He does note that his blood pressures have been quite a bit lower of late and his antihypertensive agents have been adjusted. Amlodipine was discontinued and his losartan was decreased. He is however taking labetalol 200 mg twice daily. With this his blood pressure remains low and his systolic pressure is 90 in the office today. He does feel somewhat fatigued and I suspect this is related to hypotension. I did instruct him to discontinue labetalol and continue his losartan alone at 25 mg daily. This can be titrated as needed. The remainder of his labs were reviewed and have been stable.   He is active on the transplant list at [facility]

## 2022-05-27 ENCOUNTER — TELEPHONE (OUTPATIENT)
Dept: TRANSPLANT | Age: 57
End: 2022-05-27

## 2022-05-27 RX ORDER — PREDNISONE 1 MG/1
7.5 TABLET ORAL DAILY
Qty: 135 TABLET | Refills: 3 | Status: SHIPPED | OUTPATIENT
Start: 2022-05-27

## 2022-05-31 ENCOUNTER — TELEPHONE (OUTPATIENT)
Dept: TRANSPLANT | Age: 57
End: 2022-05-31

## 2022-06-09 ENCOUNTER — LAB ENCOUNTER (OUTPATIENT)
Dept: LAB | Age: 57
End: 2022-06-09
Attending: INTERNAL MEDICINE
Payer: MEDICARE

## 2022-06-09 DIAGNOSIS — E89.0 POSTSURGICAL HYPOTHYROIDISM: ICD-10-CM

## 2022-06-09 DIAGNOSIS — Z99.2 ESRD ON HEMODIALYSIS (HCC): ICD-10-CM

## 2022-06-09 DIAGNOSIS — N18.6 ESRD ON HEMODIALYSIS (HCC): ICD-10-CM

## 2022-06-09 DIAGNOSIS — C73 PAPILLARY THYROID CARCINOMA (HCC): ICD-10-CM

## 2022-06-09 LAB
ANION GAP SERPL CALC-SCNC: 11 MMOL/L (ref 0–18)
BASOPHILS # BLD AUTO: 0.07 X10(3) UL (ref 0–0.2)
BASOPHILS NFR BLD AUTO: 1.1 %
BUN BLD-MCNC: 53 MG/DL (ref 7–18)
CALCIUM BLD-MCNC: 8.7 MG/DL (ref 8.5–10.1)
CHLORIDE SERPL-SCNC: 98 MMOL/L (ref 98–112)
CO2 SERPL-SCNC: 26 MMOL/L (ref 21–32)
CREAT BLD-MCNC: 9.45 MG/DL
EOSINOPHIL # BLD AUTO: 0.58 X10(3) UL (ref 0–0.7)
EOSINOPHIL NFR BLD AUTO: 8.9 %
ERYTHROCYTE [DISTWIDTH] IN BLOOD BY AUTOMATED COUNT: 16.1 %
GLUCOSE BLD-MCNC: 89 MG/DL (ref 70–99)
HCT VFR BLD AUTO: 37.4 %
HGB BLD-MCNC: 11.9 G/DL
IMM GRANULOCYTES # BLD AUTO: 0.01 X10(3) UL (ref 0–1)
IMM GRANULOCYTES NFR BLD: 0.2 %
LYMPHOCYTES # BLD AUTO: 2.13 X10(3) UL (ref 1–4)
LYMPHOCYTES NFR BLD AUTO: 32.8 %
MCH RBC QN AUTO: 33.3 PG (ref 26–34)
MCHC RBC AUTO-ENTMCNC: 31.8 G/DL (ref 31–37)
MCV RBC AUTO: 104.8 FL
MONOCYTES # BLD AUTO: 0.86 X10(3) UL (ref 0.1–1)
MONOCYTES NFR BLD AUTO: 13.3 %
NEUTROPHILS # BLD AUTO: 2.84 X10 (3) UL (ref 1.5–7.7)
NEUTROPHILS # BLD AUTO: 2.84 X10(3) UL (ref 1.5–7.7)
NEUTROPHILS NFR BLD AUTO: 43.7 %
OSMOLALITY SERPL CALC.SUM OF ELEC: 294 MOSM/KG (ref 275–295)
PLATELET # BLD AUTO: 121 10(3)UL (ref 150–450)
POTASSIUM SERPL-SCNC: 4.1 MMOL/L (ref 3.5–5.1)
RBC # BLD AUTO: 3.57 X10(6)UL
SODIUM SERPL-SCNC: 135 MMOL/L (ref 136–145)
TSI SER-ACNC: 2.05 MIU/ML (ref 0.36–3.74)
WBC # BLD AUTO: 6.5 X10(3) UL (ref 4–11)

## 2022-06-09 PROCEDURE — 85025 COMPLETE CBC W/AUTO DIFF WBC: CPT

## 2022-06-09 PROCEDURE — 84432 ASSAY OF THYROGLOBULIN: CPT

## 2022-06-09 PROCEDURE — 84443 ASSAY THYROID STIM HORMONE: CPT

## 2022-06-09 PROCEDURE — 80048 BASIC METABOLIC PNL TOTAL CA: CPT

## 2022-06-09 PROCEDURE — 36415 COLL VENOUS BLD VENIPUNCTURE: CPT

## 2022-06-09 PROCEDURE — 86800 THYROGLOBULIN ANTIBODY: CPT

## 2022-06-11 LAB
THYROGLOBULIN AB: <0.9 IU/ML
THYROGLOBULIN, SERUM OR PLASMA: 0.1 NG/ML

## 2022-07-03 ENCOUNTER — PATIENT MESSAGE (OUTPATIENT)
Dept: INTERNAL MEDICINE CLINIC | Facility: CLINIC | Age: 57
End: 2022-07-03

## 2022-07-05 ENCOUNTER — OFFICE VISIT (OUTPATIENT)
Dept: INTERNAL MEDICINE CLINIC | Facility: CLINIC | Age: 57
End: 2022-07-05
Payer: MEDICARE

## 2022-07-05 VITALS
HEIGHT: 73.5 IN | SYSTOLIC BLOOD PRESSURE: 128 MMHG | TEMPERATURE: 99 F | BODY MASS INDEX: 26.07 KG/M2 | HEART RATE: 69 BPM | WEIGHT: 201 LBS | DIASTOLIC BLOOD PRESSURE: 70 MMHG | RESPIRATION RATE: 16 BRPM | OXYGEN SATURATION: 98 %

## 2022-07-05 DIAGNOSIS — N25.81 SECONDARY HYPERPARATHYROIDISM OF RENAL ORIGIN (HCC): ICD-10-CM

## 2022-07-05 DIAGNOSIS — R30.0 DYSURIA: Primary | ICD-10-CM

## 2022-07-05 DIAGNOSIS — N18.6 ESRD (END STAGE RENAL DISEASE) ON DIALYSIS (HCC): ICD-10-CM

## 2022-07-05 DIAGNOSIS — Z99.2 ESRD (END STAGE RENAL DISEASE) ON DIALYSIS (HCC): ICD-10-CM

## 2022-07-05 PROCEDURE — 99214 OFFICE O/P EST MOD 30 MIN: CPT | Performed by: PHYSICIAN ASSISTANT

## 2022-07-05 RX ORDER — SULFAMETHOXAZOLE AND TRIMETHOPRIM 400; 80 MG/1; MG/1
1 TABLET ORAL DAILY
Qty: 5 TABLET | Refills: 0 | Status: SHIPPED | OUTPATIENT
Start: 2022-07-05 | End: 2022-07-10

## 2022-07-05 NOTE — TELEPHONE ENCOUNTER
Spoke with pt, confirmed 11am appointment.    Future Appointments   Date Time Provider [address] [place]   7/5/2022 11:00 AM Aleksandar Huerta [facility] [place]   8/11/2022 10:00 AM Manny Fam MD [place] [facility] [place]   9/8/2022  7:30 AM Codi Collins MD [facility] [place]

## 2022-07-05 NOTE — TELEPHONE ENCOUNTER
From: Radha Grider  To: YULIANA Browning  Sent: 7/3/2022 10:11 AM CDT  Subject: UTI    Hello Pastora Beyer,  I saw you back a few months ago about a UTI. You prescribed a strong antibiotic Sulfameth/Trimeth 400-80mg . Well my wife and I went swimming on Thursday and by Saturday what little urine I produced burned really awful. So I was wondering if you could please prescribe this same antibiotic again for me? I would truly appreciate any help so I can stop this burning asap!      Kind regards,  Tobias Torres

## 2022-07-05 NOTE — TELEPHONE ENCOUNTER
Needs OV. Please see if he can come in at 11:00 today or can see another provider if openings. Otherwise, rec eval at .

## 2022-07-05 NOTE — PATIENT INSTRUCTIONS
Start antibiotic -- 1 tablet once a day. Take this AFTER dialysis on dialysis days. Seek immediate attention if developing fever, chills, sweats, blood in urine, abdominal or back pain.

## 2022-07-06 RX ORDER — SULFAMETHOXAZOLE AND TRIMETHOPRIM 400; 80 MG/1; MG/1
1 TABLET ORAL DAILY
Qty: 5 TABLET | Refills: 0 | OUTPATIENT
Start: 2022-07-06 | End: 2022-07-11

## 2022-08-11 ENCOUNTER — OFFICE VISIT (OUTPATIENT)
Dept: NEPHROLOGY | Facility: CLINIC | Age: 57
End: 2022-08-11
Payer: MEDICARE

## 2022-08-11 VITALS — DIASTOLIC BLOOD PRESSURE: 60 MMHG | BODY MASS INDEX: 26 KG/M2 | WEIGHT: 199 LBS | SYSTOLIC BLOOD PRESSURE: 102 MMHG

## 2022-08-11 DIAGNOSIS — N18.6 ESRD ON HEMODIALYSIS (HCC): Primary | ICD-10-CM

## 2022-08-11 DIAGNOSIS — Z99.2 ESRD ON HEMODIALYSIS (HCC): Primary | ICD-10-CM

## 2022-08-11 RX ORDER — SUCROFERRIC OXYHYDROXIDE 500 MG/1
2 TABLET, CHEWABLE ORAL
COMMUNITY
Start: 2022-05-17

## 2022-08-11 NOTE — PROGRESS NOTES
Mr. Yovany Mathur was seen in nephrology clinic today in follow-up for management of ESRD. He presents today with copy of his most recent monthly blood work which is overall quite excellent. Blood pressures have been stable and he is only on 25 mg of losartan. 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 Overall he is feeling well. He does remain on the transplant list at [facility].

## 2022-09-08 ENCOUNTER — OFFICE VISIT (OUTPATIENT)
Dept: INTERNAL MEDICINE CLINIC | Facility: CLINIC | Age: 57
End: 2022-09-08
Payer: MEDICARE

## 2022-09-08 ENCOUNTER — LAB ENCOUNTER (OUTPATIENT)
Dept: LAB | Age: 57
End: 2022-09-08
Attending: INTERNAL MEDICINE
Payer: MEDICARE

## 2022-09-08 VITALS
HEART RATE: 44 BPM | DIASTOLIC BLOOD PRESSURE: 66 MMHG | BODY MASS INDEX: 26.14 KG/M2 | HEIGHT: 73.25 IN | TEMPERATURE: 99 F | RESPIRATION RATE: 12 BRPM | SYSTOLIC BLOOD PRESSURE: 94 MMHG | OXYGEN SATURATION: 97 % | WEIGHT: 199.38 LBS

## 2022-09-08 DIAGNOSIS — E89.0 POSTSURGICAL HYPOTHYROIDISM: Chronic | ICD-10-CM

## 2022-09-08 DIAGNOSIS — M06.9 RHEUMATOID ARTHRITIS INVOLVING MULTIPLE SITES, UNSPECIFIED WHETHER RHEUMATOID FACTOR PRESENT (HCC): Chronic | ICD-10-CM

## 2022-09-08 DIAGNOSIS — F11.20 OPIOID DEPENDENCE ON AGONIST THERAPY (HCC): ICD-10-CM

## 2022-09-08 DIAGNOSIS — G89.29 CHRONIC PAIN OF MULTIPLE JOINTS: ICD-10-CM

## 2022-09-08 DIAGNOSIS — I10 PRIMARY HYPERTENSION: Primary | Chronic | ICD-10-CM

## 2022-09-08 DIAGNOSIS — Z12.5 SCREENING FOR MALIGNANT NEOPLASM OF PROSTATE: ICD-10-CM

## 2022-09-08 DIAGNOSIS — N18.6 ESRD ON HEMODIALYSIS (HCC): Chronic | ICD-10-CM

## 2022-09-08 DIAGNOSIS — I25.10 CORONARY ARTERY DISEASE INVOLVING NATIVE CORONARY ARTERY OF NATIVE HEART WITHOUT ANGINA PECTORIS: Chronic | ICD-10-CM

## 2022-09-08 DIAGNOSIS — M25.50 CHRONIC PAIN OF MULTIPLE JOINTS: ICD-10-CM

## 2022-09-08 DIAGNOSIS — N25.81 SECONDARY HYPERPARATHYROIDISM OF RENAL ORIGIN (HCC): Chronic | ICD-10-CM

## 2022-09-08 DIAGNOSIS — Z79.891 LONG TERM (CURRENT) USE OF OPIATE ANALGESIC: ICD-10-CM

## 2022-09-08 DIAGNOSIS — Z99.2 ESRD ON HEMODIALYSIS (HCC): Chronic | ICD-10-CM

## 2022-09-08 DIAGNOSIS — N18.6 ANEMIA IN ESRD (END-STAGE RENAL DISEASE) (HCC): Chronic | ICD-10-CM

## 2022-09-08 DIAGNOSIS — I27.20 PULMONARY HTN (HCC): Chronic | ICD-10-CM

## 2022-09-08 DIAGNOSIS — D63.1 ANEMIA IN ESRD (END-STAGE RENAL DISEASE) (HCC): Chronic | ICD-10-CM

## 2022-09-08 LAB
CHOLEST SERPL-MCNC: 144 MG/DL (ref ?–200)
COMPLEXED PSA SERPL-MCNC: 0.76 NG/ML (ref ?–4)
EST. AVERAGE GLUCOSE BLD GHB EST-MCNC: 100 MG/DL (ref 68–126)
FASTING PATIENT LIPID ANSWER: YES
HBA1C MFR BLD: 5.1 % (ref ?–5.7)
HDLC SERPL-MCNC: 58 MG/DL (ref 40–59)
LDLC SERPL CALC-MCNC: 72 MG/DL (ref ?–100)
NONHDLC SERPL-MCNC: 86 MG/DL (ref ?–130)
TRIGL SERPL-MCNC: 72 MG/DL (ref 30–149)
VLDLC SERPL CALC-MCNC: 11 MG/DL (ref 0–30)

## 2022-09-08 PROCEDURE — 83036 HEMOGLOBIN GLYCOSYLATED A1C: CPT

## 2022-09-08 PROCEDURE — 80061 LIPID PANEL: CPT

## 2022-09-08 PROCEDURE — 36415 COLL VENOUS BLD VENIPUNCTURE: CPT

## 2022-09-08 PROCEDURE — 99214 OFFICE O/P EST MOD 30 MIN: CPT | Performed by: INTERNAL MEDICINE

## 2022-09-08 RX ORDER — CALCIUM ACETATE 667 MG/1
CAPSULE ORAL
COMMUNITY
Start: 2022-08-29

## 2022-09-08 RX ORDER — LOSARTAN POTASSIUM 25 MG/1
25 TABLET ORAL EVERY EVENING
COMMUNITY
Start: 2022-08-19

## 2022-09-08 NOTE — PATIENT INSTRUCTIONS
- Get blood tests done today (or if they are busy you can schedule a lab appointment through [address] [place] [address] [address] or [address] [place] [address]. org for another day)  - Hold your losartan on nights before dialysis. As long as blood pressure remains below 140/90 that is fine. - I entered an x-ray order for Millie Dong. She can schedule an appointment for the x-ray through [employer] or [email or web address].  - Follow up in 6 months for chronic issues; follow up earlier as needed. It was a pleasure seeing you in the clinic today. Thank you for choosing the [facility] office for your healthcare needs. Please call at [phone] with any questions or concerns.     Saba Dinero MD

## 2022-09-15 ENCOUNTER — APPOINTMENT (OUTPATIENT)
Dept: URBAN - METROPOLITAN AREA CLINIC 242 | Age: 57
Setting detail: DERMATOLOGY
End: 2022-09-15

## 2022-09-15 DIAGNOSIS — L20.89 OTHER ATOPIC DERMATITIS: ICD-10-CM

## 2022-09-15 PROBLEM — L20.84 INTRINSIC (ALLERGIC) ECZEMA: Status: ACTIVE | Noted: 2022-09-15

## 2022-09-15 PROCEDURE — OTHER PRESCRIPTION MEDICATION MANAGEMENT: OTHER

## 2022-09-15 PROCEDURE — 99203 OFFICE O/P NEW LOW 30 MIN: CPT

## 2022-09-15 PROCEDURE — OTHER PRESCRIPTION: OTHER

## 2022-09-15 PROCEDURE — OTHER COUNSELING: OTHER

## 2022-09-15 RX ORDER — TRIAMCINOLONE ACETONIDE 1 MG/G
CREAM TOPICAL
Qty: 30 | Refills: 0 | Status: ERX | COMMUNITY
Start: 2022-09-15

## 2022-09-15 ASSESSMENT — LOCATION DETAILED DESCRIPTION DERM
LOCATION DETAILED: LEFT LATERAL 4TH TOE
LOCATION DETAILED: LEFT DORSAL 5TH TOE
LOCATION DETAILED: LEFT DORSAL GREAT TOE
LOCATION DETAILED: LEFT LATERAL 2ND TOE
LOCATION DETAILED: LEFT DORSAL 3RD TOE

## 2022-09-15 ASSESSMENT — LOCATION SIMPLE DESCRIPTION DERM
LOCATION SIMPLE: LEFT 2ND TOE
LOCATION SIMPLE: LEFT 4TH TOE
LOCATION SIMPLE: LEFT 3RD TOE
LOCATION SIMPLE: LEFT GREAT TOE
LOCATION SIMPLE: LEFT 5TH TOE

## 2022-09-15 ASSESSMENT — LOCATION ZONE DERM: LOCATION ZONE: TOE

## 2022-09-29 ENCOUNTER — OFFICE VISIT (OUTPATIENT)
Dept: INTERNAL MEDICINE CLINIC | Facility: CLINIC | Age: 57
End: 2022-09-29

## 2022-09-29 ENCOUNTER — APPOINTMENT (OUTPATIENT)
Dept: URBAN - METROPOLITAN AREA CLINIC 242 | Age: 57
Setting detail: DERMATOLOGY
End: 2022-09-29

## 2022-09-29 VITALS
TEMPERATURE: 99 F | SYSTOLIC BLOOD PRESSURE: 92 MMHG | BODY MASS INDEX: 26.12 KG/M2 | RESPIRATION RATE: 16 BRPM | HEART RATE: 60 BPM | WEIGHT: 199.19 LBS | DIASTOLIC BLOOD PRESSURE: 58 MMHG | HEIGHT: 73.25 IN

## 2022-09-29 DIAGNOSIS — K21.9 GASTROESOPHAGEAL REFLUX DISEASE WITHOUT ESOPHAGITIS: ICD-10-CM

## 2022-09-29 DIAGNOSIS — L20.89 OTHER ATOPIC DERMATITIS: ICD-10-CM

## 2022-09-29 DIAGNOSIS — Z99.2 ESRD ON HEMODIALYSIS (HCC): Chronic | ICD-10-CM

## 2022-09-29 DIAGNOSIS — N18.6 ESRD ON HEMODIALYSIS (HCC): Chronic | ICD-10-CM

## 2022-09-29 DIAGNOSIS — E89.0 POSTSURGICAL HYPOTHYROIDISM: Chronic | ICD-10-CM

## 2022-09-29 DIAGNOSIS — D63.1 ANEMIA IN ESRD (END-STAGE RENAL DISEASE) (HCC): Chronic | ICD-10-CM

## 2022-09-29 DIAGNOSIS — N18.6 ANEMIA IN ESRD (END-STAGE RENAL DISEASE) (HCC): Chronic | ICD-10-CM

## 2022-09-29 DIAGNOSIS — D84.9 IMMUNOSUPPRESSED STATUS (HCC): ICD-10-CM

## 2022-09-29 DIAGNOSIS — Z99.89 OSA ON CPAP: Chronic | ICD-10-CM

## 2022-09-29 DIAGNOSIS — G62.9 PERIPHERAL POLYNEUROPATHY: Chronic | ICD-10-CM

## 2022-09-29 DIAGNOSIS — I10 PRIMARY HYPERTENSION: Chronic | ICD-10-CM

## 2022-09-29 DIAGNOSIS — G47.33 OSA ON CPAP: Chronic | ICD-10-CM

## 2022-09-29 DIAGNOSIS — M06.9 RHEUMATOID ARTHRITIS INVOLVING MULTIPLE SITES, UNSPECIFIED WHETHER RHEUMATOID FACTOR PRESENT (HCC): Chronic | ICD-10-CM

## 2022-09-29 DIAGNOSIS — F11.20 OPIOID DEPENDENCE ON AGONIST THERAPY (HCC): ICD-10-CM

## 2022-09-29 DIAGNOSIS — N25.81 SECONDARY HYPERPARATHYROIDISM OF RENAL ORIGIN (HCC): Chronic | ICD-10-CM

## 2022-09-29 DIAGNOSIS — M17.11 PRIMARY OSTEOARTHRITIS OF RIGHT KNEE: Chronic | ICD-10-CM

## 2022-09-29 DIAGNOSIS — I27.20 PULMONARY HTN (HCC): Chronic | ICD-10-CM

## 2022-09-29 DIAGNOSIS — Z00.00 ENCOUNTER FOR SUBSEQUENT ANNUAL WELLNESS VISIT (AWV) IN MEDICARE PATIENT: Primary | ICD-10-CM

## 2022-09-29 DIAGNOSIS — I25.10 CORONARY ARTERY DISEASE INVOLVING NATIVE CORONARY ARTERY OF NATIVE HEART WITHOUT ANGINA PECTORIS: Chronic | ICD-10-CM

## 2022-09-29 DIAGNOSIS — M1A.09X0 IDIOPATHIC CHRONIC GOUT OF MULTIPLE SITES WITHOUT TOPHUS: Chronic | ICD-10-CM

## 2022-09-29 PROBLEM — L20.84 INTRINSIC (ALLERGIC) ECZEMA: Status: ACTIVE | Noted: 2022-09-29

## 2022-09-29 PROCEDURE — OTHER PRESCRIPTION MEDICATION MANAGEMENT: OTHER

## 2022-09-29 PROCEDURE — OTHER COUNSELING: OTHER

## 2022-09-29 PROCEDURE — 99213 OFFICE O/P EST LOW 20 MIN: CPT

## 2022-09-29 PROCEDURE — 99214 OFFICE O/P EST MOD 30 MIN: CPT | Performed by: INTERNAL MEDICINE

## 2022-09-29 PROCEDURE — G0439 PPPS, SUBSEQ VISIT: HCPCS | Performed by: INTERNAL MEDICINE

## 2022-09-29 RX ORDER — TRIAMCINOLONE ACETONIDE 1 MG/G
CREAM TOPICAL
COMMUNITY
Start: 2022-09-15

## 2022-09-29 ASSESSMENT — LOCATION DETAILED DESCRIPTION DERM
LOCATION DETAILED: LEFT LATERAL 2ND TOE
LOCATION DETAILED: LEFT LATERAL 3RD TOE
LOCATION DETAILED: LEFT MEDIAL 5TH TOE

## 2022-09-29 ASSESSMENT — LOCATION SIMPLE DESCRIPTION DERM
LOCATION SIMPLE: LEFT 2ND TOE
LOCATION SIMPLE: LEFT 3RD TOE
LOCATION SIMPLE: LEFT 5TH TOE

## 2022-09-29 ASSESSMENT — LOCATION ZONE DERM: LOCATION ZONE: TOE

## 2022-09-29 NOTE — PROCEDURE: PRESCRIPTION MEDICATION MANAGEMENT
Detail Level: Zone
Plan: Moisturize daily
Render In Strict Bullet Format?: No
Discontinue Regimen: TAC

## 2022-09-29 NOTE — PATIENT INSTRUCTIONS
- Decrease losartan dose to 1/2 tablet (12.5 mg). Continue to skip nights before dialysis  - Message/call us if blood pressure goes above 140/90.  - Will see how blood pressure/pulse looks at your Nephrology/transplant office visits in November.  - Next colonoscopy due in October 2023.  - Follow up in 6 months for chronic issues as scheduled; follow up earlier as needed. It was a pleasure seeing you in the clinic today. Thank you for choosing the [facility] office for your healthcare needs. Please call at [phone] with any questions or concerns.     Richard Fonseca MD

## 2022-11-08 ENCOUNTER — OFFICE VISIT (OUTPATIENT)
Dept: TRANSPLANT | Age: 57
End: 2022-11-08
Attending: INTERNAL MEDICINE

## 2022-11-08 VITALS
SYSTOLIC BLOOD PRESSURE: 127 MMHG | WEIGHT: 203 LBS | TEMPERATURE: 98 F | RESPIRATION RATE: 16 BRPM | HEIGHT: 73 IN | BODY MASS INDEX: 26.9 KG/M2 | HEART RATE: 45 BPM | DIASTOLIC BLOOD PRESSURE: 69 MMHG

## 2022-11-08 DIAGNOSIS — Q87.81 ALPORT'S SYNDROME: ICD-10-CM

## 2022-11-08 DIAGNOSIS — I10 ESSENTIAL HYPERTENSION: ICD-10-CM

## 2022-11-08 DIAGNOSIS — N18.6 ESRD (END STAGE RENAL DISEASE) (CMD): ICD-10-CM

## 2022-11-08 DIAGNOSIS — Z01.818 PRE-TRANSPLANT EVALUATION FOR KIDNEY TRANSPLANT: Primary | ICD-10-CM

## 2022-11-08 DIAGNOSIS — I25.10 CORONARY ARTERY DISEASE INVOLVING NATIVE CORONARY ARTERY OF NATIVE HEART WITHOUT ANGINA PECTORIS: ICD-10-CM

## 2022-11-08 DIAGNOSIS — I10 HTN (HYPERTENSION), BENIGN: ICD-10-CM

## 2022-11-08 DIAGNOSIS — Z95.5 PRESENCE OF STENT IN CORONARY ARTERY: ICD-10-CM

## 2022-11-08 DIAGNOSIS — Z76.82 AWAITING TRANSPLANTATION OF KIDNEY: ICD-10-CM

## 2022-11-08 DIAGNOSIS — N18.6 ESRD (END STAGE RENAL DISEASE) (CMD): Primary | ICD-10-CM

## 2022-11-08 DIAGNOSIS — Z11.4 ENCOUNTER FOR SCREENING FOR HUMAN IMMUNODEFICIENCY VIRUS (HIV): ICD-10-CM

## 2022-11-08 DIAGNOSIS — Z01.810 ENCOUNTER FOR PREPROCEDURAL CARDIOVASCULAR EXAMINATION: ICD-10-CM

## 2022-11-08 DIAGNOSIS — Z99.2 DIALYSIS PATIENT (CMD): Primary | ICD-10-CM

## 2022-11-08 PROCEDURE — 99215 OFFICE O/P EST HI 40 MIN: CPT | Performed by: TRANSPLANT SURGERY

## 2022-11-08 PROCEDURE — 99214 OFFICE O/P EST MOD 30 MIN: CPT

## 2022-11-08 RX ORDER — SUCROFERRIC OXYHYDROXIDE 500 MG/1
1000 TABLET, CHEWABLE ORAL
COMMUNITY
Start: 2022-10-17

## 2022-11-08 RX ORDER — BUPRENORPHINE HYDROCHLORIDE AND NALOXONE HYDROCHLORIDE DIHYDRATE 8; 2 MG/1; MG/1
1 TABLET SUBLINGUAL NIGHTLY
Status: ON HOLD | COMMUNITY
Start: 2022-09-26 | End: 2023-02-15 | Stop reason: HOSPADM

## 2022-11-08 RX ORDER — PANTOPRAZOLE SODIUM 20 MG/1
TABLET, DELAYED RELEASE ORAL
Status: ON HOLD | COMMUNITY
Start: 2022-11-02 | End: 2023-02-12

## 2022-11-08 RX ORDER — LACTULOSE 10 G/15ML
20 SOLUTION ORAL DAILY PRN
COMMUNITY
Start: 2022-08-20

## 2022-11-08 RX ORDER — LOSARTAN POTASSIUM 25 MG/1
25 TABLET ORAL EVERY EVENING
Status: ON HOLD | COMMUNITY
Start: 2022-08-19 | End: 2023-02-12

## 2022-11-08 RX ORDER — CALCIUM ACETATE 667 MG/1
1334 CAPSULE ORAL
COMMUNITY
Start: 2022-10-17

## 2022-11-08 ASSESSMENT — ENCOUNTER SYMPTOMS
CONSTITUTIONAL NEGATIVE: 1
PSYCHIATRIC NEGATIVE: 1
NEUROLOGICAL NEGATIVE: 1
RESPIRATORY NEGATIVE: 1
HEMATOLOGIC/LYMPHATIC NEGATIVE: 1
ALLERGIC/IMMUNOLOGIC NEGATIVE: 1

## 2022-11-08 ASSESSMENT — PAIN SCALES - GENERAL: PAINLEVEL: 7

## 2022-11-10 ENCOUNTER — OFFICE VISIT (OUTPATIENT)
Dept: NEPHROLOGY | Facility: CLINIC | Age: 57
End: 2022-11-10
Payer: MEDICARE

## 2022-11-10 VITALS — WEIGHT: 200 LBS | BODY MASS INDEX: 26 KG/M2 | SYSTOLIC BLOOD PRESSURE: 110 MMHG | DIASTOLIC BLOOD PRESSURE: 70 MMHG

## 2022-11-10 DIAGNOSIS — I10 ESSENTIAL HYPERTENSION: ICD-10-CM

## 2022-11-10 DIAGNOSIS — Z99.2 ESRD ON HEMODIALYSIS (HCC): Primary | ICD-10-CM

## 2022-11-10 DIAGNOSIS — N18.6 ESRD ON HEMODIALYSIS (HCC): Primary | ICD-10-CM

## 2022-11-10 RX ORDER — NALOXONE HYDROCHLORIDE 4 MG/.1ML
SPRAY NASAL
COMMUNITY
Start: 2022-10-24 | End: 2022-11-10 | Stop reason: ALTCHOICE

## 2022-11-10 RX ORDER — CALCIUM ACETATE 667 MG/1
CAPSULE ORAL
COMMUNITY
Start: 2022-11-09

## 2022-11-10 RX ORDER — PANTOPRAZOLE SODIUM 20 MG/1
20 TABLET, DELAYED RELEASE ORAL
COMMUNITY
Start: 2022-11-02

## 2022-11-10 NOTE — PROGRESS NOTES
Pt seen today in follow up for ESRD on HD and HTN. Overall he is doing well. Labs reviewed. BP has been excellent. Cont to follow with [facility] for transplant.   Will decr pred to 5 daily

## 2022-11-11 ENCOUNTER — TELEPHONE (OUTPATIENT)
Dept: TRANSPLANT | Age: 57
End: 2022-11-11

## 2022-12-02 ENCOUNTER — PATIENT MESSAGE (OUTPATIENT)
Dept: INTERNAL MEDICINE CLINIC | Facility: CLINIC | Age: 57
End: 2022-12-02

## 2022-12-13 ENCOUNTER — TELEPHONE (OUTPATIENT)
Dept: CARDIOLOGY | Age: 57
End: 2022-12-13

## 2022-12-15 ENCOUNTER — LAB SERVICES (OUTPATIENT)
Dept: LAB | Age: 57
End: 2022-12-15

## 2022-12-15 ENCOUNTER — HOSPITAL ENCOUNTER (OUTPATIENT)
Dept: ULTRASOUND IMAGING | Age: 57
Discharge: HOME | End: 2022-12-15
Attending: INTERNAL MEDICINE

## 2022-12-15 ENCOUNTER — HOSPITAL ENCOUNTER (OUTPATIENT)
Dept: LAB | Age: 57
Discharge: HOME | End: 2022-12-15
Attending: INTERNAL MEDICINE

## 2022-12-15 ENCOUNTER — HOSPITAL ENCOUNTER (OUTPATIENT)
Dept: CARDIOLOGY | Age: 57
End: 2022-12-15
Attending: INTERNAL MEDICINE

## 2022-12-15 ENCOUNTER — HOSPITAL ENCOUNTER (OUTPATIENT)
Dept: GENERAL RADIOLOGY | Age: 57
Discharge: HOME | End: 2022-12-15
Attending: INTERNAL MEDICINE

## 2022-12-15 ENCOUNTER — HOSPITAL ENCOUNTER (OUTPATIENT)
Dept: CARDIOLOGY | Age: 57
Discharge: HOME | End: 2022-12-15
Attending: INTERNAL MEDICINE

## 2022-12-15 ENCOUNTER — HOSPITAL ENCOUNTER (OUTPATIENT)
Dept: NUCLEAR MEDICINE | Age: 57
Discharge: HOME | End: 2022-12-15
Attending: INTERNAL MEDICINE

## 2022-12-15 DIAGNOSIS — Z01.818 PRE-TRANSPLANT EVALUATION FOR KIDNEY TRANSPLANT: ICD-10-CM

## 2022-12-15 DIAGNOSIS — I10 ESSENTIAL HYPERTENSION: ICD-10-CM

## 2022-12-15 DIAGNOSIS — N18.6 ESRD (END STAGE RENAL DISEASE) (CMD): ICD-10-CM

## 2022-12-15 LAB
AORTIC VALVE AREA (AVA): 0.61
ASCENDING AORTA (AAD): 8
ATRIAL RATE (BPM): 93
AV STENOSIS SEVERITY TEXT: NORMAL
AVI LVOT PEAK GRADIENT (LVOTMG): 1.2
E WAVE DECELARATION TIME (MDT): 5.1
HBV CORE IGG+IGM SER QL: NEGATIVE
HBV SURFACE AB SER QL: POSITIVE
HBV SURFACE AG SER QL: NEGATIVE
HCV AB SER QL: NEGATIVE
HIV 1+2 AB+HIV1 P24 AG SERPL QL IA: NONREACTIVE
LEFT INTERNAL DIMENSION IN SYSTOLE (LVSD): 1.2
LEFT VENTRICULAR INTERNAL DIMENSION IN DIASTOLE (LVDD): 3.4
LEFT VENTRICULAR POSTERIOR WALL IN END DIASTOLE (LVPW): 4.9
LV EF: NORMAL %
MV E TISSUE VEL MED (MESV): 10.4
MV E WAVE VEL/E TISSUE VEL MED(MSR): 12
MV PEAK E VELOCITY (MVPEV): 0.9
P AXIS (DEGREES): 66
PR-INTERVAL (MSEC): 138
QRS-INTERVAL (MSEC): 96
QT-INTERVAL (MSEC): 382
QTC: 475
R AXIS (DEGREES): 28
REPORT TEXT: NORMAL
T AXIS (DEGREES): 39
TRICUSPID VALVE ANNULAR PEAK VELOCITY (TVAPV): 24
TRICUSPID VALVE PEAK REGURGITATION VELOCITY (TRPV): 3.9
VENTRICULAR RATE EKG/MIN (BPM): 93

## 2022-12-15 PROCEDURE — 87522 HEPATITIS C REVRS TRNSCRPJ: CPT | Performed by: CLINICAL MEDICAL LABORATORY

## 2022-12-15 PROCEDURE — 93005 ELECTROCARDIOGRAM TRACING: CPT | Performed by: INTERNAL MEDICINE

## 2022-12-15 PROCEDURE — 86480 TB TEST CELL IMMUN MEASURE: CPT | Performed by: CLINICAL MEDICAL LABORATORY

## 2022-12-15 PROCEDURE — 93306 TTE W/DOPPLER COMPLETE: CPT

## 2022-12-15 PROCEDURE — 93976 VASCULAR STUDY: CPT

## 2022-12-15 PROCEDURE — 87389 HIV-1 AG W/HIV-1&-2 AB AG IA: CPT | Performed by: CLINICAL MEDICAL LABORATORY

## 2022-12-15 PROCEDURE — 86704 HEP B CORE ANTIBODY TOTAL: CPT | Performed by: CLINICAL MEDICAL LABORATORY

## 2022-12-15 PROCEDURE — 86803 HEPATITIS C AB TEST: CPT | Performed by: CLINICAL MEDICAL LABORATORY

## 2022-12-15 PROCEDURE — 87340 HEPATITIS B SURFACE AG IA: CPT | Performed by: CLINICAL MEDICAL LABORATORY

## 2022-12-15 PROCEDURE — 86706 HEP B SURFACE ANTIBODY: CPT | Performed by: CLINICAL MEDICAL LABORATORY

## 2022-12-15 PROCEDURE — 71046 X-RAY EXAM CHEST 2 VIEWS: CPT

## 2022-12-15 PROCEDURE — 86592 SYPHILIS TEST NON-TREP QUAL: CPT | Performed by: CLINICAL MEDICAL LABORATORY

## 2022-12-15 PROCEDURE — 87517 HEPATITIS B DNA QUANT: CPT | Performed by: CLINICAL MEDICAL LABORATORY

## 2022-12-15 PROCEDURE — 36415 COLL VENOUS BLD VENIPUNCTURE: CPT | Performed by: CLINICAL MEDICAL LABORATORY

## 2022-12-15 PROCEDURE — 76700 US EXAM ABDOM COMPLETE: CPT

## 2022-12-15 PROCEDURE — 93306 TTE W/DOPPLER COMPLETE: CPT | Performed by: INTERNAL MEDICINE

## 2022-12-15 PROCEDURE — 93010 ELECTROCARDIOGRAM REPORT: CPT | Performed by: INTERNAL MEDICINE

## 2022-12-16 LAB
GAMMA INTERFERON BACKGROUND BLD IA-ACNC: 0.05 IU/ML
HBV DNA SERPL NAA+PROBE-ACNC: NOT DETECTED [IU]/ML
HBV DNA SERPL NAA+PROBE-LOG IU: NOT DETECTED {LOG_IU}/ML
HCV RNA SERPL NAA+PROBE-ACNC: NOT DETECTED K[IU]/ML
HCV RNA SERPL NAA+PROBE-LOG IU: NOT DETECTED {LOG_IU}/ML
M TB IFN-G BLD-IMP: NEGATIVE
M TB IFN-G CD4+ BCKGRND COR BLD-ACNC: 0 IU/ML
M TB IFN-G CD4+CD8+ BCKGRND COR BLD-ACNC: 0 IU/ML
MITOGEN IGNF BCKGRD COR BLD-ACNC: 1.21 IU/ML
RPR SER QL: NONREACTIVE
SERVICE CMNT-IMP: NORMAL
SERVICE CMNT-IMP: NORMAL

## 2022-12-23 ENCOUNTER — TELEPHONE (OUTPATIENT)
Dept: CARDIOLOGY | Age: 57
End: 2022-12-23

## 2022-12-27 ENCOUNTER — TELEPHONE (OUTPATIENT)
Dept: TRANSPLANT | Age: 57
End: 2022-12-27

## 2023-01-05 ENCOUNTER — HOSPITAL ENCOUNTER (OUTPATIENT)
Dept: NUCLEAR MEDICINE | Age: 58
Discharge: HOME | End: 2023-01-05
Attending: INTERNAL MEDICINE

## 2023-01-05 ENCOUNTER — HOSPITAL ENCOUNTER (OUTPATIENT)
Dept: CARDIOLOGY | Age: 58
Discharge: HOME | End: 2023-01-05
Attending: INTERNAL MEDICINE

## 2023-01-05 VITALS
DIASTOLIC BLOOD PRESSURE: 82 MMHG | OXYGEN SATURATION: 99 % | SYSTOLIC BLOOD PRESSURE: 146 MMHG | RESPIRATION RATE: 17 BRPM | BODY MASS INDEX: 26.51 KG/M2 | HEIGHT: 73 IN | WEIGHT: 200 LBS | HEART RATE: 81 BPM

## 2023-01-05 LAB — STRESS TARGET HR: 163 BPM

## 2023-01-05 PROCEDURE — G1004 CDSM NDSC: HCPCS | Performed by: INTERNAL MEDICINE

## 2023-01-05 PROCEDURE — G1004 CDSM NDSC: HCPCS

## 2023-01-05 PROCEDURE — 78452 HT MUSCLE IMAGE SPECT MULT: CPT

## 2023-01-05 PROCEDURE — 10002800 HB RX 250 W HCPCS: Performed by: INTERNAL MEDICINE

## 2023-01-05 PROCEDURE — A9500 TC99M SESTAMIBI: HCPCS | Performed by: INTERNAL MEDICINE

## 2023-01-05 PROCEDURE — 93018 CV STRESS TEST I&R ONLY: CPT | Performed by: INTERNAL MEDICINE

## 2023-01-05 PROCEDURE — 93016 CV STRESS TEST SUPVJ ONLY: CPT | Performed by: INTERNAL MEDICINE

## 2023-01-05 PROCEDURE — 93017 CV STRESS TEST TRACING ONLY: CPT

## 2023-01-05 PROCEDURE — 78452 HT MUSCLE IMAGE SPECT MULT: CPT | Performed by: INTERNAL MEDICINE

## 2023-01-05 PROCEDURE — 10006150 HB RX 343: Performed by: INTERNAL MEDICINE

## 2023-01-05 RX ORDER — TETRAKIS(2-METHOXYISOBUTYLISOCYANIDE)COPPER(I) TETRAFLUOROBORATE 1 MG/ML
31.4 INJECTION, POWDER, LYOPHILIZED, FOR SOLUTION INTRAVENOUS ONCE
Status: COMPLETED | OUTPATIENT
Start: 2023-01-05 | End: 2023-01-05

## 2023-01-05 RX ORDER — TETRAKIS(2-METHOXYISOBUTYLISOCYANIDE)COPPER(I) TETRAFLUOROBORATE 1 MG/ML
10.4 INJECTION, POWDER, LYOPHILIZED, FOR SOLUTION INTRAVENOUS ONCE
Status: COMPLETED | OUTPATIENT
Start: 2023-01-05 | End: 2023-01-05

## 2023-01-05 RX ORDER — REGADENOSON 0.08 MG/ML
0.4 INJECTION, SOLUTION INTRAVENOUS ONCE
Status: COMPLETED | OUTPATIENT
Start: 2023-01-05 | End: 2023-01-05

## 2023-01-05 RX ADMIN — REGADENOSON 0.4 MG: 0.08 INJECTION, SOLUTION INTRAVENOUS at 08:16

## 2023-01-05 RX ADMIN — TETRAKIS(2-METHOXYISOBUTYLISOCYANIDE)COPPER(I) TETRAFLUOROBORATE 31.4 MILLICURIE: 1 INJECTION, POWDER, LYOPHILIZED, FOR SOLUTION INTRAVENOUS at 08:32

## 2023-01-05 RX ADMIN — TETRAKIS(2-METHOXYISOBUTYLISOCYANIDE)COPPER(I) TETRAFLUOROBORATE 10.4 MILLICURIE: 1 INJECTION, POWDER, LYOPHILIZED, FOR SOLUTION INTRAVENOUS at 08:31

## 2023-01-06 ENCOUNTER — TELEPHONE (OUTPATIENT)
Dept: TRANSPLANT | Age: 58
End: 2023-01-06

## 2023-01-12 ENCOUNTER — TELEPHONE (OUTPATIENT)
Dept: TRANSPLANT | Age: 58
End: 2023-01-12

## 2023-02-11 ENCOUNTER — APPOINTMENT (OUTPATIENT)
Dept: GENERAL RADIOLOGY | Facility: HOSPITAL | Age: 58
End: 2023-02-11
Attending: EMERGENCY MEDICINE
Payer: MEDICARE

## 2023-02-11 ENCOUNTER — HOSPITAL ENCOUNTER (INPATIENT)
Facility: HOSPITAL | Age: 58
LOS: 1 days | Discharge: ANOTHER HOSPITAL | End: 2023-02-12
Attending: EMERGENCY MEDICINE | Admitting: HOSPITALIST
Payer: MEDICARE

## 2023-02-11 ENCOUNTER — HOSPITAL ENCOUNTER (INPATIENT)
Facility: HOSPITAL | Age: 58
LOS: 1 days | Discharge: OTHER INSTITUTION | End: 2023-02-12
Attending: EMERGENCY MEDICINE | Admitting: HOSPITALIST
Payer: MEDICARE

## 2023-02-11 DIAGNOSIS — S72.402A CLOSED FRACTURE OF DISTAL END OF LEFT FEMUR, UNSPECIFIED FRACTURE MORPHOLOGY, INITIAL ENCOUNTER (HCC): Primary | ICD-10-CM

## 2023-02-11 LAB
ALBUMIN SERPL-MCNC: 3.1 G/DL (ref 3.4–5)
ALBUMIN/GLOB SERPL: 0.9 {RATIO} (ref 1–2)
ALP LIVER SERPL-CCNC: 68 U/L
ALT SERPL-CCNC: 18 U/L
ANION GAP SERPL CALC-SCNC: 10 MMOL/L (ref 0–18)
ANTIBODY SCREEN: NEGATIVE
AST SERPL-CCNC: 15 U/L (ref 15–37)
BASOPHILS # BLD AUTO: 0.05 X10(3) UL (ref 0–0.2)
BASOPHILS NFR BLD AUTO: 0.6 %
BILIRUB SERPL-MCNC: 0.6 MG/DL (ref 0.1–2)
BUN BLD-MCNC: 43 MG/DL (ref 7–18)
CALCIUM BLD-MCNC: 7.9 MG/DL (ref 8.5–10.1)
CHLORIDE SERPL-SCNC: 104 MMOL/L (ref 98–112)
CO2 SERPL-SCNC: 28 MMOL/L (ref 21–32)
CREAT BLD-MCNC: 8.12 MG/DL
EOSINOPHIL # BLD AUTO: 0.23 X10(3) UL (ref 0–0.7)
EOSINOPHIL NFR BLD AUTO: 2.8 %
ERYTHROCYTE [DISTWIDTH] IN BLOOD BY AUTOMATED COUNT: 13.4 %
GFR SERPLBLD BASED ON 1.73 SQ M-ARVRAT: 7 ML/MIN/1.73M2 (ref 60–?)
GLOBULIN PLAS-MCNC: 3.5 G/DL (ref 2.8–4.4)
GLUCOSE BLD-MCNC: 98 MG/DL (ref 70–99)
HCT VFR BLD AUTO: 33.1 %
HGB BLD-MCNC: 11.1 G/DL
IMM GRANULOCYTES # BLD AUTO: 0.02 X10(3) UL (ref 0–1)
IMM GRANULOCYTES NFR BLD: 0.2 %
LYMPHOCYTES # BLD AUTO: 1.04 X10(3) UL (ref 1–4)
LYMPHOCYTES NFR BLD AUTO: 12.5 %
MCH RBC QN AUTO: 32.9 PG (ref 26–34)
MCHC RBC AUTO-ENTMCNC: 33.5 G/DL (ref 31–37)
MCV RBC AUTO: 98.2 FL
MONOCYTES # BLD AUTO: 1.02 X10(3) UL (ref 0.1–1)
MONOCYTES NFR BLD AUTO: 12.2 %
NEUTROPHILS # BLD AUTO: 5.99 X10 (3) UL (ref 1.5–7.7)
NEUTROPHILS # BLD AUTO: 5.99 X10(3) UL (ref 1.5–7.7)
NEUTROPHILS NFR BLD AUTO: 71.7 %
OSMOLALITY SERPL CALC.SUM OF ELEC: 305 MOSM/KG (ref 275–295)
PHOSPHATE SERPL-MCNC: 1.7 MG/DL (ref 2.5–4.9)
PLATELET # BLD AUTO: 125 10(3)UL (ref 150–450)
POTASSIUM SERPL-SCNC: 2.6 MMOL/L (ref 3.5–5.1)
PROT SERPL-MCNC: 6.6 G/DL (ref 6.4–8.2)
RBC # BLD AUTO: 3.37 X10(6)UL
RH BLOOD TYPE: POSITIVE
SARS-COV-2 RNA RESP QL NAA+PROBE: NOT DETECTED
SODIUM SERPL-SCNC: 142 MMOL/L (ref 136–145)
WBC # BLD AUTO: 8.4 X10(3) UL (ref 4–11)

## 2023-02-11 PROCEDURE — 99223 1ST HOSP IP/OBS HIGH 75: CPT | Performed by: STUDENT IN AN ORGANIZED HEALTH CARE EDUCATION/TRAINING PROGRAM

## 2023-02-11 PROCEDURE — 73560 X-RAY EXAM OF KNEE 1 OR 2: CPT | Performed by: EMERGENCY MEDICINE

## 2023-02-11 PROCEDURE — 73590 X-RAY EXAM OF LOWER LEG: CPT | Performed by: EMERGENCY MEDICINE

## 2023-02-11 PROCEDURE — 73502 X-RAY EXAM HIP UNI 2-3 VIEWS: CPT | Performed by: EMERGENCY MEDICINE

## 2023-02-11 PROCEDURE — 73610 X-RAY EXAM OF ANKLE: CPT | Performed by: EMERGENCY MEDICINE

## 2023-02-11 PROCEDURE — 73552 X-RAY EXAM OF FEMUR 2/>: CPT | Performed by: EMERGENCY MEDICINE

## 2023-02-11 PROCEDURE — 5A09357 ASSISTANCE WITH RESPIRATORY VENTILATION, LESS THAN 24 CONSECUTIVE HOURS, CONTINUOUS POSITIVE AIRWAY PRESSURE: ICD-10-PCS | Performed by: STUDENT IN AN ORGANIZED HEALTH CARE EDUCATION/TRAINING PROGRAM

## 2023-02-11 RX ORDER — HYDROCODONE BITARTRATE AND ACETAMINOPHEN 5; 325 MG/1; MG/1
1 TABLET ORAL EVERY 4 HOURS PRN
Status: DISCONTINUED | OUTPATIENT
Start: 2023-02-11 | End: 2023-02-11

## 2023-02-11 RX ORDER — HYDROMORPHONE HYDROCHLORIDE 1 MG/ML
0.4 INJECTION, SOLUTION INTRAMUSCULAR; INTRAVENOUS; SUBCUTANEOUS EVERY 2 HOUR PRN
Status: DISCONTINUED | OUTPATIENT
Start: 2023-02-11 | End: 2023-02-11

## 2023-02-11 RX ORDER — HYDROCODONE BITARTRATE AND ACETAMINOPHEN 5; 325 MG/1; MG/1
2 TABLET ORAL EVERY 4 HOURS PRN
Status: DISCONTINUED | OUTPATIENT
Start: 2023-02-11 | End: 2023-02-11

## 2023-02-11 RX ORDER — ACETAMINOPHEN 500 MG
1000 TABLET ORAL EVERY 8 HOURS PRN
Status: DISCONTINUED | OUTPATIENT
Start: 2023-02-11 | End: 2023-02-12

## 2023-02-11 RX ORDER — MELATONIN
3 NIGHTLY PRN
Status: DISCONTINUED | OUTPATIENT
Start: 2023-02-11 | End: 2023-02-12

## 2023-02-11 RX ORDER — POLYETHYLENE GLYCOL 3350 17 G/17G
17 POWDER, FOR SOLUTION ORAL DAILY PRN
Status: DISCONTINUED | OUTPATIENT
Start: 2023-02-11 | End: 2023-02-12

## 2023-02-11 RX ORDER — SODIUM CHLORIDE 9 MG/ML
125 INJECTION, SOLUTION INTRAVENOUS CONTINUOUS
Status: DISCONTINUED | OUTPATIENT
Start: 2023-02-11 | End: 2023-02-12

## 2023-02-11 RX ORDER — ECHINACEA PURPUREA EXTRACT 125 MG
1 TABLET ORAL
Status: DISCONTINUED | OUTPATIENT
Start: 2023-02-11 | End: 2023-02-12

## 2023-02-11 RX ORDER — HYDROMORPHONE HYDROCHLORIDE 1 MG/ML
0.2 INJECTION, SOLUTION INTRAMUSCULAR; INTRAVENOUS; SUBCUTANEOUS EVERY 2 HOUR PRN
Status: DISCONTINUED | OUTPATIENT
Start: 2023-02-11 | End: 2023-02-11

## 2023-02-11 RX ORDER — NALOXONE HYDROCHLORIDE 0.4 MG/ML
0.08 INJECTION, SOLUTION INTRAMUSCULAR; INTRAVENOUS; SUBCUTANEOUS
Status: DISCONTINUED | OUTPATIENT
Start: 2023-02-11 | End: 2023-02-12

## 2023-02-11 RX ORDER — ACETAMINOPHEN 325 MG/1
650 TABLET ORAL EVERY 4 HOURS PRN
Status: DISCONTINUED | OUTPATIENT
Start: 2023-02-11 | End: 2023-02-12

## 2023-02-11 RX ORDER — HYDROMORPHONE HYDROCHLORIDE 1 MG/ML
0.5 INJECTION, SOLUTION INTRAMUSCULAR; INTRAVENOUS; SUBCUTANEOUS EVERY 30 MIN PRN
Status: DISCONTINUED | OUTPATIENT
Start: 2023-02-11 | End: 2023-02-11

## 2023-02-11 RX ORDER — ATORVASTATIN CALCIUM 40 MG/1
40 TABLET, FILM COATED ORAL NIGHTLY
Status: CANCELLED | OUTPATIENT
Start: 2023-02-11

## 2023-02-11 RX ORDER — PROCHLORPERAZINE EDISYLATE 5 MG/ML
5 INJECTION INTRAMUSCULAR; INTRAVENOUS EVERY 8 HOURS PRN
Status: DISCONTINUED | OUTPATIENT
Start: 2023-02-11 | End: 2023-02-12

## 2023-02-11 RX ORDER — SENNOSIDES 8.6 MG
17.2 TABLET ORAL NIGHTLY PRN
Status: DISCONTINUED | OUTPATIENT
Start: 2023-02-11 | End: 2023-02-12

## 2023-02-11 RX ORDER — ONDANSETRON 2 MG/ML
4 INJECTION INTRAMUSCULAR; INTRAVENOUS EVERY 6 HOURS PRN
Status: DISCONTINUED | OUTPATIENT
Start: 2023-02-11 | End: 2023-02-12

## 2023-02-11 RX ORDER — BENZONATATE 100 MG/1
200 CAPSULE ORAL 3 TIMES DAILY PRN
Status: DISCONTINUED | OUTPATIENT
Start: 2023-02-11 | End: 2023-02-12

## 2023-02-11 RX ORDER — POTASSIUM CHLORIDE 20 MEQ/1
40 TABLET, EXTENDED RELEASE ORAL ONCE
Status: COMPLETED | OUTPATIENT
Start: 2023-02-11 | End: 2023-02-11

## 2023-02-11 RX ORDER — ALLOPURINOL 100 MG/1
100 TABLET ORAL DAILY
Status: DISCONTINUED | OUTPATIENT
Start: 2023-02-11 | End: 2023-02-12

## 2023-02-11 RX ORDER — HYDROMORPHONE HYDROCHLORIDE 1 MG/ML
0.8 INJECTION, SOLUTION INTRAMUSCULAR; INTRAVENOUS; SUBCUTANEOUS EVERY 2 HOUR PRN
Status: DISCONTINUED | OUTPATIENT
Start: 2023-02-11 | End: 2023-02-11

## 2023-02-11 RX ORDER — HEPARIN SODIUM 5000 [USP'U]/ML
5000 INJECTION, SOLUTION INTRAVENOUS; SUBCUTANEOUS EVERY 8 HOURS SCHEDULED
Status: DISCONTINUED | OUTPATIENT
Start: 2023-02-11 | End: 2023-02-12

## 2023-02-11 RX ORDER — ONDANSETRON 2 MG/ML
4 INJECTION INTRAMUSCULAR; INTRAVENOUS EVERY 4 HOURS PRN
Status: DISCONTINUED | OUTPATIENT
Start: 2023-02-11 | End: 2023-02-11

## 2023-02-11 RX ORDER — PANTOPRAZOLE SODIUM 20 MG/1
20 TABLET, DELAYED RELEASE ORAL
Status: DISCONTINUED | OUTPATIENT
Start: 2023-02-11 | End: 2023-02-12

## 2023-02-11 RX ORDER — DIPHENHYDRAMINE HYDROCHLORIDE 50 MG/ML
12.5 INJECTION INTRAMUSCULAR; INTRAVENOUS EVERY 4 HOURS PRN
Status: DISCONTINUED | OUTPATIENT
Start: 2023-02-11 | End: 2023-02-12

## 2023-02-11 RX ORDER — BISACODYL 10 MG
10 SUPPOSITORY, RECTAL RECTAL
Status: DISCONTINUED | OUTPATIENT
Start: 2023-02-11 | End: 2023-02-12

## 2023-02-11 RX ORDER — SODIUM CHLORIDE 9 MG/ML
INJECTION, SOLUTION INTRAVENOUS CONTINUOUS
Status: DISCONTINUED | OUTPATIENT
Start: 2023-02-11 | End: 2023-02-12

## 2023-02-11 RX ORDER — MORPHINE SULFATE 4 MG/ML
4 INJECTION, SOLUTION INTRAMUSCULAR; INTRAVENOUS ONCE
Status: COMPLETED | OUTPATIENT
Start: 2023-02-11 | End: 2023-02-11

## 2023-02-11 NOTE — PLAN OF CARE
Assumed care of Pt at this time. Per report, Pt follows with [facility] MD's, but joint was done prior by Dr. Adri Vallejo. Dr. Linda Kurtz w/ [facility] ortho was consulted and asked we reach out to [facility] ortho group. Call made to [facility]. PA spoke with MD's in group and requested consult be kept with [facility] group. Contacted Dr. Linda Kurtz who will round Sunday and make a plan.

## 2023-02-11 NOTE — PROGRESS NOTES
NURSING ADMISSION NOTE      Patient admitted via Cart from ER  Oriented to room. Safety precautions initiated. Bed in low position. Call light in reach. Pt AOx4, medicated with Dilaudid for pain. RA . Immobilizer to left leg. IVF infusing.  Wife at bedside

## 2023-02-12 ENCOUNTER — IMAGING SERVICES (OUTPATIENT)
Dept: OTHER | Age: 58
End: 2023-02-12

## 2023-02-12 ENCOUNTER — APPOINTMENT (OUTPATIENT)
Dept: CT IMAGING | Facility: HOSPITAL | Age: 58
End: 2023-02-12
Attending: ORTHOPAEDIC SURGERY
Payer: MEDICARE

## 2023-02-12 ENCOUNTER — APPOINTMENT (OUTPATIENT)
Dept: GENERAL RADIOLOGY | Facility: HOSPITAL | Age: 58
End: 2023-02-12
Attending: ORTHOPAEDIC SURGERY
Payer: MEDICARE

## 2023-02-12 ENCOUNTER — HOSPITAL ENCOUNTER (INPATIENT)
Age: 58
LOS: 3 days | Discharge: NURSING, REHAB OR LONG-TERM CARE | DRG: 480 | End: 2023-02-15
Attending: INTERNAL MEDICINE

## 2023-02-12 VITALS
SYSTOLIC BLOOD PRESSURE: 152 MMHG | WEIGHT: 199.94 LBS | TEMPERATURE: 98 F | BODY MASS INDEX: 26.5 KG/M2 | DIASTOLIC BLOOD PRESSURE: 94 MMHG | OXYGEN SATURATION: 57 % | HEART RATE: 78 BPM | RESPIRATION RATE: 18 BRPM | HEIGHT: 73 IN

## 2023-02-12 DIAGNOSIS — I10 PRIMARY HYPERTENSION: ICD-10-CM

## 2023-02-12 DIAGNOSIS — Z99.2 END-STAGE RENAL DISEASE ON HEMODIALYSIS (CMD): ICD-10-CM

## 2023-02-12 DIAGNOSIS — D63.1 ANEMIA DUE TO CHRONIC KIDNEY DISEASE, ON CHRONIC DIALYSIS (CMD): Chronic | ICD-10-CM

## 2023-02-12 DIAGNOSIS — S72.452A: Primary | ICD-10-CM

## 2023-02-12 DIAGNOSIS — Q87.81 ALPORT'S SYNDROME: ICD-10-CM

## 2023-02-12 DIAGNOSIS — I25.10 CORONARY ARTERY DISEASE INVOLVING NATIVE CORONARY ARTERY OF NATIVE HEART WITHOUT ANGINA PECTORIS: ICD-10-CM

## 2023-02-12 DIAGNOSIS — E78.2 MIXED HYPERLIPIDEMIA: Chronic | ICD-10-CM

## 2023-02-12 DIAGNOSIS — E03.9 ACQUIRED HYPOTHYROIDISM: ICD-10-CM

## 2023-02-12 DIAGNOSIS — N18.6 END-STAGE RENAL DISEASE ON HEMODIALYSIS (CMD): ICD-10-CM

## 2023-02-12 DIAGNOSIS — M97.12XD PERIPROSTHETIC FRACTURE AROUND INTERNAL PROSTHETIC LEFT KNEE JOINT, SUBSEQUENT ENCOUNTER: ICD-10-CM

## 2023-02-12 DIAGNOSIS — S72.409A CLOSED FRACTURE OF DISTAL END OF FEMUR, UNSPECIFIED FRACTURE MORPHOLOGY, INITIAL ENCOUNTER (CMD): ICD-10-CM

## 2023-02-12 DIAGNOSIS — Z99.2 ANEMIA DUE TO CHRONIC KIDNEY DISEASE, ON CHRONIC DIALYSIS (CMD): Chronic | ICD-10-CM

## 2023-02-12 DIAGNOSIS — D69.6 THROMBOCYTOPENIA (CMD): Chronic | ICD-10-CM

## 2023-02-12 DIAGNOSIS — N18.6 ANEMIA DUE TO CHRONIC KIDNEY DISEASE, ON CHRONIC DIALYSIS (CMD): Chronic | ICD-10-CM

## 2023-02-12 LAB
ALBUMIN SERPL-MCNC: 2.8 G/DL (ref 3.4–5)
ALBUMIN SERPL-MCNC: 2.8 G/DL (ref 3.6–5.1)
ALBUMIN/GLOB SERPL: 0.8 {RATIO} (ref 1–2)
ALBUMIN/GLOB SERPL: 0.9 {RATIO} (ref 1–2.4)
ALP LIVER SERPL-CCNC: 62 U/L
ALP SERPL-CCNC: 67 UNITS/L (ref 45–117)
ALT SERPL-CCNC: 17 U/L
ALT SERPL-CCNC: 17 UNITS/L
ANION GAP SERPL CALC-SCNC: 11 MMOL/L (ref 7–19)
ANION GAP SERPL CALC-SCNC: 9 MMOL/L (ref 0–18)
AST SERPL-CCNC: 11 UNITS/L
AST SERPL-CCNC: 12 U/L (ref 15–37)
ATRIAL RATE: 75 BPM
BASOPHILS # BLD AUTO: 0.03 X10(3) UL (ref 0–0.2)
BASOPHILS # BLD: 0 K/MCL (ref 0–0.3)
BASOPHILS NFR BLD AUTO: 0.4 %
BASOPHILS NFR BLD: 0 %
BILIRUB SERPL-MCNC: 0.3 MG/DL (ref 0.2–1)
BILIRUB SERPL-MCNC: 0.5 MG/DL (ref 0.1–2)
BUN BLD-MCNC: 58 MG/DL (ref 7–18)
BUN SERPL-MCNC: 80 MG/DL (ref 6–20)
BUN/CREAT SERPL: 7 (ref 7–25)
CALCIUM BLD-MCNC: 8.4 MG/DL (ref 8.5–10.1)
CALCIUM SERPL-MCNC: 8.5 MG/DL (ref 8.4–10.2)
CHLORIDE SERPL-SCNC: 100 MMOL/L (ref 98–112)
CHLORIDE SERPL-SCNC: 101 MMOL/L (ref 97–110)
CO2 SERPL-SCNC: 28 MMOL/L (ref 21–32)
CO2 SERPL-SCNC: 29 MMOL/L (ref 21–32)
CREAT BLD-MCNC: 10.6 MG/DL
CREAT SERPL-MCNC: 12.2 MG/DL (ref 0.67–1.17)
DEPRECATED RDW RBC: 50 FL (ref 39–50)
EOSINOPHIL # BLD AUTO: 0.02 X10(3) UL (ref 0–0.7)
EOSINOPHIL # BLD: 0.1 K/MCL (ref 0–0.5)
EOSINOPHIL NFR BLD AUTO: 0.3 %
EOSINOPHIL NFR BLD: 1 %
ERYTHROCYTE [DISTWIDTH] IN BLOOD BY AUTOMATED COUNT: 13.5 %
ERYTHROCYTE [DISTWIDTH] IN BLOOD: 13.6 % (ref 11–15)
FASTING DURATION TIME PATIENT: ABNORMAL H
GFR SERPLBLD BASED ON 1.73 SQ M-ARVRAT: 4 ML/MIN
GFR SERPLBLD BASED ON 1.73 SQ M-ARVRAT: 5 ML/MIN/1.73M2 (ref 60–?)
GLOBULIN PLAS-MCNC: 3.3 G/DL (ref 2.8–4.4)
GLOBULIN SER-MCNC: 3.2 G/DL (ref 2–4)
GLUCOSE BLD-MCNC: 112 MG/DL (ref 70–99)
GLUCOSE SERPL-MCNC: 141 MG/DL (ref 70–99)
HCT VFR BLD AUTO: 26.6 %
HCT VFR BLD CALC: 24.7 % (ref 39–51)
HGB BLD-MCNC: 8.2 G/DL (ref 13–17)
HGB BLD-MCNC: 8.8 G/DL
IMM GRANULOCYTES # BLD AUTO: 0 K/MCL (ref 0–0.2)
IMM GRANULOCYTES # BLD AUTO: 0.02 X10(3) UL (ref 0–1)
IMM GRANULOCYTES # BLD: 0 %
IMM GRANULOCYTES NFR BLD: 0.3 %
INR BLD: 1.14 (ref 0.85–1.16)
INR PPP: 1
LYMPHOCYTES # BLD AUTO: 0.94 X10(3) UL (ref 1–4)
LYMPHOCYTES # BLD: 0.8 K/MCL (ref 1–4)
LYMPHOCYTES NFR BLD AUTO: 12.7 %
LYMPHOCYTES NFR BLD: 10 %
MAGNESIUM SERPL-MCNC: 2.7 MG/DL (ref 1.6–2.6)
MCH RBC QN AUTO: 33.5 PG (ref 26–34)
MCH RBC QN AUTO: 33.8 PG (ref 26–34)
MCHC RBC AUTO-ENTMCNC: 33.1 G/DL (ref 31–37)
MCHC RBC AUTO-ENTMCNC: 33.2 G/DL (ref 32–36.5)
MCV RBC AUTO: 100.8 FL (ref 78–100)
MCV RBC AUTO: 102.3 FL
MONOCYTES # BLD AUTO: 1.02 X10(3) UL (ref 0.1–1)
MONOCYTES # BLD: 0.9 K/MCL (ref 0.3–0.9)
MONOCYTES NFR BLD AUTO: 13.8 %
MONOCYTES NFR BLD: 11 %
NEUTROPHILS # BLD AUTO: 5.37 X10 (3) UL (ref 1.5–7.7)
NEUTROPHILS # BLD AUTO: 5.37 X10(3) UL (ref 1.5–7.7)
NEUTROPHILS # BLD: 6.4 K/MCL (ref 1.8–7.7)
NEUTROPHILS NFR BLD AUTO: 72.5 %
NEUTROPHILS NFR BLD: 78 %
NRBC BLD MANUAL-RTO: 0 /100 WBC
OSMOLALITY SERPL CALC.SUM OF ELEC: 303 MOSM/KG (ref 275–295)
P-R INTERVAL: 150 MS
PHOSPHATE SERPL-MCNC: 2.2 MG/DL (ref 2.5–4.9)
PLATELET # BLD AUTO: 101 10(3)UL (ref 150–450)
PLATELET # BLD AUTO: 93 K/MCL (ref 140–450)
POTASSIUM SERPL-SCNC: 3.9 MMOL/L (ref 3.5–5.1)
POTASSIUM SERPL-SCNC: 4.2 MMOL/L (ref 3.4–5.1)
PROT SERPL-MCNC: 6 G/DL (ref 6.4–8.2)
PROT SERPL-MCNC: 6.1 G/DL (ref 6.4–8.2)
PROTHROMBIN TIME: 10.2 SEC (ref 9.7–11.8)
PROTHROMBIN TIME: 14.6 SECONDS (ref 11.6–14.8)
Q-T INTERVAL: 444 MS
QRS DURATION: 94 MS
QTC CALCULATION (BEZET): 495 MS
R AXIS: 82 DEGREES
RBC # BLD AUTO: 2.6 X10(6)UL
RBC # BLD: 2.45 MIL/MCL (ref 4.5–5.9)
SODIUM SERPL-SCNC: 136 MMOL/L (ref 135–145)
SODIUM SERPL-SCNC: 138 MMOL/L (ref 136–145)
T AXIS: -52 DEGREES
VENTRICULAR RATE: 75 BPM
WBC # BLD AUTO: 7.4 X10(3) UL (ref 4–11)
WBC # BLD: 8.2 K/MCL (ref 4.2–11)

## 2023-02-12 PROCEDURE — 93005 ELECTROCARDIOGRAM TRACING: CPT | Performed by: ORTHOPAEDIC SURGERY

## 2023-02-12 PROCEDURE — 93005 ELECTROCARDIOGRAM TRACING: CPT | Performed by: INTERNAL MEDICINE

## 2023-02-12 PROCEDURE — 76377 3D RENDER W/INTRP POSTPROCES: CPT | Performed by: ORTHOPAEDIC SURGERY

## 2023-02-12 PROCEDURE — 73700 CT LOWER EXTREMITY W/O DYE: CPT | Performed by: ORTHOPAEDIC SURGERY

## 2023-02-12 PROCEDURE — 85025 COMPLETE CBC W/AUTO DIFF WBC: CPT | Performed by: INTERNAL MEDICINE

## 2023-02-12 PROCEDURE — 10002800 HB RX 250 W HCPCS: Performed by: INTERNAL MEDICINE

## 2023-02-12 PROCEDURE — 99223 1ST HOSP IP/OBS HIGH 75: CPT | Performed by: INTERNAL MEDICINE

## 2023-02-12 PROCEDURE — 99223 1ST HOSP IP/OBS HIGH 75: CPT | Performed by: ORTHOPAEDIC SURGERY

## 2023-02-12 PROCEDURE — 36415 COLL VENOUS BLD VENIPUNCTURE: CPT | Performed by: INTERNAL MEDICINE

## 2023-02-12 PROCEDURE — 10002807 HB RX 258: Performed by: INTERNAL MEDICINE

## 2023-02-12 PROCEDURE — 10004651 HB RX, NO CHARGE ITEM: Performed by: INTERNAL MEDICINE

## 2023-02-12 PROCEDURE — 10000002 HB ROOM CHARGE MED SURG

## 2023-02-12 PROCEDURE — 5A1D70Z PERFORMANCE OF URINARY FILTRATION, INTERMITTENT, LESS THAN 6 HOURS PER DAY: ICD-10-PCS | Performed by: INTERNAL MEDICINE

## 2023-02-12 PROCEDURE — 73560 X-RAY EXAM OF KNEE 1 OR 2: CPT | Performed by: ORTHOPAEDIC SURGERY

## 2023-02-12 PROCEDURE — 80053 COMPREHEN METABOLIC PANEL: CPT | Performed by: INTERNAL MEDICINE

## 2023-02-12 PROCEDURE — 99222 1ST HOSP IP/OBS MODERATE 55: CPT | Performed by: INTERNAL MEDICINE

## 2023-02-12 PROCEDURE — 10002803 HB RX 637: Performed by: INTERNAL MEDICINE

## 2023-02-12 PROCEDURE — 85610 PROTHROMBIN TIME: CPT | Performed by: INTERNAL MEDICINE

## 2023-02-12 PROCEDURE — 99239 HOSP IP/OBS DSCHRG MGMT >30: CPT | Performed by: STUDENT IN AN ORGANIZED HEALTH CARE EDUCATION/TRAINING PROGRAM

## 2023-02-12 RX ORDER — CINACALCET 30 MG/1
60 TABLET, FILM COATED ORAL
Status: DISCONTINUED | OUTPATIENT
Start: 2023-02-13 | End: 2023-02-15 | Stop reason: HOSPADM

## 2023-02-12 RX ORDER — POLYETHYLENE GLYCOL 3350 17 G/17G
17 POWDER, FOR SOLUTION ORAL DAILY
Status: DISCONTINUED | OUTPATIENT
Start: 2023-02-13 | End: 2023-02-15 | Stop reason: HOSPADM

## 2023-02-12 RX ORDER — LACTULOSE 10 G/15ML
10 SOLUTION ORAL DAILY PRN
Status: DISCONTINUED | OUTPATIENT
Start: 2023-02-12 | End: 2023-02-15 | Stop reason: HOSPADM

## 2023-02-12 RX ORDER — CALCITRIOL 0.5 UG/1
0.5 CAPSULE, LIQUID FILLED ORAL
COMMUNITY

## 2023-02-12 RX ORDER — LIDOCAINE AND PRILOCAINE 25; 25 MG/G; MG/G
CREAM TOPICAL AS NEEDED
Status: DISCONTINUED | OUTPATIENT
Start: 2023-02-13 | End: 2023-02-12

## 2023-02-12 RX ORDER — PREDNISONE 5 MG/1
5 TABLET ORAL DAILY
COMMUNITY

## 2023-02-12 RX ORDER — SODIUM CHLORIDE 9 MG/ML
INJECTION, SOLUTION INTRAVENOUS CONTINUOUS
Status: DISCONTINUED | OUTPATIENT
Start: 2023-02-12 | End: 2023-02-15 | Stop reason: HOSPADM

## 2023-02-12 RX ORDER — LIDOCAINE AND PRILOCAINE 25; 25 MG/G; MG/G
CREAM TOPICAL
COMMUNITY

## 2023-02-12 RX ORDER — HYDROMORPHONE HCL IN 0.9% NACL 0.2 MG/ML
PLASTIC BAG, INJECTION (ML) INTRAVENOUS CONTINUOUS
Status: DISCONTINUED | OUTPATIENT
Start: 2023-02-12 | End: 2023-02-14

## 2023-02-12 RX ORDER — ALLOPURINOL 100 MG/1
100 TABLET ORAL DAILY
Status: DISCONTINUED | OUTPATIENT
Start: 2023-02-13 | End: 2023-02-15 | Stop reason: HOSPADM

## 2023-02-12 RX ORDER — NALBUPHINE HYDROCHLORIDE 10 MG/ML
2.5 INJECTION, SOLUTION INTRAMUSCULAR; INTRAVENOUS; SUBCUTANEOUS EVERY 8 HOURS PRN
Status: DISCONTINUED | OUTPATIENT
Start: 2023-02-12 | End: 2023-02-15 | Stop reason: HOSPADM

## 2023-02-12 RX ORDER — ALBUMIN (HUMAN) 12.5 G/50ML
100 SOLUTION INTRAVENOUS AS NEEDED
Status: DISCONTINUED | OUTPATIENT
Start: 2023-02-13 | End: 2023-02-12

## 2023-02-12 RX ORDER — CALCIUM ACETATE 667 MG/1
667 CAPSULE ORAL
Status: DISCONTINUED | OUTPATIENT
Start: 2023-02-13 | End: 2023-02-15 | Stop reason: HOSPADM

## 2023-02-12 RX ORDER — ATORVASTATIN CALCIUM 40 MG/1
40 TABLET, FILM COATED ORAL AT BEDTIME
Status: DISCONTINUED | OUTPATIENT
Start: 2023-02-12 | End: 2023-02-15 | Stop reason: HOSPADM

## 2023-02-12 RX ORDER — NALOXONE HCL 0.4 MG/ML
0.1 VIAL (ML) INJECTION PRN
Status: DISCONTINUED | OUTPATIENT
Start: 2023-02-12 | End: 2023-02-15 | Stop reason: HOSPADM

## 2023-02-12 RX ORDER — 0.9 % SODIUM CHLORIDE 0.9 %
2 VIAL (ML) INJECTION EVERY 12 HOURS SCHEDULED
Status: DISCONTINUED | OUTPATIENT
Start: 2023-02-12 | End: 2023-02-15 | Stop reason: HOSPADM

## 2023-02-12 RX ORDER — AMOXICILLIN 250 MG
1 CAPSULE ORAL NIGHTLY
Status: DISCONTINUED | OUTPATIENT
Start: 2023-02-12 | End: 2023-02-15 | Stop reason: HOSPADM

## 2023-02-12 RX ORDER — FOLIC ACID/VIT B COMPLEX AND C 0.8 MG
1 TABLET ORAL DAILY
Status: DISCONTINUED | OUTPATIENT
Start: 2023-02-13 | End: 2023-02-15 | Stop reason: HOSPADM

## 2023-02-12 RX ORDER — ASPIRIN 81 MG/1
81 TABLET, CHEWABLE ORAL DAILY
Status: DISCONTINUED | OUTPATIENT
Start: 2023-02-13 | End: 2023-02-15 | Stop reason: HOSPADM

## 2023-02-12 RX ORDER — CALCITRIOL 0.25 UG/1
0.25 CAPSULE, LIQUID FILLED ORAL
Status: DISCONTINUED | OUTPATIENT
Start: 2023-02-13 | End: 2023-02-15 | Stop reason: HOSPADM

## 2023-02-12 RX ORDER — HEPARIN SODIUM 5000 [USP'U]/ML
5000 INJECTION, SOLUTION INTRAVENOUS; SUBCUTANEOUS EVERY 8 HOURS SCHEDULED
Status: DISCONTINUED | OUTPATIENT
Start: 2023-02-12 | End: 2023-02-12

## 2023-02-12 RX ORDER — PREDNISONE 5 MG/1
7.5 TABLET ORAL DAILY
Status: DISCONTINUED | OUTPATIENT
Start: 2023-02-13 | End: 2023-02-15 | Stop reason: HOSPADM

## 2023-02-12 RX ADMIN — SODIUM CHLORIDE, PRESERVATIVE FREE 2 ML: 5 INJECTION INTRAVENOUS at 20:44

## 2023-02-12 RX ADMIN — SENNOSIDES AND DOCUSATE SODIUM 1 TABLET: 50; 8.6 TABLET ORAL at 22:26

## 2023-02-12 RX ADMIN — SODIUM CHLORIDE: 9 INJECTION, SOLUTION INTRAVENOUS at 22:35

## 2023-02-12 RX ADMIN — Medication: at 22:38

## 2023-02-12 RX ADMIN — MORPHINE SULFATE 6 MG: 2 INJECTION, SOLUTION INTRAMUSCULAR; INTRAVENOUS at 20:43

## 2023-02-12 RX ADMIN — ATORVASTATIN CALCIUM 40 MG: 40 TABLET, FILM COATED ORAL at 20:44

## 2023-02-12 SDOH — ECONOMIC STABILITY: HOUSING INSECURITY: WHAT IS YOUR LIVING SITUATION TODAY?: APARTMENT

## 2023-02-12 SDOH — HEALTH STABILITY: GENERAL: BECAUSE OF A PHYSICAL, MENTAL, OR EMOTIONAL CONDITION, DO YOU HAVE DIFFICULTY DOING ERRANDS ALONE?: NO

## 2023-02-12 SDOH — ECONOMIC STABILITY: FOOD INSECURITY: HOW OFTEN IN THE PAST 12 MONTHS WERE YOU WORRIED OR STRESSED ABOUT HAVING ENOUGH MONEY TO BUY NUTRITIOUS MEALS?: NEVER

## 2023-02-12 SDOH — HEALTH STABILITY: PHYSICAL HEALTH: DO YOU HAVE DIFFICULTY DRESSING OR BATHING?: NO

## 2023-02-12 SDOH — ECONOMIC STABILITY: TRANSPORTATION INSECURITY
IN THE PAST 12 MONTHS, HAS LACK OF TRANSPORTATION KEPT YOU FROM MEETINGS, WORK, OR FROM GETTING THINGS NEEDED FOR DAILY LIVING?: NO

## 2023-02-12 SDOH — ECONOMIC STABILITY: HOUSING INSECURITY: WHAT IS YOUR LIVING SITUATION TODAY?: SPOUSE

## 2023-02-12 SDOH — HEALTH STABILITY: GENERAL
BECAUSE OF A PHYSICAL, MENTAL, OR EMOTIONAL CONDITION, DO YOU HAVE SERIOUS DIFFICULTY CONCENTRATING, REMEMBERING OR MAKING DECISIONS?: NO

## 2023-02-12 SDOH — ECONOMIC STABILITY: TRANSPORTATION INSECURITY
IN THE PAST 12 MONTHS, HAS THE LACK OF TRANSPORTATION KEPT YOU FROM MEDICAL APPOINTMENTS OR FROM GETTING MEDICATIONS?: NO

## 2023-02-12 SDOH — ECONOMIC STABILITY: HOUSING INSECURITY: ARE YOU WORRIED ABOUT LOSING YOUR HOUSING?: NO

## 2023-02-12 SDOH — SOCIAL STABILITY: SOCIAL NETWORK: SUPPORT SYSTEMS: FAMILY MEMBERS

## 2023-02-12 SDOH — HEALTH STABILITY: PHYSICAL HEALTH: DO YOU HAVE SERIOUS DIFFICULTY WALKING OR CLIMBING STAIRS?: NO

## 2023-02-12 SDOH — SOCIAL STABILITY: SOCIAL NETWORK
HOW OFTEN DO YOU SEE OR TALK TO PEOPLE THAT YOU CARE ABOUT AND FEEL CLOSE TO? (FOR EXAMPLE: TALKING TO FRIENDS ON THE PHONE, VISITING FRIENDS OR FAMILY, GOING TO CHURCH OR CLUB MEETINGS): 1 OR 2 TIMES A WEEK

## 2023-02-12 SDOH — ECONOMIC STABILITY: GENERAL

## 2023-02-12 ASSESSMENT — ACTIVITIES OF DAILY LIVING (ADL)
ADL_SHORT_OF_BREATH: NO
ADL_BEFORE_ADMISSION: INDEPENDENT
ADL_SCORE: 12
RECENT_DECLINE_ADL: NO

## 2023-02-12 ASSESSMENT — COLUMBIA-SUICIDE SEVERITY RATING SCALE - C-SSRS
6. HAVE YOU EVER DONE ANYTHING, STARTED TO DO ANYTHING, OR PREPARED TO DO ANYTHING TO END YOUR LIFE?: NO
1. WITHIN THE PAST MONTH, HAVE YOU WISHED YOU WERE DEAD OR WISHED YOU COULD GO TO SLEEP AND NOT WAKE UP?: NO
IS THE PATIENT ABLE TO COMPLETE C-SSRS: YES
2. HAVE YOU ACTUALLY HAD ANY THOUGHTS OF KILLING YOURSELF?: NO

## 2023-02-12 ASSESSMENT — LIFESTYLE VARIABLES
HOW OFTEN DO YOU HAVE A DRINK CONTAINING ALCOHOL: NEVER
AUDIT-C TOTAL SCORE: 0
ALCOHOL_USE_STATUS: NO OR LOW RISK WITH VALIDATED TOOL
HOW MANY STANDARD DRINKS CONTAINING ALCOHOL DO YOU HAVE ON A TYPICAL DAY: 0,1 OR 2
HOW OFTEN DO YOU HAVE 6 OR MORE DRINKS ON ONE OCCASION: NEVER

## 2023-02-12 ASSESSMENT — PAIN SCALES - GENERAL: PAINLEVEL_OUTOF10: 8

## 2023-02-12 ASSESSMENT — PATIENT HEALTH QUESTIONNAIRE - PHQ9
1. LITTLE INTEREST OR PLEASURE IN DOING THINGS: NOT AT ALL
2. FEELING DOWN, DEPRESSED OR HOPELESS: NOT AT ALL
SUM OF ALL RESPONSES TO PHQ9 QUESTIONS 1 AND 2: 0
IS PATIENT ABLE TO COMPLETE PHQ2 OR PHQ9: YES
SUM OF ALL RESPONSES TO PHQ9 QUESTIONS 1 AND 2: 0
CLINICAL INTERPRETATION OF PHQ2 SCORE: NO FURTHER SCREENING NEEDED

## 2023-02-12 NOTE — CM/SW NOTE
MISHA received a call from Dr. Alton Black requesting assistance with transferring patient to [facility]. Per Dr. Alton Black he spoke with Dr. Mahamed Mathews, trauma surgeon at [facility] and he is willing to accept the patient. MISHA spoke with Nessa Goldberg at [facility] transfer center [phone], face sheet faxed to [phone]. MISHA provided Nessa Goldberg with Dr. Jerrod Bridges phone number to connect MD to MD.   Dr. Meenakshi Jacobsen notified.
MISHA received a call from Juan at [facility] transfer center stating patient will go to unit 50 room 8276-. Juan will call Earle James RN and provide RN to RN as well as setting up transport with [facility]. MISHA completing PCS form. Mihir RN notified to send all records and imaging on a CD, with the patient. CM notified Dr. Douglas Ward and Dr. Rayna Little via epic.
Statement Selected

## 2023-02-12 NOTE — PLAN OF CARE
LLE in immobilizer, swelling noted, pt able to feel & move foot stong, +2 pedal pulse, warm to touch toes. Pt hax neuropathy in toes per pt. Vss at this time. Ivf infusing as ordered, pca pain meds at bedside, pt administer self via button. Denies nausea. Plan ortho eval in am, surg Monday.

## 2023-02-12 NOTE — PLAN OF CARE
Ok to place discharge order once transfer is accepted at [facility].

## 2023-02-12 NOTE — PLAN OF CARE
ER admit 2/11 Lt danelle-prosthetic fx, Pt is AAOX4, VSS, room air, RAQUEL CPAP, TELE, IV Dilaudid PCA, renal diet, on  MWF, Immobilizer in place to Lt leg, baseline neuropathy to toes, per Ortho plan is to transfer Pt to [facility], report called to Catalina GRULLON at [facility]. Pt updated to POC, spouse at bedside, Pt doing well, all needs met, all safety measures in place, call light within reach, will CTM.

## 2023-02-13 ENCOUNTER — APPOINTMENT (OUTPATIENT)
Dept: DIALYSIS | Age: 58
DRG: 480 | End: 2023-02-13
Attending: INTERNAL MEDICINE

## 2023-02-13 ENCOUNTER — PATIENT OUTREACH (OUTPATIENT)
Dept: CASE MANAGEMENT | Age: 58
End: 2023-02-13

## 2023-02-13 ENCOUNTER — TELEPHONE (OUTPATIENT)
Dept: INTERNAL MEDICINE CLINIC | Facility: CLINIC | Age: 58
End: 2023-02-13

## 2023-02-13 ENCOUNTER — APPOINTMENT (OUTPATIENT)
Dept: GENERAL RADIOLOGY | Age: 58
DRG: 480 | End: 2023-02-13
Attending: ORTHOPAEDIC SURGERY

## 2023-02-13 ENCOUNTER — ANESTHESIA (OUTPATIENT)
Dept: SURGERY | Age: 58
DRG: 480 | End: 2023-02-13

## 2023-02-13 ENCOUNTER — ANESTHESIA EVENT (OUTPATIENT)
Dept: SURGERY | Age: 58
DRG: 480 | End: 2023-02-13

## 2023-02-13 PROBLEM — D63.1 ANEMIA DUE TO CHRONIC KIDNEY DISEASE: Chronic | Status: ACTIVE | Noted: 2023-02-13

## 2023-02-13 PROBLEM — S72.452A: Status: ACTIVE | Noted: 2023-02-13

## 2023-02-13 PROBLEM — N18.9 ANEMIA DUE TO CHRONIC KIDNEY DISEASE: Chronic | Status: ACTIVE | Noted: 2023-02-13

## 2023-02-13 PROBLEM — E78.5 HYPERLIPIDEMIA: Chronic | Status: ACTIVE | Noted: 2023-02-13

## 2023-02-13 PROBLEM — S72.409A CLOSED FRACTURE OF DISTAL END OF FEMUR, UNSPECIFIED FRACTURE MORPHOLOGY, INITIAL ENCOUNTER (CMD): Status: RESOLVED | Noted: 2023-02-12 | Resolved: 2023-02-13

## 2023-02-13 PROBLEM — M97.12XA PERIPROSTHETIC FRACTURE AROUND INTERNAL PROSTHETIC LEFT KNEE JOINT: Status: ACTIVE | Noted: 2023-02-13

## 2023-02-13 PROBLEM — D69.6 THROMBOCYTOPENIA (CMD): Chronic | Status: ACTIVE | Noted: 2023-02-13

## 2023-02-13 LAB
ABO + RH BLD: NORMAL
ANION GAP SERPL CALC-SCNC: 13 MMOL/L (ref 7–19)
ATRIAL RATE (BPM): 75
BASOPHILS # BLD: 0 K/MCL (ref 0–0.3)
BASOPHILS NFR BLD: 1 %
BLD GP AB SCN SERPL QL GEL: NEGATIVE
BLOOD EXPIRATION DATE: NORMAL
BLOOD EXPIRATION DATE: NORMAL
BUN SERPL-MCNC: 88 MG/DL (ref 6–20)
BUN/CREAT SERPL: 7 (ref 7–25)
CALCIUM SERPL-MCNC: 8.4 MG/DL (ref 8.4–10.2)
CHLORIDE SERPL-SCNC: 102 MMOL/L (ref 97–110)
CO2 SERPL-SCNC: 25 MMOL/L (ref 21–32)
CREAT SERPL-MCNC: 13.5 MG/DL (ref 0.67–1.17)
CROSSMATCH RESULT: NORMAL
DEPRECATED RDW RBC: 49.6 FL (ref 39–50)
DISPENSE STATUS: NORMAL
DISPENSE STATUS: NORMAL
EOSINOPHIL # BLD: 0.2 K/MCL (ref 0–0.5)
EOSINOPHIL NFR BLD: 3 %
ERYTHROCYTE [DISTWIDTH] IN BLOOD: 13.8 % (ref 11–15)
FASTING DURATION TIME PATIENT: ABNORMAL H
GFR SERPLBLD BASED ON 1.73 SQ M-ARVRAT: 4 ML/MIN
GLUCOSE SERPL-MCNC: 108 MG/DL (ref 70–99)
HBV CORE IGG+IGM SER QL: NEGATIVE
HBV SURFACE AB SER-ACNC: 48.79 MUNITS/ML
HBV SURFACE AG SER QL: NEGATIVE
HCT VFR BLD CALC: 23.6 % (ref 39–51)
HGB BLD-MCNC: 8 G/DL (ref 13–17)
IMM GRANULOCYTES # BLD AUTO: 0 K/MCL (ref 0–0.2)
IMM GRANULOCYTES # BLD: 0 %
ISBT BLOOD TYPE: 5100
ISBT BLOOD TYPE: 6200
ISSUE DATE/TIME: NORMAL
ISSUE DATE/TIME: NORMAL
LYMPHOCYTES # BLD: 1.3 K/MCL (ref 1–4)
LYMPHOCYTES NFR BLD: 16 %
MCH RBC QN AUTO: 33.9 PG (ref 26–34)
MCHC RBC AUTO-ENTMCNC: 33.9 G/DL (ref 32–36.5)
MCV RBC AUTO: 100 FL (ref 78–100)
MONOCYTES # BLD: 1 K/MCL (ref 0.3–0.9)
MONOCYTES NFR BLD: 13 %
NEUTROPHILS # BLD: 5.5 K/MCL (ref 1.8–7.7)
NEUTROPHILS NFR BLD: 67 %
NRBC BLD MANUAL-RTO: 0 /100 WBC
P AXIS (DEGREES): 71
PLATELET # BLD AUTO: 86 K/MCL (ref 140–450)
POTASSIUM SERPL-SCNC: 3.8 MMOL/L (ref 3.4–5.1)
PR-INTERVAL (MSEC): 128
PRODUCT CODE: NORMAL
PRODUCT CODE: NORMAL
PRODUCT DESCRIPTION: NORMAL
PRODUCT DESCRIPTION: NORMAL
PRODUCT ID: NORMAL
PRODUCT ID: NORMAL
QRS-INTERVAL (MSEC): 88
QT-INTERVAL (MSEC): 388
QTC: 433
R AXIS (DEGREES): -7
RBC # BLD: 2.36 MIL/MCL (ref 4.5–5.9)
REPORT TEXT: NORMAL
SODIUM SERPL-SCNC: 136 MMOL/L (ref 135–145)
T AXIS (DEGREES): 41
TYPE AND SCREEN EXPIRATION DATE: NORMAL
UNIT BLOOD TYPE: NORMAL
UNIT BLOOD TYPE: NORMAL
UNIT NUMBER: NORMAL
UNIT NUMBER: NORMAL
VENTRICULAR RATE EKG/MIN (BPM): 75
WBC # BLD: 8.1 K/MCL (ref 4.2–11)

## 2023-02-13 PROCEDURE — 10002800 HB RX 250 W HCPCS

## 2023-02-13 PROCEDURE — 13000117 HB ORTHO COMPLEX CASE EA ADD MINUTE: Performed by: ORTHOPAEDIC SURGERY

## 2023-02-13 PROCEDURE — 13000003 HB ANESTHESIA  GENERAL EA ADD MINUTE: Performed by: ORTHOPAEDIC SURGERY

## 2023-02-13 PROCEDURE — 86704 HEP B CORE ANTIBODY TOTAL: CPT | Performed by: INTERNAL MEDICINE

## 2023-02-13 PROCEDURE — 10002016 HB COUNTER INCENTIVE SPIROMETRY

## 2023-02-13 PROCEDURE — 10002801 HB RX 250 W/O HCPCS: Performed by: ORTHOPAEDIC SURGERY

## 2023-02-13 PROCEDURE — 99233 SBSQ HOSP IP/OBS HIGH 50: CPT | Performed by: INTERNAL MEDICINE

## 2023-02-13 PROCEDURE — 76000 FLUOROSCOPY <1 HR PHYS/QHP: CPT

## 2023-02-13 PROCEDURE — 86706 HEP B SURFACE ANTIBODY: CPT | Performed by: INTERNAL MEDICINE

## 2023-02-13 PROCEDURE — C1713 ANCHOR/SCREW BN/BN,TIS/BN: HCPCS | Performed by: ORTHOPAEDIC SURGERY

## 2023-02-13 PROCEDURE — 10002800 HB RX 250 W HCPCS: Performed by: NURSE PRACTITIONER

## 2023-02-13 PROCEDURE — 10002807 HB RX 258: Performed by: ANESTHESIOLOGY

## 2023-02-13 PROCEDURE — 13000116 HB ORTHO COMPLEX CASE S/U + 1ST 15 MIN: Performed by: ORTHOPAEDIC SURGERY

## 2023-02-13 PROCEDURE — 86900 BLOOD TYPING SEROLOGIC ABO: CPT | Performed by: INTERNAL MEDICINE

## 2023-02-13 PROCEDURE — 10002803 HB RX 637: Performed by: INTERNAL MEDICINE

## 2023-02-13 PROCEDURE — 10002800 HB RX 250 W HCPCS: Performed by: INTERNAL MEDICINE

## 2023-02-13 PROCEDURE — 10002800 HB RX 250 W HCPCS: Performed by: ANESTHESIOLOGY

## 2023-02-13 PROCEDURE — 10002801 HB RX 250 W/O HCPCS: Performed by: ANESTHESIOLOGY

## 2023-02-13 PROCEDURE — 87340 HEPATITIS B SURFACE AG IA: CPT | Performed by: INTERNAL MEDICINE

## 2023-02-13 PROCEDURE — 0QSC04Z REPOSITION LEFT LOWER FEMUR WITH INTERNAL FIXATION DEVICE, OPEN APPROACH: ICD-10-PCS | Performed by: ORTHOPAEDIC SURGERY

## 2023-02-13 PROCEDURE — 10004451 HB PACU RECOVERY 1ST 30 MINUTES: Performed by: ORTHOPAEDIC SURGERY

## 2023-02-13 PROCEDURE — 10000002 HB ROOM CHARGE MED SURG

## 2023-02-13 PROCEDURE — C1769 GUIDE WIRE: HCPCS | Performed by: ORTHOPAEDIC SURGERY

## 2023-02-13 PROCEDURE — 10002803 HB RX 637: Performed by: NURSE PRACTITIONER

## 2023-02-13 PROCEDURE — 80048 BASIC METABOLIC PNL TOTAL CA: CPT | Performed by: INTERNAL MEDICINE

## 2023-02-13 PROCEDURE — 90935 HEMODIALYSIS ONE EVALUATION: CPT

## 2023-02-13 PROCEDURE — 10006023 HB SUPPLY 272: Performed by: ORTHOPAEDIC SURGERY

## 2023-02-13 PROCEDURE — 10004180 HB COUNTER-TRANSPORT

## 2023-02-13 PROCEDURE — 36415 COLL VENOUS BLD VENIPUNCTURE: CPT | Performed by: INTERNAL MEDICINE

## 2023-02-13 PROCEDURE — 13001086 HB INCENTIVE SPIROMETER W INSTRUCT

## 2023-02-13 PROCEDURE — 85025 COMPLETE CBC W/AUTO DIFF WBC: CPT | Performed by: INTERNAL MEDICINE

## 2023-02-13 PROCEDURE — 10002801 HB RX 250 W/O HCPCS

## 2023-02-13 PROCEDURE — P9016 RBC LEUKOCYTES REDUCED: HCPCS

## 2023-02-13 PROCEDURE — 10006027 HB SUPPLY 278: Performed by: ORTHOPAEDIC SURGERY

## 2023-02-13 PROCEDURE — 94660 CPAP INITIATION&MGMT: CPT

## 2023-02-13 PROCEDURE — 10004452 HB PACU ADDL 30 MINUTES: Performed by: ORTHOPAEDIC SURGERY

## 2023-02-13 PROCEDURE — P9073 PLATELETS PHERESIS PATH REDU: HCPCS

## 2023-02-13 PROCEDURE — 73552 X-RAY EXAM OF FEMUR 2/>: CPT

## 2023-02-13 PROCEDURE — 13000002 HB ANESTHESIA  GENERAL  S/U + 1ST 15 MIN: Performed by: ORTHOPAEDIC SURGERY

## 2023-02-13 DEVICE — IMPLANTABLE DEVICE: Type: IMPLANTABLE DEVICE | Site: LEG UPPER | Status: FUNCTIONAL

## 2023-02-13 DEVICE — SCREW BN 3.5MM 50MM 2.5MM FULL THRD SELF TAP LOWPRFL SM HEX: Type: IMPLANTABLE DEVICE | Site: LEG UPPER | Status: FUNCTIONAL

## 2023-02-13 DEVICE — SCREW BN 5MM 40MM T25 SELF TAP VAR ANG STRDR LOCK SS GLDN: Type: IMPLANTABLE DEVICE | Site: LEG UPPER | Status: FUNCTIONAL

## 2023-02-13 DEVICE — SCREW BN 3.5MM 2.9MM 80MM T15 FULL THRD SELF TAP LOCK STRDR: Type: IMPLANTABLE DEVICE | Site: LEG UPPER | Status: FUNCTIONAL

## 2023-02-13 DEVICE — SCREW BN 3.5MM 44MM 2.5MM FULL THRD SELF TAP LOWPRFL SM HEX: Type: IMPLANTABLE DEVICE | Site: LEG UPPER | Status: FUNCTIONAL

## 2023-02-13 DEVICE — SCREW BN 3.5MM 6MM 48MM 2.5MM FULL THRD SELF TAP LOWPRFL: Type: IMPLANTABLE DEVICE | Site: LEG UPPER | Status: FUNCTIONAL

## 2023-02-13 DEVICE — SCREW BN 3.5MM 2.9MM 70MM T15 FULL THRD SELF TAP LOCK STRDR: Type: IMPLANTABLE DEVICE | Site: LEG UPPER | Status: FUNCTIONAL

## 2023-02-13 DEVICE — SCREW BN 3.5MM 6MM 55MM 2.5MM FULL THRD SELF TAP LOWPRFL: Type: IMPLANTABLE DEVICE | Site: LEG UPPER | Status: FUNCTIONAL

## 2023-02-13 DEVICE — SCREW BN 5MM 85MM OPTILINK T25 SELF TAP VAR ANG STRDR LOCK: Type: IMPLANTABLE DEVICE | Site: LEG UPPER | Status: FUNCTIONAL

## 2023-02-13 DEVICE — SCREW BN 5MM 90MM OPTILINK T25 SELF TAP VAR ANG STRDR LOCK: Type: IMPLANTABLE DEVICE | Site: LEG UPPER | Status: FUNCTIONAL

## 2023-02-13 DEVICE — SCREW BN 4.5MM 6.5MM 32MM FULL THRD SELF TAP CANNULATED LG: Type: IMPLANTABLE DEVICE | Site: LEG UPPER | Status: FUNCTIONAL

## 2023-02-13 DEVICE — SCREW BN 4.5MM 6.5MM 40MM FULL THRD SELF TAP CANNULATED LG: Type: IMPLANTABLE DEVICE | Site: LEG UPPER | Status: FUNCTIONAL

## 2023-02-13 DEVICE — SCREW BN 5MM 34MM T25 SELF TAP VAR ANG STRDR LOCK SS GLDN: Type: IMPLANTABLE DEVICE | Site: LEG UPPER | Status: FUNCTIONAL

## 2023-02-13 DEVICE — SCREW BN 5MM 36MM T25 SELF TAP VAR ANG STRDR LOCK SS GLDN: Type: IMPLANTABLE DEVICE | Site: LEG UPPER | Status: FUNCTIONAL

## 2023-02-13 RX ORDER — DROPERIDOL 2.5 MG/ML
INJECTION, SOLUTION INTRAMUSCULAR; INTRAVENOUS
Status: DISCONTINUED
Start: 2023-02-13 | End: 2023-02-13 | Stop reason: WASHOUT

## 2023-02-13 RX ORDER — HEPARIN SODIUM 5000 [USP'U]/ML
5000 INJECTION, SOLUTION INTRAVENOUS; SUBCUTANEOUS EVERY 8 HOURS SCHEDULED
Status: DISCONTINUED | OUTPATIENT
Start: 2023-02-14 | End: 2023-02-15 | Stop reason: HOSPADM

## 2023-02-13 RX ORDER — DROPERIDOL 2.5 MG/ML
0.62 INJECTION, SOLUTION INTRAMUSCULAR; INTRAVENOUS
Status: DISCONTINUED | OUTPATIENT
Start: 2023-02-13 | End: 2023-02-13 | Stop reason: HOSPADM

## 2023-02-13 RX ORDER — MIDAZOLAM HYDROCHLORIDE 1 MG/ML
INJECTION, SOLUTION INTRAMUSCULAR; INTRAVENOUS PRN
Status: DISCONTINUED | OUTPATIENT
Start: 2023-02-13 | End: 2023-02-13

## 2023-02-13 RX ORDER — ONDANSETRON 2 MG/ML
INJECTION INTRAMUSCULAR; INTRAVENOUS
Status: COMPLETED
Start: 2023-02-13 | End: 2023-02-13

## 2023-02-13 RX ORDER — GLYCOPYRROLATE 0.2 MG/ML
INJECTION, SOLUTION INTRAMUSCULAR; INTRAVENOUS PRN
Status: DISCONTINUED | OUTPATIENT
Start: 2023-02-13 | End: 2023-02-13

## 2023-02-13 RX ORDER — NEOSTIGMINE METHYLSULFATE 1 MG/ML
INJECTION, SOLUTION INTRAVENOUS PRN
Status: DISCONTINUED | OUTPATIENT
Start: 2023-02-13 | End: 2023-02-13

## 2023-02-13 RX ORDER — ONDANSETRON 2 MG/ML
4 INJECTION INTRAMUSCULAR; INTRAVENOUS 2 TIMES DAILY PRN
Status: DISCONTINUED | OUTPATIENT
Start: 2023-02-13 | End: 2023-02-13 | Stop reason: HOSPADM

## 2023-02-13 RX ORDER — LIDOCAINE HYDROCHLORIDE 20 MG/ML
INJECTION, SOLUTION INFILTRATION; PERINEURAL PRN
Status: DISCONTINUED | OUTPATIENT
Start: 2023-02-13 | End: 2023-02-13

## 2023-02-13 RX ORDER — SODIUM CHLORIDE 9 MG/ML
INJECTION, SOLUTION INTRAVENOUS CONTINUOUS
Status: DISCONTINUED | OUTPATIENT
Start: 2023-02-13 | End: 2023-02-15 | Stop reason: HOSPADM

## 2023-02-13 RX ORDER — HYDRALAZINE HYDROCHLORIDE 20 MG/ML
5 INJECTION INTRAMUSCULAR; INTRAVENOUS EVERY 10 MIN PRN
Status: DISCONTINUED | OUTPATIENT
Start: 2023-02-13 | End: 2023-02-13 | Stop reason: HOSPADM

## 2023-02-13 RX ORDER — PROPOFOL 10 MG/ML
INJECTION, EMULSION INTRAVENOUS PRN
Status: DISCONTINUED | OUTPATIENT
Start: 2023-02-13 | End: 2023-02-13

## 2023-02-13 RX ORDER — SODIUM CHLORIDE 9 MG/ML
INJECTION, SOLUTION INTRAVENOUS CONTINUOUS PRN
Status: DISCONTINUED | OUTPATIENT
Start: 2023-02-13 | End: 2023-02-15 | Stop reason: HOSPADM

## 2023-02-13 RX ORDER — FAMOTIDINE 20 MG/1
20 TABLET, FILM COATED ORAL
Status: CANCELLED | OUTPATIENT
Start: 2023-02-13

## 2023-02-13 RX ORDER — DEXAMETHASONE SODIUM PHOSPHATE 4 MG/ML
INJECTION, SOLUTION INTRA-ARTICULAR; INTRALESIONAL; INTRAMUSCULAR; INTRAVENOUS; SOFT TISSUE PRN
Status: DISCONTINUED | OUTPATIENT
Start: 2023-02-13 | End: 2023-02-13

## 2023-02-13 RX ORDER — DEXTROSE MONOHYDRATE 25 G/50ML
25 INJECTION, SOLUTION INTRAVENOUS PRN
Status: CANCELLED | OUTPATIENT
Start: 2023-02-13

## 2023-02-13 RX ORDER — LIDOCAINE HYDROCHLORIDE 10 MG/ML
5-10 INJECTION, SOLUTION EPIDURAL; INFILTRATION; INTRACAUDAL; PERINEURAL PRN
Status: CANCELLED | OUTPATIENT
Start: 2023-02-13

## 2023-02-13 RX ORDER — SODIUM CHLORIDE 9 MG/ML
INJECTION, SOLUTION INTRAVENOUS CONTINUOUS PRN
Status: DISCONTINUED | OUTPATIENT
Start: 2023-02-13 | End: 2023-02-13

## 2023-02-13 RX ORDER — SODIUM CHLORIDE 9 MG/ML
INJECTION, SOLUTION INTRAVENOUS CONTINUOUS
Status: CANCELLED | OUTPATIENT
Start: 2023-02-13

## 2023-02-13 RX ORDER — ONDANSETRON 2 MG/ML
INJECTION INTRAMUSCULAR; INTRAVENOUS PRN
Status: DISCONTINUED | OUTPATIENT
Start: 2023-02-13 | End: 2023-02-13

## 2023-02-13 RX ORDER — HUMAN INSULIN 100 [IU]/ML
INJECTION, SOLUTION SUBCUTANEOUS
Status: CANCELLED | OUTPATIENT
Start: 2023-02-13

## 2023-02-13 RX ORDER — BUPIVACAINE HYDROCHLORIDE AND EPINEPHRINE 2.5; 5 MG/ML; UG/ML
INJECTION, SOLUTION EPIDURAL; INFILTRATION; INTRACAUDAL; PERINEURAL PRN
Status: DISCONTINUED | OUTPATIENT
Start: 2023-02-13 | End: 2023-02-13 | Stop reason: HOSPADM

## 2023-02-13 RX ADMIN — HYDROMORPHONE HYDROCHLORIDE 0.2 MG: 1 INJECTION, SOLUTION INTRAMUSCULAR; INTRAVENOUS; SUBCUTANEOUS at 18:30

## 2023-02-13 RX ADMIN — Medication 25 MCG: at 23:20

## 2023-02-13 RX ADMIN — LIDOCAINE HYDROCHLORIDE 3 ML: 20 INJECTION, SOLUTION INFILTRATION; PERINEURAL at 14:12

## 2023-02-13 RX ADMIN — CEFAZOLIN SODIUM 2000 MG: 300 INJECTION, POWDER, LYOPHILIZED, FOR SOLUTION INTRAVENOUS at 23:22

## 2023-02-13 RX ADMIN — Medication: at 20:24

## 2023-02-13 RX ADMIN — MIDAZOLAM HYDROCHLORIDE 2 MG: 1 INJECTION, SOLUTION INTRAMUSCULAR; INTRAVENOUS at 14:12

## 2023-02-13 RX ADMIN — FENTANYL CITRATE 100 MCG: 50 INJECTION, SOLUTION INTRAMUSCULAR; INTRAVENOUS at 14:12

## 2023-02-13 RX ADMIN — HYDROMORPHONE HYDROCHLORIDE 1 MG: 1 INJECTION, SOLUTION INTRAMUSCULAR; INTRAVENOUS; SUBCUTANEOUS at 18:10

## 2023-02-13 RX ADMIN — GLYCOPYRROLATE 0.5 MG: 0.2 INJECTION, SOLUTION INTRAMUSCULAR; INTRAVENOUS at 16:52

## 2023-02-13 RX ADMIN — LEVOTHYROXINE SODIUM 175 MCG: 100 TABLET ORAL at 05:15

## 2023-02-13 RX ADMIN — ONDANSETRON 4 MG: 2 INJECTION INTRAMUSCULAR; INTRAVENOUS at 17:40

## 2023-02-13 RX ADMIN — FENTANYL CITRATE 50 MCG: 50 INJECTION, SOLUTION INTRAMUSCULAR; INTRAVENOUS at 15:24

## 2023-02-13 RX ADMIN — FENTANYL CITRATE 50 MCG: 50 INJECTION INTRAMUSCULAR; INTRAVENOUS at 17:50

## 2023-02-13 RX ADMIN — HYDROMORPHONE HYDROCHLORIDE 0.4 MG: 1 INJECTION, SOLUTION INTRAMUSCULAR; INTRAVENOUS; SUBCUTANEOUS at 18:50

## 2023-02-13 RX ADMIN — LIDOCAINE 4% 1 APPLICATION.: 4 CREAM TOPICAL at 08:02

## 2023-02-13 RX ADMIN — FENTANYL CITRATE 50 MCG: 50 INJECTION, SOLUTION INTRAMUSCULAR; INTRAVENOUS at 14:52

## 2023-02-13 RX ADMIN — PROPOFOL 150 MG: 10 INJECTION, EMULSION INTRAVENOUS at 14:12

## 2023-02-13 RX ADMIN — SENNOSIDES AND DOCUSATE SODIUM 1 TABLET: 50; 8.6 TABLET ORAL at 23:21

## 2023-02-13 RX ADMIN — NEOSTIGMINE METHYLSULFATE 3 MG: 1 INJECTION INTRAVENOUS at 16:52

## 2023-02-13 RX ADMIN — HYDROMORPHONE HYDROCHLORIDE 0.4 MG: 1 INJECTION, SOLUTION INTRAMUSCULAR; INTRAVENOUS; SUBCUTANEOUS at 18:20

## 2023-02-13 RX ADMIN — SODIUM CHLORIDE: 9 INJECTION, SOLUTION INTRAVENOUS at 14:04

## 2023-02-13 RX ADMIN — ONDANSETRON 4 MG: 2 INJECTION INTRAMUSCULAR; INTRAVENOUS at 16:00

## 2023-02-13 RX ADMIN — FENTANYL CITRATE 50 MCG: 50 INJECTION INTRAMUSCULAR; INTRAVENOUS at 18:00

## 2023-02-13 RX ADMIN — Medication: at 08:14

## 2023-02-13 RX ADMIN — DEXAMETHASONE SODIUM PHOSPHATE 4 MG: 4 INJECTION, SOLUTION INTRAMUSCULAR; INTRAVENOUS at 14:12

## 2023-02-13 RX ADMIN — CEFAZOLIN SODIUM 2000 MG: 300 INJECTION, POWDER, LYOPHILIZED, FOR SOLUTION INTRAVENOUS at 14:12

## 2023-02-13 RX ADMIN — PROPOFOL 150 MG: 10 INJECTION, EMULSION INTRAVENOUS at 14:15

## 2023-02-13 RX ADMIN — CISATRACURIUM BESYLATE 10 MG: 2 INJECTION INTRAVENOUS at 14:12

## 2023-02-13 RX ADMIN — FENTANYL CITRATE 50 MCG: 50 INJECTION, SOLUTION INTRAMUSCULAR; INTRAVENOUS at 15:13

## 2023-02-13 RX ADMIN — FENTANYL CITRATE 50 MCG: 50 INJECTION, SOLUTION INTRAMUSCULAR; INTRAVENOUS at 16:52

## 2023-02-13 RX ADMIN — ATORVASTATIN CALCIUM 40 MG: 40 TABLET, FILM COATED ORAL at 23:19

## 2023-02-13 ASSESSMENT — PAIN SCALES - GENERAL
PAINLEVEL_OUTOF10: 4
PAINLEVEL_OUTOF10: 10
PAINLEVEL_OUTOF10: 8
PAINLEVEL_OUTOF10: 5
PAINLEVEL_OUTOF10: 8
PAINLEVEL_OUTOF10: 4
PAINLEVEL_OUTOF10: 9
PAINLEVEL_OUTOF10: 5
PAINLEVEL_OUTOF10: 6
PAINLEVEL_OUTOF10: 8
PAINLEVEL_OUTOF10: 4
PAINLEVEL_OUTOF10: 5

## 2023-02-13 ASSESSMENT — ENCOUNTER SYMPTOMS
ABDOMINAL DISTENTION: 0
SHORTNESS OF BREATH: 0
DIZZINESS: 0
CHILLS: 0
ACTIVITY CHANGE: 1
CONFUSION: 0
COUGH: 0
FATIGUE: 0
APPETITE CHANGE: 1
HEADACHES: 0
LIGHT-HEADEDNESS: 0
FEVER: 0
WEAKNESS: 1

## 2023-02-13 NOTE — PLAN OF CARE
NURSING DISCHARGE NOTE    Discharged Another hospital via Ambulance. Accompanied by Spouse  Belongings Taken by patient/family. AVS printed and discussed, IV from Rt AC removed, IV to Rt hand SL and capped, wrapped in Kerlix for transport.  Pt ready to transfer to [address] [place] [address]

## 2023-02-14 ENCOUNTER — APPOINTMENT (OUTPATIENT)
Dept: CARDIOLOGY | Age: 58
End: 2023-02-14

## 2023-02-14 LAB
ANION GAP SERPL CALC-SCNC: 10 MMOL/L (ref 7–19)
BASOPHILS # BLD: 0 K/MCL (ref 0–0.3)
BASOPHILS NFR BLD: 0 %
BUN SERPL-MCNC: 57 MG/DL (ref 6–20)
BUN/CREAT SERPL: 6 (ref 7–25)
CALCIUM SERPL-MCNC: 8 MG/DL (ref 8.4–10.2)
CHLORIDE SERPL-SCNC: 99 MMOL/L (ref 97–110)
CO2 SERPL-SCNC: 28 MMOL/L (ref 21–32)
CREAT SERPL-MCNC: 9.94 MG/DL (ref 0.67–1.17)
DEPRECATED RDW RBC: 52.4 FL (ref 39–50)
EOSINOPHIL # BLD: 0 K/MCL (ref 0–0.5)
EOSINOPHIL NFR BLD: 0 %
ERYTHROCYTE [DISTWIDTH] IN BLOOD: 14 % (ref 11–15)
FASTING DURATION TIME PATIENT: ABNORMAL H
GFR SERPLBLD BASED ON 1.73 SQ M-ARVRAT: 6 ML/MIN
GLUCOSE SERPL-MCNC: 109 MG/DL (ref 70–99)
HCT VFR BLD CALC: 24.4 % (ref 39–51)
HGB BLD-MCNC: 8 G/DL (ref 13–17)
IMM GRANULOCYTES # BLD AUTO: 0 K/MCL (ref 0–0.2)
IMM GRANULOCYTES # BLD: 0 %
LYMPHOCYTES # BLD: 0.8 K/MCL (ref 1–4)
LYMPHOCYTES NFR BLD: 10 %
MCH RBC QN AUTO: 33.1 PG (ref 26–34)
MCHC RBC AUTO-ENTMCNC: 32.8 G/DL (ref 32–36.5)
MCV RBC AUTO: 100.8 FL (ref 78–100)
MONOCYTES # BLD: 1.3 K/MCL (ref 0.3–0.9)
MONOCYTES NFR BLD: 15 %
NEUTROPHILS # BLD: 6.5 K/MCL (ref 1.8–7.7)
NEUTROPHILS NFR BLD: 75 %
NRBC BLD MANUAL-RTO: 0 /100 WBC
PHOSPHATE SERPL-MCNC: 2.4 MG/DL (ref 2.4–4.7)
PLATELET # BLD AUTO: 104 K/MCL (ref 140–450)
POTASSIUM SERPL-SCNC: 4.2 MMOL/L (ref 3.4–5.1)
RBC # BLD: 2.42 MIL/MCL (ref 4.5–5.9)
SODIUM SERPL-SCNC: 133 MMOL/L (ref 135–145)
WBC # BLD: 8.6 K/MCL (ref 4.2–11)

## 2023-02-14 PROCEDURE — 97166 OT EVAL MOD COMPLEX 45 MIN: CPT

## 2023-02-14 PROCEDURE — 10002800 HB RX 250 W HCPCS: Performed by: ORTHOPAEDIC SURGERY

## 2023-02-14 PROCEDURE — 85025 COMPLETE CBC W/AUTO DIFF WBC: CPT | Performed by: INTERNAL MEDICINE

## 2023-02-14 PROCEDURE — 10002800 HB RX 250 W HCPCS: Performed by: NURSE PRACTITIONER

## 2023-02-14 PROCEDURE — 99233 SBSQ HOSP IP/OBS HIGH 50: CPT | Performed by: INTERNAL MEDICINE

## 2023-02-14 PROCEDURE — 10004651 HB RX, NO CHARGE ITEM: Performed by: INTERNAL MEDICINE

## 2023-02-14 PROCEDURE — 10002807 HB RX 258: Performed by: NURSE PRACTITIONER

## 2023-02-14 PROCEDURE — 97162 PT EVAL MOD COMPLEX 30 MIN: CPT

## 2023-02-14 PROCEDURE — 94660 CPAP INITIATION&MGMT: CPT

## 2023-02-14 PROCEDURE — 97535 SELF CARE MNGMENT TRAINING: CPT

## 2023-02-14 PROCEDURE — 84100 ASSAY OF PHOSPHORUS: CPT

## 2023-02-14 PROCEDURE — 10002803 HB RX 637: Performed by: INTERNAL MEDICINE

## 2023-02-14 PROCEDURE — 36415 COLL VENOUS BLD VENIPUNCTURE: CPT | Performed by: INTERNAL MEDICINE

## 2023-02-14 PROCEDURE — 10002803 HB RX 637: Performed by: NURSE PRACTITIONER

## 2023-02-14 PROCEDURE — 10000002 HB ROOM CHARGE MED SURG

## 2023-02-14 PROCEDURE — 10004180 HB COUNTER-TRANSPORT

## 2023-02-14 PROCEDURE — 80048 BASIC METABOLIC PNL TOTAL CA: CPT | Performed by: INTERNAL MEDICINE

## 2023-02-14 PROCEDURE — 97530 THERAPEUTIC ACTIVITIES: CPT

## 2023-02-14 PROCEDURE — 97116 GAIT TRAINING THERAPY: CPT

## 2023-02-14 RX ORDER — HYDROCODONE BITARTRATE AND ACETAMINOPHEN 10; 325 MG/1; MG/1
1 TABLET ORAL EVERY 4 HOURS
Status: DISCONTINUED | OUTPATIENT
Start: 2023-02-14 | End: 2023-02-15 | Stop reason: HOSPADM

## 2023-02-14 RX ORDER — HYDROCODONE BITARTRATE AND ACETAMINOPHEN 5; 325 MG/1; MG/1
1 TABLET ORAL EVERY 4 HOURS PRN
Status: DISCONTINUED | OUTPATIENT
Start: 2023-02-14 | End: 2023-02-15 | Stop reason: HOSPADM

## 2023-02-14 RX ORDER — HYDROMORPHONE HCL IN 0.9% NACL 0.2 MG/ML
PLASTIC BAG, INJECTION (ML) INTRAVENOUS CONTINUOUS
Status: DISCONTINUED | OUTPATIENT
Start: 2023-02-14 | End: 2023-02-14

## 2023-02-14 RX ADMIN — CALCIUM ACETATE 667 MG: 667 CAPSULE ORAL at 13:20

## 2023-02-14 RX ADMIN — ALLOPURINOL 100 MG: 100 TABLET ORAL at 09:23

## 2023-02-14 RX ADMIN — Medication: at 02:15

## 2023-02-14 RX ADMIN — ATORVASTATIN CALCIUM 40 MG: 40 TABLET, FILM COATED ORAL at 21:34

## 2023-02-14 RX ADMIN — HEPARIN SODIUM 5000 UNITS: 5000 INJECTION INTRAVENOUS; SUBCUTANEOUS at 21:34

## 2023-02-14 RX ADMIN — HYDROMORPHONE HYDROCHLORIDE 0.5 MG: 1 INJECTION, SOLUTION INTRAMUSCULAR; INTRAVENOUS; SUBCUTANEOUS at 09:19

## 2023-02-14 RX ADMIN — HYDROMORPHONE HYDROCHLORIDE 0.5 MG: 1 INJECTION, SOLUTION INTRAMUSCULAR; INTRAVENOUS; SUBCUTANEOUS at 05:44

## 2023-02-14 RX ADMIN — Medication: at 09:26

## 2023-02-14 RX ADMIN — Medication 50 MCG: at 09:23

## 2023-02-14 RX ADMIN — SODIUM CHLORIDE, PRESERVATIVE FREE 2 ML: 5 INJECTION INTRAVENOUS at 09:25

## 2023-02-14 RX ADMIN — HEPARIN SODIUM 5000 UNITS: 5000 INJECTION INTRAVENOUS; SUBCUTANEOUS at 05:44

## 2023-02-14 RX ADMIN — PREDNISONE 7.5 MG: 5 TABLET ORAL at 09:22

## 2023-02-14 RX ADMIN — HYDROCODONE BITARTRATE AND ACETAMINOPHEN 1 TABLET: 10; 325 TABLET ORAL at 16:39

## 2023-02-14 RX ADMIN — HYDROCODONE BITARTRATE AND ACETAMINOPHEN 1 TABLET: 10; 325 TABLET ORAL at 21:34

## 2023-02-14 RX ADMIN — CEFAZOLIN SODIUM 2000 MG: 300 INJECTION, POWDER, LYOPHILIZED, FOR SOLUTION INTRAVENOUS at 05:44

## 2023-02-14 RX ADMIN — ASPIRIN 81 MG CHEWABLE TABLET 81 MG: 81 TABLET CHEWABLE at 09:23

## 2023-02-14 RX ADMIN — SODIUM CHLORIDE, PRESERVATIVE FREE 2 ML: 5 INJECTION INTRAVENOUS at 21:34

## 2023-02-14 RX ADMIN — Medication 0.8 MG: at 09:24

## 2023-02-14 RX ADMIN — CALCIUM ACETATE 667 MG: 667 CAPSULE ORAL at 16:38

## 2023-02-14 RX ADMIN — HEPARIN SODIUM 5000 UNITS: 5000 INJECTION INTRAVENOUS; SUBCUTANEOUS at 13:20

## 2023-02-14 RX ADMIN — SODIUM CHLORIDE: 9 INJECTION, SOLUTION INTRAVENOUS at 05:43

## 2023-02-14 RX ADMIN — HYDROMORPHONE HYDROCHLORIDE 0.5 MG: 1 INJECTION, SOLUTION INTRAMUSCULAR; INTRAVENOUS; SUBCUTANEOUS at 16:39

## 2023-02-14 RX ADMIN — SENNOSIDES AND DOCUSATE SODIUM 1 TABLET: 50; 8.6 TABLET ORAL at 21:34

## 2023-02-14 RX ADMIN — LEVOTHYROXINE SODIUM 175 MCG: 100 TABLET ORAL at 05:44

## 2023-02-14 ASSESSMENT — ENCOUNTER SYMPTOMS
FATIGUE: 0
WEAKNESS: 1
ABDOMINAL DISTENTION: 0
SHORTNESS OF BREATH: 0
ACTIVITY CHANGE: 1
VOMITING: 0
CONFUSION: 0
CHILLS: 0
PAIN SEVERITY NOW: 9
PAIN SEVERITY NOW: 8
CHEST TIGHTNESS: 0
HEADACHES: 0
FEVER: 0
DIZZINESS: 0
ABDOMINAL PAIN: 0
COUGH: 0
NAUSEA: 0

## 2023-02-14 ASSESSMENT — PAIN SCALES - GENERAL
PAINLEVEL_OUTOF10: 10
PAINLEVEL_OUTOF10: 8
PAINLEVEL_OUTOF10: 10
PAINLEVEL_OUTOF10: 7
PAINLEVEL_OUTOF10: 3
PAINLEVEL_OUTOF10: 7

## 2023-02-14 ASSESSMENT — COGNITIVE AND FUNCTIONAL STATUS - GENERAL
DAILY_ACTIVITY_CONVERTED_SCORE: 44.27
HELP NEEDED DRESSING REGULAR LOWER BODY CLOTHING: A LITTLE
BASIC_MOBILITY_CONVERTED_SCORE: 41.05
BASIC_MOBILITY_RAW_SCORE: 18
HELP NEEDED FOR TOILETING: A LITTLE
DAILY_ACTIVITY_RAW_SCORE: 21
HELP NEEDED FOR BATHING: A LITTLE

## 2023-02-14 ASSESSMENT — ACTIVITIES OF DAILY LIVING (ADL)
PRIOR_ADL_BATHING: INDEPENDENT
HOME_MANAGEMENT_TIME_ENTRY: 23
PRIOR_ADL: MODIFIED INDEPENDENT
PRIOR_ADL_TOILETING: INDEPENDENT
EATING: INDEPENDENT
GROOMING: INDEPENDENT

## 2023-02-15 ENCOUNTER — APPOINTMENT (OUTPATIENT)
Dept: DIALYSIS | Age: 58
DRG: 480 | End: 2023-02-15

## 2023-02-15 VITALS
HEIGHT: 73 IN | HEART RATE: 97 BPM | BODY MASS INDEX: 29.13 KG/M2 | DIASTOLIC BLOOD PRESSURE: 66 MMHG | TEMPERATURE: 97 F | SYSTOLIC BLOOD PRESSURE: 137 MMHG | OXYGEN SATURATION: 99 % | RESPIRATION RATE: 16 BRPM | WEIGHT: 219.8 LBS

## 2023-02-15 LAB
ANION GAP SERPL CALC-SCNC: 15 MMOL/L (ref 7–19)
BASOPHILS # BLD: 0 K/MCL (ref 0–0.3)
BASOPHILS NFR BLD: 1 %
BUN SERPL-MCNC: 83 MG/DL (ref 6–20)
BUN/CREAT SERPL: 7 (ref 7–25)
CALCIUM SERPL-MCNC: 8.1 MG/DL (ref 8.4–10.2)
CHLORIDE SERPL-SCNC: 97 MMOL/L (ref 97–110)
CO2 SERPL-SCNC: 24 MMOL/L (ref 21–32)
CREAT SERPL-MCNC: 12.7 MG/DL (ref 0.67–1.17)
DEPRECATED RDW RBC: 51.4 FL (ref 39–50)
EOSINOPHIL # BLD: 0.1 K/MCL (ref 0–0.5)
EOSINOPHIL NFR BLD: 1 %
ERYTHROCYTE [DISTWIDTH] IN BLOOD: 13.9 % (ref 11–15)
FASTING DURATION TIME PATIENT: ABNORMAL H
GFR SERPLBLD BASED ON 1.73 SQ M-ARVRAT: 4 ML/MIN
GLUCOSE SERPL-MCNC: 108 MG/DL (ref 70–99)
HCT VFR BLD CALC: 21.6 % (ref 39–51)
HGB BLD-MCNC: 7.1 G/DL (ref 13–17)
IMM GRANULOCYTES # BLD AUTO: 0 K/MCL (ref 0–0.2)
IMM GRANULOCYTES # BLD: 0 %
LYMPHOCYTES # BLD: 1 K/MCL (ref 1–4)
LYMPHOCYTES NFR BLD: 15 %
MCH RBC QN AUTO: 33 PG (ref 26–34)
MCHC RBC AUTO-ENTMCNC: 32.9 G/DL (ref 32–36.5)
MCV RBC AUTO: 100.5 FL (ref 78–100)
MONOCYTES # BLD: 1 K/MCL (ref 0.3–0.9)
MONOCYTES NFR BLD: 15 %
NEUTROPHILS # BLD: 4.3 K/MCL (ref 1.8–7.7)
NEUTROPHILS NFR BLD: 68 %
NRBC BLD MANUAL-RTO: 0 /100 WBC
PHOSPHATE SERPL-MCNC: 3.5 MG/DL (ref 2.4–4.7)
PLATELET # BLD AUTO: 82 K/MCL (ref 140–450)
POTASSIUM SERPL-SCNC: 4.3 MMOL/L (ref 3.4–5.1)
RBC # BLD: 2.15 MIL/MCL (ref 4.5–5.9)
SODIUM SERPL-SCNC: 132 MMOL/L (ref 135–145)
WBC # BLD: 6.3 K/MCL (ref 4.2–11)

## 2023-02-15 PROCEDURE — 10002803 HB RX 637: Performed by: INTERNAL MEDICINE

## 2023-02-15 PROCEDURE — 80048 BASIC METABOLIC PNL TOTAL CA: CPT | Performed by: INTERNAL MEDICINE

## 2023-02-15 PROCEDURE — 84100 ASSAY OF PHOSPHORUS: CPT

## 2023-02-15 PROCEDURE — 10002800 HB RX 250 W HCPCS: Performed by: NURSE PRACTITIONER

## 2023-02-15 PROCEDURE — 10002803 HB RX 637: Performed by: NURSE PRACTITIONER

## 2023-02-15 PROCEDURE — 90935 HEMODIALYSIS ONE EVALUATION: CPT

## 2023-02-15 PROCEDURE — 85025 COMPLETE CBC W/AUTO DIFF WBC: CPT | Performed by: INTERNAL MEDICINE

## 2023-02-15 PROCEDURE — 99239 HOSP IP/OBS DSCHRG MGMT >30: CPT | Performed by: INTERNAL MEDICINE

## 2023-02-15 PROCEDURE — 94660 CPAP INITIATION&MGMT: CPT

## 2023-02-15 PROCEDURE — 36415 COLL VENOUS BLD VENIPUNCTURE: CPT | Performed by: INTERNAL MEDICINE

## 2023-02-15 PROCEDURE — 10004651 HB RX, NO CHARGE ITEM: Performed by: INTERNAL MEDICINE

## 2023-02-15 RX ORDER — ASPIRIN 81 MG/1
81 TABLET, CHEWABLE ORAL DAILY
INPATIENT
Start: 2023-02-16

## 2023-02-15 RX ORDER — HEPARIN SODIUM 5000 [USP'U]/ML
5000 INJECTION, SOLUTION INTRAVENOUS; SUBCUTANEOUS EVERY 8 HOURS SCHEDULED
INPATIENT
Start: 2023-02-15

## 2023-02-15 RX ORDER — AMOXICILLIN 250 MG
1 CAPSULE ORAL NIGHTLY
INPATIENT
Start: 2023-02-15

## 2023-02-15 RX ORDER — LACTULOSE 10 G/15ML
10 SOLUTION ORAL DAILY PRN
Status: DISCONTINUED | OUTPATIENT
Start: 2023-02-15 | End: 2023-02-15 | Stop reason: SDUPTHER

## 2023-02-15 RX ORDER — POLYETHYLENE GLYCOL 3350 17 G/17G
17 POWDER, FOR SOLUTION ORAL DAILY
INPATIENT
Start: 2023-02-16

## 2023-02-15 RX ORDER — HYDROCODONE BITARTRATE AND ACETAMINOPHEN 5; 325 MG/1; MG/1
1 TABLET ORAL EVERY 4 HOURS PRN
Qty: 20 TABLET | Refills: 0 | Status: SHIPPED | OUTPATIENT
Start: 2023-02-15 | End: 2023-10-12 | Stop reason: CLARIF

## 2023-02-15 RX ADMIN — CINACALCET 60 MG: 30 TABLET ORAL at 08:11

## 2023-02-15 RX ADMIN — HYDROCODONE BITARTRATE AND ACETAMINOPHEN 1 TABLET: 10; 325 TABLET ORAL at 08:11

## 2023-02-15 RX ADMIN — ALLOPURINOL 100 MG: 100 TABLET ORAL at 08:11

## 2023-02-15 RX ADMIN — Medication 0.8 MG: at 08:11

## 2023-02-15 RX ADMIN — HYDROMORPHONE HYDROCHLORIDE 0.5 MG: 1 INJECTION, SOLUTION INTRAMUSCULAR; INTRAVENOUS; SUBCUTANEOUS at 06:33

## 2023-02-15 RX ADMIN — LEVOTHYROXINE SODIUM 175 MCG: 100 TABLET ORAL at 05:43

## 2023-02-15 RX ADMIN — HYDROCODONE BITARTRATE AND ACETAMINOPHEN 1 TABLET: 10; 325 TABLET ORAL at 13:29

## 2023-02-15 RX ADMIN — ASPIRIN 81 MG CHEWABLE TABLET 81 MG: 81 TABLET CHEWABLE at 08:11

## 2023-02-15 RX ADMIN — CALCITRIOL CAPSULES 0.25 MCG 0.25 MCG: 0.25 CAPSULE ORAL at 08:11

## 2023-02-15 RX ADMIN — PREDNISONE 7.5 MG: 5 TABLET ORAL at 08:14

## 2023-02-15 RX ADMIN — HEPARIN SODIUM 5000 UNITS: 5000 INJECTION INTRAVENOUS; SUBCUTANEOUS at 05:43

## 2023-02-15 RX ADMIN — SODIUM CHLORIDE, PRESERVATIVE FREE 2 ML: 5 INJECTION INTRAVENOUS at 08:15

## 2023-02-15 RX ADMIN — HYDROCODONE BITARTRATE AND ACETAMINOPHEN 1 TABLET: 10; 325 TABLET ORAL at 05:43

## 2023-02-15 RX ADMIN — LACTULOSE 10 G: 10 SOLUTION ORAL at 13:39

## 2023-02-15 RX ADMIN — Medication 50 MCG: at 08:12

## 2023-02-15 RX ADMIN — HYDROCODONE BITARTRATE AND ACETAMINOPHEN 1 TABLET: 10; 325 TABLET ORAL at 01:18

## 2023-02-15 RX ADMIN — CALCIUM ACETATE 667 MG: 667 CAPSULE ORAL at 08:11

## 2023-02-15 RX ADMIN — CALCIUM ACETATE 667 MG: 667 CAPSULE ORAL at 13:29

## 2023-02-15 ASSESSMENT — ENCOUNTER SYMPTOMS
CONFUSION: 0
WEAKNESS: 1
CHEST TIGHTNESS: 0
VOMITING: 0
NAUSEA: 0
DIZZINESS: 0
FATIGUE: 0
ABDOMINAL PAIN: 0
CHILLS: 0
ACTIVITY CHANGE: 1
ABDOMINAL DISTENTION: 0
SHORTNESS OF BREATH: 0
FEVER: 0
HEADACHES: 0
COUGH: 0

## 2023-02-15 ASSESSMENT — PAIN SCALES - GENERAL
PAINLEVEL_OUTOF10: 7
PAINLEVEL_OUTOF10: 6
PAINLEVEL_OUTOF10: 8

## 2023-03-09 ENCOUNTER — OFFICE VISIT (OUTPATIENT)
Dept: INTERNAL MEDICINE CLINIC | Facility: CLINIC | Age: 58
End: 2023-03-09
Payer: MEDICARE

## 2023-03-09 VITALS
HEART RATE: 90 BPM | WEIGHT: 209 LBS | OXYGEN SATURATION: 98 % | SYSTOLIC BLOOD PRESSURE: 130 MMHG | DIASTOLIC BLOOD PRESSURE: 70 MMHG | HEIGHT: 73 IN | TEMPERATURE: 98 F | RESPIRATION RATE: 16 BRPM | BODY MASS INDEX: 27.7 KG/M2

## 2023-03-09 DIAGNOSIS — D84.9 IMMUNOSUPPRESSED STATUS (HCC): ICD-10-CM

## 2023-03-09 DIAGNOSIS — N18.6 ESRD ON HEMODIALYSIS (HCC): Chronic | ICD-10-CM

## 2023-03-09 DIAGNOSIS — F11.20 OPIOID DEPENDENCE ON AGONIST THERAPY (HCC): ICD-10-CM

## 2023-03-09 DIAGNOSIS — D63.1 ANEMIA IN ESRD (END-STAGE RENAL DISEASE) (HCC): Chronic | ICD-10-CM

## 2023-03-09 DIAGNOSIS — D69.6 THROMBOCYTOPENIA (HCC): ICD-10-CM

## 2023-03-09 DIAGNOSIS — I25.10 CORONARY ARTERY DISEASE INVOLVING NATIVE CORONARY ARTERY OF NATIVE HEART WITHOUT ANGINA PECTORIS: Chronic | ICD-10-CM

## 2023-03-09 DIAGNOSIS — I27.20 PULMONARY HTN (HCC): Chronic | ICD-10-CM

## 2023-03-09 DIAGNOSIS — S72.402D CLOSED FRACTURE OF DISTAL END OF LEFT FEMUR WITH ROUTINE HEALING, UNSPECIFIED FRACTURE MORPHOLOGY, SUBSEQUENT ENCOUNTER: Primary | ICD-10-CM

## 2023-03-09 DIAGNOSIS — G89.29 CHRONIC PAIN OF MULTIPLE JOINTS: ICD-10-CM

## 2023-03-09 DIAGNOSIS — E89.0 POSTSURGICAL HYPOTHYROIDISM: Chronic | ICD-10-CM

## 2023-03-09 DIAGNOSIS — Z99.2 ESRD ON HEMODIALYSIS (HCC): Chronic | ICD-10-CM

## 2023-03-09 DIAGNOSIS — M25.50 CHRONIC PAIN OF MULTIPLE JOINTS: ICD-10-CM

## 2023-03-09 DIAGNOSIS — I10 PRIMARY HYPERTENSION: Chronic | ICD-10-CM

## 2023-03-09 DIAGNOSIS — M06.9 RHEUMATOID ARTHRITIS INVOLVING MULTIPLE SITES, UNSPECIFIED WHETHER RHEUMATOID FACTOR PRESENT (HCC): Chronic | ICD-10-CM

## 2023-03-09 DIAGNOSIS — N25.81 SECONDARY HYPERPARATHYROIDISM OF RENAL ORIGIN (HCC): Chronic | ICD-10-CM

## 2023-03-09 DIAGNOSIS — N18.6 ANEMIA IN ESRD (END-STAGE RENAL DISEASE) (HCC): Chronic | ICD-10-CM

## 2023-03-09 PROBLEM — E46 PROTEIN-CALORIE MALNUTRITION (HCC): Status: RESOLVED | Noted: 2018-07-10 | Resolved: 2023-03-09

## 2023-03-09 PROCEDURE — 99214 OFFICE O/P EST MOD 30 MIN: CPT | Performed by: INTERNAL MEDICINE

## 2023-03-09 RX ORDER — HYDROCODONE BITARTRATE AND ACETAMINOPHEN 5; 325 MG/1; MG/1
1 TABLET ORAL EVERY 4 HOURS PRN
COMMUNITY
Start: 2023-02-15

## 2023-03-09 NOTE — PATIENT INSTRUCTIONS
- Repeat blood pressure reading was normal  - Continue current medications  - Follow up with Orthopedics or Pain Management regarding pain medications  - Follow up in September for your Medicare Wellness visit; follow up earlier as needed. It was a pleasure seeing you in the clinic today. Thank you for choosing the [facility] office for your healthcare needs. Please call at [phone] with any questions or concerns.     Arleth Nath MD

## 2023-04-04 ENCOUNTER — OFFICE VISIT (OUTPATIENT)
Dept: CARDIOLOGY | Age: 58
End: 2023-04-04

## 2023-04-04 VITALS
WEIGHT: 205 LBS | HEIGHT: 74 IN | SYSTOLIC BLOOD PRESSURE: 152 MMHG | DIASTOLIC BLOOD PRESSURE: 92 MMHG | HEART RATE: 76 BPM | BODY MASS INDEX: 26.31 KG/M2

## 2023-04-04 DIAGNOSIS — Z95.5 PRESENCE OF STENT IN CORONARY ARTERY: ICD-10-CM

## 2023-04-04 DIAGNOSIS — Z01.810 PREOPERATIVE CARDIOVASCULAR EXAMINATION: ICD-10-CM

## 2023-04-04 DIAGNOSIS — I10 PRIMARY HYPERTENSION: Primary | ICD-10-CM

## 2023-04-04 PROCEDURE — 3077F SYST BP >= 140 MM HG: CPT | Performed by: INTERNAL MEDICINE

## 2023-04-04 PROCEDURE — 3080F DIAST BP >= 90 MM HG: CPT | Performed by: INTERNAL MEDICINE

## 2023-04-04 PROCEDURE — 99215 OFFICE O/P EST HI 40 MIN: CPT | Performed by: INTERNAL MEDICINE

## 2023-04-04 ASSESSMENT — ENCOUNTER SYMPTOMS
ENDOCRINE NEGATIVE: 1
RESPIRATORY NEGATIVE: 1
PSYCHIATRIC NEGATIVE: 1
EYES NEGATIVE: 1
GASTROINTESTINAL NEGATIVE: 1
NEUROLOGICAL NEGATIVE: 1
CONSTITUTIONAL NEGATIVE: 1

## 2023-04-13 ENCOUNTER — OFFICE VISIT (OUTPATIENT)
Dept: NEPHROLOGY | Facility: CLINIC | Age: 58
End: 2023-04-13
Payer: MEDICARE

## 2023-04-13 VITALS — SYSTOLIC BLOOD PRESSURE: 142 MMHG | BODY MASS INDEX: 27 KG/M2 | DIASTOLIC BLOOD PRESSURE: 90 MMHG | WEIGHT: 206.38 LBS

## 2023-04-13 DIAGNOSIS — Z99.2 ESRD ON HEMODIALYSIS (HCC): Primary | ICD-10-CM

## 2023-04-13 DIAGNOSIS — N18.6 ESRD ON HEMODIALYSIS (HCC): Primary | ICD-10-CM

## 2023-04-13 NOTE — PROGRESS NOTES
Patient seen in the nephrology clinic today. We reviewed dialysis labs and also his recent clinical history which unfortunately included left femur fracture and ongoing left knee pain. He states that he will require a second knee replacement on the left once his left femur has healed. He continues to undergo physical therapy and is following closely with orthopedics as well. Dialysis labs are reviewed and are stable. Blood pressure has been stable as well.       Bunny Perez MD  4/13/2023  9:17 AM

## 2023-04-27 ENCOUNTER — PATIENT MESSAGE (OUTPATIENT)
Dept: INTERNAL MEDICINE CLINIC | Facility: CLINIC | Age: 58
End: 2023-04-27

## 2023-04-27 DIAGNOSIS — Z23 NEED FOR PNEUMOCOCCAL VACCINATION: Primary | ICD-10-CM

## 2023-04-27 NOTE — TELEPHONE ENCOUNTER
He would benefit from/is due for Prevnar 20 immunization. Order entered. He can schedule nurse visit for this shot (or get it at local pharmacy or [facility]).

## 2023-04-27 NOTE — TELEPHONE ENCOUNTER
Please advise-TY!     Overdue - Pneumococcal Vaccine: Birth to 64yrs  (3 - PPSV23 if available, else PCV20)  Overdue since 2/14/2023 02/14/2018  Imm Admin: Pneumovax 23   12/14/2017  Imm Admin: Pneumococcal (Prevnar 13)

## 2023-05-05 ENCOUNTER — TELEPHONE (OUTPATIENT)
Dept: INTERNAL MEDICINE CLINIC | Facility: CLINIC | Age: 58
End: 2023-05-05

## 2023-05-05 NOTE — TELEPHONE ENCOUNTER
Spoke with pt's wife. She stated the past 4 mornings pt wakes up and feels dizzy. She states it does not last all day. She stated they spoke with dialysis provider and he stated all labs look good, so it could be an ear infection. . he advised pt to be evaluated by PCP. Pt set up appt with RP for Saturday morning. Advised her if s/s worsen to be seen in IC/UC today.     She v/u

## 2023-05-05 NOTE — TELEPHONE ENCOUNTER
Patient spouse called stating that every time patient gets up he feels very dizzy and is unable to sleep. Patient spouse would like to know if patient is able to get seen today or would like a call back with recommendations.

## 2023-05-06 ENCOUNTER — OFFICE VISIT (OUTPATIENT)
Dept: INTERNAL MEDICINE CLINIC | Facility: CLINIC | Age: 58
End: 2023-05-06
Payer: MEDICARE

## 2023-05-06 VITALS
WEIGHT: 206 LBS | OXYGEN SATURATION: 98 % | TEMPERATURE: 98 F | SYSTOLIC BLOOD PRESSURE: 128 MMHG | RESPIRATION RATE: 16 BRPM | HEIGHT: 73 IN | BODY MASS INDEX: 27.3 KG/M2 | HEART RATE: 102 BPM | DIASTOLIC BLOOD PRESSURE: 88 MMHG

## 2023-05-06 DIAGNOSIS — R42 DIZZINESS: Primary | ICD-10-CM

## 2023-05-06 PROCEDURE — 99213 OFFICE O/P EST LOW 20 MIN: CPT | Performed by: INTERNAL MEDICINE

## 2023-05-06 RX ORDER — CALCIUM ACETATE 667 MG/1
2 CAPSULE ORAL 3 TIMES DAILY
COMMUNITY
Start: 2023-04-10

## 2023-05-06 RX ORDER — FLUTICASONE PROPIONATE 50 MCG
1 SPRAY, SUSPENSION (ML) NASAL 2 TIMES DAILY
Qty: 1 EACH | Refills: 0 | Status: SHIPPED | OUTPATIENT
Start: 2023-05-06

## 2023-05-06 NOTE — PATIENT INSTRUCTIONS
- Use fluticasone nasal spray twice daily for next two weeks  - Let us know if dizziness symptoms are not better within two weeks - we may consider further tests (CT scan of brain, et Miriam Guzman) at that time.  - Recommend that you get the Prevnar 20 pneumonia shot sometime this year  - Would recommend another COVID booster shot; you can wait until late summer/early fall for this. It was a pleasure seeing you in the clinic today. Thank you for choosing the [facility] office for your healthcare needs. Please call at [phone] with any questions or concerns.     Ken Stovall MD

## 2023-05-09 RX ORDER — FLUTICASONE PROPIONATE 50 MCG
1 SPRAY, SUSPENSION (ML) NASAL 2 TIMES DAILY
Qty: 1 EACH | Refills: 0 | OUTPATIENT
Start: 2023-05-09

## 2023-05-10 ENCOUNTER — PATIENT MESSAGE (OUTPATIENT)
Dept: INTERNAL MEDICINE CLINIC | Facility: CLINIC | Age: 58
End: 2023-05-10

## 2023-05-11 NOTE — TELEPHONE ENCOUNTER
From: Yady Reyes  To: Saba Dinero MD  Sent: 5/10/2023 2:35 PM CDT  Subject: Shingles    Dr Constantin Delgado I had my shingrix vaccine a few years ago . My question is can I catch shingles from a relative who has shingles?     Kind regards,  Jovanni Au

## 2023-06-13 ENCOUNTER — LAB ENCOUNTER (OUTPATIENT)
Dept: LAB | Age: 58
End: 2023-06-13
Attending: INTERNAL MEDICINE
Payer: MEDICARE

## 2023-06-13 DIAGNOSIS — C73: Primary | ICD-10-CM

## 2023-06-13 LAB — TSI SER-ACNC: 0.39 MIU/ML (ref 0.36–3.74)

## 2023-06-13 PROCEDURE — 84432 ASSAY OF THYROGLOBULIN: CPT

## 2023-06-13 PROCEDURE — 36415 COLL VENOUS BLD VENIPUNCTURE: CPT

## 2023-06-13 PROCEDURE — 84443 ASSAY THYROID STIM HORMONE: CPT

## 2023-06-16 LAB — LC THYROGLOBIN LCMS: <0.2 NG/ML

## 2023-06-20 ENCOUNTER — TELEPHONE (OUTPATIENT)
Dept: TRANSPLANT | Age: 58
End: 2023-06-20

## 2023-06-27 ENCOUNTER — APPOINTMENT (OUTPATIENT)
Dept: GENERAL RADIOLOGY | Age: 58
End: 2023-06-27
Attending: PHYSICIAN ASSISTANT
Payer: MEDICARE

## 2023-06-27 ENCOUNTER — PATIENT MESSAGE (OUTPATIENT)
Dept: INTERNAL MEDICINE CLINIC | Facility: CLINIC | Age: 58
End: 2023-06-27

## 2023-06-27 ENCOUNTER — HOSPITAL ENCOUNTER (OUTPATIENT)
Age: 58
Discharge: HOME | End: 2023-06-27
Payer: MEDICARE

## 2023-06-27 VITALS
OXYGEN SATURATION: 98 % | TEMPERATURE: 98 F | DIASTOLIC BLOOD PRESSURE: 98 MMHG | SYSTOLIC BLOOD PRESSURE: 165 MMHG | WEIGHT: 207 LBS | BODY MASS INDEX: 28.04 KG/M2 | HEART RATE: 94 BPM | HEIGHT: 72 IN | RESPIRATION RATE: 20 BRPM

## 2023-06-27 DIAGNOSIS — M25.562 ACUTE PAIN OF LEFT KNEE: Primary | ICD-10-CM

## 2023-06-27 PROCEDURE — 73562 X-RAY EXAM OF KNEE 3: CPT | Performed by: PHYSICIAN ASSISTANT

## 2023-06-27 PROCEDURE — 99214 OFFICE O/P EST MOD 30 MIN: CPT

## 2023-06-27 PROCEDURE — 99213 OFFICE O/P EST LOW 20 MIN: CPT

## 2023-06-27 NOTE — ED INITIAL ASSESSMENT (HPI)
C/o fell landed left knee to a carpeted floor on June 18, 2023. Left knee always swollen. Hx of femur surgery on Feb. 2023. Denies hitting head.

## 2023-07-13 ENCOUNTER — OFFICE VISIT (OUTPATIENT)
Dept: NEPHROLOGY | Facility: CLINIC | Age: 58
End: 2023-07-13
Payer: MEDICARE

## 2023-07-13 VITALS — WEIGHT: 207 LBS | BODY MASS INDEX: 28 KG/M2 | SYSTOLIC BLOOD PRESSURE: 148 MMHG | DIASTOLIC BLOOD PRESSURE: 74 MMHG

## 2023-07-13 DIAGNOSIS — N18.6 ESRD ON HEMODIALYSIS (HCC): Primary | ICD-10-CM

## 2023-07-13 DIAGNOSIS — I10 ESSENTIAL HYPERTENSION: ICD-10-CM

## 2023-07-13 DIAGNOSIS — Z99.2 ESRD ON HEMODIALYSIS (HCC): Primary | ICD-10-CM

## 2023-07-13 NOTE — PROGRESS NOTES
Patient seen in the nephrology clinic today in follow-up for management of end-stage renal disease on hemodialysis. [facility] remains [facility]. Labs are reviewed and overall is doing quite well. He does report that he is scheduled for orthopedic evaluation for left knee replacement. In addition he remains on a transplant list at [facility].   Blood pressure has been stable and he is stable also from a volume standpoint    Tara Barclay MD  7/13/2023  9:13 AM

## 2023-07-25 ENCOUNTER — TELEPHONE (OUTPATIENT)
Dept: TRANSPLANT | Age: 58
End: 2023-07-25

## 2023-08-01 RX ORDER — PREDNISONE 5 MG/1
5 TABLET ORAL DAILY
Qty: 30 TABLET | Refills: 5 | Status: SHIPPED | OUTPATIENT
Start: 2023-08-01

## 2023-09-07 ENCOUNTER — LAB ENCOUNTER (OUTPATIENT)
Dept: LAB | Age: 58
End: 2023-09-07
Attending: INTERNAL MEDICINE
Payer: MEDICARE

## 2023-09-07 ENCOUNTER — OFFICE VISIT (OUTPATIENT)
Dept: INTERNAL MEDICINE CLINIC | Facility: CLINIC | Age: 58
End: 2023-09-07
Payer: MEDICARE

## 2023-09-07 VITALS
HEIGHT: 72 IN | TEMPERATURE: 98 F | SYSTOLIC BLOOD PRESSURE: 116 MMHG | BODY MASS INDEX: 28.22 KG/M2 | HEART RATE: 91 BPM | OXYGEN SATURATION: 97 % | RESPIRATION RATE: 18 BRPM | DIASTOLIC BLOOD PRESSURE: 88 MMHG | WEIGHT: 208.38 LBS

## 2023-09-07 DIAGNOSIS — M1A.09X0 IDIOPATHIC CHRONIC GOUT OF MULTIPLE SITES WITHOUT TOPHUS: Chronic | ICD-10-CM

## 2023-09-07 DIAGNOSIS — G89.29 CHRONIC PAIN OF MULTIPLE JOINTS: Chronic | ICD-10-CM

## 2023-09-07 DIAGNOSIS — E89.0 POSTSURGICAL HYPOTHYROIDISM: Chronic | ICD-10-CM

## 2023-09-07 DIAGNOSIS — R79.9 ABNORMAL FINDING OF BLOOD CHEMISTRY, UNSPECIFIED: ICD-10-CM

## 2023-09-07 DIAGNOSIS — G47.33 OSA ON CPAP: Chronic | ICD-10-CM

## 2023-09-07 DIAGNOSIS — K21.9 GASTROESOPHAGEAL REFLUX DISEASE WITHOUT ESOPHAGITIS: Chronic | ICD-10-CM

## 2023-09-07 DIAGNOSIS — Z99.2 ESRD ON HEMODIALYSIS (HCC): Chronic | ICD-10-CM

## 2023-09-07 DIAGNOSIS — G44.52 NEW DAILY PERSISTENT HEADACHE: ICD-10-CM

## 2023-09-07 DIAGNOSIS — D69.6 THROMBOCYTOPENIA (HCC): ICD-10-CM

## 2023-09-07 DIAGNOSIS — M06.9 RHEUMATOID ARTHRITIS INVOLVING MULTIPLE SITES, UNSPECIFIED WHETHER RHEUMATOID FACTOR PRESENT (HCC): Chronic | ICD-10-CM

## 2023-09-07 DIAGNOSIS — N25.81 SECONDARY HYPERPARATHYROIDISM OF RENAL ORIGIN (HCC): Chronic | ICD-10-CM

## 2023-09-07 DIAGNOSIS — I25.10 CORONARY ARTERY DISEASE INVOLVING NATIVE CORONARY ARTERY OF NATIVE HEART WITHOUT ANGINA PECTORIS: Chronic | ICD-10-CM

## 2023-09-07 DIAGNOSIS — G62.9 PERIPHERAL POLYNEUROPATHY: Chronic | ICD-10-CM

## 2023-09-07 DIAGNOSIS — N18.6 ESRD ON HEMODIALYSIS (HCC): Chronic | ICD-10-CM

## 2023-09-07 DIAGNOSIS — F11.20 OPIOID DEPENDENCE ON AGONIST THERAPY (HCC): ICD-10-CM

## 2023-09-07 DIAGNOSIS — I27.20 PULMONARY HTN (HCC): Chronic | ICD-10-CM

## 2023-09-07 DIAGNOSIS — D63.1 ANEMIA IN ESRD (END-STAGE RENAL DISEASE): Chronic | ICD-10-CM

## 2023-09-07 DIAGNOSIS — N18.6 ANEMIA IN ESRD (END-STAGE RENAL DISEASE): Chronic | ICD-10-CM

## 2023-09-07 DIAGNOSIS — Z00.00 ENCOUNTER FOR SUBSEQUENT ANNUAL WELLNESS VISIT (AWV) IN MEDICARE PATIENT: ICD-10-CM

## 2023-09-07 DIAGNOSIS — Z00.00 ENCOUNTER FOR SUBSEQUENT ANNUAL WELLNESS VISIT (AWV) IN MEDICARE PATIENT: Primary | ICD-10-CM

## 2023-09-07 DIAGNOSIS — D84.9 IMMUNOSUPPRESSED STATUS (HCC): ICD-10-CM

## 2023-09-07 DIAGNOSIS — Q87.81 ALPORT SYNDROME: Chronic | ICD-10-CM

## 2023-09-07 DIAGNOSIS — I10 PRIMARY HYPERTENSION: Chronic | ICD-10-CM

## 2023-09-07 DIAGNOSIS — M17.11 PRIMARY OSTEOARTHRITIS OF RIGHT KNEE: Chronic | ICD-10-CM

## 2023-09-07 DIAGNOSIS — M25.50 CHRONIC PAIN OF MULTIPLE JOINTS: Chronic | ICD-10-CM

## 2023-09-07 PROBLEM — S72.402A CLOSED FRACTURE OF DISTAL END OF LEFT FEMUR, UNSPECIFIED FRACTURE MORPHOLOGY, INITIAL ENCOUNTER (HCC): Status: RESOLVED | Noted: 2023-02-11 | Resolved: 2023-09-07

## 2023-09-07 LAB
CHOLEST SERPL-MCNC: 150 MG/DL (ref ?–200)
EST. AVERAGE GLUCOSE BLD GHB EST-MCNC: 94 MG/DL (ref 68–126)
FASTING PATIENT LIPID ANSWER: YES
HBA1C MFR BLD: 4.9 % (ref ?–5.7)
HDLC SERPL-MCNC: 65 MG/DL (ref 40–59)
LDLC SERPL CALC-MCNC: 72 MG/DL (ref ?–100)
NONHDLC SERPL-MCNC: 85 MG/DL (ref ?–130)
TRIGL SERPL-MCNC: 62 MG/DL (ref 30–149)
VLDLC SERPL CALC-MCNC: 9 MG/DL (ref 0–30)

## 2023-09-07 PROCEDURE — G0439 PPPS, SUBSEQ VISIT: HCPCS | Performed by: INTERNAL MEDICINE

## 2023-09-07 PROCEDURE — 80061 LIPID PANEL: CPT

## 2023-09-07 PROCEDURE — 83036 HEMOGLOBIN GLYCOSYLATED A1C: CPT

## 2023-09-07 PROCEDURE — 36415 COLL VENOUS BLD VENIPUNCTURE: CPT

## 2023-09-07 PROCEDURE — 99214 OFFICE O/P EST MOD 30 MIN: CPT | Performed by: INTERNAL MEDICINE

## 2023-09-07 NOTE — PATIENT INSTRUCTIONS
- Will monitor blood pressure for now  - Get blood tests done today  - Schedule CT scan of the head. Can schedule through [employer] or call central scheduling at [phone]  - Follow up in 6 months for chronic issues; follow up earlier as needed. It was a pleasure seeing you in the clinic today. Thank you for choosing the [facility] office for your healthcare needs. Please call at [phone] with any questions or concerns.     Rory Ratliff MD

## 2023-09-08 ENCOUNTER — TELEPHONE (OUTPATIENT)
Dept: TRANSPLANT | Age: 58
End: 2023-09-08

## 2023-09-17 ENCOUNTER — HOSPITAL ENCOUNTER (OUTPATIENT)
Dept: CT IMAGING | Age: 58
Discharge: HOME | End: 2023-09-17
Attending: INTERNAL MEDICINE
Payer: MEDICARE

## 2023-09-17 ENCOUNTER — HOSPITAL ENCOUNTER (OUTPATIENT)
Dept: CT IMAGING | Age: 58
End: 2023-09-17
Attending: INTERNAL MEDICINE
Payer: MEDICARE

## 2023-09-17 DIAGNOSIS — G44.52 NEW DAILY PERSISTENT HEADACHE: ICD-10-CM

## 2023-09-17 PROCEDURE — 70450 CT HEAD/BRAIN W/O DYE: CPT | Performed by: INTERNAL MEDICINE

## 2023-10-09 ENCOUNTER — PATIENT MESSAGE (OUTPATIENT)
Dept: INTERNAL MEDICINE CLINIC | Facility: CLINIC | Age: 58
End: 2023-10-09

## 2023-10-09 RX ORDER — CALCIUM CARBONATE 500 MG/1
TABLET, CHEWABLE ORAL
COMMUNITY
Start: 2021-08-25

## 2023-10-09 NOTE — TELEPHONE ENCOUNTER
See [facility] from patient. Prevnar 13 -12/14/2017  Prevnar 23 - 02/14/2018      Codi Loyola MD      4/27/23  9:03 AM  Note  He would benefit from/is due for Prevnar 20 immunization. Order entered. He can schedule nurse visit for this shot (or get it at local pharmacy or [facility]).           Shingrix 09/24/2020, 12/22/2020

## 2023-10-12 ENCOUNTER — OFFICE VISIT (OUTPATIENT)
Dept: TRANSPLANT | Age: 58
End: 2023-10-12
Attending: INTERNAL MEDICINE

## 2023-10-12 VITALS
WEIGHT: 212.08 LBS | SYSTOLIC BLOOD PRESSURE: 134 MMHG | HEIGHT: 74 IN | DIASTOLIC BLOOD PRESSURE: 87 MMHG | TEMPERATURE: 98.6 F | BODY MASS INDEX: 27.22 KG/M2 | RESPIRATION RATE: 16 BRPM | HEART RATE: 98 BPM

## 2023-10-12 DIAGNOSIS — Z99.2 HEMODIALYSIS PATIENT (CMD): ICD-10-CM

## 2023-10-12 DIAGNOSIS — Z11.4 ENCOUNTER FOR SCREENING FOR HUMAN IMMUNODEFICIENCY VIRUS (HIV): ICD-10-CM

## 2023-10-12 DIAGNOSIS — I10 HTN (HYPERTENSION), BENIGN: ICD-10-CM

## 2023-10-12 DIAGNOSIS — Z76.82 AWAITING TRANSPLANTATION OF KIDNEY: ICD-10-CM

## 2023-10-12 DIAGNOSIS — Z01.818 PRE-TRANSPLANT EVALUATION FOR CKD (CHRONIC KIDNEY DISEASE): Primary | ICD-10-CM

## 2023-10-12 DIAGNOSIS — Z01.818 PRE-TRANSPLANT EVALUATION FOR KIDNEY TRANSPLANT: Primary | ICD-10-CM

## 2023-10-12 DIAGNOSIS — N18.6 ESRD (END STAGE RENAL DISEASE) (CMD): Primary | ICD-10-CM

## 2023-10-12 DIAGNOSIS — N18.6 ESRD (END STAGE RENAL DISEASE) (CMD): ICD-10-CM

## 2023-10-12 DIAGNOSIS — Z12.5 ENCOUNTER FOR SCREENING FOR MALIGNANT NEOPLASM OF PROSTATE: ICD-10-CM

## 2023-10-12 PROCEDURE — 99213 OFFICE O/P EST LOW 20 MIN: CPT

## 2023-10-12 PROCEDURE — 99214 OFFICE O/P EST MOD 30 MIN: CPT | Performed by: INTERNAL MEDICINE

## 2023-10-12 PROCEDURE — 99214 OFFICE O/P EST MOD 30 MIN: CPT

## 2023-10-12 RX ORDER — BUPRENORPHINE HYDROCHLORIDE AND NALOXONE HYDROCHLORIDE DIHYDRATE 8; 2 MG/1; MG/1
0.5 TABLET SUBLINGUAL 2 TIMES DAILY
COMMUNITY
Start: 2023-07-21

## 2023-10-13 ENCOUNTER — TELEPHONE (OUTPATIENT)
Dept: TRANSPLANT | Age: 58
End: 2023-10-13

## 2023-10-20 RX ORDER — SODIUM CHLORIDE, SODIUM LACTATE, POTASSIUM CHLORIDE, CALCIUM CHLORIDE 600; 310; 30; 20 MG/100ML; MG/100ML; MG/100ML; MG/100ML
INJECTION, SOLUTION INTRAVENOUS CONTINUOUS
Status: CANCELLED | OUTPATIENT
Start: 2023-10-20

## 2023-10-26 ENCOUNTER — OFFICE VISIT (OUTPATIENT)
Dept: NEPHROLOGY | Facility: CLINIC | Age: 58
End: 2023-10-26

## 2023-10-26 VITALS — DIASTOLIC BLOOD PRESSURE: 90 MMHG | WEIGHT: 210.5 LBS | SYSTOLIC BLOOD PRESSURE: 136 MMHG | BODY MASS INDEX: 27 KG/M2

## 2023-10-26 DIAGNOSIS — N18.6 ESRD ON HEMODIALYSIS (HCC): Primary | ICD-10-CM

## 2023-10-26 DIAGNOSIS — Z99.2 ESRD ON HEMODIALYSIS (HCC): Primary | ICD-10-CM

## 2023-10-26 NOTE — PROGRESS NOTES
Pt seen in the office today in follow-up for issues revolving around end-stage renal disease on hemodialysis. He continues to do well overall although does consider himself with fluid gains between treatments. Blood pressure has been stable and dialysis labs have been excellent as well. He continues to follow closely with [facility] for transplant.     Aimee Gloria MD  10/26/2023  9:18 AM

## 2023-11-14 ENCOUNTER — HOSPITAL ENCOUNTER (OUTPATIENT)
Facility: HOSPITAL | Age: 58
Setting detail: HOSPITAL OUTPATIENT SURGERY
Discharge: HOME | End: 2023-11-14
Attending: INTERNAL MEDICINE | Admitting: INTERNAL MEDICINE
Payer: MEDICARE

## 2023-11-14 ENCOUNTER — ANESTHESIA EVENT (OUTPATIENT)
Dept: ENDOSCOPY | Facility: HOSPITAL | Age: 58
End: 2023-11-14
Payer: MEDICARE

## 2023-11-14 ENCOUNTER — ANESTHESIA (OUTPATIENT)
Dept: ENDOSCOPY | Facility: HOSPITAL | Age: 58
End: 2023-11-14
Payer: MEDICARE

## 2023-11-14 VITALS
HEIGHT: 74 IN | HEART RATE: 78 BPM | OXYGEN SATURATION: 99 % | DIASTOLIC BLOOD PRESSURE: 95 MMHG | SYSTOLIC BLOOD PRESSURE: 147 MMHG | WEIGHT: 210 LBS | BODY MASS INDEX: 26.95 KG/M2 | TEMPERATURE: 98 F | RESPIRATION RATE: 16 BRPM

## 2023-11-14 DIAGNOSIS — Z86.010 PERSONAL HISTORY OF COLONIC POLYPS: ICD-10-CM

## 2023-11-14 LAB
ANION GAP SERPL CALC-SCNC: 9 MMOL/L (ref 0–18)
BUN BLD-MCNC: 30 MG/DL (ref 9–23)
CALCIUM BLD-MCNC: 8.5 MG/DL (ref 8.5–10.1)
CHLORIDE SERPL-SCNC: 92 MMOL/L (ref 98–112)
CO2 SERPL-SCNC: 39 MMOL/L (ref 21–32)
CREAT BLD-MCNC: 7.85 MG/DL
EGFRCR SERPLBLD CKD-EPI 2021: 7 ML/MIN/1.73M2 (ref 60–?)
GLUCOSE BLD-MCNC: 99 MG/DL (ref 70–99)
OSMOLALITY SERPL CALC.SUM OF ELEC: 296 MOSM/KG (ref 275–295)
POTASSIUM SERPL-SCNC: 3 MMOL/L (ref 3.5–5.1)
SODIUM SERPL-SCNC: 140 MMOL/L (ref 136–145)

## 2023-11-14 PROCEDURE — 88305 TISSUE EXAM BY PATHOLOGIST: CPT | Performed by: INTERNAL MEDICINE

## 2023-11-14 PROCEDURE — 0DBK8ZX EXCISION OF ASCENDING COLON, VIA NATURAL OR ARTIFICIAL OPENING ENDOSCOPIC, DIAGNOSTIC: ICD-10-PCS | Performed by: INTERNAL MEDICINE

## 2023-11-14 PROCEDURE — 80048 BASIC METABOLIC PNL TOTAL CA: CPT

## 2023-11-14 RX ORDER — SODIUM CHLORIDE 9 MG/ML
INJECTION, SOLUTION INTRAVENOUS CONTINUOUS
Status: DISCONTINUED | OUTPATIENT
Start: 2023-11-14 | End: 2023-11-14

## 2023-11-14 RX ORDER — SODIUM CHLORIDE, SODIUM LACTATE, POTASSIUM CHLORIDE, CALCIUM CHLORIDE 600; 310; 30; 20 MG/100ML; MG/100ML; MG/100ML; MG/100ML
INJECTION, SOLUTION INTRAVENOUS CONTINUOUS
Status: DISCONTINUED | OUTPATIENT
Start: 2023-11-14 | End: 2023-11-14

## 2023-11-14 RX ORDER — NALOXONE HYDROCHLORIDE 0.4 MG/ML
0.08 INJECTION, SOLUTION INTRAMUSCULAR; INTRAVENOUS; SUBCUTANEOUS ONCE AS NEEDED
Status: DISCONTINUED | OUTPATIENT
Start: 2023-11-14 | End: 2023-11-14

## 2023-11-14 RX ORDER — LIDOCAINE HYDROCHLORIDE 10 MG/ML
INJECTION, SOLUTION EPIDURAL; INFILTRATION; INTRACAUDAL; PERINEURAL AS NEEDED
Status: DISCONTINUED | OUTPATIENT
Start: 2023-11-14 | End: 2023-11-14 | Stop reason: SURG

## 2023-11-14 RX ADMIN — LIDOCAINE HYDROCHLORIDE 50 MG: 10 INJECTION, SOLUTION EPIDURAL; INFILTRATION; INTRACAUDAL; PERINEURAL at 07:14:00

## 2023-11-14 NOTE — ANESTHESIA POSTPROCEDURE EVALUATION
[address] [facility] Patient Status:  Hospital Outpatient Surgery   Age/Gender 62year old male MRN [id number]   Location [address] [place] [address] Attending Marilyne Bence, MD   Hosp Day # 0 PCP Salo Plaza MD       Anesthesia Post-op Note    COLONOSCOPY with forcep polypectomy    Procedure Summary       Date: 11/14/23 Room / Location: [facility] ENDOSCOPY 02 / [facility] ENDOSCOPY    Anesthesia Start: Melvenia Begin Anesthesia Stop: 9604    Procedure: COLONOSCOPY with forcep polypectomy Diagnosis:       Personal history of colonic polyps      (polyps, diverticulosis)    Surgeons: Marilyne Bence, MD Anesthesiologist: Daysi Shannon MD    Anesthesia Type: MAC ASA Status: 3            Anesthesia Type: MAC    Vitals Value Taken Time   /106 11/14/23 0740   Temp n 11/14/23 0744   Pulse 74 11/14/23 0743   Resp 16 11/14/23 0740   SpO2 99 % 11/14/23 0743   Vitals shown include unfiled device data. Patient Location: Endoscopy    Anesthesia Type: MAC    Airway Patency: patent    Postop Pain Control: adequate    Mental Status: preanesthetic baseline    Nausea/Vomiting: none    Cardiopulmonary/Hydration status: stable euvolemic    Complications: no apparent anesthesia related complications    Postop vital signs: stable    Dental Exam: Unchanged from Preop    Patient to be discharged home when criteria met.

## 2023-11-14 NOTE — DISCHARGE INSTRUCTIONS

## 2023-11-14 NOTE — OPERATIVE REPORT
Morris Davila Patient Status:  Hospital Outpatient Surgery    3/31/1965 MRN [id number]   Location [id number] [place] [address] Attending Rodney Tinoco MD   Date 2023 PCP Dolores Brown MD     PREOPERATIVE DIAGNOSIS/INDICATION: Personal h/o polyps  POSTOPERTATIVE DIAGNOSIS: Polyps, diverticulosis, melanosis  PROCEDURE PERFORMED: COLONOSCOPY with biopsy  SEDATION: MAC sedation provided by General Anesthesia    TIME OUT WAS PERFORMED    INFORMED CONSENT: Risks, benefits and alternatives to the procedure were explained to the patient including but not limited to bleeding, infection, perforation, adverse drug reactions, pancreatitis and the need for hospitalization and surgery if this occurs, the patient understands and agrees to procedure. PROCEDURE DESCRIPTION: After careful digital rectal examination a pediatric colonoscope was introduced into the patients rectum, advanced pass the recto sigmoid junction, into the descending colon, splenic flexure, transverse colon, hepatic flexure, ascending colon, cecum, confirmed by landmarks, including the appendiceal orifice and ileocecal valve. Careful examination of the above described areas was performed on withdrawal of the endoscope. Retroflexion was performed on the rectum. The patient tolerated the procedure well, there were no immediate complication immediately following the procedure, and the patient was transferred to recovery in stable condition. QUALITY OF PREPARATION: Gila Bowel Preparation Scale:            -      Right colon 3, Transverse colon 3, Left colon 3   FINDINGS/THERAPEUTICS:  COLON: Two 2 mm flat ascending colon polyp s/p cold snare polypectomy, moderate left sided diverticulosis  RECOMMENDATIONS:   Post Colonoscopy/polypectomy precautions, watch for bleeding, infection, perforation, adverse drug reactions   Follow biopsies. Repeat colonoscopy in 5-7 years.     Vamsi Mcknight MD  2023  7:37 AM

## 2023-11-15 NOTE — PROGRESS NOTES
Date: 11/15/2023    To: Niels England  : 3/31/1965    I hope this letter finds you doing well. I am writing to inform you of the following: The biopsies obtained from your recent colonoscopy indicate that the polyps were adenomas which, although benign (no evidence of cancer), are considered potentially pre-cancerous if they are left alone for many years. They were removed at the time of your colonoscopy. I am advising you to undergo repeat colonoscopy in 7 years. We will send you a reminder card when the time of your procedure is near. Please call the office at [phone] if there are any questions.     Nancy Sanders M.D.

## 2023-11-16 ENCOUNTER — HOSPITAL ENCOUNTER (OUTPATIENT)
Dept: CARDIOLOGY | Age: 58
Discharge: HOME | End: 2023-11-16
Attending: INTERNAL MEDICINE

## 2023-11-16 ENCOUNTER — LAB SERVICES (OUTPATIENT)
Dept: LAB | Age: 58
End: 2023-11-16

## 2023-11-16 DIAGNOSIS — Z01.818 PRE-TRANSPLANT EVALUATION FOR KIDNEY TRANSPLANT: ICD-10-CM

## 2023-11-16 DIAGNOSIS — N18.6 ESRD (END STAGE RENAL DISEASE) (CMD): ICD-10-CM

## 2023-11-16 LAB
AORTIC VALVE AREA (AVA): 0.36
ASCENDING AORTA (AAD): 3
ATRIAL RATE (BPM): 88
AV STENOSIS SEVERITY TEXT: NORMAL
AVI LVOT PEAK GRADIENT (LVOTMG): 1.2
E WAVE DECELARATION TIME (MDT): 3.81
HBV CORE IGG+IGM SER QL: NEGATIVE
HBV SURFACE AB SER QL: POSITIVE
HBV SURFACE AG SER QL: NEGATIVE
HCV AB SER QL: NEGATIVE
HIV 1+2 AB+HIV1 P24 AG SERPL QL IA: NONREACTIVE
LEFT INTERNAL DIMENSION IN SYSTOLE (LVSD): 1.1
LEFT VENTRICULAR INTERNAL DIMENSION IN DIASTOLE (LVDD): 4
LEFT VENTRICULAR POSTERIOR WALL IN END DIASTOLE (LVPW): 5.9
LV EF: NORMAL %
MV E TISSUE VEL MED (MESV): 11.9
MV E WAVE VEL/E TISSUE VEL MED(MSR): 9.46
MV PEAK A VELOCITY (MVPAV): 232
MV PEAK E VELOCITY (MVPEV): 0.76
P AXIS (DEGREES): 55
PR-INTERVAL (MSEC): 138
PSA SERPL-MCNC: 1.09 NG/ML
QRS-INTERVAL (MSEC): 88
QT-INTERVAL (MSEC): 386
QTC: 467
R AXIS (DEGREES): -2
REPORT TEXT: NORMAL
T AXIS (DEGREES): 41
TRICUSPID VALVE ANNULAR PEAK VELOCITY (TVAPV): 20
VENTRICULAR RATE EKG/MIN (BPM): 88

## 2023-11-16 PROCEDURE — 87517 HEPATITIS B DNA QUANT: CPT | Performed by: CLINICAL MEDICAL LABORATORY

## 2023-11-16 PROCEDURE — 93005 ELECTROCARDIOGRAM TRACING: CPT | Performed by: INTERNAL MEDICINE

## 2023-11-16 PROCEDURE — 86706 HEP B SURFACE ANTIBODY: CPT | Performed by: CLINICAL MEDICAL LABORATORY

## 2023-11-16 PROCEDURE — 86803 HEPATITIS C AB TEST: CPT | Performed by: CLINICAL MEDICAL LABORATORY

## 2023-11-16 PROCEDURE — 87522 HEPATITIS C REVRS TRNSCRPJ: CPT | Performed by: CLINICAL MEDICAL LABORATORY

## 2023-11-16 PROCEDURE — 86704 HEP B CORE ANTIBODY TOTAL: CPT | Performed by: CLINICAL MEDICAL LABORATORY

## 2023-11-16 PROCEDURE — 86480 TB TEST CELL IMMUN MEASURE: CPT | Performed by: CLINICAL MEDICAL LABORATORY

## 2023-11-16 PROCEDURE — 87340 HEPATITIS B SURFACE AG IA: CPT | Performed by: CLINICAL MEDICAL LABORATORY

## 2023-11-16 PROCEDURE — 86592 SYPHILIS TEST NON-TREP QUAL: CPT | Performed by: CLINICAL MEDICAL LABORATORY

## 2023-11-16 PROCEDURE — 93306 TTE W/DOPPLER COMPLETE: CPT

## 2023-11-16 PROCEDURE — 36415 COLL VENOUS BLD VENIPUNCTURE: CPT | Performed by: INTERNAL MEDICINE

## 2023-11-16 PROCEDURE — 93306 TTE W/DOPPLER COMPLETE: CPT | Performed by: INTERNAL MEDICINE

## 2023-11-16 PROCEDURE — 84153 ASSAY OF PSA TOTAL: CPT | Performed by: CLINICAL MEDICAL LABORATORY

## 2023-11-16 PROCEDURE — 87389 HIV-1 AG W/HIV-1&-2 AB AG IA: CPT | Performed by: CLINICAL MEDICAL LABORATORY

## 2023-11-17 LAB
GAMMA INTERFERON BACKGROUND BLD IA-ACNC: 0.04 IU/ML
HBV DNA SERPL NAA+PROBE-ACNC: NOT DETECTED [IU]/ML
HBV DNA SERPL NAA+PROBE-LOG IU: NOT DETECTED {LOG_IU}/ML
HCV RNA SERPL NAA+PROBE-ACNC: NOT DETECTED K[IU]/ML
HCV RNA SERPL NAA+PROBE-LOG IU: NOT DETECTED {LOG_IU}/ML
M TB IFN-G BLD-IMP: NEGATIVE
M TB IFN-G CD4+ BCKGRND COR BLD-ACNC: 0 IU/ML
M TB IFN-G CD4+CD8+ BCKGRND COR BLD-ACNC: 0 IU/ML
MITOGEN IGNF BCKGRD COR BLD-ACNC: >10 IU/ML
RPR SER QL: NONREACTIVE
SERVICE CMNT-IMP: NORMAL
SERVICE CMNT-IMP: NORMAL

## 2023-12-06 ENCOUNTER — TELEPHONE (OUTPATIENT)
Dept: TRANSPLANT | Age: 58
End: 2023-12-06

## 2024-01-15 ENCOUNTER — TELEPHONE (OUTPATIENT)
Dept: TRANSPLANT | Age: 59
End: 2024-01-15

## 2024-01-16 ENCOUNTER — TELEPHONE (OUTPATIENT)
Dept: TRANSPLANT | Age: 59
End: 2024-01-16

## 2024-01-17 ENCOUNTER — TELEPHONE (OUTPATIENT)
Dept: TRANSPLANT | Age: 59
End: 2024-01-17

## 2024-01-18 ENCOUNTER — TELEPHONE (OUTPATIENT)
Dept: TRANSPLANT | Age: 59
End: 2024-01-18

## 2024-01-18 DIAGNOSIS — N18.6 ESRD (END STAGE RENAL DISEASE) (CMD): Primary | ICD-10-CM

## 2024-01-18 DIAGNOSIS — Z01.818 PRE-TRANSPLANT EVALUATION FOR KIDNEY TRANSPLANT: ICD-10-CM

## 2024-01-19 ENCOUNTER — TELEPHONE (OUTPATIENT)
Dept: TRANSPLANT | Age: 59
End: 2024-01-19

## 2024-01-22 ENCOUNTER — TELEPHONE (OUTPATIENT)
Dept: TRANSPLANT | Age: 59
End: 2024-01-22

## 2024-01-25 ENCOUNTER — HOSPITAL ENCOUNTER (OUTPATIENT)
Dept: CT IMAGING | Age: 59
Discharge: HOME | End: 2024-01-25
Attending: INTERNAL MEDICINE

## 2024-01-25 ENCOUNTER — APPOINTMENT (OUTPATIENT)
Dept: LAB | Age: 59
End: 2024-01-25

## 2024-01-25 DIAGNOSIS — N18.6 ESRD (END STAGE RENAL DISEASE) (CMD): ICD-10-CM

## 2024-01-25 DIAGNOSIS — Z01.818 PRE-TRANSPLANT EVALUATION FOR KIDNEY TRANSPLANT: ICD-10-CM

## 2024-01-25 LAB — FIBRINOGEN PPP-MCNC: 332 MG/DL (ref 190–425)

## 2024-01-25 PROCEDURE — 85390 FIBRINOLYSINS SCREEN I&R: CPT | Performed by: CLINICAL MEDICAL LABORATORY

## 2024-01-25 PROCEDURE — 74176 CT ABD & PELVIS W/O CONTRAST: CPT

## 2024-01-25 PROCEDURE — 86147 CARDIOLIPIN ANTIBODY EA IG: CPT | Performed by: CLINICAL MEDICAL LABORATORY

## 2024-01-25 PROCEDURE — 85598 HEXAGNAL PHOSPH PLTLT NEUTRL: CPT | Performed by: CLINICAL MEDICAL LABORATORY

## 2024-01-25 PROCEDURE — 36415 COLL VENOUS BLD VENIPUNCTURE: CPT | Performed by: INTERNAL MEDICINE

## 2024-01-25 PROCEDURE — 85670 THROMBIN TIME PLASMA: CPT | Performed by: CLINICAL MEDICAL LABORATORY

## 2024-01-25 PROCEDURE — 85732 THROMBOPLASTIN TIME PARTIAL: CPT | Performed by: CLINICAL MEDICAL LABORATORY

## 2024-01-25 PROCEDURE — 81240 F2 GENE: CPT | Performed by: CLINICAL MEDICAL LABORATORY

## 2024-01-25 PROCEDURE — 85384 FIBRINOGEN ACTIVITY: CPT | Performed by: INTERNAL MEDICINE

## 2024-01-25 PROCEDURE — 85613 RUSSELL VIPER VENOM DILUTED: CPT | Performed by: CLINICAL MEDICAL LABORATORY

## 2024-01-26 LAB
APTT HEX PL PPP: 1 DELTA SEC
CONFIRM DRVVT: 1.28 RATIO
MIXING DRVVT: 1.2 RATIO
MIXING DRVVT: 43.7 SEC
PATH REV: ABNORMAL
SCREEN APTT: 27.5 SEC (ref 22–32)
SCREEN DRVVT: 48.6 SEC
THROMBIN TIME: 21.3 SEC (ref 15.3–21.1)

## 2024-01-29 ENCOUNTER — TELEPHONE (OUTPATIENT)
Dept: TRANSPLANT | Age: 59
End: 2024-01-29

## 2024-01-29 LAB
CARDIOLIPIN IGG SER IA-ACNC: <20 GPL
COAGULATION SPECIALIST REVIEW: NORMAL
F2 GENE MUT ANL BLD/T: NOT DETECTED
SERVICE CMNT-IMP: NORMAL

## 2024-01-30 ENCOUNTER — TELEPHONE (OUTPATIENT)
Dept: TRANSPLANT | Age: 59
End: 2024-01-30

## 2024-01-31 ENCOUNTER — CLINICAL DOCUMENTATION (OUTPATIENT)
Dept: TRANSPLANT | Age: 59
End: 2024-01-31

## 2024-02-01 ENCOUNTER — TELEPHONE (OUTPATIENT)
Dept: TRANSPLANT | Age: 59
End: 2024-02-01

## 2024-02-08 ENCOUNTER — OFFICE VISIT (OUTPATIENT)
Dept: NEPHROLOGY | Facility: CLINIC | Age: 59
End: 2024-02-08
Payer: MEDICARE

## 2024-02-08 VITALS — BODY MASS INDEX: 27 KG/M2 | SYSTOLIC BLOOD PRESSURE: 128 MMHG | DIASTOLIC BLOOD PRESSURE: 70 MMHG | WEIGHT: 210.25 LBS

## 2024-02-08 DIAGNOSIS — I10 ESSENTIAL HYPERTENSION: ICD-10-CM

## 2024-02-08 DIAGNOSIS — N18.6 ESRD ON HEMODIALYSIS (HCC): Primary | ICD-10-CM

## 2024-02-08 DIAGNOSIS — Z99.2 ESRD ON HEMODIALYSIS (HCC): Primary | ICD-10-CM

## 2024-02-08 NOTE — PROGRESS NOTES
Mr. Prather was seen in the nephrology clinic today in follow-up for management of ESRD for which he remains on dialysis Monday/Wednesday/Friday at [facility].  Overall he is doing well.  Dialysis labs are reviewed and remain stable with exception of mildly low serum albumin.  We did discuss increasing protein intake.  Blood pressure is excellent at 128/70.  He continues to follow closely at [facility] for transplant evaluation and is seeing both hematology and cardiology in the next few months.

## 2024-02-15 ENCOUNTER — TELEPHONE (OUTPATIENT)
Dept: TRANSPLANT | Age: 59
End: 2024-02-15

## 2024-02-26 RX ORDER — PREDNISONE 5 MG/1
5 TABLET ORAL DAILY
Qty: 30 TABLET | Refills: 5 | Status: SHIPPED | OUTPATIENT
Start: 2024-02-26

## 2024-03-07 ENCOUNTER — OFFICE VISIT (OUTPATIENT)
Dept: INTERNAL MEDICINE CLINIC | Facility: CLINIC | Age: 59
End: 2024-03-07
Payer: MEDICARE

## 2024-03-07 VITALS
DIASTOLIC BLOOD PRESSURE: 78 MMHG | RESPIRATION RATE: 18 BRPM | SYSTOLIC BLOOD PRESSURE: 132 MMHG | TEMPERATURE: 98 F | BODY MASS INDEX: 27.03 KG/M2 | WEIGHT: 210.63 LBS | HEART RATE: 75 BPM | OXYGEN SATURATION: 95 % | HEIGHT: 74 IN

## 2024-03-07 DIAGNOSIS — D63.1 ANEMIA IN ESRD (END-STAGE RENAL DISEASE) (HCC): Chronic | ICD-10-CM

## 2024-03-07 DIAGNOSIS — I10 PRIMARY HYPERTENSION: Primary | Chronic | ICD-10-CM

## 2024-03-07 DIAGNOSIS — N18.6 ANEMIA IN ESRD (END-STAGE RENAL DISEASE) (HCC): Chronic | ICD-10-CM

## 2024-03-07 DIAGNOSIS — Q87.81 ALPORT SYNDROME (HCC): Chronic | ICD-10-CM

## 2024-03-07 DIAGNOSIS — M06.9 RHEUMATOID ARTHRITIS INVOLVING MULTIPLE SITES, UNSPECIFIED WHETHER RHEUMATOID FACTOR PRESENT (HCC): Chronic | ICD-10-CM

## 2024-03-07 DIAGNOSIS — N18.6 ESRD ON HEMODIALYSIS (HCC): Chronic | ICD-10-CM

## 2024-03-07 DIAGNOSIS — F11.20 OPIOID DEPENDENCE ON AGONIST THERAPY (HCC): Chronic | ICD-10-CM

## 2024-03-07 DIAGNOSIS — M25.50 CHRONIC PAIN OF MULTIPLE JOINTS: Chronic | ICD-10-CM

## 2024-03-07 DIAGNOSIS — Z99.2 ESRD ON HEMODIALYSIS (HCC): Chronic | ICD-10-CM

## 2024-03-07 DIAGNOSIS — N25.81 SECONDARY HYPERPARATHYROIDISM OF RENAL ORIGIN (HCC): Chronic | ICD-10-CM

## 2024-03-07 DIAGNOSIS — G47.33 OSA ON CPAP: Chronic | ICD-10-CM

## 2024-03-07 DIAGNOSIS — G89.29 CHRONIC PAIN OF MULTIPLE JOINTS: Chronic | ICD-10-CM

## 2024-03-07 DIAGNOSIS — I27.20 PULMONARY HTN (HCC): Chronic | ICD-10-CM

## 2024-03-07 DIAGNOSIS — T86.12 RENAL TRANSPLANT FAILURE AND REJECTION (HCC): ICD-10-CM

## 2024-03-07 DIAGNOSIS — T86.11 RENAL TRANSPLANT FAILURE AND REJECTION (HCC): ICD-10-CM

## 2024-03-07 DIAGNOSIS — D84.9 IMMUNOSUPPRESSED STATUS (HCC): ICD-10-CM

## 2024-03-07 DIAGNOSIS — D69.6 THROMBOCYTOPENIA (HCC): ICD-10-CM

## 2024-03-07 DIAGNOSIS — E89.0 POSTSURGICAL HYPOTHYROIDISM: Chronic | ICD-10-CM

## 2024-03-07 DIAGNOSIS — I25.10 CORONARY ARTERY DISEASE INVOLVING NATIVE CORONARY ARTERY OF NATIVE HEART WITHOUT ANGINA PECTORIS: Chronic | ICD-10-CM

## 2024-03-07 PROCEDURE — 99214 OFFICE O/P EST MOD 30 MIN: CPT | Performed by: INTERNAL MEDICINE

## 2024-03-07 PROCEDURE — 99499 UNLISTED E&M SERVICE: CPT | Performed by: INTERNAL MEDICINE

## 2024-03-07 NOTE — PROGRESS NOTES
Suresh Prather is a 58 year old male.   HPI:   Patient presents for follow up on chronic medical issues.    Hypertension - at goal blood pressure today.  Coronary artery disease - no anginal symptoms.  Seen by [facility] in April as part of pre-transplant work up (kidney).   Pulmonary hypertension - no worsening shortness of breath.  Hypothyroidism, secondary hyperparathyroidism - sees Endocrinology, Nephrology, on levothyroxine therapy.  Chronic pain of multiple joints, opioid dependence on agonist therapy, rheumatoid arthritis - on Suboxone, follows with [facility].  Saw several orthopedic surgeons regarding his left knee in the past.    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  Thrombocytopenia, anemia in ESRD - no major bleeding episodes, no orthostatic symptoms.  RAQUEL - on CPAP therapy.    Once or twice a year he has nocturnal emissions, some burning afterwards.  Not sexually active currently.    Past medical, family, surgical and social history were reviewed as listed in the chart, and are unchanged from previous visit.  REVIEW OF SYSTEMS:   GENERAL/ const: no fevers/chills, no unintentional weight loss  SKIN: denies any unusual skin lesions  EYES:no vision problems  HEENT: denies sinus pain or sinus tenderness  LUNGS: denies shortness of breath   CARDIOVASCULAR: denies chest pain  GI: denies nausea/emesis/ abdominal pain diarrhea constipation  : denies dysuria   MUSCULOSKELETAL: chronic joint pains  NEURO: denies headaches  PSYCHIATRIC: denies issues  ENDOCRINE: no hot/cold intolerance  ALLERGY:   Allergies   Allergen Reactions    Carvedilol HIVES and SWELLING     TABS    Ciprofloxacin Hcl HIVES and ITCHING     TABS    Duloxetine OTHER (SEE COMMENTS)    Fosinopril OTHER (SEE COMMENTS)    Gabapentin SWELLING     Foot swelling    Hydralazine HIVES    Metoprolol OTHER (SEE COMMENTS)    Monopril  [Fosinopril Sodium] HIVES and ITCHING    Pravastatin Sodium SWELLING     TABS    Nortriptyline DIARRHEA, NAUSEA AND VOMITING and OTHER (SEE COMMENTS)    Minoxidil OTHER (SEE COMMENTS)     Chest pain     PAST HISTORY:     Current Outpatient Medications:     predniSONE 5 MG Oral Tab, Take 1 tablet (5 mg total) by mouth daily., Disp: 30 tablet, Rfl: 5    calcium acetate 667 MG Oral Cap, Take 2 capsules (1,334 mg total) by mouth in the morning, at noon, and at bedtime., Disp: , Rfl:     Sucroferric Oxyhydroxide (VELPHORO) 500 MG Oral Chew Tab, Chew 2 tablets by mouth 3 (three) times daily with meals., Disp: , Rfl:     Buprenorphine HCl-Naloxone HCl 8-2 MG Sublingual SL Tab, Place 0.5 tablets under the tongue 2 (two) times a day., Disp: 30 tablet, Rfl: 0    Sennosides-Docusate Sodium (SENNA-PLUS OR), Take 2 tablets by mouth daily., Disp: , Rfl:     Levothyroxine Sodium 175 MCG Oral Tab, Take 1 tablet (175 mcg total) by mouth daily., Disp: 90 tablet, Rfl: 3    allopurinol 100 MG Oral Tab, Take 1 tablet (100 mg total) by mouth daily., Disp: 90 tablet, Rfl: 1    Nephro-Leticia 0.8 MG Oral Tab, Take 1 tablet (0.8 mg total) by mouth daily., Disp: , Rfl:     Cinacalcet HCl 90 MG Oral Tab, Take 1 tablet (90 mg total) by mouth as needed with dialysis., Disp: , Rfl:     lactulose 10 GM/15ML Oral Solution, Take 30 mL (20 g total) by mouth 2 (two) times daily as needed., Disp: , Rfl:     atorvastatin 40 MG Oral Tab, Take 1 tablet (40 mg total) by mouth nightly., Disp: , Rfl:     lidocaine-prilocaine 2.5-2.5 % External Cream, NAM 1 HOUR PRIOR TO DIALYSIS AND COVER WITH PLASTIC WRAP., Disp: , Rfl: 3    aspirin 81 MG Oral Tab EC, Take 1 tablet (81 mg total) by mouth daily., Disp: , Rfl:     PEG 3350-KCl-NaBcb-NaCl-NaSulf (PEG-3350/ELECTROLYTES) 236 g Oral Recon Soln, Take as directed by physician (Patient not taking: Reported on 3/7/2024), Disp: 4000 mL, Rfl: 0  Medical:  has a past medical history of Anaphylactic reaction due to adverse  effect of correct drug or medicament properly administered, initial encounter (10/18/2021), Anemia (1997), Arthritis (2012), Cancer ([facility]) (9/2015), Change in hair (2018), Closed fracture of distal end of left femur, unspecified fracture morphology, initial encounter ([facility]) (02/11/2023), Constipation, Dialysis patient ([facility]), Disorder of thyroid, Easy bruising, Exposure to medical diagnostic radiation, Fatigue, Frequent use of laxatives, Hearing loss, History of blood transfusion, History of total left knee replacement (11/08/2016), Itch of skin (2018), Kidney failure (1995), Kidney replaced by transplant ([facility]) (1997), Nephritis and nephropathy, not specified as acute or chronic, with unspecified pathological lesion in kidney, Painful total knee replacement, sequela (05/31/2017), Papillary thyroid 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  Surgical:  has a past surgical history that includes appendectomy (2003); cholecystectomy (2003); laminectomy,facetectomy,lumbar (05/29/2012); revise median n/carpal tunnel surg (2009); femur/knee surg unlisted (1994); colonoscopy; thyroidectomy; colonoscopy (N/A, 05/01/2015); cath pv (12/11/2015); knee surgery; anesth,kidney transplant; cath percutaneous  transluminal coronary angioplasty (2016); appendectomy; knee replacement surgery; organ transplant; colonoscopy (N/A, 10/23/2018); back surgery; other surgical history; other surgical history (Right, 1997); angioplasty (coronary) (2015); femur fracture surgery (Left, 02/2023); and colonoscopy (N/A, 11/14/2023).  Family: family history includes Breast Cancer (age of onset: 65) in his mother;  Cancer in his paternal uncle and son; Heart Attack in his maternal uncle; Heart Disorder in his paternal uncle; Stroke in his maternal grandmother; bipolar disorder in his mother; glomerulonephritis in his maternal grandmother and mother.  Social:  reports that he has never smoked. He has never used smokeless tobacco. He reports that he does not drink alcohol and does not use drugs.  Wt Readings from Last 6 Encounters:   03/07/24 210 lb 9.6 oz (95.5 kg)   02/08/24 210 lb 4 oz (95.4 kg)   11/14/23 210 lb (95.3 kg)   10/26/23 210 lb 8 oz (95.5 kg)   10/17/23 210 lb (95.3 kg)   09/07/23 208 lb 6.4 oz (94.5 kg)     EXAM:   /78 (BP Location: Right arm, Patient Position: Sitting, Cuff Size: adult)   Pulse 75   Temp 98.2 °F (36.8 °C) (Temporal)   Resp 18   Ht 6' 2\" (1.88 m)   Wt 210 lb 9.6 oz (95.5 kg)   SpO2 95%   BMI 27.04 kg/m²   GENERAL: Alert and oriented, well developed, well nourished,in no apparent distress  HEENT: atraumatic, PERRLA, EOMI, normal lid and conjunctiva  LUNGS: clear to auscultation bilaterally, no wheezing/rubs  CARDIO: RRR without murmurs.  No clubbing, cyanosis or edema.  GI: soft non tender nondistended no hepatosplenomegaly, bowel sounds throughout  NEURO: CN II-XII intact, 5/5 strength all extremities  PSYCH: pleasant, appropriate mood and affect  ASSESSMENT AND PLAN:   1. Primary hypertension  At goal blood pressure.  On dialysis.  Continue to monitor.    2. Coronary artery disease involving native coronary artery of native heart without angina pectoris  3. Pulmonary HTN (HCC)  No anginal symptoms.  Will be seeing Cardiology at [facility] next month.    4. Postsurgical hypothyroidism  5. ESRD on hemodialysis (HCC)  6. Alport syndrome (HCC)  7. Anemia in ESRD (end-stage renal disease) (HCC)  8. Secondary hyperparathyroidism of renal origin (HCC)  9. Immunosuppressed status (HCC) - Long-term current use of immunosuppressive medication  10. Renal transplant failure and rejection  (HCC)  On MWF dialysis.  Working with [facility] transplant clinic.  Also sees Endocrinology (Dr. Feng/[place]).  On levothyroxine, Cinacalcet.     11. Chronic pain of multiple joints  12. Rheumatoid arthritis involving multiple sites, unspecified whether rheumatoid factor present (HCC)  13. Opioid dependence on agonist therapy (HCC)  On Suboxone.  Follows with [facility].  Has seen Orthopedics for his knees, not currently surgical candidate for revision of knee.    14. Thrombocytopenia (HCC)  No major bleeding episodes.  Will be seeing Hematology at [facility] this spring.    15. RAQUEL on CPAP  On CPAP therapy; continue.    Patient Care Team:  Codi Loyola MD as PCP - General (Internal Medicine)  Brant Vaz MD as Consulting Physician (NEPHROLOGY)  Mike Brady MD as Consulting Physician (SURGERY, ORTHOPEDIC)  Geovanna Anton MD as Consulting Physician (ENDOCRINOLOGY)  Huyen Espinoza DC as Consulting Physician (CHIROPRACTOR)  Crescencio Collier DO (NEUROLOGY)  Louis Bearden MD as Consulting Physician (NEUROSURGERY)  Louis Dalton MD as Consulting Physician (CARDIOLOGY)  Nic Caal MD as Consulting Physician (DERMATOLOGY)  The patient indicates understanding of these issues and agrees to the plan.  The patient is asked to return to clinic in 6 months for follow up on chronic issues/Medicare Wellness visit, or earlier if acute issues arise.    Codi Loyola MD

## 2024-03-07 NOTE — PATIENT INSTRUCTIONS
- Blood pressure looks good  - Will hold off on further blood work for now  - Follow up in 6 months (September) for your Annual Medicare Wellness Visit; follow up earlier as needed.    It was a pleasure seeing you in the clinic today.  Thank you for choosing the [facility] office for your healthcare needs. Please call at [phone] with any questions or concerns.    Codi Loyola MD

## 2024-03-19 ENCOUNTER — PATIENT MESSAGE (OUTPATIENT)
Dept: INTERNAL MEDICINE CLINIC | Facility: CLINIC | Age: 59
End: 2024-03-19

## 2024-03-19 RX ORDER — ALLOPURINOL 100 MG/1
100 TABLET ORAL DAILY
Qty: 90 TABLET | Refills: 1 | OUTPATIENT
Start: 2024-03-19

## 2024-03-20 ENCOUNTER — PATIENT MESSAGE (OUTPATIENT)
Dept: INTERNAL MEDICINE CLINIC | Facility: CLINIC | Age: 59
End: 2024-03-20

## 2024-03-20 RX ORDER — ALLOPURINOL 100 MG/1
100 TABLET ORAL DAILY
Qty: 90 TABLET | Refills: 1 | Status: CANCELLED
Start: 2024-03-20

## 2024-03-20 NOTE — TELEPHONE ENCOUNTER
Pt sent new MCM today:    From: Suresh Prather  To: Codi Loyola  Sent: 3/20/2024 11:42 AM CDT  Subject: Allopurinol    Hello Dr Loyola. I deleted a message I sent to your office for a prescription refill . Fortunately today at dialysis the NP came to visit so she just sent the prescription in at [facility] so I’m ok now.    Thank you Dr Loyola.     Cancelled refill request.

## 2024-03-20 NOTE — TELEPHONE ENCOUNTER
From: Suresh Prather  To: Codi Loyola  Sent: 3/20/2024 11:42 AM CDT  Subject: Allopurinol     Hello Dr Loyola. I deleted a message I sent to your office for a prescription refill . Fortunately today at dialysis the NP came to visit so she just sent the prescription in at [facility] so I’m ok now.     Thank you Dr Loyola.

## 2024-03-20 NOTE — TELEPHONE ENCOUNTER
From: Suresh Prather  To: Codi Loyola  Sent: 3/19/2024 11:00 PM CDT  Subject: Allopurinol     Dr Loyola could you please help me. I need a 90 day supply of: allopurinol 100 MG Tabs    Commonly known as: Zyloprim  Learn more  Take 1 tablet (100 mg total) by mouth daily.    For some reason this medicine was last refilled by my NP at [facility] and [facility] can’t get hold of her. Would you be able to fill out this prescription for me please.     I use the [facility] [address] in [place].     Thank you Dr Loyola.

## 2024-04-04 ENCOUNTER — APPOINTMENT (OUTPATIENT)
Dept: CARDIOLOGY | Age: 59
End: 2024-04-04

## 2024-04-04 ENCOUNTER — EXTERNAL RECORD (OUTPATIENT)
Dept: HEALTH INFORMATION MANAGEMENT | Facility: OTHER | Age: 59
End: 2024-04-04

## 2024-04-04 VITALS
DIASTOLIC BLOOD PRESSURE: 86 MMHG | WEIGHT: 208.78 LBS | HEART RATE: 80 BPM | SYSTOLIC BLOOD PRESSURE: 108 MMHG | BODY MASS INDEX: 26.81 KG/M2

## 2024-04-04 DIAGNOSIS — Z01.810 PREOPERATIVE CARDIOVASCULAR EXAMINATION: Primary | ICD-10-CM

## 2024-04-04 DIAGNOSIS — R76.0 LUPUS ANTICOAGULANT POSITIVE: Primary | ICD-10-CM

## 2024-04-04 DIAGNOSIS — I25.10 CORONARY ARTERY DISEASE INVOLVING NATIVE CORONARY ARTERY OF NATIVE HEART WITHOUT ANGINA PECTORIS: ICD-10-CM

## 2024-04-04 DIAGNOSIS — N18.6 END-STAGE RENAL DISEASE ON HEMODIALYSIS (CMD): ICD-10-CM

## 2024-04-04 DIAGNOSIS — E78.2 MIXED HYPERLIPIDEMIA: Chronic | ICD-10-CM

## 2024-04-04 DIAGNOSIS — Z99.2 END-STAGE RENAL DISEASE ON HEMODIALYSIS (CMD): ICD-10-CM

## 2024-04-04 DIAGNOSIS — I10 PRIMARY HYPERTENSION: ICD-10-CM

## 2024-04-04 ASSESSMENT — ENCOUNTER SYMPTOMS
CHILLS: 0
EYES NEGATIVE: 1
DECREASED APPETITE: 0
SHORTNESS OF BREATH: 0
COUGH: 0
FEVER: 0
GASTROINTESTINAL NEGATIVE: 1
DIZZINESS: 0
ORTHOPNEA: 0
ENDOCRINE NEGATIVE: 1
LIGHT-HEADEDNESS: 0
PSYCHIATRIC NEGATIVE: 1

## 2024-04-05 ENCOUNTER — APPOINTMENT (OUTPATIENT)
Dept: CARDIOLOGY | Age: 59
End: 2024-04-05

## 2024-04-11 ENCOUNTER — TELEPHONE (OUTPATIENT)
Dept: TRANSPLANT | Age: 59
End: 2024-04-11

## 2024-04-25 ENCOUNTER — TELEPHONE (OUTPATIENT)
Dept: TRANSPLANT | Age: 59
End: 2024-04-25

## 2024-04-30 ENCOUNTER — APPOINTMENT (OUTPATIENT)
Dept: LAB | Age: 59
End: 2024-04-30

## 2024-04-30 DIAGNOSIS — R76.0 LUPUS ANTICOAGULANT POSITIVE: ICD-10-CM

## 2024-04-30 LAB
BASOPHILS # BLD: 0.1 K/MCL (ref 0–0.3)
BASOPHILS NFR BLD: 1 %
DEPRECATED RDW RBC: 50 FL (ref 39–50)
EOSINOPHIL # BLD: 0.2 K/MCL (ref 0–0.5)
EOSINOPHIL NFR BLD: 3 %
ERYTHROCYTE [DISTWIDTH] IN BLOOD: 13.8 % (ref 11–15)
HCT VFR BLD CALC: 41.3 % (ref 39–51)
HGB BLD-MCNC: 14 G/DL (ref 13–17)
IMM GRANULOCYTES # BLD AUTO: 0 K/MCL (ref 0–0.2)
IMM GRANULOCYTES # BLD: 0 %
LYMPHOCYTES # BLD: 2.2 K/MCL (ref 1–4)
LYMPHOCYTES NFR BLD: 23 %
MCH RBC QN AUTO: 33.7 PG (ref 26–34)
MCHC RBC AUTO-ENTMCNC: 33.9 G/DL (ref 32–36.5)
MCV RBC AUTO: 99.5 FL (ref 78–100)
MONOCYTES # BLD: 1.1 K/MCL (ref 0.3–0.9)
MONOCYTES NFR BLD: 11 %
NEUTROPHILS # BLD: 5.7 K/MCL (ref 1.8–7.7)
NEUTROPHILS NFR BLD: 62 %
NRBC BLD MANUAL-RTO: 0 /100 WBC
PLATELET # BLD AUTO: 130 K/MCL (ref 140–450)
RBC # BLD: 4.15 MIL/MCL (ref 4.5–5.9)
WBC # BLD: 9.3 K/MCL (ref 4.2–11)

## 2024-04-30 PROCEDURE — 86146 BETA-2 GLYCOPROTEIN ANTIBODY: CPT | Performed by: CLINICAL MEDICAL LABORATORY

## 2024-04-30 PROCEDURE — 85670 THROMBIN TIME PLASMA: CPT | Performed by: CLINICAL MEDICAL LABORATORY

## 2024-04-30 PROCEDURE — 85732 THROMBOPLASTIN TIME PARTIAL: CPT | Performed by: CLINICAL MEDICAL LABORATORY

## 2024-04-30 PROCEDURE — 36415 COLL VENOUS BLD VENIPUNCTURE: CPT | Performed by: CLINICAL MEDICAL LABORATORY

## 2024-04-30 PROCEDURE — 85613 RUSSELL VIPER VENOM DILUTED: CPT | Performed by: CLINICAL MEDICAL LABORATORY

## 2024-04-30 PROCEDURE — 85025 COMPLETE CBC W/AUTO DIFF WBC: CPT | Performed by: CLINICAL MEDICAL LABORATORY

## 2024-04-30 PROCEDURE — 85390 FIBRINOLYSINS SCREEN I&R: CPT | Performed by: CLINICAL MEDICAL LABORATORY

## 2024-05-01 LAB
B2 GLYCOPROT1 IGA SERPL IA-ACNC: <20 SAU
B2 GLYCOPROT1 IGG SERPL IA-ACNC: <20 SGU
B2 GLYCOPROT1 IGM SERPL IA-ACNC: <20 SMU
PATH REV: ABNORMAL
SCREEN APTT: 26.8 SEC (ref 22–32)
SCREEN DRVVT: 42.6 SEC
THROMBIN TIME: 22 SEC (ref 15.3–21.1)

## 2024-05-06 ENCOUNTER — TELEPHONE (OUTPATIENT)
Dept: TRANSPLANT | Age: 59
End: 2024-05-06

## 2024-05-07 ENCOUNTER — TELEPHONE (OUTPATIENT)
Dept: TRANSPLANT | Age: 59
End: 2024-05-07

## 2024-05-09 ENCOUNTER — OFFICE VISIT (OUTPATIENT)
Dept: NEPHROLOGY | Facility: CLINIC | Age: 59
End: 2024-05-09
Payer: MEDICARE

## 2024-05-09 VITALS — SYSTOLIC BLOOD PRESSURE: 130 MMHG | BODY MASS INDEX: 27 KG/M2 | WEIGHT: 210.38 LBS | DIASTOLIC BLOOD PRESSURE: 86 MMHG

## 2024-05-09 DIAGNOSIS — I10 ESSENTIAL HYPERTENSION: ICD-10-CM

## 2024-05-09 DIAGNOSIS — N18.6 ESRD ON HEMODIALYSIS (HCC): Primary | ICD-10-CM

## 2024-05-09 DIAGNOSIS — Z99.2 ESRD ON HEMODIALYSIS (HCC): Primary | ICD-10-CM

## 2024-05-09 NOTE — PROGRESS NOTES
Suresh Prather was seen in the nephrology clinic today in follow-up for management of hypertension in the setting of ESRD on hemodialysis.  He continues on 3 times weekly dialysis at [facility].  Labs and clinical course reviewed.      Brant Vaz MD  5/9/2024  10:05 AM

## 2024-05-14 ENCOUNTER — TELEPHONE (OUTPATIENT)
Dept: TRANSPLANT | Age: 59
End: 2024-05-14

## 2024-05-14 ENCOUNTER — EXTERNAL RECORD (OUTPATIENT)
Dept: HEALTH INFORMATION MANAGEMENT | Facility: OTHER | Age: 59
End: 2024-05-14

## 2024-05-30 ENCOUNTER — TELEPHONE (OUTPATIENT)
Dept: TRANSPLANT | Age: 59
End: 2024-05-30

## 2024-06-09 ENCOUNTER — TELEPHONE (OUTPATIENT)
Dept: TRANSPLANT | Age: 59
End: 2024-06-09

## 2024-06-11 ENCOUNTER — LAB ENCOUNTER (OUTPATIENT)
Dept: LAB | Age: 59
End: 2024-06-11
Attending: INTERNAL MEDICINE
Payer: MEDICARE

## 2024-06-11 DIAGNOSIS — C73 MALIGNANT NEOPLASM OF THYROID GLAND (HCC): Primary | ICD-10-CM

## 2024-06-11 DIAGNOSIS — E89.0 POSTSURGICAL HYPOTHYROIDISM: ICD-10-CM

## 2024-06-11 LAB
T4 FREE SERPL-MCNC: 2.3 NG/DL (ref 0.8–1.7)
TSI SER-ACNC: 0.09 MIU/ML (ref 0.55–4.78)

## 2024-06-11 PROCEDURE — 84432 ASSAY OF THYROGLOBULIN: CPT

## 2024-06-11 PROCEDURE — 84443 ASSAY THYROID STIM HORMONE: CPT

## 2024-06-11 PROCEDURE — 36415 COLL VENOUS BLD VENIPUNCTURE: CPT

## 2024-06-11 PROCEDURE — 84439 ASSAY OF FREE THYROXINE: CPT

## 2024-06-14 ENCOUNTER — EXTERNAL LAB (OUTPATIENT)
Dept: HEALTH INFORMATION MANAGEMENT | Facility: OTHER | Age: 59
End: 2024-06-14

## 2024-06-14 LAB
LAB RESULT: NORMAL
LAB RESULT: NORMAL

## 2024-06-18 LAB — LC THYROGLOBIN LCMS: <0.2 NG/ML

## 2024-06-27 ENCOUNTER — TELEPHONE (OUTPATIENT)
Dept: TRANSPLANT | Age: 59
End: 2024-06-27

## 2024-07-22 ENCOUNTER — CLINICAL DOCUMENTATION (OUTPATIENT)
Dept: TRANSPLANT | Age: 59
End: 2024-07-22

## 2024-07-22 ENCOUNTER — TELEPHONE (OUTPATIENT)
Dept: TRANSPLANT | Age: 59
End: 2024-07-22

## 2024-07-23 ENCOUNTER — APPOINTMENT (OUTPATIENT)
Dept: ULTRASOUND IMAGING | Age: 59
DRG: 652 | End: 2024-07-23

## 2024-07-23 ENCOUNTER — HOSPITAL ENCOUNTER (INPATIENT)
Age: 59
DRG: 652 | End: 2024-07-23
Attending: TRANSPLANT SURGERY | Admitting: TRANSPLANT SURGERY

## 2024-07-23 ENCOUNTER — APPOINTMENT (OUTPATIENT)
Dept: GENERAL RADIOLOGY | Age: 59
DRG: 652 | End: 2024-07-23

## 2024-07-23 ENCOUNTER — ANESTHESIA (OUTPATIENT)
Dept: SURGERY | Age: 59
End: 2024-07-23

## 2024-07-23 ENCOUNTER — ANESTHESIA EVENT (OUTPATIENT)
Dept: SURGERY | Age: 59
End: 2024-07-23

## 2024-07-23 DIAGNOSIS — E87.5 HYPERKALEMIA: Primary | ICD-10-CM

## 2024-07-23 DIAGNOSIS — Z76.82 KIDNEY TRANSPLANT CANDIDATE: ICD-10-CM

## 2024-07-23 LAB
ABO + RH BLD: NORMAL
ALBUMIN SERPL-MCNC: 2.9 G/DL (ref 3.6–5.1)
ALBUMIN SERPL-MCNC: 3.7 G/DL (ref 3.6–5.1)
ALBUMIN/GLOB SERPL: 0.9 {RATIO} (ref 1–2.4)
ALBUMIN/GLOB SERPL: 1 {RATIO} (ref 1–2.4)
ALP SERPL-CCNC: 53 UNITS/L (ref 45–117)
ALP SERPL-CCNC: 68 UNITS/L (ref 45–117)
ALT SERPL-CCNC: 23 UNITS/L
ALT SERPL-CCNC: 27 UNITS/L
ANION GAP SERPL CALC-SCNC: 13 MMOL/L (ref 7–19)
ANION GAP SERPL CALC-SCNC: 15 MMOL/L (ref 7–19)
ANNOTATION COMMENT IMP: NORMAL
APTT PPP: 28 SEC (ref 22–32)
AST SERPL-CCNC: 24 UNITS/L
AST SERPL-CCNC: 32 UNITS/L
ATRIAL RATE (BPM): 100
ATRIAL RATE (BPM): 88
BASOPHILS # BLD: 0.1 K/MCL (ref 0–0.3)
BASOPHILS NFR BLD: 1 %
BILIRUB SERPL-MCNC: 0.5 MG/DL (ref 0.2–1)
BILIRUB SERPL-MCNC: 0.8 MG/DL (ref 0.2–1)
BLD GP AB SCN SERPL QL GEL: NEGATIVE
BLOOD EXPIRATION DATE: NORMAL
BLOOD EXPIRATION DATE: NORMAL
BUN SERPL-MCNC: 33 MG/DL (ref 6–20)
BUN SERPL-MCNC: 34 MG/DL (ref 6–20)
BUN/CREAT SERPL: 4 (ref 7–25)
BUN/CREAT SERPL: 4 (ref 7–25)
CALCIUM SERPL-MCNC: 7.3 MG/DL (ref 8.4–10.2)
CALCIUM SERPL-MCNC: 9.5 MG/DL (ref 8.4–10.2)
CHLORIDE SERPL-SCNC: 106 MMOL/L (ref 97–110)
CHLORIDE SERPL-SCNC: 98 MMOL/L (ref 97–110)
CO2 SERPL-SCNC: 23 MMOL/L (ref 21–32)
CO2 SERPL-SCNC: 28 MMOL/L (ref 21–32)
CREAT SERPL-MCNC: 7.59 MG/DL (ref 0.67–1.17)
CREAT SERPL-MCNC: 7.95 MG/DL (ref 0.67–1.17)
CROSSMATCH RESULT: NORMAL
CROSSMATCH RESULT: NORMAL
DEPRECATED RDW RBC: 50.9 FL (ref 39–50)
DEPRECATED RDW RBC: 53.5 FL (ref 39–50)
DISPENSE STATUS: NORMAL
DISPENSE STATUS: NORMAL
EBV VCA IGG SER-ACNC: >8 AI
EGFRCR SERPLBLD CKD-EPI 2021: 7 ML/MIN/{1.73_M2}
EGFRCR SERPLBLD CKD-EPI 2021: 8 ML/MIN/{1.73_M2}
EOSINOPHIL # BLD: 0.2 K/MCL (ref 0–0.5)
EOSINOPHIL NFR BLD: 2 %
ERYTHROCYTE [DISTWIDTH] IN BLOOD: 14 % (ref 11–15)
ERYTHROCYTE [DISTWIDTH] IN BLOOD: 14.1 % (ref 11–15)
FASTING DURATION TIME PATIENT: ABNORMAL H
FASTING DURATION TIME PATIENT: ABNORMAL H
GLOBULIN SER-MCNC: 2.9 G/DL (ref 2–4)
GLOBULIN SER-MCNC: 4 G/DL (ref 2–4)
GLUCOSE BLDC GLUCOMTR-MCNC: 158 MG/DL (ref 70–99)
GLUCOSE BLDC GLUCOMTR-MCNC: 165 MG/DL (ref 70–99)
GLUCOSE BLDC GLUCOMTR-MCNC: 187 MG/DL (ref 70–99)
GLUCOSE SERPL-MCNC: 119 MG/DL (ref 70–99)
GLUCOSE SERPL-MCNC: 160 MG/DL (ref 70–99)
HBV CORE IGG+IGM SER QL: NEGATIVE
HBV SURFACE AB SER QL: POSITIVE
HBV SURFACE AG SER QL: NEGATIVE
HCT VFR BLD CALC: 34.2 % (ref 39–51)
HCT VFR BLD CALC: 39.6 % (ref 39–51)
HCV AB SER QL: NEGATIVE
HGB BLD-MCNC: 11.4 G/DL (ref 13–17)
HGB BLD-MCNC: 13.5 G/DL (ref 13–17)
HIV 1+2 AB+HIV1 P24 AG SERPL QL IA: NONREACTIVE
IMM GRANULOCYTES # BLD AUTO: 0 K/MCL (ref 0–0.2)
IMM GRANULOCYTES # BLD: 0 %
INR PPP: 1
ISBT BLOOD TYPE: 6200
ISBT BLOOD TYPE: 6200
ISSUE DATE/TIME: NORMAL
ISSUE DATE/TIME: NORMAL
LYMPHOCYTES # BLD: 1.3 K/MCL (ref 1–4)
LYMPHOCYTES NFR BLD: 13 %
MAGNESIUM SERPL-MCNC: 2.8 MG/DL (ref 1.7–2.4)
MCH RBC QN AUTO: 33.9 PG (ref 26–34)
MCH RBC QN AUTO: 34.8 PG (ref 26–34)
MCHC RBC AUTO-ENTMCNC: 33.3 G/DL (ref 32–36.5)
MCHC RBC AUTO-ENTMCNC: 34.1 G/DL (ref 32–36.5)
MCV RBC AUTO: 104.3 FL (ref 78–100)
MCV RBC AUTO: 99.5 FL (ref 78–100)
MONOCYTES # BLD: 0.1 K/MCL (ref 0.3–0.9)
MONOCYTES # BLD: 1.2 K/MCL (ref 0.3–0.9)
MONOCYTES NFR BLD: 1 %
MONOCYTES NFR BLD: 12 %
MRSA DNA SPEC QL NAA+PROBE: NOT DETECTED
NEUTROPHILS # BLD: 11.7 K/MCL (ref 1.8–7.7)
NEUTROPHILS # BLD: 7.1 K/MCL (ref 1.8–7.7)
NEUTROPHILS NFR BLD: 72 %
NEUTS SEG NFR BLD: 99 %
NRBC BLD MANUAL-RTO: 0 /100 WBC
NRBC BLD MANUAL-RTO: 0 /100 WBC
P AXIS (DEGREES): 55
P AXIS (DEGREES): 60
PHOSPHATE SERPL-MCNC: 2 MG/DL (ref 2.4–4.7)
PLAT MORPH BLD: NORMAL
PLATELET # BLD AUTO: 136 K/MCL (ref 140–450)
PLATELET # BLD AUTO: 92 K/MCL (ref 140–450)
POTASSIUM SERPL-SCNC: 3.9 MMOL/L (ref 3.4–5.1)
POTASSIUM SERPL-SCNC: 4.4 MMOL/L (ref 3.4–5.1)
PR-INTERVAL (MSEC): 134
PR-INTERVAL (MSEC): 142
PRODUCT CODE: NORMAL
PRODUCT CODE: NORMAL
PRODUCT DESCRIPTION: NORMAL
PRODUCT DESCRIPTION: NORMAL
PRODUCT ID: NORMAL
PRODUCT ID: NORMAL
PROT SERPL-MCNC: 5.8 G/DL (ref 6.4–8.2)
PROT SERPL-MCNC: 7.7 G/DL (ref 6.4–8.2)
PROTHROMBIN TIME: 10.6 SEC (ref 9.7–11.8)
QRS-INTERVAL (MSEC): 90
QRS-INTERVAL (MSEC): 90
QT-INTERVAL (MSEC): 384
QT-INTERVAL (MSEC): 404
QTC: 489
QTC: 495
R AXIS (DEGREES): -9
R AXIS (DEGREES): 3
RBC # BLD: 3.28 MIL/MCL (ref 4.5–5.9)
RBC # BLD: 3.98 MIL/MCL (ref 4.5–5.9)
RBC MORPH BLD: NORMAL
REPORT TEXT: NORMAL
REPORT TEXT: NORMAL
S AUREUS DNA SPEC QL NAA+PROBE: NOT DETECTED
SODIUM SERPL-SCNC: 137 MMOL/L (ref 135–145)
SODIUM SERPL-SCNC: 138 MMOL/L (ref 135–145)
T AXIS (DEGREES): 46
T AXIS (DEGREES): 62
TROPONIN I SERPL DL<=0.01 NG/ML-MCNC: 17 NG/L
TYPE AND SCREEN EXPIRATION DATE: NORMAL
UNIT BLOOD TYPE: NORMAL
UNIT BLOOD TYPE: NORMAL
UNIT NUMBER: NORMAL
UNIT NUMBER: NORMAL
VENTRICULAR RATE EKG/MIN (BPM): 100
VENTRICULAR RATE EKG/MIN (BPM): 88
WBC # BLD: 10 K/MCL (ref 4.2–11)
WBC # BLD: 11.8 K/MCL (ref 4.2–11)
WBC TOXIC VACUOLES BLD QL SMEAR: PRESENT

## 2024-07-23 PROCEDURE — 10002803 HB RX 637

## 2024-07-23 PROCEDURE — P9016 RBC LEUKOCYTES REDUCED: HCPCS

## 2024-07-23 PROCEDURE — 13000003 HB ANESTHESIA  GENERAL EA ADD MINUTE: Performed by: TRANSPLANT SURGERY

## 2024-07-23 PROCEDURE — 13000129 HB TRANSPLANT CASE EA ADD MINUTE: Performed by: TRANSPLANT SURGERY

## 2024-07-23 PROCEDURE — 84484 ASSAY OF TROPONIN QUANT: CPT

## 2024-07-23 PROCEDURE — 10006023 HB SUPPLY 272: Performed by: TRANSPLANT SURGERY

## 2024-07-23 PROCEDURE — 10002805 HB CONTRAST AGENT: Performed by: TRANSPLANT SURGERY

## 2024-07-23 PROCEDURE — 10002807 HB RX 258: Performed by: TRANSPLANT SURGERY

## 2024-07-23 PROCEDURE — 71045 X-RAY EXAM CHEST 1 VIEW: CPT

## 2024-07-23 PROCEDURE — 0TY10Z0 TRANSPLANTATION OF LEFT KIDNEY, ALLOGENEIC, OPEN APPROACH: ICD-10-PCS | Performed by: TRANSPLANT SURGERY

## 2024-07-23 PROCEDURE — 85730 THROMBOPLASTIN TIME PARTIAL: CPT

## 2024-07-23 PROCEDURE — 87389 HIV-1 AG W/HIV-1&-2 AB AG IA: CPT

## 2024-07-23 PROCEDURE — 10002801 HB RX 250 W/O HCPCS: Performed by: STUDENT IN AN ORGANIZED HEALTH CARE EDUCATION/TRAINING PROGRAM

## 2024-07-23 PROCEDURE — 50605 INSERT URETERAL SUPPORT: CPT | Performed by: TRANSPLANT SURGERY

## 2024-07-23 PROCEDURE — 10000008 HB ROOM CHARGE ICU OR CCU

## 2024-07-23 PROCEDURE — X1094 NO CHARGE VISIT: HCPCS | Performed by: TRANSPLANT SURGERY

## 2024-07-23 PROCEDURE — 83735 ASSAY OF MAGNESIUM: CPT

## 2024-07-23 PROCEDURE — 86923 COMPATIBILITY TEST ELECTRIC: CPT

## 2024-07-23 PROCEDURE — 13003158 HB ACQUISITION KIDNEY CADAVER DONOR

## 2024-07-23 PROCEDURE — 86644 CMV ANTIBODY: CPT

## 2024-07-23 PROCEDURE — 85610 PROTHROMBIN TIME: CPT

## 2024-07-23 PROCEDURE — 10006027 HB SUPPLY 278: Performed by: TRANSPLANT SURGERY

## 2024-07-23 PROCEDURE — 13000128 HB TRANSPLANT CASE S/U + 1ST 15 MIN: Performed by: TRANSPLANT SURGERY

## 2024-07-23 PROCEDURE — 85025 COMPLETE CBC W/AUTO DIFF WBC: CPT

## 2024-07-23 PROCEDURE — 93010 ELECTROCARDIOGRAM REPORT: CPT | Performed by: INTERNAL MEDICINE

## 2024-07-23 PROCEDURE — 10002800 HB RX 250 W HCPCS: Performed by: NURSE PRACTITIONER

## 2024-07-23 PROCEDURE — 86665 EPSTEIN-BARR CAPSID VCA: CPT

## 2024-07-23 PROCEDURE — 10002807 HB RX 258

## 2024-07-23 PROCEDURE — 87522 HEPATITIS C REVRS TRNSCRPJ: CPT

## 2024-07-23 PROCEDURE — 10002800 HB RX 250 W HCPCS

## 2024-07-23 PROCEDURE — 10002800 HB RX 250 W HCPCS: Performed by: STUDENT IN AN ORGANIZED HEALTH CARE EDUCATION/TRAINING PROGRAM

## 2024-07-23 PROCEDURE — 80053 COMPREHEN METABOLIC PANEL: CPT

## 2024-07-23 PROCEDURE — 84100 ASSAY OF PHOSPHORUS: CPT

## 2024-07-23 PROCEDURE — 10002803 HB RX 637: Performed by: NURSE PRACTITIONER

## 2024-07-23 PROCEDURE — 99222 1ST HOSP IP/OBS MODERATE 55: CPT | Performed by: INTERNAL MEDICINE

## 2024-07-23 PROCEDURE — 86704 HEP B CORE ANTIBODY TOTAL: CPT

## 2024-07-23 PROCEDURE — 85027 COMPLETE CBC AUTOMATED: CPT

## 2024-07-23 PROCEDURE — C2617 STENT, NON-COR, TEM W/O DEL: HCPCS | Performed by: TRANSPLANT SURGERY

## 2024-07-23 PROCEDURE — 86900 BLOOD TYPING SEROLOGIC ABO: CPT

## 2024-07-23 PROCEDURE — 10004651 HB RX, NO CHARGE ITEM

## 2024-07-23 PROCEDURE — 13000002 HB ANESTHESIA  GENERAL  S/U + 1ST 15 MIN: Performed by: TRANSPLANT SURGERY

## 2024-07-23 PROCEDURE — 50360 RNL ALTRNSPLJ W/O RCP NFRCT: CPT | Performed by: TRANSPLANT SURGERY

## 2024-07-23 PROCEDURE — 86803 HEPATITIS C AB TEST: CPT

## 2024-07-23 PROCEDURE — 76776 US EXAM K TRANSPL W/DOPPLER: CPT

## 2024-07-23 PROCEDURE — 99291 CRITICAL CARE FIRST HOUR: CPT | Performed by: NURSE PRACTITIONER

## 2024-07-23 PROCEDURE — 96372 THER/PROPH/DIAG INJ SC/IM: CPT | Performed by: NURSE PRACTITIONER

## 2024-07-23 PROCEDURE — 10002807 HB RX 258: Performed by: STUDENT IN AN ORGANIZED HEALTH CARE EDUCATION/TRAINING PROGRAM

## 2024-07-23 PROCEDURE — 93005 ELECTROCARDIOGRAM TRACING: CPT

## 2024-07-23 PROCEDURE — 87640 STAPH A DNA AMP PROBE: CPT

## 2024-07-23 PROCEDURE — 10002801 HB RX 250 W/O HCPCS

## 2024-07-23 DEVICE — SET STENT 6FR 16CM SM 2 PGTL CRV COUDE SOF-FLEX RADOPQ CATH: Type: IMPLANTABLE DEVICE | Site: URETER | Status: FUNCTIONAL

## 2024-07-23 DEVICE — HORIZON TI LG 6 CLIPS/CART
Type: IMPLANTABLE DEVICE | Site: ABDOMEN | Status: FUNCTIONAL
Brand: WECK

## 2024-07-23 RX ORDER — PROPOFOL 10 MG/ML
INJECTION, EMULSION INTRAVENOUS PRN
Status: DISCONTINUED | OUTPATIENT
Start: 2024-07-23 | End: 2024-07-23

## 2024-07-23 RX ORDER — SODIUM CHLORIDE 9 MG/ML
INJECTION, SOLUTION INTRAVENOUS CONTINUOUS PRN
Status: DISCONTINUED | OUTPATIENT
Start: 2024-07-23 | End: 2024-07-23

## 2024-07-23 RX ORDER — ONDANSETRON 2 MG/ML
INJECTION INTRAMUSCULAR; INTRAVENOUS PRN
Status: DISCONTINUED | OUTPATIENT
Start: 2024-07-23 | End: 2024-07-23

## 2024-07-23 RX ORDER — ALLOPURINOL 100 MG/1
100 TABLET ORAL DAILY
Status: DISCONTINUED | OUTPATIENT
Start: 2024-07-23 | End: 2024-07-30 | Stop reason: HOSPADM

## 2024-07-23 RX ORDER — NALOXONE HCL 0.4 MG/ML
0.1 VIAL (ML) INJECTION PRN
Status: DISCONTINUED | OUTPATIENT
Start: 2024-07-23 | End: 2024-07-30 | Stop reason: HOSPADM

## 2024-07-23 RX ORDER — HEPARIN SODIUM 5000 [USP'U]/ML
5000 INJECTION, SOLUTION INTRAVENOUS; SUBCUTANEOUS EVERY 8 HOURS SCHEDULED
Status: DISCONTINUED | OUTPATIENT
Start: 2024-07-23 | End: 2024-07-30 | Stop reason: HOSPADM

## 2024-07-23 RX ORDER — SODIUM CHLORIDE 9 MG/ML
INJECTION, SOLUTION INTRAVENOUS CONTINUOUS
Status: DISCONTINUED | OUTPATIENT
Start: 2024-07-23 | End: 2024-07-30 | Stop reason: HOSPADM

## 2024-07-23 RX ORDER — PHENYLEPHRINE HYDROCHLORIDE 10 MG/ML
INJECTION, SOLUTION INTRAMUSCULAR; INTRAVENOUS; SUBCUTANEOUS PRN
Status: DISCONTINUED | OUTPATIENT
Start: 2024-07-23 | End: 2024-07-23

## 2024-07-23 RX ORDER — ACETAMINOPHEN 650 MG/1
650 SUPPOSITORY RECTAL ONCE
Status: DISCONTINUED | OUTPATIENT
Start: 2024-07-23 | End: 2024-07-23

## 2024-07-23 RX ORDER — ACETAMINOPHEN 500 MG
1000 TABLET ORAL EVERY 8 HOURS SCHEDULED
Status: DISCONTINUED | OUTPATIENT
Start: 2024-07-23 | End: 2024-07-30 | Stop reason: HOSPADM

## 2024-07-23 RX ORDER — FUROSEMIDE 10 MG/ML
40 INJECTION INTRAMUSCULAR; INTRAVENOUS EVERY 4 HOURS
Status: DISCONTINUED | OUTPATIENT
Start: 2024-07-23 | End: 2024-07-26

## 2024-07-23 RX ORDER — LIDOCAINE HYDROCHLORIDE 20 MG/ML
INJECTION, SOLUTION INFILTRATION; PERINEURAL PRN
Status: DISCONTINUED | OUTPATIENT
Start: 2024-07-23 | End: 2024-07-23

## 2024-07-23 RX ORDER — DIPHENHYDRAMINE HCL 25 MG
50 CAPSULE ORAL ONCE
Status: COMPLETED | OUTPATIENT
Start: 2024-07-24 | End: 2024-07-24

## 2024-07-23 RX ORDER — DOCUSATE SODIUM 100 MG/1
100 CAPSULE, LIQUID FILLED ORAL 2 TIMES DAILY
Status: DISCONTINUED | OUTPATIENT
Start: 2024-07-23 | End: 2024-07-26

## 2024-07-23 RX ORDER — SODIUM CHLORIDE 9 MG/ML
INJECTION, SOLUTION INTRAVENOUS CONTINUOUS
Status: DISCONTINUED | OUTPATIENT
Start: 2024-07-23 | End: 2024-07-29

## 2024-07-23 RX ORDER — MIDAZOLAM HYDROCHLORIDE 1 MG/ML
INJECTION, SOLUTION INTRAMUSCULAR; INTRAVENOUS PRN
Status: DISCONTINUED | OUTPATIENT
Start: 2024-07-23 | End: 2024-07-23

## 2024-07-23 RX ORDER — DEXTROSE MONOHYDRATE 25 G/50ML
12.5 INJECTION, SOLUTION INTRAVENOUS PRN
Status: DISCONTINUED | OUTPATIENT
Start: 2024-07-23 | End: 2024-07-30 | Stop reason: HOSPADM

## 2024-07-23 RX ORDER — FUROSEMIDE 10 MG/ML
INJECTION INTRAMUSCULAR; INTRAVENOUS PRN
Status: DISCONTINUED | OUTPATIENT
Start: 2024-07-23 | End: 2024-07-23

## 2024-07-23 RX ORDER — FUROSEMIDE 10 MG/ML
40 INJECTION INTRAMUSCULAR; INTRAVENOUS ONCE
Status: COMPLETED | OUTPATIENT
Start: 2024-07-23 | End: 2024-07-23

## 2024-07-23 RX ORDER — NICOTINE POLACRILEX 4 MG
30 LOZENGE BUCCAL PRN
Status: DISCONTINUED | OUTPATIENT
Start: 2024-07-23 | End: 2024-07-30 | Stop reason: HOSPADM

## 2024-07-23 RX ORDER — CHLORHEXIDINE GLUCONATE ORAL RINSE 1.2 MG/ML
15 SOLUTION DENTAL
Status: COMPLETED | OUTPATIENT
Start: 2024-07-23 | End: 2024-07-23

## 2024-07-23 RX ORDER — BUPRENORPHINE AND NALOXONE 8; 2 MG/1; MG/1
0.5 FILM, SOLUBLE BUCCAL; SUBLINGUAL EVERY 12 HOURS SCHEDULED
Status: DISCONTINUED | OUTPATIENT
Start: 2024-07-23 | End: 2024-07-30 | Stop reason: HOSPADM

## 2024-07-23 RX ORDER — SODIUM CHLORIDE 450 MG/100ML
INJECTION, SOLUTION INTRAVENOUS CONTINUOUS
Status: DISCONTINUED | OUTPATIENT
Start: 2024-07-23 | End: 2024-07-25

## 2024-07-23 RX ORDER — DIPHENHYDRAMINE HYDROCHLORIDE 50 MG/ML
25 INJECTION INTRAMUSCULAR; INTRAVENOUS
Status: COMPLETED | OUTPATIENT
Start: 2024-07-23 | End: 2024-07-23

## 2024-07-23 RX ORDER — 0.9 % SODIUM CHLORIDE 0.9 %
2 VIAL (ML) INJECTION EVERY 12 HOURS SCHEDULED
Status: DISCONTINUED | OUTPATIENT
Start: 2024-07-23 | End: 2024-07-23 | Stop reason: HOSPADM

## 2024-07-23 RX ORDER — NICOTINE POLACRILEX 4 MG
15 LOZENGE BUCCAL PRN
Status: DISCONTINUED | OUTPATIENT
Start: 2024-07-23 | End: 2024-07-30 | Stop reason: HOSPADM

## 2024-07-23 RX ORDER — OXYCODONE HYDROCHLORIDE 5 MG/1
2.5 TABLET ORAL EVERY 4 HOURS PRN
Status: DISCONTINUED | OUTPATIENT
Start: 2024-07-23 | End: 2024-07-26

## 2024-07-23 RX ORDER — ROCURONIUM BROMIDE 10 MG/ML
INJECTION, SOLUTION INTRAVENOUS PRN
Status: DISCONTINUED | OUTPATIENT
Start: 2024-07-23 | End: 2024-07-23

## 2024-07-23 RX ORDER — POLYETHYLENE GLYCOL 3350 17 G/17G
17 POWDER, FOR SOLUTION ORAL
Status: DISCONTINUED | OUTPATIENT
Start: 2024-07-23 | End: 2024-07-30 | Stop reason: HOSPADM

## 2024-07-23 RX ORDER — FUROSEMIDE 10 MG/ML
INJECTION INTRAMUSCULAR; INTRAVENOUS
Status: DISPENSED
Start: 2024-07-23 | End: 2024-07-24

## 2024-07-23 RX ORDER — 0.9 % SODIUM CHLORIDE 0.9 %
2 VIAL (ML) INJECTION EVERY 12 HOURS SCHEDULED
Status: DISCONTINUED | OUTPATIENT
Start: 2024-07-23 | End: 2024-07-30 | Stop reason: HOSPADM

## 2024-07-23 RX ORDER — DIPHENHYDRAMINE HCL 25 MG
25 CAPSULE ORAL EVERY 4 HOURS PRN
Status: DISCONTINUED | OUTPATIENT
Start: 2024-07-23 | End: 2024-07-30 | Stop reason: HOSPADM

## 2024-07-23 RX ORDER — DIPHENHYDRAMINE HYDROCHLORIDE 50 MG/ML
50 INJECTION INTRAMUSCULAR; INTRAVENOUS ONCE
Status: DISCONTINUED | OUTPATIENT
Start: 2024-07-23 | End: 2024-07-23

## 2024-07-23 RX ORDER — HYDROMORPHONE HCL IN 0.9% NACL 0.2 MG/ML
PLASTIC BAG, INJECTION (ML) INTRAVENOUS CONTINUOUS
Status: DISCONTINUED | OUTPATIENT
Start: 2024-07-23 | End: 2024-07-23

## 2024-07-23 RX ORDER — ATORVASTATIN CALCIUM 40 MG/1
40 TABLET, FILM COATED ORAL NIGHTLY
Status: DISCONTINUED | OUTPATIENT
Start: 2024-07-23 | End: 2024-07-30 | Stop reason: HOSPADM

## 2024-07-23 RX ORDER — CHLORHEXIDINE GLUCONATE ORAL RINSE 1.2 MG/ML
15 SOLUTION DENTAL EVERY 12 HOURS SCHEDULED
Status: DISPENSED | OUTPATIENT
Start: 2024-07-23 | End: 2024-07-26

## 2024-07-23 RX ORDER — FAMOTIDINE 20 MG/1
20 TABLET, FILM COATED ORAL DAILY
Status: DISCONTINUED | OUTPATIENT
Start: 2024-07-23 | End: 2024-07-30 | Stop reason: HOSPADM

## 2024-07-23 RX ORDER — SODIUM CHLORIDE 9 MG/ML
INJECTION, SOLUTION INTRAVENOUS CONTINUOUS
Status: ACTIVE | OUTPATIENT
Start: 2024-07-23 | End: 2024-07-24

## 2024-07-23 RX ORDER — DEXTROSE MONOHYDRATE 25 G/50ML
25 INJECTION, SOLUTION INTRAVENOUS PRN
Status: DISCONTINUED | OUTPATIENT
Start: 2024-07-23 | End: 2024-07-30 | Stop reason: HOSPADM

## 2024-07-23 RX ORDER — ACETAMINOPHEN 325 MG/1
325 TABLET ORAL ONCE
Status: COMPLETED | OUTPATIENT
Start: 2024-07-24 | End: 2024-07-24

## 2024-07-23 RX ORDER — NOREPINEPHRINE BITARTRATE/D5W 8 MG/250ML
PLASTIC BAG, INJECTION (ML) INTRAVENOUS CONTINUOUS PRN
Status: DISCONTINUED | OUTPATIENT
Start: 2024-07-23 | End: 2024-07-23

## 2024-07-23 RX ORDER — MANNITOL 250 MG/ML
INJECTION, SOLUTION INTRAVENOUS PRN
Status: DISCONTINUED | OUTPATIENT
Start: 2024-07-23 | End: 2024-07-23

## 2024-07-23 RX ORDER — ACETAMINOPHEN 325 MG/1
650 TABLET ORAL
Status: COMPLETED | OUTPATIENT
Start: 2024-07-23 | End: 2024-07-23

## 2024-07-23 RX ORDER — DIPHENHYDRAMINE HCL 25 MG
25 CAPSULE ORAL EVERY 8 HOURS PRN
Status: DISCONTINUED | OUTPATIENT
Start: 2024-07-23 | End: 2024-07-23

## 2024-07-23 RX ADMIN — HYDROMORPHONE HYDROCHLORIDE 0.4 MG: 1 INJECTION, SOLUTION INTRAMUSCULAR; INTRAVENOUS; SUBCUTANEOUS at 11:14

## 2024-07-23 RX ADMIN — SODIUM CHLORIDE: 9 INJECTION, SOLUTION INTRAVENOUS at 06:59

## 2024-07-23 RX ADMIN — ATORVASTATIN CALCIUM 40 MG: 40 TABLET, FILM COATED ORAL at 21:03

## 2024-07-23 RX ADMIN — MANNITOL 12.5 G: 12.5 INJECTION, SOLUTION INTRAVENOUS at 10:33

## 2024-07-23 RX ADMIN — ANTI-THYMOCYTE GLOBULIN (RABBIT) 100 MG: 5 INJECTION, POWDER, LYOPHILIZED, FOR SOLUTION INTRAVENOUS at 09:26

## 2024-07-23 RX ADMIN — SODIUM CHLORIDE: 9 INJECTION, SOLUTION INTRAVENOUS at 13:59

## 2024-07-23 RX ADMIN — ACETAMINOPHEN 1000 MG: 500 TABLET ORAL at 16:39

## 2024-07-23 RX ADMIN — CEFAZOLIN SODIUM 2000 MG: 300 INJECTION, POWDER, LYOPHILIZED, FOR SOLUTION INTRAVENOUS at 08:45

## 2024-07-23 RX ADMIN — CHLORHEXIDINE GLUCONATE 15 ML: 1.2 RINSE ORAL at 21:03

## 2024-07-23 RX ADMIN — ROCURONIUM BROMIDE 20 MG: 10 INJECTION INTRAVENOUS at 12:06

## 2024-07-23 RX ADMIN — HYDROMORPHONE HYDROCHLORIDE 0.3 MG: 1 INJECTION, SOLUTION INTRAMUSCULAR; INTRAVENOUS; SUBCUTANEOUS at 13:25

## 2024-07-23 RX ADMIN — SODIUM CHLORIDE: 4.5 INJECTION, SOLUTION INTRAVENOUS at 15:18

## 2024-07-23 RX ADMIN — PHENYLEPHRINE HYDROCHLORIDE 200 MCG: 10 INJECTION INTRAVENOUS at 08:15

## 2024-07-23 RX ADMIN — ACETAMINOPHEN 1000 MG: 500 TABLET ORAL at 21:07

## 2024-07-23 RX ADMIN — NOREPINEPHRINE BITARTRATE 5 MCG/MIN: 8 INJECTION, SOLUTION INTRAVENOUS at 10:05

## 2024-07-23 RX ADMIN — LIDOCAINE HYDROCHLORIDE 5 ML: 20 INJECTION, SOLUTION INFILTRATION; PERINEURAL at 07:49

## 2024-07-23 RX ADMIN — INSULIN LISPRO 1 UNITS: 100 INJECTION, SOLUTION INTRAVENOUS; SUBCUTANEOUS at 18:42

## 2024-07-23 RX ADMIN — ROCURONIUM BROMIDE 30 MG: 10 INJECTION INTRAVENOUS at 11:16

## 2024-07-23 RX ADMIN — DOCUSATE SODIUM 100 MG: 100 CAPSULE, LIQUID FILLED ORAL at 21:03

## 2024-07-23 RX ADMIN — ROCURONIUM BROMIDE 20 MG: 10 INJECTION INTRAVENOUS at 10:15

## 2024-07-23 RX ADMIN — CEFAZOLIN SODIUM 2000 MG: 300 INJECTION, POWDER, LYOPHILIZED, FOR SOLUTION INTRAVENOUS at 11:39

## 2024-07-23 RX ADMIN — HYDROMORPHONE HYDROCHLORIDE 0.3 MG: 1 INJECTION, SOLUTION INTRAMUSCULAR; INTRAVENOUS; SUBCUTANEOUS at 13:18

## 2024-07-23 RX ADMIN — FUROSEMIDE 40 MG: 10 INJECTION, SOLUTION INTRAMUSCULAR; INTRAVENOUS at 14:31

## 2024-07-23 RX ADMIN — SODIUM CHLORIDE: 9 INJECTION, SOLUTION INTRAVENOUS at 07:39

## 2024-07-23 RX ADMIN — FENTANYL CITRATE 100 MCG: 50 INJECTION INTRAMUSCULAR; INTRAVENOUS at 09:08

## 2024-07-23 RX ADMIN — FENTANYL CITRATE 50 MCG: 50 INJECTION INTRAMUSCULAR; INTRAVENOUS at 08:57

## 2024-07-23 RX ADMIN — SODIUM CHLORIDE: 9 INJECTION, SOLUTION INTRAVENOUS at 09:58

## 2024-07-23 RX ADMIN — OXYCODONE HYDROCHLORIDE 2.5 MG: 5 TABLET ORAL at 16:39

## 2024-07-23 RX ADMIN — FENTANYL CITRATE 50 MCG: 50 INJECTION INTRAMUSCULAR; INTRAVENOUS at 07:49

## 2024-07-23 RX ADMIN — CEFAZOLIN SODIUM 2000 MG: 300 INJECTION, POWDER, LYOPHILIZED, FOR SOLUTION INTRAVENOUS at 21:03

## 2024-07-23 RX ADMIN — FAMOTIDINE 20 MG: 20 TABLET, FILM COATED ORAL at 16:39

## 2024-07-23 RX ADMIN — FUROSEMIDE 40 MG: 10 INJECTION, SOLUTION INTRAMUSCULAR; INTRAVENOUS at 19:10

## 2024-07-23 RX ADMIN — ROCURONIUM BROMIDE 50 MG: 10 INJECTION INTRAVENOUS at 09:11

## 2024-07-23 RX ADMIN — DIPHENHYDRAMINE HYDROCHLORIDE 25 MG: 50 INJECTION, SOLUTION INTRAMUSCULAR; INTRAVENOUS at 07:02

## 2024-07-23 RX ADMIN — NOREPINEPHRINE BITARTRATE 4 MCG/MIN: 8 INJECTION, SOLUTION INTRAVENOUS at 08:46

## 2024-07-23 RX ADMIN — SODIUM CHLORIDE 500 MG: 9 INJECTION, SOLUTION INTRAVENOUS at 08:38

## 2024-07-23 RX ADMIN — SUGAMMADEX 200 MG: 100 INJECTION, SOLUTION INTRAVENOUS at 13:05

## 2024-07-23 RX ADMIN — PHENYLEPHRINE HYDROCHLORIDE 100 MCG: 10 INJECTION INTRAVENOUS at 08:33

## 2024-07-23 RX ADMIN — ALLOPURINOL 100 MG: 100 TABLET ORAL at 18:42

## 2024-07-23 RX ADMIN — ONDANSETRON 4 MG: 2 INJECTION INTRAMUSCULAR; INTRAVENOUS at 13:16

## 2024-07-23 RX ADMIN — MIDAZOLAM HYDROCHLORIDE 2 MG: 1 INJECTION, SOLUTION INTRAMUSCULAR; INTRAVENOUS at 07:45

## 2024-07-23 RX ADMIN — SODIUM CHLORIDE 250 ML: 9 INJECTION, SOLUTION INTRAVENOUS at 19:12

## 2024-07-23 RX ADMIN — PROPOFOL 150 MG: 10 INJECTION, EMULSION INTRAVENOUS at 07:49

## 2024-07-23 RX ADMIN — HEPARIN SODIUM 5000 UNITS: 5000 INJECTION INTRAVENOUS; SUBCUTANEOUS at 21:03

## 2024-07-23 RX ADMIN — SODIUM CHLORIDE: 9 INJECTION, SOLUTION INTRAVENOUS at 09:49

## 2024-07-23 RX ADMIN — CEFAZOLIN SODIUM 2000 MG: 300 INJECTION, POWDER, LYOPHILIZED, FOR SOLUTION INTRAVENOUS at 16:39

## 2024-07-23 RX ADMIN — BUPRENORPHINE AND NALOXONE 0.5 EACH: 8; 2 FILM BUCCAL; SUBLINGUAL at 21:03

## 2024-07-23 RX ADMIN — SODIUM CHLORIDE: 9 INJECTION, SOLUTION INTRAVENOUS at 08:30

## 2024-07-23 RX ADMIN — FUROSEMIDE 80 MG: 10 INJECTION, SOLUTION INTRAMUSCULAR; INTRAVENOUS at 10:33

## 2024-07-23 RX ADMIN — INSULIN LISPRO 1 UNITS: 100 INJECTION, SOLUTION INTRAVENOUS; SUBCUTANEOUS at 21:03

## 2024-07-23 RX ADMIN — SODIUM CHLORIDE, PRESERVATIVE FREE 2 ML: 5 INJECTION INTRAVENOUS at 21:12

## 2024-07-23 RX ADMIN — ACETAMINOPHEN 650 MG: 325 TABLET ORAL at 06:54

## 2024-07-23 RX ADMIN — PHENYLEPHRINE HYDROCHLORIDE 100 MCG: 10 INJECTION INTRAVENOUS at 07:58

## 2024-07-23 RX ADMIN — ROCURONIUM BROMIDE 20 MG: 10 INJECTION INTRAVENOUS at 12:14

## 2024-07-23 RX ADMIN — FUROSEMIDE 40 MG: 10 INJECTION, SOLUTION INTRAMUSCULAR; INTRAVENOUS at 22:04

## 2024-07-23 RX ADMIN — CHLORHEXIDINE GLUCONATE 15 ML: 1.2 RINSE ORAL at 06:55

## 2024-07-23 RX ADMIN — ROCURONIUM BROMIDE 100 MG: 10 INJECTION INTRAVENOUS at 07:49

## 2024-07-23 SDOH — ECONOMIC STABILITY: HOUSING INSECURITY: WHAT IS YOUR LIVING SITUATION TODAY?: APARTMENT

## 2024-07-23 SDOH — HEALTH STABILITY: PHYSICAL HEALTH: DO YOU HAVE SERIOUS DIFFICULTY WALKING OR CLIMBING STAIRS?: NO

## 2024-07-23 SDOH — ECONOMIC STABILITY: FOOD INSECURITY: WITHIN THE PAST 12 MONTHS, THE FOOD YOU BOUGHT JUST DIDN'T LAST AND YOU DIDN'T HAVE MONEY TO GET MORE.: NEVER TRUE

## 2024-07-23 SDOH — ECONOMIC STABILITY: HOUSING INSECURITY: DO YOU HAVE PROBLEMS WITH ANY OF THE FOLLOWING?: NONE OF THE ABOVE

## 2024-07-23 SDOH — ECONOMIC STABILITY: TRANSPORTATION INSECURITY
IN THE PAST 12 MONTHS, HAS LACK OF RELIABLE TRANSPORTATION KEPT YOU FROM MEDICAL APPOINTMENTS, MEETINGS, WORK OR FROM GETTING THINGS NEEDED FOR DAILY LIVING?: NO

## 2024-07-23 SDOH — ECONOMIC STABILITY: HOUSING INSECURITY: WHAT IS YOUR LIVING SITUATION TODAY?: I HAVE A STEADY PLACE TO LIVE

## 2024-07-23 SDOH — HEALTH STABILITY: GENERAL: BECAUSE OF A PHYSICAL, MENTAL, OR EMOTIONAL CONDITION, DO YOU HAVE DIFFICULTY DOING ERRANDS ALONE?: NO

## 2024-07-23 SDOH — SOCIAL STABILITY: SOCIAL INSECURITY: HOW OFTEN DOES ANYONE, INCLUDING FAMILY AND FRIENDS, PHYSICALLY HURT YOU?: NEVER

## 2024-07-23 SDOH — SOCIAL STABILITY: SOCIAL INSECURITY: HOW OFTEN DOES ANYONE, INCLUDING FAMILY AND FRIENDS, THREATEN YOU WITH HARM?: NEVER

## 2024-07-23 SDOH — SOCIAL STABILITY: SOCIAL NETWORK
HOW OFTEN DO YOU SEE OR TALK TO PEOPLE THAT YOU CARE ABOUT AND FEEL CLOSE TO? (FOR EXAMPLE: TALKING TO FRIENDS ON THE PHONE, VISITING FRIENDS OR FAMILY, GOING TO CHURCH OR CLUB MEETINGS): 3 TO 5 TIMES A WEEK

## 2024-07-23 SDOH — ECONOMIC STABILITY: INCOME INSECURITY: IN THE PAST 12 MONTHS, HAS THE ELECTRIC, GAS, OIL, OR WATER COMPANY THREATENED TO SHUT OFF SERVICE IN YOUR HOME?: NO

## 2024-07-23 SDOH — HEALTH STABILITY: PHYSICAL HEALTH: DO YOU HAVE DIFFICULTY DRESSING OR BATHING?: NO

## 2024-07-23 SDOH — ECONOMIC STABILITY: HOUSING INSECURITY: WHAT IS YOUR LIVING SITUATION TODAY?: SPOUSE

## 2024-07-23 SDOH — SOCIAL STABILITY: SOCIAL INSECURITY: HOW OFTEN DOES ANYONE, INCLUDING FAMILY AND FRIENDS, INSULT OR TALK DOWN TO YOU?: NEVER

## 2024-07-23 SDOH — ECONOMIC STABILITY: GENERAL: WOULD YOU LIKE HELP WITH ANY OF THE FOLLOWING NEEDS?: I DON'T WANT HELP WITH ANY OF THESE

## 2024-07-23 SDOH — SOCIAL STABILITY: SOCIAL INSECURITY: HOW OFTEN DOES ANYONE, INCLUDING FAMILY AND FRIENDS, SCREAM OR CURSE AT YOU?: NEVER

## 2024-07-23 SDOH — ECONOMIC STABILITY: GENERAL

## 2024-07-23 SDOH — SOCIAL STABILITY: SOCIAL NETWORK: SUPPORT SYSTEMS: CHILDREN;SPOUSE

## 2024-07-23 ASSESSMENT — ENCOUNTER SYMPTOMS
RESPIRATORY NEGATIVE: 1
ENDOCRINE NEGATIVE: 1
EYES NEGATIVE: 1
HEMATOLOGIC/LYMPHATIC NEGATIVE: 1
PSYCHIATRIC NEGATIVE: 1
GASTROINTESTINAL NEGATIVE: 1
EXERCISE TOLERANCE: GOOD (>4 METS)
NEUROLOGICAL NEGATIVE: 1
CONSTITUTIONAL NEGATIVE: 1
ALLERGIC/IMMUNOLOGIC NEGATIVE: 1

## 2024-07-23 ASSESSMENT — COLUMBIA-SUICIDE SEVERITY RATING SCALE - C-SSRS
6. HAVE YOU EVER DONE ANYTHING, STARTED TO DO ANYTHING, OR PREPARED TO DO ANYTHING TO END YOUR LIFE?: NO
IS THE PATIENT ABLE TO COMPLETE C-SSRS: YES
2. HAVE YOU ACTUALLY HAD ANY THOUGHTS OF KILLING YOURSELF?: NO
1. WITHIN THE PAST MONTH, HAVE YOU WISHED YOU WERE DEAD OR WISHED YOU COULD GO TO SLEEP AND NOT WAKE UP?: NO

## 2024-07-23 ASSESSMENT — ACTIVITIES OF DAILY LIVING (ADL)
BATHING: INDEPENDENT
TOILETING: INDEPENDENT
FEEDING: INDEPENDENT
RECENT_DECLINE_ADL: NO
ADL_SCORE: 24
ADL_BEFORE_ADMISSION: INDEPENDENT
DRESSING: INDEPENDENT
ADL_SHORT_OF_BREATH: NO

## 2024-07-23 ASSESSMENT — LIFESTYLE VARIABLES
HOW OFTEN DO YOU HAVE 6 OR MORE DRINKS ON ONE OCCASION: NEVER
AUDIT-C TOTAL SCORE: 0
HOW OFTEN DO YOU HAVE A DRINK CONTAINING ALCOHOL: NEVER
ALCOHOL_USE_STATUS: NO OR LOW RISK WITH VALIDATED TOOL
HOW MANY STANDARD DRINKS CONTAINING ALCOHOL DO YOU HAVE ON A TYPICAL DAY: 0,1 OR 2

## 2024-07-23 ASSESSMENT — PATIENT HEALTH QUESTIONNAIRE - PHQ9
1. LITTLE INTEREST OR PLEASURE IN DOING THINGS: NOT AT ALL
IS PATIENT ABLE TO COMPLETE PHQ2 OR PHQ9: YES
2. FEELING DOWN, DEPRESSED OR HOPELESS: NOT AT ALL
SUM OF ALL RESPONSES TO PHQ9 QUESTIONS 1 AND 2: 0
SUM OF ALL RESPONSES TO PHQ9 QUESTIONS 1 AND 2: 0
CLINICAL INTERPRETATION OF PHQ2 SCORE: NO FURTHER SCREENING NEEDED

## 2024-07-23 ASSESSMENT — PAIN SCALES - GENERAL: PAINLEVEL_OUTOF10: 3

## 2024-07-24 LAB
ANION GAP SERPL CALC-SCNC: 12 MMOL/L (ref 7–19)
BASOPHILS # BLD: 0 K/MCL (ref 0–0.3)
BASOPHILS NFR BLD: 0 %
BUN SERPL-MCNC: 45 MG/DL (ref 6–20)
BUN/CREAT SERPL: 5 (ref 7–25)
CALCIUM SERPL-MCNC: 7.8 MG/DL (ref 8.4–10.2)
CHLORIDE SERPL-SCNC: 103 MMOL/L (ref 97–110)
CMV IGG SERPL IA-ACNC: 2.03 ISR
CO2 SERPL-SCNC: 24 MMOL/L (ref 21–32)
CREAT SERPL-MCNC: 8.61 MG/DL (ref 0.67–1.17)
DEPRECATED RDW RBC: 52.7 FL (ref 39–50)
EGFRCR SERPLBLD CKD-EPI 2021: 7 ML/MIN/{1.73_M2}
EOSINOPHIL # BLD: 0 K/MCL (ref 0–0.5)
EOSINOPHIL NFR BLD: 0 %
ERYTHROCYTE [DISTWIDTH] IN BLOOD: 14 % (ref 11–15)
FASTING DURATION TIME PATIENT: ABNORMAL H
GLUCOSE BLDC GLUCOMTR-MCNC: 116 MG/DL (ref 70–99)
GLUCOSE BLDC GLUCOMTR-MCNC: 127 MG/DL (ref 70–99)
GLUCOSE BLDC GLUCOMTR-MCNC: 174 MG/DL (ref 70–99)
GLUCOSE BLDC GLUCOMTR-MCNC: 182 MG/DL (ref 70–99)
GLUCOSE SERPL-MCNC: 142 MG/DL (ref 70–99)
HCT VFR BLD CALC: 30.2 % (ref 39–51)
HCV RNA SERPL NAA+PROBE-ACNC: NOT DETECTED K[IU]/ML
HCV RNA SERPL NAA+PROBE-LOG IU: NOT DETECTED {LOG_IU}/ML
HGB BLD-MCNC: 10.1 G/DL (ref 13–17)
IMM GRANULOCYTES # BLD AUTO: 0.1 K/MCL (ref 0–0.2)
IMM GRANULOCYTES # BLD: 0 %
LYMPHOCYTES # BLD: 0 K/MCL (ref 1–4)
LYMPHOCYTES NFR BLD: 0 %
MAGNESIUM SERPL-MCNC: 2.4 MG/DL (ref 1.7–2.4)
MCH RBC QN AUTO: 34.2 PG (ref 26–34)
MCHC RBC AUTO-ENTMCNC: 33.4 G/DL (ref 32–36.5)
MCV RBC AUTO: 102.4 FL (ref 78–100)
MONOCYTES # BLD: 0.8 K/MCL (ref 0.3–0.9)
MONOCYTES NFR BLD: 6 %
NEUTROPHILS # BLD: 13.4 K/MCL (ref 1.8–7.7)
NEUTROPHILS NFR BLD: 94 %
NRBC BLD MANUAL-RTO: 0 /100 WBC
PHOSPHATE SERPL-MCNC: 3.1 MG/DL (ref 2.4–4.7)
PLATELET # BLD AUTO: 88 K/MCL (ref 140–450)
POTASSIUM SERPL-SCNC: 5.1 MMOL/L (ref 3.4–5.1)
RBC # BLD: 2.95 MIL/MCL (ref 4.5–5.9)
SERVICE CMNT-IMP: NORMAL
SODIUM SERPL-SCNC: 134 MMOL/L (ref 135–145)
WBC # BLD: 14.3 K/MCL (ref 4.2–11)

## 2024-07-24 PROCEDURE — 96372 THER/PROPH/DIAG INJ SC/IM: CPT | Performed by: NURSE PRACTITIONER

## 2024-07-24 PROCEDURE — 99223 1ST HOSP IP/OBS HIGH 75: CPT | Performed by: INTERNAL MEDICINE

## 2024-07-24 PROCEDURE — 10002801 HB RX 250 W/O HCPCS

## 2024-07-24 PROCEDURE — 83735 ASSAY OF MAGNESIUM: CPT

## 2024-07-24 PROCEDURE — 82955 ASSAY OF G6PD ENZYME: CPT | Performed by: INTERNAL MEDICINE

## 2024-07-24 PROCEDURE — 10002807 HB RX 258

## 2024-07-24 PROCEDURE — 84100 ASSAY OF PHOSPHORUS: CPT

## 2024-07-24 PROCEDURE — 99233 SBSQ HOSP IP/OBS HIGH 50: CPT | Performed by: INTERNAL MEDICINE

## 2024-07-24 PROCEDURE — 10002800 HB RX 250 W HCPCS: Performed by: NURSE PRACTITIONER

## 2024-07-24 PROCEDURE — 10002807 HB RX 258: Performed by: NURSE PRACTITIONER

## 2024-07-24 PROCEDURE — 99024 POSTOP FOLLOW-UP VISIT: CPT | Performed by: TRANSPLANT SURGERY

## 2024-07-24 PROCEDURE — 10004651 HB RX, NO CHARGE ITEM

## 2024-07-24 PROCEDURE — 10002800 HB RX 250 W HCPCS: Performed by: INTERNAL MEDICINE

## 2024-07-24 PROCEDURE — 85025 COMPLETE CBC W/AUTO DIFF WBC: CPT

## 2024-07-24 PROCEDURE — 10002800 HB RX 250 W HCPCS

## 2024-07-24 PROCEDURE — 10002803 HB RX 637: Performed by: INTERNAL MEDICINE

## 2024-07-24 PROCEDURE — 80048 BASIC METABOLIC PNL TOTAL CA: CPT

## 2024-07-24 PROCEDURE — 10002803 HB RX 637: Performed by: NURSE PRACTITIONER

## 2024-07-24 PROCEDURE — 99291 CRITICAL CARE FIRST HOUR: CPT | Performed by: NURSE PRACTITIONER

## 2024-07-24 PROCEDURE — 10000008 HB ROOM CHARGE ICU OR CCU

## 2024-07-24 PROCEDURE — 10002803 HB RX 637

## 2024-07-24 RX ORDER — LEVOTHYROXINE SODIUM 75 UG/1
150 TABLET ORAL
Status: DISCONTINUED | OUTPATIENT
Start: 2024-07-25 | End: 2024-07-24

## 2024-07-24 RX ORDER — SULFAMETHOXAZOLE AND TRIMETHOPRIM 400; 80 MG/1; MG/1
1 TABLET ORAL
Status: DISCONTINUED | OUTPATIENT
Start: 2024-07-26 | End: 2024-07-30 | Stop reason: HOSPADM

## 2024-07-24 RX ORDER — MYCOPHENOLIC ACID 360 MG/1
360 TABLET, DELAYED RELEASE ORAL
Status: DISCONTINUED | OUTPATIENT
Start: 2024-07-24 | End: 2024-07-30

## 2024-07-24 RX ORDER — LEVOTHYROXINE SODIUM 75 UG/1
150 TABLET ORAL EVERY MORNING
Status: DISCONTINUED | OUTPATIENT
Start: 2024-07-25 | End: 2024-07-30 | Stop reason: HOSPADM

## 2024-07-24 RX ORDER — VALGANCICLOVIR 450 MG/1
450 TABLET, FILM COATED ORAL
Status: DISCONTINUED | OUTPATIENT
Start: 2024-07-26 | End: 2024-07-30 | Stop reason: HOSPADM

## 2024-07-24 RX ADMIN — SODIUM CHLORIDE: 4.5 INJECTION, SOLUTION INTRAVENOUS at 12:57

## 2024-07-24 RX ADMIN — SODIUM ZIRCONIUM CYCLOSILICATE 10 G: 10 POWDER, FOR SUSPENSION ORAL at 17:45

## 2024-07-24 RX ADMIN — HEPARIN SODIUM 5000 UNITS: 5000 INJECTION INTRAVENOUS; SUBCUTANEOUS at 21:38

## 2024-07-24 RX ADMIN — DIPHENHYDRAMINE HYDROCHLORIDE 50 MG: 25 CAPSULE ORAL at 10:15

## 2024-07-24 RX ADMIN — INSULIN LISPRO 1 UNITS: 100 INJECTION, SOLUTION INTRAVENOUS; SUBCUTANEOUS at 21:00

## 2024-07-24 RX ADMIN — SODIUM CHLORIDE 250 ML: 9 INJECTION, SOLUTION INTRAVENOUS at 15:05

## 2024-07-24 RX ADMIN — CHLORHEXIDINE GLUCONATE 15 ML: 1.2 RINSE ORAL at 09:15

## 2024-07-24 RX ADMIN — CEFAZOLIN SODIUM 2000 MG: 300 INJECTION, POWDER, LYOPHILIZED, FOR SOLUTION INTRAVENOUS at 14:00

## 2024-07-24 RX ADMIN — ALLOPURINOL 100 MG: 100 TABLET ORAL at 10:10

## 2024-07-24 RX ADMIN — LABETALOL HYDROCHLORIDE 50 MG: 100 TABLET ORAL at 13:02

## 2024-07-24 RX ADMIN — ACETAMINOPHEN 325 MG: 325 TABLET ORAL at 10:28

## 2024-07-24 RX ADMIN — CEFAZOLIN SODIUM 2000 MG: 300 INJECTION, POWDER, LYOPHILIZED, FOR SOLUTION INTRAVENOUS at 21:39

## 2024-07-24 RX ADMIN — SODIUM CHLORIDE, PRESERVATIVE FREE 2 ML: 5 INJECTION INTRAVENOUS at 09:16

## 2024-07-24 RX ADMIN — SODIUM CHLORIDE: 4.5 INJECTION, SOLUTION INTRAVENOUS at 11:05

## 2024-07-24 RX ADMIN — SODIUM CHLORIDE, PRESERVATIVE FREE 2 ML: 5 INJECTION INTRAVENOUS at 21:40

## 2024-07-24 RX ADMIN — TACROLIMUS 5 MG: 4 TABLET, EXTENDED RELEASE ORAL at 10:27

## 2024-07-24 RX ADMIN — DOCUSATE SODIUM 100 MG: 100 CAPSULE, LIQUID FILLED ORAL at 21:38

## 2024-07-24 RX ADMIN — LEVOTHYROXINE SODIUM 175 MCG: 0.03 TABLET ORAL at 06:00

## 2024-07-24 RX ADMIN — ATORVASTATIN CALCIUM 40 MG: 40 TABLET, FILM COATED ORAL at 21:38

## 2024-07-24 RX ADMIN — DOCUSATE SODIUM 100 MG: 100 CAPSULE, LIQUID FILLED ORAL at 10:00

## 2024-07-24 RX ADMIN — SODIUM CHLORIDE: 9 INJECTION, SOLUTION INTRAVENOUS at 12:04

## 2024-07-24 RX ADMIN — BUPRENORPHINE AND NALOXONE 0.5 EACH: 8; 2 FILM BUCCAL; SUBLINGUAL at 21:38

## 2024-07-24 RX ADMIN — SODIUM CHLORIDE: 9 INJECTION, SOLUTION INTRAVENOUS at 14:03

## 2024-07-24 RX ADMIN — FUROSEMIDE 40 MG: 10 INJECTION, SOLUTION INTRAMUSCULAR; INTRAVENOUS at 06:01

## 2024-07-24 RX ADMIN — SODIUM CHLORIDE 250 MG: 9 INJECTION, SOLUTION INTRAVENOUS at 10:25

## 2024-07-24 RX ADMIN — FUROSEMIDE 40 MG: 10 INJECTION, SOLUTION INTRAMUSCULAR; INTRAVENOUS at 17:52

## 2024-07-24 RX ADMIN — OXYCODONE HYDROCHLORIDE 2.5 MG: 5 TABLET ORAL at 01:15

## 2024-07-24 RX ADMIN — ACETAMINOPHEN 500 MG: 500 TABLET ORAL at 14:04

## 2024-07-24 RX ADMIN — ACETAMINOPHEN 1000 MG: 500 TABLET ORAL at 21:39

## 2024-07-24 RX ADMIN — SODIUM CHLORIDE: 4.5 INJECTION, SOLUTION INTRAVENOUS at 09:09

## 2024-07-24 RX ADMIN — FAMOTIDINE 20 MG: 20 TABLET, FILM COATED ORAL at 10:10

## 2024-07-24 RX ADMIN — ANTI-THYMOCYTE GLOBULIN (RABBIT) 100 MG: 5 INJECTION, POWDER, LYOPHILIZED, FOR SOLUTION INTRAVENOUS at 11:36

## 2024-07-24 RX ADMIN — FUROSEMIDE 40 MG: 10 INJECTION, SOLUTION INTRAMUSCULAR; INTRAVENOUS at 10:18

## 2024-07-24 RX ADMIN — MYCOPHENOLIC ACID 360 MG: 360 TABLET, DELAYED RELEASE ORAL at 18:55

## 2024-07-24 RX ADMIN — CEFAZOLIN SODIUM 2000 MG: 300 INJECTION, POWDER, LYOPHILIZED, FOR SOLUTION INTRAVENOUS at 06:01

## 2024-07-24 RX ADMIN — ACETAMINOPHEN 1000 MG: 500 TABLET ORAL at 06:00

## 2024-07-24 RX ADMIN — FUROSEMIDE 40 MG: 10 INJECTION, SOLUTION INTRAMUSCULAR; INTRAVENOUS at 02:01

## 2024-07-24 RX ADMIN — FUROSEMIDE 40 MG: 10 INJECTION, SOLUTION INTRAMUSCULAR; INTRAVENOUS at 21:39

## 2024-07-24 RX ADMIN — SODIUM CHLORIDE: 9 INJECTION, SOLUTION INTRAVENOUS at 17:58

## 2024-07-24 RX ADMIN — INSULIN LISPRO 1 UNITS: 100 INJECTION, SOLUTION INTRAVENOUS; SUBCUTANEOUS at 17:07

## 2024-07-24 RX ADMIN — LABETALOL HYDROCHLORIDE 50 MG: 100 TABLET ORAL at 21:38

## 2024-07-24 RX ADMIN — FUROSEMIDE 40 MG: 10 INJECTION, SOLUTION INTRAMUSCULAR; INTRAVENOUS at 14:04

## 2024-07-24 RX ADMIN — MYCOPHENOLIC ACID 360 MG: 360 TABLET, DELAYED RELEASE ORAL at 10:11

## 2024-07-24 RX ADMIN — SODIUM CHLORIDE: 9 INJECTION, SOLUTION INTRAVENOUS at 10:09

## 2024-07-24 RX ADMIN — HEPARIN SODIUM 5000 UNITS: 5000 INJECTION INTRAVENOUS; SUBCUTANEOUS at 14:04

## 2024-07-24 RX ADMIN — SODIUM CHLORIDE: 9 INJECTION, SOLUTION INTRAVENOUS at 08:05

## 2024-07-24 RX ADMIN — HEPARIN SODIUM 5000 UNITS: 5000 INJECTION INTRAVENOUS; SUBCUTANEOUS at 06:00

## 2024-07-24 RX ADMIN — BUPRENORPHINE AND NALOXONE 0.5 EACH: 8; 2 FILM BUCCAL; SUBLINGUAL at 09:12

## 2024-07-24 ASSESSMENT — ENCOUNTER SYMPTOMS
ALLERGIC/IMMUNOLOGIC NEGATIVE: 1
CONSTITUTIONAL NEGATIVE: 1
RESPIRATORY NEGATIVE: 1
PSYCHIATRIC NEGATIVE: 1
ABDOMINAL PAIN: 1
HEMATOLOGIC/LYMPHATIC NEGATIVE: 1
EYES NEGATIVE: 1
ENDOCRINE NEGATIVE: 1
NEUROLOGICAL NEGATIVE: 1

## 2024-07-24 ASSESSMENT — PAIN SCALES - GENERAL
PAINLEVEL_OUTOF10: 2
PAINLEVEL_OUTOF10: 5
PAINLEVEL_OUTOF10: 6

## 2024-07-25 ENCOUNTER — CLINICAL DOCUMENTATION (OUTPATIENT)
Dept: TRANSPLANT | Age: 59
End: 2024-07-25

## 2024-07-25 ENCOUNTER — TELEPHONE (OUTPATIENT)
Dept: TRANSPLANT | Age: 59
End: 2024-07-25

## 2024-07-25 LAB
ANION GAP SERPL CALC-SCNC: 15 MMOL/L (ref 7–19)
BASOPHILS # BLD: 0 K/MCL (ref 0–0.3)
BASOPHILS NFR BLD: 0 %
BUN SERPL-MCNC: 64 MG/DL (ref 6–20)
BUN/CREAT SERPL: 8 (ref 7–25)
CALCIUM SERPL-MCNC: 7.6 MG/DL (ref 8.4–10.2)
CHLORIDE SERPL-SCNC: 98 MMOL/L (ref 97–110)
CO2 SERPL-SCNC: 21 MMOL/L (ref 21–32)
CREAT SERPL-MCNC: 7.84 MG/DL (ref 0.67–1.17)
DEPRECATED RDW RBC: 53.4 FL (ref 39–50)
EGFRCR SERPLBLD CKD-EPI 2021: 7 ML/MIN/{1.73_M2}
EOSINOPHIL # BLD: 0 K/MCL (ref 0–0.5)
EOSINOPHIL NFR BLD: 0 %
ERYTHROCYTE [DISTWIDTH] IN BLOOD: 14 % (ref 11–15)
FASTING DURATION TIME PATIENT: ABNORMAL H
GLUCOSE BLDC GLUCOMTR-MCNC: 153 MG/DL (ref 70–99)
GLUCOSE BLDC GLUCOMTR-MCNC: 153 MG/DL (ref 70–99)
GLUCOSE BLDC GLUCOMTR-MCNC: 160 MG/DL (ref 70–99)
GLUCOSE SERPL-MCNC: 150 MG/DL (ref 70–99)
HCT VFR BLD CALC: 28.4 % (ref 39–51)
HGB BLD-MCNC: 9.3 G/DL (ref 13–17)
IMM GRANULOCYTES # BLD AUTO: 0.1 K/MCL (ref 0–0.2)
IMM GRANULOCYTES # BLD: 1 %
LYMPHOCYTES # BLD: 0.1 K/MCL (ref 1–4)
LYMPHOCYTES NFR BLD: 1 %
MAGNESIUM SERPL-MCNC: 2.3 MG/DL (ref 1.7–2.4)
MCH RBC QN AUTO: 34.2 PG (ref 26–34)
MCHC RBC AUTO-ENTMCNC: 32.7 G/DL (ref 32–36.5)
MCV RBC AUTO: 104.4 FL (ref 78–100)
MONOCYTES # BLD: 0.6 K/MCL (ref 0.3–0.9)
MONOCYTES NFR BLD: 6 %
NEUTROPHILS # BLD: 10.3 K/MCL (ref 1.8–7.7)
NEUTROPHILS NFR BLD: 92 %
NRBC BLD MANUAL-RTO: 0 /100 WBC
PHOSPHATE SERPL-MCNC: 5.9 MG/DL (ref 2.4–4.7)
PLATELET # BLD AUTO: 61 K/MCL (ref 140–450)
POTASSIUM SERPL-SCNC: 3.9 MMOL/L (ref 3.4–5.1)
RBC # BLD: 2.72 MIL/MCL (ref 4.5–5.9)
SODIUM SERPL-SCNC: 130 MMOL/L (ref 135–145)
TACROLIMUS BLD-MCNC: 2 NG/ML (ref 5–20)
WBC # BLD: 11.1 K/MCL (ref 4.2–11)

## 2024-07-25 PROCEDURE — 80048 BASIC METABOLIC PNL TOTAL CA: CPT

## 2024-07-25 PROCEDURE — 85025 COMPLETE CBC W/AUTO DIFF WBC: CPT

## 2024-07-25 PROCEDURE — 80197 ASSAY OF TACROLIMUS: CPT | Performed by: INTERNAL MEDICINE

## 2024-07-25 PROCEDURE — 10002800 HB RX 250 W HCPCS: Performed by: INTERNAL MEDICINE

## 2024-07-25 PROCEDURE — 99233 SBSQ HOSP IP/OBS HIGH 50: CPT | Performed by: INTERNAL MEDICINE

## 2024-07-25 PROCEDURE — 10002800 HB RX 250 W HCPCS: Performed by: NURSE PRACTITIONER

## 2024-07-25 PROCEDURE — 83735 ASSAY OF MAGNESIUM: CPT

## 2024-07-25 PROCEDURE — 10004651 HB RX, NO CHARGE ITEM

## 2024-07-25 PROCEDURE — 10002803 HB RX 637: Performed by: NURSE PRACTITIONER

## 2024-07-25 PROCEDURE — 96372 THER/PROPH/DIAG INJ SC/IM: CPT | Performed by: NURSE PRACTITIONER

## 2024-07-25 PROCEDURE — 84100 ASSAY OF PHOSPHORUS: CPT

## 2024-07-25 PROCEDURE — 10002801 HB RX 250 W/O HCPCS

## 2024-07-25 PROCEDURE — 10002803 HB RX 637: Performed by: TRANSPLANT SURGERY

## 2024-07-25 PROCEDURE — 99291 CRITICAL CARE FIRST HOUR: CPT

## 2024-07-25 PROCEDURE — 10002803 HB RX 637

## 2024-07-25 PROCEDURE — 10000002 HB ROOM CHARGE MED SURG

## 2024-07-25 PROCEDURE — 99231 SBSQ HOSP IP/OBS SF/LOW 25: CPT | Performed by: TRANSPLANT SURGERY

## 2024-07-25 PROCEDURE — 10002803 HB RX 637: Performed by: INTERNAL MEDICINE

## 2024-07-25 PROCEDURE — 10002800 HB RX 250 W HCPCS

## 2024-07-25 RX ORDER — ONDANSETRON 4 MG/1
4 TABLET, ORALLY DISINTEGRATING ORAL EVERY 12 HOURS PRN
Status: DISCONTINUED | OUTPATIENT
Start: 2024-07-25 | End: 2024-07-30 | Stop reason: HOSPADM

## 2024-07-25 RX ORDER — PROCHLORPERAZINE EDISYLATE 5 MG/ML
10 INJECTION INTRAMUSCULAR; INTRAVENOUS ONCE
Status: COMPLETED | OUTPATIENT
Start: 2024-07-25 | End: 2024-07-25

## 2024-07-25 RX ORDER — LACTULOSE 10 G/15ML
30 SOLUTION ORAL DAILY
Status: DISCONTINUED | OUTPATIENT
Start: 2024-07-25 | End: 2024-07-27

## 2024-07-25 RX ADMIN — LEVOTHYROXINE SODIUM 150 MCG: 75 TABLET ORAL at 03:28

## 2024-07-25 RX ADMIN — FUROSEMIDE 40 MG: 10 INJECTION, SOLUTION INTRAMUSCULAR; INTRAVENOUS at 02:02

## 2024-07-25 RX ADMIN — HYDROMORPHONE HYDROCHLORIDE 0.2 MG: 1 INJECTION, SOLUTION INTRAMUSCULAR; INTRAVENOUS; SUBCUTANEOUS at 23:16

## 2024-07-25 RX ADMIN — MYCOPHENOLIC ACID 360 MG: 360 TABLET, DELAYED RELEASE ORAL at 18:14

## 2024-07-25 RX ADMIN — INSULIN LISPRO 1 UNITS: 100 INJECTION, SOLUTION INTRAVENOUS; SUBCUTANEOUS at 08:41

## 2024-07-25 RX ADMIN — FAMOTIDINE 20 MG: 20 TABLET, FILM COATED ORAL at 08:40

## 2024-07-25 RX ADMIN — HEPARIN SODIUM 5000 UNITS: 5000 INJECTION INTRAVENOUS; SUBCUTANEOUS at 05:44

## 2024-07-25 RX ADMIN — LACTULOSE 30 G: 10 SOLUTION ORAL at 16:36

## 2024-07-25 RX ADMIN — TACROLIMUS 5 MG: 4 TABLET, EXTENDED RELEASE ORAL at 06:55

## 2024-07-25 RX ADMIN — ACETAMINOPHEN 1000 MG: 500 TABLET ORAL at 13:47

## 2024-07-25 RX ADMIN — FUROSEMIDE 40 MG: 10 INJECTION, SOLUTION INTRAMUSCULAR; INTRAVENOUS at 18:14

## 2024-07-25 RX ADMIN — CHLORHEXIDINE GLUCONATE 15 ML: 1.2 RINSE ORAL at 08:40

## 2024-07-25 RX ADMIN — SODIUM CHLORIDE: 4.5 INJECTION, SOLUTION INTRAVENOUS at 18:13

## 2024-07-25 RX ADMIN — FUROSEMIDE 40 MG: 10 INJECTION, SOLUTION INTRAMUSCULAR; INTRAVENOUS at 13:49

## 2024-07-25 RX ADMIN — INSULIN LISPRO 1 UNITS: 100 INJECTION, SOLUTION INTRAVENOUS; SUBCUTANEOUS at 13:06

## 2024-07-25 RX ADMIN — SODIUM CHLORIDE, PRESERVATIVE FREE 2 ML: 5 INJECTION INTRAVENOUS at 22:30

## 2024-07-25 RX ADMIN — FUROSEMIDE 40 MG: 10 INJECTION, SOLUTION INTRAMUSCULAR; INTRAVENOUS at 05:44

## 2024-07-25 RX ADMIN — PROCHLORPERAZINE EDISYLATE 10 MG: 5 INJECTION INTRAMUSCULAR; INTRAVENOUS at 22:53

## 2024-07-25 RX ADMIN — BUPRENORPHINE AND NALOXONE 0.5 EACH: 8; 2 FILM BUCCAL; SUBLINGUAL at 08:40

## 2024-07-25 RX ADMIN — FUROSEMIDE 40 MG: 10 INJECTION, SOLUTION INTRAMUSCULAR; INTRAVENOUS at 22:54

## 2024-07-25 RX ADMIN — TACROLIMUS 3 MG: 1 TABLET, EXTENDED RELEASE ORAL at 11:54

## 2024-07-25 RX ADMIN — SODIUM CHLORIDE, PRESERVATIVE FREE 2 ML: 5 INJECTION INTRAVENOUS at 08:41

## 2024-07-25 RX ADMIN — INSULIN LISPRO 1 UNITS: 100 INJECTION, SOLUTION INTRAVENOUS; SUBCUTANEOUS at 16:35

## 2024-07-25 RX ADMIN — LABETALOL HYDROCHLORIDE 50 MG: 100 TABLET ORAL at 08:40

## 2024-07-25 RX ADMIN — ONDANSETRON 4 MG: 4 TABLET, ORALLY DISINTEGRATING ORAL at 23:03

## 2024-07-25 RX ADMIN — METHYLPREDNISOLONE SODIUM SUCCINATE 125 MG: 125 INJECTION, POWDER, FOR SOLUTION INTRAMUSCULAR; INTRAVENOUS at 09:00

## 2024-07-25 RX ADMIN — CEFAZOLIN SODIUM 2000 MG: 300 INJECTION, POWDER, LYOPHILIZED, FOR SOLUTION INTRAVENOUS at 05:44

## 2024-07-25 RX ADMIN — ACETAMINOPHEN 1000 MG: 500 TABLET ORAL at 05:44

## 2024-07-25 RX ADMIN — MYCOPHENOLIC ACID 360 MG: 360 TABLET, DELAYED RELEASE ORAL at 06:55

## 2024-07-25 RX ADMIN — DOCUSATE SODIUM 100 MG: 100 CAPSULE, LIQUID FILLED ORAL at 08:40

## 2024-07-25 RX ADMIN — ALLOPURINOL 100 MG: 100 TABLET ORAL at 08:40

## 2024-07-25 RX ADMIN — FUROSEMIDE 40 MG: 10 INJECTION, SOLUTION INTRAMUSCULAR; INTRAVENOUS at 09:00

## 2024-07-25 RX ADMIN — HEPARIN SODIUM 5000 UNITS: 5000 INJECTION INTRAVENOUS; SUBCUTANEOUS at 13:49

## 2024-07-25 ASSESSMENT — PAIN SCALES - GENERAL
PAINLEVEL_OUTOF10: 5
PAINLEVEL_OUTOF10: 0
PAINLEVEL_OUTOF10: 6
PAINLEVEL_OUTOF10: 0
PAINLEVEL_OUTOF10: 7
PAINLEVEL_OUTOF10: 0
PAINLEVEL_OUTOF10: 0

## 2024-07-26 LAB
ABO + RH BLD: NORMAL
ANION GAP SERPL CALC-SCNC: 16 MMOL/L (ref 7–19)
BASOPHILS # BLD: 0 K/MCL (ref 0–0.3)
BASOPHILS NFR BLD: 0 %
BLD GP AB SCN SERPL QL GEL: NEGATIVE
BUN SERPL-MCNC: 75 MG/DL (ref 6–20)
BUN/CREAT SERPL: 10 (ref 7–25)
CALCIUM SERPL-MCNC: 8 MG/DL (ref 8.4–10.2)
CHLORIDE SERPL-SCNC: 99 MMOL/L (ref 97–110)
CO2 SERPL-SCNC: 21 MMOL/L (ref 21–32)
CREAT SERPL-MCNC: 7.25 MG/DL (ref 0.67–1.17)
DEPRECATED RDW RBC: 52.4 FL (ref 39–50)
EGFRCR SERPLBLD CKD-EPI 2021: 8 ML/MIN/{1.73_M2}
EOSINOPHIL # BLD: 0 K/MCL (ref 0–0.5)
EOSINOPHIL NFR BLD: 0 %
ERYTHROCYTE [DISTWIDTH] IN BLOOD: 14.3 % (ref 11–15)
FASTING DURATION TIME PATIENT: ABNORMAL H
G6PD RBC-CCNT: 17.8 U/G HB (ref 9.9–16.6)
GLUCOSE BLDC GLUCOMTR-MCNC: 102 MG/DL (ref 70–99)
GLUCOSE BLDC GLUCOMTR-MCNC: 120 MG/DL (ref 70–99)
GLUCOSE BLDC GLUCOMTR-MCNC: 134 MG/DL (ref 70–99)
GLUCOSE BLDC GLUCOMTR-MCNC: 170 MG/DL (ref 70–99)
GLUCOSE SERPL-MCNC: 115 MG/DL (ref 70–99)
HCT VFR BLD CALC: 27.5 % (ref 39–51)
HGB BLD-MCNC: 9.3 G/DL (ref 13–17)
IMM GRANULOCYTES # BLD AUTO: 0.1 K/MCL (ref 0–0.2)
IMM GRANULOCYTES # BLD: 1 %
LYMPHOCYTES # BLD: 0.1 K/MCL (ref 1–4)
LYMPHOCYTES NFR BLD: 1 %
MAGNESIUM SERPL-MCNC: 2.7 MG/DL (ref 1.7–2.4)
MCH RBC QN AUTO: 34.3 PG (ref 26–34)
MCHC RBC AUTO-ENTMCNC: 33.8 G/DL (ref 32–36.5)
MCV RBC AUTO: 101.5 FL (ref 78–100)
MONOCYTES # BLD: 1.1 K/MCL (ref 0.3–0.9)
MONOCYTES NFR BLD: 10 %
NEUTROPHILS # BLD: 9.1 K/MCL (ref 1.8–7.7)
NEUTROPHILS NFR BLD: 88 %
NRBC BLD MANUAL-RTO: 0 /100 WBC
PHOSPHATE SERPL-MCNC: 6 MG/DL (ref 2.4–4.7)
PLATELET # BLD AUTO: 80 K/MCL (ref 140–450)
POTASSIUM SERPL-SCNC: 3.1 MMOL/L (ref 3.4–5.1)
RBC # BLD: 2.71 MIL/MCL (ref 4.5–5.9)
SODIUM SERPL-SCNC: 133 MMOL/L (ref 135–145)
TACROLIMUS BLD-MCNC: 5.5 NG/ML (ref 5–20)
TYPE AND SCREEN EXPIRATION DATE: NORMAL
WBC # BLD: 10.4 K/MCL (ref 4.2–11)

## 2024-07-26 PROCEDURE — 84100 ASSAY OF PHOSPHORUS: CPT

## 2024-07-26 PROCEDURE — 80048 BASIC METABOLIC PNL TOTAL CA: CPT

## 2024-07-26 PROCEDURE — 10006031 HB ROOM CHARGE TELEMETRY

## 2024-07-26 PROCEDURE — 10004651 HB RX, NO CHARGE ITEM

## 2024-07-26 PROCEDURE — 10002803 HB RX 637

## 2024-07-26 PROCEDURE — 10002803 HB RX 637: Performed by: INTERNAL MEDICINE

## 2024-07-26 PROCEDURE — 86850 RBC ANTIBODY SCREEN: CPT

## 2024-07-26 PROCEDURE — 80197 ASSAY OF TACROLIMUS: CPT | Performed by: INTERNAL MEDICINE

## 2024-07-26 PROCEDURE — 10002807 HB RX 258: Performed by: INTERNAL MEDICINE

## 2024-07-26 PROCEDURE — 99233 SBSQ HOSP IP/OBS HIGH 50: CPT | Performed by: INTERNAL MEDICINE

## 2024-07-26 PROCEDURE — 99231 SBSQ HOSP IP/OBS SF/LOW 25: CPT | Performed by: TRANSPLANT SURGERY

## 2024-07-26 PROCEDURE — 85025 COMPLETE CBC W/AUTO DIFF WBC: CPT

## 2024-07-26 PROCEDURE — 10002803 HB RX 637: Performed by: NURSE PRACTITIONER

## 2024-07-26 PROCEDURE — 10002803 HB RX 637: Performed by: TRANSPLANT SURGERY

## 2024-07-26 PROCEDURE — 10004651 HB RX, NO CHARGE ITEM: Performed by: INTERNAL MEDICINE

## 2024-07-26 PROCEDURE — 10002800 HB RX 250 W HCPCS

## 2024-07-26 PROCEDURE — 83735 ASSAY OF MAGNESIUM: CPT

## 2024-07-26 PROCEDURE — 10002800 HB RX 250 W HCPCS: Performed by: INTERNAL MEDICINE

## 2024-07-26 PROCEDURE — 10002800 HB RX 250 W HCPCS: Performed by: NURSE PRACTITIONER

## 2024-07-26 PROCEDURE — 96372 THER/PROPH/DIAG INJ SC/IM: CPT | Performed by: NURSE PRACTITIONER

## 2024-07-26 PROCEDURE — 10002800 HB RX 250 W HCPCS: Performed by: TRANSPLANT SURGERY

## 2024-07-26 RX ORDER — METOCLOPRAMIDE HYDROCHLORIDE 5 MG/ML
5 INJECTION INTRAMUSCULAR; INTRAVENOUS EVERY 6 HOURS PRN
Status: DISCONTINUED | OUTPATIENT
Start: 2024-07-26 | End: 2024-07-30 | Stop reason: HOSPADM

## 2024-07-26 RX ORDER — POTASSIUM CHLORIDE 29.8 MG/ML
20 INJECTION INTRAVENOUS ONCE
Status: COMPLETED | OUTPATIENT
Start: 2024-07-26 | End: 2024-07-26

## 2024-07-26 RX ORDER — FUROSEMIDE 10 MG/ML
40 INJECTION INTRAMUSCULAR; INTRAVENOUS EVERY 6 HOURS
Status: DISCONTINUED | OUTPATIENT
Start: 2024-07-26 | End: 2024-07-27

## 2024-07-26 RX ORDER — POLYETHYLENE GLYCOL 3350 17 G/17G
17 POWDER, FOR SOLUTION ORAL DAILY
Status: DISCONTINUED | OUTPATIENT
Start: 2024-07-26 | End: 2024-07-30 | Stop reason: HOSPADM

## 2024-07-26 RX ORDER — OXYCODONE HYDROCHLORIDE 5 MG/1
2.5 TABLET ORAL EVERY 6 HOURS PRN
Status: DISCONTINUED | OUTPATIENT
Start: 2024-07-26 | End: 2024-07-30 | Stop reason: HOSPADM

## 2024-07-26 RX ORDER — ONDANSETRON 2 MG/ML
4 INJECTION INTRAMUSCULAR; INTRAVENOUS ONCE
Status: COMPLETED | OUTPATIENT
Start: 2024-07-26 | End: 2024-07-26

## 2024-07-26 RX ORDER — DIPHENHYDRAMINE HCL 25 MG
50 CAPSULE ORAL ONCE
Status: COMPLETED | OUTPATIENT
Start: 2024-07-26 | End: 2024-07-26

## 2024-07-26 RX ORDER — LACTULOSE 10 G/15ML
20 SOLUTION ORAL DAILY PRN
Status: DISCONTINUED | OUTPATIENT
Start: 2024-07-26 | End: 2024-07-30 | Stop reason: HOSPADM

## 2024-07-26 RX ORDER — ACETAMINOPHEN 325 MG/1
325 TABLET ORAL ONCE
Status: COMPLETED | OUTPATIENT
Start: 2024-07-26 | End: 2024-07-26

## 2024-07-26 RX ORDER — AMOXICILLIN 250 MG
2 CAPSULE ORAL NIGHTLY
Status: DISCONTINUED | OUTPATIENT
Start: 2024-07-26 | End: 2024-07-30 | Stop reason: HOSPADM

## 2024-07-26 RX ADMIN — DOCUSATE SODIUM 100 MG: 100 CAPSULE, LIQUID FILLED ORAL at 09:16

## 2024-07-26 RX ADMIN — VALGANCICLOVIR HYDROCHLORIDE 450 MG: 450 TABLET ORAL at 09:13

## 2024-07-26 RX ADMIN — ONDANSETRON 4 MG: 2 INJECTION INTRAMUSCULAR; INTRAVENOUS at 02:35

## 2024-07-26 RX ADMIN — ACETAMINOPHEN 1000 MG: 500 TABLET ORAL at 08:07

## 2024-07-26 RX ADMIN — FUROSEMIDE 40 MG: 10 INJECTION, SOLUTION INTRAMUSCULAR; INTRAVENOUS at 20:40

## 2024-07-26 RX ADMIN — LABETALOL HYDROCHLORIDE 50 MG: 100 TABLET ORAL at 09:13

## 2024-07-26 RX ADMIN — MYCOPHENOLIC ACID 360 MG: 360 TABLET, DELAYED RELEASE ORAL at 18:31

## 2024-07-26 RX ADMIN — LABETALOL HYDROCHLORIDE 50 MG: 100 TABLET ORAL at 20:46

## 2024-07-26 RX ADMIN — FAMOTIDINE 20 MG: 20 TABLET, FILM COATED ORAL at 09:14

## 2024-07-26 RX ADMIN — ALLOPURINOL 100 MG: 100 TABLET ORAL at 09:13

## 2024-07-26 RX ADMIN — INSULIN LISPRO 1 UNITS: 100 INJECTION, SOLUTION INTRAVENOUS; SUBCUTANEOUS at 17:01

## 2024-07-26 RX ADMIN — ATORVASTATIN CALCIUM 40 MG: 40 TABLET, FILM COATED ORAL at 20:46

## 2024-07-26 RX ADMIN — ONDANSETRON 4 MG: 2 INJECTION INTRAMUSCULAR; INTRAVENOUS at 08:07

## 2024-07-26 RX ADMIN — TACROLIMUS 2 MG: 1 TABLET, EXTENDED RELEASE ORAL at 14:48

## 2024-07-26 RX ADMIN — POTASSIUM CHLORIDE 20 MEQ: 29.8 INJECTION, SOLUTION INTRAVENOUS at 13:01

## 2024-07-26 RX ADMIN — BUPRENORPHINE AND NALOXONE 0.5 EACH: 8; 2 FILM BUCCAL; SUBLINGUAL at 20:46

## 2024-07-26 RX ADMIN — SENNOSIDES AND DOCUSATE SODIUM 2 TABLET: 50; 8.6 TABLET ORAL at 20:46

## 2024-07-26 RX ADMIN — SODIUM CHLORIDE, PRESERVATIVE FREE 2 ML: 5 INJECTION INTRAVENOUS at 09:23

## 2024-07-26 RX ADMIN — HEPARIN SODIUM 5000 UNITS: 5000 INJECTION INTRAVENOUS; SUBCUTANEOUS at 21:42

## 2024-07-26 RX ADMIN — MYCOPHENOLIC ACID 360 MG: 360 TABLET, DELAYED RELEASE ORAL at 06:36

## 2024-07-26 RX ADMIN — ACETAMINOPHEN 1000 MG: 500 TABLET ORAL at 21:41

## 2024-07-26 RX ADMIN — CHLORHEXIDINE GLUCONATE 15 ML: 1.2 RINSE ORAL at 09:17

## 2024-07-26 RX ADMIN — METOCLOPRAMIDE HYDROCHLORIDE 5 MG: 5 INJECTION INTRAMUSCULAR; INTRAVENOUS at 06:12

## 2024-07-26 RX ADMIN — TACROLIMUS 8 MG: 4 TABLET, EXTENDED RELEASE ORAL at 06:36

## 2024-07-26 RX ADMIN — METHYLPREDNISOLONE SODIUM SUCCINATE 75 MG: 125 INJECTION, POWDER, FOR SOLUTION INTRAMUSCULAR; INTRAVENOUS at 10:31

## 2024-07-26 RX ADMIN — FUROSEMIDE 40 MG: 10 INJECTION, SOLUTION INTRAMUSCULAR; INTRAVENOUS at 14:47

## 2024-07-26 RX ADMIN — BUPRENORPHINE AND NALOXONE 0.5 EACH: 8; 2 FILM BUCCAL; SUBLINGUAL at 09:18

## 2024-07-26 RX ADMIN — METOCLOPRAMIDE HYDROCHLORIDE 5 MG: 5 INJECTION INTRAMUSCULAR; INTRAVENOUS at 21:42

## 2024-07-26 RX ADMIN — POLYETHYLENE GLYCOL (3350) 17 G: 17 POWDER, FOR SOLUTION ORAL at 14:48

## 2024-07-26 RX ADMIN — FUROSEMIDE 40 MG: 10 INJECTION, SOLUTION INTRAMUSCULAR; INTRAVENOUS at 06:11

## 2024-07-26 RX ADMIN — ANTI-THYMOCYTE GLOBULIN (RABBIT) 100 MG: 5 INJECTION, POWDER, LYOPHILIZED, FOR SOLUTION INTRAVENOUS at 11:47

## 2024-07-26 RX ADMIN — ACETAMINOPHEN 1000 MG: 500 TABLET ORAL at 14:48

## 2024-07-26 RX ADMIN — SODIUM CHLORIDE, PRESERVATIVE FREE 2 ML: 5 INJECTION INTRAVENOUS at 20:49

## 2024-07-26 RX ADMIN — DIPHENHYDRAMINE HYDROCHLORIDE 50 MG: 25 CAPSULE ORAL at 10:30

## 2024-07-26 RX ADMIN — FUROSEMIDE 40 MG: 10 INJECTION, SOLUTION INTRAMUSCULAR; INTRAVENOUS at 02:35

## 2024-07-26 RX ADMIN — FUROSEMIDE 40 MG: 10 INJECTION, SOLUTION INTRAMUSCULAR; INTRAVENOUS at 11:52

## 2024-07-26 RX ADMIN — ACETAMINOPHEN 325 MG: 325 TABLET ORAL at 10:30

## 2024-07-26 RX ADMIN — LACTULOSE 30 G: 10 SOLUTION ORAL at 09:17

## 2024-07-26 RX ADMIN — SULFAMETHOXAZOLE AND TRIMETHOPRIM 1 TABLET: 400; 80 TABLET ORAL at 09:16

## 2024-07-26 RX ADMIN — POTASSIUM CHLORIDE 20 MEQ: 29.8 INJECTION, SOLUTION INTRAVENOUS at 10:50

## 2024-07-26 ASSESSMENT — PAIN SCALES - GENERAL
PAINLEVEL_OUTOF10: 4
PAINLEVEL_OUTOF10: 7
PAINLEVEL_OUTOF10: 6
PAINLEVEL_OUTOF10: 0

## 2024-07-27 VITALS
RESPIRATION RATE: 18 BRPM | HEART RATE: 90 BPM | DIASTOLIC BLOOD PRESSURE: 91 MMHG | SYSTOLIC BLOOD PRESSURE: 133 MMHG | OXYGEN SATURATION: 88 % | HEIGHT: 74 IN | WEIGHT: 221.34 LBS | TEMPERATURE: 97.9 F | BODY MASS INDEX: 28.41 KG/M2

## 2024-07-27 LAB
ALBUMIN SERPL-MCNC: 2.8 G/DL (ref 3.6–5.1)
ANION GAP SERPL CALC-SCNC: 14 MMOL/L (ref 7–19)
ANION GAP SERPL CALC-SCNC: 15 MMOL/L (ref 7–19)
BASOPHILS # BLD: 0 K/MCL (ref 0–0.3)
BASOPHILS NFR BLD: 0 %
BUN SERPL-MCNC: 88 MG/DL (ref 6–20)
BUN SERPL-MCNC: 94 MG/DL (ref 6–20)
BUN/CREAT SERPL: 11 (ref 7–25)
BUN/CREAT SERPL: 12 (ref 7–25)
CALCIUM SERPL-MCNC: 7.9 MG/DL (ref 8.4–10.2)
CALCIUM SERPL-MCNC: 8 MG/DL (ref 8.4–10.2)
CHLORIDE SERPL-SCNC: 101 MMOL/L (ref 97–110)
CHLORIDE SERPL-SCNC: 103 MMOL/L (ref 97–110)
CO2 SERPL-SCNC: 19 MMOL/L (ref 21–32)
CO2 SERPL-SCNC: 20 MMOL/L (ref 21–32)
CREAT SERPL-MCNC: 7.76 MG/DL (ref 0.67–1.17)
CREAT SERPL-MCNC: 7.77 MG/DL (ref 0.67–1.17)
DEPRECATED RDW RBC: 52.5 FL (ref 39–50)
EGFRCR SERPLBLD CKD-EPI 2021: 7 ML/MIN/{1.73_M2}
EGFRCR SERPLBLD CKD-EPI 2021: 7 ML/MIN/{1.73_M2}
EOSINOPHIL # BLD: 0 K/MCL (ref 0–0.5)
EOSINOPHIL NFR BLD: 0 %
ERYTHROCYTE [DISTWIDTH] IN BLOOD: 14.3 % (ref 11–15)
FASTING DURATION TIME PATIENT: ABNORMAL H
FASTING DURATION TIME PATIENT: ABNORMAL H
GLUCOSE BLDC GLUCOMTR-MCNC: 111 MG/DL (ref 70–99)
GLUCOSE BLDC GLUCOMTR-MCNC: 116 MG/DL (ref 70–99)
GLUCOSE BLDC GLUCOMTR-MCNC: 146 MG/DL (ref 70–99)
GLUCOSE BLDC GLUCOMTR-MCNC: 163 MG/DL (ref 70–99)
GLUCOSE SERPL-MCNC: 121 MG/DL (ref 70–99)
GLUCOSE SERPL-MCNC: 159 MG/DL (ref 70–99)
HCT VFR BLD CALC: 25.8 % (ref 39–51)
HGB BLD-MCNC: 8.8 G/DL (ref 13–17)
IMM GRANULOCYTES # BLD AUTO: 0.1 K/MCL (ref 0–0.2)
IMM GRANULOCYTES # BLD: 1 %
LYMPHOCYTES # BLD: 0.1 K/MCL (ref 1–4)
LYMPHOCYTES NFR BLD: 1 %
MAGNESIUM SERPL-MCNC: 2.8 MG/DL (ref 1.7–2.4)
MCH RBC QN AUTO: 34.6 PG (ref 26–34)
MCHC RBC AUTO-ENTMCNC: 34.1 G/DL (ref 32–36.5)
MCV RBC AUTO: 101.6 FL (ref 78–100)
MONOCYTES # BLD: 0.7 K/MCL (ref 0.3–0.9)
MONOCYTES NFR BLD: 12 %
NEUTROPHILS # BLD: 5.2 K/MCL (ref 1.8–7.7)
NEUTROPHILS NFR BLD: 86 %
NRBC BLD MANUAL-RTO: 0 /100 WBC
PHOSPHATE SERPL-MCNC: 5.6 MG/DL (ref 2.4–4.7)
PHOSPHATE SERPL-MCNC: 6.9 MG/DL (ref 2.4–4.7)
PLATELET # BLD AUTO: 68 K/MCL (ref 140–450)
POTASSIUM SERPL-SCNC: 3.4 MMOL/L (ref 3.4–5.1)
POTASSIUM SERPL-SCNC: 3.8 MMOL/L (ref 3.4–5.1)
RBC # BLD: 2.54 MIL/MCL (ref 4.5–5.9)
SODIUM SERPL-SCNC: 132 MMOL/L (ref 135–145)
SODIUM SERPL-SCNC: 133 MMOL/L (ref 135–145)
TACROLIMUS BLD-MCNC: 10.1 NG/ML (ref 5–20)
WBC # BLD: 6 K/MCL (ref 4.2–11)

## 2024-07-27 PROCEDURE — 10002803 HB RX 637: Performed by: NURSE PRACTITIONER

## 2024-07-27 PROCEDURE — 10002800 HB RX 250 W HCPCS: Performed by: INTERNAL MEDICINE

## 2024-07-27 PROCEDURE — 84100 ASSAY OF PHOSPHORUS: CPT

## 2024-07-27 PROCEDURE — 10002803 HB RX 637: Performed by: TRANSPLANT SURGERY

## 2024-07-27 PROCEDURE — 10002803 HB RX 637: Performed by: INTERNAL MEDICINE

## 2024-07-27 PROCEDURE — 96372 THER/PROPH/DIAG INJ SC/IM: CPT | Performed by: NURSE PRACTITIONER

## 2024-07-27 PROCEDURE — 83735 ASSAY OF MAGNESIUM: CPT

## 2024-07-27 PROCEDURE — 10004651 HB RX, NO CHARGE ITEM

## 2024-07-27 PROCEDURE — 99231 SBSQ HOSP IP/OBS SF/LOW 25: CPT | Performed by: TRANSPLANT SURGERY

## 2024-07-27 PROCEDURE — 10006031 HB ROOM CHARGE TELEMETRY

## 2024-07-27 PROCEDURE — 10002803 HB RX 637

## 2024-07-27 PROCEDURE — 80048 BASIC METABOLIC PNL TOTAL CA: CPT

## 2024-07-27 PROCEDURE — 85025 COMPLETE CBC W/AUTO DIFF WBC: CPT

## 2024-07-27 PROCEDURE — 80197 ASSAY OF TACROLIMUS: CPT | Performed by: INTERNAL MEDICINE

## 2024-07-27 PROCEDURE — 99233 SBSQ HOSP IP/OBS HIGH 50: CPT | Performed by: INTERNAL MEDICINE

## 2024-07-27 PROCEDURE — 10002807 HB RX 258: Performed by: INTERNAL MEDICINE

## 2024-07-27 PROCEDURE — 10002800 HB RX 250 W HCPCS: Performed by: NURSE PRACTITIONER

## 2024-07-27 PROCEDURE — 80069 RENAL FUNCTION PANEL: CPT | Performed by: INTERNAL MEDICINE

## 2024-07-27 RX ORDER — DOCUSATE SODIUM 100 MG/1
100 CAPSULE, LIQUID FILLED ORAL 3 TIMES DAILY
Status: DISCONTINUED | OUTPATIENT
Start: 2024-07-27 | End: 2024-07-30 | Stop reason: HOSPADM

## 2024-07-27 RX ORDER — BISACODYL 10 MG
10 SUPPOSITORY, RECTAL RECTAL ONCE
Status: COMPLETED | OUTPATIENT
Start: 2024-07-27 | End: 2024-07-27

## 2024-07-27 RX ORDER — LACTULOSE 10 G/15ML
20 SOLUTION ORAL EVERY 8 HOURS SCHEDULED
Status: DISCONTINUED | OUTPATIENT
Start: 2024-07-27 | End: 2024-07-30 | Stop reason: HOSPADM

## 2024-07-27 RX ORDER — SENNOSIDES A AND B 8.6 MG/1
1 TABLET, FILM COATED ORAL 3 TIMES DAILY
Status: DISCONTINUED | OUTPATIENT
Start: 2024-07-27 | End: 2024-07-30 | Stop reason: HOSPADM

## 2024-07-27 RX ORDER — FUROSEMIDE 10 MG/ML
40 INJECTION INTRAMUSCULAR; INTRAVENOUS EVERY 6 HOURS
Status: DISCONTINUED | OUTPATIENT
Start: 2024-07-27 | End: 2024-07-29

## 2024-07-27 RX ORDER — POTASSIUM CHLORIDE 1500 MG/1
40 TABLET, EXTENDED RELEASE ORAL ONCE
Status: COMPLETED | OUTPATIENT
Start: 2024-07-27 | End: 2024-07-27

## 2024-07-27 RX ADMIN — METHYLPREDNISOLONE SODIUM SUCCINATE 75 MG: 125 INJECTION, POWDER, FOR SOLUTION INTRAMUSCULAR; INTRAVENOUS at 10:04

## 2024-07-27 RX ADMIN — ATORVASTATIN CALCIUM 40 MG: 40 TABLET, FILM COATED ORAL at 21:43

## 2024-07-27 RX ADMIN — DOCUSATE SODIUM 100 MG: 100 CAPSULE, LIQUID FILLED ORAL at 21:44

## 2024-07-27 RX ADMIN — POLYETHYLENE GLYCOL (3350) 17 G: 17 POWDER, FOR SOLUTION ORAL at 08:34

## 2024-07-27 RX ADMIN — INSULIN LISPRO 1 UNITS: 100 INJECTION, SOLUTION INTRAVENOUS; SUBCUTANEOUS at 17:22

## 2024-07-27 RX ADMIN — SODIUM CHLORIDE: 9 INJECTION, SOLUTION INTRAVENOUS at 08:50

## 2024-07-27 RX ADMIN — FAMOTIDINE 20 MG: 20 TABLET, FILM COATED ORAL at 08:36

## 2024-07-27 RX ADMIN — LABETALOL HYDROCHLORIDE 50 MG: 100 TABLET ORAL at 08:36

## 2024-07-27 RX ADMIN — LEVOTHYROXINE SODIUM 150 MCG: 75 TABLET ORAL at 04:43

## 2024-07-27 RX ADMIN — FUROSEMIDE 40 MG: 10 INJECTION, SOLUTION INTRAMUSCULAR; INTRAVENOUS at 09:18

## 2024-07-27 RX ADMIN — FUROSEMIDE 40 MG: 10 INJECTION, SOLUTION INTRAMUSCULAR; INTRAVENOUS at 14:10

## 2024-07-27 RX ADMIN — ACETAMINOPHEN 1000 MG: 500 TABLET ORAL at 06:33

## 2024-07-27 RX ADMIN — LACTULOSE 30 G: 10 SOLUTION ORAL at 08:34

## 2024-07-27 RX ADMIN — SODIUM CHLORIDE, PRESERVATIVE FREE 2 ML: 5 INJECTION INTRAVENOUS at 08:37

## 2024-07-27 RX ADMIN — HEPARIN SODIUM 5000 UNITS: 5000 INJECTION INTRAVENOUS; SUBCUTANEOUS at 21:48

## 2024-07-27 RX ADMIN — MYCOPHENOLIC ACID 360 MG: 360 TABLET, DELAYED RELEASE ORAL at 06:33

## 2024-07-27 RX ADMIN — BISACODYL 10 MG: 10 SUPPOSITORY RECTAL at 18:41

## 2024-07-27 RX ADMIN — FUROSEMIDE 40 MG: 10 INJECTION, SOLUTION INTRAMUSCULAR; INTRAVENOUS at 02:27

## 2024-07-27 RX ADMIN — TACROLIMUS 10 MG: 4 TABLET, EXTENDED RELEASE ORAL at 06:33

## 2024-07-27 RX ADMIN — ACETAMINOPHEN 1000 MG: 500 TABLET ORAL at 21:44

## 2024-07-27 RX ADMIN — LACTULOSE 20 G: 10 SOLUTION ORAL at 21:45

## 2024-07-27 RX ADMIN — ALLOPURINOL 100 MG: 100 TABLET ORAL at 08:36

## 2024-07-27 RX ADMIN — SODIUM CHLORIDE: 9 INJECTION, SOLUTION INTRAVENOUS at 17:13

## 2024-07-27 RX ADMIN — BUPRENORPHINE AND NALOXONE 0.5 EACH: 8; 2 FILM BUCCAL; SUBLINGUAL at 08:35

## 2024-07-27 RX ADMIN — SODIUM CHLORIDE, PRESERVATIVE FREE 2 ML: 5 INJECTION INTRAVENOUS at 21:47

## 2024-07-27 RX ADMIN — ACETAMINOPHEN 1000 MG: 500 TABLET ORAL at 14:11

## 2024-07-27 RX ADMIN — SENNOSIDES AND DOCUSATE SODIUM 2 TABLET: 50; 8.6 TABLET ORAL at 21:44

## 2024-07-27 RX ADMIN — BUPRENORPHINE AND NALOXONE 0.5 EACH: 8; 2 FILM BUCCAL; SUBLINGUAL at 21:47

## 2024-07-27 RX ADMIN — HEPARIN SODIUM 5000 UNITS: 5000 INJECTION INTRAVENOUS; SUBCUTANEOUS at 14:10

## 2024-07-27 RX ADMIN — HEPARIN SODIUM 5000 UNITS: 5000 INJECTION INTRAVENOUS; SUBCUTANEOUS at 06:32

## 2024-07-27 RX ADMIN — MYCOPHENOLIC ACID 360 MG: 360 TABLET, DELAYED RELEASE ORAL at 18:42

## 2024-07-27 RX ADMIN — POTASSIUM CHLORIDE 40 MEQ: 1500 TABLET, EXTENDED RELEASE ORAL at 08:36

## 2024-07-27 RX ADMIN — LABETALOL HYDROCHLORIDE 50 MG: 100 TABLET ORAL at 21:55

## 2024-07-27 RX ADMIN — SENNOSIDES 8.6 MG: 8.6 TABLET, FILM COATED ORAL at 21:55

## 2024-07-27 RX ADMIN — FUROSEMIDE 40 MG: 10 INJECTION, SOLUTION INTRAMUSCULAR; INTRAVENOUS at 21:48

## 2024-07-27 ASSESSMENT — PAIN SCALES - GENERAL
PAINLEVEL_OUTOF10: 0

## 2024-07-28 ENCOUNTER — APPOINTMENT (OUTPATIENT)
Dept: ULTRASOUND IMAGING | Age: 59
DRG: 652 | End: 2024-07-28
Attending: TRANSPLANT SURGERY

## 2024-07-28 LAB
ALBUMIN SERPL-MCNC: 2.7 G/DL (ref 3.6–5.1)
ALBUMIN/GLOB SERPL: 1 {RATIO} (ref 1–2.4)
ALP SERPL-CCNC: 42 UNITS/L (ref 45–117)
ALT SERPL-CCNC: 11 UNITS/L
ANION GAP SERPL CALC-SCNC: 15 MMOL/L (ref 7–19)
AST SERPL-CCNC: 74 UNITS/L
BILIRUB SERPL-MCNC: 0.5 MG/DL (ref 0.2–1)
BUN SERPL-MCNC: 93 MG/DL (ref 6–20)
BUN/CREAT SERPL: 12 (ref 7–25)
CALCIUM SERPL-MCNC: 8.2 MG/DL (ref 8.4–10.2)
CHLORIDE SERPL-SCNC: 104 MMOL/L (ref 97–110)
CO2 SERPL-SCNC: 18 MMOL/L (ref 21–32)
CREAT SERPL-MCNC: 8.01 MG/DL (ref 0.67–1.17)
DEPRECATED RDW RBC: 54.7 FL (ref 39–50)
EGFRCR SERPLBLD CKD-EPI 2021: 7 ML/MIN/{1.73_M2}
ERYTHROCYTE [DISTWIDTH] IN BLOOD: 14.6 % (ref 11–15)
FASTING DURATION TIME PATIENT: ABNORMAL H
GLOBULIN SER-MCNC: 2.6 G/DL (ref 2–4)
GLUCOSE BLDC GLUCOMTR-MCNC: 102 MG/DL (ref 70–99)
GLUCOSE BLDC GLUCOMTR-MCNC: 106 MG/DL (ref 70–99)
GLUCOSE BLDC GLUCOMTR-MCNC: 110 MG/DL (ref 70–99)
GLUCOSE BLDC GLUCOMTR-MCNC: 112 MG/DL (ref 70–99)
GLUCOSE SERPL-MCNC: 107 MG/DL (ref 70–99)
HBV CORE IGG+IGM SER QL: NEGATIVE
HBV SURFACE AG SER QL: NEGATIVE
HCT VFR BLD CALC: 24.4 % (ref 39–51)
HGB BLD-MCNC: 8.2 G/DL (ref 13–17)
LYMPHOCYTES # BLD: 0.1 K/MCL (ref 1–4)
LYMPHOCYTES NFR BLD: 1 %
MAGNESIUM SERPL-MCNC: 2.7 MG/DL (ref 1.7–2.4)
MCH RBC QN AUTO: 34.7 PG (ref 26–34)
MCHC RBC AUTO-ENTMCNC: 33.6 G/DL (ref 32–36.5)
MCV RBC AUTO: 103.4 FL (ref 78–100)
METAMYELOCYTES NFR BLD: 2 % (ref 0–2)
MONOCYTES # BLD: 0.8 K/MCL (ref 0.3–0.9)
MONOCYTES NFR BLD: 12 %
NEUTROPHILS # BLD: 5.6 K/MCL (ref 1.8–7.7)
NEUTS BAND NFR BLD: 1 % (ref 0–10)
NEUTS SEG NFR BLD: 84 %
NRBC BLD MANUAL-RTO: 0 /100 WBC
PHOSPHATE SERPL-MCNC: 6.4 MG/DL (ref 2.4–4.7)
PLAT MORPH BLD: NORMAL
PLATELET # BLD AUTO: 85 K/MCL (ref 140–450)
POTASSIUM SERPL-SCNC: 3.5 MMOL/L (ref 3.4–5.1)
PROT SERPL-MCNC: 5.3 G/DL (ref 6.4–8.2)
RBC # BLD: 2.36 MIL/MCL (ref 4.5–5.9)
SCHISTOCYTES BLD QL SMEAR: ABNORMAL
SODIUM SERPL-SCNC: 133 MMOL/L (ref 135–145)
TACROLIMUS BLD-MCNC: 15.2 NG/ML (ref 5–20)
WBC # BLD: 6.6 K/MCL (ref 4.2–11)
WBC MORPH BLD: NORMAL
WBC TOXIC VACUOLES BLD QL SMEAR: PRESENT

## 2024-07-28 PROCEDURE — 83735 ASSAY OF MAGNESIUM: CPT

## 2024-07-28 PROCEDURE — 10002800 HB RX 250 W HCPCS: Performed by: INTERNAL MEDICINE

## 2024-07-28 PROCEDURE — 80053 COMPREHEN METABOLIC PANEL: CPT | Performed by: INTERNAL MEDICINE

## 2024-07-28 PROCEDURE — 10002807 HB RX 258

## 2024-07-28 PROCEDURE — 10002800 HB RX 250 W HCPCS: Performed by: NURSE PRACTITIONER

## 2024-07-28 PROCEDURE — 86706 HEP B SURFACE ANTIBODY: CPT | Performed by: INTERNAL MEDICINE

## 2024-07-28 PROCEDURE — 87340 HEPATITIS B SURFACE AG IA: CPT | Performed by: INTERNAL MEDICINE

## 2024-07-28 PROCEDURE — 96372 THER/PROPH/DIAG INJ SC/IM: CPT | Performed by: NURSE PRACTITIONER

## 2024-07-28 PROCEDURE — 10002803 HB RX 637

## 2024-07-28 PROCEDURE — 10002803 HB RX 637: Performed by: INTERNAL MEDICINE

## 2024-07-28 PROCEDURE — 10002803 HB RX 637: Performed by: TRANSPLANT SURGERY

## 2024-07-28 PROCEDURE — 5A1D70Z PERFORMANCE OF URINARY FILTRATION, INTERMITTENT, LESS THAN 6 HOURS PER DAY: ICD-10-PCS | Performed by: INTERNAL MEDICINE

## 2024-07-28 PROCEDURE — 10004651 HB RX, NO CHARGE ITEM

## 2024-07-28 PROCEDURE — 10002807 HB RX 258: Performed by: INTERNAL MEDICINE

## 2024-07-28 PROCEDURE — 90935 HEMODIALYSIS ONE EVALUATION: CPT

## 2024-07-28 PROCEDURE — 10006031 HB ROOM CHARGE TELEMETRY

## 2024-07-28 PROCEDURE — 10002803 HB RX 637: Performed by: NURSE PRACTITIONER

## 2024-07-28 PROCEDURE — 86704 HEP B CORE ANTIBODY TOTAL: CPT | Performed by: INTERNAL MEDICINE

## 2024-07-28 PROCEDURE — 99231 SBSQ HOSP IP/OBS SF/LOW 25: CPT | Performed by: TRANSPLANT SURGERY

## 2024-07-28 PROCEDURE — 99233 SBSQ HOSP IP/OBS HIGH 50: CPT | Performed by: INTERNAL MEDICINE

## 2024-07-28 PROCEDURE — 84100 ASSAY OF PHOSPHORUS: CPT

## 2024-07-28 PROCEDURE — 85027 COMPLETE CBC AUTOMATED: CPT

## 2024-07-28 PROCEDURE — 80197 ASSAY OF TACROLIMUS: CPT | Performed by: INTERNAL MEDICINE

## 2024-07-28 PROCEDURE — 76776 US EXAM K TRANSPL W/DOPPLER: CPT

## 2024-07-28 RX ORDER — DIPHENHYDRAMINE HCL 25 MG
50 CAPSULE ORAL ONCE
Status: COMPLETED | OUTPATIENT
Start: 2024-07-28 | End: 2024-07-28

## 2024-07-28 RX ORDER — 0.9 % SODIUM CHLORIDE 0.9 %
10 VIAL (ML) INJECTION PRN
Status: DISCONTINUED | OUTPATIENT
Start: 2024-07-28 | End: 2024-07-30 | Stop reason: HOSPADM

## 2024-07-28 RX ORDER — SODIUM CITRATE 4 % (5 ML)
3 SYRINGE (ML) MISCELLANEOUS PRN
Status: DISCONTINUED | OUTPATIENT
Start: 2024-07-28 | End: 2024-07-30 | Stop reason: HOSPADM

## 2024-07-28 RX ADMIN — OXYCODONE HYDROCHLORIDE 2.5 MG: 5 TABLET ORAL at 20:56

## 2024-07-28 RX ADMIN — HEPARIN SODIUM 5000 UNITS: 5000 INJECTION INTRAVENOUS; SUBCUTANEOUS at 13:52

## 2024-07-28 RX ADMIN — POLYETHYLENE GLYCOL (3350) 17 G: 17 POWDER, FOR SOLUTION ORAL at 08:51

## 2024-07-28 RX ADMIN — FAMOTIDINE 20 MG: 20 TABLET, FILM COATED ORAL at 08:51

## 2024-07-28 RX ADMIN — ONDANSETRON 4 MG: 4 TABLET, ORALLY DISINTEGRATING ORAL at 20:51

## 2024-07-28 RX ADMIN — LACTULOSE 20 G: 10 SOLUTION ORAL at 06:37

## 2024-07-28 RX ADMIN — SODIUM CHLORIDE: 9 INJECTION, SOLUTION INTRAVENOUS at 09:10

## 2024-07-28 RX ADMIN — ACETAMINOPHEN 1000 MG: 500 TABLET ORAL at 23:01

## 2024-07-28 RX ADMIN — DOCUSATE SODIUM 100 MG: 100 CAPSULE, LIQUID FILLED ORAL at 20:57

## 2024-07-28 RX ADMIN — DOCUSATE SODIUM 100 MG: 100 CAPSULE, LIQUID FILLED ORAL at 13:52

## 2024-07-28 RX ADMIN — ACETAMINOPHEN 1000 MG: 500 TABLET ORAL at 13:53

## 2024-07-28 RX ADMIN — DIPHENHYDRAMINE HYDROCHLORIDE 50 MG: 25 CAPSULE ORAL at 21:35

## 2024-07-28 RX ADMIN — FUROSEMIDE 40 MG: 10 INJECTION, SOLUTION INTRAMUSCULAR; INTRAVENOUS at 03:55

## 2024-07-28 RX ADMIN — FUROSEMIDE 40 MG: 10 INJECTION, SOLUTION INTRAMUSCULAR; INTRAVENOUS at 14:02

## 2024-07-28 RX ADMIN — TACROLIMUS 10 MG: 4 TABLET, EXTENDED RELEASE ORAL at 06:35

## 2024-07-28 RX ADMIN — SENNOSIDES AND DOCUSATE SODIUM 2 TABLET: 50; 8.6 TABLET ORAL at 20:57

## 2024-07-28 RX ADMIN — SODIUM CHLORIDE: 9 INJECTION, SOLUTION INTRAVENOUS at 14:02

## 2024-07-28 RX ADMIN — SODIUM CHLORIDE 25 ML: 9 INJECTION, SOLUTION INTRAVENOUS at 22:24

## 2024-07-28 RX ADMIN — SENNOSIDES 8.6 MG: 8.6 TABLET, FILM COATED ORAL at 13:52

## 2024-07-28 RX ADMIN — BUPRENORPHINE AND NALOXONE 0.5 EACH: 8; 2 FILM BUCCAL; SUBLINGUAL at 08:51

## 2024-07-28 RX ADMIN — FUROSEMIDE 40 MG: 10 INJECTION, SOLUTION INTRAMUSCULAR; INTRAVENOUS at 21:09

## 2024-07-28 RX ADMIN — FUROSEMIDE 40 MG: 10 INJECTION, SOLUTION INTRAMUSCULAR; INTRAVENOUS at 08:51

## 2024-07-28 RX ADMIN — ACETAMINOPHEN 1000 MG: 500 TABLET ORAL at 06:36

## 2024-07-28 RX ADMIN — SODIUM CHLORIDE: 9 INJECTION, SOLUTION INTRAVENOUS at 01:08

## 2024-07-28 RX ADMIN — ALLOPURINOL 100 MG: 100 TABLET ORAL at 08:51

## 2024-07-28 RX ADMIN — DOCUSATE SODIUM 100 MG: 100 CAPSULE, LIQUID FILLED ORAL at 08:51

## 2024-07-28 RX ADMIN — SODIUM CHLORIDE: 9 INJECTION, SOLUTION INTRAVENOUS at 22:18

## 2024-07-28 RX ADMIN — SENNOSIDES 8.6 MG: 8.6 TABLET, FILM COATED ORAL at 20:59

## 2024-07-28 RX ADMIN — LABETALOL HYDROCHLORIDE 50 MG: 100 TABLET ORAL at 20:59

## 2024-07-28 RX ADMIN — ATORVASTATIN CALCIUM 40 MG: 40 TABLET, FILM COATED ORAL at 20:59

## 2024-07-28 RX ADMIN — SODIUM CHLORIDE, PRESERVATIVE FREE 2 ML: 5 INJECTION INTRAVENOUS at 21:02

## 2024-07-28 RX ADMIN — HEPARIN SODIUM 5000 UNITS: 5000 INJECTION INTRAVENOUS; SUBCUTANEOUS at 22:59

## 2024-07-28 RX ADMIN — MYCOPHENOLIC ACID 360 MG: 360 TABLET, DELAYED RELEASE ORAL at 18:25

## 2024-07-28 RX ADMIN — LACTULOSE 20 G: 10 SOLUTION ORAL at 13:52

## 2024-07-28 RX ADMIN — MYCOPHENOLIC ACID 360 MG: 360 TABLET, DELAYED RELEASE ORAL at 06:37

## 2024-07-28 RX ADMIN — LACTULOSE 20 G: 10 SOLUTION ORAL at 22:59

## 2024-07-28 RX ADMIN — ANTI-THYMOCYTE GLOBULIN (RABBIT) 100 MG: 5 INJECTION, POWDER, LYOPHILIZED, FOR SOLUTION INTRAVENOUS at 22:32

## 2024-07-28 RX ADMIN — METHYLPREDNISOLONE SODIUM SUCCINATE 75 MG: 125 INJECTION, POWDER, FOR SOLUTION INTRAMUSCULAR; INTRAVENOUS at 21:32

## 2024-07-28 RX ADMIN — SENNOSIDES 8.6 MG: 8.6 TABLET, FILM COATED ORAL at 08:51

## 2024-07-28 RX ADMIN — LABETALOL HYDROCHLORIDE 50 MG: 100 TABLET ORAL at 08:51

## 2024-07-28 RX ADMIN — SODIUM CHLORIDE, PRESERVATIVE FREE 2 ML: 5 INJECTION INTRAVENOUS at 08:51

## 2024-07-28 RX ADMIN — LEVOTHYROXINE SODIUM 150 MCG: 75 TABLET ORAL at 04:03

## 2024-07-28 ASSESSMENT — PAIN SCALES - GENERAL
PAINLEVEL_OUTOF10: 6
PAINLEVEL_OUTOF10: 0
PAINLEVEL_OUTOF10: 0
PAINLEVEL_OUTOF10: 2
PAINLEVEL_OUTOF10: 0

## 2024-07-29 ENCOUNTER — COMMITTEE REVIEW (OUTPATIENT)
Dept: TRANSPLANT | Age: 59
End: 2024-07-29

## 2024-07-29 LAB
ABO + RH BLD: NORMAL
ALBUMIN SERPL-MCNC: 2.8 G/DL (ref 3.6–5.1)
ALBUMIN/GLOB SERPL: 1 {RATIO} (ref 1–2.4)
ALP SERPL-CCNC: 49 UNITS/L (ref 45–117)
ALT SERPL-CCNC: 17 UNITS/L
ANION GAP SERPL CALC-SCNC: 11 MMOL/L (ref 7–19)
AST SERPL-CCNC: 98 UNITS/L
BILIRUB SERPL-MCNC: 0.8 MG/DL (ref 0.2–1)
BLD GP AB SCN SERPL QL GEL: NEGATIVE
BUN SERPL-MCNC: 48 MG/DL (ref 6–20)
BUN/CREAT SERPL: 10 (ref 7–25)
CALCIUM SERPL-MCNC: 8.2 MG/DL (ref 8.4–10.2)
CHLORIDE SERPL-SCNC: 108 MMOL/L (ref 97–110)
CO2 SERPL-SCNC: 23 MMOL/L (ref 21–32)
CREAT SERPL-MCNC: 4.8 MG/DL (ref 0.67–1.17)
DEPRECATED RDW RBC: 54.6 FL (ref 39–50)
EGFRCR SERPLBLD CKD-EPI 2021: 13 ML/MIN/{1.73_M2}
ERYTHROCYTE [DISTWIDTH] IN BLOOD: 14.7 % (ref 11–15)
FASTING DURATION TIME PATIENT: ABNORMAL H
GLOBULIN SER-MCNC: 2.8 G/DL (ref 2–4)
GLUCOSE BLDC GLUCOMTR-MCNC: 110 MG/DL (ref 70–99)
GLUCOSE BLDC GLUCOMTR-MCNC: 113 MG/DL (ref 70–99)
GLUCOSE BLDC GLUCOMTR-MCNC: 127 MG/DL (ref 70–99)
GLUCOSE BLDC GLUCOMTR-MCNC: 147 MG/DL (ref 70–99)
GLUCOSE SERPL-MCNC: 133 MG/DL (ref 70–99)
HBV SURFACE AB SER-ACNC: 33.41 MUNITS/ML
HCT VFR BLD CALC: 25.2 % (ref 39–51)
HGB BLD-MCNC: 8.4 G/DL (ref 13–17)
HYPOCHROMIA BLD QL SMEAR: ABNORMAL
LYMPHOCYTES # BLD: 0.1 K/MCL (ref 1–4)
LYMPHOCYTES NFR BLD: 1 %
MAGNESIUM SERPL-MCNC: 2.2 MG/DL (ref 1.7–2.4)
MCH RBC QN AUTO: 34 PG (ref 26–34)
MCHC RBC AUTO-ENTMCNC: 33.3 G/DL (ref 32–36.5)
MCV RBC AUTO: 102 FL (ref 78–100)
MICROCYTES BLD QL SMEAR: ABNORMAL
MONOCYTES # BLD: 0.3 K/MCL (ref 0.3–0.9)
MONOCYTES NFR BLD: 6 %
MYELOCYTES # BLD MANUAL: 2 %
NEUTROPHILS # BLD: 4.7 K/MCL (ref 1.8–7.7)
NEUTS BAND NFR BLD: 4 % (ref 0–10)
NEUTS SEG NFR BLD: 87 %
NRBC BLD MANUAL-RTO: 0 /100 WBC
OVALOCYTES BLD QL SMEAR: ABNORMAL
PHOSPHATE SERPL-MCNC: 4.3 MG/DL (ref 2.4–4.7)
PLAT MORPH BLD: NORMAL
PLATELET # BLD AUTO: 92 K/MCL (ref 140–450)
POLYCHROMASIA BLD QL SMEAR: ABNORMAL
POTASSIUM SERPL-SCNC: 3.7 MMOL/L (ref 3.4–5.1)
PROT SERPL-MCNC: 5.6 G/DL (ref 6.4–8.2)
RBC # BLD: 2.47 MIL/MCL (ref 4.5–5.9)
SODIUM SERPL-SCNC: 138 MMOL/L (ref 135–145)
TACROLIMUS BLD-MCNC: 11.2 NG/ML (ref 5–20)
TOXIC GRANULES BLD QL SMEAR: PRESENT
TYPE AND SCREEN EXPIRATION DATE: NORMAL
WBC # BLD: 5.2 K/MCL (ref 4.2–11)

## 2024-07-29 PROCEDURE — 83735 ASSAY OF MAGNESIUM: CPT

## 2024-07-29 PROCEDURE — 85027 COMPLETE CBC AUTOMATED: CPT

## 2024-07-29 PROCEDURE — 10002803 HB RX 637: Performed by: NURSE PRACTITIONER

## 2024-07-29 PROCEDURE — 10002803 HB RX 637: Performed by: INTERNAL MEDICINE

## 2024-07-29 PROCEDURE — 10002803 HB RX 637

## 2024-07-29 PROCEDURE — 10002807 HB RX 258: Performed by: INTERNAL MEDICINE

## 2024-07-29 PROCEDURE — 10002800 HB RX 250 W HCPCS: Performed by: INTERNAL MEDICINE

## 2024-07-29 PROCEDURE — 80053 COMPREHEN METABOLIC PANEL: CPT | Performed by: INTERNAL MEDICINE

## 2024-07-29 PROCEDURE — 10002800 HB RX 250 W HCPCS: Performed by: TRANSPLANT SURGERY

## 2024-07-29 PROCEDURE — 80197 ASSAY OF TACROLIMUS: CPT | Performed by: INTERNAL MEDICINE

## 2024-07-29 PROCEDURE — 96372 THER/PROPH/DIAG INJ SC/IM: CPT | Performed by: NURSE PRACTITIONER

## 2024-07-29 PROCEDURE — 84100 ASSAY OF PHOSPHORUS: CPT

## 2024-07-29 PROCEDURE — 10002800 HB RX 250 W HCPCS: Performed by: NURSE PRACTITIONER

## 2024-07-29 PROCEDURE — 99233 SBSQ HOSP IP/OBS HIGH 50: CPT | Performed by: INTERNAL MEDICINE

## 2024-07-29 PROCEDURE — 10004651 HB RX, NO CHARGE ITEM

## 2024-07-29 PROCEDURE — 86901 BLOOD TYPING SEROLOGIC RH(D): CPT | Performed by: INTERNAL MEDICINE

## 2024-07-29 PROCEDURE — 10000002 HB ROOM CHARGE MED SURG

## 2024-07-29 PROCEDURE — 10002803 HB RX 637: Performed by: TRANSPLANT SURGERY

## 2024-07-29 PROCEDURE — 10004281 HB COUNTER-STAFF TIME PER 15 MIN

## 2024-07-29 RX ORDER — FUROSEMIDE 40 MG
40 TABLET ORAL
Status: DISCONTINUED | OUTPATIENT
Start: 2024-07-29 | End: 2024-07-30 | Stop reason: HOSPADM

## 2024-07-29 RX ORDER — SENNOSIDES A AND B 8.6 MG/1
1 TABLET, FILM COATED ORAL 2 TIMES DAILY
Qty: 60 TABLET | Refills: 5 | Status: SHIPPED | OUTPATIENT
Start: 2024-07-29 | End: 2025-01-25

## 2024-07-29 RX ORDER — SULFAMETHOXAZOLE/TRIMETHOPRIM 800-160 MG
1 TABLET ORAL
Status: SHIPPED | COMMUNITY
Start: 2024-07-29 | End: 2024-07-30

## 2024-07-29 RX ORDER — POLYETHYLENE GLYCOL 3350 17 G/17G
17 POWDER, FOR SOLUTION ORAL DAILY
Qty: 30 PACKET | Refills: 2 | Status: SHIPPED | OUTPATIENT
Start: 2024-07-29 | End: 2024-10-27

## 2024-07-29 RX ORDER — PREDNISONE 5 MG/1
5 TABLET ORAL
Status: DISCONTINUED | OUTPATIENT
Start: 2024-07-29 | End: 2024-07-30 | Stop reason: HOSPADM

## 2024-07-29 RX ORDER — LABETALOL 100 MG/1
50 TABLET, FILM COATED ORAL EVERY 12 HOURS SCHEDULED
Qty: 30 TABLET | Refills: 5 | Status: SHIPPED | OUTPATIENT
Start: 2024-07-29 | End: 2025-01-25

## 2024-07-29 RX ORDER — VALGANCICLOVIR 450 MG/1
450 TABLET, FILM COATED ORAL DAILY
Status: SHIPPED | COMMUNITY
Start: 2024-07-29

## 2024-07-29 RX ORDER — LEVOTHYROXINE SODIUM 150 UG/1
150 TABLET ORAL EVERY MORNING
Status: SHIPPED | COMMUNITY
Start: 2024-07-30

## 2024-07-29 RX ORDER — FAMOTIDINE 20 MG/1
20 TABLET, FILM COATED ORAL DAILY
Qty: 30 TABLET | Refills: 5 | Status: SHIPPED | OUTPATIENT
Start: 2024-07-29 | End: 2025-01-25

## 2024-07-29 RX ORDER — PSEUDOEPHEDRINE HCL 30 MG
100 TABLET ORAL 2 TIMES DAILY
Qty: 60 CAPSULE | Refills: 3 | Status: SHIPPED | OUTPATIENT
Start: 2024-07-29 | End: 2025-01-25

## 2024-07-29 RX ADMIN — DOCUSATE SODIUM 100 MG: 100 CAPSULE, LIQUID FILLED ORAL at 21:40

## 2024-07-29 RX ADMIN — ACETAMINOPHEN 1000 MG: 500 TABLET ORAL at 05:34

## 2024-07-29 RX ADMIN — SENNOSIDES 8.6 MG: 8.6 TABLET, FILM COATED ORAL at 14:57

## 2024-07-29 RX ADMIN — FUROSEMIDE 40 MG: 10 INJECTION, SOLUTION INTRAMUSCULAR; INTRAVENOUS at 03:32

## 2024-07-29 RX ADMIN — SENNOSIDES AND DOCUSATE SODIUM 2 TABLET: 50; 8.6 TABLET ORAL at 21:40

## 2024-07-29 RX ADMIN — SENNOSIDES 8.6 MG: 8.6 TABLET, FILM COATED ORAL at 09:44

## 2024-07-29 RX ADMIN — MYCOPHENOLIC ACID 360 MG: 360 TABLET, DELAYED RELEASE ORAL at 06:04

## 2024-07-29 RX ADMIN — HEPARIN SODIUM 5000 UNITS: 5000 INJECTION INTRAVENOUS; SUBCUTANEOUS at 14:58

## 2024-07-29 RX ADMIN — TACROLIMUS 10 MG: 4 TABLET, EXTENDED RELEASE ORAL at 06:04

## 2024-07-29 RX ADMIN — SODIUM CHLORIDE, PRESERVATIVE FREE 2 ML: 5 INJECTION INTRAVENOUS at 21:45

## 2024-07-29 RX ADMIN — SODIUM CHLORIDE 500 ML: 9 INJECTION, SOLUTION INTRAVENOUS at 18:55

## 2024-07-29 RX ADMIN — SENNOSIDES 8.6 MG: 8.6 TABLET, FILM COATED ORAL at 21:40

## 2024-07-29 RX ADMIN — LACTULOSE 20 G: 10 SOLUTION ORAL at 21:40

## 2024-07-29 RX ADMIN — ATORVASTATIN CALCIUM 40 MG: 40 TABLET, FILM COATED ORAL at 21:40

## 2024-07-29 RX ADMIN — HEPARIN SODIUM 5000 UNITS: 5000 INJECTION INTRAVENOUS; SUBCUTANEOUS at 21:40

## 2024-07-29 RX ADMIN — HEPARIN SODIUM 5000 UNITS: 5000 INJECTION INTRAVENOUS; SUBCUTANEOUS at 05:35

## 2024-07-29 RX ADMIN — DOCUSATE SODIUM 100 MG: 100 CAPSULE, LIQUID FILLED ORAL at 14:57

## 2024-07-29 RX ADMIN — BUPRENORPHINE AND NALOXONE 0.5 EACH: 8; 2 FILM BUCCAL; SUBLINGUAL at 09:45

## 2024-07-29 RX ADMIN — METOCLOPRAMIDE HYDROCHLORIDE 5 MG: 5 INJECTION INTRAMUSCULAR; INTRAVENOUS at 06:03

## 2024-07-29 RX ADMIN — MYCOPHENOLIC ACID 360 MG: 360 TABLET, DELAYED RELEASE ORAL at 18:18

## 2024-07-29 RX ADMIN — ACETAMINOPHEN 1000 MG: 500 TABLET ORAL at 21:40

## 2024-07-29 RX ADMIN — BUPRENORPHINE AND NALOXONE 0.5 EACH: 8; 2 FILM BUCCAL; SUBLINGUAL at 21:41

## 2024-07-29 RX ADMIN — VALGANCICLOVIR HYDROCHLORIDE 450 MG: 450 TABLET ORAL at 09:44

## 2024-07-29 RX ADMIN — ACETAMINOPHEN 1000 MG: 500 TABLET ORAL at 14:58

## 2024-07-29 RX ADMIN — POLYETHYLENE GLYCOL (3350) 17 G: 17 POWDER, FOR SOLUTION ORAL at 09:44

## 2024-07-29 RX ADMIN — LEVOTHYROXINE SODIUM 150 MCG: 75 TABLET ORAL at 03:32

## 2024-07-29 RX ADMIN — LABETALOL HYDROCHLORIDE 50 MG: 100 TABLET ORAL at 21:40

## 2024-07-29 RX ADMIN — FAMOTIDINE 20 MG: 20 TABLET, FILM COATED ORAL at 09:44

## 2024-07-29 RX ADMIN — ALLOPURINOL 100 MG: 100 TABLET ORAL at 09:44

## 2024-07-29 RX ADMIN — FUROSEMIDE 40 MG: 40 TABLET ORAL at 11:58

## 2024-07-29 RX ADMIN — OXYCODONE HYDROCHLORIDE 2.5 MG: 5 TABLET ORAL at 03:31

## 2024-07-29 RX ADMIN — FUROSEMIDE 40 MG: 40 TABLET ORAL at 17:51

## 2024-07-29 RX ADMIN — PREDNISONE 5 MG: 5 TABLET ORAL at 11:58

## 2024-07-29 RX ADMIN — SODIUM CHLORIDE: 9 INJECTION, SOLUTION INTRAVENOUS at 06:32

## 2024-07-29 RX ADMIN — DOCUSATE SODIUM 100 MG: 100 CAPSULE, LIQUID FILLED ORAL at 09:51

## 2024-07-29 RX ADMIN — SULFAMETHOXAZOLE AND TRIMETHOPRIM 1 TABLET: 400; 80 TABLET ORAL at 09:44

## 2024-07-29 RX ADMIN — LABETALOL HYDROCHLORIDE 50 MG: 100 TABLET ORAL at 09:44

## 2024-07-29 RX ADMIN — SODIUM CHLORIDE, PRESERVATIVE FREE 2 ML: 5 INJECTION INTRAVENOUS at 09:45

## 2024-07-29 ASSESSMENT — PAIN SCALES - GENERAL
PAINLEVEL_OUTOF10: 6
PAINLEVEL_OUTOF10: 0
PAINLEVEL_OUTOF10: 5

## 2024-07-30 VITALS
RESPIRATION RATE: 18 BRPM | WEIGHT: 243.39 LBS | SYSTOLIC BLOOD PRESSURE: 130 MMHG | BODY MASS INDEX: 31.24 KG/M2 | HEIGHT: 74 IN | DIASTOLIC BLOOD PRESSURE: 85 MMHG | HEART RATE: 81 BPM | OXYGEN SATURATION: 99 % | TEMPERATURE: 98.4 F

## 2024-07-30 LAB
ALBUMIN SERPL-MCNC: 2.6 G/DL (ref 3.6–5.1)
ALBUMIN/GLOB SERPL: 1 {RATIO} (ref 1–2.4)
ALP SERPL-CCNC: 48 UNITS/L (ref 45–117)
ALT SERPL-CCNC: 14 UNITS/L
ANION GAP SERPL CALC-SCNC: 9 MMOL/L (ref 7–19)
AST SERPL-CCNC: 60 UNITS/L
BILIRUB SERPL-MCNC: 0.7 MG/DL (ref 0.2–1)
BUN SERPL-MCNC: 51 MG/DL (ref 6–20)
BUN/CREAT SERPL: 9 (ref 7–25)
CALCIUM SERPL-MCNC: 8 MG/DL (ref 8.4–10.2)
CHLORIDE SERPL-SCNC: 108 MMOL/L (ref 97–110)
CO2 SERPL-SCNC: 22 MMOL/L (ref 21–32)
CREAT SERPL-MCNC: 5.63 MG/DL (ref 0.67–1.17)
DEPRECATED RDW RBC: 56.2 FL (ref 39–50)
EGFRCR SERPLBLD CKD-EPI 2021: 11 ML/MIN/{1.73_M2}
EOSINOPHIL # BLD: 0.1 K/MCL (ref 0–0.5)
EOSINOPHIL NFR BLD: 2 %
ERYTHROCYTE [DISTWIDTH] IN BLOOD: 15 % (ref 11–15)
FASTING DURATION TIME PATIENT: ABNORMAL H
GLOBULIN SER-MCNC: 2.7 G/DL (ref 2–4)
GLUCOSE BLDC GLUCOMTR-MCNC: 108 MG/DL (ref 70–99)
GLUCOSE BLDC GLUCOMTR-MCNC: 124 MG/DL (ref 70–99)
GLUCOSE BLDC GLUCOMTR-MCNC: 130 MG/DL (ref 70–99)
GLUCOSE SERPL-MCNC: 108 MG/DL (ref 70–99)
HCT VFR BLD CALC: 24.2 % (ref 39–51)
HGB BLD-MCNC: 8.2 G/DL (ref 13–17)
HYPOCHROMIA BLD QL SMEAR: ABNORMAL
LYMPHOCYTES # BLD: 0.1 K/MCL (ref 1–4)
LYMPHOCYTES NFR BLD: 1 %
MACROCYTES BLD QL SMEAR: ABNORMAL
MAGNESIUM SERPL-MCNC: 2.3 MG/DL (ref 1.7–2.4)
MCH RBC QN AUTO: 35 PG (ref 26–34)
MCHC RBC AUTO-ENTMCNC: 33.9 G/DL (ref 32–36.5)
MCV RBC AUTO: 103.4 FL (ref 78–100)
MONOCYTES # BLD: 0.6 K/MCL (ref 0.3–0.9)
MONOCYTES NFR BLD: 11 %
NEUTROPHILS # BLD: 4.6 K/MCL (ref 1.8–7.7)
NEUTS BAND NFR BLD: 4 % (ref 0–10)
NEUTS SEG NFR BLD: 82 %
NRBC BLD MANUAL-RTO: 0 /100 WBC
PHOSPHATE SERPL-MCNC: 4.5 MG/DL (ref 2.4–4.7)
PLAT MORPH BLD: NORMAL
PLATELET # BLD AUTO: 87 K/MCL (ref 140–450)
POLYCHROMASIA BLD QL SMEAR: ABNORMAL
POTASSIUM SERPL-SCNC: 3.3 MMOL/L (ref 3.4–5.1)
PROT SERPL-MCNC: 5.3 G/DL (ref 6.4–8.2)
RBC # BLD: 2.34 MIL/MCL (ref 4.5–5.9)
SODIUM SERPL-SCNC: 136 MMOL/L (ref 135–145)
TACROLIMUS BLD-MCNC: 11.7 NG/ML (ref 5–20)
WBC # BLD: 5.4 K/MCL (ref 4.2–11)
WBC MORPH BLD: NORMAL

## 2024-07-30 PROCEDURE — 80053 COMPREHEN METABOLIC PANEL: CPT | Performed by: INTERNAL MEDICINE

## 2024-07-30 PROCEDURE — 99239 HOSP IP/OBS DSCHRG MGMT >30: CPT | Performed by: INTERNAL MEDICINE

## 2024-07-30 PROCEDURE — 10002803 HB RX 637: Performed by: INTERNAL MEDICINE

## 2024-07-30 PROCEDURE — 10002800 HB RX 250 W HCPCS: Performed by: INTERNAL MEDICINE

## 2024-07-30 PROCEDURE — 83735 ASSAY OF MAGNESIUM: CPT

## 2024-07-30 PROCEDURE — 10002803 HB RX 637: Performed by: TRANSPLANT SURGERY

## 2024-07-30 PROCEDURE — 36415 COLL VENOUS BLD VENIPUNCTURE: CPT | Performed by: INTERNAL MEDICINE

## 2024-07-30 PROCEDURE — 10002800 HB RX 250 W HCPCS: Performed by: NURSE PRACTITIONER

## 2024-07-30 PROCEDURE — 10002803 HB RX 637: Performed by: NURSE PRACTITIONER

## 2024-07-30 PROCEDURE — 10004651 HB RX, NO CHARGE ITEM

## 2024-07-30 PROCEDURE — 84100 ASSAY OF PHOSPHORUS: CPT

## 2024-07-30 PROCEDURE — 10002803 HB RX 637

## 2024-07-30 PROCEDURE — 96372 THER/PROPH/DIAG INJ SC/IM: CPT | Performed by: NURSE PRACTITIONER

## 2024-07-30 PROCEDURE — 80197 ASSAY OF TACROLIMUS: CPT | Performed by: INTERNAL MEDICINE

## 2024-07-30 PROCEDURE — 85027 COMPLETE CBC AUTOMATED: CPT

## 2024-07-30 RX ORDER — HYDRALAZINE HYDROCHLORIDE 20 MG/ML
10 INJECTION INTRAMUSCULAR; INTRAVENOUS EVERY 4 HOURS PRN
Status: DISCONTINUED | OUTPATIENT
Start: 2024-07-30 | End: 2024-07-30 | Stop reason: HOSPADM

## 2024-07-30 RX ORDER — POTASSIUM CHLORIDE 1500 MG/1
40 TABLET, EXTENDED RELEASE ORAL ONCE
Status: COMPLETED | OUTPATIENT
Start: 2024-07-30 | End: 2024-07-30

## 2024-07-30 RX ORDER — SULFAMETHOXAZOLE/TRIMETHOPRIM 800-160 MG
0.5 TABLET ORAL
Status: SHIPPED | COMMUNITY
Start: 2024-07-31

## 2024-07-30 RX ORDER — MYCOPHENOLIC ACID 360 MG/1
720 TABLET, DELAYED RELEASE ORAL
Status: DISCONTINUED | OUTPATIENT
Start: 2024-07-30 | End: 2024-07-30 | Stop reason: HOSPADM

## 2024-07-30 RX ORDER — FUROSEMIDE 40 MG
80 TABLET ORAL 2 TIMES DAILY
Qty: 120 TABLET | Refills: 5 | Status: SHIPPED | OUTPATIENT
Start: 2024-07-30 | End: 2024-08-01 | Stop reason: ALTCHOICE

## 2024-07-30 RX ORDER — POTASSIUM CHLORIDE 1500 MG/1
20 TABLET, EXTENDED RELEASE ORAL DAILY
Qty: 30 TABLET | Refills: 2 | Status: SHIPPED | OUTPATIENT
Start: 2024-07-30 | End: 2024-08-12 | Stop reason: ALTCHOICE

## 2024-07-30 RX ORDER — MYCOPHENOLIC ACID 360 MG/1
TABLET, DELAYED RELEASE ORAL
Status: SHIPPED | COMMUNITY
Start: 2024-07-30

## 2024-07-30 RX ADMIN — FUROSEMIDE 40 MG: 40 TABLET ORAL at 09:08

## 2024-07-30 RX ADMIN — TACROLIMUS 10 MG: 4 TABLET, EXTENDED RELEASE ORAL at 06:01

## 2024-07-30 RX ADMIN — SENNOSIDES 8.6 MG: 8.6 TABLET, FILM COATED ORAL at 14:24

## 2024-07-30 RX ADMIN — HEPARIN SODIUM 5000 UNITS: 5000 INJECTION INTRAVENOUS; SUBCUTANEOUS at 06:02

## 2024-07-30 RX ADMIN — SODIUM CHLORIDE, PRESERVATIVE FREE 2 ML: 5 INJECTION INTRAVENOUS at 09:17

## 2024-07-30 RX ADMIN — ACETAMINOPHEN 1000 MG: 500 TABLET ORAL at 14:24

## 2024-07-30 RX ADMIN — FUROSEMIDE 40 MG: 40 TABLET ORAL at 17:31

## 2024-07-30 RX ADMIN — SENNOSIDES 8.6 MG: 8.6 TABLET, FILM COATED ORAL at 09:08

## 2024-07-30 RX ADMIN — HYDRALAZINE HYDROCHLORIDE 10 MG: 20 INJECTION, SOLUTION INTRAMUSCULAR; INTRAVENOUS at 04:12

## 2024-07-30 RX ADMIN — LEVOTHYROXINE SODIUM 150 MCG: 75 TABLET ORAL at 04:13

## 2024-07-30 RX ADMIN — OXYCODONE HYDROCHLORIDE 2.5 MG: 5 TABLET ORAL at 11:13

## 2024-07-30 RX ADMIN — POLYETHYLENE GLYCOL (3350) 17 G: 17 POWDER, FOR SOLUTION ORAL at 09:08

## 2024-07-30 RX ADMIN — MYCOPHENOLIC ACID 360 MG: 360 TABLET, DELAYED RELEASE ORAL at 06:01

## 2024-07-30 RX ADMIN — DOCUSATE SODIUM 100 MG: 100 CAPSULE, LIQUID FILLED ORAL at 09:08

## 2024-07-30 RX ADMIN — DOCUSATE SODIUM 100 MG: 100 CAPSULE, LIQUID FILLED ORAL at 14:24

## 2024-07-30 RX ADMIN — PREDNISONE 5 MG: 5 TABLET ORAL at 09:08

## 2024-07-30 RX ADMIN — LABETALOL HYDROCHLORIDE 50 MG: 100 TABLET ORAL at 09:08

## 2024-07-30 RX ADMIN — FAMOTIDINE 20 MG: 20 TABLET, FILM COATED ORAL at 09:09

## 2024-07-30 RX ADMIN — ALLOPURINOL 100 MG: 100 TABLET ORAL at 09:08

## 2024-07-30 RX ADMIN — LACTULOSE 20 G: 10 SOLUTION ORAL at 14:24

## 2024-07-30 RX ADMIN — HEPARIN SODIUM 5000 UNITS: 5000 INJECTION INTRAVENOUS; SUBCUTANEOUS at 14:24

## 2024-07-30 RX ADMIN — ACETAMINOPHEN 1000 MG: 500 TABLET ORAL at 06:01

## 2024-07-30 RX ADMIN — POTASSIUM CHLORIDE 40 MEQ: 1500 TABLET, EXTENDED RELEASE ORAL at 11:14

## 2024-07-30 ASSESSMENT — PAIN SCALES - GENERAL
PAINLEVEL_OUTOF10: 0
PAINLEVEL_OUTOF10: 7

## 2024-07-31 ENCOUNTER — TELEPHONE (OUTPATIENT)
Dept: TRANSPLANT | Age: 59
End: 2024-07-31

## 2024-07-31 DIAGNOSIS — Z94.0 STATUS POST KIDNEY TRANSPLANT (CMD): Primary | ICD-10-CM

## 2024-07-31 DIAGNOSIS — Q87.81 ALPORT'S SYNDROME (CMD): ICD-10-CM

## 2024-07-31 DIAGNOSIS — D84.9 IMMUNOSUPPRESSION  (CMD): ICD-10-CM

## 2024-08-01 ENCOUNTER — LAB SERVICES (OUTPATIENT)
Dept: LAB | Age: 59
End: 2024-08-01

## 2024-08-01 ENCOUNTER — OFFICE VISIT (OUTPATIENT)
Dept: TRANSPLANT | Age: 59
End: 2024-08-01
Attending: INTERNAL MEDICINE

## 2024-08-01 ENCOUNTER — TELEPHONE (OUTPATIENT)
Dept: NEPHROLOGY | Facility: CLINIC | Age: 59
End: 2024-08-01

## 2024-08-01 ENCOUNTER — TELEPHONE (OUTPATIENT)
Dept: CARE COORDINATION | Age: 59
End: 2024-08-01

## 2024-08-01 ENCOUNTER — APPOINTMENT (OUTPATIENT)
Dept: LAB | Age: 59
End: 2024-08-01

## 2024-08-01 VITALS
SYSTOLIC BLOOD PRESSURE: 115 MMHG | TEMPERATURE: 97.3 F | HEART RATE: 100 BPM | OXYGEN SATURATION: 99 % | BODY MASS INDEX: 31.6 KG/M2 | HEIGHT: 74 IN | WEIGHT: 246.25 LBS | DIASTOLIC BLOOD PRESSURE: 76 MMHG

## 2024-08-01 VITALS
HEIGHT: 74 IN | SYSTOLIC BLOOD PRESSURE: 115 MMHG | WEIGHT: 246.25 LBS | TEMPERATURE: 97.3 F | DIASTOLIC BLOOD PRESSURE: 76 MMHG | OXYGEN SATURATION: 99 % | BODY MASS INDEX: 31.6 KG/M2 | HEART RATE: 100 BPM

## 2024-08-01 DIAGNOSIS — Q87.81 ALPORT'S SYNDROME (CMD): ICD-10-CM

## 2024-08-01 DIAGNOSIS — Z94.0 STATUS POST KIDNEY TRANSPLANT (CMD): Primary | ICD-10-CM

## 2024-08-01 DIAGNOSIS — E87.70 HYPERVOLEMIA, UNSPECIFIED HYPERVOLEMIA TYPE: ICD-10-CM

## 2024-08-01 DIAGNOSIS — D84.9 IMMUNOSUPPRESSION  (CMD): ICD-10-CM

## 2024-08-01 DIAGNOSIS — Z94.0 STATUS POST KIDNEY TRANSPLANT (CMD): ICD-10-CM

## 2024-08-01 DIAGNOSIS — I10 BENIGN ESSENTIAL HTN: ICD-10-CM

## 2024-08-01 DIAGNOSIS — Z94.0 RENAL TRANSPLANT, STATUS POST (CMD): Primary | ICD-10-CM

## 2024-08-01 DIAGNOSIS — R79.89 ELEVATED SERUM CREATININE: Primary | ICD-10-CM

## 2024-08-01 LAB
ALBUMIN SERPL-MCNC: 2.9 G/DL (ref 3.6–5.1)
ALBUMIN/GLOB SERPL: 1 {RATIO} (ref 1–2.4)
ALP SERPL-CCNC: 55 UNITS/L (ref 45–117)
ALT SERPL-CCNC: 30 UNITS/L
ANION GAP SERPL CALC-SCNC: 10 MMOL/L (ref 7–19)
APPEARANCE UR: ABNORMAL
AST SERPL-CCNC: 70 UNITS/L
BACTERIA #/AREA URNS HPF: ABNORMAL /HPF
BILIRUB SERPL-MCNC: 1.2 MG/DL (ref 0.2–1)
BILIRUB UR QL STRIP: NEGATIVE
BUN SERPL-MCNC: 30 MG/DL (ref 6–20)
BUN/CREAT SERPL: 7 (ref 7–25)
BURR CELLS BLD QL SMEAR: NORMAL
CALCIUM SERPL-MCNC: 8.4 MG/DL (ref 8.4–10.2)
CHLORIDE SERPL-SCNC: 104 MMOL/L (ref 97–110)
CO2 SERPL-SCNC: 27 MMOL/L (ref 21–32)
COLOR UR: ABNORMAL
CREAT SERPL-MCNC: 4.09 MG/DL (ref 0.67–1.17)
CREAT UR-MCNC: 215 MG/DL
DACRYOCYTES BLD QL SMEAR: NORMAL
DEPRECATED RDW RBC: 58.4 FL (ref 39–50)
EGFRCR SERPLBLD CKD-EPI 2021: 16 ML/MIN/{1.73_M2}
EOSINOPHIL # BLD: 0.1 K/MCL (ref 0–0.5)
EOSINOPHIL NFR BLD: 1 %
ERYTHROCYTE [DISTWIDTH] IN BLOOD: 15.5 % (ref 11–15)
FASTING DURATION TIME PATIENT: ABNORMAL H
GLOBULIN SER-MCNC: 2.9 G/DL (ref 2–4)
GLUCOSE SERPL-MCNC: 110 MG/DL (ref 70–99)
GLUCOSE UR STRIP-MCNC: NEGATIVE MG/DL
HCT VFR BLD CALC: 28.4 % (ref 39–51)
HGB BLD-MCNC: 9.2 G/DL (ref 13–17)
HGB UR QL STRIP: ABNORMAL
HYALINE CASTS #/AREA URNS LPF: ABNORMAL /LPF
KETONES UR STRIP-MCNC: NEGATIVE MG/DL
LEUKOCYTE ESTERASE UR QL STRIP: ABNORMAL
MAGNESIUM SERPL-MCNC: 2.1 MG/DL (ref 1.7–2.4)
MCH RBC QN AUTO: 33.9 PG (ref 26–34)
MCHC RBC AUTO-ENTMCNC: 32.4 G/DL (ref 32–36.5)
MCV RBC AUTO: 104.8 FL (ref 78–100)
METAMYELOCYTES NFR BLD: 1 % (ref 0–2)
MONOCYTES # BLD: 0.4 K/MCL (ref 0.3–0.9)
MONOCYTES NFR BLD: 5 %
MUCOUS THREADS URNS QL MICRO: PRESENT
NEUTROPHILS # BLD: 7 K/MCL (ref 1.8–7.7)
NEUTS SEG NFR BLD: 93 %
NITRITE UR QL STRIP: NEGATIVE
NRBC BLD MANUAL-RTO: 0 /100 WBC
PH UR STRIP: 5.5 [PH] (ref 5–7)
PHOSPHATE SERPL-MCNC: 3 MG/DL (ref 2.4–4.7)
PLAT MORPH BLD: NORMAL
PLATELET # BLD AUTO: 122 K/MCL (ref 140–450)
POLYCHROMASIA BLD QL SMEAR: NORMAL
POTASSIUM SERPL-SCNC: 3.6 MMOL/L (ref 3.4–5.1)
PROT SERPL-MCNC: 5.8 G/DL (ref 6.4–8.2)
PROT UR STRIP-MCNC: 100 MG/DL
PROT UR-MCNC: 242 MG/DL
PROT/CREAT UR: 1126 MGPR/GCR
RBC # BLD: 2.71 MIL/MCL (ref 4.5–5.9)
RBC #/AREA URNS HPF: >100 /HPF
SODIUM SERPL-SCNC: 137 MMOL/L (ref 135–145)
SP GR UR STRIP: >1.03 (ref 1–1.03)
SQUAMOUS #/AREA URNS HPF: ABNORMAL /HPF
TACROLIMUS BLD-MCNC: 15.2 NG/ML (ref 5–20)
UROBILINOGEN UR STRIP-MCNC: 0.2 MG/DL
WBC # BLD: 7.5 K/MCL (ref 4.2–11)
WBC #/AREA URNS HPF: ABNORMAL /HPF
WBC TOXIC VACUOLES BLD QL SMEAR: PRESENT

## 2024-08-01 PROCEDURE — 84156 ASSAY OF PROTEIN URINE: CPT | Performed by: CLINICAL MEDICAL LABORATORY

## 2024-08-01 PROCEDURE — 36415 COLL VENOUS BLD VENIPUNCTURE: CPT | Performed by: CLINICAL MEDICAL LABORATORY

## 2024-08-01 PROCEDURE — 84100 ASSAY OF PHOSPHORUS: CPT | Performed by: CLINICAL MEDICAL LABORATORY

## 2024-08-01 PROCEDURE — 83735 ASSAY OF MAGNESIUM: CPT | Performed by: CLINICAL MEDICAL LABORATORY

## 2024-08-01 PROCEDURE — 80053 COMPREHEN METABOLIC PANEL: CPT | Performed by: CLINICAL MEDICAL LABORATORY

## 2024-08-01 PROCEDURE — 82570 ASSAY OF URINE CREATININE: CPT | Performed by: CLINICAL MEDICAL LABORATORY

## 2024-08-01 PROCEDURE — 80197 ASSAY OF TACROLIMUS: CPT | Performed by: CLINICAL MEDICAL LABORATORY

## 2024-08-01 PROCEDURE — 81001 URINALYSIS AUTO W/SCOPE: CPT | Performed by: CLINICAL MEDICAL LABORATORY

## 2024-08-01 PROCEDURE — 99212 OFFICE O/P EST SF 10 MIN: CPT

## 2024-08-01 PROCEDURE — 85027 COMPLETE CBC AUTOMATED: CPT | Performed by: CLINICAL MEDICAL LABORATORY

## 2024-08-01 RX ORDER — TORSEMIDE 20 MG/1
20 TABLET ORAL 2 TIMES DAILY
Qty: 60 TABLET | Refills: 5 | Status: SHIPPED | OUTPATIENT
Start: 2024-08-01 | End: 2025-01-28

## 2024-08-01 RX ORDER — ASPIRIN 81 MG/1
81 TABLET ORAL DAILY
Qty: 30 TABLET | Refills: 11 | Status: SHIPPED | OUTPATIENT
Start: 2024-08-01 | End: 2025-08-01

## 2024-08-01 RX ORDER — MYCOPHENOLIC ACID 180 MG/1
540 TABLET, DELAYED RELEASE ORAL EVERY 12 HOURS
COMMUNITY
Start: 2024-07-29

## 2024-08-01 RX ORDER — ONDANSETRON 4 MG/1
4 TABLET, FILM COATED ORAL EVERY 8 HOURS PRN
Qty: 30 TABLET | Refills: 0 | Status: SHIPPED | OUTPATIENT
Start: 2024-08-01

## 2024-08-01 ASSESSMENT — PAIN SCALES - GENERAL
PAINLEVEL: 7
PAINLEVEL: 7

## 2024-08-02 ENCOUNTER — TELEPHONE (OUTPATIENT)
Dept: TRANSPLANT | Age: 59
End: 2024-08-02

## 2024-08-02 DIAGNOSIS — Q87.81 ALPORT'S SYNDROME (CMD): ICD-10-CM

## 2024-08-02 DIAGNOSIS — I10 ESSENTIAL HYPERTENSION: ICD-10-CM

## 2024-08-02 DIAGNOSIS — D84.9 IMMUNOSUPPRESSION  (CMD): ICD-10-CM

## 2024-08-02 DIAGNOSIS — Z94.0 STATUS POST KIDNEY TRANSPLANT (CMD): Primary | ICD-10-CM

## 2024-08-05 ENCOUNTER — APPOINTMENT (OUTPATIENT)
Dept: LAB | Age: 59
End: 2024-08-05

## 2024-08-05 ENCOUNTER — APPOINTMENT (OUTPATIENT)
Dept: TRANSPLANT | Age: 59
End: 2024-08-05
Attending: INTERNAL MEDICINE

## 2024-08-06 ENCOUNTER — OFFICE VISIT (OUTPATIENT)
Dept: TRANSPLANT | Age: 59
End: 2024-08-06
Attending: INTERNAL MEDICINE

## 2024-08-06 ENCOUNTER — LAB SERVICES (OUTPATIENT)
Dept: LAB | Age: 59
End: 2024-08-06

## 2024-08-06 VITALS
HEIGHT: 74 IN | DIASTOLIC BLOOD PRESSURE: 73 MMHG | HEART RATE: 76 BPM | BODY MASS INDEX: 30.33 KG/M2 | SYSTOLIC BLOOD PRESSURE: 114 MMHG | TEMPERATURE: 97.7 F | WEIGHT: 236.33 LBS

## 2024-08-06 DIAGNOSIS — D84.9 IMMUNOSUPPRESSION  (CMD): ICD-10-CM

## 2024-08-06 DIAGNOSIS — I10 BENIGN ESSENTIAL HTN: ICD-10-CM

## 2024-08-06 DIAGNOSIS — R79.89 ELEVATED SERUM CREATININE: ICD-10-CM

## 2024-08-06 DIAGNOSIS — Z94.0 STATUS POST KIDNEY TRANSPLANT (CMD): Primary | ICD-10-CM

## 2024-08-06 DIAGNOSIS — Z94.0 RENAL TRANSPLANT, STATUS POST (CMD): Primary | ICD-10-CM

## 2024-08-06 DIAGNOSIS — I10 ESSENTIAL HYPERTENSION: ICD-10-CM

## 2024-08-06 DIAGNOSIS — N18.6 ESRD (END STAGE RENAL DISEASE)  (CMD): Primary | ICD-10-CM

## 2024-08-06 DIAGNOSIS — Q87.81 ALPORT'S SYNDROME (CMD): ICD-10-CM

## 2024-08-06 DIAGNOSIS — Z94.0 STATUS POST KIDNEY TRANSPLANT (CMD): ICD-10-CM

## 2024-08-06 DIAGNOSIS — T86.19 DELAYED GRAFT FUNCTION OF KIDNEY (CMD): ICD-10-CM

## 2024-08-06 LAB
ALBUMIN SERPL-MCNC: 2.4 G/DL (ref 3.6–5.1)
ALBUMIN/GLOB SERPL: 0.9 {RATIO} (ref 1–2.4)
ALP SERPL-CCNC: 61 UNITS/L (ref 45–117)
ALT SERPL-CCNC: 23 UNITS/L
ANION GAP SERPL CALC-SCNC: 7 MMOL/L (ref 7–19)
APPEARANCE UR: CLEAR
AST SERPL-CCNC: 36 UNITS/L
BACTERIA #/AREA URNS HPF: ABNORMAL /HPF
BASOPHILS # BLD: 0 K/MCL (ref 0–0.3)
BASOPHILS NFR BLD: 0 %
BILIRUB SERPL-MCNC: 0.6 MG/DL (ref 0.2–1)
BILIRUB UR QL STRIP: NEGATIVE
BUN SERPL-MCNC: 14 MG/DL (ref 6–20)
BUN/CREAT SERPL: 4 (ref 7–25)
CALCIUM SERPL-MCNC: 8 MG/DL (ref 8.4–10.2)
CHLORIDE SERPL-SCNC: 104 MMOL/L (ref 97–110)
CO2 SERPL-SCNC: 32 MMOL/L (ref 21–32)
COLOR UR: YELLOW
CREAT SERPL-MCNC: 3.16 MG/DL (ref 0.67–1.17)
CREAT UR-MCNC: 225 MG/DL
DEPRECATED RDW RBC: 59.5 FL (ref 39–50)
EGFRCR SERPLBLD CKD-EPI 2021: 22 ML/MIN/{1.73_M2}
EOSINOPHIL # BLD: 0.2 K/MCL (ref 0–0.5)
EOSINOPHIL NFR BLD: 3 %
ERYTHROCYTE [DISTWIDTH] IN BLOOD: 15.5 % (ref 11–15)
FASTING DURATION TIME PATIENT: ABNORMAL H
GLOBULIN SER-MCNC: 2.7 G/DL (ref 2–4)
GLUCOSE SERPL-MCNC: 105 MG/DL (ref 70–99)
GLUCOSE UR STRIP-MCNC: NEGATIVE MG/DL
HCT VFR BLD CALC: 24.1 % (ref 39–51)
HGB BLD-MCNC: 7.8 G/DL (ref 13–17)
HGB UR QL STRIP: ABNORMAL
HYALINE CASTS #/AREA URNS LPF: ABNORMAL /LPF
IMM GRANULOCYTES # BLD AUTO: 0 K/MCL (ref 0–0.2)
IMM GRANULOCYTES # BLD: 1 %
KETONES UR STRIP-MCNC: NEGATIVE MG/DL
LEUKOCYTE ESTERASE UR QL STRIP: ABNORMAL
LYMPHOCYTES # BLD: 0.1 K/MCL (ref 1–4)
LYMPHOCYTES NFR BLD: 2 %
MAGNESIUM SERPL-MCNC: 1.8 MG/DL (ref 1.7–2.4)
MCH RBC QN AUTO: 34.1 PG (ref 26–34)
MCHC RBC AUTO-ENTMCNC: 32.4 G/DL (ref 32–36.5)
MCV RBC AUTO: 105.2 FL (ref 78–100)
MONOCYTES # BLD: 0.5 K/MCL (ref 0.3–0.9)
MONOCYTES NFR BLD: 11 %
MUCOUS THREADS URNS QL MICRO: PRESENT
NEUTROPHILS # BLD: 3.9 K/MCL (ref 1.8–7.7)
NEUTROPHILS NFR BLD: 83 %
NITRITE UR QL STRIP: NEGATIVE
NRBC BLD MANUAL-RTO: 0 /100 WBC
PH UR STRIP: 7 [PH] (ref 5–7)
PHOSPHATE SERPL-MCNC: 2.2 MG/DL (ref 2.4–4.7)
PLATELET # BLD AUTO: 130 K/MCL (ref 140–450)
POTASSIUM SERPL-SCNC: 3.3 MMOL/L (ref 3.4–5.1)
PROT SERPL-MCNC: 5.1 G/DL (ref 6.4–8.2)
PROT UR STRIP-MCNC: 300 MG/DL
PROT UR-MCNC: 199 MG/DL
PROT/CREAT UR: 884 MGPR/GCR
RBC # BLD: 2.29 MIL/MCL (ref 4.5–5.9)
RBC #/AREA URNS HPF: ABNORMAL /HPF
SODIUM SERPL-SCNC: 140 MMOL/L (ref 135–145)
SP GR UR STRIP: 1.02 (ref 1–1.03)
SQUAMOUS #/AREA URNS HPF: ABNORMAL /HPF
TACROLIMUS BLD-MCNC: 15.9 NG/ML (ref 5–20)
UROBILINOGEN UR STRIP-MCNC: 2 MG/DL
WBC # BLD: 4.8 K/MCL (ref 4.2–11)
WBC #/AREA URNS HPF: ABNORMAL /HPF

## 2024-08-06 PROCEDURE — 36415 COLL VENOUS BLD VENIPUNCTURE: CPT | Performed by: CLINICAL MEDICAL LABORATORY

## 2024-08-06 PROCEDURE — 84156 ASSAY OF PROTEIN URINE: CPT | Performed by: CLINICAL MEDICAL LABORATORY

## 2024-08-06 PROCEDURE — 80197 ASSAY OF TACROLIMUS: CPT | Performed by: CLINICAL MEDICAL LABORATORY

## 2024-08-06 PROCEDURE — 84100 ASSAY OF PHOSPHORUS: CPT | Performed by: CLINICAL MEDICAL LABORATORY

## 2024-08-06 PROCEDURE — 82570 ASSAY OF URINE CREATININE: CPT | Performed by: CLINICAL MEDICAL LABORATORY

## 2024-08-06 PROCEDURE — 85025 COMPLETE CBC W/AUTO DIFF WBC: CPT | Performed by: CLINICAL MEDICAL LABORATORY

## 2024-08-06 PROCEDURE — 83735 ASSAY OF MAGNESIUM: CPT | Performed by: CLINICAL MEDICAL LABORATORY

## 2024-08-06 PROCEDURE — 99213 OFFICE O/P EST LOW 20 MIN: CPT

## 2024-08-06 PROCEDURE — 81001 URINALYSIS AUTO W/SCOPE: CPT | Performed by: CLINICAL MEDICAL LABORATORY

## 2024-08-06 PROCEDURE — 80053 COMPREHEN METABOLIC PANEL: CPT | Performed by: CLINICAL MEDICAL LABORATORY

## 2024-08-06 ASSESSMENT — PAIN SCALES - GENERAL: PAINLEVEL: 6

## 2024-08-07 ENCOUNTER — TELEPHONE (OUTPATIENT)
Dept: TRANSPLANT | Age: 59
End: 2024-08-07

## 2024-08-07 DIAGNOSIS — Z94.0 STATUS POST KIDNEY TRANSPLANT (CMD): Primary | ICD-10-CM

## 2024-08-07 DIAGNOSIS — T86.19 DELAYED GRAFT FUNCTION OF KIDNEY (CMD): ICD-10-CM

## 2024-08-07 DIAGNOSIS — R79.89 ELEVATED SERUM CREATININE: ICD-10-CM

## 2024-08-07 DIAGNOSIS — D84.9 IMMUNOSUPPRESSION  (CMD): ICD-10-CM

## 2024-08-08 ENCOUNTER — OFFICE VISIT (OUTPATIENT)
Dept: TRANSPLANT | Age: 59
End: 2024-08-08
Attending: INTERNAL MEDICINE

## 2024-08-08 ENCOUNTER — APPOINTMENT (OUTPATIENT)
Dept: LAB | Age: 59
End: 2024-08-08

## 2024-08-08 ENCOUNTER — TELEPHONE (OUTPATIENT)
Dept: CARE COORDINATION | Age: 59
End: 2024-08-08

## 2024-08-08 ENCOUNTER — LAB SERVICES (OUTPATIENT)
Dept: LAB | Age: 59
End: 2024-08-08

## 2024-08-08 VITALS
BODY MASS INDEX: 29.88 KG/M2 | HEIGHT: 74 IN | TEMPERATURE: 97.7 F | RESPIRATION RATE: 18 BRPM | SYSTOLIC BLOOD PRESSURE: 112 MMHG | HEART RATE: 79 BPM | DIASTOLIC BLOOD PRESSURE: 70 MMHG | WEIGHT: 232.81 LBS

## 2024-08-08 DIAGNOSIS — Z94.0 STATUS POST KIDNEY TRANSPLANT (CMD): ICD-10-CM

## 2024-08-08 DIAGNOSIS — R79.89 ELEVATED SERUM CREATININE: Primary | ICD-10-CM

## 2024-08-08 DIAGNOSIS — T86.19 DELAYED GRAFT FUNCTION OF KIDNEY (CMD): ICD-10-CM

## 2024-08-08 DIAGNOSIS — I10 BENIGN ESSENTIAL HTN: ICD-10-CM

## 2024-08-08 DIAGNOSIS — R79.89 ELEVATED SERUM CREATININE: ICD-10-CM

## 2024-08-08 DIAGNOSIS — D84.9 IMMUNOSUPPRESSION  (CMD): ICD-10-CM

## 2024-08-08 DIAGNOSIS — Z94.0 STATUS POST KIDNEY TRANSPLANT (CMD): Primary | ICD-10-CM

## 2024-08-08 DIAGNOSIS — E83.42 HYPOMAGNESEMIA: ICD-10-CM

## 2024-08-08 DIAGNOSIS — E83.39 HYPOPHOSPHATEMIA: ICD-10-CM

## 2024-08-08 LAB
ALBUMIN SERPL-MCNC: 2.6 G/DL (ref 3.6–5.1)
ALBUMIN/GLOB SERPL: 0.9 {RATIO} (ref 1–2.4)
ALP SERPL-CCNC: 62 UNITS/L (ref 45–117)
ALT SERPL-CCNC: 21 UNITS/L
ANION GAP SERPL CALC-SCNC: 8 MMOL/L (ref 7–19)
APPEARANCE UR: CLEAR
AST SERPL-CCNC: 29 UNITS/L
BACTERIA #/AREA URNS HPF: ABNORMAL /HPF
BASOPHILS # BLD: 0 K/MCL (ref 0–0.3)
BASOPHILS NFR BLD: 0 %
BILIRUB SERPL-MCNC: 0.6 MG/DL (ref 0.2–1)
BILIRUB UR QL STRIP: NEGATIVE
BUN SERPL-MCNC: 11 MG/DL (ref 6–20)
BUN/CREAT SERPL: 4 (ref 7–25)
CALCIUM SERPL-MCNC: 8.4 MG/DL (ref 8.4–10.2)
CHLORIDE SERPL-SCNC: 105 MMOL/L (ref 97–110)
CO2 SERPL-SCNC: 31 MMOL/L (ref 21–32)
COLOR UR: YELLOW
CREAT SERPL-MCNC: 2.63 MG/DL (ref 0.67–1.17)
CREAT UR-MCNC: 204 MG/DL
DEPRECATED RDW RBC: 59.3 FL (ref 39–50)
EGFRCR SERPLBLD CKD-EPI 2021: 27 ML/MIN/{1.73_M2}
EOSINOPHIL # BLD: 0.1 K/MCL (ref 0–0.5)
EOSINOPHIL NFR BLD: 4 %
ERYTHROCYTE [DISTWIDTH] IN BLOOD: 15.4 % (ref 11–15)
FASTING DURATION TIME PATIENT: ABNORMAL H
GLOBULIN SER-MCNC: 2.8 G/DL (ref 2–4)
GLUCOSE SERPL-MCNC: 97 MG/DL (ref 70–99)
GLUCOSE UR STRIP-MCNC: NEGATIVE MG/DL
HCT VFR BLD CALC: 25.2 % (ref 39–51)
HGB BLD-MCNC: 8.2 G/DL (ref 13–17)
HGB UR QL STRIP: NEGATIVE
HYALINE CASTS #/AREA URNS LPF: ABNORMAL /LPF
IMM GRANULOCYTES # BLD AUTO: 0 K/MCL (ref 0–0.2)
IMM GRANULOCYTES # BLD: 1 %
KETONES UR STRIP-MCNC: NEGATIVE MG/DL
LEUKOCYTE ESTERASE UR QL STRIP: NEGATIVE
LYMPHOCYTES # BLD: 0.1 K/MCL (ref 1–4)
LYMPHOCYTES NFR BLD: 3 %
MAGNESIUM SERPL-MCNC: 1.6 MG/DL (ref 1.7–2.4)
MCH RBC QN AUTO: 34.3 PG (ref 26–34)
MCHC RBC AUTO-ENTMCNC: 32.5 G/DL (ref 32–36.5)
MCV RBC AUTO: 105.4 FL (ref 78–100)
MONOCYTES # BLD: 0.4 K/MCL (ref 0.3–0.9)
MONOCYTES NFR BLD: 12 %
NEUTROPHILS # BLD: 2.7 K/MCL (ref 1.8–7.7)
NEUTROPHILS NFR BLD: 80 %
NITRITE UR QL STRIP: NEGATIVE
NRBC BLD MANUAL-RTO: 0 /100 WBC
PH UR STRIP: 8.5 [PH] (ref 5–7)
PHOSPHATE SERPL-MCNC: 2 MG/DL (ref 2.4–4.7)
PLATELET # BLD AUTO: 133 K/MCL (ref 140–450)
POTASSIUM SERPL-SCNC: 3.5 MMOL/L (ref 3.4–5.1)
PROT SERPL-MCNC: 5.4 G/DL (ref 6.4–8.2)
PROT UR STRIP-MCNC: 100 MG/DL
PROT UR-MCNC: 137 MG/DL
PROT/CREAT UR: 672 MGPR/GCR
RBC # BLD: 2.39 MIL/MCL (ref 4.5–5.9)
RBC #/AREA URNS HPF: ABNORMAL /HPF
SODIUM SERPL-SCNC: 140 MMOL/L (ref 135–145)
SP GR UR STRIP: 1.02 (ref 1–1.03)
SQUAMOUS #/AREA URNS HPF: ABNORMAL /HPF
TACROLIMUS BLD-MCNC: 11.9 NG/ML (ref 5–20)
UROBILINOGEN UR STRIP-MCNC: 0.2 MG/DL
WBC # BLD: 3.4 K/MCL (ref 4.2–11)
WBC #/AREA URNS HPF: ABNORMAL /HPF

## 2024-08-08 PROCEDURE — 85025 COMPLETE CBC W/AUTO DIFF WBC: CPT | Performed by: INTERNAL MEDICINE

## 2024-08-08 PROCEDURE — 36415 COLL VENOUS BLD VENIPUNCTURE: CPT | Performed by: INTERNAL MEDICINE

## 2024-08-08 PROCEDURE — 80197 ASSAY OF TACROLIMUS: CPT | Performed by: CLINICAL MEDICAL LABORATORY

## 2024-08-08 PROCEDURE — 84156 ASSAY OF PROTEIN URINE: CPT | Performed by: INTERNAL MEDICINE

## 2024-08-08 PROCEDURE — 83735 ASSAY OF MAGNESIUM: CPT | Performed by: INTERNAL MEDICINE

## 2024-08-08 PROCEDURE — 84100 ASSAY OF PHOSPHORUS: CPT | Performed by: INTERNAL MEDICINE

## 2024-08-08 PROCEDURE — 80053 COMPREHEN METABOLIC PANEL: CPT | Performed by: INTERNAL MEDICINE

## 2024-08-08 PROCEDURE — 99211 OFF/OP EST MAY X REQ PHY/QHP: CPT

## 2024-08-08 PROCEDURE — 82570 ASSAY OF URINE CREATININE: CPT | Performed by: INTERNAL MEDICINE

## 2024-08-08 PROCEDURE — 81001 URINALYSIS AUTO W/SCOPE: CPT | Performed by: INTERNAL MEDICINE

## 2024-08-08 RX ORDER — DIBASIC SODIUM PHOSPHATE, MONOBASIC POTASSIUM PHOSPHATE AND MONOBASIC SODIUM PHOSPHATE 852; 155; 130 MG/1; MG/1; MG/1
250 TABLET ORAL DAILY
Qty: 30 TABLET | Refills: 5 | Status: SHIPPED | OUTPATIENT
Start: 2024-08-08 | End: 2025-02-04

## 2024-08-08 RX ORDER — MAGNESIUM OXIDE 400 MG/1
400 TABLET ORAL 2 TIMES DAILY
Qty: 60 TABLET | Refills: 5 | Status: SHIPPED | OUTPATIENT
Start: 2024-08-08 | End: 2025-02-04

## 2024-08-08 ASSESSMENT — PAIN SCALES - GENERAL: PAINLEVEL: 7

## 2024-08-09 ENCOUNTER — TELEPHONE (OUTPATIENT)
Dept: TRANSPLANT | Age: 59
End: 2024-08-09

## 2024-08-09 ENCOUNTER — CASE MANAGEMENT (OUTPATIENT)
Dept: CARE COORDINATION | Age: 59
End: 2024-08-09

## 2024-08-09 DIAGNOSIS — R79.89 ELEVATED SERUM CREATININE: ICD-10-CM

## 2024-08-09 DIAGNOSIS — D84.9 IMMUNOSUPPRESSION  (CMD): ICD-10-CM

## 2024-08-09 DIAGNOSIS — E83.39 HYPOPHOSPHATEMIA: ICD-10-CM

## 2024-08-09 DIAGNOSIS — E83.42 HYPOMAGNESEMIA: ICD-10-CM

## 2024-08-09 DIAGNOSIS — Z94.0 STATUS POST KIDNEY TRANSPLANT (CMD): Primary | ICD-10-CM

## 2024-08-09 DIAGNOSIS — T86.19 DELAYED GRAFT FUNCTION OF KIDNEY (CMD): ICD-10-CM

## 2024-08-12 ENCOUNTER — APPOINTMENT (OUTPATIENT)
Dept: LAB | Age: 59
End: 2024-08-12

## 2024-08-12 ENCOUNTER — APPOINTMENT (OUTPATIENT)
Dept: TRANSPLANT | Age: 59
End: 2024-08-12
Attending: INTERNAL MEDICINE

## 2024-08-12 ENCOUNTER — TELEPHONE (OUTPATIENT)
Dept: TRANSPLANT | Age: 59
End: 2024-08-12

## 2024-08-12 ENCOUNTER — OFFICE VISIT (OUTPATIENT)
Dept: TRANSPLANT | Age: 59
End: 2024-08-12
Attending: INTERNAL MEDICINE

## 2024-08-12 ENCOUNTER — CASE MANAGEMENT (OUTPATIENT)
Dept: CARE COORDINATION | Age: 59
End: 2024-08-12

## 2024-08-12 VITALS
HEART RATE: 72 BPM | TEMPERATURE: 97.8 F | SYSTOLIC BLOOD PRESSURE: 126 MMHG | BODY MASS INDEX: 30.3 KG/M2 | WEIGHT: 236.11 LBS | HEIGHT: 74 IN | DIASTOLIC BLOOD PRESSURE: 78 MMHG

## 2024-08-12 DIAGNOSIS — Z94.0 STATUS POST KIDNEY TRANSPLANT (CMD): ICD-10-CM

## 2024-08-12 DIAGNOSIS — Z94.0 RENAL TRANSPLANT, STATUS POST (CMD): Primary | ICD-10-CM

## 2024-08-12 DIAGNOSIS — Z94.0 STATUS POST KIDNEY TRANSPLANT (CMD): Primary | ICD-10-CM

## 2024-08-12 DIAGNOSIS — D84.9 IMMUNOSUPPRESSION  (CMD): ICD-10-CM

## 2024-08-12 DIAGNOSIS — E83.39 HYPOPHOSPHATEMIA: ICD-10-CM

## 2024-08-12 DIAGNOSIS — T86.19 DELAYED GRAFT FUNCTION OF KIDNEY (CMD): ICD-10-CM

## 2024-08-12 DIAGNOSIS — D84.9 IMMUNOSUPPRESSION  (CMD): Primary | ICD-10-CM

## 2024-08-12 DIAGNOSIS — E83.42 HYPOMAGNESEMIA: ICD-10-CM

## 2024-08-12 DIAGNOSIS — R79.89 ELEVATED SERUM CREATININE: ICD-10-CM

## 2024-08-12 LAB
ALBUMIN SERPL-MCNC: 2.7 G/DL (ref 3.6–5.1)
ALBUMIN/GLOB SERPL: 1 {RATIO} (ref 1–2.4)
ALP SERPL-CCNC: 59 UNITS/L (ref 45–117)
ALT SERPL-CCNC: 14 UNITS/L
ANION GAP SERPL CALC-SCNC: 9 MMOL/L (ref 7–19)
APPEARANCE UR: CLEAR
AST SERPL-CCNC: 18 UNITS/L
BACTERIA #/AREA URNS HPF: ABNORMAL /HPF
BASOPHILS # BLD: 0 K/MCL (ref 0–0.3)
BASOPHILS NFR BLD: 0 %
BILIRUB SERPL-MCNC: 0.5 MG/DL (ref 0.2–1)
BILIRUB UR QL STRIP: NEGATIVE
BUN SERPL-MCNC: 20 MG/DL (ref 6–20)
BUN/CREAT SERPL: 8 (ref 7–25)
CALCIUM SERPL-MCNC: 8.1 MG/DL (ref 8.4–10.2)
CAOX CRY URNS QL MICRO: PRESENT
CHLORIDE SERPL-SCNC: 102 MMOL/L (ref 97–110)
CO2 SERPL-SCNC: 28 MMOL/L (ref 21–32)
COLOR UR: ABNORMAL
CREAT SERPL-MCNC: 2.42 MG/DL (ref 0.67–1.17)
CREAT UR-MCNC: 137 MG/DL
DEPRECATED RDW RBC: 59.8 FL (ref 39–50)
EGFRCR SERPLBLD CKD-EPI 2021: 30 ML/MIN/{1.73_M2}
EOSINOPHIL # BLD: 0.2 K/MCL (ref 0–0.5)
EOSINOPHIL NFR BLD: 6 %
ERYTHROCYTE [DISTWIDTH] IN BLOOD: 15.9 % (ref 11–15)
FASTING DURATION TIME PATIENT: ABNORMAL H
GLOBULIN SER-MCNC: 2.7 G/DL (ref 2–4)
GLUCOSE SERPL-MCNC: 94 MG/DL (ref 70–99)
GLUCOSE UR STRIP-MCNC: NEGATIVE MG/DL
HCT VFR BLD CALC: 25.7 % (ref 39–51)
HGB BLD-MCNC: 8.3 G/DL (ref 13–17)
HGB UR QL STRIP: ABNORMAL
HYALINE CASTS #/AREA URNS LPF: ABNORMAL /LPF
IMM GRANULOCYTES # BLD AUTO: 0 K/MCL (ref 0–0.2)
IMM GRANULOCYTES # BLD: 1 %
KETONES UR STRIP-MCNC: NEGATIVE MG/DL
LEUKOCYTE ESTERASE UR QL STRIP: NEGATIVE
LYMPHOCYTES # BLD: 0.1 K/MCL (ref 1–4)
LYMPHOCYTES NFR BLD: 4 %
MAGNESIUM SERPL-MCNC: 1.5 MG/DL (ref 1.7–2.4)
MCH RBC QN AUTO: 34 PG (ref 26–34)
MCHC RBC AUTO-ENTMCNC: 32.3 G/DL (ref 32–36.5)
MCV RBC AUTO: 105.3 FL (ref 78–100)
MONOCYTES # BLD: 0.3 K/MCL (ref 0.3–0.9)
MONOCYTES NFR BLD: 10 %
NEUTROPHILS # BLD: 2.4 K/MCL (ref 1.8–7.7)
NEUTROPHILS NFR BLD: 79 %
NITRITE UR QL STRIP: NEGATIVE
NRBC BLD MANUAL-RTO: 0 /100 WBC
PH UR STRIP: 7 [PH] (ref 5–7)
PHOSPHATE SERPL-MCNC: 3.9 MG/DL (ref 2.4–4.7)
PLATELET # BLD AUTO: 138 K/MCL (ref 140–450)
POTASSIUM SERPL-SCNC: 3.3 MMOL/L (ref 3.4–5.1)
PROT SERPL-MCNC: 5.4 G/DL (ref 6.4–8.2)
PROT UR STRIP-MCNC: ABNORMAL MG/DL
PROT UR-MCNC: 29 MG/DL
PROT/CREAT UR: 212 MGPR/GCR
RBC # BLD: 2.44 MIL/MCL (ref 4.5–5.9)
RBC #/AREA URNS HPF: >100 /HPF
SODIUM SERPL-SCNC: 136 MMOL/L (ref 135–145)
SP GR UR STRIP: 1.01 (ref 1–1.03)
SQUAMOUS #/AREA URNS HPF: ABNORMAL /HPF
TACROLIMUS BLD-MCNC: 12.4 NG/ML (ref 5–20)
TRANS CELLS #/AREA URNS HPF: ABNORMAL /HPF
UROBILINOGEN UR STRIP-MCNC: 0.2 MG/DL
WBC # BLD: 3.1 K/MCL (ref 4.2–11)
WBC #/AREA URNS HPF: ABNORMAL /HPF

## 2024-08-12 PROCEDURE — 99211 OFF/OP EST MAY X REQ PHY/QHP: CPT

## 2024-08-12 PROCEDURE — 83735 ASSAY OF MAGNESIUM: CPT | Performed by: CLINICAL MEDICAL LABORATORY

## 2024-08-12 PROCEDURE — 80053 COMPREHEN METABOLIC PANEL: CPT | Performed by: CLINICAL MEDICAL LABORATORY

## 2024-08-12 PROCEDURE — 82570 ASSAY OF URINE CREATININE: CPT | Performed by: CLINICAL MEDICAL LABORATORY

## 2024-08-12 PROCEDURE — 80197 ASSAY OF TACROLIMUS: CPT | Performed by: CLINICAL MEDICAL LABORATORY

## 2024-08-12 PROCEDURE — 36415 COLL VENOUS BLD VENIPUNCTURE: CPT | Performed by: CLINICAL MEDICAL LABORATORY

## 2024-08-12 PROCEDURE — 85025 COMPLETE CBC W/AUTO DIFF WBC: CPT | Performed by: CLINICAL MEDICAL LABORATORY

## 2024-08-12 PROCEDURE — 81001 URINALYSIS AUTO W/SCOPE: CPT | Performed by: CLINICAL MEDICAL LABORATORY

## 2024-08-12 PROCEDURE — 84156 ASSAY OF PROTEIN URINE: CPT | Performed by: CLINICAL MEDICAL LABORATORY

## 2024-08-12 PROCEDURE — 84100 ASSAY OF PHOSPHORUS: CPT | Performed by: CLINICAL MEDICAL LABORATORY

## 2024-08-12 RX ORDER — TORSEMIDE 20 MG/1
40 TABLET ORAL 2 TIMES DAILY
Qty: 120 TABLET | Refills: 3 | Status: SHIPPED | OUTPATIENT
Start: 2024-08-12

## 2024-08-12 SDOH — ECONOMIC STABILITY: FOOD INSECURITY: WITHIN THE PAST 12 MONTHS, THE FOOD YOU BOUGHT JUST DIDN'T LAST AND YOU DIDN'T HAVE MONEY TO GET MORE.: NEVER TRUE

## 2024-08-12 SDOH — ECONOMIC STABILITY: GENERAL

## 2024-08-12 SDOH — HEALTH STABILITY: MENTAL HEALTH: DEPRESSION SCREENING SCORE: 0

## 2024-08-12 SDOH — HEALTH STABILITY: PHYSICAL HEALTH: ON AVERAGE, HOW MANY MINUTES DO YOU ENGAGE IN EXERCISE AT THIS LEVEL?: 60 MIN

## 2024-08-12 SDOH — SOCIAL STABILITY: SOCIAL INSECURITY: HOW OFTEN DOES ANYONE, INCLUDING FAMILY AND FRIENDS, SCREAM OR CURSE AT YOU?: NEVER

## 2024-08-12 SDOH — ECONOMIC STABILITY: HOUSING INSECURITY: WHAT IS YOUR LIVING SITUATION TODAY?: I HAVE A STEADY PLACE TO LIVE

## 2024-08-12 SDOH — HEALTH STABILITY: MENTAL HEALTH: PHQ2 INTERPRETATION: NO FURTHER SCREENING NEEDED

## 2024-08-12 SDOH — HEALTH STABILITY: MENTAL HEALTH: HOW OFTEN DO YOU HAVE A DRINK CONTAINING ALCOHOL?: NEVER

## 2024-08-12 SDOH — ECONOMIC STABILITY: INCOME INSECURITY: IN THE PAST 12 MONTHS, HAS THE ELECTRIC, GAS, OIL, OR WATER COMPANY THREATENED TO SHUT OFF SERVICE IN YOUR HOME?: NO

## 2024-08-12 SDOH — HEALTH STABILITY: MENTAL HEALTH: AUDIT TOTAL SCORE: 0

## 2024-08-12 SDOH — SOCIAL STABILITY: SOCIAL INSECURITY: HOW OFTEN DOES ANYONE, INCLUDING FAMILY AND FRIENDS, THREATEN YOU WITH HARM?: NEVER

## 2024-08-12 SDOH — SOCIAL STABILITY: SOCIAL NETWORK
HOW OFTEN DO YOU SEE OR TALK TO PEOPLE THAT YOU CARE ABOUT AND FEEL CLOSE TO? (FOR EXAMPLE: TALKING TO FRIENDS ON THE PHONE, VISITING FRIENDS OR FAMILY, GOING TO CHURCH OR CLUB MEETINGS): 5 OR MORE TIMES A WEEK

## 2024-08-12 SDOH — HEALTH STABILITY: MENTAL HEALTH: HOW MANY STANDARD DRINKS CONTAINING ALCOHOL DO YOU HAVE ON A TYPICAL DAY?: 0,1 OR 2

## 2024-08-12 SDOH — HEALTH STABILITY: MENTAL HEALTH: FEELING DOWN, DEPRESSED OR HOPELESS?: NOT AT ALL

## 2024-08-12 SDOH — ECONOMIC STABILITY: TRANSPORTATION INSECURITY
IN THE PAST 12 MONTHS, HAS LACK OF RELIABLE TRANSPORTATION KEPT YOU FROM MEDICAL APPOINTMENTS, MEETINGS, WORK OR FROM GETTING THINGS NEEDED FOR DAILY LIVING?: YES

## 2024-08-12 SDOH — SOCIAL STABILITY: SOCIAL INSECURITY: HOW OFTEN DOES ANYONE, INCLUDING FAMILY AND FRIENDS, PHYSICALLY HURT YOU?: NEVER

## 2024-08-12 SDOH — HEALTH STABILITY: MENTAL HEALTH: LITTLE INTEREST OR PLEASURE IN ACTIVITY?: NOT AT ALL

## 2024-08-12 SDOH — SOCIAL STABILITY: SOCIAL INSECURITY: HOW OFTEN DOES ANYONE, INCLUDING FAMILY AND FRIENDS, INSULT OR TALK DOWN TO YOU?: NEVER

## 2024-08-12 SDOH — HEALTH STABILITY: MENTAL HEALTH: HOW OFTEN DO YOU HAVE 6 OR MORE DRINKS ON ONE OCCASION?: NEVER

## 2024-08-12 SDOH — HEALTH STABILITY: PHYSICAL HEALTH: ON AVERAGE, HOW MANY DAYS PER WEEK DO YOU ENGAGE IN MODERATE TO STRENUOUS EXERCISE (LIKE A BRISK WALK)?: 7 DAYS

## 2024-08-12 ASSESSMENT — PAIN SCALES - GENERAL: PAINLEVEL: 6

## 2024-08-12 ASSESSMENT — PATIENT HEALTH QUESTIONNAIRE - PHQ9: SUM OF ALL RESPONSES TO PHQ9 QUESTIONS 1 AND 2: 0

## 2024-08-13 ENCOUNTER — CASE MANAGEMENT (OUTPATIENT)
Dept: CARE COORDINATION | Age: 59
End: 2024-08-13

## 2024-08-14 ENCOUNTER — TELEPHONE (OUTPATIENT)
Dept: TRANSPLANT | Age: 59
End: 2024-08-14

## 2024-08-14 DIAGNOSIS — D84.9 IMMUNOSUPPRESSION  (CMD): ICD-10-CM

## 2024-08-14 DIAGNOSIS — E83.42 HYPOMAGNESEMIA: ICD-10-CM

## 2024-08-14 DIAGNOSIS — T86.19 DELAYED GRAFT FUNCTION OF KIDNEY (CMD): ICD-10-CM

## 2024-08-14 DIAGNOSIS — R79.89 ELEVATED SERUM CREATININE: ICD-10-CM

## 2024-08-14 DIAGNOSIS — Z94.0 STATUS POST KIDNEY TRANSPLANT (CMD): Primary | ICD-10-CM

## 2024-08-15 ENCOUNTER — OFFICE VISIT (OUTPATIENT)
Dept: TRANSPLANT | Age: 59
End: 2024-08-15
Attending: INTERNAL MEDICINE

## 2024-08-15 ENCOUNTER — LAB SERVICES (OUTPATIENT)
Dept: LAB | Age: 59
End: 2024-08-15

## 2024-08-15 ENCOUNTER — TELEPHONE (OUTPATIENT)
Dept: CARE COORDINATION | Age: 59
End: 2024-08-15

## 2024-08-15 VITALS
DIASTOLIC BLOOD PRESSURE: 70 MMHG | HEART RATE: 85 BPM | BODY MASS INDEX: 30.54 KG/M2 | OXYGEN SATURATION: 100 % | TEMPERATURE: 97.9 F | WEIGHT: 237.88 LBS | SYSTOLIC BLOOD PRESSURE: 112 MMHG

## 2024-08-15 DIAGNOSIS — E83.39 HYPOPHOSPHATEMIA: ICD-10-CM

## 2024-08-15 DIAGNOSIS — T86.19 DELAYED GRAFT FUNCTION OF KIDNEY (CMD): Primary | ICD-10-CM

## 2024-08-15 DIAGNOSIS — E83.42 HYPOMAGNESEMIA: ICD-10-CM

## 2024-08-15 DIAGNOSIS — R79.89 ELEVATED SERUM CREATININE: ICD-10-CM

## 2024-08-15 DIAGNOSIS — Z94.0 STATUS POST KIDNEY TRANSPLANT (CMD): ICD-10-CM

## 2024-08-15 DIAGNOSIS — D84.9 IMMUNOSUPPRESSION  (CMD): ICD-10-CM

## 2024-08-15 DIAGNOSIS — E87.6 HYPOKALEMIA: ICD-10-CM

## 2024-08-15 DIAGNOSIS — I10 BENIGN ESSENTIAL HTN: ICD-10-CM

## 2024-08-15 DIAGNOSIS — T86.19 DELAYED GRAFT FUNCTION OF KIDNEY (CMD): ICD-10-CM

## 2024-08-15 DIAGNOSIS — Z94.0 RENAL TRANSPLANT, STATUS POST (CMD): Primary | ICD-10-CM

## 2024-08-15 DIAGNOSIS — Z94.0 STATUS POST KIDNEY TRANSPLANT (CMD): Primary | ICD-10-CM

## 2024-08-15 LAB
ALBUMIN SERPL-MCNC: 2.9 G/DL (ref 3.6–5.1)
ALBUMIN/GLOB SERPL: 1 {RATIO} (ref 1–2.4)
ALP SERPL-CCNC: 55 UNITS/L (ref 45–117)
ALT SERPL-CCNC: 15 UNITS/L
ANION GAP SERPL CALC-SCNC: 9 MMOL/L (ref 7–19)
APPEARANCE UR: CLEAR
AST SERPL-CCNC: 18 UNITS/L
BACTERIA #/AREA URNS HPF: ABNORMAL /HPF
BASOPHILS # BLD: 0 K/MCL (ref 0–0.3)
BASOPHILS NFR BLD: 0 %
BILIRUB SERPL-MCNC: 0.6 MG/DL (ref 0.2–1)
BILIRUB UR QL STRIP: NEGATIVE
BUN SERPL-MCNC: 25 MG/DL (ref 6–20)
BUN/CREAT SERPL: 12 (ref 7–25)
CALCIUM SERPL-MCNC: 8.6 MG/DL (ref 8.4–10.2)
CHLORIDE SERPL-SCNC: 101 MMOL/L (ref 97–110)
CO2 SERPL-SCNC: 31 MMOL/L (ref 21–32)
COLOR UR: COLORLESS
CREAT SERPL-MCNC: 2.16 MG/DL (ref 0.67–1.17)
CREAT UR-MCNC: 59.8 MG/DL
DEPRECATED RDW RBC: 60.2 FL (ref 39–50)
EGFRCR SERPLBLD CKD-EPI 2021: 34 ML/MIN/{1.73_M2}
EOSINOPHIL # BLD: 0.2 K/MCL (ref 0–0.5)
EOSINOPHIL NFR BLD: 6 %
ERYTHROCYTE [DISTWIDTH] IN BLOOD: 15.9 % (ref 11–15)
FASTING DURATION TIME PATIENT: ABNORMAL H
GLOBULIN SER-MCNC: 2.9 G/DL (ref 2–4)
GLUCOSE SERPL-MCNC: 96 MG/DL (ref 70–99)
GLUCOSE UR STRIP-MCNC: NEGATIVE MG/DL
HCT VFR BLD CALC: 27.4 % (ref 39–51)
HGB BLD-MCNC: 8.9 G/DL (ref 13–17)
HGB UR QL STRIP: ABNORMAL
HYALINE CASTS #/AREA URNS LPF: ABNORMAL /LPF
IMM GRANULOCYTES # BLD AUTO: 0 K/MCL (ref 0–0.2)
IMM GRANULOCYTES # BLD: 2 %
KETONES UR STRIP-MCNC: NEGATIVE MG/DL
LEUKOCYTE ESTERASE UR QL STRIP: NEGATIVE
LYMPHOCYTES # BLD: 0.1 K/MCL (ref 1–4)
LYMPHOCYTES NFR BLD: 5 %
MAGNESIUM SERPL-MCNC: 1.6 MG/DL (ref 1.7–2.4)
MCH RBC QN AUTO: 34.1 PG (ref 26–34)
MCHC RBC AUTO-ENTMCNC: 32.5 G/DL (ref 32–36.5)
MCV RBC AUTO: 105 FL (ref 78–100)
MONOCYTES # BLD: 0.3 K/MCL (ref 0.3–0.9)
MONOCYTES NFR BLD: 13 %
MUCOUS THREADS URNS QL MICRO: PRESENT
NEUTROPHILS # BLD: 1.9 K/MCL (ref 1.8–7.7)
NEUTROPHILS NFR BLD: 74 %
NITRITE UR QL STRIP: NEGATIVE
NRBC BLD MANUAL-RTO: 0 /100 WBC
PH UR STRIP: 7.5 [PH] (ref 5–7)
PHOSPHATE SERPL-MCNC: 3.5 MG/DL (ref 2.4–4.7)
PLATELET # BLD AUTO: 157 K/MCL (ref 140–450)
POTASSIUM SERPL-SCNC: 3.5 MMOL/L (ref 3.4–5.1)
PROT SERPL-MCNC: 5.8 G/DL (ref 6.4–8.2)
PROT UR STRIP-MCNC: ABNORMAL MG/DL
PROT UR-MCNC: 23 MG/DL
PROT/CREAT UR: 385 MGPR/GCR
RBC # BLD: 2.61 MIL/MCL (ref 4.5–5.9)
RBC #/AREA URNS HPF: ABNORMAL /HPF
SODIUM SERPL-SCNC: 137 MMOL/L (ref 135–145)
SP GR UR STRIP: 1.01 (ref 1–1.03)
SQUAMOUS #/AREA URNS HPF: ABNORMAL /HPF
TACROLIMUS BLD-MCNC: 10.9 NG/ML (ref 5–20)
UROBILINOGEN UR STRIP-MCNC: 0.2 MG/DL
WBC # BLD: 2.6 K/MCL (ref 4.2–11)
WBC #/AREA URNS HPF: ABNORMAL /HPF

## 2024-08-15 PROCEDURE — 82570 ASSAY OF URINE CREATININE: CPT | Performed by: CLINICAL MEDICAL LABORATORY

## 2024-08-15 PROCEDURE — 80197 ASSAY OF TACROLIMUS: CPT | Performed by: CLINICAL MEDICAL LABORATORY

## 2024-08-15 PROCEDURE — 85025 COMPLETE CBC W/AUTO DIFF WBC: CPT | Performed by: CLINICAL MEDICAL LABORATORY

## 2024-08-15 PROCEDURE — 36415 COLL VENOUS BLD VENIPUNCTURE: CPT | Performed by: CLINICAL MEDICAL LABORATORY

## 2024-08-15 PROCEDURE — 81001 URINALYSIS AUTO W/SCOPE: CPT | Performed by: CLINICAL MEDICAL LABORATORY

## 2024-08-15 PROCEDURE — 99211 OFF/OP EST MAY X REQ PHY/QHP: CPT

## 2024-08-15 PROCEDURE — 83735 ASSAY OF MAGNESIUM: CPT | Performed by: CLINICAL MEDICAL LABORATORY

## 2024-08-15 PROCEDURE — 84100 ASSAY OF PHOSPHORUS: CPT | Performed by: CLINICAL MEDICAL LABORATORY

## 2024-08-15 PROCEDURE — 84156 ASSAY OF PROTEIN URINE: CPT | Performed by: CLINICAL MEDICAL LABORATORY

## 2024-08-15 PROCEDURE — 80053 COMPREHEN METABOLIC PANEL: CPT | Performed by: CLINICAL MEDICAL LABORATORY

## 2024-08-16 ENCOUNTER — TELEPHONE (OUTPATIENT)
Dept: TRANSPLANT | Age: 59
End: 2024-08-16

## 2024-08-16 DIAGNOSIS — Z94.0 STATUS POST KIDNEY TRANSPLANT (CMD): Primary | ICD-10-CM

## 2024-08-16 DIAGNOSIS — D84.9 IMMUNOSUPPRESSION  (CMD): ICD-10-CM

## 2024-08-16 DIAGNOSIS — R79.89 ELEVATED SERUM CREATININE: ICD-10-CM

## 2024-08-16 DIAGNOSIS — I10 ESSENTIAL HYPERTENSION: ICD-10-CM

## 2024-08-19 ENCOUNTER — OFFICE VISIT (OUTPATIENT)
Dept: TRANSPLANT | Age: 59
End: 2024-08-19
Attending: INTERNAL MEDICINE

## 2024-08-19 ENCOUNTER — APPOINTMENT (OUTPATIENT)
Dept: LAB | Age: 59
End: 2024-08-19

## 2024-08-19 ENCOUNTER — APPOINTMENT (OUTPATIENT)
Dept: TRANSPLANT | Age: 59
End: 2024-08-19
Attending: INTERNAL MEDICINE

## 2024-08-19 VITALS
WEIGHT: 234.79 LBS | HEART RATE: 72 BPM | TEMPERATURE: 97.7 F | DIASTOLIC BLOOD PRESSURE: 73 MMHG | BODY MASS INDEX: 30.13 KG/M2 | RESPIRATION RATE: 17 BRPM | HEIGHT: 74 IN | SYSTOLIC BLOOD PRESSURE: 130 MMHG

## 2024-08-19 DIAGNOSIS — T86.19 DELAYED GRAFT FUNCTION OF KIDNEY (CMD): ICD-10-CM

## 2024-08-19 DIAGNOSIS — R79.89 ELEVATED SERUM CREATININE: ICD-10-CM

## 2024-08-19 DIAGNOSIS — E83.42 HYPOMAGNESEMIA: ICD-10-CM

## 2024-08-19 DIAGNOSIS — D84.9 IMMUNOSUPPRESSION  (CMD): ICD-10-CM

## 2024-08-19 DIAGNOSIS — E83.39 HYPOPHOSPHATEMIA: ICD-10-CM

## 2024-08-19 DIAGNOSIS — Z79.899 IMMUNOSUPPRESSIVE MANAGEMENT ENCOUNTER FOLLOWING KIDNEY TRANSPLANT (CMD): Primary | ICD-10-CM

## 2024-08-19 DIAGNOSIS — Z94.0 IMMUNOSUPPRESSIVE MANAGEMENT ENCOUNTER FOLLOWING KIDNEY TRANSPLANT (CMD): Primary | ICD-10-CM

## 2024-08-19 DIAGNOSIS — I10 ESSENTIAL HYPERTENSION: ICD-10-CM

## 2024-08-19 DIAGNOSIS — Z94.0 KIDNEY TRANSPLANT STATUS, CADAVERIC (CMD): ICD-10-CM

## 2024-08-19 DIAGNOSIS — Z94.0 STATUS POST KIDNEY TRANSPLANT (CMD): Primary | ICD-10-CM

## 2024-08-19 DIAGNOSIS — Z94.0 STATUS POST KIDNEY TRANSPLANT (CMD): ICD-10-CM

## 2024-08-19 DIAGNOSIS — E87.6 HYPOKALEMIA: ICD-10-CM

## 2024-08-19 DIAGNOSIS — I10 BENIGN ESSENTIAL HTN: ICD-10-CM

## 2024-08-19 DIAGNOSIS — T86.19 DELAYED GRAFT FUNCTION OF KIDNEY (CMD): Primary | ICD-10-CM

## 2024-08-19 LAB
ALBUMIN SERPL-MCNC: 2.9 G/DL (ref 3.6–5.1)
ALBUMIN/GLOB SERPL: 0.9 {RATIO} (ref 1–2.4)
ALP SERPL-CCNC: 58 UNITS/L (ref 45–117)
ALT SERPL-CCNC: 15 UNITS/L
ANION GAP SERPL CALC-SCNC: 10 MMOL/L (ref 7–19)
APPEARANCE UR: CLEAR
AST SERPL-CCNC: 19 UNITS/L
BACTERIA #/AREA URNS HPF: ABNORMAL /HPF
BASOPHILS # BLD: 0 K/MCL (ref 0–0.3)
BASOPHILS NFR BLD: 1 %
BILIRUB SERPL-MCNC: 0.7 MG/DL (ref 0.2–1)
BILIRUB UR QL STRIP: NEGATIVE
BUN SERPL-MCNC: 22 MG/DL (ref 6–20)
BUN/CREAT SERPL: 14 (ref 7–25)
CALCIUM SERPL-MCNC: 8.9 MG/DL (ref 8.4–10.2)
CHLORIDE SERPL-SCNC: 106 MMOL/L (ref 97–110)
CO2 SERPL-SCNC: 28 MMOL/L (ref 21–32)
COLOR UR: ABNORMAL
CREAT SERPL-MCNC: 1.6 MG/DL (ref 0.67–1.17)
CREAT UR-MCNC: 52.7 MG/DL
DEPRECATED RDW RBC: 59.2 FL (ref 39–50)
EGFRCR SERPLBLD CKD-EPI 2021: 49 ML/MIN/{1.73_M2}
EOSINOPHIL # BLD: 0.2 K/MCL (ref 0–0.5)
EOSINOPHIL NFR BLD: 6 %
ERYTHROCYTE [DISTWIDTH] IN BLOOD: 15.3 % (ref 11–15)
FASTING DURATION TIME PATIENT: ABNORMAL H
GLOBULIN SER-MCNC: 3.1 G/DL (ref 2–4)
GLUCOSE SERPL-MCNC: 98 MG/DL (ref 70–99)
GLUCOSE UR STRIP-MCNC: NEGATIVE MG/DL
HCT VFR BLD CALC: 28.1 % (ref 39–51)
HGB BLD-MCNC: 9 G/DL (ref 13–17)
HGB UR QL STRIP: ABNORMAL
HYALINE CASTS #/AREA URNS LPF: ABNORMAL /LPF
IMM GRANULOCYTES # BLD AUTO: 0 K/MCL (ref 0–0.2)
IMM GRANULOCYTES # BLD: 1 %
KETONES UR STRIP-MCNC: NEGATIVE MG/DL
LEUKOCYTE ESTERASE UR QL STRIP: NEGATIVE
LYMPHOCYTES # BLD: 0.1 K/MCL (ref 1–4)
LYMPHOCYTES NFR BLD: 5 %
MAGNESIUM SERPL-MCNC: 1.6 MG/DL (ref 1.7–2.4)
MCH RBC QN AUTO: 33.6 PG (ref 26–34)
MCHC RBC AUTO-ENTMCNC: 32 G/DL (ref 32–36.5)
MCV RBC AUTO: 104.9 FL (ref 78–100)
MONOCYTES # BLD: 0.4 K/MCL (ref 0.3–0.9)
MONOCYTES NFR BLD: 13 %
NEUTROPHILS # BLD: 2.2 K/MCL (ref 1.8–7.7)
NEUTROPHILS NFR BLD: 74 %
NITRITE UR QL STRIP: NEGATIVE
NRBC BLD MANUAL-RTO: 0 /100 WBC
PH UR STRIP: 7.5 [PH] (ref 5–7)
PHOSPHATE SERPL-MCNC: 3.4 MG/DL (ref 2.4–4.7)
PLATELET # BLD AUTO: 146 K/MCL (ref 140–450)
POTASSIUM SERPL-SCNC: 3.6 MMOL/L (ref 3.4–5.1)
PROT SERPL-MCNC: 6 G/DL (ref 6.4–8.2)
PROT UR STRIP-MCNC: NEGATIVE MG/DL
PROT UR-MCNC: 20 MG/DL
PROT/CREAT UR: 380 MGPR/GCR
RBC # BLD: 2.68 MIL/MCL (ref 4.5–5.9)
RBC #/AREA URNS HPF: ABNORMAL /HPF
SODIUM SERPL-SCNC: 140 MMOL/L (ref 135–145)
SP GR UR STRIP: 1.01 (ref 1–1.03)
SQUAMOUS #/AREA URNS HPF: ABNORMAL /HPF
TACROLIMUS BLD-MCNC: 10.2 NG/ML (ref 5–20)
UROBILINOGEN UR STRIP-MCNC: 0.2 MG/DL
WBC # BLD: 2.9 K/MCL (ref 4.2–11)
WBC #/AREA URNS HPF: ABNORMAL /HPF

## 2024-08-19 PROCEDURE — 81001 URINALYSIS AUTO W/SCOPE: CPT | Performed by: INTERNAL MEDICINE

## 2024-08-19 PROCEDURE — 82570 ASSAY OF URINE CREATININE: CPT | Performed by: INTERNAL MEDICINE

## 2024-08-19 PROCEDURE — 85025 COMPLETE CBC W/AUTO DIFF WBC: CPT | Performed by: INTERNAL MEDICINE

## 2024-08-19 PROCEDURE — 83735 ASSAY OF MAGNESIUM: CPT | Performed by: INTERNAL MEDICINE

## 2024-08-19 PROCEDURE — 80053 COMPREHEN METABOLIC PANEL: CPT | Performed by: INTERNAL MEDICINE

## 2024-08-19 PROCEDURE — 36415 COLL VENOUS BLD VENIPUNCTURE: CPT | Performed by: INTERNAL MEDICINE

## 2024-08-19 PROCEDURE — 84100 ASSAY OF PHOSPHORUS: CPT | Performed by: INTERNAL MEDICINE

## 2024-08-19 PROCEDURE — 84156 ASSAY OF PROTEIN URINE: CPT | Performed by: INTERNAL MEDICINE

## 2024-08-19 PROCEDURE — 99212 OFFICE O/P EST SF 10 MIN: CPT

## 2024-08-19 PROCEDURE — 80197 ASSAY OF TACROLIMUS: CPT | Performed by: CLINICAL MEDICAL LABORATORY

## 2024-08-19 RX ORDER — POTASSIUM CHLORIDE 1500 MG/1
20 TABLET, EXTENDED RELEASE ORAL 2 TIMES DAILY
COMMUNITY

## 2024-08-20 ENCOUNTER — CLINICAL DOCUMENTATION (OUTPATIENT)
Dept: TRANSPLANT | Age: 59
End: 2024-08-20

## 2024-08-20 ENCOUNTER — CASE MANAGEMENT (OUTPATIENT)
Dept: CARE COORDINATION | Age: 59
End: 2024-08-20

## 2024-08-21 ENCOUNTER — TELEPHONE (OUTPATIENT)
Dept: ADMINISTRATIVE | Age: 59
End: 2024-08-21

## 2024-08-21 ENCOUNTER — TELEPHONE (OUTPATIENT)
Dept: TRANSPLANT | Age: 59
End: 2024-08-21

## 2024-08-21 DIAGNOSIS — Z79.899 IMMUNOSUPPRESSIVE MANAGEMENT ENCOUNTER FOLLOWING KIDNEY TRANSPLANT (CMD): ICD-10-CM

## 2024-08-21 DIAGNOSIS — Z94.0 IMMUNOSUPPRESSIVE MANAGEMENT ENCOUNTER FOLLOWING KIDNEY TRANSPLANT (CMD): ICD-10-CM

## 2024-08-21 DIAGNOSIS — Z94.0 STATUS POST KIDNEY TRANSPLANT (CMD): Primary | ICD-10-CM

## 2024-08-21 DIAGNOSIS — E83.42 HYPOMAGNESEMIA: ICD-10-CM

## 2024-08-21 DIAGNOSIS — D84.9 IMMUNOSUPPRESSION  (CMD): ICD-10-CM

## 2024-08-21 DIAGNOSIS — I10 ESSENTIAL HYPERTENSION: ICD-10-CM

## 2024-08-22 ENCOUNTER — TELEPHONE (OUTPATIENT)
Dept: CARE COORDINATION | Age: 59
End: 2024-08-22

## 2024-08-22 ENCOUNTER — LAB SERVICES (OUTPATIENT)
Dept: LAB | Age: 59
End: 2024-08-22

## 2024-08-22 ENCOUNTER — APPOINTMENT (OUTPATIENT)
Dept: TRANSPLANT | Age: 59
End: 2024-08-22
Attending: INTERNAL MEDICINE

## 2024-08-22 DIAGNOSIS — E83.42 HYPOMAGNESEMIA: ICD-10-CM

## 2024-08-22 DIAGNOSIS — Z94.0 STATUS POST KIDNEY TRANSPLANT (CMD): ICD-10-CM

## 2024-08-22 DIAGNOSIS — Z79.899 IMMUNOSUPPRESSIVE MANAGEMENT ENCOUNTER FOLLOWING KIDNEY TRANSPLANT (CMD): ICD-10-CM

## 2024-08-22 DIAGNOSIS — Z94.0 IMMUNOSUPPRESSIVE MANAGEMENT ENCOUNTER FOLLOWING KIDNEY TRANSPLANT (CMD): ICD-10-CM

## 2024-08-22 DIAGNOSIS — I10 ESSENTIAL HYPERTENSION: ICD-10-CM

## 2024-08-22 DIAGNOSIS — D84.9 IMMUNOSUPPRESSION  (CMD): ICD-10-CM

## 2024-08-22 LAB
ALBUMIN SERPL-MCNC: 3.1 G/DL (ref 3.6–5.1)
ALBUMIN/GLOB SERPL: 1.1 {RATIO} (ref 1–2.4)
ALP SERPL-CCNC: 62 UNITS/L (ref 45–117)
ALT SERPL-CCNC: 14 UNITS/L
ANION GAP SERPL CALC-SCNC: 9 MMOL/L (ref 7–19)
APPEARANCE UR: CLEAR
AST SERPL-CCNC: 16 UNITS/L
BACTERIA #/AREA URNS HPF: ABNORMAL /HPF
BASOPHILS # BLD: 0 K/MCL (ref 0–0.3)
BASOPHILS NFR BLD: 1 %
BILIRUB SERPL-MCNC: 0.6 MG/DL (ref 0.2–1)
BILIRUB UR QL STRIP: NEGATIVE
BUN SERPL-MCNC: 23 MG/DL (ref 6–20)
BUN/CREAT SERPL: 15 (ref 7–25)
CALCIUM SERPL-MCNC: 9.1 MG/DL (ref 8.4–10.2)
CHLORIDE SERPL-SCNC: 107 MMOL/L (ref 97–110)
CO2 SERPL-SCNC: 26 MMOL/L (ref 21–32)
COLOR UR: ABNORMAL
CREAT SERPL-MCNC: 1.54 MG/DL (ref 0.67–1.17)
CREAT UR-MCNC: 55 MG/DL
DEPRECATED RDW RBC: 58.4 FL (ref 39–50)
EGFRCR SERPLBLD CKD-EPI 2021: 52 ML/MIN/{1.73_M2}
EOSINOPHIL # BLD: 0.1 K/MCL (ref 0–0.5)
EOSINOPHIL NFR BLD: 3 %
ERYTHROCYTE [DISTWIDTH] IN BLOOD: 15 % (ref 11–15)
FASTING DURATION TIME PATIENT: ABNORMAL H
GLOBULIN SER-MCNC: 2.8 G/DL (ref 2–4)
GLUCOSE SERPL-MCNC: 99 MG/DL (ref 70–99)
GLUCOSE UR STRIP-MCNC: NEGATIVE MG/DL
HCT VFR BLD CALC: 30.4 % (ref 39–51)
HGB BLD-MCNC: 9.7 G/DL (ref 13–17)
HGB UR QL STRIP: ABNORMAL
HYALINE CASTS #/AREA URNS LPF: ABNORMAL /LPF
IMM GRANULOCYTES # BLD AUTO: 0 K/MCL (ref 0–0.2)
IMM GRANULOCYTES # BLD: 1 %
KETONES UR STRIP-MCNC: NEGATIVE MG/DL
LEUKOCYTE ESTERASE UR QL STRIP: NEGATIVE
LYMPHOCYTES # BLD: 0.2 K/MCL (ref 1–4)
LYMPHOCYTES NFR BLD: 7 %
MAGNESIUM SERPL-MCNC: 1.7 MG/DL (ref 1.7–2.4)
MCH RBC QN AUTO: 33.3 PG (ref 26–34)
MCHC RBC AUTO-ENTMCNC: 31.9 G/DL (ref 32–36.5)
MCV RBC AUTO: 104.5 FL (ref 78–100)
MONOCYTES # BLD: 0.4 K/MCL (ref 0.3–0.9)
MONOCYTES NFR BLD: 14 %
NEUTROPHILS # BLD: 2.4 K/MCL (ref 1.8–7.7)
NEUTROPHILS NFR BLD: 74 %
NITRITE UR QL STRIP: NEGATIVE
NRBC BLD MANUAL-RTO: 0 /100 WBC
PH UR STRIP: 7.5 [PH] (ref 5–7)
PHOSPHATE SERPL-MCNC: 3.5 MG/DL (ref 2.4–4.7)
PLATELET # BLD AUTO: 154 K/MCL (ref 140–450)
POTASSIUM SERPL-SCNC: 3.9 MMOL/L (ref 3.4–5.1)
PROT SERPL-MCNC: 5.9 G/DL (ref 6.4–8.2)
PROT UR STRIP-MCNC: NEGATIVE MG/DL
PROT UR-MCNC: 16 MG/DL
PROT/CREAT UR: 291 MGPR/GCR
RBC # BLD: 2.91 MIL/MCL (ref 4.5–5.9)
RBC #/AREA URNS HPF: ABNORMAL /HPF
SODIUM SERPL-SCNC: 138 MMOL/L (ref 135–145)
SP GR UR STRIP: 1.01 (ref 1–1.03)
SQUAMOUS #/AREA URNS HPF: ABNORMAL /HPF
TACROLIMUS BLD-MCNC: 10.1 NG/ML (ref 5–20)
UROBILINOGEN UR STRIP-MCNC: 0.2 MG/DL
WBC # BLD: 3.2 K/MCL (ref 4.2–11)
WBC #/AREA URNS HPF: ABNORMAL /HPF

## 2024-08-22 PROCEDURE — 82570 ASSAY OF URINE CREATININE: CPT | Performed by: CLINICAL MEDICAL LABORATORY

## 2024-08-22 PROCEDURE — 80053 COMPREHEN METABOLIC PANEL: CPT | Performed by: CLINICAL MEDICAL LABORATORY

## 2024-08-22 PROCEDURE — 81001 URINALYSIS AUTO W/SCOPE: CPT | Performed by: CLINICAL MEDICAL LABORATORY

## 2024-08-22 PROCEDURE — 36415 COLL VENOUS BLD VENIPUNCTURE: CPT | Performed by: INTERNAL MEDICINE

## 2024-08-22 PROCEDURE — 83735 ASSAY OF MAGNESIUM: CPT | Performed by: CLINICAL MEDICAL LABORATORY

## 2024-08-22 PROCEDURE — 84100 ASSAY OF PHOSPHORUS: CPT | Performed by: CLINICAL MEDICAL LABORATORY

## 2024-08-22 PROCEDURE — 80197 ASSAY OF TACROLIMUS: CPT | Performed by: CLINICAL MEDICAL LABORATORY

## 2024-08-22 PROCEDURE — 85025 COMPLETE CBC W/AUTO DIFF WBC: CPT | Performed by: CLINICAL MEDICAL LABORATORY

## 2024-08-22 PROCEDURE — 84156 ASSAY OF PROTEIN URINE: CPT | Performed by: CLINICAL MEDICAL LABORATORY

## 2024-08-23 ENCOUNTER — TELEPHONE (OUTPATIENT)
Dept: TRANSPLANT | Age: 59
End: 2024-08-23

## 2024-08-23 DIAGNOSIS — I10 ESSENTIAL HYPERTENSION: ICD-10-CM

## 2024-08-23 DIAGNOSIS — Z94.0 IMMUNOSUPPRESSIVE MANAGEMENT ENCOUNTER FOLLOWING KIDNEY TRANSPLANT (CMD): ICD-10-CM

## 2024-08-23 DIAGNOSIS — Z79.899 IMMUNOSUPPRESSIVE MANAGEMENT ENCOUNTER FOLLOWING KIDNEY TRANSPLANT (CMD): ICD-10-CM

## 2024-08-23 DIAGNOSIS — E83.39 HYPOPHOSPHATEMIA: ICD-10-CM

## 2024-08-23 DIAGNOSIS — Z94.0 STATUS POST KIDNEY TRANSPLANT (CMD): Primary | ICD-10-CM

## 2024-08-23 DIAGNOSIS — E87.6 HYPOKALEMIA: ICD-10-CM

## 2024-08-23 DIAGNOSIS — E83.42 HYPOMAGNESEMIA: ICD-10-CM

## 2024-08-23 DIAGNOSIS — Z21 ASYMPTOMATIC HUMAN IMMUNODEFICIENCY VIRUS (HIV) INFECTION STATUS  (CMD): ICD-10-CM

## 2024-08-26 ENCOUNTER — EXTERNAL LAB (OUTPATIENT)
Dept: HEALTH INFORMATION MANAGEMENT | Facility: OTHER | Age: 59
End: 2024-08-26

## 2024-08-26 ENCOUNTER — OFFICE VISIT (OUTPATIENT)
Dept: TRANSPLANT | Age: 59
End: 2024-08-26
Attending: INTERNAL MEDICINE

## 2024-08-26 ENCOUNTER — APPOINTMENT (OUTPATIENT)
Dept: LAB | Age: 59
End: 2024-08-26

## 2024-08-26 ENCOUNTER — APPOINTMENT (OUTPATIENT)
Dept: TRANSPLANT | Age: 59
End: 2024-08-26
Attending: INTERNAL MEDICINE

## 2024-08-26 ENCOUNTER — TELEPHONE (OUTPATIENT)
Dept: ADMINISTRATIVE | Age: 59
End: 2024-08-26

## 2024-08-26 VITALS
WEIGHT: 234.57 LBS | BODY MASS INDEX: 30.1 KG/M2 | HEART RATE: 70 BPM | TEMPERATURE: 97.1 F | DIASTOLIC BLOOD PRESSURE: 81 MMHG | SYSTOLIC BLOOD PRESSURE: 136 MMHG | HEIGHT: 74 IN | RESPIRATION RATE: 16 BRPM

## 2024-08-26 DIAGNOSIS — Z79.899 IMMUNOSUPPRESSIVE MANAGEMENT ENCOUNTER FOLLOWING KIDNEY TRANSPLANT (CMD): Primary | ICD-10-CM

## 2024-08-26 DIAGNOSIS — R60.0 LOWER EXTREMITY EDEMA: ICD-10-CM

## 2024-08-26 DIAGNOSIS — Z94.0 IMMUNOSUPPRESSIVE MANAGEMENT ENCOUNTER FOLLOWING KIDNEY TRANSPLANT (CMD): Primary | ICD-10-CM

## 2024-08-26 DIAGNOSIS — Z94.0 STATUS POST KIDNEY TRANSPLANT (CMD): ICD-10-CM

## 2024-08-26 DIAGNOSIS — Z79.899 IMMUNOSUPPRESSIVE MANAGEMENT ENCOUNTER FOLLOWING KIDNEY TRANSPLANT (CMD): ICD-10-CM

## 2024-08-26 DIAGNOSIS — E87.6 HYPOKALEMIA: ICD-10-CM

## 2024-08-26 DIAGNOSIS — E83.39 HYPOPHOSPHATEMIA: ICD-10-CM

## 2024-08-26 DIAGNOSIS — R31.9 HEMATURIA, UNSPECIFIED TYPE: ICD-10-CM

## 2024-08-26 DIAGNOSIS — Z94.0 STATUS POST KIDNEY TRANSPLANT (CMD): Primary | ICD-10-CM

## 2024-08-26 DIAGNOSIS — E83.42 HYPOMAGNESEMIA: Primary | ICD-10-CM

## 2024-08-26 DIAGNOSIS — E83.42 HYPOMAGNESEMIA: ICD-10-CM

## 2024-08-26 DIAGNOSIS — D84.9 IMMUNOSUPPRESSION  (CMD): ICD-10-CM

## 2024-08-26 DIAGNOSIS — Z21 ASYMPTOMATIC HUMAN IMMUNODEFICIENCY VIRUS (HIV) INFECTION STATUS (CMD): ICD-10-CM

## 2024-08-26 DIAGNOSIS — Z94.0 KIDNEY TRANSPLANT STATUS, CADAVERIC (CMD): ICD-10-CM

## 2024-08-26 DIAGNOSIS — I10 ESSENTIAL HYPERTENSION: ICD-10-CM

## 2024-08-26 DIAGNOSIS — Z94.0 IMMUNOSUPPRESSIVE MANAGEMENT ENCOUNTER FOLLOWING KIDNEY TRANSPLANT (CMD): ICD-10-CM

## 2024-08-26 LAB
ALBUMIN SERPL-MCNC: 3.2 G/DL (ref 3.6–5.1)
ALBUMIN/GLOB SERPL: 1.1 {RATIO} (ref 1–2.4)
ALP SERPL-CCNC: 62 UNITS/L (ref 45–117)
ALT SERPL-CCNC: 16 UNITS/L
ANION GAP SERPL CALC-SCNC: 7 MMOL/L (ref 7–19)
APPEARANCE UR: CLEAR
AST SERPL-CCNC: 14 UNITS/L
BACTERIA #/AREA URNS HPF: ABNORMAL /HPF
BASOPHILS # BLD: 0 K/MCL (ref 0–0.3)
BASOPHILS NFR BLD: 1 %
BILIRUB SERPL-MCNC: 0.6 MG/DL (ref 0.2–1)
BILIRUB UR QL STRIP: NEGATIVE
BUN SERPL-MCNC: 21 MG/DL (ref 6–20)
BUN/CREAT SERPL: 16 (ref 7–25)
CALCIUM SERPL-MCNC: 9.1 MG/DL (ref 8.4–10.2)
CHLORIDE SERPL-SCNC: 108 MMOL/L (ref 97–110)
CO2 SERPL-SCNC: 26 MMOL/L (ref 21–32)
COLOR UR: COLORLESS
CREAT SERPL-MCNC: 1.3 MG/DL (ref 0.67–1.17)
CREAT UR-MCNC: 37.4 MG/DL
DEPRECATED RDW RBC: 54.1 FL (ref 39–50)
EGFRCR SERPLBLD CKD-EPI 2021: 63 ML/MIN/{1.73_M2}
EOSINOPHIL # BLD: 0.1 K/MCL (ref 0–0.5)
EOSINOPHIL NFR BLD: 4 %
ERYTHROCYTE [DISTWIDTH] IN BLOOD: 14.7 % (ref 11–15)
FASTING DURATION TIME PATIENT: ABNORMAL H
GLOBULIN SER-MCNC: 2.8 G/DL (ref 2–4)
GLUCOSE SERPL-MCNC: 97 MG/DL (ref 70–99)
GLUCOSE UR STRIP-MCNC: NEGATIVE MG/DL
HCT VFR BLD CALC: 30.2 % (ref 39–51)
HGB BLD-MCNC: 9.8 G/DL (ref 13–17)
HGB UR QL STRIP: ABNORMAL
HYALINE CASTS #/AREA URNS LPF: ABNORMAL /LPF
IMM GRANULOCYTES # BLD AUTO: 0.1 K/MCL (ref 0–0.2)
IMM GRANULOCYTES # BLD: 1 %
KETONES UR STRIP-MCNC: NEGATIVE MG/DL
LAB RESULT: NORMAL
LAB RESULT: NORMAL
LEUKOCYTE ESTERASE UR QL STRIP: NEGATIVE
LYMPHOCYTES # BLD: 0.2 K/MCL (ref 1–4)
LYMPHOCYTES NFR BLD: 5 %
MAGNESIUM SERPL-MCNC: 1.6 MG/DL (ref 1.7–2.4)
MCH RBC QN AUTO: 32.6 PG (ref 26–34)
MCHC RBC AUTO-ENTMCNC: 32.5 G/DL (ref 32–36.5)
MCV RBC AUTO: 100.3 FL (ref 78–100)
MONOCYTES # BLD: 0.3 K/MCL (ref 0.3–0.9)
MONOCYTES NFR BLD: 9 %
NEUTROPHILS # BLD: 2.9 K/MCL (ref 1.8–7.7)
NEUTROPHILS NFR BLD: 80 %
NITRITE UR QL STRIP: NEGATIVE
NRBC BLD MANUAL-RTO: 0 /100 WBC
PH UR STRIP: 7.5 [PH] (ref 5–7)
PHOSPHATE SERPL-MCNC: 3.5 MG/DL (ref 2.4–4.7)
PLATELET # BLD AUTO: 146 K/MCL (ref 140–450)
POTASSIUM SERPL-SCNC: 3.8 MMOL/L (ref 3.4–5.1)
PROT SERPL-MCNC: 6 G/DL (ref 6.4–8.2)
PROT UR STRIP-MCNC: NEGATIVE MG/DL
PROT UR-MCNC: 11 MG/DL
PROT/CREAT UR: 294 MGPR/GCR
RBC # BLD: 3.01 MIL/MCL (ref 4.5–5.9)
RBC #/AREA URNS HPF: ABNORMAL /HPF
SODIUM SERPL-SCNC: 137 MMOL/L (ref 135–145)
SP GR UR STRIP: 1.01 (ref 1–1.03)
SQUAMOUS #/AREA URNS HPF: ABNORMAL /HPF
TACROLIMUS BLD-MCNC: 10 NG/ML (ref 5–20)
UROBILINOGEN UR STRIP-MCNC: 0.2 MG/DL
WBC # BLD: 3.6 K/MCL (ref 4.2–11)
WBC #/AREA URNS HPF: ABNORMAL /HPF

## 2024-08-26 PROCEDURE — 87799 DETECT AGENT NOS DNA QUANT: CPT | Performed by: CLINICAL MEDICAL LABORATORY

## 2024-08-26 PROCEDURE — 80197 ASSAY OF TACROLIMUS: CPT | Performed by: CLINICAL MEDICAL LABORATORY

## 2024-08-26 PROCEDURE — 87522 HEPATITIS C REVRS TRNSCRPJ: CPT | Performed by: CLINICAL MEDICAL LABORATORY

## 2024-08-26 PROCEDURE — 81001 URINALYSIS AUTO W/SCOPE: CPT | Performed by: CLINICAL MEDICAL LABORATORY

## 2024-08-26 PROCEDURE — 84156 ASSAY OF PROTEIN URINE: CPT | Performed by: CLINICAL MEDICAL LABORATORY

## 2024-08-26 PROCEDURE — 99211 OFF/OP EST MAY X REQ PHY/QHP: CPT

## 2024-08-26 PROCEDURE — 80053 COMPREHEN METABOLIC PANEL: CPT | Performed by: CLINICAL MEDICAL LABORATORY

## 2024-08-26 PROCEDURE — 85025 COMPLETE CBC W/AUTO DIFF WBC: CPT | Performed by: CLINICAL MEDICAL LABORATORY

## 2024-08-26 PROCEDURE — 82570 ASSAY OF URINE CREATININE: CPT | Performed by: CLINICAL MEDICAL LABORATORY

## 2024-08-26 PROCEDURE — 83735 ASSAY OF MAGNESIUM: CPT | Performed by: CLINICAL MEDICAL LABORATORY

## 2024-08-26 PROCEDURE — 36415 COLL VENOUS BLD VENIPUNCTURE: CPT | Performed by: CLINICAL MEDICAL LABORATORY

## 2024-08-26 PROCEDURE — 87536 HIV-1 QUANT&REVRSE TRNSCRPJ: CPT | Performed by: CLINICAL MEDICAL LABORATORY

## 2024-08-26 PROCEDURE — 84100 ASSAY OF PHOSPHORUS: CPT | Performed by: CLINICAL MEDICAL LABORATORY

## 2024-08-26 PROCEDURE — 87517 HEPATITIS B DNA QUANT: CPT | Performed by: CLINICAL MEDICAL LABORATORY

## 2024-08-26 RX ORDER — MAGNESIUM OXIDE 400 MG/1
400 TABLET ORAL 3 TIMES DAILY
Qty: 90 TABLET | Refills: 5 | Status: SHIPPED | OUTPATIENT
Start: 2024-08-26 | End: 2025-02-22

## 2024-08-26 RX ORDER — POTASSIUM CHLORIDE 1500 MG/1
20 TABLET, EXTENDED RELEASE ORAL 2 TIMES DAILY
Qty: 60 TABLET | Refills: 5 | Status: SHIPPED | OUTPATIENT
Start: 2024-08-26

## 2024-08-26 ASSESSMENT — PAIN SCALES - GENERAL: PAINLEVEL: 0

## 2024-08-27 LAB
CMV DNA # SPEC NAA+PROBE: NOT DETECTED {COPIES}/ML
CMV DNA SERPL NAA+PROBE-ACNC: NOT DETECTED [IU]/ML
HBV DNA SERPL NAA+PROBE-ACNC: NOT DETECTED [IU]/ML
HBV DNA SERPL NAA+PROBE-LOG IU: NOT DETECTED {LOG_IU}/ML
HCV RNA SERPL NAA+PROBE-ACNC: NOT DETECTED K[IU]/ML
HCV RNA SERPL NAA+PROBE-LOG IU: NOT DETECTED {LOG_IU}/ML
HIV1 RNA # SERPL NAA+PROBE: NOT DETECTED {COPIES}/ML
HIV1 RNA SERPL NAA+PROBE-LOG#: NOT DETECTED {LOG_COPIES}/ML
SERVICE CMNT-IMP: NORMAL

## 2024-08-28 LAB
BKV DNA # UR NAA+PROBE: NOT DETECTED
BKV DNA SPEC NAA+PROBE-LOG#: NOT DETECTED {LOG_COPIES}/ML
EBV DNA # SPEC NAA+PROBE: NOT DETECTED {COPIES}/ML
EBV DNA SPEC NAA+PROBE-LOG#: NOT DETECTED {LOG_COPIES}/ML
SERVICE CMNT-IMP: NORMAL
SERVICE CMNT-IMP: NORMAL

## 2024-08-29 ENCOUNTER — LAB SERVICES (OUTPATIENT)
Dept: LAB | Age: 59
End: 2024-08-29

## 2024-08-29 ENCOUNTER — TELEPHONE (OUTPATIENT)
Dept: CARE COORDINATION | Age: 59
End: 2024-08-29

## 2024-08-29 ENCOUNTER — APPOINTMENT (OUTPATIENT)
Dept: TRANSPLANT | Age: 59
End: 2024-08-29
Attending: INTERNAL MEDICINE

## 2024-08-29 DIAGNOSIS — Z94.0 IMMUNOSUPPRESSIVE MANAGEMENT ENCOUNTER FOLLOWING KIDNEY TRANSPLANT (CMD): ICD-10-CM

## 2024-08-29 DIAGNOSIS — E83.39 HYPOPHOSPHATEMIA: ICD-10-CM

## 2024-08-29 DIAGNOSIS — Z94.0 STATUS POST KIDNEY TRANSPLANT (CMD): ICD-10-CM

## 2024-08-29 DIAGNOSIS — E83.42 HYPOMAGNESEMIA: ICD-10-CM

## 2024-08-29 DIAGNOSIS — E87.6 HYPOKALEMIA: ICD-10-CM

## 2024-08-29 DIAGNOSIS — Z79.899 IMMUNOSUPPRESSIVE MANAGEMENT ENCOUNTER FOLLOWING KIDNEY TRANSPLANT (CMD): ICD-10-CM

## 2024-08-29 LAB
ALBUMIN SERPL-MCNC: 3.3 G/DL (ref 3.6–5.1)
ALBUMIN/GLOB SERPL: 1.2 {RATIO} (ref 1–2.4)
ALP SERPL-CCNC: 64 UNITS/L (ref 45–117)
ALT SERPL-CCNC: 13 UNITS/L
ANION GAP SERPL CALC-SCNC: 11 MMOL/L (ref 7–19)
APPEARANCE UR: CLEAR
AST SERPL-CCNC: 12 UNITS/L
BACTERIA #/AREA URNS HPF: ABNORMAL /HPF
BASOPHILS # BLD: 0 K/MCL (ref 0–0.3)
BASOPHILS NFR BLD: 1 %
BILIRUB SERPL-MCNC: 0.6 MG/DL (ref 0.2–1)
BILIRUB UR QL STRIP: NEGATIVE
BUN SERPL-MCNC: 26 MG/DL (ref 6–20)
BUN/CREAT SERPL: 18 (ref 7–25)
CALCIUM SERPL-MCNC: 9.2 MG/DL (ref 8.4–10.2)
CHLORIDE SERPL-SCNC: 109 MMOL/L (ref 97–110)
CO2 SERPL-SCNC: 22 MMOL/L (ref 21–32)
COLOR UR: COLORLESS
CREAT SERPL-MCNC: 1.45 MG/DL (ref 0.67–1.17)
CREAT UR-MCNC: 58.6 MG/DL
DEPRECATED RDW RBC: 53.4 FL (ref 39–50)
EGFRCR SERPLBLD CKD-EPI 2021: 56 ML/MIN/{1.73_M2}
EOSINOPHIL # BLD: 0.2 K/MCL (ref 0–0.5)
EOSINOPHIL NFR BLD: 4 %
ERYTHROCYTE [DISTWIDTH] IN BLOOD: 14.5 % (ref 11–15)
FASTING DURATION TIME PATIENT: 12 HOURS (ref 0–999)
GLOBULIN SER-MCNC: 2.7 G/DL (ref 2–4)
GLUCOSE SERPL-MCNC: 94 MG/DL (ref 70–99)
GLUCOSE UR STRIP-MCNC: NEGATIVE MG/DL
HCT VFR BLD CALC: 30.9 % (ref 39–51)
HGB BLD-MCNC: 10.1 G/DL (ref 13–17)
HGB UR QL STRIP: ABNORMAL
HYALINE CASTS #/AREA URNS LPF: ABNORMAL /LPF
IMM GRANULOCYTES # BLD AUTO: 0 K/MCL (ref 0–0.2)
IMM GRANULOCYTES # BLD: 1 %
KETONES UR STRIP-MCNC: NEGATIVE MG/DL
LEUKOCYTE ESTERASE UR QL STRIP: NEGATIVE
LYMPHOCYTES # BLD: 0.2 K/MCL (ref 1–4)
LYMPHOCYTES NFR BLD: 6 %
MAGNESIUM SERPL-MCNC: 1.7 MG/DL (ref 1.7–2.4)
MCH RBC QN AUTO: 32.8 PG (ref 26–34)
MCHC RBC AUTO-ENTMCNC: 32.7 G/DL (ref 32–36.5)
MCV RBC AUTO: 100.3 FL (ref 78–100)
MONOCYTES # BLD: 0.3 K/MCL (ref 0.3–0.9)
MONOCYTES NFR BLD: 7 %
NEUTROPHILS # BLD: 3.3 K/MCL (ref 1.8–7.7)
NEUTROPHILS NFR BLD: 81 %
NITRITE UR QL STRIP: NEGATIVE
NRBC BLD MANUAL-RTO: 0 /100 WBC
PH UR STRIP: 7.5 [PH] (ref 5–7)
PHOSPHATE SERPL-MCNC: 3.5 MG/DL (ref 2.4–4.7)
PLATELET # BLD AUTO: 128 K/MCL (ref 140–450)
POTASSIUM SERPL-SCNC: 4 MMOL/L (ref 3.4–5.1)
PROT SERPL-MCNC: 6 G/DL (ref 6.4–8.2)
PROT UR STRIP-MCNC: NEGATIVE MG/DL
PROT UR-MCNC: 23 MG/DL
PROT/CREAT UR: 392 MGPR/GCR
RBC # BLD: 3.08 MIL/MCL (ref 4.5–5.9)
RBC #/AREA URNS HPF: >100 /HPF
SERVICE CMNT-IMP: NORMAL
SODIUM SERPL-SCNC: 138 MMOL/L (ref 135–145)
SP GR UR STRIP: 1.01 (ref 1–1.03)
SQUAMOUS #/AREA URNS HPF: ABNORMAL /HPF
TACROLIMUS BLD-MCNC: 13 NG/ML (ref 5–20)
UROBILINOGEN UR STRIP-MCNC: 0.2 MG/DL
WBC # BLD: 4.1 K/MCL (ref 4.2–11)
WBC #/AREA URNS HPF: ABNORMAL /HPF

## 2024-08-29 PROCEDURE — 80053 COMPREHEN METABOLIC PANEL: CPT | Performed by: CLINICAL MEDICAL LABORATORY

## 2024-08-29 PROCEDURE — 81001 URINALYSIS AUTO W/SCOPE: CPT | Performed by: CLINICAL MEDICAL LABORATORY

## 2024-08-29 PROCEDURE — 82570 ASSAY OF URINE CREATININE: CPT | Performed by: CLINICAL MEDICAL LABORATORY

## 2024-08-29 PROCEDURE — 36415 COLL VENOUS BLD VENIPUNCTURE: CPT | Performed by: INTERNAL MEDICINE

## 2024-08-29 PROCEDURE — 83735 ASSAY OF MAGNESIUM: CPT | Performed by: CLINICAL MEDICAL LABORATORY

## 2024-08-29 PROCEDURE — 85025 COMPLETE CBC W/AUTO DIFF WBC: CPT | Performed by: CLINICAL MEDICAL LABORATORY

## 2024-08-29 PROCEDURE — 80197 ASSAY OF TACROLIMUS: CPT | Performed by: CLINICAL MEDICAL LABORATORY

## 2024-08-29 PROCEDURE — 84100 ASSAY OF PHOSPHORUS: CPT | Performed by: CLINICAL MEDICAL LABORATORY

## 2024-08-29 PROCEDURE — 84156 ASSAY OF PROTEIN URINE: CPT | Performed by: CLINICAL MEDICAL LABORATORY

## 2024-08-30 ENCOUNTER — LAB ENCOUNTER (OUTPATIENT)
Dept: LAB | Age: 59
End: 2024-08-30
Attending: INTERNAL MEDICINE
Payer: MEDICARE

## 2024-08-30 DIAGNOSIS — E89.0 POSTSURGICAL HYPOTHYROIDISM: ICD-10-CM

## 2024-08-30 DIAGNOSIS — C73 MALIGNANT NEOPLASM OF THYROID GLAND (HCC): Primary | ICD-10-CM

## 2024-08-30 LAB
THYROGLOB SERPL-MCNC: <15 U/ML (ref ?–60)
TSI SER-ACNC: 2.74 MIU/ML (ref 0.55–4.78)

## 2024-08-30 PROCEDURE — 36415 COLL VENOUS BLD VENIPUNCTURE: CPT

## 2024-08-30 PROCEDURE — 86800 THYROGLOBULIN ANTIBODY: CPT

## 2024-08-30 PROCEDURE — 84443 ASSAY THYROID STIM HORMONE: CPT

## 2024-08-30 PROCEDURE — 84432 ASSAY OF THYROGLOBULIN: CPT

## 2024-08-31 ENCOUNTER — TELEPHONE (OUTPATIENT)
Dept: TRANSPLANT | Age: 59
End: 2024-08-31

## 2024-08-31 DIAGNOSIS — E83.39 HYPOPHOSPHATEMIA: ICD-10-CM

## 2024-08-31 DIAGNOSIS — I10 ESSENTIAL HYPERTENSION: ICD-10-CM

## 2024-08-31 DIAGNOSIS — Z79.899 IMMUNOSUPPRESSIVE MANAGEMENT ENCOUNTER FOLLOWING KIDNEY TRANSPLANT (CMD): ICD-10-CM

## 2024-08-31 DIAGNOSIS — Z94.0 STATUS POST KIDNEY TRANSPLANT (CMD): Primary | ICD-10-CM

## 2024-08-31 DIAGNOSIS — E83.42 HYPOMAGNESEMIA: ICD-10-CM

## 2024-08-31 DIAGNOSIS — D84.9 IMMUNOSUPPRESSION  (CMD): ICD-10-CM

## 2024-08-31 DIAGNOSIS — Z94.0 IMMUNOSUPPRESSIVE MANAGEMENT ENCOUNTER FOLLOWING KIDNEY TRANSPLANT (CMD): ICD-10-CM

## 2024-09-04 LAB
ALLOSURE DD-CFDNA: 0.54 %
SERVICE CMNT-IMP: NORMAL

## 2024-09-05 ENCOUNTER — OFFICE VISIT (OUTPATIENT)
Dept: TRANSPLANT | Age: 59
End: 2024-09-05
Attending: INTERNAL MEDICINE

## 2024-09-05 ENCOUNTER — LAB SERVICES (OUTPATIENT)
Dept: LAB | Age: 59
End: 2024-09-05

## 2024-09-05 VITALS
HEIGHT: 74 IN | HEART RATE: 89 BPM | RESPIRATION RATE: 16 BRPM | BODY MASS INDEX: 30.22 KG/M2 | TEMPERATURE: 97.2 F | SYSTOLIC BLOOD PRESSURE: 118 MMHG | WEIGHT: 235.45 LBS | DIASTOLIC BLOOD PRESSURE: 66 MMHG

## 2024-09-05 DIAGNOSIS — E83.39 HYPOPHOSPHATEMIA: ICD-10-CM

## 2024-09-05 DIAGNOSIS — I10 ESSENTIAL HYPERTENSION: ICD-10-CM

## 2024-09-05 DIAGNOSIS — Z94.0 STATUS POST KIDNEY TRANSPLANT (CMD): ICD-10-CM

## 2024-09-05 DIAGNOSIS — E83.42 HYPOMAGNESEMIA: ICD-10-CM

## 2024-09-05 DIAGNOSIS — D84.9 IMMUNOSUPPRESSION  (CMD): ICD-10-CM

## 2024-09-05 DIAGNOSIS — Z94.0 KIDNEY TRANSPLANT RECIPIENT (CMD): Primary | ICD-10-CM

## 2024-09-05 DIAGNOSIS — Z79.899 IMMUNOSUPPRESSIVE MANAGEMENT ENCOUNTER FOLLOWING KIDNEY TRANSPLANT (CMD): ICD-10-CM

## 2024-09-05 DIAGNOSIS — I10 BENIGN ESSENTIAL HTN: Primary | ICD-10-CM

## 2024-09-05 DIAGNOSIS — Z94.0 IMMUNOSUPPRESSIVE MANAGEMENT ENCOUNTER FOLLOWING KIDNEY TRANSPLANT (CMD): ICD-10-CM

## 2024-09-05 DIAGNOSIS — I25.10 CORONARY ARTERY DISEASE INVOLVING NATIVE CORONARY ARTERY OF NATIVE HEART WITHOUT ANGINA PECTORIS: ICD-10-CM

## 2024-09-05 DIAGNOSIS — Z94.0 STATUS POST KIDNEY TRANSPLANT (CMD): Primary | ICD-10-CM

## 2024-09-05 LAB
ALBUMIN SERPL-MCNC: 3.2 G/DL (ref 3.6–5.1)
ALBUMIN/GLOB SERPL: 1 {RATIO} (ref 1–2.4)
ALP SERPL-CCNC: 70 UNITS/L (ref 45–117)
ALT SERPL-CCNC: 20 UNITS/L
ANION GAP SERPL CALC-SCNC: 10 MMOL/L (ref 7–19)
APPEARANCE UR: CLEAR
AST SERPL-CCNC: 15 UNITS/L
BACTERIA #/AREA URNS HPF: ABNORMAL /HPF
BASOPHILS # BLD: 0 K/MCL (ref 0–0.3)
BASOPHILS NFR BLD: 1 %
BILIRUB SERPL-MCNC: 0.5 MG/DL (ref 0.2–1)
BILIRUB UR QL STRIP: NEGATIVE
BUN SERPL-MCNC: 24 MG/DL (ref 6–20)
BUN/CREAT SERPL: 17 (ref 7–25)
CALCIUM SERPL-MCNC: 9.3 MG/DL (ref 8.4–10.2)
CHLORIDE SERPL-SCNC: 109 MMOL/L (ref 97–110)
CO2 SERPL-SCNC: 24 MMOL/L (ref 21–32)
COLOR UR: COLORLESS
CREAT SERPL-MCNC: 1.4 MG/DL (ref 0.67–1.17)
CREAT UR-MCNC: 78.6 MG/DL
DEPRECATED RDW RBC: 52.6 FL (ref 39–50)
EGFRCR SERPLBLD CKD-EPI 2021: 58 ML/MIN/{1.73_M2}
EOSINOPHIL # BLD: 0.2 K/MCL (ref 0–0.5)
EOSINOPHIL NFR BLD: 5 %
ERYTHROCYTE [DISTWIDTH] IN BLOOD: 14.4 % (ref 11–15)
FASTING DURATION TIME PATIENT: ABNORMAL H
GLOBULIN SER-MCNC: 3.1 G/DL (ref 2–4)
GLUCOSE SERPL-MCNC: 101 MG/DL (ref 70–99)
GLUCOSE UR STRIP-MCNC: NEGATIVE MG/DL
HCT VFR BLD CALC: 32.6 % (ref 39–51)
HGB BLD-MCNC: 10.8 G/DL (ref 13–17)
HGB UR QL STRIP: ABNORMAL
HYALINE CASTS #/AREA URNS LPF: ABNORMAL /LPF
IMM GRANULOCYTES # BLD AUTO: 0 K/MCL (ref 0–0.2)
IMM GRANULOCYTES # BLD: 1 %
KETONES UR STRIP-MCNC: NEGATIVE MG/DL
LEUKOCYTE ESTERASE UR QL STRIP: NEGATIVE
LYMPHOCYTES # BLD: 0.3 K/MCL (ref 1–4)
LYMPHOCYTES NFR BLD: 6 %
MAGNESIUM SERPL-MCNC: 2.1 MG/DL (ref 1.7–2.4)
MCH RBC QN AUTO: 32.6 PG (ref 26–34)
MCHC RBC AUTO-ENTMCNC: 33.1 G/DL (ref 32–36.5)
MCV RBC AUTO: 98.5 FL (ref 78–100)
MONOCYTES # BLD: 0.3 K/MCL (ref 0.3–0.9)
MONOCYTES NFR BLD: 7 %
MUCOUS THREADS URNS QL MICRO: PRESENT
NEUTROPHILS # BLD: 4 K/MCL (ref 1.8–7.7)
NEUTROPHILS NFR BLD: 80 %
NITRITE UR QL STRIP: NEGATIVE
NRBC BLD MANUAL-RTO: 0 /100 WBC
PH UR STRIP: 7 [PH] (ref 5–7)
PHOSPHATE SERPL-MCNC: 4.3 MG/DL (ref 2.4–4.7)
PLATELET # BLD AUTO: 137 K/MCL (ref 140–450)
POTASSIUM SERPL-SCNC: 3.8 MMOL/L (ref 3.4–5.1)
PROT SERPL-MCNC: 6.3 G/DL (ref 6.4–8.2)
PROT UR STRIP-MCNC: ABNORMAL MG/DL
PROT UR-MCNC: 20 MG/DL
PROT/CREAT UR: 254 MGPR/GCR
RBC # BLD: 3.31 MIL/MCL (ref 4.5–5.9)
RBC #/AREA URNS HPF: >100 /HPF
SODIUM SERPL-SCNC: 139 MMOL/L (ref 135–145)
SP GR UR STRIP: 1.01 (ref 1–1.03)
SQUAMOUS #/AREA URNS HPF: ABNORMAL /HPF
TACROLIMUS BLD-MCNC: 10 NG/ML (ref 5–20)
UROBILINOGEN UR STRIP-MCNC: 0.2 MG/DL
WBC # BLD: 4.9 K/MCL (ref 4.2–11)
WBC #/AREA URNS HPF: ABNORMAL /HPF

## 2024-09-05 PROCEDURE — 80053 COMPREHEN METABOLIC PANEL: CPT | Performed by: CLINICAL MEDICAL LABORATORY

## 2024-09-05 PROCEDURE — 85025 COMPLETE CBC W/AUTO DIFF WBC: CPT | Performed by: CLINICAL MEDICAL LABORATORY

## 2024-09-05 PROCEDURE — 36415 COLL VENOUS BLD VENIPUNCTURE: CPT | Performed by: CLINICAL MEDICAL LABORATORY

## 2024-09-05 PROCEDURE — 81001 URINALYSIS AUTO W/SCOPE: CPT | Performed by: CLINICAL MEDICAL LABORATORY

## 2024-09-05 PROCEDURE — 83735 ASSAY OF MAGNESIUM: CPT | Performed by: CLINICAL MEDICAL LABORATORY

## 2024-09-05 PROCEDURE — 99212 OFFICE O/P EST SF 10 MIN: CPT

## 2024-09-05 PROCEDURE — 84100 ASSAY OF PHOSPHORUS: CPT | Performed by: CLINICAL MEDICAL LABORATORY

## 2024-09-05 PROCEDURE — 80197 ASSAY OF TACROLIMUS: CPT | Performed by: CLINICAL MEDICAL LABORATORY

## 2024-09-05 PROCEDURE — 82570 ASSAY OF URINE CREATININE: CPT | Performed by: CLINICAL MEDICAL LABORATORY

## 2024-09-05 PROCEDURE — 84156 ASSAY OF PROTEIN URINE: CPT | Performed by: CLINICAL MEDICAL LABORATORY

## 2024-09-05 ASSESSMENT — PAIN SCALES - GENERAL: PAINLEVEL: 0

## 2024-09-09 ENCOUNTER — TELEPHONE (OUTPATIENT)
Dept: TRANSPLANT | Age: 59
End: 2024-09-09

## 2024-09-09 DIAGNOSIS — Z79.899 IMMUNOSUPPRESSIVE MANAGEMENT ENCOUNTER FOLLOWING KIDNEY TRANSPLANT (CMD): ICD-10-CM

## 2024-09-09 DIAGNOSIS — E83.42 HYPOMAGNESEMIA: ICD-10-CM

## 2024-09-09 DIAGNOSIS — R31.9 HEMATURIA, UNSPECIFIED TYPE: ICD-10-CM

## 2024-09-09 DIAGNOSIS — Z94.0 IMMUNOSUPPRESSIVE MANAGEMENT ENCOUNTER FOLLOWING KIDNEY TRANSPLANT (CMD): ICD-10-CM

## 2024-09-09 DIAGNOSIS — Z94.0 STATUS POST KIDNEY TRANSPLANT (CMD): Primary | ICD-10-CM

## 2024-09-09 DIAGNOSIS — D84.9 IMMUNOSUPPRESSION  (CMD): ICD-10-CM

## 2024-09-09 LAB — LC THYROGLOBIN LCMS: <0.2 NG/ML

## 2024-09-10 ENCOUNTER — OFFICE VISIT (OUTPATIENT)
Dept: INTERNAL MEDICINE CLINIC | Facility: CLINIC | Age: 59
End: 2024-09-10
Payer: MEDICARE

## 2024-09-10 VITALS
TEMPERATURE: 98 F | SYSTOLIC BLOOD PRESSURE: 112 MMHG | WEIGHT: 236.63 LBS | HEIGHT: 72.75 IN | BODY MASS INDEX: 31.36 KG/M2 | DIASTOLIC BLOOD PRESSURE: 61 MMHG | RESPIRATION RATE: 12 BRPM | HEART RATE: 65 BPM | OXYGEN SATURATION: 100 %

## 2024-09-10 DIAGNOSIS — N18.6 ANEMIA IN ESRD (END-STAGE RENAL DISEASE) (HCC): Chronic | ICD-10-CM

## 2024-09-10 DIAGNOSIS — Z00.00 ENCOUNTER FOR SUBSEQUENT ANNUAL WELLNESS VISIT (AWV) IN MEDICARE PATIENT: Primary | ICD-10-CM

## 2024-09-10 DIAGNOSIS — M06.9 RHEUMATOID ARTHRITIS INVOLVING MULTIPLE SITES, UNSPECIFIED WHETHER RHEUMATOID FACTOR PRESENT (HCC): Chronic | ICD-10-CM

## 2024-09-10 DIAGNOSIS — D84.9 IMMUNOSUPPRESSED STATUS (HCC): Chronic | ICD-10-CM

## 2024-09-10 DIAGNOSIS — Q87.81 ALPORT SYNDROME (HCC): Chronic | ICD-10-CM

## 2024-09-10 DIAGNOSIS — Z94.0 RENAL TRANSPLANT, STATUS POST (HCC): Chronic | ICD-10-CM

## 2024-09-10 DIAGNOSIS — M1A.09X0 IDIOPATHIC CHRONIC GOUT OF MULTIPLE SITES WITHOUT TOPHUS: Chronic | ICD-10-CM

## 2024-09-10 DIAGNOSIS — M25.50 CHRONIC PAIN OF MULTIPLE JOINTS: Chronic | ICD-10-CM

## 2024-09-10 DIAGNOSIS — G47.33 OSA ON CPAP: Chronic | ICD-10-CM

## 2024-09-10 DIAGNOSIS — D63.1 ANEMIA IN ESRD (END-STAGE RENAL DISEASE) (HCC): Chronic | ICD-10-CM

## 2024-09-10 DIAGNOSIS — I25.10 CORONARY ARTERY DISEASE INVOLVING NATIVE CORONARY ARTERY OF NATIVE HEART WITHOUT ANGINA PECTORIS: Chronic | ICD-10-CM

## 2024-09-10 DIAGNOSIS — I10 PRIMARY HYPERTENSION: Chronic | ICD-10-CM

## 2024-09-10 DIAGNOSIS — Z12.5 SCREENING FOR MALIGNANT NEOPLASM OF PROSTATE: ICD-10-CM

## 2024-09-10 DIAGNOSIS — G89.29 CHRONIC PAIN OF MULTIPLE JOINTS: Chronic | ICD-10-CM

## 2024-09-10 DIAGNOSIS — I27.20 PULMONARY HTN (HCC): Chronic | ICD-10-CM

## 2024-09-10 DIAGNOSIS — E89.0 POSTSURGICAL HYPOTHYROIDISM: Chronic | ICD-10-CM

## 2024-09-10 DIAGNOSIS — N25.81 SECONDARY HYPERPARATHYROIDISM OF RENAL ORIGIN (HCC): Chronic | ICD-10-CM

## 2024-09-10 DIAGNOSIS — D69.6 THROMBOCYTOPENIA (HCC): Chronic | ICD-10-CM

## 2024-09-10 DIAGNOSIS — F11.20 OPIOID DEPENDENCE ON AGONIST THERAPY (HCC): Chronic | ICD-10-CM

## 2024-09-10 PROBLEM — D68.62 LUPUS ANTICOAGULANT DISORDER (HCC): Status: ACTIVE | Noted: 2024-09-10

## 2024-09-10 PROCEDURE — G0439 PPPS, SUBSEQ VISIT: HCPCS | Performed by: INTERNAL MEDICINE

## 2024-09-10 RX ORDER — MAGNESIUM OXIDE 400 MG/1
1 TABLET ORAL 2 TIMES DAILY
COMMUNITY
Start: 2024-08-26 | End: 2025-02-22

## 2024-09-10 RX ORDER — DIBASIC SODIUM PHOSPHATE, MONOBASIC POTASSIUM PHOSPHATE AND MONOBASIC SODIUM PHOSPHATE 852; 155; 130 MG/1; MG/1; MG/1
1 TABLET ORAL DAILY
COMMUNITY
Start: 2024-08-08 | End: 2025-02-04

## 2024-09-10 RX ORDER — SEVELAMER CARBONATE 800 MG/1
800 TABLET, FILM COATED ORAL
COMMUNITY
Start: 2024-05-25

## 2024-09-10 RX ORDER — TACROLIMUS 4 MG/1
2 TABLET, EXTENDED RELEASE ORAL DAILY
COMMUNITY
Start: 2024-09-01

## 2024-09-10 RX ORDER — POTASSIUM CHLORIDE 1500 MG/1
20 TABLET, EXTENDED RELEASE ORAL DAILY
COMMUNITY
Start: 2024-08-26

## 2024-09-10 RX ORDER — MYCOPHENOLIC ACID 180 MG/1
540 TABLET, DELAYED RELEASE ORAL EVERY 12 HOURS
COMMUNITY

## 2024-09-10 RX ORDER — LABETALOL 100 MG/1
50 TABLET, FILM COATED ORAL EVERY 12 HOURS
COMMUNITY
Start: 2024-07-29 | End: 2024-09-10

## 2024-09-10 RX ORDER — TORSEMIDE 20 MG/1
40 TABLET ORAL EVERY MORNING
COMMUNITY
Start: 2024-08-12

## 2024-09-10 RX ORDER — SULFAMETHOXAZOLE/TRIMETHOPRIM 800-160 MG
0.5 TABLET ORAL
COMMUNITY
Start: 2024-07-31

## 2024-09-10 RX ORDER — VALGANCICLOVIR 450 MG/1
450 TABLET, FILM COATED ORAL DAILY
COMMUNITY
Start: 2024-07-29

## 2024-09-10 RX ORDER — LEVOTHYROXINE SODIUM 150 UG/1
150 TABLET ORAL
COMMUNITY

## 2024-09-10 RX ORDER — FAMOTIDINE 20 MG/1
20 TABLET, FILM COATED ORAL DAILY
COMMUNITY

## 2024-09-10 NOTE — PROGRESS NOTES
Subjective:   Suresh Prather is a 59 year old male who presents for a Subsequent Annual Wellness visit (Pt already had Initial Annual Wellness) and scheduled follow up of multiple significant but stable problems.   Preventative health - due for updated PSA.   Hypertension - at goal blood pressure.  No longer on antihypertensive therapy post-transplant.  CAD, pulmonary hypertension - no anginal symptoms or shortness of breath.  Has coronary stent.  Hypothyroidism - no new symptoms, on levothyroxine therapy.  Chronic pain of multiple joints, chronic gout, rheumatoid arthritis, opioid dependence - Following with [facility], on Suboxone therapy.  Alport syndrome, long-term current use of immunosuppressive medication, secondary hyperparathyroidism, status post renal transplant -   Patient's prior kidney transplant failed.  Was on hemodialysis.  Got a new kidney transplant earlier this summer.  Initially had to continue hemodialysis, but now .  On transplant rejection therapy/immunosuppressive therapy.    Anemia in ESRD, thrombocytopenia - getting regular CBC's from Nephrology; stable levels.  No major bleeding episodes.  RAQUEL - on CPAP therapy.    History/Other:   Fall Risk Assessment:   He has been screened for Falls and is High Risk. Fall Prevention information provided to patient in After Visit Summary.    Do you feel unsteady when standing or walking?: Yes  Do you worry about falling?: Yes  Have you fallen in the past year?: No     Cognitive Assessment:   Abnormal  What day of the week is this?: Correct  What month is it?: Correct  What year is it?: Correct  Recall \"Ball\": Correct  Recall \"Flag\": Correct  Recall \"Tree\": Incorrect      Functional Ability/Status:   Suresh Prather has some abnormal functions as listed below:  He has Hearing problems based on screening of functional status.He has Walking problems based on screening of functional status. He has problems with Memory based on screening  of functional status.       Depression Screening (PHQ):  PHQ-2 SCORE: 0  , done 9/10/2024       Advanced Directives:   He has a Living Will on file in [facility]; reviewed and discussed documents with patient (and family/surrogate if present).  He has a Power of  for Health Care on file in [facility].  Patient has Advance Care Planning documents present in EMR. Reviewed documents with patient (and family/surrogate if present).      Patient Active Problem List   Diagnosis    Esophageal reflux    Pulmonary HTN ([facility])    RA (rheumatoid arthritis) ([facility])    Chronic fatigue syndrome    Primary hypertension    Postsurgical hypothyroidism    Primary osteoarthritis of right knee    RAQUEL on CPAP    Gout    Functional gait abnormality    History of fusion of cervical spine    Painful total knee replacement, sequela    Chronic pain of multiple joints    Opioid dependence on agonist therapy ([facility])    Thrombocytopenia ([facility])    Peripheral polyneuropathy    Immunosuppressed status ([facility]) - Long-term current use of immunosuppressive medication    Coronary artery disease involving native coronary artery of native heart without angina pectoris    Alport syndrome ([facility])    Renal transplant, status post ([facility])    Anemia in ESRD (end-stage renal disease) ([facility])    Presence of stent in coronary artery    Secondary hyperparathyroidism of renal origin ([facility])    Encounter for monitoring Suboxone maintenance therapy    Numerous moles     Allergies:  He is allergic to carvedilol, ciprofloxacin hcl, duloxetine, fosinopril, gabapentin, hydralazine, metoprolol, monopril [fosinopril sodium], pravastatin sodium, nortriptyline, and minoxidil.    Current Medications:  Outpatient Medications Marked as Taking for the 9/10/24 encounter (Office Visit) with Codi Loyola MD   Medication Sig    famotidine 20 MG Oral Tab Take 1 tablet (20 mg total) by mouth daily.    K Phos [place]-Sod Phos Di & Mono (PHOSPHA 250 NEUTRAL) [id number] MG Oral Tab Take 1 tablet by mouth  daily.    magnesium oxide 400 MG Oral Tab Take 1 tablet (400 mg total) by mouth 2 (two) times daily.    mycophenolate sodium 180 MG Oral Tab EC Take 3 tablets (540 mg total) by mouth Q12H.    potassium chloride 20 MEQ Oral Tab CR Take 1 tablet (20 mEq total) by mouth daily.    sulfamethoxazole-trimethoprim -160 MG Oral Tab per tablet Take 0.5 tablets by mouth 3 (three) times a week. Monday, Wednesday, Friday    torsemide 20 MG Oral Tab Take 2 tablets (40 mg total) by mouth every morning.    valGANciclovir 450 MG Oral Tab Take 1 tablet (450 mg total) by mouth daily.    ENVARSUS XR 4 MG Oral Tablet 24 Hr Take 2 tablets by mouth daily.    levothyroxine 150 MCG Oral Tab Take 1 tablet (150 mcg total) by mouth before breakfast.    predniSONE 5 MG Oral Tab Take 1 tablet (5 mg total) by mouth daily.    Buprenorphine HCl-Naloxone HCl 8-2 MG Sublingual SL Tab Place 0.5 tablets under the tongue 2 (two) times a day.    Sennosides-Docusate Sodium (SENNA-PLUS OR) Take 2 tablets by mouth daily.    allopurinol 100 MG Oral Tab Take 1 tablet (100 mg total) by mouth daily.    lactulose 10 GM/15ML Oral Solution Take 30 mL (20 g total) by mouth 2 (two) times daily as needed.    atorvastatin 40 MG Oral Tab Take 1 tablet (40 mg total) by mouth nightly.    aspirin 81 MG Oral Tab EC Take 1 tablet (81 mg total) by mouth daily.       Medical History:  He  has a past medical history of Anaphylactic reaction due to adverse effect of correct drug or medicament properly administered, initial encounter (10/18/2021), Cancer ([facility]) (9/2015), Change in hair (2018), Closed fracture of distal end of left femur, unspecified fracture morphology, initial encounter ([facility]) (02/11/2023), Constipation, Easy bruising, ESRD on hemodialysis ([facility]), Exposure to medical diagnostic radiation, Fatigue, Frequent use of laxatives, Hearing loss, History of blood transfusion, History of total left knee replacement (11/08/2016), Itch of skin (2018), Kidney replaced by  transplant (HCC) (1997), Nephritis and nephropathy, not specified as acute or chronic, with unspecified pathological lesion in kidney, Painful total knee replacement, sequela (05/31/2017), Papillary thyroid carcinoma (HCC), PONV (postoperative nausea and vomiting) (1997), Postlaminectomy syndrome, cervical region (06/20/2013), Protein-calorie malnutrition (HCC) (07/10/2018), Status post cervical spinal fusion (09/20/2012), Stented coronary artery, Syncope, Trigger finger (acquired), Unspecified hemorrhoids without mention of complication (06/29/2012), and Wears glasses (2010).  Surgical History:  He  has a past surgical history that includes appendectomy (2003); cholecystectomy (2003); laminectomy,facetectomy,lumbar (05/29/2012); revise median n/carpal tunnel surg (2009); femur/knee surg unlisted (1994); colonoscopy; thyroidectomy; colonoscopy (N/A, 05/01/2015); cath pv (12/11/2015); knee surgery; anesth,kidney transplant; cath percutaneous  transluminal coronary angioplasty (2016); appendectomy; knee replacement surgery; organ transplant; colonoscopy (N/A, 10/23/2018); back surgery; other surgical history; other surgical history (Right, 1997); angioplasty (coronary) (2015); femur fracture surgery (Left, 02/2023); and colonoscopy (N/A, 11/14/2023).   Family History:  His family history includes Breast Cancer (age of onset: 65) in his mother; Cancer in his paternal uncle and son; Heart Attack in his maternal uncle; Heart Disorder in his paternal uncle; Stroke in his maternal grandmother; bipolar disorder in his mother; glomerulonephritis in his maternal grandmother and mother.  Social History:  He  reports that he has never smoked. He has never used smokeless tobacco. He reports that he does not drink alcohol and does not use drugs.    Tobacco:  He has never smoked tobacco.    CAGE Alcohol Screen:   CAGE screening score of 0 on 9/10/2024, showing low risk of alcohol abuse.      Patient Care Team:  Codi Loyola MD  as PCP - General (Internal Medicine)  Brant Vaz MD as Consulting Physician (NEPHROLOGY)  Mike Brady MD as Consulting Physician (SURGERY, ORTHOPEDIC)  Geovanna Anton MD as Consulting Physician (ENDOCRINOLOGY)  Huyen Espinoza DC as Consulting Physician (CHIROPRACTOR)  Crescencio Collier DO (NEUROLOGY)  Louis Bearden MD as Consulting Physician (NEUROSURGERY)  Louis Dalton MD as Consulting Physician (CARDIOLOGY)  Nic Caal MD as Consulting Physician (DERMATOLOGY)    Review of Systems  GENERAL: feels well otherwise  SKIN: denies any unusual skin lesions  EYES: denies blurred vision or double vision  HEENT: denies nasal congestion, sinus pain or ST  LUNGS: denies shortness of breath with exertion  CARDIOVASCULAR: denies chest pain on exertion  GI: abdominal wall swelling; denies abdominal pain, denies heartburn  : no complaint of urinary incontinence  MUSCULOSKELETAL: chronic joint pains  NEURO: denies headaches  PSYCHE: denies depression or anxiety  HEMATOLOGIC: hx of anemia  ENDOCRINE: denies hot/cold intolerance  ALL/ASTHMA: denies hx of allergy or asthma    Objective:   Physical Exam  /61 (BP Location: Right arm, Patient Position: Sitting, Cuff Size: large)   Pulse 65   Temp 98 °F (36.7 °C) (Temporal)   Resp 12   Ht 6' 0.75\" (1.848 m)   Wt 236 lb 9.6 oz (107.3 kg)   SpO2 100%   BMI 31.43 kg/m²  Estimated body mass index is 31.43 kg/m² as calculated from the following:    Height as of this encounter: 6' 0.75\" (1.848 m).    Weight as of this encounter: 236 lb 9.6 oz (107.3 kg).  Medicare Hearing Assessment:   Hearing Screening    Time taken: 9/10/2024  7:56 AM  Entry User: Tanesha Cadena CMA  Screening Method: Finger Rub  Finger Rub Result: Pass           GENERAL: Alert and oriented, well developed, well nourished,in no apparent distress  SKIN: no rashes,no suspicious lesions  HEENT: atraumatic, PERRLA, EOMI, normal lid and conjunctiva  NECK: supple, no jvd, no  thyromegaly  LUNGS: clear to auscultation bilaterally, no wheezing/rubs  CARDIO: RRR without murmurs.  No clubbing, cyanosis or edema.  GI: soft non tender nondistended no hepatosplenomegaly, bowel sounds throughout  NEURO: CN II-XII intact, 5/5 strength all extremities  PSYCH: pleasant, appropriate mood and affect    Assessment & Plan:   Suresh Prather is a 59 year old male who presents for a Medicare Assessment.   1. Encounter for subsequent annual wellness visit (AWV) in Medicare patient  2. Screening for malignant neoplasm of prostate  Age appropriate health guidance and counseling provided.  Screening labs ordered as below. Body mass index is 31.43 kg/m².  Focus on lifestyle modification.    - Lipid Panel; Future  - PSA Total, Screen; Future    3. Primary hypertension  At goal blood pressure - actually not on antihypertensive therapy since he had his most recent kidney transplant.    4. Coronary artery disease involving native coronary artery of native heart without angina pectoris  5. Pulmonary HTN (HCC)  No anginal symptoms.  On aspirin, statin.  Follows with Cardiology.  - Lipid Panel; Future    6. Postsurgical hypothyroidism  No new symptoms.  Continue levothyroxine 150 mcg qAM.    7. Chronic pain of multiple joints  8. Idiopathic chronic gout of multiple sites without tophus  9. Rheumatoid arthritis involving multiple sites, unspecified whether rheumatoid factor present ([facility])  10. Opioid dependence on agonist therapy ([facility])  Following with [facility]; on Suboxone therapy.  Previously on narcotic therapy.  Walks with cane.    11. Renal transplant, status post ([facility])  12. Alport syndrome ([facility])  13. Immunosuppressed status ([facility]) - Long-term current use of immunosuppressive medication  14. Secondary hyperparathyroidism of renal origin ([facility])  15. Anemia in ESRD (end-stage renal disease) ([facility])  Patient's prior kidney transplant failed.  Patient had another kidney transplant this summer.  Initially  on hemodialysis post-transplant for a few sessions, now off hemodialysis.  Following with Transplant Nephrology at [facility].  Getting regular labs.  On immunosuppressive therapy.    16. Thrombocytopenia (HCC)  Stable thrombocytopenia.  Continue to monitor.    17. RAQUEL on CPAP  On CPAP therapy; continue.    The patient indicates understanding of these issues and agrees to the plan.  Reinforced healthy diet, lifestyle, and exercise.      Return in about 1 year (around 9/10/2025).     Codi Loyola MD, 9/10/2024     Supplementary Documentation:   General Health:  In the past six months, have you lost more than 10 pounds without trying?: 2 - No  Has your appetite been poor?: No  Type of Diet: Low Salt  How does the patient maintain a good energy level?: Daily Walks  How would you describe your daily physical activity?: Light  How would you describe your current health state?: Fair  How do you maintain positive mental well-being?: Social Interaction  On a scale of 0 to 10, with 0 being no pain and 10 being severe pain, what is your pain level?: 6 - (Moderate)  In the past six months, have you experienced urine leakage?: 0-No  At any time do you feel concerned for the safety/well-being of yourself and/or your children, in your home or elsewhere?: No  Have you had any immunizations at another office such as Influenza, Hepatitis B, Tetanus, or Pneumococcal?: No    Health Maintenance   Topic Date Due    Annual Physical  09/07/2024    PSA  09/08/2024    Influenza Vaccine (1) 10/01/2024    DTaP,Tdap,and Td Vaccines (2 - Td or Tdap) 08/05/2029    Colorectal Cancer Screening  11/14/2030    Annual Depression Screening  Completed    Pneumococcal Vaccine: Birth to 64yrs  Completed    Zoster Vaccines  Completed    COVID-19 Vaccine  Completed

## 2024-09-10 NOTE — PATIENT INSTRUCTIONS
- Get PSA and lipid panel blood tests done with your next labs (hand them my paper orders)  - Continue current medications  - Follow up in 1 year for Annual Medicare Wellness Visit/chronic issues; follow up earlier as needed.    It was a pleasure seeing you in the clinic today.  Thank you for choosing the [facility] [place] office for your healthcare needs. Please call at [phone] with any questions or concerns.    Codi Loyola MD

## 2024-09-11 ENCOUNTER — PATIENT MESSAGE (OUTPATIENT)
Dept: INTERNAL MEDICINE CLINIC | Facility: CLINIC | Age: 59
End: 2024-09-11

## 2024-09-12 ENCOUNTER — LAB SERVICES (OUTPATIENT)
Dept: LAB | Age: 59
End: 2024-09-12

## 2024-09-12 ENCOUNTER — APPOINTMENT (OUTPATIENT)
Dept: CT IMAGING | Age: 59
End: 2024-09-12
Attending: STUDENT IN AN ORGANIZED HEALTH CARE EDUCATION/TRAINING PROGRAM

## 2024-09-12 DIAGNOSIS — Z79.899 IMMUNOSUPPRESSIVE MANAGEMENT ENCOUNTER FOLLOWING KIDNEY TRANSPLANT (CMD): ICD-10-CM

## 2024-09-12 DIAGNOSIS — Z94.0 IMMUNOSUPPRESSIVE MANAGEMENT ENCOUNTER FOLLOWING KIDNEY TRANSPLANT (CMD): ICD-10-CM

## 2024-09-12 DIAGNOSIS — D84.9 IMMUNOSUPPRESSION  (CMD): ICD-10-CM

## 2024-09-12 DIAGNOSIS — Z94.0 STATUS POST KIDNEY TRANSPLANT (CMD): ICD-10-CM

## 2024-09-12 DIAGNOSIS — Z94.0 KIDNEY TRANSPLANT RECIPIENT (CMD): ICD-10-CM

## 2024-09-12 DIAGNOSIS — E83.42 HYPOMAGNESEMIA: ICD-10-CM

## 2024-09-12 DIAGNOSIS — R31.9 HEMATURIA, UNSPECIFIED TYPE: ICD-10-CM

## 2024-09-12 LAB
ALBUMIN SERPL-MCNC: 3.5 G/DL (ref 3.6–5.1)
ALBUMIN/GLOB SERPL: 1.3 {RATIO} (ref 1–2.4)
ALP SERPL-CCNC: 67 UNITS/L (ref 45–117)
ALT SERPL-CCNC: 20 UNITS/L
ANION GAP SERPL CALC-SCNC: 10 MMOL/L (ref 7–19)
APPEARANCE UR: CLEAR
AST SERPL-CCNC: 16 UNITS/L
BACTERIA #/AREA URNS HPF: ABNORMAL /HPF
BASOPHILS # BLD: 0.1 K/MCL (ref 0–0.3)
BASOPHILS NFR BLD: 1 %
BILIRUB SERPL-MCNC: 0.6 MG/DL (ref 0.2–1)
BILIRUB UR QL STRIP: NEGATIVE
BUN SERPL-MCNC: 27 MG/DL (ref 6–20)
BUN/CREAT SERPL: 18 (ref 7–25)
CALCIUM SERPL-MCNC: 9.1 MG/DL (ref 8.4–10.2)
CHLORIDE SERPL-SCNC: 108 MMOL/L (ref 97–110)
CO2 SERPL-SCNC: 25 MMOL/L (ref 21–32)
COLOR UR: ABNORMAL
CREAT SERPL-MCNC: 1.49 MG/DL (ref 0.67–1.17)
CREAT UR-MCNC: 55.6 MG/DL
DEPRECATED RDW RBC: 51.1 FL (ref 39–50)
EGFRCR SERPLBLD CKD-EPI 2021: 54 ML/MIN/{1.73_M2}
EOSINOPHIL # BLD: 0.2 K/MCL (ref 0–0.5)
EOSINOPHIL NFR BLD: 4 %
ERYTHROCYTE [DISTWIDTH] IN BLOOD: 14.1 % (ref 11–15)
FASTING DURATION TIME PATIENT: 12 HOURS (ref 0–999)
GLOBULIN SER-MCNC: 2.8 G/DL (ref 2–4)
GLUCOSE SERPL-MCNC: 106 MG/DL (ref 70–99)
GLUCOSE UR STRIP-MCNC: NEGATIVE MG/DL
HCT VFR BLD CALC: 34.6 % (ref 39–51)
HGB BLD-MCNC: 11.3 G/DL (ref 13–17)
HGB UR QL STRIP: ABNORMAL
HYALINE CASTS #/AREA URNS LPF: ABNORMAL /LPF
IMM GRANULOCYTES # BLD AUTO: 0 K/MCL (ref 0–0.2)
IMM GRANULOCYTES # BLD: 0 %
KETONES UR STRIP-MCNC: NEGATIVE MG/DL
LEUKOCYTE ESTERASE UR QL STRIP: NEGATIVE
LYMPHOCYTES # BLD: 0.3 K/MCL (ref 1–4)
LYMPHOCYTES NFR BLD: 6 %
MAGNESIUM SERPL-MCNC: 1.9 MG/DL (ref 1.7–2.4)
MCH RBC QN AUTO: 31.8 PG (ref 26–34)
MCHC RBC AUTO-ENTMCNC: 32.7 G/DL (ref 32–36.5)
MCV RBC AUTO: 97.5 FL (ref 78–100)
MONOCYTES # BLD: 0.3 K/MCL (ref 0.3–0.9)
MONOCYTES NFR BLD: 7 %
MUCOUS THREADS URNS QL MICRO: PRESENT
NEUTROPHILS # BLD: 3.8 K/MCL (ref 1.8–7.7)
NEUTROPHILS NFR BLD: 82 %
NITRITE UR QL STRIP: NEGATIVE
NRBC BLD MANUAL-RTO: 0 /100 WBC
PH UR STRIP: 7.5 [PH] (ref 5–7)
PHOSPHATE SERPL-MCNC: 4.1 MG/DL (ref 2.4–4.7)
PLATELET # BLD AUTO: 146 K/MCL (ref 140–450)
POTASSIUM SERPL-SCNC: 3.8 MMOL/L (ref 3.4–5.1)
PROT SERPL-MCNC: 6.3 G/DL (ref 6.4–8.2)
PROT UR STRIP-MCNC: ABNORMAL MG/DL
PROT UR-MCNC: 26 MG/DL
PROT/CREAT UR: 468 MGPR/GCR
RBC # BLD: 3.55 MIL/MCL (ref 4.5–5.9)
RBC #/AREA URNS HPF: >100 /HPF
SODIUM SERPL-SCNC: 139 MMOL/L (ref 135–145)
SP GR UR STRIP: 1.01 (ref 1–1.03)
SQUAMOUS #/AREA URNS HPF: ABNORMAL /HPF
TACROLIMUS BLD-MCNC: 9.9 NG/ML (ref 5–20)
UROBILINOGEN UR STRIP-MCNC: 0.2 MG/DL
WBC # BLD: 4.6 K/MCL (ref 4.2–11)
WBC #/AREA URNS HPF: ABNORMAL /HPF

## 2024-09-12 PROCEDURE — 85025 COMPLETE CBC W/AUTO DIFF WBC: CPT | Performed by: CLINICAL MEDICAL LABORATORY

## 2024-09-12 PROCEDURE — 83735 ASSAY OF MAGNESIUM: CPT | Performed by: CLINICAL MEDICAL LABORATORY

## 2024-09-12 PROCEDURE — 84156 ASSAY OF PROTEIN URINE: CPT | Performed by: CLINICAL MEDICAL LABORATORY

## 2024-09-12 PROCEDURE — 80053 COMPREHEN METABOLIC PANEL: CPT | Performed by: CLINICAL MEDICAL LABORATORY

## 2024-09-12 PROCEDURE — 80197 ASSAY OF TACROLIMUS: CPT | Performed by: CLINICAL MEDICAL LABORATORY

## 2024-09-12 PROCEDURE — 81001 URINALYSIS AUTO W/SCOPE: CPT | Performed by: CLINICAL MEDICAL LABORATORY

## 2024-09-12 PROCEDURE — 84100 ASSAY OF PHOSPHORUS: CPT | Performed by: CLINICAL MEDICAL LABORATORY

## 2024-09-12 PROCEDURE — 74176 CT ABD & PELVIS W/O CONTRAST: CPT | Performed by: RADIOLOGY

## 2024-09-12 PROCEDURE — 36415 COLL VENOUS BLD VENIPUNCTURE: CPT | Performed by: INTERNAL MEDICINE

## 2024-09-12 PROCEDURE — 82570 ASSAY OF URINE CREATININE: CPT | Performed by: CLINICAL MEDICAL LABORATORY

## 2024-09-12 NOTE — TELEPHONE ENCOUNTER
From: Suresh Prather  To: Codi Loyola  Sent: 9/11/2024 10:41 PM CDT  Subject: Blood test    Dr Loyola,  I am having my blood work you gave me on Monday instead of Thursday. Reason is I also have a ct scan test set up at 12:30 so I need to fast from 8:30- the test is over by 2. Your blood test recommended I fast for at least 8 hours. I can’t go that long without water. I’m afraid I will hurt my new kidney. Fasting from 11 pm to 8 am then 8:30 am to 2 pm. It’s just easier fasting Artemio night.     Thank you, Suresh Prather

## 2024-09-16 ENCOUNTER — LAB ENCOUNTER (OUTPATIENT)
Dept: LAB | Age: 59
End: 2024-09-16
Attending: INTERNAL MEDICINE
Payer: MEDICARE

## 2024-09-16 DIAGNOSIS — I25.10 CORONARY ARTERY DISEASE INVOLVING NATIVE CORONARY ARTERY OF NATIVE HEART WITHOUT ANGINA PECTORIS: Chronic | ICD-10-CM

## 2024-09-16 DIAGNOSIS — Z12.5 SCREENING FOR MALIGNANT NEOPLASM OF PROSTATE: ICD-10-CM

## 2024-09-16 DIAGNOSIS — Z00.00 ENCOUNTER FOR SUBSEQUENT ANNUAL WELLNESS VISIT (AWV) IN MEDICARE PATIENT: ICD-10-CM

## 2024-09-16 LAB
CHOLEST SERPL-MCNC: 167 MG/DL (ref ?–200)
COMPLEXED PSA SERPL-MCNC: 0.55 NG/ML (ref ?–4)
FASTING PATIENT LIPID ANSWER: YES
HDLC SERPL-MCNC: 64 MG/DL (ref 40–59)
LDLC SERPL CALC-MCNC: 92 MG/DL (ref ?–100)
NONHDLC SERPL-MCNC: 103 MG/DL (ref ?–130)
TRIGL SERPL-MCNC: 55 MG/DL (ref 30–149)
VLDLC SERPL CALC-MCNC: 9 MG/DL (ref 0–30)

## 2024-09-16 PROCEDURE — 80061 LIPID PANEL: CPT

## 2024-09-16 PROCEDURE — 36415 COLL VENOUS BLD VENIPUNCTURE: CPT

## 2024-09-18 ENCOUNTER — TELEPHONE (OUTPATIENT)
Dept: TRANSPLANT | Age: 59
End: 2024-09-18

## 2024-09-18 DIAGNOSIS — Z94.0 IMMUNOSUPPRESSIVE MANAGEMENT ENCOUNTER FOLLOWING KIDNEY TRANSPLANT (CMD): ICD-10-CM

## 2024-09-18 DIAGNOSIS — E83.39 HYPOPHOSPHATEMIA: ICD-10-CM

## 2024-09-18 DIAGNOSIS — D84.9 IMMUNOSUPPRESSION  (CMD): ICD-10-CM

## 2024-09-18 DIAGNOSIS — Z79.899 IMMUNOSUPPRESSIVE MANAGEMENT ENCOUNTER FOLLOWING KIDNEY TRANSPLANT (CMD): ICD-10-CM

## 2024-09-18 DIAGNOSIS — Z94.0 STATUS POST KIDNEY TRANSPLANT (CMD): Primary | ICD-10-CM

## 2024-09-18 DIAGNOSIS — E83.42 HYPOMAGNESEMIA: ICD-10-CM

## 2024-09-19 ENCOUNTER — OFFICE VISIT (OUTPATIENT)
Dept: TRANSPLANT | Age: 59
End: 2024-09-19
Attending: INTERNAL MEDICINE

## 2024-09-19 ENCOUNTER — LAB SERVICES (OUTPATIENT)
Dept: LAB | Age: 59
End: 2024-09-19

## 2024-09-19 VITALS
RESPIRATION RATE: 16 BRPM | HEART RATE: 40 BPM | SYSTOLIC BLOOD PRESSURE: 133 MMHG | DIASTOLIC BLOOD PRESSURE: 67 MMHG | BODY MASS INDEX: 30.64 KG/M2 | HEIGHT: 74 IN | TEMPERATURE: 97.9 F | WEIGHT: 238.76 LBS

## 2024-09-19 DIAGNOSIS — Z94.0 KIDNEY TRANSPLANT RECIPIENT (CMD): ICD-10-CM

## 2024-09-19 DIAGNOSIS — I10 BENIGN ESSENTIAL HTN: Primary | ICD-10-CM

## 2024-09-19 DIAGNOSIS — Z94.0 IMMUNOSUPPRESSIVE MANAGEMENT ENCOUNTER FOLLOWING KIDNEY TRANSPLANT (CMD): ICD-10-CM

## 2024-09-19 DIAGNOSIS — Z94.0 RENAL TRANSPLANT, STATUS POST (CMD): Primary | ICD-10-CM

## 2024-09-19 DIAGNOSIS — R18.8 ABDOMINAL FLUID COLLECTION: ICD-10-CM

## 2024-09-19 DIAGNOSIS — Z94.0 STATUS POST KIDNEY TRANSPLANT (CMD): Primary | ICD-10-CM

## 2024-09-19 DIAGNOSIS — E83.42 HYPOMAGNESEMIA: ICD-10-CM

## 2024-09-19 DIAGNOSIS — E83.39 HYPOPHOSPHATEMIA: ICD-10-CM

## 2024-09-19 DIAGNOSIS — D84.9 IMMUNOSUPPRESSION  (CMD): ICD-10-CM

## 2024-09-19 DIAGNOSIS — Z79.899 IMMUNOSUPPRESSIVE MANAGEMENT ENCOUNTER FOLLOWING KIDNEY TRANSPLANT (CMD): ICD-10-CM

## 2024-09-19 DIAGNOSIS — Z94.0 STATUS POST KIDNEY TRANSPLANT (CMD): ICD-10-CM

## 2024-09-19 LAB
ALBUMIN SERPL-MCNC: 3.3 G/DL (ref 3.4–5)
ALBUMIN/GLOB SERPL: 1.1 {RATIO} (ref 1–2.4)
ALP SERPL-CCNC: 68 UNITS/L (ref 45–117)
ALT SERPL-CCNC: 25 UNITS/L
ANION GAP SERPL CALC-SCNC: 11 MMOL/L (ref 7–19)
APPEARANCE UR: CLEAR
AST SERPL-CCNC: 18 UNITS/L
BACTERIA #/AREA URNS HPF: ABNORMAL /HPF
BASOPHILS # BLD: 0 K/MCL (ref 0–0.3)
BASOPHILS NFR BLD: 1 %
BILIRUB SERPL-MCNC: 0.5 MG/DL (ref 0.2–1)
BILIRUB UR QL STRIP: NEGATIVE
BUN SERPL-MCNC: 27 MG/DL (ref 6–20)
BUN/CREAT SERPL: 19 (ref 7–25)
CALCIUM SERPL-MCNC: 9.1 MG/DL (ref 8.4–10.2)
CHLORIDE SERPL-SCNC: 109 MMOL/L (ref 97–110)
CO2 SERPL-SCNC: 23 MMOL/L (ref 21–32)
COLOR UR: COLORLESS
CREAT SERPL-MCNC: 1.42 MG/DL (ref 0.67–1.17)
CREAT UR-MCNC: 21.4 MG/DL
DEPRECATED RDW RBC: 49.7 FL (ref 39–50)
EGFRCR SERPLBLD CKD-EPI 2021: 57 ML/MIN/{1.73_M2}
EOSINOPHIL # BLD: 0.1 K/MCL (ref 0–0.5)
EOSINOPHIL NFR BLD: 4 %
ERYTHROCYTE [DISTWIDTH] IN BLOOD: 14 % (ref 11–15)
FASTING DURATION TIME PATIENT: ABNORMAL H
GLOBULIN SER-MCNC: 3.1 G/DL (ref 2–4)
GLUCOSE SERPL-MCNC: 101 MG/DL (ref 70–99)
GLUCOSE UR STRIP-MCNC: NEGATIVE MG/DL
HCT VFR BLD CALC: 34.9 % (ref 39–51)
HGB BLD-MCNC: 11.4 G/DL (ref 13–17)
HGB UR QL STRIP: ABNORMAL
HYALINE CASTS #/AREA URNS LPF: ABNORMAL /LPF
IMM GRANULOCYTES # BLD AUTO: 0 K/MCL (ref 0–0.2)
IMM GRANULOCYTES # BLD: 1 %
KETONES UR STRIP-MCNC: NEGATIVE MG/DL
LEUKOCYTE ESTERASE UR QL STRIP: NEGATIVE
LYMPHOCYTES # BLD: 0.2 K/MCL (ref 1–4)
LYMPHOCYTES NFR BLD: 6 %
MAGNESIUM SERPL-MCNC: 1.9 MG/DL (ref 1.7–2.4)
MCH RBC QN AUTO: 31.6 PG (ref 26–34)
MCHC RBC AUTO-ENTMCNC: 32.7 G/DL (ref 32–36.5)
MCV RBC AUTO: 96.7 FL (ref 78–100)
MONOCYTES # BLD: 0.2 K/MCL (ref 0.3–0.9)
MONOCYTES NFR BLD: 7 %
NEUTROPHILS # BLD: 2.7 K/MCL (ref 1.8–7.7)
NEUTROPHILS NFR BLD: 81 %
NITRITE UR QL STRIP: NEGATIVE
NRBC BLD MANUAL-RTO: 0 /100 WBC
PH UR STRIP: 7 [PH] (ref 5–7)
PHOSPHATE SERPL-MCNC: 4 MG/DL (ref 2.4–4.7)
PLATELET # BLD AUTO: 132 K/MCL (ref 140–450)
POTASSIUM SERPL-SCNC: 3.7 MMOL/L (ref 3.4–5.1)
PROT SERPL-MCNC: 6.4 G/DL (ref 6.4–8.2)
PROT UR STRIP-MCNC: NEGATIVE MG/DL
PROT UR-MCNC: 7 MG/DL
PROT/CREAT UR: 327 MGPR/GCR
RBC # BLD: 3.61 MIL/MCL (ref 4.5–5.9)
RBC #/AREA URNS HPF: ABNORMAL /HPF
SODIUM SERPL-SCNC: 139 MMOL/L (ref 135–145)
SP GR UR STRIP: 1.01 (ref 1–1.03)
SQUAMOUS #/AREA URNS HPF: ABNORMAL /HPF
TACROLIMUS BLD-MCNC: 11.4 NG/ML (ref 5–20)
UROBILINOGEN UR STRIP-MCNC: 0.2 MG/DL
WBC # BLD: 3.2 K/MCL (ref 4.2–11)
WBC #/AREA URNS HPF: ABNORMAL /HPF

## 2024-09-19 PROCEDURE — 84156 ASSAY OF PROTEIN URINE: CPT | Performed by: CLINICAL MEDICAL LABORATORY

## 2024-09-19 PROCEDURE — 83735 ASSAY OF MAGNESIUM: CPT | Performed by: CLINICAL MEDICAL LABORATORY

## 2024-09-19 PROCEDURE — 81001 URINALYSIS AUTO W/SCOPE: CPT | Performed by: CLINICAL MEDICAL LABORATORY

## 2024-09-19 PROCEDURE — 85025 COMPLETE CBC W/AUTO DIFF WBC: CPT | Performed by: CLINICAL MEDICAL LABORATORY

## 2024-09-19 PROCEDURE — 80197 ASSAY OF TACROLIMUS: CPT | Performed by: CLINICAL MEDICAL LABORATORY

## 2024-09-19 PROCEDURE — 82570 ASSAY OF URINE CREATININE: CPT | Performed by: CLINICAL MEDICAL LABORATORY

## 2024-09-19 PROCEDURE — 84100 ASSAY OF PHOSPHORUS: CPT | Performed by: CLINICAL MEDICAL LABORATORY

## 2024-09-19 PROCEDURE — 80053 COMPREHEN METABOLIC PANEL: CPT | Performed by: CLINICAL MEDICAL LABORATORY

## 2024-09-19 PROCEDURE — 99212 OFFICE O/P EST SF 10 MIN: CPT

## 2024-09-19 PROCEDURE — 36415 COLL VENOUS BLD VENIPUNCTURE: CPT | Performed by: CLINICAL MEDICAL LABORATORY

## 2024-09-19 ASSESSMENT — PAIN SCALES - GENERAL: PAINLEVEL: 0

## 2024-09-20 ENCOUNTER — TELEPHONE (OUTPATIENT)
Dept: TRANSPLANT | Age: 59
End: 2024-09-20

## 2024-09-23 ENCOUNTER — TELEPHONE (OUTPATIENT)
Dept: OTHER | Age: 59
End: 2024-09-23

## 2024-09-24 ENCOUNTER — TELEPHONE (OUTPATIENT)
Dept: INTERVENTIONAL RADIOLOGY/VASCULAR | Age: 59
End: 2024-09-24

## 2024-09-24 DIAGNOSIS — Z94.0 KIDNEY TRANSPLANT RECIPIENT (CMD): Primary | ICD-10-CM

## 2024-09-24 RX ORDER — PREDNISONE 5 MG/1
5 TABLET ORAL DAILY
Qty: 30 TABLET | Refills: 5 | Status: SHIPPED | OUTPATIENT
Start: 2024-09-24

## 2024-09-25 ENCOUNTER — TELEPHONE (OUTPATIENT)
Dept: TRANSPLANT | Age: 59
End: 2024-09-25

## 2024-09-25 DIAGNOSIS — Z94.0 STATUS POST KIDNEY TRANSPLANT (CMD): Primary | ICD-10-CM

## 2024-09-25 DIAGNOSIS — Z79.899 IMMUNOSUPPRESSIVE MANAGEMENT ENCOUNTER FOLLOWING KIDNEY TRANSPLANT (CMD): ICD-10-CM

## 2024-09-25 DIAGNOSIS — E83.42 HYPOMAGNESEMIA: ICD-10-CM

## 2024-09-25 DIAGNOSIS — Z94.0 IMMUNOSUPPRESSIVE MANAGEMENT ENCOUNTER FOLLOWING KIDNEY TRANSPLANT (CMD): ICD-10-CM

## 2024-09-25 DIAGNOSIS — E83.39 HYPOPHOSPHATEMIA: ICD-10-CM

## 2024-09-26 ENCOUNTER — LAB SERVICES (OUTPATIENT)
Dept: LAB | Age: 59
End: 2024-09-26

## 2024-09-26 DIAGNOSIS — Z94.0 IMMUNOSUPPRESSIVE MANAGEMENT ENCOUNTER FOLLOWING KIDNEY TRANSPLANT (CMD): ICD-10-CM

## 2024-09-26 DIAGNOSIS — Z94.0 KIDNEY TRANSPLANT RECIPIENT (CMD): ICD-10-CM

## 2024-09-26 DIAGNOSIS — E83.42 HYPOMAGNESEMIA: ICD-10-CM

## 2024-09-26 DIAGNOSIS — Z79.899 IMMUNOSUPPRESSIVE MANAGEMENT ENCOUNTER FOLLOWING KIDNEY TRANSPLANT (CMD): ICD-10-CM

## 2024-09-26 DIAGNOSIS — E83.39 HYPOPHOSPHATEMIA: ICD-10-CM

## 2024-09-26 DIAGNOSIS — Z94.0 STATUS POST KIDNEY TRANSPLANT (CMD): ICD-10-CM

## 2024-09-26 LAB
ALBUMIN SERPL-MCNC: 3.4 G/DL (ref 3.4–5)
ALBUMIN/GLOB SERPL: 1.2 {RATIO} (ref 1–2.4)
ALP SERPL-CCNC: 65 UNITS/L (ref 45–117)
ALT SERPL-CCNC: 21 UNITS/L
ANION GAP SERPL CALC-SCNC: 10 MMOL/L (ref 7–19)
APPEARANCE UR: CLEAR
AST SERPL-CCNC: 21 UNITS/L
BACTERIA #/AREA URNS HPF: ABNORMAL /HPF
BASOPHILS # BLD: 0 K/MCL (ref 0–0.3)
BASOPHILS NFR BLD: 1 %
BILIRUB SERPL-MCNC: 0.6 MG/DL (ref 0.2–1)
BILIRUB UR QL STRIP: NEGATIVE
BUN SERPL-MCNC: 31 MG/DL (ref 6–20)
BUN/CREAT SERPL: 22 (ref 7–25)
CALCIUM SERPL-MCNC: 9.2 MG/DL (ref 8.4–10.2)
CHLORIDE SERPL-SCNC: 108 MMOL/L (ref 97–110)
CO2 SERPL-SCNC: 24 MMOL/L (ref 21–32)
COLOR UR: ABNORMAL
CREAT SERPL-MCNC: 1.4 MG/DL (ref 0.67–1.17)
CREAT UR-MCNC: 86.1 MG/DL
DEPRECATED RDW RBC: 49.1 FL (ref 39–50)
EGFRCR SERPLBLD CKD-EPI 2021: 58 ML/MIN/{1.73_M2}
EOSINOPHIL # BLD: 0 K/MCL (ref 0–0.5)
EOSINOPHIL NFR BLD: 1 %
ERYTHROCYTE [DISTWIDTH] IN BLOOD: 13.9 % (ref 11–15)
FASTING DURATION TIME PATIENT: 9 HOURS (ref 0–999)
GLOBULIN SER-MCNC: 2.9 G/DL (ref 2–4)
GLUCOSE SERPL-MCNC: 100 MG/DL (ref 70–99)
GLUCOSE UR STRIP-MCNC: NEGATIVE MG/DL
HCT VFR BLD CALC: 36.4 % (ref 39–51)
HGB BLD-MCNC: 11.8 G/DL (ref 13–17)
HGB UR QL STRIP: NEGATIVE
HYALINE CASTS #/AREA URNS LPF: ABNORMAL /LPF
IMM GRANULOCYTES # BLD AUTO: 0 K/MCL (ref 0–0.2)
IMM GRANULOCYTES # BLD: 0 %
INR PPP: 1.1
KETONES UR STRIP-MCNC: NEGATIVE MG/DL
LEUKOCYTE ESTERASE UR QL STRIP: NEGATIVE
LYMPHOCYTES # BLD: 0.2 K/MCL (ref 1–4)
LYMPHOCYTES NFR BLD: 7 %
MAGNESIUM SERPL-MCNC: 2 MG/DL (ref 1.7–2.4)
MCH RBC QN AUTO: 31.1 PG (ref 26–34)
MCHC RBC AUTO-ENTMCNC: 32.4 G/DL (ref 32–36.5)
MCV RBC AUTO: 95.8 FL (ref 78–100)
MONOCYTES # BLD: 0 K/MCL (ref 0.3–0.9)
MONOCYTES NFR BLD: 1 %
NEUTROPHILS # BLD: 2.2 K/MCL (ref 1.8–7.7)
NEUTROPHILS NFR BLD: 90 %
NITRITE UR QL STRIP: NEGATIVE
NRBC BLD MANUAL-RTO: 0 /100 WBC
PH UR STRIP: 6.5 [PH] (ref 5–7)
PHOSPHATE SERPL-MCNC: 3.5 MG/DL (ref 2.4–4.7)
PLATELET # BLD AUTO: 125 K/MCL (ref 140–450)
POTASSIUM SERPL-SCNC: 3.7 MMOL/L (ref 3.4–5.1)
PROT SERPL-MCNC: 6.3 G/DL (ref 6.4–8.2)
PROT UR STRIP-MCNC: NEGATIVE MG/DL
PROT UR-MCNC: 18 MG/DL
PROT/CREAT UR: 209 MGPR/GCR
PROTHROMBIN TIME: 11.4 SEC (ref 9.7–11.8)
RBC # BLD: 3.8 MIL/MCL (ref 4.5–5.9)
RBC #/AREA URNS HPF: ABNORMAL /HPF
SODIUM SERPL-SCNC: 138 MMOL/L (ref 135–145)
SP GR UR STRIP: 1.02 (ref 1–1.03)
SQUAMOUS #/AREA URNS HPF: ABNORMAL /HPF
TACROLIMUS BLD-MCNC: 15.2 NG/ML (ref 5–20)
UROBILINOGEN UR STRIP-MCNC: 0.2 MG/DL
WBC # BLD: 2.4 K/MCL (ref 4.2–11)
WBC #/AREA URNS HPF: ABNORMAL /HPF

## 2024-09-26 PROCEDURE — 87799 DETECT AGENT NOS DNA QUANT: CPT | Performed by: CLINICAL MEDICAL LABORATORY

## 2024-09-26 PROCEDURE — 82570 ASSAY OF URINE CREATININE: CPT | Performed by: CLINICAL MEDICAL LABORATORY

## 2024-09-26 PROCEDURE — 84100 ASSAY OF PHOSPHORUS: CPT | Performed by: CLINICAL MEDICAL LABORATORY

## 2024-09-26 PROCEDURE — 85610 PROTHROMBIN TIME: CPT | Performed by: CLINICAL MEDICAL LABORATORY

## 2024-09-26 PROCEDURE — 83735 ASSAY OF MAGNESIUM: CPT | Performed by: CLINICAL MEDICAL LABORATORY

## 2024-09-26 PROCEDURE — 81001 URINALYSIS AUTO W/SCOPE: CPT | Performed by: CLINICAL MEDICAL LABORATORY

## 2024-09-26 PROCEDURE — 84156 ASSAY OF PROTEIN URINE: CPT | Performed by: CLINICAL MEDICAL LABORATORY

## 2024-09-26 PROCEDURE — 36415 COLL VENOUS BLD VENIPUNCTURE: CPT | Performed by: STUDENT IN AN ORGANIZED HEALTH CARE EDUCATION/TRAINING PROGRAM

## 2024-09-26 PROCEDURE — 80197 ASSAY OF TACROLIMUS: CPT | Performed by: CLINICAL MEDICAL LABORATORY

## 2024-09-26 PROCEDURE — 80053 COMPREHEN METABOLIC PANEL: CPT | Performed by: CLINICAL MEDICAL LABORATORY

## 2024-09-26 PROCEDURE — 85025 COMPLETE CBC W/AUTO DIFF WBC: CPT | Performed by: CLINICAL MEDICAL LABORATORY

## 2024-09-27 ENCOUNTER — HOSPITAL ENCOUNTER (OUTPATIENT)
Dept: CT IMAGING | Age: 59
End: 2024-09-27
Attending: STUDENT IN AN ORGANIZED HEALTH CARE EDUCATION/TRAINING PROGRAM

## 2024-09-27 VITALS
RESPIRATION RATE: 11 BRPM | OXYGEN SATURATION: 100 % | DIASTOLIC BLOOD PRESSURE: 82 MMHG | HEART RATE: 82 BPM | SYSTOLIC BLOOD PRESSURE: 140 MMHG | TEMPERATURE: 97.2 F

## 2024-09-27 DIAGNOSIS — Z94.0 RENAL TRANSPLANT, STATUS POST (CMD): ICD-10-CM

## 2024-09-27 LAB
BKV DNA # UR NAA+PROBE: NOT DETECTED
BKV DNA SPEC NAA+PROBE-LOG#: NOT DETECTED {LOG_COPIES}/ML
CMV DNA # SPEC NAA+PROBE: NOT DETECTED {COPIES}/ML
CMV DNA SERPL NAA+PROBE-ACNC: NOT DETECTED [IU]/ML
EBV DNA # SPEC NAA+PROBE: NOT DETECTED {COPIES}/ML
EBV DNA SPEC NAA+PROBE-LOG#: NOT DETECTED {LOG_COPIES}/ML
SERVICE CMNT-IMP: NORMAL

## 2024-09-27 PROCEDURE — 10002801 HB RX 250 W/O HCPCS: Performed by: RADIOLOGY

## 2024-09-27 PROCEDURE — 13000001 HB PHASE II RECOVERY EA 30 MINUTES

## 2024-09-27 PROCEDURE — 10002800 HB RX 250 W HCPCS: Performed by: RADIOLOGY

## 2024-09-27 PROCEDURE — 49406 IMAGE CATH FLUID PERI/RETRO: CPT

## 2024-09-27 PROCEDURE — 99152 MOD SED SAME PHYS/QHP 5/>YRS: CPT

## 2024-09-27 PROCEDURE — 87075 CULTR BACTERIA EXCEPT BLOOD: CPT | Performed by: STUDENT IN AN ORGANIZED HEALTH CARE EDUCATION/TRAINING PROGRAM

## 2024-09-27 RX ORDER — MIDAZOLAM HYDROCHLORIDE 1 MG/ML
INJECTION, SOLUTION INTRAMUSCULAR; INTRAVENOUS PRN
Status: COMPLETED | OUTPATIENT
Start: 2024-09-27 | End: 2024-09-27

## 2024-09-27 RX ORDER — LIDOCAINE HYDROCHLORIDE 10 MG/ML
INJECTION, SOLUTION INFILTRATION; PERINEURAL PRN
Status: COMPLETED | OUTPATIENT
Start: 2024-09-27 | End: 2024-09-27

## 2024-09-27 RX ADMIN — MIDAZOLAM HYDROCHLORIDE 1 MG: 1 INJECTION, SOLUTION INTRAMUSCULAR; INTRAVENOUS at 12:03

## 2024-09-27 RX ADMIN — LIDOCAINE HYDROCHLORIDE 10 ML: 10 INJECTION, SOLUTION INFILTRATION; PERINEURAL at 12:00

## 2024-09-27 RX ADMIN — MIDAZOLAM HYDROCHLORIDE 1 MG: 1 INJECTION, SOLUTION INTRAMUSCULAR; INTRAVENOUS at 11:57

## 2024-09-27 RX ADMIN — FENTANYL CITRATE 50 MCG: 50 INJECTION INTRAMUSCULAR; INTRAVENOUS at 11:57

## 2024-09-27 ASSESSMENT — PAIN SCALES - GENERAL
PAINLEVEL_OUTOF10: 0
PAINLEVEL_OUTOF10: 7

## 2024-09-28 LAB
BACTERIA SPEC ANAEROBE+AEROBE CULT: NORMAL
GRAM STN SPEC: NORMAL

## 2024-10-01 ENCOUNTER — PATIENT MESSAGE (OUTPATIENT)
Dept: INTERNAL MEDICINE CLINIC | Facility: CLINIC | Age: 59
End: 2024-10-01

## 2024-10-01 LAB
BACTERIA SPEC ANAEROBE+AEROBE CULT: NORMAL
GRAM STN SPEC: NORMAL

## 2024-10-01 NOTE — TELEPHONE ENCOUNTER
From: Suresh Prather  To: Codi Loyola  Sent: 10/1/2024 12:17 PM CDT  Subject: Buphrenorphine-nalaxone    Hello Dr Loyola,  I had an appointment to see the Dr who prescribes my Buphrenorphine-Nalaxone for me. His name is Dr Gonzales in [place], He said the rules have changed . He said you may be able to start prescribing me this medicine? It would save me a long drive to [place] and a lot of money. I would be very appreciative if you could prescribe this medication for me.     Kind regards,  Suresh Prather

## 2024-10-01 NOTE — TELEPHONE ENCOUNTER
I do not prescribe buprenorphine-naloxone but he could try [facility] - I believe [facility] prescribes this medication.  He could call [facility] general number and tell them I recommended he look into the MAT clinic for buprenorphine-naloxone (Suboxone) therapy.  [phone]

## 2024-10-02 ENCOUNTER — TELEPHONE (OUTPATIENT)
Dept: TRANSPLANT | Age: 59
End: 2024-10-02

## 2024-10-02 DIAGNOSIS — Z79.899 IMMUNOSUPPRESSIVE MANAGEMENT ENCOUNTER FOLLOWING KIDNEY TRANSPLANT (CMD): ICD-10-CM

## 2024-10-02 DIAGNOSIS — Z94.0 STATUS POST KIDNEY TRANSPLANT (CMD): Primary | ICD-10-CM

## 2024-10-02 DIAGNOSIS — I10 ESSENTIAL HYPERTENSION: ICD-10-CM

## 2024-10-02 DIAGNOSIS — Z94.0 IMMUNOSUPPRESSIVE MANAGEMENT ENCOUNTER FOLLOWING KIDNEY TRANSPLANT (CMD): ICD-10-CM

## 2024-10-02 DIAGNOSIS — E83.42 HYPOMAGNESEMIA: ICD-10-CM

## 2024-10-02 DIAGNOSIS — E83.39 HYPOPHOSPHATEMIA: ICD-10-CM

## 2024-10-03 ENCOUNTER — OFFICE VISIT (OUTPATIENT)
Dept: TRANSPLANT | Age: 59
End: 2024-10-03
Attending: INTERNAL MEDICINE

## 2024-10-03 ENCOUNTER — HOSPITAL ENCOUNTER (OUTPATIENT)
Dept: INFUSION THERAPY | Age: 59
Discharge: STILL ADMITTED OR ADMINISTRATIVE | End: 2024-10-03

## 2024-10-03 ENCOUNTER — LAB SERVICES (OUTPATIENT)
Dept: LAB | Age: 59
End: 2024-10-03

## 2024-10-03 VITALS
HEIGHT: 74 IN | DIASTOLIC BLOOD PRESSURE: 66 MMHG | SYSTOLIC BLOOD PRESSURE: 113 MMHG | BODY MASS INDEX: 31.09 KG/M2 | WEIGHT: 242.29 LBS | TEMPERATURE: 97.6 F | RESPIRATION RATE: 16 BRPM | HEART RATE: 41 BPM

## 2024-10-03 VITALS — DIASTOLIC BLOOD PRESSURE: 83 MMHG | TEMPERATURE: 97.7 F | HEART RATE: 76 BPM | SYSTOLIC BLOOD PRESSURE: 134 MMHG

## 2024-10-03 DIAGNOSIS — I10 BENIGN ESSENTIAL HTN: Primary | ICD-10-CM

## 2024-10-03 DIAGNOSIS — Z94.0 STATUS POST KIDNEY TRANSPLANT (CMD): Primary | ICD-10-CM

## 2024-10-03 DIAGNOSIS — E83.39 HYPOPHOSPHATEMIA: ICD-10-CM

## 2024-10-03 DIAGNOSIS — Z94.0 IMMUNOSUPPRESSIVE MANAGEMENT ENCOUNTER FOLLOWING KIDNEY TRANSPLANT (CMD): ICD-10-CM

## 2024-10-03 DIAGNOSIS — Z94.0 STATUS POST KIDNEY TRANSPLANT (CMD): ICD-10-CM

## 2024-10-03 DIAGNOSIS — E83.42 HYPOMAGNESEMIA: ICD-10-CM

## 2024-10-03 DIAGNOSIS — Z79.899 IMMUNOSUPPRESSIVE MANAGEMENT ENCOUNTER FOLLOWING KIDNEY TRANSPLANT (CMD): ICD-10-CM

## 2024-10-03 DIAGNOSIS — Z94.0 RENAL TRANSPLANT, STATUS POST (CMD): Primary | ICD-10-CM

## 2024-10-03 DIAGNOSIS — D70.9 NEUTROPENIA, UNSPECIFIED TYPE (CMD): Primary | ICD-10-CM

## 2024-10-03 DIAGNOSIS — D70.2 OTHER DRUG-INDUCED NEUTROPENIA (CMD): ICD-10-CM

## 2024-10-03 DIAGNOSIS — I10 ESSENTIAL HYPERTENSION: ICD-10-CM

## 2024-10-03 LAB
ACANTHOCYTES BLD QL SMEAR: ABNORMAL
ALBUMIN SERPL-MCNC: 3.2 G/DL (ref 3.4–5)
ALBUMIN/GLOB SERPL: 1.1 {RATIO} (ref 1–2.4)
ALP SERPL-CCNC: 67 UNITS/L (ref 45–117)
ALT SERPL-CCNC: 27 UNITS/L
ANION GAP SERPL CALC-SCNC: 10 MMOL/L (ref 7–19)
APPEARANCE UR: CLEAR
AST SERPL-CCNC: 21 UNITS/L
BACTERIA #/AREA URNS HPF: NORMAL /HPF
BASOPHILS # BLD: 0 K/MCL (ref 0–0.3)
BASOPHILS NFR BLD: 3 %
BILIRUB SERPL-MCNC: 0.6 MG/DL (ref 0.2–1)
BILIRUB UR QL STRIP: NEGATIVE
BUN SERPL-MCNC: 29 MG/DL (ref 6–20)
BUN/CREAT SERPL: 19 (ref 7–25)
CALCIUM SERPL-MCNC: 8.4 MG/DL (ref 8.4–10.2)
CHLORIDE SERPL-SCNC: 108 MMOL/L (ref 97–110)
CO2 SERPL-SCNC: 24 MMOL/L (ref 21–32)
COLOR UR: NORMAL
CREAT SERPL-MCNC: 1.55 MG/DL (ref 0.67–1.17)
CREAT UR-MCNC: 66.4 MG/DL
DEPRECATED RDW RBC: 47 FL (ref 39–50)
EGFRCR SERPLBLD CKD-EPI 2021: 51 ML/MIN/{1.73_M2}
ERYTHROCYTE [DISTWIDTH] IN BLOOD: 13.7 % (ref 11–15)
FASTING DURATION TIME PATIENT: ABNORMAL H
GLOBULIN SER-MCNC: 2.9 G/DL (ref 2–4)
GLUCOSE SERPL-MCNC: 98 MG/DL (ref 70–99)
GLUCOSE UR STRIP-MCNC: NEGATIVE MG/DL
HCT VFR BLD CALC: 35.1 % (ref 39–51)
HGB BLD-MCNC: 11.6 G/DL (ref 13–17)
HGB UR QL STRIP: NEGATIVE
HYALINE CASTS #/AREA URNS LPF: NORMAL /LPF
KETONES UR STRIP-MCNC: NEGATIVE MG/DL
LEUKOCYTE ESTERASE UR QL STRIP: NEGATIVE
LYMPHOCYTES # BLD: 0.1 K/MCL (ref 1–4)
LYMPHOCYTES NFR BLD: 21 %
MAGNESIUM SERPL-MCNC: 1.8 MG/DL (ref 1.7–2.4)
MCH RBC QN AUTO: 31.1 PG (ref 26–34)
MCHC RBC AUTO-ENTMCNC: 33 G/DL (ref 32–36.5)
MCV RBC AUTO: 94.1 FL (ref 78–100)
MONOCYTES # BLD: 0 K/MCL (ref 0.3–0.9)
MONOCYTES NFR BLD: 8 %
NEUTROPHILS # BLD: 0.4 K/MCL (ref 1.8–7.7)
NEUTS SEG NFR BLD: 66 %
NITRITE UR QL STRIP: NEGATIVE
NRBC BLD MANUAL-RTO: 0 /100 WBC
OVALOCYTES BLD QL SMEAR: ABNORMAL
PH UR STRIP: 7 [PH] (ref 5–7)
PHOSPHATE SERPL-MCNC: 3.3 MG/DL (ref 2.4–4.7)
PLAT MORPH BLD: NORMAL
PLATELET # BLD AUTO: 127 K/MCL (ref 140–450)
POTASSIUM SERPL-SCNC: 3.9 MMOL/L (ref 3.4–5.1)
PROT SERPL-MCNC: 6.1 G/DL (ref 6.4–8.2)
PROT UR STRIP-MCNC: NEGATIVE MG/DL
PROT UR-MCNC: 18 MG/DL
PROT/CREAT UR: 271 MGPR/GCR
RBC # BLD: 3.73 MIL/MCL (ref 4.5–5.9)
RBC #/AREA URNS HPF: NORMAL /HPF
SODIUM SERPL-SCNC: 138 MMOL/L (ref 135–145)
SP GR UR STRIP: 1.01 (ref 1–1.03)
SQUAMOUS #/AREA URNS HPF: NORMAL /HPF
TACROLIMUS BLD-MCNC: 17.2 NG/ML (ref 5–20)
UROBILINOGEN UR STRIP-MCNC: 0.2 MG/DL
VARIANT LYMPHS NFR BLD: 2 % (ref 0–5)
WBC # BLD: 0.6 K/MCL (ref 4.2–11)
WBC #/AREA URNS HPF: NORMAL /HPF

## 2024-10-03 PROCEDURE — 80053 COMPREHEN METABOLIC PANEL: CPT | Performed by: CLINICAL MEDICAL LABORATORY

## 2024-10-03 PROCEDURE — 36415 COLL VENOUS BLD VENIPUNCTURE: CPT | Performed by: CLINICAL MEDICAL LABORATORY

## 2024-10-03 PROCEDURE — 10002800 HB RX 250 W HCPCS: Performed by: INTERNAL MEDICINE

## 2024-10-03 PROCEDURE — 81001 URINALYSIS AUTO W/SCOPE: CPT | Performed by: CLINICAL MEDICAL LABORATORY

## 2024-10-03 PROCEDURE — 83735 ASSAY OF MAGNESIUM: CPT | Performed by: CLINICAL MEDICAL LABORATORY

## 2024-10-03 PROCEDURE — 99211 OFF/OP EST MAY X REQ PHY/QHP: CPT

## 2024-10-03 PROCEDURE — 96372 THER/PROPH/DIAG INJ SC/IM: CPT | Performed by: INTERNAL MEDICINE

## 2024-10-03 PROCEDURE — 80197 ASSAY OF TACROLIMUS: CPT | Performed by: CLINICAL MEDICAL LABORATORY

## 2024-10-03 PROCEDURE — 82570 ASSAY OF URINE CREATININE: CPT | Performed by: CLINICAL MEDICAL LABORATORY

## 2024-10-03 PROCEDURE — 85027 COMPLETE CBC AUTOMATED: CPT | Performed by: CLINICAL MEDICAL LABORATORY

## 2024-10-03 PROCEDURE — 84100 ASSAY OF PHOSPHORUS: CPT | Performed by: CLINICAL MEDICAL LABORATORY

## 2024-10-03 PROCEDURE — 84156 ASSAY OF PROTEIN URINE: CPT | Performed by: CLINICAL MEDICAL LABORATORY

## 2024-10-03 RX ADMIN — FILGRASTIM-SNDZ 480 MCG: 480 INJECTION, SOLUTION INTRAVENOUS; SUBCUTANEOUS at 13:01

## 2024-10-03 ASSESSMENT — PAIN SCALES - GENERAL
PAINLEVEL: 5
PAINLEVEL_OUTOF10: 0

## 2024-10-03 ASSESSMENT — PATIENT HEALTH QUESTIONNAIRE - PHQ9
2. FEELING DOWN, DEPRESSED OR HOPELESS: NOT AT ALL
SUM OF ALL RESPONSES TO PHQ9 QUESTIONS 1 AND 2: 0
IS PATIENT ABLE TO COMPLETE PHQ2 OR PHQ9: YES
CLINICAL INTERPRETATION OF PHQ2 SCORE: NO FURTHER SCREENING NEEDED
SUM OF ALL RESPONSES TO PHQ9 QUESTIONS 1 AND 2: 0
1. LITTLE INTEREST OR PLEASURE IN DOING THINGS: NOT AT ALL

## 2024-10-05 ENCOUNTER — HOSPITAL ENCOUNTER (OUTPATIENT)
Dept: INFUSION THERAPY | Age: 59
Discharge: HOME | End: 2024-10-05

## 2024-10-05 VITALS
SYSTOLIC BLOOD PRESSURE: 126 MMHG | WEIGHT: 240.96 LBS | BODY MASS INDEX: 30.94 KG/M2 | OXYGEN SATURATION: 99 % | TEMPERATURE: 98.1 F | DIASTOLIC BLOOD PRESSURE: 67 MMHG | HEART RATE: 67 BPM

## 2024-10-05 DIAGNOSIS — D70.9 NEUTROPENIA, UNSPECIFIED TYPE (CMD): Primary | ICD-10-CM

## 2024-10-05 PROCEDURE — 10002800 HB RX 250 W HCPCS: Performed by: INTERNAL MEDICINE

## 2024-10-05 PROCEDURE — 96372 THER/PROPH/DIAG INJ SC/IM: CPT | Performed by: INTERNAL MEDICINE

## 2024-10-05 RX ADMIN — FILGRASTIM-SNDZ 480 MCG: 480 INJECTION, SOLUTION INTRAVENOUS; SUBCUTANEOUS at 10:57

## 2024-10-05 ASSESSMENT — PAIN SCALES - GENERAL: PAINLEVEL_OUTOF10: 0

## 2024-10-09 ENCOUNTER — TELEPHONE (OUTPATIENT)
Dept: TRANSPLANT | Age: 59
End: 2024-10-09

## 2024-10-09 DIAGNOSIS — D84.9 IMMUNOSUPPRESSION  (CMD): ICD-10-CM

## 2024-10-09 DIAGNOSIS — E83.39 HYPOPHOSPHATEMIA: ICD-10-CM

## 2024-10-09 DIAGNOSIS — Z94.0 STATUS POST KIDNEY TRANSPLANT (CMD): Primary | ICD-10-CM

## 2024-10-09 DIAGNOSIS — Z94.0 IMMUNOSUPPRESSIVE MANAGEMENT ENCOUNTER FOLLOWING KIDNEY TRANSPLANT (CMD): ICD-10-CM

## 2024-10-09 DIAGNOSIS — E83.42 HYPOMAGNESEMIA: ICD-10-CM

## 2024-10-09 DIAGNOSIS — Z79.899 IMMUNOSUPPRESSIVE MANAGEMENT ENCOUNTER FOLLOWING KIDNEY TRANSPLANT (CMD): ICD-10-CM

## 2024-10-09 DIAGNOSIS — D70.9 NEUTROPENIA, UNSPECIFIED TYPE (CMD): ICD-10-CM

## 2024-10-10 ENCOUNTER — LAB SERVICES (OUTPATIENT)
Dept: LAB | Age: 59
End: 2024-10-10

## 2024-10-10 DIAGNOSIS — D70.9 NEUTROPENIA, UNSPECIFIED TYPE (CMD): ICD-10-CM

## 2024-10-10 DIAGNOSIS — Z94.0 STATUS POST KIDNEY TRANSPLANT (CMD): ICD-10-CM

## 2024-10-10 DIAGNOSIS — E83.39 HYPOPHOSPHATEMIA: ICD-10-CM

## 2024-10-10 DIAGNOSIS — Z94.0 IMMUNOSUPPRESSIVE MANAGEMENT ENCOUNTER FOLLOWING KIDNEY TRANSPLANT (CMD): ICD-10-CM

## 2024-10-10 DIAGNOSIS — E83.42 HYPOMAGNESEMIA: ICD-10-CM

## 2024-10-10 DIAGNOSIS — D84.9 IMMUNOSUPPRESSION  (CMD): ICD-10-CM

## 2024-10-10 DIAGNOSIS — Z79.899 IMMUNOSUPPRESSIVE MANAGEMENT ENCOUNTER FOLLOWING KIDNEY TRANSPLANT (CMD): ICD-10-CM

## 2024-10-10 LAB
ALBUMIN SERPL-MCNC: 3.3 G/DL (ref 3.4–5)
ALBUMIN/GLOB SERPL: 1.1 {RATIO} (ref 1–2.4)
ALP SERPL-CCNC: 69 UNITS/L (ref 45–117)
ALT SERPL-CCNC: 33 UNITS/L
ANION GAP SERPL CALC-SCNC: 9 MMOL/L (ref 7–19)
APPEARANCE UR: CLEAR
AST SERPL-CCNC: 27 UNITS/L
BACTERIA #/AREA URNS HPF: ABNORMAL /HPF
BILIRUB SERPL-MCNC: 0.4 MG/DL (ref 0.2–1)
BILIRUB UR QL STRIP: NEGATIVE
BUN SERPL-MCNC: 27 MG/DL (ref 6–20)
BUN/CREAT SERPL: 18 (ref 7–25)
CALCIUM SERPL-MCNC: 8.7 MG/DL (ref 8.4–10.2)
CHLORIDE SERPL-SCNC: 108 MMOL/L (ref 97–110)
CO2 SERPL-SCNC: 25 MMOL/L (ref 21–32)
COLOR UR: COLORLESS
CREAT SERPL-MCNC: 1.54 MG/DL (ref 0.67–1.17)
CREAT UR-MCNC: 49.2 MG/DL
DEPRECATED RDW RBC: 47.2 FL (ref 39–50)
EGFRCR SERPLBLD CKD-EPI 2021: 52 ML/MIN/{1.73_M2}
ERYTHROCYTE [DISTWIDTH] IN BLOOD: 14 % (ref 11–15)
FASTING DURATION TIME PATIENT: 12 HOURS (ref 0–999)
GLOBULIN SER-MCNC: 3 G/DL (ref 2–4)
GLUCOSE SERPL-MCNC: 97 MG/DL (ref 70–99)
GLUCOSE UR STRIP-MCNC: NEGATIVE MG/DL
HCT VFR BLD CALC: 36.7 % (ref 39–51)
HGB BLD-MCNC: 12 G/DL (ref 13–17)
HGB UR QL STRIP: NEGATIVE
HYALINE CASTS #/AREA URNS LPF: ABNORMAL /LPF
KETONES UR STRIP-MCNC: NEGATIVE MG/DL
LEUKOCYTE ESTERASE UR QL STRIP: NEGATIVE
LYMPHOCYTES # BLD: 0.5 K/MCL (ref 1–4)
LYMPHOCYTES NFR BLD: 18 %
MAGNESIUM SERPL-MCNC: 1.9 MG/DL (ref 1.7–2.4)
MCH RBC QN AUTO: 29.9 PG (ref 26–34)
MCHC RBC AUTO-ENTMCNC: 32.7 G/DL (ref 32–36.5)
MCV RBC AUTO: 91.3 FL (ref 78–100)
MONOCYTES # BLD: 0.4 K/MCL (ref 0.3–0.9)
MONOCYTES NFR BLD: 16 %
NEUTROPHILS # BLD: 1.7 K/MCL (ref 1.8–7.7)
NEUTS SEG NFR BLD: 66 %
NITRITE UR QL STRIP: NEGATIVE
NRBC BLD MANUAL-RTO: 0 /100 WBC
PH UR STRIP: 7.5 [PH] (ref 5–7)
PHOSPHATE SERPL-MCNC: 3.8 MG/DL (ref 2.4–4.7)
PLAT MORPH BLD: NORMAL
PLATELET # BLD AUTO: 107 K/MCL (ref 140–450)
POTASSIUM SERPL-SCNC: 3.7 MMOL/L (ref 3.4–5.1)
PROT SERPL-MCNC: 6.3 G/DL (ref 6.4–8.2)
PROT UR STRIP-MCNC: NEGATIVE MG/DL
PROT UR-MCNC: 16 MG/DL
PROT/CREAT UR: 325 MGPR/GCR
RBC # BLD: 4.02 MIL/MCL (ref 4.5–5.9)
RBC #/AREA URNS HPF: ABNORMAL /HPF
RBC MORPH BLD: NORMAL
SODIUM SERPL-SCNC: 138 MMOL/L (ref 135–145)
SP GR UR STRIP: 1.01 (ref 1–1.03)
SQUAMOUS #/AREA URNS HPF: ABNORMAL /HPF
TACROLIMUS BLD-MCNC: 6.7 NG/ML (ref 5–20)
UROBILINOGEN UR STRIP-MCNC: 0.2 MG/DL
WBC # BLD: 2.5 K/MCL (ref 4.2–11)
WBC #/AREA URNS HPF: ABNORMAL /HPF

## 2024-10-10 PROCEDURE — 36415 COLL VENOUS BLD VENIPUNCTURE: CPT | Performed by: INTERNAL MEDICINE

## 2024-10-10 PROCEDURE — 81001 URINALYSIS AUTO W/SCOPE: CPT | Performed by: CLINICAL MEDICAL LABORATORY

## 2024-10-10 PROCEDURE — 80197 ASSAY OF TACROLIMUS: CPT | Performed by: CLINICAL MEDICAL LABORATORY

## 2024-10-10 PROCEDURE — 80053 COMPREHEN METABOLIC PANEL: CPT | Performed by: CLINICAL MEDICAL LABORATORY

## 2024-10-10 PROCEDURE — 84156 ASSAY OF PROTEIN URINE: CPT | Performed by: CLINICAL MEDICAL LABORATORY

## 2024-10-10 PROCEDURE — 84100 ASSAY OF PHOSPHORUS: CPT | Performed by: CLINICAL MEDICAL LABORATORY

## 2024-10-10 PROCEDURE — 83735 ASSAY OF MAGNESIUM: CPT | Performed by: CLINICAL MEDICAL LABORATORY

## 2024-10-10 PROCEDURE — 85027 COMPLETE CBC AUTOMATED: CPT | Performed by: CLINICAL MEDICAL LABORATORY

## 2024-10-10 PROCEDURE — 82570 ASSAY OF URINE CREATININE: CPT | Performed by: CLINICAL MEDICAL LABORATORY

## 2024-10-15 ENCOUNTER — TELEPHONE (OUTPATIENT)
Dept: OTHER | Age: 59
End: 2024-10-15

## 2024-10-17 ENCOUNTER — APPOINTMENT (OUTPATIENT)
Dept: TRANSPLANT | Age: 59
End: 2024-10-17
Attending: INTERNAL MEDICINE

## 2024-10-17 ENCOUNTER — TELEPHONE (OUTPATIENT)
Dept: TRANSPLANT | Age: 59
End: 2024-10-17

## 2024-10-17 DIAGNOSIS — E83.39 HYPOPHOSPHATEMIA: ICD-10-CM

## 2024-10-17 DIAGNOSIS — Z94.0 STATUS POST KIDNEY TRANSPLANT (CMD): Primary | ICD-10-CM

## 2024-10-17 DIAGNOSIS — I10 ESSENTIAL HYPERTENSION: ICD-10-CM

## 2024-10-17 DIAGNOSIS — D84.9 IMMUNOSUPPRESSION  (CMD): ICD-10-CM

## 2024-10-17 DIAGNOSIS — Z79.899 IMMUNOSUPPRESSIVE MANAGEMENT ENCOUNTER FOLLOWING KIDNEY TRANSPLANT (CMD): ICD-10-CM

## 2024-10-17 DIAGNOSIS — Z94.0 IMMUNOSUPPRESSIVE MANAGEMENT ENCOUNTER FOLLOWING KIDNEY TRANSPLANT (CMD): ICD-10-CM

## 2024-10-17 DIAGNOSIS — E83.42 HYPOMAGNESEMIA: ICD-10-CM

## 2024-10-18 ENCOUNTER — OFFICE VISIT (OUTPATIENT)
Dept: TRANSPLANT | Age: 59
End: 2024-10-18
Attending: INTERNAL MEDICINE

## 2024-10-18 ENCOUNTER — LAB SERVICES (OUTPATIENT)
Dept: LAB | Age: 59
End: 2024-10-18

## 2024-10-18 VITALS
WEIGHT: 241.62 LBS | BODY MASS INDEX: 31.01 KG/M2 | SYSTOLIC BLOOD PRESSURE: 130 MMHG | HEIGHT: 74 IN | DIASTOLIC BLOOD PRESSURE: 71 MMHG | TEMPERATURE: 97 F | HEART RATE: 83 BPM | RESPIRATION RATE: 16 BRPM

## 2024-10-18 DIAGNOSIS — D84.9 IMMUNOSUPPRESSION  (CMD): ICD-10-CM

## 2024-10-18 DIAGNOSIS — E83.39 HYPOPHOSPHATEMIA: ICD-10-CM

## 2024-10-18 DIAGNOSIS — Z94.0 RENAL TRANSPLANT, STATUS POST (CMD): Primary | ICD-10-CM

## 2024-10-18 DIAGNOSIS — Z94.0 STATUS POST KIDNEY TRANSPLANT (CMD): ICD-10-CM

## 2024-10-18 DIAGNOSIS — Z79.899 IMMUNOSUPPRESSIVE MANAGEMENT ENCOUNTER FOLLOWING KIDNEY TRANSPLANT (CMD): ICD-10-CM

## 2024-10-18 DIAGNOSIS — E87.6 HYPOKALEMIA: ICD-10-CM

## 2024-10-18 DIAGNOSIS — I10 ESSENTIAL HYPERTENSION: ICD-10-CM

## 2024-10-18 DIAGNOSIS — I10 BENIGN ESSENTIAL HTN: Primary | ICD-10-CM

## 2024-10-18 DIAGNOSIS — Z94.0 IMMUNOSUPPRESSIVE MANAGEMENT ENCOUNTER FOLLOWING KIDNEY TRANSPLANT (CMD): ICD-10-CM

## 2024-10-18 DIAGNOSIS — D70.9 NEUTROPENIA, UNSPECIFIED TYPE (CMD): ICD-10-CM

## 2024-10-18 DIAGNOSIS — E83.42 HYPOMAGNESEMIA: ICD-10-CM

## 2024-10-18 LAB
ALBUMIN SERPL-MCNC: 3.4 G/DL (ref 3.4–5)
ALBUMIN/GLOB SERPL: 1 {RATIO} (ref 1–2.4)
ALP SERPL-CCNC: 62 UNITS/L (ref 45–117)
ALT SERPL-CCNC: 56 UNITS/L
ANION GAP SERPL CALC-SCNC: 13 MMOL/L (ref 7–19)
APPEARANCE UR: CLEAR
AST SERPL-CCNC: 46 UNITS/L
BACTERIA #/AREA URNS HPF: ABNORMAL /HPF
BASOPHILS # BLD: 0 K/MCL (ref 0–0.3)
BASOPHILS NFR BLD: 1 %
BILIRUB SERPL-MCNC: 0.6 MG/DL (ref 0.2–1)
BILIRUB UR QL STRIP: NEGATIVE
BUN SERPL-MCNC: 27 MG/DL (ref 6–20)
BUN/CREAT SERPL: 19 (ref 7–25)
CALCIUM SERPL-MCNC: 9.5 MG/DL (ref 8.4–10.2)
CHLORIDE SERPL-SCNC: 107 MMOL/L (ref 97–110)
CO2 SERPL-SCNC: 25 MMOL/L (ref 21–32)
COLOR UR: COLORLESS
CREAT SERPL-MCNC: 1.43 MG/DL (ref 0.67–1.17)
CREAT UR-MCNC: 31.6 MG/DL
DEPRECATED RDW RBC: 50 FL (ref 39–50)
EGFRCR SERPLBLD CKD-EPI 2021: 56 ML/MIN/{1.73_M2}
EOSINOPHIL # BLD: 0.1 K/MCL (ref 0–0.5)
EOSINOPHIL NFR BLD: 1 %
ERYTHROCYTE [DISTWIDTH] IN BLOOD: 14.6 % (ref 11–15)
FASTING DURATION TIME PATIENT: ABNORMAL H
GLOBULIN SER-MCNC: 3.4 G/DL (ref 2–4)
GLUCOSE SERPL-MCNC: 108 MG/DL (ref 70–99)
GLUCOSE UR STRIP-MCNC: NEGATIVE MG/DL
HCT VFR BLD CALC: 40.5 % (ref 39–51)
HGB BLD-MCNC: 13 G/DL (ref 13–17)
HGB UR QL STRIP: NEGATIVE
HYALINE CASTS #/AREA URNS LPF: ABNORMAL /LPF
IMM GRANULOCYTES # BLD AUTO: 0 K/MCL (ref 0–0.2)
IMM GRANULOCYTES # BLD: 1 %
KETONES UR STRIP-MCNC: NEGATIVE MG/DL
LEUKOCYTE ESTERASE UR QL STRIP: NEGATIVE
LYMPHOCYTES # BLD: 0.3 K/MCL (ref 1–4)
LYMPHOCYTES NFR BLD: 7 %
MAGNESIUM SERPL-MCNC: 2.2 MG/DL (ref 1.7–2.4)
MCH RBC QN AUTO: 30.3 PG (ref 26–34)
MCHC RBC AUTO-ENTMCNC: 32.1 G/DL (ref 32–36.5)
MCV RBC AUTO: 94.4 FL (ref 78–100)
MONOCYTES # BLD: 0.3 K/MCL (ref 0.3–0.9)
MONOCYTES NFR BLD: 8 %
NEUTROPHILS # BLD: 3.5 K/MCL (ref 1.8–7.7)
NEUTROPHILS NFR BLD: 82 %
NITRITE UR QL STRIP: NEGATIVE
NRBC BLD MANUAL-RTO: 0 /100 WBC
PH UR STRIP: 7.5 [PH] (ref 5–7)
PHOSPHATE SERPL-MCNC: 3.3 MG/DL (ref 2.4–4.7)
PLATELET # BLD AUTO: 130 K/MCL (ref 140–450)
POTASSIUM SERPL-SCNC: 3.8 MMOL/L (ref 3.4–5.1)
PROT SERPL-MCNC: 6.8 G/DL (ref 6.4–8.2)
PROT UR STRIP-MCNC: NEGATIVE MG/DL
PROT UR-MCNC: 14 MG/DL
PROT/CREAT UR: 443 MGPR/GCR
RBC # BLD: 4.29 MIL/MCL (ref 4.5–5.9)
RBC #/AREA URNS HPF: ABNORMAL /HPF
SODIUM SERPL-SCNC: 141 MMOL/L (ref 135–145)
SP GR UR STRIP: 1.01 (ref 1–1.03)
SQUAMOUS #/AREA URNS HPF: ABNORMAL /HPF
TACROLIMUS BLD-MCNC: 5.9 NG/ML (ref 5–20)
UROBILINOGEN UR STRIP-MCNC: 0.2 MG/DL
WBC # BLD: 4.3 K/MCL (ref 4.2–11)
WBC #/AREA URNS HPF: ABNORMAL /HPF

## 2024-10-18 PROCEDURE — 84100 ASSAY OF PHOSPHORUS: CPT | Performed by: CLINICAL MEDICAL LABORATORY

## 2024-10-18 PROCEDURE — 81001 URINALYSIS AUTO W/SCOPE: CPT | Performed by: CLINICAL MEDICAL LABORATORY

## 2024-10-18 PROCEDURE — 99213 OFFICE O/P EST LOW 20 MIN: CPT | Performed by: STUDENT IN AN ORGANIZED HEALTH CARE EDUCATION/TRAINING PROGRAM

## 2024-10-18 PROCEDURE — 87799 DETECT AGENT NOS DNA QUANT: CPT | Performed by: CLINICAL MEDICAL LABORATORY

## 2024-10-18 PROCEDURE — 83735 ASSAY OF MAGNESIUM: CPT | Performed by: CLINICAL MEDICAL LABORATORY

## 2024-10-18 PROCEDURE — 82570 ASSAY OF URINE CREATININE: CPT | Performed by: CLINICAL MEDICAL LABORATORY

## 2024-10-18 PROCEDURE — 99212 OFFICE O/P EST SF 10 MIN: CPT

## 2024-10-18 PROCEDURE — 84156 ASSAY OF PROTEIN URINE: CPT | Performed by: CLINICAL MEDICAL LABORATORY

## 2024-10-18 PROCEDURE — G2211 COMPLEX E/M VISIT ADD ON: HCPCS | Performed by: STUDENT IN AN ORGANIZED HEALTH CARE EDUCATION/TRAINING PROGRAM

## 2024-10-18 PROCEDURE — 80197 ASSAY OF TACROLIMUS: CPT | Performed by: CLINICAL MEDICAL LABORATORY

## 2024-10-18 PROCEDURE — 80053 COMPREHEN METABOLIC PANEL: CPT | Performed by: CLINICAL MEDICAL LABORATORY

## 2024-10-18 PROCEDURE — 85025 COMPLETE CBC W/AUTO DIFF WBC: CPT | Performed by: CLINICAL MEDICAL LABORATORY

## 2024-10-18 PROCEDURE — 36415 COLL VENOUS BLD VENIPUNCTURE: CPT | Performed by: CLINICAL MEDICAL LABORATORY

## 2024-10-18 RX ORDER — SIROLIMUS 1 MG/1
1 TABLET, FILM COATED ORAL DAILY
Qty: 30 TABLET | Refills: 5 | Status: SHIPPED | OUTPATIENT
Start: 2024-10-18

## 2024-10-18 RX ORDER — SIROLIMUS 1 MG/1
1 TABLET, FILM COATED ORAL DAILY
Qty: 30 TABLET | Refills: 5 | Status: SHIPPED | OUTPATIENT
Start: 2024-10-18 | End: 2024-10-18 | Stop reason: CLARIF

## 2024-10-18 ASSESSMENT — PAIN SCALES - GENERAL: PAINLEVEL: 0

## 2024-10-20 LAB
CMV DNA # SPEC NAA+PROBE: NOT DETECTED {COPIES}/ML
CMV DNA SERPL NAA+PROBE-ACNC: NOT DETECTED [IU]/ML
SERVICE CMNT-IMP: NORMAL

## 2024-10-22 LAB
BKV DNA # UR NAA+PROBE: NOT DETECTED
BKV DNA SPEC NAA+PROBE-LOG#: NOT DETECTED {LOG_COPIES}/ML
SERVICE CMNT-IMP: NORMAL

## 2024-11-07 ENCOUNTER — TELEPHONE (OUTPATIENT)
Dept: TRANSPLANT | Age: 59
End: 2024-11-07

## 2024-11-07 DIAGNOSIS — I10 ESSENTIAL HYPERTENSION: ICD-10-CM

## 2024-11-07 DIAGNOSIS — D70.9 NEUTROPENIA, UNSPECIFIED TYPE (CMD): ICD-10-CM

## 2024-11-07 DIAGNOSIS — E87.6 HYPOKALEMIA: ICD-10-CM

## 2024-11-07 DIAGNOSIS — D84.9 IMMUNOSUPPRESSION  (CMD): ICD-10-CM

## 2024-11-07 DIAGNOSIS — E83.42 HYPOMAGNESEMIA: ICD-10-CM

## 2024-11-07 DIAGNOSIS — Z94.0 STATUS POST KIDNEY TRANSPLANT (CMD): Primary | ICD-10-CM

## 2024-11-08 ENCOUNTER — LAB SERVICES (OUTPATIENT)
Dept: LAB | Age: 59
End: 2024-11-08

## 2024-11-08 DIAGNOSIS — E87.6 HYPOKALEMIA: ICD-10-CM

## 2024-11-08 DIAGNOSIS — E83.42 HYPOMAGNESEMIA: ICD-10-CM

## 2024-11-08 DIAGNOSIS — D84.9 IMMUNOSUPPRESSION  (CMD): ICD-10-CM

## 2024-11-08 DIAGNOSIS — Z94.0 STATUS POST KIDNEY TRANSPLANT (CMD): ICD-10-CM

## 2024-11-08 DIAGNOSIS — I10 ESSENTIAL HYPERTENSION: ICD-10-CM

## 2024-11-08 DIAGNOSIS — D70.9 NEUTROPENIA, UNSPECIFIED TYPE (CMD): ICD-10-CM

## 2024-11-08 LAB
ALBUMIN SERPL-MCNC: 3.3 G/DL (ref 3.4–5)
ALBUMIN/GLOB SERPL: 1 {RATIO} (ref 1–2.4)
ALP SERPL-CCNC: 96 UNITS/L (ref 45–117)
ALT SERPL-CCNC: 45 UNITS/L
ANION GAP SERPL CALC-SCNC: 9 MMOL/L (ref 7–19)
APPEARANCE UR: CLEAR
AST SERPL-CCNC: 28 UNITS/L
BACTERIA #/AREA URNS HPF: ABNORMAL /HPF
BILIRUB SERPL-MCNC: 0.5 MG/DL (ref 0.2–1)
BILIRUB UR QL STRIP: NEGATIVE
BUN SERPL-MCNC: 27 MG/DL (ref 6–20)
BUN/CREAT SERPL: 20 (ref 7–25)
CALCIUM SERPL-MCNC: 8.8 MG/DL (ref 8.4–10.2)
CHLORIDE SERPL-SCNC: 110 MMOL/L (ref 97–110)
CO2 SERPL-SCNC: 24 MMOL/L (ref 21–32)
COLOR UR: COLORLESS
CREAT SERPL-MCNC: 1.38 MG/DL (ref 0.67–1.17)
CREAT UR-MCNC: 52.3 MG/DL
EGFRCR SERPLBLD CKD-EPI 2021: 59 ML/MIN/{1.73_M2}
FASTING DURATION TIME PATIENT: 12 HOURS (ref 0–999)
GLOBULIN SER-MCNC: 3.4 G/DL (ref 2–4)
GLUCOSE SERPL-MCNC: 91 MG/DL (ref 70–99)
GLUCOSE UR STRIP-MCNC: NEGATIVE MG/DL
HGB UR QL STRIP: NEGATIVE
HYALINE CASTS #/AREA URNS LPF: ABNORMAL /LPF
KETONES UR STRIP-MCNC: NEGATIVE MG/DL
LEUKOCYTE ESTERASE UR QL STRIP: NEGATIVE
MAGNESIUM SERPL-MCNC: 1.9 MG/DL (ref 1.7–2.4)
NITRITE UR QL STRIP: NEGATIVE
PH UR STRIP: 7.5 [PH] (ref 5–7)
PHOSPHATE SERPL-MCNC: 3.2 MG/DL (ref 2.4–4.7)
POTASSIUM SERPL-SCNC: 3.6 MMOL/L (ref 3.4–5.1)
PROT SERPL-MCNC: 6.7 G/DL (ref 6.4–8.2)
PROT UR STRIP-MCNC: NEGATIVE MG/DL
PROT UR-MCNC: 15 MG/DL
PROT/CREAT UR: 287 MGPR/GCR
RBC #/AREA URNS HPF: ABNORMAL /HPF
SODIUM SERPL-SCNC: 139 MMOL/L (ref 135–145)
SP GR UR STRIP: 1.01 (ref 1–1.03)
SQUAMOUS #/AREA URNS HPF: ABNORMAL /HPF
UROBILINOGEN UR STRIP-MCNC: 0.2 MG/DL
WBC #/AREA URNS HPF: ABNORMAL /HPF

## 2024-11-08 PROCEDURE — 83735 ASSAY OF MAGNESIUM: CPT | Performed by: CLINICAL MEDICAL LABORATORY

## 2024-11-08 PROCEDURE — 82570 ASSAY OF URINE CREATININE: CPT | Performed by: CLINICAL MEDICAL LABORATORY

## 2024-11-08 PROCEDURE — 36415 COLL VENOUS BLD VENIPUNCTURE: CPT | Performed by: INTERNAL MEDICINE

## 2024-11-08 PROCEDURE — 81001 URINALYSIS AUTO W/SCOPE: CPT | Performed by: CLINICAL MEDICAL LABORATORY

## 2024-11-08 PROCEDURE — 84100 ASSAY OF PHOSPHORUS: CPT | Performed by: CLINICAL MEDICAL LABORATORY

## 2024-11-08 PROCEDURE — 85025 COMPLETE CBC W/AUTO DIFF WBC: CPT | Performed by: CLINICAL MEDICAL LABORATORY

## 2024-11-08 PROCEDURE — 84156 ASSAY OF PROTEIN URINE: CPT | Performed by: CLINICAL MEDICAL LABORATORY

## 2024-11-08 PROCEDURE — 80053 COMPREHEN METABOLIC PANEL: CPT | Performed by: CLINICAL MEDICAL LABORATORY

## 2024-11-08 PROCEDURE — 80195 ASSAY OF SIROLIMUS: CPT | Performed by: CLINICAL MEDICAL LABORATORY

## 2024-11-08 PROCEDURE — 80197 ASSAY OF TACROLIMUS: CPT | Performed by: CLINICAL MEDICAL LABORATORY

## 2024-11-09 LAB
BASOPHILS # BLD: 0.1 K/MCL (ref 0–0.3)
BASOPHILS NFR BLD: 1 %
DEPRECATED RDW RBC: 50.7 FL (ref 39–50)
EOSINOPHIL # BLD: 5.4 K/MCL (ref 0–0.5)
EOSINOPHIL NFR BLD: 46 %
ERYTHROCYTE [DISTWIDTH] IN BLOOD: 15 % (ref 11–15)
HCT VFR BLD CALC: 40.2 % (ref 39–51)
HGB BLD-MCNC: 12.9 G/DL (ref 13–17)
IMM GRANULOCYTES # BLD AUTO: 0 K/MCL (ref 0–0.2)
IMM GRANULOCYTES # BLD: 0 %
LYMPHOCYTES # BLD: 0.7 K/MCL (ref 1–4)
LYMPHOCYTES NFR BLD: 6 %
MCH RBC QN AUTO: 29.5 PG (ref 26–34)
MCHC RBC AUTO-ENTMCNC: 32.1 G/DL (ref 32–36.5)
MCV RBC AUTO: 91.8 FL (ref 78–100)
MONOCYTES # BLD: 1.1 K/MCL (ref 0.3–0.9)
MONOCYTES NFR BLD: 9 %
NEUTROPHILS # BLD: 4.5 K/MCL (ref 1.8–7.7)
NEUTROPHILS NFR BLD: 38 %
NRBC BLD MANUAL-RTO: 0 /100 WBC
PLATELET # BLD AUTO: 153 K/MCL (ref 140–450)
RBC # BLD: 4.38 MIL/MCL (ref 4.5–5.9)
SIROLIMUS BLD-MCNC: 5.1 NG/ML (ref 5–20)
TACROLIMUS BLD-MCNC: 5.8 NG/ML (ref 5–20)
WBC # BLD: 11.8 K/MCL (ref 4.2–11)

## 2024-11-18 ENCOUNTER — TELEPHONE (OUTPATIENT)
Dept: OTHER | Age: 59
End: 2024-11-18

## 2024-11-18 ENCOUNTER — PATIENT MESSAGE (OUTPATIENT)
Dept: INTERNAL MEDICINE CLINIC | Facility: CLINIC | Age: 59
End: 2024-11-18

## 2024-11-20 ENCOUNTER — TELEPHONE (OUTPATIENT)
Dept: TRANSPLANT | Age: 59
End: 2024-11-20

## 2024-11-20 DIAGNOSIS — Z79.899 IMMUNOSUPPRESSIVE MANAGEMENT ENCOUNTER FOLLOWING KIDNEY TRANSPLANT (CMD): ICD-10-CM

## 2024-11-20 DIAGNOSIS — E83.42 HYPOMAGNESEMIA: ICD-10-CM

## 2024-11-20 DIAGNOSIS — Z94.0 IMMUNOSUPPRESSIVE MANAGEMENT ENCOUNTER FOLLOWING KIDNEY TRANSPLANT (CMD): ICD-10-CM

## 2024-11-20 DIAGNOSIS — D70.9 NEUTROPENIA, UNSPECIFIED TYPE (CMD): ICD-10-CM

## 2024-11-20 DIAGNOSIS — Z94.0 STATUS POST KIDNEY TRANSPLANT (CMD): Primary | ICD-10-CM

## 2024-11-20 DIAGNOSIS — D84.9 IMMUNOSUPPRESSION  (CMD): ICD-10-CM

## 2024-11-21 ENCOUNTER — LAB SERVICES (OUTPATIENT)
Dept: LAB | Age: 59
End: 2024-11-21

## 2024-11-21 ENCOUNTER — OFFICE VISIT (OUTPATIENT)
Dept: TRANSPLANT | Age: 59
End: 2024-11-21
Attending: INTERNAL MEDICINE

## 2024-11-21 VITALS
HEART RATE: 43 BPM | WEIGHT: 251.77 LBS | RESPIRATION RATE: 16 BRPM | TEMPERATURE: 98 F | HEIGHT: 74 IN | DIASTOLIC BLOOD PRESSURE: 74 MMHG | BODY MASS INDEX: 32.31 KG/M2 | SYSTOLIC BLOOD PRESSURE: 129 MMHG

## 2024-11-21 VITALS
SYSTOLIC BLOOD PRESSURE: 129 MMHG | HEIGHT: 74 IN | HEART RATE: 43 BPM | BODY MASS INDEX: 32.31 KG/M2 | DIASTOLIC BLOOD PRESSURE: 74 MMHG | WEIGHT: 251.77 LBS | TEMPERATURE: 98 F | RESPIRATION RATE: 16 BRPM

## 2024-11-21 DIAGNOSIS — Z79.899 IMMUNOSUPPRESSIVE MANAGEMENT ENCOUNTER FOLLOWING KIDNEY TRANSPLANT (CMD): Primary | ICD-10-CM

## 2024-11-21 DIAGNOSIS — Z94.0 STATUS POST KIDNEY TRANSPLANT (CMD): ICD-10-CM

## 2024-11-21 DIAGNOSIS — Z94.0 KIDNEY TRANSPLANT STATUS, CADAVERIC (CMD): ICD-10-CM

## 2024-11-21 DIAGNOSIS — D70.9 NEUTROPENIA, UNSPECIFIED TYPE (CMD): ICD-10-CM

## 2024-11-21 DIAGNOSIS — E87.6 HYPOKALEMIA: ICD-10-CM

## 2024-11-21 DIAGNOSIS — Z79.899 IMMUNOSUPPRESSIVE MANAGEMENT ENCOUNTER FOLLOWING KIDNEY TRANSPLANT (CMD): ICD-10-CM

## 2024-11-21 DIAGNOSIS — E83.42 HYPOMAGNESEMIA: ICD-10-CM

## 2024-11-21 DIAGNOSIS — I10 BENIGN ESSENTIAL HTN: Primary | ICD-10-CM

## 2024-11-21 DIAGNOSIS — D84.9 IMMUNOSUPPRESSION  (CMD): ICD-10-CM

## 2024-11-21 DIAGNOSIS — Z94.0 IMMUNOSUPPRESSIVE MANAGEMENT ENCOUNTER FOLLOWING KIDNEY TRANSPLANT (CMD): Primary | ICD-10-CM

## 2024-11-21 DIAGNOSIS — Z94.0 STATUS POST KIDNEY TRANSPLANT (CMD): Primary | ICD-10-CM

## 2024-11-21 DIAGNOSIS — D70.2 OTHER DRUG-INDUCED NEUTROPENIA (CMD): ICD-10-CM

## 2024-11-21 DIAGNOSIS — Z94.0 IMMUNOSUPPRESSIVE MANAGEMENT ENCOUNTER FOLLOWING KIDNEY TRANSPLANT (CMD): ICD-10-CM

## 2024-11-21 LAB
ALBUMIN SERPL-MCNC: 3.1 G/DL (ref 3.4–5)
ALBUMIN/GLOB SERPL: 0.8 {RATIO} (ref 1–2.4)
ALP SERPL-CCNC: 79 UNITS/L (ref 45–117)
ALT SERPL-CCNC: 30 UNITS/L
ANION GAP SERPL CALC-SCNC: 10 MMOL/L (ref 7–19)
APPEARANCE UR: CLEAR
AST SERPL-CCNC: 23 UNITS/L
BACTERIA #/AREA URNS HPF: NORMAL /HPF
BASOPHILS # BLD: 0 K/MCL (ref 0–0.3)
BASOPHILS NFR BLD: 0 %
BILIRUB SERPL-MCNC: 0.5 MG/DL (ref 0.2–1)
BILIRUB UR QL STRIP: NEGATIVE
BUN SERPL-MCNC: 19 MG/DL (ref 6–20)
BUN/CREAT SERPL: 14 (ref 7–25)
CALCIUM SERPL-MCNC: 8.9 MG/DL (ref 8.4–10.2)
CHLORIDE SERPL-SCNC: 106 MMOL/L (ref 97–110)
CO2 SERPL-SCNC: 25 MMOL/L (ref 21–32)
COLOR UR: NORMAL
CREAT SERPL-MCNC: 1.36 MG/DL (ref 0.67–1.17)
CREAT UR-MCNC: 71.8 MG/DL
DEPRECATED RDW RBC: 49.1 FL (ref 39–50)
EGFRCR SERPLBLD CKD-EPI 2021: 60 ML/MIN/{1.73_M2}
EOSINOPHIL # BLD: 3.8 K/MCL (ref 0–0.5)
EOSINOPHIL NFR BLD: 41 %
ERYTHROCYTE [DISTWIDTH] IN BLOOD: 14.9 % (ref 11–15)
FASTING DURATION TIME PATIENT: ABNORMAL H
GLOBULIN SER-MCNC: 3.8 G/DL (ref 2–4)
GLUCOSE SERPL-MCNC: 97 MG/DL (ref 70–99)
GLUCOSE UR STRIP-MCNC: NEGATIVE MG/DL
HCT VFR BLD CALC: 40.5 % (ref 39–51)
HGB BLD-MCNC: 13 G/DL (ref 13–17)
HGB UR QL STRIP: NEGATIVE
HYALINE CASTS #/AREA URNS LPF: NORMAL /LPF
IMM GRANULOCYTES # BLD AUTO: 0 K/MCL (ref 0–0.2)
IMM GRANULOCYTES # BLD: 0 %
KETONES UR STRIP-MCNC: NEGATIVE MG/DL
LEUKOCYTE ESTERASE UR QL STRIP: NEGATIVE
LYMPHOCYTES # BLD: 0.5 K/MCL (ref 1–4)
LYMPHOCYTES NFR BLD: 6 %
MAGNESIUM SERPL-MCNC: 1.9 MG/DL (ref 1.7–2.4)
MCH RBC QN AUTO: 28.6 PG (ref 26–34)
MCHC RBC AUTO-ENTMCNC: 32.1 G/DL (ref 32–36.5)
MCV RBC AUTO: 89.2 FL (ref 78–100)
MONOCYTES # BLD: 1 K/MCL (ref 0.3–0.9)
MONOCYTES NFR BLD: 11 %
MUCOUS THREADS URNS QL MICRO: PRESENT
NEUTROPHILS # BLD: 3.8 K/MCL (ref 1.8–7.7)
NEUTROPHILS NFR BLD: 42 %
NITRITE UR QL STRIP: NEGATIVE
NRBC BLD MANUAL-RTO: 0 /100 WBC
PH UR STRIP: 6.5 [PH] (ref 5–7)
PHOSPHATE SERPL-MCNC: 3.7 MG/DL (ref 2.4–4.7)
PLATELET # BLD AUTO: 157 K/MCL (ref 140–450)
POTASSIUM SERPL-SCNC: 3.5 MMOL/L (ref 3.4–5.1)
PROT SERPL-MCNC: 6.9 G/DL (ref 6.4–8.2)
PROT UR STRIP-MCNC: NEGATIVE MG/DL
PROT UR-MCNC: 10 MG/DL
PROT/CREAT UR: 139 MGPR/GCR
RBC # BLD: 4.54 MIL/MCL (ref 4.5–5.9)
RBC #/AREA URNS HPF: NORMAL /HPF
SODIUM SERPL-SCNC: 137 MMOL/L (ref 135–145)
SP GR UR STRIP: 1.01 (ref 1–1.03)
SQUAMOUS #/AREA URNS HPF: NORMAL /HPF
TACROLIMUS BLD-MCNC: 6.8 NG/ML (ref 5–20)
UROBILINOGEN UR STRIP-MCNC: 0.2 MG/DL
WBC # BLD: 9.2 K/MCL (ref 4.2–11)
WBC #/AREA URNS HPF: NORMAL /HPF

## 2024-11-21 PROCEDURE — 87799 DETECT AGENT NOS DNA QUANT: CPT | Performed by: CLINICAL MEDICAL LABORATORY

## 2024-11-21 PROCEDURE — 80195 ASSAY OF SIROLIMUS: CPT | Performed by: CLINICAL MEDICAL LABORATORY

## 2024-11-21 PROCEDURE — 84100 ASSAY OF PHOSPHORUS: CPT | Performed by: CLINICAL MEDICAL LABORATORY

## 2024-11-21 PROCEDURE — 83735 ASSAY OF MAGNESIUM: CPT | Performed by: CLINICAL MEDICAL LABORATORY

## 2024-11-21 PROCEDURE — 99212 OFFICE O/P EST SF 10 MIN: CPT

## 2024-11-21 PROCEDURE — 80197 ASSAY OF TACROLIMUS: CPT | Performed by: CLINICAL MEDICAL LABORATORY

## 2024-11-21 PROCEDURE — 81001 URINALYSIS AUTO W/SCOPE: CPT | Performed by: CLINICAL MEDICAL LABORATORY

## 2024-11-21 PROCEDURE — 82570 ASSAY OF URINE CREATININE: CPT | Performed by: CLINICAL MEDICAL LABORATORY

## 2024-11-21 PROCEDURE — 80053 COMPREHEN METABOLIC PANEL: CPT | Performed by: CLINICAL MEDICAL LABORATORY

## 2024-11-21 PROCEDURE — 85025 COMPLETE CBC W/AUTO DIFF WBC: CPT | Performed by: CLINICAL MEDICAL LABORATORY

## 2024-11-21 PROCEDURE — 84156 ASSAY OF PROTEIN URINE: CPT | Performed by: CLINICAL MEDICAL LABORATORY

## 2024-11-21 PROCEDURE — 36415 COLL VENOUS BLD VENIPUNCTURE: CPT | Performed by: CLINICAL MEDICAL LABORATORY

## 2024-11-21 RX ORDER — PREDNISONE 2.5 MG/1
2.5 TABLET ORAL
Qty: 90 TABLET | Refills: 3 | Status: SHIPPED | OUTPATIENT
Start: 2024-11-21

## 2024-11-21 ASSESSMENT — ENCOUNTER SYMPTOMS
ACTIVITY CHANGE: 0
FEVER: 0
VOMITING: 0
WOUND: 0
ABDOMINAL DISTENTION: 0
FATIGUE: 0
SHORTNESS OF BREATH: 0
DIARRHEA: 0
CHEST TIGHTNESS: 0
ABDOMINAL PAIN: 0
COLOR CHANGE: 0
CONSTIPATION: 0
NAUSEA: 0

## 2024-11-21 ASSESSMENT — PAIN SCALES - GENERAL
PAINLEVEL: 0
PAINLEVEL: 0

## 2024-11-22 LAB
BKV DNA # UR NAA+PROBE: NOT DETECTED
BKV DNA SPEC NAA+PROBE-LOG#: NOT DETECTED {LOG_COPIES}/ML
CMV DNA # SPEC NAA+PROBE: NOT DETECTED {COPIES}/ML
CMV DNA SERPL NAA+PROBE-ACNC: NOT DETECTED [IU]/ML
SERVICE CMNT-IMP: NORMAL
SERVICE CMNT-IMP: NORMAL
SIROLIMUS BLD-MCNC: 5.9 NG/ML (ref 5–20)

## 2024-11-27 ENCOUNTER — PATIENT MESSAGE (OUTPATIENT)
Dept: INTERNAL MEDICINE CLINIC | Facility: CLINIC | Age: 59
End: 2024-11-27

## 2024-12-05 ENCOUNTER — TELEPHONE (OUTPATIENT)
Dept: TRANSPLANT | Age: 59
End: 2024-12-05

## 2024-12-05 ENCOUNTER — LAB SERVICES (OUTPATIENT)
Dept: LAB | Age: 59
End: 2024-12-05

## 2024-12-05 DIAGNOSIS — Z94.0 IMMUNOSUPPRESSIVE MANAGEMENT ENCOUNTER FOLLOWING KIDNEY TRANSPLANT (CMD): ICD-10-CM

## 2024-12-05 DIAGNOSIS — I10 ESSENTIAL HYPERTENSION: ICD-10-CM

## 2024-12-05 DIAGNOSIS — Z79.899 IMMUNOSUPPRESSIVE MANAGEMENT ENCOUNTER FOLLOWING KIDNEY TRANSPLANT (CMD): ICD-10-CM

## 2024-12-05 DIAGNOSIS — E83.42 HYPOMAGNESEMIA: ICD-10-CM

## 2024-12-05 DIAGNOSIS — Z94.0 STATUS POST KIDNEY TRANSPLANT (CMD): ICD-10-CM

## 2024-12-05 DIAGNOSIS — E87.6 HYPOKALEMIA: ICD-10-CM

## 2024-12-05 DIAGNOSIS — Z94.0 STATUS POST KIDNEY TRANSPLANT (CMD): Primary | ICD-10-CM

## 2024-12-05 LAB
ALBUMIN SERPL-MCNC: 3.3 G/DL (ref 3.4–5)
ALBUMIN/GLOB SERPL: 0.9 {RATIO} (ref 1–2.4)
ALP SERPL-CCNC: 78 UNITS/L (ref 45–117)
ALT SERPL-CCNC: 29 UNITS/L
ANION GAP SERPL CALC-SCNC: 9 MMOL/L (ref 7–19)
APPEARANCE UR: CLEAR
AST SERPL-CCNC: 25 UNITS/L
BACTERIA #/AREA URNS HPF: ABNORMAL /HPF
BASOPHILS # BLD: 0.1 K/MCL (ref 0–0.3)
BASOPHILS NFR BLD: 1 %
BILIRUB SERPL-MCNC: 0.6 MG/DL (ref 0.2–1)
BILIRUB UR QL STRIP: NEGATIVE
BUN SERPL-MCNC: 17 MG/DL (ref 6–20)
BUN/CREAT SERPL: 13 (ref 7–25)
CALCIUM SERPL-MCNC: 8.9 MG/DL (ref 8.4–10.2)
CHLORIDE SERPL-SCNC: 108 MMOL/L (ref 97–110)
CO2 SERPL-SCNC: 26 MMOL/L (ref 21–32)
COLOR UR: ABNORMAL
CREAT SERPL-MCNC: 1.36 MG/DL (ref 0.67–1.17)
CREAT UR-MCNC: 63 MG/DL
DEPRECATED RDW RBC: 47.4 FL (ref 39–50)
EGFRCR SERPLBLD CKD-EPI 2021: 60 ML/MIN/{1.73_M2}
EOSINOPHIL # BLD: 1.6 K/MCL (ref 0–0.5)
EOSINOPHIL NFR BLD: 21 %
ERYTHROCYTE [DISTWIDTH] IN BLOOD: 14.9 % (ref 11–15)
FASTING DURATION TIME PATIENT: 11 HOURS (ref 0–999)
GLOBULIN SER-MCNC: 3.5 G/DL (ref 2–4)
GLUCOSE SERPL-MCNC: 97 MG/DL (ref 70–99)
GLUCOSE UR STRIP-MCNC: NEGATIVE MG/DL
HCT VFR BLD CALC: 40.7 % (ref 39–51)
HGB BLD-MCNC: 13.2 G/DL (ref 13–17)
HGB UR QL STRIP: NEGATIVE
HYALINE CASTS #/AREA URNS LPF: ABNORMAL /LPF
IMM GRANULOCYTES # BLD AUTO: 0 K/MCL (ref 0–0.2)
IMM GRANULOCYTES # BLD: 0 %
KETONES UR STRIP-MCNC: NEGATIVE MG/DL
LEUKOCYTE ESTERASE UR QL STRIP: NEGATIVE
LYMPHOCYTES # BLD: 0.5 K/MCL (ref 1–4)
LYMPHOCYTES NFR BLD: 7 %
MAGNESIUM SERPL-MCNC: 2 MG/DL (ref 1.7–2.4)
MCH RBC QN AUTO: 27.9 PG (ref 26–34)
MCHC RBC AUTO-ENTMCNC: 32.4 G/DL (ref 32–36.5)
MCV RBC AUTO: 86 FL (ref 78–100)
MONOCYTES # BLD: 1 K/MCL (ref 0.3–0.9)
MONOCYTES NFR BLD: 13 %
MUCOUS THREADS URNS QL MICRO: PRESENT
NEUTROPHILS # BLD: 4.4 K/MCL (ref 1.8–7.7)
NEUTROPHILS NFR BLD: 58 %
NITRITE UR QL STRIP: NEGATIVE
NRBC BLD MANUAL-RTO: 0 /100 WBC
PH UR STRIP: 7.5 [PH] (ref 5–7)
PHOSPHATE SERPL-MCNC: 2.9 MG/DL (ref 2.4–4.7)
PLATELET # BLD AUTO: 157 K/MCL (ref 140–450)
POTASSIUM SERPL-SCNC: 3.5 MMOL/L (ref 3.4–5.1)
PROT SERPL-MCNC: 6.8 G/DL (ref 6.4–8.2)
PROT UR STRIP-MCNC: NEGATIVE MG/DL
PROT UR-MCNC: 16 MG/DL
PROT/CREAT UR: 254 MGPR/GCR
RBC # BLD: 4.73 MIL/MCL (ref 4.5–5.9)
RBC #/AREA URNS HPF: ABNORMAL /HPF
SODIUM SERPL-SCNC: 139 MMOL/L (ref 135–145)
SP GR UR STRIP: 1.01 (ref 1–1.03)
SQUAMOUS #/AREA URNS HPF: ABNORMAL /HPF
TACROLIMUS BLD-MCNC: 6 NG/ML (ref 5–20)
UROBILINOGEN UR STRIP-MCNC: 0.2 MG/DL
WBC # BLD: 7.5 K/MCL (ref 4.2–11)
WBC #/AREA URNS HPF: ABNORMAL /HPF

## 2024-12-05 PROCEDURE — 80197 ASSAY OF TACROLIMUS: CPT | Performed by: CLINICAL MEDICAL LABORATORY

## 2024-12-05 PROCEDURE — 80195 ASSAY OF SIROLIMUS: CPT | Performed by: CLINICAL MEDICAL LABORATORY

## 2024-12-05 PROCEDURE — 83735 ASSAY OF MAGNESIUM: CPT | Performed by: CLINICAL MEDICAL LABORATORY

## 2024-12-05 PROCEDURE — 85025 COMPLETE CBC W/AUTO DIFF WBC: CPT | Performed by: CLINICAL MEDICAL LABORATORY

## 2024-12-05 PROCEDURE — 80053 COMPREHEN METABOLIC PANEL: CPT | Performed by: CLINICAL MEDICAL LABORATORY

## 2024-12-05 PROCEDURE — 84100 ASSAY OF PHOSPHORUS: CPT | Performed by: CLINICAL MEDICAL LABORATORY

## 2024-12-05 PROCEDURE — 84156 ASSAY OF PROTEIN URINE: CPT | Performed by: CLINICAL MEDICAL LABORATORY

## 2024-12-05 PROCEDURE — 36415 COLL VENOUS BLD VENIPUNCTURE: CPT | Performed by: INTERNAL MEDICINE

## 2024-12-05 PROCEDURE — 81001 URINALYSIS AUTO W/SCOPE: CPT | Performed by: CLINICAL MEDICAL LABORATORY

## 2024-12-05 PROCEDURE — 82570 ASSAY OF URINE CREATININE: CPT | Performed by: CLINICAL MEDICAL LABORATORY

## 2024-12-06 LAB — SIROLIMUS BLD-MCNC: 5.6 NG/ML (ref 5–20)

## 2024-12-09 ENCOUNTER — OFFICE VISIT (OUTPATIENT)
Dept: TRANSPLANT | Age: 59
End: 2024-12-09
Attending: INTERNAL MEDICINE

## 2024-12-09 ENCOUNTER — TELEPHONE (OUTPATIENT)
Dept: TRANSPLANT | Age: 59
End: 2024-12-09

## 2024-12-09 VITALS
RESPIRATION RATE: 16 BRPM | SYSTOLIC BLOOD PRESSURE: 128 MMHG | WEIGHT: 249.78 LBS | BODY MASS INDEX: 32.06 KG/M2 | HEART RATE: 47 BPM | TEMPERATURE: 98.1 F | HEIGHT: 74 IN | DIASTOLIC BLOOD PRESSURE: 78 MMHG

## 2024-12-09 DIAGNOSIS — E87.6 HYPOKALEMIA: ICD-10-CM

## 2024-12-09 DIAGNOSIS — E83.42 HYPOMAGNESEMIA: ICD-10-CM

## 2024-12-09 DIAGNOSIS — Z79.899 IMMUNOSUPPRESSIVE MANAGEMENT ENCOUNTER FOLLOWING KIDNEY TRANSPLANT (CMD): ICD-10-CM

## 2024-12-09 DIAGNOSIS — Z94.0 IMMUNOSUPPRESSIVE MANAGEMENT ENCOUNTER FOLLOWING KIDNEY TRANSPLANT (CMD): ICD-10-CM

## 2024-12-09 DIAGNOSIS — Z94.0 STATUS POST KIDNEY TRANSPLANT (CMD): Primary | ICD-10-CM

## 2024-12-09 DIAGNOSIS — Z94.0 STATUS POST KIDNEY TRANSPLANT (CMD): ICD-10-CM

## 2024-12-09 DIAGNOSIS — I10 BENIGN ESSENTIAL HTN: Primary | ICD-10-CM

## 2024-12-09 DIAGNOSIS — D84.9 IMMUNOSUPPRESSION  (CMD): ICD-10-CM

## 2024-12-09 PROCEDURE — 99211 OFF/OP EST MAY X REQ PHY/QHP: CPT

## 2024-12-09 ASSESSMENT — PAIN SCALES - GENERAL: PAINLEVEL: 0

## 2024-12-17 ENCOUNTER — TELEPHONE (OUTPATIENT)
Dept: TRANSPLANT | Age: 59
End: 2024-12-17

## 2024-12-17 DIAGNOSIS — D84.9 IMMUNOSUPPRESSION  (CMD): ICD-10-CM

## 2024-12-17 DIAGNOSIS — Z94.0 STATUS POST KIDNEY TRANSPLANT (CMD): Primary | ICD-10-CM

## 2024-12-17 DIAGNOSIS — Z79.899 IMMUNOSUPPRESSIVE MANAGEMENT ENCOUNTER FOLLOWING KIDNEY TRANSPLANT (CMD): ICD-10-CM

## 2024-12-17 DIAGNOSIS — Z94.0 IMMUNOSUPPRESSIVE MANAGEMENT ENCOUNTER FOLLOWING KIDNEY TRANSPLANT (CMD): ICD-10-CM

## 2024-12-18 ENCOUNTER — TELEPHONE (OUTPATIENT)
Dept: INTERNAL MEDICINE CLINIC | Facility: CLINIC | Age: 59
End: 2024-12-18

## 2024-12-18 NOTE — TELEPHONE ENCOUNTER
Fax received from [facility] kidney transplant clinic placed in RP in-basket

## 2025-01-09 ENCOUNTER — OFFICE VISIT (OUTPATIENT)
Dept: NEPHROLOGY | Facility: CLINIC | Age: 60
End: 2025-01-09
Payer: MEDICARE

## 2025-01-09 VITALS — WEIGHT: 262.5 LBS | DIASTOLIC BLOOD PRESSURE: 70 MMHG | SYSTOLIC BLOOD PRESSURE: 128 MMHG | BODY MASS INDEX: 35 KG/M2

## 2025-01-09 DIAGNOSIS — G89.29 CHRONIC PAIN OF MULTIPLE JOINTS: Chronic | ICD-10-CM

## 2025-01-09 DIAGNOSIS — Q87.81 ALPORT SYNDROME (HCC): Chronic | ICD-10-CM

## 2025-01-09 DIAGNOSIS — M25.50 CHRONIC PAIN OF MULTIPLE JOINTS: Chronic | ICD-10-CM

## 2025-01-09 DIAGNOSIS — Z94.0 RENAL TRANSPLANT, STATUS POST (HCC): Chronic | ICD-10-CM

## 2025-01-09 DIAGNOSIS — D84.9 IMMUNOSUPPRESSED STATUS (HCC): Primary | Chronic | ICD-10-CM

## 2025-01-09 DIAGNOSIS — F11.20 OPIOID DEPENDENCE ON AGONIST THERAPY (HCC): Chronic | ICD-10-CM

## 2025-01-09 DIAGNOSIS — M06.9 RHEUMATOID ARTHRITIS INVOLVING MULTIPLE SITES, UNSPECIFIED WHETHER RHEUMATOID FACTOR PRESENT (HCC): Chronic | ICD-10-CM

## 2025-01-09 DIAGNOSIS — M17.11 PRIMARY OSTEOARTHRITIS OF RIGHT KNEE: Chronic | ICD-10-CM

## 2025-01-09 DIAGNOSIS — Z94.0 RENAL TRANSPLANT RECIPIENT (HCC): Primary | ICD-10-CM

## 2025-01-09 PROBLEM — N25.81 SECONDARY HYPERPARATHYROIDISM OF RENAL ORIGIN (HCC): Chronic | Status: RESOLVED | Noted: 2018-07-10 | Resolved: 2025-01-09

## 2025-01-09 NOTE — PROGRESS NOTES
Nephrology Progress Note      Suresh Prather is a 59 year old male.    HPI:     Chief Complaint   Patient presents with    Renal Transplant       Mr. Prather was seen in the nephrology clinic today in follow-up for irenal transplant which was performed on July 23, 2024 at [facility].  He reports that he required dialysis for approximately 2 weeks post transplant but subsequently recovered function and has been doing quite well since then.  Most recent creatinine was 1.36 mg/dL.  He has not had any episodes of rejection or ongoing infection and does continue to follow every other month with the transplant center.  Current immunosuppression consists of prednisone 2.5 mg daily, Envarsus 1.75 mg daily and sirolimus 1 mg.  Review of systems is completed and is otherwise unremarkable.      HISTORY:  Past Medical History:    Anaphylactic reaction due to adverse effect of correct drug or medicament properly administered, initial encounter    Cancer (HCC)    Thyroid cancer    Change in hair    Due to dialysis    Closed fracture of distal end of left femur, unspecified fracture morphology, initial encounter ([facility])    Constipation    Easy bruising    ESRD on hemodialysis ([facility])    Exposure to medical diagnostic radiation    completed 2016    Fatigue    Frequent use of laxatives    Hearing loss    History of blood transfusion    few years ago    History of total left knee replacement    Itch of skin    Due to dialysis    Kidney replaced by transplant (HCC)    Nephritis and nephropathy, not specified as acute or chronic, with unspecified pathological lesion in kidney    Painful total knee replacement, sequela    Papillary thyroid carcinoma (HCC)    Dx in 1/2015: tx with surgery and MANZO    PONV (postoperative nausea and vomiting)    s/p kidney transplant    Postlaminectomy syndrome, cervical region    Log Date: 12/12/2012     Protein-calorie malnutrition (HCC)    Status post cervical spinal fusion    Stented  coronary artery    Syncope    Trigger finger (acquired)    Unspecified hemorrhoids without mention of complication    Wears glasses    For reading and distance      Past Surgical History:   Procedure Laterality Date    Anesth,kidney transplant      Angioplasty (coronary)  2015    Appendectomy  2003    Appendectomy      Back surgery      Cath percutaneous  transluminal coronary angioplasty  2016    Cath pv  12/11/2015    Done at -patient states requires future angioplasty.    Cholecystectomy  2003    Colonoscopy      2003?    Colonoscopy N/A 05/01/2015    Procedure: COLONOSCOPY;  Surgeon: Cuauhtemoc Carty MD;  Location:  ENDOSCOPY    Colonoscopy N/A 10/23/2018    Procedure: COLONOSCOPY;  Surgeon: Andres Wills MD;  Location:  ENDOSCOPY    Colonoscopy N/A 11/14/2023    Procedure: COLONOSCOPY with forcep polypectomy;  Surgeon: Andres Wills MD;  Location:  ENDOSCOPY    Femur fracture surgery Left 02/2023    Femur/knee surg unlisted  1994    right knee repair of ligament cruciate anterior    Knee replacement surgery      Knee surgery      Laminectomy,facetectomy,lumbar  05/29/2012    foraminotomy cervic seg    Organ transplant      Other surgical history      arthrodesis cervical ACDF at C5-6    Other surgical history Right 1997    renal transplant - Done at [facility]    Revise median n/carpal tunnel surg  2009    neuroplasty decompression median     Thyroidectomy      On 2/2/2015: total thyroidectomy for papillary thyroid carcinoma      Family History   Problem Relation Age of Onset    Breast Cancer Mother 65    Other (bipolar disorder) Mother     Other (glomerulonephritis) Mother     Stroke Maternal Grandmother     Other (glomerulonephritis) Maternal Grandmother     Cancer Paternal Uncle         prostate CA    Heart Disorder Paternal Uncle         CAD/Aortic disease    Heart Attack Maternal Uncle     Cancer Son         Mother- Breast cancer Uncle-Prostate cancern      Social History:   Social History      Socioeconomic History    Marital status:    Tobacco Use    Smoking status: Never    Smokeless tobacco: Never   Vaping Use    Vaping status: Never Used   Substance and Sexual Activity    Alcohol use: No    Drug use: No   Other Topics Concern    Caffeine Concern No    Stress Concern No    Weight Concern Yes    Special Diet No    Exercise Yes    Seat Belt Yes   Social History Narrative    ** Merged History Encounter **          Social Drivers of Health     Financial Resource Strain: Low Risk  (8/12/2024)    Received from [facility]    Financial Resource Strain     In the past year, have you or any family members you live with been unable to get any of the following when it was really needed? Check all that apply.: None   Food Insecurity: Low Risk  (8/12/2024)    Received from [facility]    Food Insecurity     Within the past 12 months, you worried that your food would run out before you got money to buy more.  : Never true     Within the past 12 months, the food you bought just didn't last and you didn't have money to get more. : Never true   Transportation Needs: At Risk (8/12/2024)    Received from [facility]    Transportation Needs     In the past 12 months, has lack of reliable transportation kept you from medical appointments, meetings, work or from getting things needed for daily living? : Yes   Physical Activity: Low Risk  (8/12/2024)    Received from [facility]    Exercise Vital Sign     On average, how many days per week do you engage in moderate to strenuous exercise (like a brisk walk)?: 7 days     On average, how many minutes do you engage in exercise at this level?: 60 min   Stress: Low Risk  (8/12/2024)    Received from [facility]    Stress     Stress is when someone feels tense, nervous, anxious, or can't sleep at night because their mind is troubled. How stressed are you? : Not at all   Social Connections: Low Risk  (8/12/2024)     Received from [facility]    Social Connections     How often do you see or talk to people that you care about and feel close to? (For example: talking to friends on the phone, visiting friends or family, going to [employer] or club meetings): 5 or more times a week        Medications (Active prior to today's visit):  Current Outpatient Medications   Medication Sig Dispense Refill    PREDNISONE 5 MG Oral Tab TAKE 1 TABLET(5 MG) BY MOUTH DAILY 30 tablet 5    famotidine 20 MG Oral Tab Take 1 tablet (20 mg total) by mouth daily.      K Phos [place]-Sod Phos Di & Mono (PHOSPHA 250 NEUTRAL) 155-852-130 MG Oral Tab Take 1 tablet by mouth daily.      magnesium oxide 400 MG Oral Tab Take 1 tablet (400 mg total) by mouth 2 (two) times daily.      mycophenolate sodium 180 MG Oral Tab EC Take 3 tablets (540 mg total) by mouth Q12H.      potassium chloride 20 MEQ Oral Tab CR Take 1 tablet (20 mEq total) by mouth daily.      sevelamer carbonate 800 MG Oral Tab Take 1 tablet (800 mg total) by mouth 3 (three) times daily with meals.      sulfamethoxazole-trimethoprim -160 MG Oral Tab per tablet Take 0.5 tablets by mouth 3 (three) times a week. Monday, Wednesday, Friday      torsemide 20 MG Oral Tab Take 2 tablets (40 mg total) by mouth every morning.      valGANciclovir 450 MG Oral Tab Take 1 tablet (450 mg total) by mouth daily.      ENVARSUS XR 4 MG Oral Tablet 24 Hr Take 2 tablets by mouth daily.      levothyroxine 150 MCG Oral Tab Take 1 tablet (150 mcg total) by mouth before breakfast.      Buprenorphine HCl-Naloxone HCl 8-2 MG Sublingual SL Tab Place 0.5 tablets under the tongue 2 (two) times a day. 30 tablet 0    Sennosides-Docusate Sodium (SENNA-PLUS OR) Take 2 tablets by mouth daily.      allopurinol 100 MG Oral Tab Take 1 tablet (100 mg total) by mouth daily. 90 tablet 1    lactulose 10 GM/15ML Oral Solution Take 30 mL (20 g total) by mouth 2 (two) times daily as needed.      atorvastatin 40 MG Oral Tab Take 1  tablet (40 mg total) by mouth nightly.      aspirin 81 MG Oral Tab EC Take 1 tablet (81 mg total) by mouth daily.         Allergies:  Allergies[1]      ROS:     Denies fever/chills  + Wt gain  Denies HA or visual changes  Denies CP or palpitations  Denies SOB/cough/hemoptysis  Denies abd or flank pain  Denies N/V/D  Denies change in urinary habits or gross hematuria  Denies LE edema  Denies skin rashes/myalgias/arthralgias      PHYSICAL EXAM:   /70 (BP Location: Right arm, Patient Position: Sitting)   Wt 262 lb 8 oz (119.1 kg)   BMI 34.87 kg/m²   Wt Readings from Last 6 Encounters:   25 262 lb 8 oz (119.1 kg)   09/10/24 236 lb 9.6 oz (107.3 kg)   24 210 lb 6 oz (95.4 kg)   24 210 lb 9.6 oz (95.5 kg)   24 210 lb 4 oz (95.4 kg)   23 210 lb (95.3 kg)       General: Alert and oriented in no apparent distress.  HEENT: No scleral icterus, MMM  Neck: Supple, no LISY or thyromegaly  Cardiac: Regular rate and rhythm, S1, S2 normal, no murmur or rub  Lungs: Clear without wheezes, rales, rhonchi.    Extremities: Without clubbing, cyanosis or edema.  Left arm AV fistula with bruit and thrill  Neurologic: Alert and oriented, normal affect, cranial nerves grossly intact, moving all extremities  Skin: Warm and dry, no rashes      LABS:     Labs from  reviewed and include serum creatinine 1.36 mg/dL, tacrolimus level 6.0    ASSESSMENT/PLAN:     #1.  Renal transplant recipient-he received a  donor kidney transplant on 2024 at [facility].  While there was delayed graft function he did recover and has been doing well since that time with most recent creatinine at 1.36 mg/dL.  Again he continues to follow closely with the transplant center and will be transitioning his care in the next several months most likely.  Immunosuppression consists of tacrolimus (Envarsus) 1.75 mg daily, sirolimus 1 mg daily and prednisone 2.5 mg daily.  He is also on  trimethoprim/sulfamethoxazole 3 times weekly.    Thank you again for allowing me to participate in care of your patient.  Please do not hesitate to call with any questions or concerns.      Brant Vaz MD  1/9/2025  9:05 AM             [1]   Allergies  Allergen Reactions    Carvedilol HIVES and SWELLING     TABS    Ciprofloxacin Hcl HIVES and ITCHING     TABS    Duloxetine OTHER (SEE COMMENTS)    Fosinopril OTHER (SEE COMMENTS)    Gabapentin SWELLING     Foot swelling    Hydralazine HIVES    Metoprolol OTHER (SEE COMMENTS)    Monopril [Fosinopril Sodium] HIVES and ITCHING    Pravastatin Sodium SWELLING     TABS    Nortriptyline DIARRHEA, NAUSEA AND VOMITING and OTHER (SEE COMMENTS)    Minoxidil OTHER (SEE COMMENTS)     Chest pain

## 2025-01-20 ENCOUNTER — TELEPHONE (OUTPATIENT)
Dept: TRANSPLANT | Age: 60
End: 2025-01-20

## 2025-01-20 DIAGNOSIS — E83.42 HYPOMAGNESEMIA: ICD-10-CM

## 2025-01-20 DIAGNOSIS — E83.39 HYPOPHOSPHATEMIA: ICD-10-CM

## 2025-01-20 DIAGNOSIS — Z79.899 IMMUNOSUPPRESSIVE MANAGEMENT ENCOUNTER FOLLOWING KIDNEY TRANSPLANT (CMD): ICD-10-CM

## 2025-01-20 DIAGNOSIS — Z94.0 IMMUNOSUPPRESSIVE MANAGEMENT ENCOUNTER FOLLOWING KIDNEY TRANSPLANT (CMD): ICD-10-CM

## 2025-01-20 DIAGNOSIS — Z94.0 STATUS POST KIDNEY TRANSPLANT (CMD): Primary | ICD-10-CM

## 2025-01-21 ENCOUNTER — APPOINTMENT (OUTPATIENT)
Dept: LAB | Age: 60
End: 2025-01-21

## 2025-01-21 ENCOUNTER — OFFICE VISIT (OUTPATIENT)
Dept: TRANSPLANT | Age: 60
End: 2025-01-21
Attending: INTERNAL MEDICINE

## 2025-01-21 VITALS
WEIGHT: 264.11 LBS | HEIGHT: 74 IN | HEART RATE: 44 BPM | BODY MASS INDEX: 33.9 KG/M2 | TEMPERATURE: 98.1 F | DIASTOLIC BLOOD PRESSURE: 77 MMHG | SYSTOLIC BLOOD PRESSURE: 133 MMHG | RESPIRATION RATE: 16 BRPM

## 2025-01-21 DIAGNOSIS — Z94.0 STATUS POST KIDNEY TRANSPLANT (CMD): ICD-10-CM

## 2025-01-21 DIAGNOSIS — Z79.899 IMMUNOSUPPRESSIVE MANAGEMENT ENCOUNTER FOLLOWING KIDNEY TRANSPLANT (CMD): ICD-10-CM

## 2025-01-21 DIAGNOSIS — Z94.0 IMMUNOSUPPRESSIVE MANAGEMENT ENCOUNTER FOLLOWING KIDNEY TRANSPLANT (CMD): ICD-10-CM

## 2025-01-21 DIAGNOSIS — E83.42 HYPOMAGNESEMIA: ICD-10-CM

## 2025-01-21 DIAGNOSIS — E83.39 HYPOPHOSPHATEMIA: ICD-10-CM

## 2025-01-21 DIAGNOSIS — I10 BENIGN ESSENTIAL HTN: Primary | ICD-10-CM

## 2025-01-21 DIAGNOSIS — D84.9 IMMUNOSUPPRESSION  (CMD): ICD-10-CM

## 2025-01-21 DIAGNOSIS — Z94.0 RENAL TRANSPLANT, STATUS POST (CMD): Primary | ICD-10-CM

## 2025-01-21 DIAGNOSIS — I25.10 CORONARY ARTERY DISEASE INVOLVING NATIVE CORONARY ARTERY OF NATIVE HEART WITHOUT ANGINA PECTORIS: ICD-10-CM

## 2025-01-21 DIAGNOSIS — Z94.0 STATUS POST KIDNEY TRANSPLANT (CMD): Primary | ICD-10-CM

## 2025-01-21 LAB
25(OH)D3+25(OH)D2 SERPL-MCNC: 20.5 NG/ML (ref 30–100)
ALBUMIN SERPL-MCNC: 3.4 G/DL (ref 3.4–5)
ALBUMIN/GLOB SERPL: 1 {RATIO} (ref 1–2.4)
ALP SERPL-CCNC: 81 UNITS/L (ref 45–117)
ALT SERPL-CCNC: 46 UNITS/L
ANION GAP SERPL CALC-SCNC: 9 MMOL/L (ref 7–19)
APPEARANCE UR: CLEAR
AST SERPL-CCNC: 33 UNITS/L
BACTERIA #/AREA URNS HPF: NORMAL /HPF
BASOPHILS # BLD: 0 K/MCL (ref 0–0.3)
BASOPHILS NFR BLD: 1 %
BILIRUB SERPL-MCNC: 0.7 MG/DL (ref 0.2–1)
BILIRUB UR QL STRIP: NEGATIVE
BUN SERPL-MCNC: 19 MG/DL (ref 6–20)
BUN/CREAT SERPL: 14 (ref 7–25)
CALCIUM SERPL-MCNC: 9 MG/DL (ref 8.4–10.2)
CHLORIDE SERPL-SCNC: 108 MMOL/L (ref 97–110)
CO2 SERPL-SCNC: 27 MMOL/L (ref 21–32)
COLOR UR: NORMAL
CREAT SERPL-MCNC: 1.39 MG/DL (ref 0.67–1.17)
DEPRECATED RDW RBC: 50.3 FL (ref 39–50)
EGFRCR SERPLBLD CKD-EPI 2021: 58 ML/MIN/{1.73_M2}
EOSINOPHIL # BLD: 0.7 K/MCL (ref 0–0.5)
EOSINOPHIL NFR BLD: 11 %
ERYTHROCYTE [DISTWIDTH] IN BLOOD: 16.1 % (ref 11–15)
FASTING DURATION TIME PATIENT: 12 HOURS (ref 0–999)
GLOBULIN SER-MCNC: 3.3 G/DL (ref 2–4)
GLUCOSE SERPL-MCNC: 99 MG/DL (ref 70–99)
GLUCOSE UR STRIP-MCNC: NEGATIVE MG/DL
HCT VFR BLD CALC: 40.2 % (ref 39–51)
HGB BLD-MCNC: 12.6 G/DL (ref 13–17)
HGB UR QL STRIP: NEGATIVE
HYALINE CASTS #/AREA URNS LPF: NORMAL /LPF
IMM GRANULOCYTES # BLD AUTO: 0 K/MCL (ref 0–0.2)
IMM GRANULOCYTES # BLD: 0 %
KETONES UR STRIP-MCNC: NEGATIVE MG/DL
LEUKOCYTE ESTERASE UR QL STRIP: NEGATIVE
LYMPHOCYTES # BLD: 1.4 K/MCL (ref 1–4)
LYMPHOCYTES NFR BLD: 22 %
MAGNESIUM SERPL-MCNC: 2 MG/DL (ref 1.7–2.4)
MCH RBC QN AUTO: 26.5 PG (ref 26–34)
MCHC RBC AUTO-ENTMCNC: 31.3 G/DL (ref 32–36.5)
MCV RBC AUTO: 84.6 FL (ref 78–100)
MONOCYTES # BLD: 0.8 K/MCL (ref 0.3–0.9)
MONOCYTES NFR BLD: 13 %
MUCOUS THREADS URNS QL MICRO: PRESENT
NEUTROPHILS # BLD: 3.5 K/MCL (ref 1.8–7.7)
NEUTROPHILS NFR BLD: 53 %
NITRITE UR QL STRIP: NEGATIVE
NRBC BLD MANUAL-RTO: 0 /100 WBC
PH UR STRIP: 7 [PH] (ref 5–7)
PHOSPHATE SERPL-MCNC: 3.8 MG/DL (ref 2.4–4.7)
PLATELET # BLD AUTO: 142 K/MCL (ref 140–450)
POTASSIUM SERPL-SCNC: 3.6 MMOL/L (ref 3.4–5.1)
PROT SERPL-MCNC: 6.7 G/DL (ref 6.4–8.2)
PROT UR STRIP-MCNC: NEGATIVE MG/DL
RBC # BLD: 4.75 MIL/MCL (ref 4.5–5.9)
RBC #/AREA URNS HPF: NORMAL /HPF
SODIUM SERPL-SCNC: 140 MMOL/L (ref 135–145)
SP GR UR STRIP: 1.01 (ref 1–1.03)
SQUAMOUS #/AREA URNS HPF: NORMAL /HPF
UROBILINOGEN UR STRIP-MCNC: 0.2 MG/DL
WBC # BLD: 6.5 K/MCL (ref 4.2–11)
WBC #/AREA URNS HPF: NORMAL /HPF

## 2025-01-21 PROCEDURE — 99215 OFFICE O/P EST HI 40 MIN: CPT | Performed by: INTERNAL MEDICINE

## 2025-01-21 PROCEDURE — 3078F DIAST BP <80 MM HG: CPT | Performed by: INTERNAL MEDICINE

## 2025-01-21 PROCEDURE — 87799 DETECT AGENT NOS DNA QUANT: CPT | Performed by: CLINICAL MEDICAL LABORATORY

## 2025-01-21 PROCEDURE — G2211 COMPLEX E/M VISIT ADD ON: HCPCS | Performed by: STUDENT IN AN ORGANIZED HEALTH CARE EDUCATION/TRAINING PROGRAM

## 2025-01-21 PROCEDURE — 36415 COLL VENOUS BLD VENIPUNCTURE: CPT | Performed by: INTERNAL MEDICINE

## 2025-01-21 PROCEDURE — 99214 OFFICE O/P EST MOD 30 MIN: CPT | Performed by: STUDENT IN AN ORGANIZED HEALTH CARE EDUCATION/TRAINING PROGRAM

## 2025-01-21 PROCEDURE — 80195 ASSAY OF SIROLIMUS: CPT | Performed by: CLINICAL MEDICAL LABORATORY

## 2025-01-21 PROCEDURE — 84100 ASSAY OF PHOSPHORUS: CPT | Performed by: CLINICAL MEDICAL LABORATORY

## 2025-01-21 PROCEDURE — 85025 COMPLETE CBC W/AUTO DIFF WBC: CPT | Performed by: CLINICAL MEDICAL LABORATORY

## 2025-01-21 PROCEDURE — 83036 HEMOGLOBIN GLYCOSYLATED A1C: CPT | Performed by: CLINICAL MEDICAL LABORATORY

## 2025-01-21 PROCEDURE — 84156 ASSAY OF PROTEIN URINE: CPT | Performed by: CLINICAL MEDICAL LABORATORY

## 2025-01-21 PROCEDURE — 80053 COMPREHEN METABOLIC PANEL: CPT | Performed by: CLINICAL MEDICAL LABORATORY

## 2025-01-21 PROCEDURE — 80197 ASSAY OF TACROLIMUS: CPT | Performed by: CLINICAL MEDICAL LABORATORY

## 2025-01-21 PROCEDURE — 82306 VITAMIN D 25 HYDROXY: CPT | Performed by: CLINICAL MEDICAL LABORATORY

## 2025-01-21 PROCEDURE — 81001 URINALYSIS AUTO W/SCOPE: CPT | Performed by: CLINICAL MEDICAL LABORATORY

## 2025-01-21 PROCEDURE — 3075F SYST BP GE 130 - 139MM HG: CPT | Performed by: INTERNAL MEDICINE

## 2025-01-21 PROCEDURE — 83735 ASSAY OF MAGNESIUM: CPT | Performed by: CLINICAL MEDICAL LABORATORY

## 2025-01-21 PROCEDURE — 99212 OFFICE O/P EST SF 10 MIN: CPT

## 2025-01-21 PROCEDURE — 82570 ASSAY OF URINE CREATININE: CPT | Performed by: CLINICAL MEDICAL LABORATORY

## 2025-01-21 PROCEDURE — 83970 ASSAY OF PARATHORMONE: CPT | Performed by: CLINICAL MEDICAL LABORATORY

## 2025-01-21 ASSESSMENT — PAIN SCALES - GENERAL: PAINLEVEL: 0

## 2025-01-22 LAB
CMV DNA # SPEC NAA+PROBE: NOT DETECTED {COPIES}/ML
CMV DNA SERPL NAA+PROBE-ACNC: NOT DETECTED [IU]/ML
CREAT UR-MCNC: 83.3 MG/DL
EBV DNA # SPEC NAA+PROBE: NOT DETECTED {COPIES}/ML
EBV DNA SPEC NAA+PROBE-LOG#: NOT DETECTED {LOG_COPIES}/ML
HBA1C MFR BLD: 5.3 % (ref 4.5–5.6)
PROT UR-MCNC: 14 MG/DL
PROT/CREAT UR: 168 MGPR/GCR
PTH-INTACT SERPL-MCNC: 318 PG/ML (ref 19–88)
SERVICE CMNT-IMP: NORMAL
SERVICE CMNT-IMP: NORMAL
SIROLIMUS BLD-MCNC: 5.6 NG/ML (ref 5–20)
TACROLIMUS BLD-MCNC: 7.2 NG/ML (ref 5–20)

## 2025-01-23 ENCOUNTER — APPOINTMENT (OUTPATIENT)
Dept: TRANSPLANT | Age: 60
End: 2025-01-23
Attending: INTERNAL MEDICINE

## 2025-01-24 LAB
BKV DNA # UR NAA+PROBE: ABNORMAL IU/ML
BKV DNA SPEC NAA+PROBE-LOG#: 5.85 LOG
SERVICE CMNT-IMP: ABNORMAL

## 2025-01-24 RX ORDER — TORSEMIDE 20 MG/1
20 TABLET ORAL DAILY
Qty: 90 TABLET | Refills: 1 | Status: SHIPPED | OUTPATIENT
Start: 2025-01-24

## 2025-01-28 NOTE — ED NOTES
Appointment rescheduled for 02/10/2025 at 10:45a.    Patient verbalized understanding.    ----- Message from Fahad sent at 1/28/2025  9:08 AM CST -----  Regarding: Return Call    Who Called:  Patient      Who Left Message for Patient:  Sharon,       Does the patient know what this is regarding?  Would like to have a call about her appointment        Best Call Back Number:  [phone]         Additional Information: Patient is returning a call.  Please assist. Patient stated she need to reschedule her appointment   Pt going to [facility]. Report called to RN.

## 2025-02-06 ENCOUNTER — CLINICAL DOCUMENTATION (OUTPATIENT)
Dept: TRANSPLANT | Age: 60
End: 2025-02-06

## 2025-02-06 ENCOUNTER — TELEPHONE (OUTPATIENT)
Dept: TRANSPLANT | Age: 60
End: 2025-02-06

## 2025-02-06 DIAGNOSIS — Z94.0 STATUS POST KIDNEY TRANSPLANT (CMD): Primary | ICD-10-CM

## 2025-02-06 DIAGNOSIS — D84.9 IMMUNOSUPPRESSION  (CMD): ICD-10-CM

## 2025-02-06 RX ORDER — SIROLIMUS 1 MG/1
1 TABLET, FILM COATED ORAL DAILY
Qty: 90 TABLET | Refills: 3 | Status: SHIPPED | OUTPATIENT
Start: 2025-02-06

## 2025-02-06 RX ORDER — TACROLIMUS 1 MG/1
1 CAPSULE ORAL 2 TIMES DAILY
Qty: 180 CAPSULE | Refills: 3 | Status: SHIPPED | OUTPATIENT
Start: 2025-02-06

## 2025-02-12 ENCOUNTER — TELEPHONE (OUTPATIENT)
Dept: TRANSPLANT | Age: 60
End: 2025-02-12

## 2025-02-17 ENCOUNTER — TELEPHONE (OUTPATIENT)
Dept: TRANSPLANT | Age: 60
End: 2025-02-17

## 2025-02-17 DIAGNOSIS — D84.9 IMMUNOSUPPRESSION  (CMD): ICD-10-CM

## 2025-02-17 DIAGNOSIS — Z94.0 STATUS POST KIDNEY TRANSPLANT (CMD): Primary | ICD-10-CM

## 2025-02-17 DIAGNOSIS — R82.79 BK VIRURIA: ICD-10-CM

## 2025-02-17 DIAGNOSIS — E83.42 HYPOMAGNESEMIA: ICD-10-CM

## 2025-02-18 ENCOUNTER — LAB SERVICES (OUTPATIENT)
Dept: LAB | Age: 60
End: 2025-02-18

## 2025-02-18 ENCOUNTER — TELEPHONE (OUTPATIENT)
Dept: TRANSPLANT | Age: 60
End: 2025-02-18

## 2025-02-18 ENCOUNTER — CLINICAL DOCUMENTATION (OUTPATIENT)
Dept: TRANSPLANT | Age: 60
End: 2025-02-18

## 2025-02-18 DIAGNOSIS — R82.79 BK VIRURIA: ICD-10-CM

## 2025-02-18 DIAGNOSIS — Z94.0 STATUS POST KIDNEY TRANSPLANT (CMD): ICD-10-CM

## 2025-02-18 DIAGNOSIS — D84.9 IMMUNOSUPPRESSION  (CMD): ICD-10-CM

## 2025-02-18 DIAGNOSIS — E83.42 HYPOMAGNESEMIA: ICD-10-CM

## 2025-02-18 LAB
ALBUMIN SERPL-MCNC: 3.3 G/DL (ref 3.4–5)
ALBUMIN/GLOB SERPL: 1 {RATIO} (ref 1–2.4)
ALP SERPL-CCNC: 82 UNITS/L (ref 45–117)
ALT SERPL-CCNC: 34 UNITS/L
ANION GAP SERPL CALC-SCNC: 9 MMOL/L (ref 7–19)
APPEARANCE UR: CLEAR
AST SERPL-CCNC: 29 UNITS/L
BACTERIA #/AREA URNS HPF: NORMAL /HPF
BASOPHILS # BLD: 0 K/MCL (ref 0–0.3)
BASOPHILS NFR BLD: 1 %
BILIRUB SERPL-MCNC: 0.8 MG/DL (ref 0.2–1)
BILIRUB UR QL STRIP: NEGATIVE
BUN SERPL-MCNC: 18 MG/DL (ref 6–20)
BUN/CREAT SERPL: 12 (ref 7–25)
CALCIUM SERPL-MCNC: 9.3 MG/DL (ref 8.4–10.2)
CHLORIDE SERPL-SCNC: 108 MMOL/L (ref 97–110)
CO2 SERPL-SCNC: 27 MMOL/L (ref 21–32)
COLOR UR: NORMAL
CREAT SERPL-MCNC: 1.55 MG/DL (ref 0.67–1.17)
CREAT UR-MCNC: 120 MG/DL
DEPRECATED RDW RBC: 48.7 FL (ref 39–50)
EGFRCR SERPLBLD CKD-EPI 2021: 51 ML/MIN/{1.73_M2}
EOSINOPHIL # BLD: 0.9 K/MCL (ref 0–0.5)
EOSINOPHIL NFR BLD: 15 %
ERYTHROCYTE [DISTWIDTH] IN BLOOD: 16.5 % (ref 11–15)
FASTING DURATION TIME PATIENT: 8 HOURS (ref 0–999)
GLOBULIN SER-MCNC: 3.2 G/DL (ref 2–4)
GLUCOSE SERPL-MCNC: 99 MG/DL (ref 70–99)
GLUCOSE UR STRIP-MCNC: NEGATIVE MG/DL
HCT VFR BLD CALC: 38.7 % (ref 39–51)
HGB BLD-MCNC: 12.5 G/DL (ref 13–17)
HGB UR QL STRIP: NEGATIVE
HYALINE CASTS #/AREA URNS LPF: NORMAL /LPF
IMM GRANULOCYTES # BLD AUTO: 0 K/MCL (ref 0–0.2)
IMM GRANULOCYTES # BLD: 0 %
KETONES UR STRIP-MCNC: NEGATIVE MG/DL
LEUKOCYTE ESTERASE UR QL STRIP: NEGATIVE
LYMPHOCYTES # BLD: 1.5 K/MCL (ref 1–4)
LYMPHOCYTES NFR BLD: 24 %
MAGNESIUM SERPL-MCNC: 1.8 MG/DL (ref 1.7–2.4)
MCH RBC QN AUTO: 26.3 PG (ref 26–34)
MCHC RBC AUTO-ENTMCNC: 32.3 G/DL (ref 32–36.5)
MCV RBC AUTO: 81.3 FL (ref 78–100)
MONOCYTES # BLD: 0.9 K/MCL (ref 0.3–0.9)
MONOCYTES NFR BLD: 15 %
NEUTROPHILS # BLD: 2.9 K/MCL (ref 1.8–7.7)
NEUTROPHILS NFR BLD: 45 %
NITRITE UR QL STRIP: NEGATIVE
NRBC BLD MANUAL-RTO: 0 /100 WBC
PH UR STRIP: 7 [PH] (ref 5–7)
PHOSPHATE SERPL-MCNC: 3.7 MG/DL (ref 2.4–4.7)
PLATELET # BLD AUTO: 145 K/MCL (ref 140–450)
POTASSIUM SERPL-SCNC: 3.4 MMOL/L (ref 3.4–5.1)
PROT SERPL-MCNC: 6.5 G/DL (ref 6.4–8.2)
PROT UR STRIP-MCNC: NEGATIVE MG/DL
PROT UR-MCNC: 15 MG/DL
PROT/CREAT UR: 125 MGPR/GCR
RBC # BLD: 4.76 MIL/MCL (ref 4.5–5.9)
RBC #/AREA URNS HPF: NORMAL /HPF
SODIUM SERPL-SCNC: 141 MMOL/L (ref 135–145)
SP GR UR STRIP: 1.02 (ref 1–1.03)
SQUAMOUS #/AREA URNS HPF: NORMAL /HPF
TACROLIMUS BLD-MCNC: 7.2 NG/ML (ref 5–20)
UROBILINOGEN UR STRIP-MCNC: 0.2 MG/DL
WBC # BLD: 6.3 K/MCL (ref 4.2–11)
WBC #/AREA URNS HPF: NORMAL /HPF

## 2025-02-18 PROCEDURE — 85025 COMPLETE CBC W/AUTO DIFF WBC: CPT | Performed by: CLINICAL MEDICAL LABORATORY

## 2025-02-18 PROCEDURE — 80053 COMPREHEN METABOLIC PANEL: CPT | Performed by: CLINICAL MEDICAL LABORATORY

## 2025-02-18 PROCEDURE — 87799 DETECT AGENT NOS DNA QUANT: CPT | Performed by: CLINICAL MEDICAL LABORATORY

## 2025-02-18 PROCEDURE — 84156 ASSAY OF PROTEIN URINE: CPT | Performed by: CLINICAL MEDICAL LABORATORY

## 2025-02-18 PROCEDURE — 36415 COLL VENOUS BLD VENIPUNCTURE: CPT | Performed by: INTERNAL MEDICINE

## 2025-02-18 PROCEDURE — 82570 ASSAY OF URINE CREATININE: CPT | Performed by: CLINICAL MEDICAL LABORATORY

## 2025-02-18 PROCEDURE — 80197 ASSAY OF TACROLIMUS: CPT | Performed by: CLINICAL MEDICAL LABORATORY

## 2025-02-18 PROCEDURE — 83735 ASSAY OF MAGNESIUM: CPT | Performed by: CLINICAL MEDICAL LABORATORY

## 2025-02-18 PROCEDURE — 84100 ASSAY OF PHOSPHORUS: CPT | Performed by: CLINICAL MEDICAL LABORATORY

## 2025-02-18 PROCEDURE — 81001 URINALYSIS AUTO W/SCOPE: CPT | Performed by: CLINICAL MEDICAL LABORATORY

## 2025-02-18 PROCEDURE — 80195 ASSAY OF SIROLIMUS: CPT | Performed by: CLINICAL MEDICAL LABORATORY

## 2025-02-19 LAB — SIROLIMUS BLD-MCNC: 6.9 NG/ML (ref 5–20)

## 2025-02-20 ENCOUNTER — TELEPHONE (OUTPATIENT)
Dept: TRANSPLANT | Age: 60
End: 2025-02-20

## 2025-02-20 PROBLEM — Z79.2 NEED FOR ANTIBIOTIC PROPHYLAXIS FOR DENTAL PROCEDURE: Status: ACTIVE | Noted: 2025-02-20

## 2025-02-20 LAB
BKV DNA # SPEC NAA+PROBE: NOT DETECTED {COPIES}/ML
BKV DNA # UR NAA+PROBE: ABNORMAL IU/ML
BKV DNA SPEC NAA+PROBE-LOG#: 4.48 LOG
BKV DNA SPEC NAA+PROBE-LOG#: NOT DETECTED {LOG_COPIES}/ML
SERVICE CMNT-IMP: ABNORMAL
SERVICE CMNT-IMP: NORMAL

## 2025-02-20 RX ORDER — TACROLIMUS 0.5 MG/1
0.5 CAPSULE ORAL EVERY 12 HOURS
Qty: 180 CAPSULE | Refills: 3 | Status: SHIPPED | OUTPATIENT
Start: 2025-02-20

## 2025-02-26 ENCOUNTER — CLINICAL DOCUMENTATION (OUTPATIENT)
Dept: TRANSPLANT | Age: 60
End: 2025-02-26

## 2025-03-05 ENCOUNTER — TELEPHONE (OUTPATIENT)
Dept: TRANSPLANT | Age: 60
End: 2025-03-05

## 2025-03-05 ENCOUNTER — CLINICAL DOCUMENTATION (OUTPATIENT)
Dept: TRANSPLANT | Age: 60
End: 2025-03-05

## 2025-03-05 DIAGNOSIS — D84.9 IMMUNOSUPPRESSION  (CMD): ICD-10-CM

## 2025-03-05 DIAGNOSIS — Z94.0 STATUS POST KIDNEY TRANSPLANT (CMD): Primary | ICD-10-CM

## 2025-03-05 DIAGNOSIS — R82.79 BK VIRURIA: ICD-10-CM

## 2025-03-05 DIAGNOSIS — E83.42 HYPOMAGNESEMIA: ICD-10-CM

## 2025-03-07 ENCOUNTER — LAB SERVICES (OUTPATIENT)
Dept: LAB | Age: 60
End: 2025-03-07

## 2025-03-07 DIAGNOSIS — Z94.0 STATUS POST KIDNEY TRANSPLANT (CMD): ICD-10-CM

## 2025-03-07 DIAGNOSIS — E83.42 HYPOMAGNESEMIA: ICD-10-CM

## 2025-03-07 DIAGNOSIS — D84.9 IMMUNOSUPPRESSION  (CMD): ICD-10-CM

## 2025-03-07 DIAGNOSIS — R82.79 BK VIRURIA: ICD-10-CM

## 2025-03-07 LAB
ALBUMIN SERPL-MCNC: 3.2 G/DL (ref 3.4–5)
ALBUMIN/GLOB SERPL: 1 {RATIO} (ref 1–2.4)
ALP SERPL-CCNC: 83 UNITS/L (ref 45–117)
ALT SERPL-CCNC: 30 UNITS/L
ANION GAP SERPL CALC-SCNC: 11 MMOL/L (ref 7–19)
APPEARANCE UR: CLEAR
AST SERPL-CCNC: 32 UNITS/L
BACTERIA #/AREA URNS HPF: NORMAL /HPF
BASOPHILS # BLD: 0 K/MCL (ref 0–0.3)
BASOPHILS NFR BLD: 0 %
BILIRUB SERPL-MCNC: 0.5 MG/DL (ref 0.2–1)
BILIRUB UR QL STRIP: NEGATIVE
BUN SERPL-MCNC: 18 MG/DL (ref 6–20)
BUN/CREAT SERPL: 12 (ref 7–25)
CALCIUM SERPL-MCNC: 8.9 MG/DL (ref 8.4–10.2)
CHLORIDE SERPL-SCNC: 105 MMOL/L (ref 97–110)
CO2 SERPL-SCNC: 26 MMOL/L (ref 21–32)
COLOR UR: NORMAL
CREAT SERPL-MCNC: 1.45 MG/DL (ref 0.67–1.17)
CREAT UR-MCNC: 92.6 MG/DL
DEPRECATED RDW RBC: 49.3 FL (ref 39–50)
EGFRCR SERPLBLD CKD-EPI 2021: 56 ML/MIN/{1.73_M2}
EOSINOPHIL # BLD: 1.3 K/MCL (ref 0–0.5)
EOSINOPHIL NFR BLD: 19 %
ERYTHROCYTE [DISTWIDTH] IN BLOOD: 16.8 % (ref 11–15)
FASTING DURATION TIME PATIENT: 8 HOURS (ref 0–999)
GLOBULIN SER-MCNC: 3.3 G/DL (ref 2–4)
GLUCOSE SERPL-MCNC: 103 MG/DL (ref 70–99)
GLUCOSE UR STRIP-MCNC: NEGATIVE MG/DL
HCT VFR BLD CALC: 37.9 % (ref 39–51)
HGB BLD-MCNC: 12.3 G/DL (ref 13–17)
HGB UR QL STRIP: NEGATIVE
HYALINE CASTS #/AREA URNS LPF: NORMAL /LPF
IMM GRANULOCYTES # BLD AUTO: 0 K/MCL (ref 0–0.2)
IMM GRANULOCYTES # BLD: 0 %
KETONES UR STRIP-MCNC: NEGATIVE MG/DL
LEUKOCYTE ESTERASE UR QL STRIP: NEGATIVE
LYMPHOCYTES # BLD: 1.3 K/MCL (ref 1–4)
LYMPHOCYTES NFR BLD: 17 %
MAGNESIUM SERPL-MCNC: 1.8 MG/DL (ref 1.7–2.4)
MCH RBC QN AUTO: 26.3 PG (ref 26–34)
MCHC RBC AUTO-ENTMCNC: 32.5 G/DL (ref 32–36.5)
MCV RBC AUTO: 81.2 FL (ref 78–100)
MONOCYTES # BLD: 0.9 K/MCL (ref 0.3–0.9)
MONOCYTES NFR BLD: 12 %
NEUTROPHILS # BLD: 3.7 K/MCL (ref 1.8–7.7)
NEUTROPHILS NFR BLD: 52 %
NITRITE UR QL STRIP: NEGATIVE
NRBC BLD MANUAL-RTO: 0 /100 WBC
PH UR STRIP: 6.5 [PH] (ref 5–7)
PHOSPHATE SERPL-MCNC: 3.7 MG/DL (ref 2.4–4.7)
PLATELET # BLD AUTO: 151 K/MCL (ref 140–450)
POTASSIUM SERPL-SCNC: 3.6 MMOL/L (ref 3.4–5.1)
PROT SERPL-MCNC: 6.5 G/DL (ref 6.4–8.2)
PROT UR STRIP-MCNC: NEGATIVE MG/DL
PROT UR-MCNC: 10 MG/DL
PROT/CREAT UR: 108 MGPR/GCR
RBC # BLD: 4.67 MIL/MCL (ref 4.5–5.9)
RBC #/AREA URNS HPF: NORMAL /HPF
SODIUM SERPL-SCNC: 138 MMOL/L (ref 135–145)
SP GR UR STRIP: 1.01 (ref 1–1.03)
SQUAMOUS #/AREA URNS HPF: NORMAL /HPF
TACROLIMUS BLD-MCNC: 6.1 NG/ML (ref 5–20)
UROBILINOGEN UR STRIP-MCNC: 0.2 MG/DL
WBC # BLD: 7.2 K/MCL (ref 4.2–11)
WBC #/AREA URNS HPF: NORMAL /HPF

## 2025-03-07 PROCEDURE — 85025 COMPLETE CBC W/AUTO DIFF WBC: CPT | Performed by: CLINICAL MEDICAL LABORATORY

## 2025-03-07 PROCEDURE — 83735 ASSAY OF MAGNESIUM: CPT | Performed by: CLINICAL MEDICAL LABORATORY

## 2025-03-07 PROCEDURE — 36415 COLL VENOUS BLD VENIPUNCTURE: CPT | Performed by: INTERNAL MEDICINE

## 2025-03-07 PROCEDURE — 87799 DETECT AGENT NOS DNA QUANT: CPT | Performed by: CLINICAL MEDICAL LABORATORY

## 2025-03-07 PROCEDURE — 80195 ASSAY OF SIROLIMUS: CPT | Performed by: CLINICAL MEDICAL LABORATORY

## 2025-03-07 PROCEDURE — 81001 URINALYSIS AUTO W/SCOPE: CPT | Performed by: CLINICAL MEDICAL LABORATORY

## 2025-03-07 PROCEDURE — 82570 ASSAY OF URINE CREATININE: CPT | Performed by: CLINICAL MEDICAL LABORATORY

## 2025-03-07 PROCEDURE — 84100 ASSAY OF PHOSPHORUS: CPT | Performed by: CLINICAL MEDICAL LABORATORY

## 2025-03-07 PROCEDURE — 84156 ASSAY OF PROTEIN URINE: CPT | Performed by: CLINICAL MEDICAL LABORATORY

## 2025-03-07 PROCEDURE — 80053 COMPREHEN METABOLIC PANEL: CPT | Performed by: CLINICAL MEDICAL LABORATORY

## 2025-03-07 PROCEDURE — 80197 ASSAY OF TACROLIMUS: CPT | Performed by: CLINICAL MEDICAL LABORATORY

## 2025-03-08 LAB — SIROLIMUS BLD-MCNC: 6.4 NG/ML (ref 5–20)

## 2025-03-11 ENCOUNTER — TELEPHONE (OUTPATIENT)
Dept: INTERNAL MEDICINE CLINIC | Facility: CLINIC | Age: 60
End: 2025-03-11

## 2025-03-11 LAB
BKV DNA # SPEC NAA+PROBE: NOT DETECTED {COPIES}/ML
BKV DNA # UR NAA+PROBE: ABNORMAL IU/ML
BKV DNA SPEC NAA+PROBE-LOG#: 4.72 LOG
BKV DNA SPEC NAA+PROBE-LOG#: NOT DETECTED {LOG_COPIES}/ML
SERVICE CMNT-IMP: ABNORMAL
SERVICE CMNT-IMP: NORMAL

## 2025-03-11 NOTE — TELEPHONE ENCOUNTER
Spoke with patient's spouse, Kayley. They reached out to surgeon, but they recommended to follow-up with PCP. Patient prefers to see Dr. Loyola in office. Spouse requesting if Dr. Loyola will see patient sooner.     This RN offered 3/14 appointment with patient's spouse and spouse refused, requesting to be seen sooner.    RP: Scheduled is booked this week. Offered 3/14 appointment slot, but pt's spouse refused. Pt unwilling to see Dr. Chung. Please advise if you can squeeze patient in sooner, TY!

## 2025-03-11 NOTE — TELEPHONE ENCOUNTER
Out of the office Wednesdays.  Don't have any openings Thursday.  Could see him at 2:45 slot Friday if he can be here by 2:30.  Otherwise he could go to IC/ED

## 2025-03-11 NOTE — TELEPHONE ENCOUNTER
Pt believes he may have epididymitis and is scheduled to see RP.    However, pt wife called back and explained it is urgent he be seen sooner as he has had a liver transplant and stated that any potential infection can be more dangerous than normal for him.    Hoping to be seen sooner by Dr. Loyola instead of Dr. Chung.    Future Appointments   Date Time Provider Department Center   3/14/2025  1:40 PM Dewayne Chung MD [facility] [place]   4/10/2025  9:20 AM Brant Vaz MD [facility] [place]   9/23/2025  8:00 AM Codi Loyola MD [facility] [place]        Statement Selected

## 2025-03-14 ENCOUNTER — LAB ENCOUNTER (OUTPATIENT)
Dept: LAB | Age: 60
End: 2025-03-14
Attending: INTERNAL MEDICINE
Payer: MEDICARE

## 2025-03-14 ENCOUNTER — OFFICE VISIT (OUTPATIENT)
Dept: INTERNAL MEDICINE CLINIC | Facility: CLINIC | Age: 60
End: 2025-03-14
Payer: MEDICARE

## 2025-03-14 VITALS
OXYGEN SATURATION: 99 % | RESPIRATION RATE: 16 BRPM | BODY MASS INDEX: 35 KG/M2 | WEIGHT: 265 LBS | TEMPERATURE: 97 F | DIASTOLIC BLOOD PRESSURE: 60 MMHG | HEART RATE: 90 BPM | SYSTOLIC BLOOD PRESSURE: 116 MMHG

## 2025-03-14 DIAGNOSIS — I10 PRIMARY HYPERTENSION: Chronic | ICD-10-CM

## 2025-03-14 DIAGNOSIS — Q87.81 ALPORT SYNDROME (HCC): Chronic | ICD-10-CM

## 2025-03-14 DIAGNOSIS — R10.2 PELVIC PAIN: ICD-10-CM

## 2025-03-14 DIAGNOSIS — I27.20 PULMONARY HTN (HCC): Chronic | ICD-10-CM

## 2025-03-14 DIAGNOSIS — M06.9 RHEUMATOID ARTHRITIS INVOLVING MULTIPLE SITES, UNSPECIFIED WHETHER RHEUMATOID FACTOR PRESENT (HCC): Chronic | ICD-10-CM

## 2025-03-14 DIAGNOSIS — N50.82 SCROTAL PAIN: ICD-10-CM

## 2025-03-14 DIAGNOSIS — Z94.0 RENAL TRANSPLANT, STATUS POST (HCC): Chronic | ICD-10-CM

## 2025-03-14 DIAGNOSIS — M25.50 CHRONIC PAIN OF MULTIPLE JOINTS: Chronic | ICD-10-CM

## 2025-03-14 DIAGNOSIS — N50.82 SCROTAL PAIN: Primary | ICD-10-CM

## 2025-03-14 DIAGNOSIS — D84.9 IMMUNOSUPPRESSED STATUS (HCC): Chronic | ICD-10-CM

## 2025-03-14 DIAGNOSIS — F11.20 OPIOID DEPENDENCE ON AGONIST THERAPY (HCC): Chronic | ICD-10-CM

## 2025-03-14 DIAGNOSIS — G89.29 CHRONIC PAIN OF MULTIPLE JOINTS: Chronic | ICD-10-CM

## 2025-03-14 LAB — PSA SERPL-MCNC: 0.68 NG/ML (ref ?–4)

## 2025-03-14 PROCEDURE — 36415 COLL VENOUS BLD VENIPUNCTURE: CPT

## 2025-03-14 PROCEDURE — 3074F SYST BP LT 130 MM HG: CPT | Performed by: INTERNAL MEDICINE

## 2025-03-14 PROCEDURE — 84153 ASSAY OF PSA TOTAL: CPT

## 2025-03-14 PROCEDURE — 99214 OFFICE O/P EST MOD 30 MIN: CPT | Performed by: INTERNAL MEDICINE

## 2025-03-14 PROCEDURE — 3078F DIAST BP <80 MM HG: CPT | Performed by: INTERNAL MEDICINE

## 2025-03-14 RX ORDER — SIROLIMUS 1 MG/1
TABLET, FILM COATED ORAL
COMMUNITY
Start: 2024-11-26

## 2025-03-14 RX ORDER — TACROLIMUS 0.5 MG/1
0.5 CAPSULE ORAL EVERY 12 HOURS
COMMUNITY
Start: 2025-02-20

## 2025-03-14 RX ORDER — MELATONIN
COMMUNITY
Start: 2024-07-24

## 2025-03-14 NOTE — PATIENT INSTRUCTIONS
- Get PSA blood test done today  - Schedule scrotal ultrasound through [facility] or call central scheduling at [phone]  - Will decide next steps based on these test results  - Can take over the counter acetaminophen/Tylenol as needed for pain in the meantime  - Follow up in September as scheduled for your annual Wellness Visit; follow up earlier as needed.    It was a pleasure seeing you in the clinic today.  Thank you for choosing the [facility] office for your healthcare needs. Please call at [phone] with any questions or concerns.    Codi Loyola MD    
no arthralgia

## 2025-03-14 NOTE — PROGRESS NOTES
Suresh Prather is a 59 year old male.   HPI:   Patient presents for follow up on several issues.    After he got out of the hospital last year with his kidney transplant, he was having some lower pelvic pain.  Did have ureteral stent - once this was removed symptoms resolved.  However past 1-2 months he his having groin pain again.  Daily.  He is walking a lot more - 10,000 steps a day.  Has been getting routine labs from transplant clinic - stable.  Pain is pretty constant, but severity fluctuates.  Feels better when laying down/sitting down.  He did have a normal urinalysis a week ago.  No pain or burning with urination.     Other chronic issues:  Alport syndrome, status post kidney transplant, immunosuppressed - following with [facility] transplant clinic.  Also has appointment with Dr. Vaz next month.  Doing well.  Hypertension - at goal blood pressure.  Rheumatoid arthritis, chronic pain on multiple joins, opioid dependence on agonist therapy - following with [facility].  Pulmonary hypertension - no shortness of breath or worsening edema.    Past medical, family, surgical and social history were reviewed as listed in the chart, and are unchanged from previous visit.  REVIEW OF SYSTEMS:   GENERAL/ const: no fevers/chills, no unintentional weight loss  EYES:no vision problems  HEENT: chronic hearing loss; denies sinus pain or sinus tenderness  LUNGS: denies shortness of breath   CARDIOVASCULAR: denies chest pain  GI: denies nausea/emesis/ abdominal pain diarrhea constipation  : pelvic/groin pain; denies dysuria   MUSCULOSKELETAL: chronic joint pain  NEURO: denies headaches  PSYCHIATRIC: denies issues  ENDOCRINE: no hot/cold intolerance  ALLERGY: Allergies[1]  PAST HISTORY:     Current Outpatient Medications:     tacrolimus 0.5 MG Oral Cap, Take 1 capsule (0.5 mg total) by mouth Q12H., Disp: , Rfl:     sirolimus 1 MG Oral Tab, , Disp: , Rfl:     Naloxone HCl 4 MG/0.1ML Nasal Liquid, 4 mg by Nasal  route as needed., Disp: , Rfl:     Magnesium Oxide -Mg Supplement (MAGNESIUM-OXIDE) 400 (240 Mg) MG Oral Tab, , Disp: , Rfl:     amoxicillin 500 MG Oral Tab, Take 4 tablets (2,000 mg total) by mouth As Directed. Take 2000 mg before dental procedures, Disp: 30 tablet, Rfl: 0    famotidine 20 MG Oral Tab, Take 1 tablet (20 mg total) by mouth daily., Disp: , Rfl:     potassium chloride 20 MEQ Oral Tab CR, Take 1 tablet (20 mEq total) by mouth daily., Disp: , Rfl:     sulfamethoxazole-trimethoprim -160 MG Oral Tab per tablet, Take 0.5 tablets by mouth 3 (three) times a week. Monday, Wednesday, Friday, Disp: , Rfl:     torsemide 20 MG Oral Tab, Take 2 tablets (40 mg total) by mouth every morning., Disp: , Rfl:     levothyroxine 150 MCG Oral Tab, Take 1 tablet (150 mcg total) by mouth before breakfast., Disp: , Rfl:     Buprenorphine HCl-Naloxone HCl 8-2 MG Sublingual SL Tab, Place 0.5 tablets under the tongue 2 (two) times a day., Disp: 30 tablet, Rfl: 0    Sennosides-Docusate Sodium (SENNA-PLUS OR), Take 2 tablets by mouth daily., Disp: , Rfl:     allopurinol 100 MG Oral Tab, Take 1 tablet (100 mg total) by mouth daily., Disp: 90 tablet, Rfl: 1    lactulose 10 GM/15ML Oral Solution, Take 30 mL (20 g total) by mouth 2 (two) times daily as needed., Disp: , Rfl:     atorvastatin 40 MG Oral Tab, Take 1 tablet (40 mg total) by mouth nightly., Disp: , Rfl:     aspirin 81 MG Oral Tab EC, Take 1 tablet (81 mg total) by mouth daily., Disp: , Rfl:   Medical:  has a past medical history of Anaphylactic reaction due to adverse effect of correct drug or medicament properly administered, initial encounter (10/18/2021), Cancer ([facility]) (9/2015), Change in hair (2018), Closed fracture of distal end of left femur, unspecified fracture morphology, initial encounter ([facility]) (02/11/2023), Constipation, Easy bruising, ESRD on hemodialysis ([facility]), Exposure to medical diagnostic radiation, Fatigue, Frequent use of laxatives, Hearing loss,  History of blood transfusion, History of total left knee replacement (11/08/2016), Itch of skin (2018), Kidney replaced by transplant (HCC) (1997), Nephritis and nephropathy, not specified as acute or chronic, with unspecified pathological lesion in kidney, Painful total knee replacement, sequela (05/31/2017), Papillary thyroid carcinoma (HCC), PONV (postoperative nausea and vomiting) (1997), Postlaminectomy syndrome, cervical region (06/20/2013), Protein-calorie malnutrition (HCC) (07/10/2018), Status post cervical spinal fusion (09/20/2012), Stented coronary artery, Syncope, Trigger finger (acquired), Unspecified hemorrhoids without mention of complication (06/29/2012), and Wears glasses (2010).  Surgical:  has a past surgical history that includes appendectomy (2003); cholecystectomy (2003); laminectomy,facetectomy,lumbar (05/29/2012); revise median n/carpal tunnel surg (2009); femur/knee surg unlisted (1994); colonoscopy; thyroidectomy; colonoscopy (N/A, 05/01/2015); cath pv (12/11/2015); knee surgery; anesth,kidney transplant; cath percutaneous  transluminal coronary angioplasty (2016); appendectomy; knee replacement surgery; organ transplant; colonoscopy (N/A, 10/23/2018); back surgery; other surgical history; other surgical history (Right, 1997); angioplasty (coronary) (2015); femur fracture surgery (Left, 02/2023); and colonoscopy (N/A, 11/14/2023).  Family: family history includes Breast Cancer (age of onset: 65) in his mother; Cancer in his paternal uncle and son; Heart Attack in his maternal uncle; Heart Disorder in his paternal uncle; Stroke in his maternal grandmother; bipolar disorder in his mother; glomerulonephritis in his maternal grandmother and mother.  Social:  reports that he has never smoked. He has never used smokeless tobacco. He reports that he does not drink alcohol and does not use drugs.  Wt Readings from Last 6 Encounters:   03/14/25 265 lb (120.2 kg)   01/09/25 262 lb 8 oz (119.1 kg)    09/10/24 236 lb 9.6 oz (107.3 kg)   05/09/24 210 lb 6 oz (95.4 kg)   03/07/24 210 lb 9.6 oz (95.5 kg)   02/08/24 210 lb 4 oz (95.4 kg)     EXAM:   /60 (BP Location: Right arm, Patient Position: Sitting, Cuff Size: large)   Pulse 90   Temp 97.2 °F (36.2 °C) (Temporal)   Resp 16   Wt 265 lb (120.2 kg)   SpO2 99%   BMI 35.20 kg/m²   GENERAL: Alert and oriented, well developed, well nourished,in no apparent distress  HEENT: atraumatic, PERRLA, EOMI, normal lid and conjunctiva  LUNGS: clear to auscultation bilaterally, no wheezing/rubs  CARDIO: RRR without murmurs.  No clubbing, cyanosis or edema.  GI: soft non tender nondistended no hepatosplenomegaly, bowel sounds throughout  : bilateral testicular tenderness   NEURO: CN II-XII intact, 5/5 strength all extremities  PSYCH: pleasant, appropriate mood and affect  ASSESSMENT AND PLAN:   1. Scrotal pain  2. Pelvic pain  3. Renal transplant, status post ([facility])  4. Immunosuppressed status ([facility]) - Long-term current use of immunosuppressive medication  5. Alport syndrome ([facility])  Patient describes 1-2 months of groin/pelvic pain; on examination it seems more testicular/scrotal pain.  Severity fluctuates.  Gets frequent labs from kidney transplant clinic - unremarkable CBC and urinalysis just a week ago.  Will check PSA, scrotal ultrasound. PRN acetaminophen for pain for now.  May refer to Urology based on test results and course of symptoms, may consider empiric antibiotics for prostatitis if PSA elevated (was normal in September).  - PSA, Total - Diagnostic [E]; Future  - US SCROTUM W/ DOPPLER (CPT=93975/26518); Future    6. Primary hypertension  At goal blood pressure. Better readings after most recent kidney transplant.  Continue to monitor.    7. Rheumatoid arthritis involving multiple sites, unspecified whether rheumatoid factor present ([facility])  8. Chronic pain of multiple joints  9. Opioid dependence on agonist therapy ([facility])  Following with [facility]; on Suboxone therapy.     10. Pulmonary HTN (HCC)  No shortness of breath or worsening lower extremity edema.    Patient Care Team:  Codi Loyola MD as PCP - General (Internal Medicine)  Brant Vaz MD as Consulting Physician (NEPHROLOGY)  Mike Brady MD as Consulting Physician (SURGERY, ORTHOPEDIC)  Geovanna Anton MD as Consulting Physician (ENDOCRINOLOGY)  Huyen Espinoza DC as Consulting Physician (CHIROPRACTOR)  Crescencio Collier DO (NEUROLOGY)  Louis Bearden MD as Consulting Physician (NEUROSURGERY)  Louis Dalton MD as Consulting Physician (CARDIOLOGY)  Nic Caal MD as Consulting Physician (DERMATOLOGY)  The patient indicates understanding of these issues and agrees to the plan.  The patient is asked to return to clinic in 6 months for follow up on chronic issues/MA Supervisit, or earlier if acute issues arise.    oCdi Loyola MD           [1]   Allergies  Allergen Reactions    Carvedilol HIVES and SWELLING     TABS    Ciprofloxacin HIVES and UNKNOWN    Ciprofloxacin Hcl HIVES and ITCHING     TABS    Duloxetine OTHER (SEE COMMENTS)    Fosinopril OTHER (SEE COMMENTS)    Gabapentin SWELLING     Foot swelling    Hydralazine HIVES and UNKNOWN    Metoprolol OTHER (SEE COMMENTS) and UNKNOWN    Monopril [Fosinopril Sodium] HIVES and ITCHING    Pravastatin Sodium SWELLING     TABS    Nortriptyline DIARRHEA, NAUSEA AND VOMITING and OTHER (SEE COMMENTS)    Minoxidil OTHER (SEE COMMENTS)     Chest pain

## 2025-03-17 ENCOUNTER — HOSPITAL ENCOUNTER (OUTPATIENT)
Dept: ULTRASOUND IMAGING | Facility: HOSPITAL | Age: 60
Discharge: HOME | End: 2025-03-17
Attending: INTERNAL MEDICINE
Payer: MEDICARE

## 2025-03-17 DIAGNOSIS — R10.2 PELVIC PAIN: ICD-10-CM

## 2025-03-17 DIAGNOSIS — Z94.0 RENAL TRANSPLANT, STATUS POST (HCC): Chronic | ICD-10-CM

## 2025-03-17 DIAGNOSIS — N50.82 SCROTAL PAIN: ICD-10-CM

## 2025-03-17 PROCEDURE — 93975 VASCULAR STUDY: CPT | Performed by: INTERNAL MEDICINE

## 2025-03-17 PROCEDURE — 76870 US EXAM SCROTUM: CPT | Performed by: INTERNAL MEDICINE

## 2025-03-19 ENCOUNTER — PATIENT MESSAGE (OUTPATIENT)
Dept: INTERNAL MEDICINE CLINIC | Facility: CLINIC | Age: 60
End: 2025-03-19

## 2025-03-20 RX ORDER — ALLOPURINOL 100 MG/1
100 TABLET ORAL DAILY
Qty: 90 TABLET | Refills: 1 | Status: SHIPPED | OUTPATIENT
Start: 2025-03-20

## 2025-03-24 ENCOUNTER — TELEPHONE (OUTPATIENT)
Dept: INTERNAL MEDICINE CLINIC | Facility: CLINIC | Age: 60
End: 2025-03-24

## 2025-03-24 NOTE — TELEPHONE ENCOUNTER
Pt's wife Kayley says she came in last week for medical records but due to the power outage she was advised by RN Ashwini and Deann OSULLIVAN to go get them from the hospital.    Kayley says they waited 3 hrs at hospital and received a disk but this will not work for disability. Kayley says she is needing any diagnosis history for the pt, just the diagnosis not the lab work, imaging, or notes.    This can be sent on [employer].

## 2025-03-25 ENCOUNTER — TELEPHONE (OUTPATIENT)
Dept: TRANSPLANT | Age: 60
End: 2025-03-25

## 2025-03-25 DIAGNOSIS — D84.9 IMMUNOSUPPRESSION  (CMD): ICD-10-CM

## 2025-03-25 DIAGNOSIS — Z94.0 STATUS POST KIDNEY TRANSPLANT (CMD): Primary | ICD-10-CM

## 2025-03-25 DIAGNOSIS — R82.79 BK VIRURIA: ICD-10-CM

## 2025-03-25 DIAGNOSIS — Z79.899 IMMUNOSUPPRESSIVE MANAGEMENT ENCOUNTER FOLLOWING KIDNEY TRANSPLANT (CMD): ICD-10-CM

## 2025-03-25 DIAGNOSIS — Z94.0 IMMUNOSUPPRESSIVE MANAGEMENT ENCOUNTER FOLLOWING KIDNEY TRANSPLANT (CMD): ICD-10-CM

## 2025-03-25 DIAGNOSIS — E83.42 HYPOMAGNESEMIA: ICD-10-CM

## 2025-03-27 ENCOUNTER — LAB SERVICES (OUTPATIENT)
Dept: LAB | Age: 60
End: 2025-03-27

## 2025-03-27 DIAGNOSIS — Z94.0 STATUS POST KIDNEY TRANSPLANT (CMD): ICD-10-CM

## 2025-03-27 DIAGNOSIS — R82.79 BK VIRURIA: ICD-10-CM

## 2025-03-27 DIAGNOSIS — Z94.0 IMMUNOSUPPRESSIVE MANAGEMENT ENCOUNTER FOLLOWING KIDNEY TRANSPLANT (CMD): ICD-10-CM

## 2025-03-27 DIAGNOSIS — E83.42 HYPOMAGNESEMIA: ICD-10-CM

## 2025-03-27 DIAGNOSIS — Z79.899 IMMUNOSUPPRESSIVE MANAGEMENT ENCOUNTER FOLLOWING KIDNEY TRANSPLANT (CMD): ICD-10-CM

## 2025-03-27 DIAGNOSIS — D84.9 IMMUNOSUPPRESSION  (CMD): ICD-10-CM

## 2025-03-27 LAB
ALBUMIN SERPL-MCNC: 3.2 G/DL (ref 3.4–5)
ALBUMIN/GLOB SERPL: 1 {RATIO} (ref 1–2.4)
ALP SERPL-CCNC: 82 UNITS/L (ref 45–117)
ALT SERPL-CCNC: 57 UNITS/L
ANION GAP SERPL CALC-SCNC: 8 MMOL/L (ref 7–19)
APPEARANCE UR: CLEAR
AST SERPL-CCNC: 40 UNITS/L
BACTERIA #/AREA URNS HPF: NORMAL /HPF
BASOPHILS # BLD: 0 K/MCL (ref 0–0.3)
BASOPHILS NFR BLD: 0 %
BILIRUB SERPL-MCNC: 0.5 MG/DL (ref 0.2–1)
BILIRUB UR QL STRIP: NEGATIVE
BUN SERPL-MCNC: 19 MG/DL (ref 6–20)
BUN/CREAT SERPL: 14 (ref 7–25)
CALCIUM SERPL-MCNC: 9.3 MG/DL (ref 8.4–10.2)
CHLORIDE SERPL-SCNC: 108 MMOL/L (ref 97–110)
CO2 SERPL-SCNC: 27 MMOL/L (ref 21–32)
COLOR UR: NORMAL
CREAT SERPL-MCNC: 1.38 MG/DL (ref 0.67–1.17)
CREAT UR-MCNC: 104 MG/DL
DEPRECATED RDW RBC: 48.9 FL (ref 39–50)
EGFRCR SERPLBLD CKD-EPI 2021: 59 ML/MIN/{1.73_M2}
EOSINOPHIL # BLD: 1.1 K/MCL (ref 0–0.5)
EOSINOPHIL NFR BLD: 16 %
ERYTHROCYTE [DISTWIDTH] IN BLOOD: 16.3 % (ref 11–15)
FASTING DURATION TIME PATIENT: ABNORMAL H
GLOBULIN SER-MCNC: 3.3 G/DL (ref 2–4)
GLUCOSE SERPL-MCNC: 102 MG/DL (ref 70–99)
GLUCOSE UR STRIP-MCNC: NEGATIVE MG/DL
HCT VFR BLD CALC: 38.6 % (ref 39–51)
HGB BLD-MCNC: 12.2 G/DL (ref 13–17)
HGB UR QL STRIP: NEGATIVE
HYALINE CASTS #/AREA URNS LPF: NORMAL /LPF
IMM GRANULOCYTES # BLD AUTO: 0 K/MCL (ref 0–0.2)
IMM GRANULOCYTES # BLD: 0 %
KETONES UR STRIP-MCNC: NEGATIVE MG/DL
LEUKOCYTE ESTERASE UR QL STRIP: NEGATIVE
LYMPHOCYTES # BLD: 1.2 K/MCL (ref 1–4)
LYMPHOCYTES NFR BLD: 17 %
MAGNESIUM SERPL-MCNC: 2.1 MG/DL (ref 1.7–2.4)
MCH RBC QN AUTO: 25.9 PG (ref 26–34)
MCHC RBC AUTO-ENTMCNC: 31.6 G/DL (ref 32–36.5)
MCV RBC AUTO: 82 FL (ref 78–100)
MONOCYTES # BLD: 0.9 K/MCL (ref 0.3–0.9)
MONOCYTES NFR BLD: 13 %
NEUTROPHILS # BLD: 3.6 K/MCL (ref 1.8–7.7)
NEUTROPHILS NFR BLD: 54 %
NITRITE UR QL STRIP: NEGATIVE
NRBC BLD MANUAL-RTO: 0 /100 WBC
PH UR STRIP: 6.5 [PH] (ref 5–7)
PHOSPHATE SERPL-MCNC: 4.2 MG/DL (ref 2.4–4.7)
PLATELET # BLD AUTO: 182 K/MCL (ref 140–450)
POTASSIUM SERPL-SCNC: 3.7 MMOL/L (ref 3.4–5.1)
PROT SERPL-MCNC: 6.5 G/DL (ref 6.4–8.2)
PROT UR STRIP-MCNC: NEGATIVE MG/DL
PROT UR-MCNC: 12 MG/DL
PROT/CREAT UR: 115 MGPR/GCR
RBC # BLD: 4.71 MIL/MCL (ref 4.5–5.9)
RBC #/AREA URNS HPF: NORMAL /HPF
SODIUM SERPL-SCNC: 139 MMOL/L (ref 135–145)
SP GR UR STRIP: 1.01 (ref 1–1.03)
SQUAMOUS #/AREA URNS HPF: NORMAL /HPF
TACROLIMUS BLD-MCNC: 4.5 NG/ML (ref 5–20)
UROBILINOGEN UR STRIP-MCNC: 0.2 MG/DL
WBC # BLD: 6.8 K/MCL (ref 4.2–11)
WBC #/AREA URNS HPF: NORMAL /HPF

## 2025-03-27 PROCEDURE — 84156 ASSAY OF PROTEIN URINE: CPT | Performed by: CLINICAL MEDICAL LABORATORY

## 2025-03-27 PROCEDURE — 80197 ASSAY OF TACROLIMUS: CPT | Performed by: CLINICAL MEDICAL LABORATORY

## 2025-03-27 PROCEDURE — 83735 ASSAY OF MAGNESIUM: CPT | Performed by: CLINICAL MEDICAL LABORATORY

## 2025-03-27 PROCEDURE — 87799 DETECT AGENT NOS DNA QUANT: CPT | Performed by: CLINICAL MEDICAL LABORATORY

## 2025-03-27 PROCEDURE — 85025 COMPLETE CBC W/AUTO DIFF WBC: CPT | Performed by: CLINICAL MEDICAL LABORATORY

## 2025-03-27 PROCEDURE — 80053 COMPREHEN METABOLIC PANEL: CPT | Performed by: CLINICAL MEDICAL LABORATORY

## 2025-03-27 PROCEDURE — 81001 URINALYSIS AUTO W/SCOPE: CPT | Performed by: CLINICAL MEDICAL LABORATORY

## 2025-03-27 PROCEDURE — 82570 ASSAY OF URINE CREATININE: CPT | Performed by: CLINICAL MEDICAL LABORATORY

## 2025-03-27 PROCEDURE — 80195 ASSAY OF SIROLIMUS: CPT | Performed by: CLINICAL MEDICAL LABORATORY

## 2025-03-27 PROCEDURE — 36415 COLL VENOUS BLD VENIPUNCTURE: CPT | Performed by: INTERNAL MEDICINE

## 2025-03-27 PROCEDURE — 84100 ASSAY OF PHOSPHORUS: CPT | Performed by: CLINICAL MEDICAL LABORATORY

## 2025-03-28 LAB
BKV DNA # SPEC NAA+PROBE: NOT DETECTED {COPIES}/ML
BKV DNA # UR NAA+PROBE: ABNORMAL IU/ML
BKV DNA SPEC NAA+PROBE-LOG#: 4.63 LOG
BKV DNA SPEC NAA+PROBE-LOG#: NOT DETECTED {LOG_COPIES}/ML
SERVICE CMNT-IMP: ABNORMAL
SERVICE CMNT-IMP: NORMAL
SIROLIMUS BLD-MCNC: 6.5 NG/ML (ref 5–20)

## 2025-03-31 DIAGNOSIS — D84.9 IMMUNOSUPPRESSION  (CMD): Primary | ICD-10-CM

## 2025-03-31 DIAGNOSIS — Z94.0 STATUS POST KIDNEY TRANSPLANT (CMD): ICD-10-CM

## 2025-04-01 RX ORDER — SULFAMETHOXAZOLE AND TRIMETHOPRIM 800; 160 MG/1; MG/1
TABLET ORAL
Qty: 15 TABLET | Refills: 5 | Status: SHIPPED | OUTPATIENT
Start: 2025-04-01

## 2025-04-10 ENCOUNTER — OFFICE VISIT (OUTPATIENT)
Dept: NEPHROLOGY | Facility: CLINIC | Age: 60
End: 2025-04-10
Payer: MEDICARE

## 2025-04-10 VITALS — WEIGHT: 271.19 LBS | SYSTOLIC BLOOD PRESSURE: 126 MMHG | DIASTOLIC BLOOD PRESSURE: 72 MMHG | BODY MASS INDEX: 36 KG/M2

## 2025-04-10 DIAGNOSIS — R10.32 LEFT LOWER QUADRANT ABDOMINAL PAIN: ICD-10-CM

## 2025-04-10 DIAGNOSIS — Z94.0 RENAL TRANSPLANT RECIPIENT (HCC): Primary | ICD-10-CM

## 2025-04-10 PROCEDURE — 3074F SYST BP LT 130 MM HG: CPT | Performed by: INTERNAL MEDICINE

## 2025-04-10 PROCEDURE — 99214 OFFICE O/P EST MOD 30 MIN: CPT | Performed by: INTERNAL MEDICINE

## 2025-04-10 PROCEDURE — 3078F DIAST BP <80 MM HG: CPT | Performed by: INTERNAL MEDICINE

## 2025-04-10 NOTE — PROGRESS NOTES
Nephrology Progress Note      Suresh Prather is a 60 year old male.    HPI:     Chief Complaint   Patient presents with    Renal Transplant       Mr. Prather was seen in the nephrology clinic today in follow-up for management of renal transplant which was performed July 23, 2024 at [facility].  Since his last clinic visit he reports that he has had ongoing issues with left lower quadrant abdominal pain of unclear etiology.  Pain is not over his transplant.  He does note that he had a scrotal ultrasound due to concern for epididymitis but this was unremarkable.  He has no urinary symptoms, he does not recall any specific injury although may have pulled a muscle while stretching.        HISTORY:  Past Medical History[1]   Past Surgical History[2]   Family History[3]   Social History: Short Social Hx on File[4]     Medications (Active prior to today's visit):  Current Medications[5]    Allergies:  Allergies[6]      ROS:     Denies fever/chills  Denies wt loss/gain  Denies HA or visual changes  Denies CP or palpitations  Denies SOB/cough/hemoptysis  + Left lower quadrant abd pain  Denies N/V/D  Denies change in urinary habits or gross hematuria  Denies LE edema  Denies skin rashes/myalgias/arthralgias      PHYSICAL EXAM:   /72 (BP Location: Right arm)   Wt 271 lb 3.2 oz (123 kg)   BMI 36.03 kg/m²   Wt Readings from Last 6 Encounters:   04/10/25 271 lb 3.2 oz (123 kg)   03/14/25 265 lb (120.2 kg)   01/09/25 262 lb 8 oz (119.1 kg)   09/10/24 236 lb 9.6 oz (107.3 kg)   05/09/24 210 lb 6 oz (95.4 kg)   03/07/24 210 lb 9.6 oz (95.5 kg)       General: Alert and oriented in no apparent distress.  HEENT: No scleral icterus, MMM  Neck: Supple, no LISY or thyromegaly  Cardiac: Regular rate and rhythm, S1, S2 normal, no murmur or rub  Lungs: Clear without wheezes, rales, rhonchi.    Abdomen: Soft, non-tender to palpation. + bowel sounds, transplant is palpable in the left lower quadrant and is  nontender  Extremities: Without clubbing, cyanosis or edema.  Neurologic: Alert and oriented, normal affect, cranial nerves grossly intact, moving all extremities  Skin: Warm and dry, no rashes      LABS:     Labs from  reviewed include creatinine 1.38 mg/dL, protein to creatinine ratio 115 mg/g  Tacrolimus level 4.5, sirolimus level 6.5      ASSESSMENT/PLAN:     #1.  Renal transplant recipient-he does have chronic kidney disease stage IIIa in the setting, however renal function remains stable with most recent creatinine at 1.38 mg/dL.  Transplant was performed 2024 at [facility] and this was a  donor kidney.  He remains on Envarsus 1.75 mg daily, sirolimus 1 mg daily and prednisone 2.5 mg daily.    #2.  Left lower quadrant abdominal pain-etiology remains unclear.  Does not appear to be related to his kidney given his lack of discomfort on exam and also normal renal function, normal urine studies.  Will check abdominal ultrasound.  I have encouraged him to follow-up with his transplant team as well      Thank you again for allowing me to participate in care of your patient.  Please do not hesitate to call with any questions or concerns.        Brant Vaz MD  4/10/2025  9:33 AM             [1]   Past Medical History:   Anaphylactic reaction due to adverse effect of correct drug or medicament properly administered, initial encounter    Cancer ([facility])    Thyroid cancer    Change in hair    Due to dialysis    Closed fracture of distal end of left femur, unspecified fracture morphology, initial encounter ([facility])    Constipation    Easy bruising    ESRD on hemodialysis ([facility])    Exposure to medical diagnostic radiation    completed     Fatigue    Frequent use of laxatives    Hearing loss    History of blood transfusion    few years ago    History of total left knee replacement    Itch of skin    Due to dialysis    Kidney replaced by transplant ([facility])    Nephritis and nephropathy,  not specified as acute or chronic, with unspecified pathological lesion in kidney    Painful total knee replacement, sequela    Papillary thyroid carcinoma (HCC)    Dx in 1/2015: tx with surgery and MANZO    PONV (postoperative nausea and vomiting)    s/p kidney transplant    Postlaminectomy syndrome, cervical region    Log Date: 12/12/2012     Protein-calorie malnutrition (HCC)    Status post cervical spinal fusion    Stented coronary artery    Syncope    Trigger finger (acquired)    Unspecified hemorrhoids without mention of complication    Wears glasses    For reading and distance   [2]   Past Surgical History:  Procedure Laterality Date    Anesth,kidney transplant      Angioplasty (coronary)  2015    Appendectomy  2003    Appendectomy      Back surgery      Cath percutaneous  transluminal coronary angioplasty  2016    Cath pv  12/11/2015    Done at -patient states requires future angioplasty.    Cholecystectomy  2003    Colonoscopy      2003?    Colonoscopy N/A 05/01/2015    Procedure: COLONOSCOPY;  Surgeon: Cuauhtemoc Carty MD;  Location:  ENDOSCOPY    Colonoscopy N/A 10/23/2018    Procedure: COLONOSCOPY;  Surgeon: Andres Wills MD;  Location:  ENDOSCOPY    Colonoscopy N/A 11/14/2023    Procedure: COLONOSCOPY with forcep polypectomy;  Surgeon: Andres Wills MD;  Location:  ENDOSCOPY    Femur fracture surgery Left 02/2023    Femur/knee surg unlisted  1994    right knee repair of ligament cruciate anterior    Knee replacement surgery      Knee surgery      Laminectomy,facetectomy,lumbar  05/29/2012    foraminotomy cervic seg    Organ transplant      Other surgical history      arthrodesis cervical ACDF at C5-6    Other surgical history Right 1997    renal transplant - Done at [facility]    Revise median n/carpal tunnel surg  2009    neuroplasty decompression median     Thyroidectomy      On 2/2/2015: total thyroidectomy for papillary thyroid carcinoma   [3]   Family History  Problem Relation Age of  Onset    Breast Cancer Mother 65    Other (bipolar disorder) Mother     Other (glomerulonephritis) Mother     Stroke Maternal Grandmother     Other (glomerulonephritis) Maternal Grandmother     Cancer Paternal Uncle         prostate CA    Heart Disorder Paternal Uncle         CAD/Aortic disease    Heart Attack Maternal Uncle     Cancer Son         Mother- Breast cancer Uncle-Prostate cancern   [4]   Social History  Socioeconomic History    Marital status:    Tobacco Use    Smoking status: Never    Smokeless tobacco: Never   Vaping Use    Vaping status: Never Used   Substance and Sexual Activity    Alcohol use: No    Drug use: No   Other Topics Concern    Caffeine Concern No    Stress Concern No    Weight Concern Yes    Special Diet No    Exercise Yes     Comment: Daily stretching    Seat Belt Yes   Social History Narrative    ** Merged History Encounter **          Social Drivers of Health     Food Insecurity: Low Risk  (8/12/2024)    Received from [facility]    Food Insecurity     Within the past 12 months, you worried that your food would run out before you got money to buy more.  : Never true     Within the past 12 months, the food you bought just didn't last and you didn't have money to get more. : Never true   Transportation Needs: At Risk (8/12/2024)    Received from [employer] [facility] [employer]    Transportation Needs     In the past 12 months, has lack of reliable transportation kept you from medical appointments, meetings, work or from getting things needed for daily living? : Yes   Stress: Low Risk  (8/12/2024)    Received from [facility]    Stress     Stress is when someone feels tense, nervous, anxious, or can't sleep at night because their mind is troubled. How stressed are you? : Not at all   [5]   Current Outpatient Medications   Medication Sig Dispense Refill    allopurinol 100 MG Oral Tab Take 1 tablet (100 mg total) by mouth daily. 90 tablet 1    tacrolimus 0.5 MG Oral Cap  Take 1 capsule (0.5 mg total) by mouth Q12H.      sirolimus 1 MG Oral Tab       Naloxone HCl 4 MG/0.1ML Nasal Liquid 4 mg by Nasal route as needed.      Magnesium Oxide -Mg Supplement (MAGNESIUM-OXIDE) 400 (240 Mg) MG Oral Tab       amoxicillin 500 MG Oral Tab Take 4 tablets (2,000 mg total) by mouth As Directed. Take 2000 mg before dental procedures 30 tablet 0    famotidine 20 MG Oral Tab Take 1 tablet (20 mg total) by mouth daily.      potassium chloride 20 MEQ Oral Tab CR Take 1 tablet (20 mEq total) by mouth daily.      sulfamethoxazole-trimethoprim -160 MG Oral Tab per tablet Take 0.5 tablets by mouth 3 (three) times a week. Monday, Wednesday, Friday      torsemide 20 MG Oral Tab Take 2 tablets (40 mg total) by mouth every morning.      levothyroxine 150 MCG Oral Tab Take 1 tablet (150 mcg total) by mouth before breakfast.      Buprenorphine HCl-Naloxone HCl 8-2 MG Sublingual SL Tab Place 0.5 tablets under the tongue 2 (two) times a day. 30 tablet 0    Sennosides-Docusate Sodium (SENNA-PLUS OR) Take 2 tablets by mouth daily.      lactulose 10 GM/15ML Oral Solution Take 30 mL (20 g total) by mouth 2 (two) times daily as needed.      atorvastatin 40 MG Oral Tab Take 1 tablet (40 mg total) by mouth nightly.      aspirin 81 MG Oral Tab EC Take 1 tablet (81 mg total) by mouth daily.     [6]   Allergies  Allergen Reactions    Carvedilol HIVES and SWELLING     TABS    Ciprofloxacin HIVES and UNKNOWN    Ciprofloxacin Hcl HIVES and ITCHING     TABS    Duloxetine OTHER (SEE COMMENTS)    Fosinopril OTHER (SEE COMMENTS)    Gabapentin SWELLING     Foot swelling    Hydralazine HIVES and UNKNOWN    Metoprolol OTHER (SEE COMMENTS) and UNKNOWN    Monopril [Fosinopril Sodium] HIVES and ITCHING    Pravastatin Sodium SWELLING     TABS    Nortriptyline DIARRHEA, NAUSEA AND VOMITING and OTHER (SEE COMMENTS)    Minoxidil OTHER (SEE COMMENTS)     Chest pain

## 2025-04-11 ENCOUNTER — HOSPITAL ENCOUNTER (OUTPATIENT)
Dept: ULTRASOUND IMAGING | Facility: HOSPITAL | Age: 60
Discharge: HOME | End: 2025-04-11
Attending: INTERNAL MEDICINE
Payer: MEDICARE

## 2025-04-11 ENCOUNTER — HOSPITAL ENCOUNTER (OUTPATIENT)
Dept: ULTRASOUND IMAGING | Facility: HOSPITAL | Age: 60
End: 2025-04-11
Attending: INTERNAL MEDICINE
Payer: MEDICARE

## 2025-04-11 DIAGNOSIS — R10.32 LEFT LOWER QUADRANT ABDOMINAL PAIN: ICD-10-CM

## 2025-04-11 DIAGNOSIS — Z94.0 RENAL TRANSPLANT RECIPIENT (HCC): ICD-10-CM

## 2025-04-11 PROCEDURE — 76700 US EXAM ABDOM COMPLETE: CPT | Performed by: INTERNAL MEDICINE

## 2025-04-15 ENCOUNTER — PATIENT MESSAGE (OUTPATIENT)
Dept: INTERNAL MEDICINE CLINIC | Facility: CLINIC | Age: 60
End: 2025-04-15

## 2025-04-21 ENCOUNTER — HOSPITAL ENCOUNTER (OUTPATIENT)
Dept: CT IMAGING | Facility: HOSPITAL | Age: 60
Discharge: HOME | End: 2025-04-21
Payer: MEDICARE

## 2025-04-21 ENCOUNTER — LAB ENCOUNTER (OUTPATIENT)
Dept: LAB | Facility: HOSPITAL | Age: 60
End: 2025-04-21
Payer: MEDICARE

## 2025-04-21 ENCOUNTER — TELEPHONE (OUTPATIENT)
Dept: TRANSPLANT | Age: 60
End: 2025-04-21

## 2025-04-21 DIAGNOSIS — R10.32 LLQ PAIN: ICD-10-CM

## 2025-04-21 DIAGNOSIS — Z94.0 IMMUNOSUPPRESSIVE MANAGEMENT ENCOUNTER FOLLOWING KIDNEY TRANSPLANT (CMD): ICD-10-CM

## 2025-04-21 DIAGNOSIS — R74.01 ELEVATED AST (SGOT): ICD-10-CM

## 2025-04-21 DIAGNOSIS — Z79.899 IMMUNOSUPPRESSIVE MANAGEMENT ENCOUNTER FOLLOWING KIDNEY TRANSPLANT (CMD): ICD-10-CM

## 2025-04-21 DIAGNOSIS — Z94.0 STATUS POST KIDNEY TRANSPLANT (CMD): ICD-10-CM

## 2025-04-21 DIAGNOSIS — D84.9 IMMUNOSUPPRESSION  (CMD): Primary | ICD-10-CM

## 2025-04-21 DIAGNOSIS — R82.79 BK VIRURIA: ICD-10-CM

## 2025-04-21 LAB
ALBUMIN SERPL-MCNC: 4.2 G/DL (ref 3.2–4.8)
ALBUMIN/GLOB SERPL: 1.5 {RATIO} (ref 1–2)
ALP LIVER SERPL-CCNC: 86 U/L (ref 45–117)
ALT SERPL-CCNC: 29 U/L (ref 10–49)
ANION GAP SERPL CALC-SCNC: 11 MMOL/L (ref 0–18)
AST SERPL-CCNC: 31 U/L (ref ?–34)
BASOPHILS # BLD AUTO: 0.03 X10(3) UL (ref 0–0.2)
BASOPHILS NFR BLD AUTO: 0.4 %
BILIRUB SERPL-MCNC: 0.7 MG/DL (ref 0.2–1.1)
BUN BLD-MCNC: 17 MG/DL (ref 9–23)
CALCIUM BLD-MCNC: 9.7 MG/DL (ref 8.7–10.6)
CHLORIDE SERPL-SCNC: 109 MMOL/L (ref 98–112)
CO2 SERPL-SCNC: 23 MMOL/L (ref 21–32)
CREAT BLD-MCNC: 1.29 MG/DL (ref 0.7–1.3)
CRP SERPL-MCNC: 2.1 MG/DL (ref ?–0.5)
EGFRCR SERPLBLD CKD-EPI 2021: 63 ML/MIN/1.73M2 (ref 60–?)
EOSINOPHIL # BLD AUTO: 0.88 X10(3) UL (ref 0–0.7)
EOSINOPHIL NFR BLD AUTO: 12.1 %
ERYTHROCYTE [DISTWIDTH] IN BLOOD BY AUTOMATED COUNT: 16.2 %
FASTING STATUS PATIENT QL REPORTED: NO
GLOBULIN PLAS-MCNC: 2.8 G/DL (ref 2–3.5)
GLUCOSE BLD-MCNC: 98 MG/DL (ref 70–99)
HCT VFR BLD AUTO: 38.1 % (ref 39–53)
HGB BLD-MCNC: 12.7 G/DL (ref 13–17.5)
IMM GRANULOCYTES # BLD AUTO: 0.02 X10(3) UL (ref 0–1)
IMM GRANULOCYTES NFR BLD: 0.3 %
LYMPHOCYTES # BLD AUTO: 1.21 X10(3) UL (ref 1–4)
LYMPHOCYTES NFR BLD AUTO: 16.7 %
MCH RBC QN AUTO: 26.5 PG (ref 26–34)
MCHC RBC AUTO-ENTMCNC: 33.3 G/DL (ref 31–37)
MCV RBC AUTO: 79.5 FL (ref 80–100)
MONOCYTES # BLD AUTO: 0.79 X10(3) UL (ref 0.1–1)
MONOCYTES NFR BLD AUTO: 10.9 %
NEUTROPHILS # BLD AUTO: 4.32 X10 (3) UL (ref 1.5–7.7)
NEUTROPHILS # BLD AUTO: 4.32 X10(3) UL (ref 1.5–7.7)
NEUTROPHILS NFR BLD AUTO: 59.6 %
OSMOLALITY SERPL CALC.SUM OF ELEC: 298 MOSM/KG (ref 275–295)
PLATELET # BLD AUTO: 160 10(3)UL (ref 150–450)
POTASSIUM SERPL-SCNC: 4 MMOL/L (ref 3.5–5.1)
PROT SERPL-MCNC: 7 G/DL (ref 5.7–8.2)
RBC # BLD AUTO: 4.79 X10(6)UL (ref 4.3–5.7)
SODIUM SERPL-SCNC: 143 MMOL/L (ref 136–145)
WBC # BLD AUTO: 7.3 X10(3) UL (ref 4–11)

## 2025-04-21 PROCEDURE — 74176 CT ABD & PELVIS W/O CONTRAST: CPT

## 2025-04-21 PROCEDURE — 36415 COLL VENOUS BLD VENIPUNCTURE: CPT

## 2025-04-21 PROCEDURE — 80053 COMPREHEN METABOLIC PANEL: CPT

## 2025-04-21 PROCEDURE — 85025 COMPLETE CBC W/AUTO DIFF WBC: CPT

## 2025-04-21 PROCEDURE — 86140 C-REACTIVE PROTEIN: CPT

## 2025-04-22 NOTE — PROGRESS NOTES
Results reviewed and discussed with the patient over the phone. CT a/p revealed no evidence of diverticulitis. There was moderate intra and extrahepatic biliary ductal dilatation.  The common bile duct measures up to 28 mm in diameter. Pt is schedule for MRCP in Sunday.   Also, there was transplanted left pelvic kidney noted with mild perinephric stranding and thickening along the renal hilum that is concerning for pyelonephritis or recently passed stone.The patient was instructed to inform his nephrologist and transplant provider of the CT results.Also, there is mild-to-moderate calcified plaque in the aorta and iliac arteries.  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

## 2025-04-23 ENCOUNTER — LAB SERVICES (OUTPATIENT)
Dept: LAB | Age: 60
End: 2025-04-23

## 2025-04-23 DIAGNOSIS — Z94.0 IMMUNOSUPPRESSIVE MANAGEMENT ENCOUNTER FOLLOWING KIDNEY TRANSPLANT (CMD): ICD-10-CM

## 2025-04-23 DIAGNOSIS — D84.9 IMMUNOSUPPRESSION  (CMD): ICD-10-CM

## 2025-04-23 DIAGNOSIS — Z94.0 STATUS POST KIDNEY TRANSPLANT (CMD): ICD-10-CM

## 2025-04-23 DIAGNOSIS — Z79.899 IMMUNOSUPPRESSIVE MANAGEMENT ENCOUNTER FOLLOWING KIDNEY TRANSPLANT (CMD): ICD-10-CM

## 2025-04-23 DIAGNOSIS — R82.79 BK VIRURIA: ICD-10-CM

## 2025-04-23 LAB
25(OH)D3+25(OH)D2 SERPL-MCNC: 44.8 NG/ML (ref 30–100)
ALBUMIN SERPL-MCNC: 3.1 G/DL (ref 3.4–5)
ALBUMIN/GLOB SERPL: 1 {RATIO} (ref 1–2.4)
ALP SERPL-CCNC: 88 UNITS/L (ref 45–117)
ALT SERPL-CCNC: 35 UNITS/L
ANION GAP SERPL CALC-SCNC: 12 MMOL/L (ref 7–19)
APPEARANCE UR: CLEAR
AST SERPL-CCNC: 28 UNITS/L
BACTERIA #/AREA URNS HPF: NORMAL /HPF
BASOPHILS # BLD: 0 K/MCL (ref 0–0.3)
BASOPHILS NFR BLD: 1 %
BILIRUB SERPL-MCNC: 0.6 MG/DL (ref 0.2–1)
BILIRUB UR QL STRIP: NEGATIVE
BUN SERPL-MCNC: 18 MG/DL (ref 6–20)
BUN/CREAT SERPL: 14 (ref 7–25)
CALCIUM SERPL-MCNC: 8.9 MG/DL (ref 8.4–10.2)
CHLORIDE SERPL-SCNC: 109 MMOL/L (ref 97–110)
CO2 SERPL-SCNC: 22 MMOL/L (ref 21–32)
COLOR UR: NORMAL
CREAT SERPL-MCNC: 1.32 MG/DL (ref 0.67–1.17)
CREAT UR-MCNC: 93.7 MG/DL
DEPRECATED RDW RBC: 48 FL (ref 39–50)
EGFRCR SERPLBLD CKD-EPI 2021: 62 ML/MIN/{1.73_M2}
EOSINOPHIL # BLD: 1 K/MCL (ref 0–0.5)
EOSINOPHIL NFR BLD: 15 %
ERYTHROCYTE [DISTWIDTH] IN BLOOD: 16.5 % (ref 11–15)
FASTING DURATION TIME PATIENT: 12 HOURS (ref 0–999)
GLOBULIN SER-MCNC: 3.2 G/DL (ref 2–4)
GLUCOSE SERPL-MCNC: 100 MG/DL (ref 70–99)
GLUCOSE UR STRIP-MCNC: NEGATIVE MG/DL
HBA1C MFR BLD: 5.4 % (ref 4.5–5.6)
HCT VFR BLD CALC: 37.6 % (ref 39–51)
HGB BLD-MCNC: 12.1 G/DL (ref 13–17)
HGB UR QL STRIP: NEGATIVE
HYALINE CASTS #/AREA URNS LPF: NORMAL /LPF
IMM GRANULOCYTES # BLD AUTO: 0 K/MCL (ref 0–0.2)
IMM GRANULOCYTES # BLD: 0 %
KETONES UR STRIP-MCNC: NEGATIVE MG/DL
LEUKOCYTE ESTERASE UR QL STRIP: NEGATIVE
LYMPHOCYTES # BLD: 1.1 K/MCL (ref 1–4)
LYMPHOCYTES NFR BLD: 17 %
MAGNESIUM SERPL-MCNC: 2 MG/DL (ref 1.7–2.4)
MCH RBC QN AUTO: 25.9 PG (ref 26–34)
MCHC RBC AUTO-ENTMCNC: 32.2 G/DL (ref 32–36.5)
MCV RBC AUTO: 80.3 FL (ref 78–100)
MONOCYTES # BLD: 0.7 K/MCL (ref 0.3–0.9)
MONOCYTES NFR BLD: 11 %
NEUTROPHILS # BLD: 3.7 K/MCL (ref 1.8–7.7)
NEUTROPHILS NFR BLD: 56 %
NITRITE UR QL STRIP: NEGATIVE
NRBC BLD MANUAL-RTO: 0 /100 WBC
PH UR STRIP: 6.5 [PH] (ref 5–7)
PHOSPHATE SERPL-MCNC: 4 MG/DL (ref 2.4–4.7)
PLATELET # BLD AUTO: 159 K/MCL (ref 140–450)
POTASSIUM SERPL-SCNC: 3.6 MMOL/L (ref 3.4–5.1)
PROT SERPL-MCNC: 6.3 G/DL (ref 6.4–8.2)
PROT UR STRIP-MCNC: NEGATIVE MG/DL
PROT UR-MCNC: 15 MG/DL
PROT/CREAT UR: 160 MGPR/GCR
RBC # BLD: 4.68 MIL/MCL (ref 4.5–5.9)
RBC #/AREA URNS HPF: NORMAL /HPF
SODIUM SERPL-SCNC: 139 MMOL/L (ref 135–145)
SP GR UR STRIP: 1.02 (ref 1–1.03)
SQUAMOUS #/AREA URNS HPF: NORMAL /HPF
TACROLIMUS BLD-MCNC: 3.7 NG/ML (ref 5–20)
UROBILINOGEN UR STRIP-MCNC: 0.2 MG/DL
WBC # BLD: 6.6 K/MCL (ref 4.2–11)
WBC #/AREA URNS HPF: NORMAL /HPF

## 2025-04-23 PROCEDURE — 87799 DETECT AGENT NOS DNA QUANT: CPT | Performed by: CLINICAL MEDICAL LABORATORY

## 2025-04-23 PROCEDURE — 82306 VITAMIN D 25 HYDROXY: CPT | Performed by: CLINICAL MEDICAL LABORATORY

## 2025-04-23 PROCEDURE — 83970 ASSAY OF PARATHORMONE: CPT | Performed by: CLINICAL MEDICAL LABORATORY

## 2025-04-23 PROCEDURE — 81001 URINALYSIS AUTO W/SCOPE: CPT | Performed by: CLINICAL MEDICAL LABORATORY

## 2025-04-23 PROCEDURE — 82570 ASSAY OF URINE CREATININE: CPT | Performed by: CLINICAL MEDICAL LABORATORY

## 2025-04-23 PROCEDURE — 36415 COLL VENOUS BLD VENIPUNCTURE: CPT | Performed by: INTERNAL MEDICINE

## 2025-04-23 PROCEDURE — 84156 ASSAY OF PROTEIN URINE: CPT | Performed by: CLINICAL MEDICAL LABORATORY

## 2025-04-23 PROCEDURE — 83735 ASSAY OF MAGNESIUM: CPT | Performed by: CLINICAL MEDICAL LABORATORY

## 2025-04-23 PROCEDURE — 85025 COMPLETE CBC W/AUTO DIFF WBC: CPT | Performed by: CLINICAL MEDICAL LABORATORY

## 2025-04-23 PROCEDURE — 80197 ASSAY OF TACROLIMUS: CPT | Performed by: CLINICAL MEDICAL LABORATORY

## 2025-04-23 PROCEDURE — 83036 HEMOGLOBIN GLYCOSYLATED A1C: CPT | Performed by: CLINICAL MEDICAL LABORATORY

## 2025-04-23 PROCEDURE — 80053 COMPREHEN METABOLIC PANEL: CPT | Performed by: CLINICAL MEDICAL LABORATORY

## 2025-04-23 PROCEDURE — 84100 ASSAY OF PHOSPHORUS: CPT | Performed by: CLINICAL MEDICAL LABORATORY

## 2025-04-24 LAB
BKV DNA # SPEC NAA+PROBE: NOT DETECTED {COPIES}/ML
BKV DNA # UR NAA+PROBE: ABNORMAL IU/ML
BKV DNA SPEC NAA+PROBE-LOG#: 4.2 LOG
BKV DNA SPEC NAA+PROBE-LOG#: NOT DETECTED {LOG_COPIES}/ML
CMV DNA # SPEC NAA+PROBE: NOT DETECTED {COPIES}/ML
CMV DNA SERPL NAA+PROBE-ACNC: NOT DETECTED [IU]/ML
EBV DNA # SPEC NAA+PROBE: NOT DETECTED {COPIES}/ML
EBV DNA SPEC NAA+PROBE-LOG#: NOT DETECTED {LOG_COPIES}/ML
PTH-INTACT SERPL-MCNC: 128 PG/ML (ref 19–88)
SERVICE CMNT-IMP: ABNORMAL
SERVICE CMNT-IMP: NORMAL

## 2025-04-27 ENCOUNTER — HOSPITAL ENCOUNTER (OUTPATIENT)
Dept: MRI IMAGING | Facility: HOSPITAL | Age: 60
Discharge: HOME | End: 2025-04-27
Attending: INTERNAL MEDICINE
Payer: MEDICARE

## 2025-04-27 ENCOUNTER — HOSPITAL ENCOUNTER (OUTPATIENT)
Dept: MRI IMAGING | Facility: HOSPITAL | Age: 60
End: 2025-04-27
Attending: INTERNAL MEDICINE
Payer: MEDICARE

## 2025-04-27 DIAGNOSIS — R93.89 ABNORMAL FINDING ON IMAGING: ICD-10-CM

## 2025-04-27 PROCEDURE — 76376 3D RENDER W/INTRP POSTPROCES: CPT | Performed by: INTERNAL MEDICINE

## 2025-04-27 PROCEDURE — 74181 MRI ABDOMEN W/O CONTRAST: CPT | Performed by: INTERNAL MEDICINE

## 2025-04-29 ENCOUNTER — RESULTS FOLLOW-UP (OUTPATIENT)
Dept: TRANSPLANT | Age: 60
End: 2025-04-29

## 2025-05-02 ENCOUNTER — TELEPHONE (OUTPATIENT)
Dept: TRANSPLANT | Age: 60
End: 2025-05-02

## 2025-05-02 DIAGNOSIS — R82.79 BK VIRURIA: ICD-10-CM

## 2025-05-02 DIAGNOSIS — E83.42 HYPOMAGNESEMIA: ICD-10-CM

## 2025-05-02 DIAGNOSIS — Z94.0 STATUS POST KIDNEY TRANSPLANT (CMD): Primary | ICD-10-CM

## 2025-05-02 DIAGNOSIS — D84.9 IMMUNOSUPPRESSION  (CMD): ICD-10-CM

## 2025-05-05 ENCOUNTER — APPOINTMENT (OUTPATIENT)
Dept: LAB | Age: 60
End: 2025-05-05

## 2025-05-05 DIAGNOSIS — D84.9 IMMUNOSUPPRESSION  (CMD): ICD-10-CM

## 2025-05-05 DIAGNOSIS — R82.79 BK VIRURIA: ICD-10-CM

## 2025-05-05 DIAGNOSIS — E83.42 HYPOMAGNESEMIA: ICD-10-CM

## 2025-05-05 DIAGNOSIS — Z94.0 STATUS POST KIDNEY TRANSPLANT (CMD): ICD-10-CM

## 2025-05-05 LAB
ALBUMIN SERPL-MCNC: 3.3 G/DL (ref 3.4–5)
ALBUMIN/GLOB SERPL: 1 {RATIO} (ref 1–2.4)
ALP SERPL-CCNC: 85 UNITS/L (ref 45–117)
ALT SERPL-CCNC: 42 UNITS/L
ANION GAP SERPL CALC-SCNC: 9 MMOL/L (ref 7–19)
APPEARANCE UR: CLEAR
AST SERPL-CCNC: 34 UNITS/L
BACTERIA #/AREA URNS HPF: ABNORMAL /HPF
BASOPHILS # BLD: 0 K/MCL (ref 0–0.3)
BASOPHILS NFR BLD: 1 %
BILIRUB SERPL-MCNC: 0.6 MG/DL (ref 0.2–1)
BILIRUB UR QL STRIP: NEGATIVE
BUN SERPL-MCNC: 15 MG/DL (ref 6–20)
BUN/CREAT SERPL: 12 (ref 7–25)
CALCIUM SERPL-MCNC: 8.5 MG/DL (ref 8.4–10.2)
CHLORIDE SERPL-SCNC: 108 MMOL/L (ref 97–110)
CO2 SERPL-SCNC: 26 MMOL/L (ref 21–32)
COLOR UR: ABNORMAL
CREAT SERPL-MCNC: 1.23 MG/DL (ref 0.67–1.17)
CREAT UR-MCNC: 172 MG/DL
DEPRECATED RDW RBC: 48.4 FL (ref 39–50)
EGFRCR SERPLBLD CKD-EPI 2021: 67 ML/MIN/{1.73_M2}
EOSINOPHIL # BLD: 1 K/MCL (ref 0–0.5)
EOSINOPHIL NFR BLD: 15 %
ERYTHROCYTE [DISTWIDTH] IN BLOOD: 16.2 % (ref 11–15)
FASTING DURATION TIME PATIENT: 13 HOURS (ref 0–999)
GLOBULIN SER-MCNC: 3.2 G/DL (ref 2–4)
GLUCOSE SERPL-MCNC: 98 MG/DL (ref 70–99)
GLUCOSE UR STRIP-MCNC: NEGATIVE MG/DL
HCT VFR BLD CALC: 38.3 % (ref 39–51)
HGB BLD-MCNC: 12.3 G/DL (ref 13–17)
HGB UR QL STRIP: NEGATIVE
HYALINE CASTS #/AREA URNS LPF: ABNORMAL /LPF
IMM GRANULOCYTES # BLD AUTO: 0 K/MCL (ref 0–0.2)
IMM GRANULOCYTES # BLD: 0 %
KETONES UR STRIP-MCNC: NEGATIVE MG/DL
LEUKOCYTE ESTERASE UR QL STRIP: NEGATIVE
LYMPHOCYTES # BLD: 1.3 K/MCL (ref 1–4)
LYMPHOCYTES NFR BLD: 19 %
MAGNESIUM SERPL-MCNC: 1.8 MG/DL (ref 1.7–2.4)
MCH RBC QN AUTO: 26.3 PG (ref 26–34)
MCHC RBC AUTO-ENTMCNC: 32.1 G/DL (ref 32–36.5)
MCV RBC AUTO: 81.8 FL (ref 78–100)
MONOCYTES # BLD: 0.7 K/MCL (ref 0.3–0.9)
MONOCYTES NFR BLD: 11 %
MUCOUS THREADS URNS QL MICRO: PRESENT
NEUTROPHILS # BLD: 3.6 K/MCL (ref 1.8–7.7)
NEUTROPHILS NFR BLD: 54 %
NITRITE UR QL STRIP: NEGATIVE
NRBC BLD MANUAL-RTO: 0 /100 WBC
PH UR STRIP: 6.5 [PH] (ref 5–7)
PHOSPHATE SERPL-MCNC: 3.7 MG/DL (ref 2.4–4.7)
PLATELET # BLD AUTO: 177 K/MCL (ref 140–450)
POTASSIUM SERPL-SCNC: 3.6 MMOL/L (ref 3.4–5.1)
PROT SERPL-MCNC: 6.5 G/DL (ref 6.4–8.2)
PROT UR STRIP-MCNC: ABNORMAL MG/DL
PROT UR-MCNC: 22 MG/DL
PROT/CREAT UR: 128 MGPR/GCR
RBC # BLD: 4.68 MIL/MCL (ref 4.5–5.9)
RBC #/AREA URNS HPF: ABNORMAL /HPF
SODIUM SERPL-SCNC: 139 MMOL/L (ref 135–145)
SP GR UR STRIP: 1.02 (ref 1–1.03)
SQUAMOUS #/AREA URNS HPF: ABNORMAL /HPF
TACROLIMUS BLD-MCNC: 5.9 NG/ML (ref 5–20)
UROBILINOGEN UR STRIP-MCNC: 0.2 MG/DL
WBC # BLD: 6.6 K/MCL (ref 4.2–11)
WBC #/AREA URNS HPF: ABNORMAL /HPF

## 2025-05-05 PROCEDURE — 85025 COMPLETE CBC W/AUTO DIFF WBC: CPT | Performed by: CLINICAL MEDICAL LABORATORY

## 2025-05-05 PROCEDURE — 36415 COLL VENOUS BLD VENIPUNCTURE: CPT | Performed by: INTERNAL MEDICINE

## 2025-05-05 PROCEDURE — 83735 ASSAY OF MAGNESIUM: CPT | Performed by: CLINICAL MEDICAL LABORATORY

## 2025-05-05 PROCEDURE — 84156 ASSAY OF PROTEIN URINE: CPT | Performed by: CLINICAL MEDICAL LABORATORY

## 2025-05-05 PROCEDURE — 87799 DETECT AGENT NOS DNA QUANT: CPT | Performed by: CLINICAL MEDICAL LABORATORY

## 2025-05-05 PROCEDURE — 80053 COMPREHEN METABOLIC PANEL: CPT | Performed by: CLINICAL MEDICAL LABORATORY

## 2025-05-05 PROCEDURE — 80197 ASSAY OF TACROLIMUS: CPT | Performed by: CLINICAL MEDICAL LABORATORY

## 2025-05-05 PROCEDURE — 80195 ASSAY OF SIROLIMUS: CPT | Performed by: CLINICAL MEDICAL LABORATORY

## 2025-05-05 PROCEDURE — 82570 ASSAY OF URINE CREATININE: CPT | Performed by: CLINICAL MEDICAL LABORATORY

## 2025-05-05 PROCEDURE — 81001 URINALYSIS AUTO W/SCOPE: CPT | Performed by: CLINICAL MEDICAL LABORATORY

## 2025-05-05 PROCEDURE — 84100 ASSAY OF PHOSPHORUS: CPT | Performed by: CLINICAL MEDICAL LABORATORY

## 2025-05-06 ENCOUNTER — OFFICE VISIT (OUTPATIENT)
Dept: TRANSPLANT | Age: 60
End: 2025-05-06
Attending: INTERNAL MEDICINE

## 2025-05-06 ENCOUNTER — APPOINTMENT (OUTPATIENT)
Dept: TRANSPLANT | Age: 60
End: 2025-05-06
Attending: STUDENT IN AN ORGANIZED HEALTH CARE EDUCATION/TRAINING PROGRAM

## 2025-05-06 ENCOUNTER — APPOINTMENT (OUTPATIENT)
Dept: TRANSPLANT | Age: 60
End: 2025-05-06
Attending: INTERNAL MEDICINE

## 2025-05-06 ENCOUNTER — RESULTS FOLLOW-UP (OUTPATIENT)
Dept: TRANSPLANT | Age: 60
End: 2025-05-06

## 2025-05-06 VITALS
TEMPERATURE: 97.5 F | WEIGHT: 267.64 LBS | HEART RATE: 78 BPM | BODY MASS INDEX: 34.35 KG/M2 | DIASTOLIC BLOOD PRESSURE: 88 MMHG | OXYGEN SATURATION: 99 % | HEIGHT: 74 IN | SYSTOLIC BLOOD PRESSURE: 136 MMHG

## 2025-05-06 DIAGNOSIS — E83.42 HYPOMAGNESEMIA: ICD-10-CM

## 2025-05-06 DIAGNOSIS — I10 BENIGN ESSENTIAL HTN: Primary | ICD-10-CM

## 2025-05-06 DIAGNOSIS — R82.79 BK VIRURIA: ICD-10-CM

## 2025-05-06 DIAGNOSIS — D84.9 IMMUNOSUPPRESSION  (CMD): ICD-10-CM

## 2025-05-06 DIAGNOSIS — E83.39 HYPOPHOSPHATEMIA: ICD-10-CM

## 2025-05-06 DIAGNOSIS — Z94.0 STATUS POST KIDNEY TRANSPLANT (CMD): ICD-10-CM

## 2025-05-06 DIAGNOSIS — Z94.0 RENAL TRANSPLANT, STATUS POST (CMD): Primary | ICD-10-CM

## 2025-05-06 DIAGNOSIS — Z94.0 STATUS POST KIDNEY TRANSPLANT (CMD): Primary | ICD-10-CM

## 2025-05-06 LAB
BKV DNA # SPEC NAA+PROBE: NOT DETECTED {COPIES}/ML
BKV DNA SPEC NAA+PROBE-LOG#: NOT DETECTED {LOG_COPIES}/ML
SERVICE CMNT-IMP: NORMAL
SIROLIMUS BLD-MCNC: 5.8 NG/ML (ref 5–20)

## 2025-05-06 PROCEDURE — 99212 OFFICE O/P EST SF 10 MIN: CPT

## 2025-05-06 PROCEDURE — 99215 OFFICE O/P EST HI 40 MIN: CPT | Performed by: INTERNAL MEDICINE

## 2025-05-06 PROCEDURE — 99214 OFFICE O/P EST MOD 30 MIN: CPT | Performed by: STUDENT IN AN ORGANIZED HEALTH CARE EDUCATION/TRAINING PROGRAM

## 2025-05-06 PROCEDURE — G2211 COMPLEX E/M VISIT ADD ON: HCPCS | Performed by: STUDENT IN AN ORGANIZED HEALTH CARE EDUCATION/TRAINING PROGRAM

## 2025-05-06 RX ORDER — PREDNISONE 10 MG/1
TABLET ORAL
Qty: 30 TABLET | Refills: 1 | Status: SHIPPED | OUTPATIENT
Start: 2025-05-07 | End: 2025-06-12

## 2025-05-07 LAB
BKV DNA # UR NAA+PROBE: 4240 IU/ML
BKV DNA SPEC NAA+PROBE-LOG#: 3.63 LOG
SERVICE CMNT-IMP: ABNORMAL

## 2025-05-08 ENCOUNTER — OFFICE VISIT (OUTPATIENT)
Dept: INTERNAL MEDICINE CLINIC | Facility: CLINIC | Age: 60
End: 2025-05-08
Payer: MEDICARE

## 2025-05-08 VITALS
WEIGHT: 264.81 LBS | BODY MASS INDEX: 34 KG/M2 | DIASTOLIC BLOOD PRESSURE: 76 MMHG | TEMPERATURE: 98 F | SYSTOLIC BLOOD PRESSURE: 120 MMHG | OXYGEN SATURATION: 98 % | HEART RATE: 97 BPM

## 2025-05-08 DIAGNOSIS — M65.341 TRIGGER FINGER, RIGHT RING FINGER: Primary | ICD-10-CM

## 2025-05-08 DIAGNOSIS — I10 PRIMARY HYPERTENSION: Chronic | ICD-10-CM

## 2025-05-08 DIAGNOSIS — K83.8 DILATED BILE DUCT: ICD-10-CM

## 2025-05-08 DIAGNOSIS — Q61.02 MULTIPLE RENAL CYSTS: ICD-10-CM

## 2025-05-08 PROBLEM — D68.62 LUPUS ANTICOAGULANT SYNDROME (HCC): Chronic | Status: ACTIVE | Noted: 2025-02-11

## 2025-05-08 PROCEDURE — 3074F SYST BP LT 130 MM HG: CPT | Performed by: INTERNAL MEDICINE

## 2025-05-08 PROCEDURE — 99214 OFFICE O/P EST MOD 30 MIN: CPT | Performed by: INTERNAL MEDICINE

## 2025-05-08 PROCEDURE — 3078F DIAST BP <80 MM HG: CPT | Performed by: INTERNAL MEDICINE

## 2025-05-08 RX ORDER — TACROLIMUS 1 MG/1
1 CAPSULE ORAL 2 TIMES DAILY
COMMUNITY
Start: 2025-05-05

## 2025-05-08 RX ORDER — PREDNISONE 10 MG/1
10 TABLET ORAL DAILY
COMMUNITY
Start: 2025-05-06

## 2025-05-08 NOTE — PROGRESS NOTES
Suresh Prather is a 60 year old male.   HPI:   Patient presents to discuss several issues.    He has been dealing with trigger finger in right 4th digit.   Has been going on for 2 months.  Now constant.  Finger triggers whenever he tries to extend the finger.  He had similar issues with right hand a while ago - ended up needing trigger finger release surgery.     Other chronic issues:  Hypertension - at goal blood pressure.  Had MRI/MRCP through GI - dilatation noted of pancreatobiliary ductal area.  He will thus be having EGD/EUS.  Some incidental renal cysts noted as well.  Patient did pass along those results to his transplant nephrology team.    Past medical, family, surgical and social history were reviewed as listed in the chart, and are unchanged from previous visit on 3/14/2025  REVIEW OF SYSTEMS:   GENERAL/ const: no fevers/chills, no unintentional weight loss  EYES:no vision problems  HEENT: denies sinus pain or sinus tenderness  LUNGS: denies shortness of breath   CARDIOVASCULAR: denies chest pain  GI: abdominal pain  : denies dysuria   MUSCULOSKELETAL: right trigger finger  ALLERGY: Allergies[1]  PAST HISTORY:   Medications - Current[2]  Medical:  has a past medical history of Abdominal pain (February 2025), Anaphylactic reaction due to adverse effect of correct drug or medicament properly administered, initial encounter (10/18/2021), Anemia (1997), Arthritis (2012), Cancer ([facility]) (9/2015), Change in hair (2018), Closed fracture of distal end of left femur, unspecified fracture morphology, initial encounter ([facility]) (02/11/2023), Constipation, Dialysis patient (June 25th), Diverticulosis of large intestine (2018), Easy bruising, ESRD on hemodialysis ([facility]), Exposure to medical diagnostic radiation, Fatigue, Frequent use of laxatives, Gout, Hearing loss, History of blood transfusion, History of total left knee replacement (11/08/2016), Hypertension, Itch of skin (2018), Kidney disorder (1996), Kidney  failure (1995), Kidney replaced by transplant (HCC) (1997), Nephritis and nephropathy, not specified as acute or chronic, with unspecified pathological lesion in kidney, Painful total knee replacement, sequela (05/31/2017), Papillary thyroid carcinoma (HCC), PONV (postoperative nausea and vomiting) (1997), Postlaminectomy syndrome, cervical region (06/20/2013), Protein-calorie malnutrition (HCC) (07/10/2018), Rheumatoid arthritis (HCC) (2012), Sleep apnea, Status post cervical spinal fusion (09/20/2012), Stented coronary artery, Syncope, Trigger finger (acquired), Unspecified hemorrhoids without mention of complication (06/29/2012), Wears glasses (2010), and Weight gain (September 2024).  Surgical:  has a past surgical history that includes appendectomy (2003); cholecystectomy (2003); laminectomy,facetectomy,lumbar (05/29/2012); revise median n/carpal tunnel surg (2009); femur/knee surg unlisted (1994); colonoscopy; thyroidectomy; colonoscopy (N/A, 05/01/2015); cath pv (12/11/2015); knee surgery; anesth,kidney transplant; cath percutaneous  transluminal coronary angioplasty (2016); appendectomy; knee replacement surgery; organ transplant; colonoscopy (N/A, 10/23/2018); back surgery; other surgical history; other surgical history (Right, 1997); angioplasty (coronary) (2015); femur fracture surgery (Left, 02/2023); colonoscopy (N/A, 11/14/2023); and or/kidney trans (10-28-97).  Family: family history includes Breast Cancer (age of onset: 65) in his mother; Cancer in his paternal uncle, son, son, and son; Heart Attack in his maternal uncle; Heart Disorder in his paternal uncle; Kidney Disease in his mother; Stroke in his maternal grandmother; bipolar disorder in his mother; glomerulonephritis in his maternal grandmother and mother.  Social:  reports that he has never smoked. He has never used smokeless tobacco. He reports that he does not drink alcohol and does not use drugs.  Wt Readings from Last 6 Encounters:    05/08/25 264 lb 12.8 oz (120.1 kg)   04/21/25 265 lb (120.2 kg)   04/10/25 271 lb 3.2 oz (123 kg)   03/14/25 265 lb (120.2 kg)   01/09/25 262 lb 8 oz (119.1 kg)   09/10/24 236 lb 9.6 oz (107.3 kg)     EXAM:   /76 (BP Location: Right arm, Patient Position: Sitting, Cuff Size: large)   Pulse 97   Temp 98.3 °F (36.8 °C) (Temporal)   Wt 264 lb 12.8 oz (120.1 kg)   SpO2 98%   BMI 34.00 kg/m²   GENERAL: Alert and oriented, well developed, well nourished,in no apparent distress  HEENT: atraumatic, PERRLA, EOMI, normal lid and conjunctiva  LUNGS: clear to auscultation bilaterally, no wheezing/rubs  CARDIO: RRR without murmurs.  No clubbing, cyanosis or edema.  GI: soft non tender nondistended no hepatosplenomegaly, bowel sounds throughout  NEURO: CN II-XII intact, 5/5 strength all extremities  MS: Full ROM right 4th digit; however frequent triggering when flexing this finger  PSYCH: pleasant, appropriate mood and affect  ASSESSMENT AND PLAN:   1. Trigger finger, right ring finger  Persistent triggering of right 4th digit.  He has been wearing a finger brace, doing squeezing of ball.  Triggering noted on exam today especially when flexing finger.  Home physical therapy handout provided.  Advised patient to follow up with Ortho Hand - contact information provided for Dr. Rojo and Geronimo.    2. Dilated bile duct  Noted on MRCP.  Patient now to have EGD/EUS with GI ([facility]).    3. Multiple renal cysts  Noted incidentally on MRI/MRCP. Patient passed along these results to his transplant nephrology team.    4. Primary hypertension  At goal blood pressure.  Continue to monitor.    Patient Care Team:  Codi Loyola MD as PCP - General (Internal Medicine)  Brant Vaz MD as Consulting Physician (NEPHROLOGY)  Mike Brady MD as Consulting Physician (SURGERY, ORTHOPEDIC)  Geovanna Anton MD as Consulting Physician (ENDOCRINOLOGY)  Huyen Espinoza DC as Consulting Physician (CHIROPRACTOR)  Amira  DO Crescencio (NEUROLOGY)  Louis Bearden MD as Consulting Physician (NEUROSURGERY)  Louis Dalton MD as Consulting Physician (CARDIOLOGY)  Nic Caal MD as Consulting Physician (DERMATOLOGY)  The patient indicates understanding of these issues and agrees to the plan.  The patient is asked to return to clinic in 3-6 months for follow up on chronic issues/MA Supervisit, or earlier if acute issues arise.    Codi Loyola MD           [1]   Allergies  Allergen Reactions    Carvedilol HIVES and SWELLING     TABS    Ciprofloxacin HIVES and UNKNOWN    Ciprofloxacin Hcl HIVES and ITCHING     TABS    Duloxetine OTHER (SEE COMMENTS)    Fosinopril OTHER (SEE COMMENTS)    Gabapentin SWELLING     Foot swelling    Hydralazine HIVES and UNKNOWN    Metoprolol OTHER (SEE COMMENTS) and UNKNOWN    Monopril [Fosinopril Sodium] HIVES and ITCHING    Pravastatin Sodium SWELLING     TABS    Nortriptyline DIARRHEA, NAUSEA AND VOMITING and OTHER (SEE COMMENTS)    Minoxidil OTHER (SEE COMMENTS)     Chest pain   [2]   Current Outpatient Medications:     predniSONE 10 MG Oral Tab, Take 1 tablet (10 mg total) by mouth daily., Disp: , Rfl:     tacrolimus 1 MG Oral Cap, Take 1 capsule (1 mg total) by mouth 2 (two) times daily., Disp: , Rfl:     allopurinol 100 MG Oral Tab, Take 1 tablet (100 mg total) by mouth daily., Disp: 90 tablet, Rfl: 1    sirolimus 1 MG Oral Tab, , Disp: , Rfl:     Naloxone HCl 4 MG/0.1ML Nasal Liquid, 4 mg by Nasal route as needed., Disp: , Rfl:     Magnesium Oxide -Mg Supplement (MAGNESIUM-OXIDE) 400 (240 Mg) MG Oral Tab, , Disp: , Rfl:     amoxicillin 500 MG Oral Tab, Take 4 tablets (2,000 mg total) by mouth As Directed. Take 2000 mg before dental procedures, Disp: 30 tablet, Rfl: 0    famotidine 20 MG Oral Tab, Take 1 tablet (20 mg total) by mouth daily., Disp: , Rfl:     potassium chloride 20 MEQ Oral Tab CR, Take 1 tablet (20 mEq total) by mouth daily., Disp: , Rfl:     sulfamethoxazole-trimethoprim DS  800-160 MG Oral Tab per tablet, Take 0.5 tablets by mouth 3 (three) times a week. Monday, Wednesday, Friday, Disp: , Rfl:     torsemide 20 MG Oral Tab, Take 2 tablets (40 mg total) by mouth every morning., Disp: , Rfl:     levothyroxine 150 MCG Oral Tab, Take 1 tablet (150 mcg total) by mouth before breakfast., Disp: , Rfl:     Buprenorphine HCl-Naloxone HCl 8-2 MG Sublingual SL Tab, Place 0.5 tablets under the tongue 2 (two) times a day., Disp: 30 tablet, Rfl: 0    Sennosides-Docusate Sodium (SENNA-PLUS OR), Take 2 tablets by mouth daily., Disp: , Rfl:     lactulose 10 GM/15ML Oral Solution, Take 30 mL (20 g total) by mouth 2 (two) times daily as needed., Disp: , Rfl:     atorvastatin 40 MG Oral Tab, Take 1 tablet (40 mg total) by mouth nightly., Disp: , Rfl:     aspirin 81 MG Oral Tab EC, Take 1 tablet (81 mg total) by mouth daily., Disp: , Rfl:

## 2025-05-08 NOTE — PATIENT INSTRUCTIONS
- See handout for physical therapy exercises  - Schedule appointment with our Orthopedic Hand specialists:  Dr. Rojo  [address] [address] [address]  [phone]    Dr. Mckinley Melton  [address] [place] [id number] [phone]    - Can follow up with Urology some time this year, either the [facility] or Dr. Crain/Erum  [address] [place] [address], [address]  [place], [place] [address]  [phone]    - Follow up with me some time this spring or summer for your Annual Medicare Wellness visit; follow up earlier as needed.    It was a pleasure seeing you in the clinic today.  Thank you for choosing the [facility] [place] office for your healthcare needs. Please call at [phone] with any questions or concerns.    Codi Loyola MD

## 2025-05-19 ENCOUNTER — TELEPHONE (OUTPATIENT)
Dept: ORTHOPEDICS CLINIC | Facility: CLINIC | Age: 60
End: 2025-05-19

## 2025-05-19 NOTE — TELEPHONE ENCOUNTER
Patient has an appointment scheduled with Dr. Melton on 6/30/25 for right trigger finger. Please advise if imaging is needed prior.

## 2025-05-21 ENCOUNTER — ANESTHESIA EVENT (OUTPATIENT)
Dept: ENDOSCOPY | Facility: HOSPITAL | Age: 60
End: 2025-05-21
Payer: MEDICARE

## 2025-05-21 ENCOUNTER — HOSPITAL ENCOUNTER (OUTPATIENT)
Facility: HOSPITAL | Age: 60
Setting detail: HOSPITAL OUTPATIENT SURGERY
Discharge: HOME | End: 2025-05-21
Attending: INTERNAL MEDICINE | Admitting: INTERNAL MEDICINE
Payer: MEDICARE

## 2025-05-21 ENCOUNTER — ANESTHESIA (OUTPATIENT)
Dept: ENDOSCOPY | Facility: HOSPITAL | Age: 60
End: 2025-05-21
Payer: MEDICARE

## 2025-05-21 VITALS
HEART RATE: 81 BPM | WEIGHT: 265 LBS | SYSTOLIC BLOOD PRESSURE: 129 MMHG | OXYGEN SATURATION: 98 % | BODY MASS INDEX: 34.01 KG/M2 | TEMPERATURE: 98 F | RESPIRATION RATE: 16 BRPM | HEIGHT: 74 IN | DIASTOLIC BLOOD PRESSURE: 65 MMHG

## 2025-05-21 DIAGNOSIS — R93.3 ABNORMAL FINDINGS ON DIAGNOSTIC IMAGING OF OTHER PARTS OF DIGESTIVE TRACT: ICD-10-CM

## 2025-05-21 PROCEDURE — 88305 TISSUE EXAM BY PATHOLOGIST: CPT | Performed by: INTERNAL MEDICINE

## 2025-05-21 RX ORDER — NALOXONE HYDROCHLORIDE 0.4 MG/ML
0.08 INJECTION, SOLUTION INTRAMUSCULAR; INTRAVENOUS; SUBCUTANEOUS ONCE AS NEEDED
Status: DISCONTINUED | OUTPATIENT
Start: 2025-05-21 | End: 2025-05-21

## 2025-05-21 RX ORDER — SODIUM CHLORIDE, SODIUM LACTATE, POTASSIUM CHLORIDE, CALCIUM CHLORIDE 600; 310; 30; 20 MG/100ML; MG/100ML; MG/100ML; MG/100ML
INJECTION, SOLUTION INTRAVENOUS CONTINUOUS
Status: DISCONTINUED | OUTPATIENT
Start: 2025-05-21 | End: 2025-05-21

## 2025-05-21 RX ORDER — ONDANSETRON 2 MG/ML
4 INJECTION INTRAMUSCULAR; INTRAVENOUS ONCE AS NEEDED
Status: DISCONTINUED | OUTPATIENT
Start: 2025-05-21 | End: 2025-05-21

## 2025-05-21 RX ORDER — SODIUM CHLORIDE 9 MG/ML
INJECTION, SOLUTION INTRAVENOUS CONTINUOUS
Status: DISCONTINUED | OUTPATIENT
Start: 2025-05-21 | End: 2025-05-21

## 2025-05-21 RX ADMIN — SODIUM CHLORIDE: 9 INJECTION, SOLUTION INTRAVENOUS at 10:45:00

## 2025-05-21 NOTE — DISCHARGE INSTRUCTIONS
Home Care Instructions for Gastroscopy and Endoscopic Ultrasound with Sedation    Diet:  - Resume your regular diet as tolerated unless otherwise instructed.  - Start with light meals to minimize bloating.  - Do not drink alcohol today.    Medication:  - If you have questions about resuming your normal medications, please contact your Primary Care Physician.    Activities:  - Take it easy today. Do not return to work today.  - Do not drive today.  - Do not operate any machinery today (including kitchen equipment).    Gastroscopy:  - You may have a sore throat for 2-3 days following the exam. This is normal. Gargling with warm salt water (1/2 tsp salt to 1 glass warm water) or using throat lozenges will help.  - If you experience any sharp pain in your neck, abdomen or chest, vomiting of blood, oral temperature over 100 degrees Fahrenheit, light-headedness or dizziness, or any other problems, contact your doctor.    **If unable to reach your doctor, please go to the [facility] Emergency Room**    - Your referring physician will receive a full report of your examination.  - If you do not hear from your doctor's office within two weeks of your biopsy, please call them for your results.    You may be able to see your laboratory results in [employer] between 4 and 7 business days.  In some cases, your physician may not have viewed the results before they are released to [employer].  If you have questions regarding your results contact the physician who ordered the test/exam by phone or via [employer] by choosing \"Ask a Medical Question.\"

## 2025-05-21 NOTE — ANESTHESIA POSTPROCEDURE EVALUATION
[facility]    Suresh Prather Patient Status:  Hospital Outpatient Surgery   Age/Gender 60 year old male MRN [id number]   Location [facility] ENDOSCOPY PAIN CENTER Attending Andres Wills MD   Hosp Day # 0 PCP Codi Loyola MD       Anesthesia Post-op Note    ESOPHAGOGASTRODUODENOSCOPY (EGD) WITH BIOPSY, ENDOSCOPIC ULTRASOUND (EUS)    Procedure Summary       Date: 05/21/25 Room / Location:  ENDOSCOPY 01 /  ENDOSCOPY    Anesthesia Start: 1045 Anesthesia Stop: 1106    Procedures:       ESOPHAGOGASTRODUODENOSCOPY (EGD) WITH BIOPSY, ENDOSCOPIC ULTRASOUND (EUS)      ENDOSCOPIC ULTRASOUND (EUS) Diagnosis:       Abnormal findings on diagnostic imaging of other parts of digestive tract      (Abnormal findings on diagnostic imaging of other parts of digestive tract [R93.3])    Surgeons: Andres Wills MD Anesthesiologist: Jung Valdez MD    Anesthesia Type: MAC ASA Status: 3            Anesthesia Type: MAC    Vitals Value Taken Time   BP 99/ 53 05/21/25 11:06   Temp  05/21/25 11:06   Pulse 82 05/21/25 11:06   Resp 16 05/21/25 11:06   SpO2 96 05/21/25 11:06           Patient Location: Endoscopy    Anesthesia Type: MAC    Airway Patency: patent    Postop Pain Control: adequate    Mental Status: mildly sedated but able to meaningfully participate in the post-anesthesia evaluation    Nausea/Vomiting: none    Cardiopulmonary/Hydration status: stable euvolemic    Complications: no apparent anesthesia related complications    Postop vital signs: stable    Comments: Report given to RN    Dental Exam: Unchanged from Preop    Patient to be discharged home when criteria met.

## 2025-05-21 NOTE — H&P
[facility]  Pre-op H and P    Suresh Prather Patient Status:  Hospital Outpatient Surgery    3/31/1965 MRN [id number]   Location [facility] END[facility] Attending Andres Wills MD   Date 2025 PCP Codi Loyola MD     CC: ABnormal imaging    History of Present Illness:  Suresh Prather is a a(n) 60 year old male. ABnormal imaging    History:  Past Medical History[1]  Past Surgical History[2]  Family History[3]   reports that he has never smoked. He has never used smokeless tobacco. He reports that he does not drink alcohol and does not use drugs.    Allergies:  Allergies[4]    Medications:  Current Hospital Medications[5]    Physical Exam:    Blood pressure 133/82, pulse 88, temperature 98 °F (36.7 °C), temperature source Temporal, resp. rate 20, height 6' 2\" (1.88 m), weight 265 lb (120.2 kg), SpO2 100%.    General: Appears alert, oriented x3 and in no acute distress.  CV: Normal rate   Lungs: Normal effort   Skin: Warm and dry.  Laboratory Data:       Imaging:      Assessment/Plan/Procedure:  Problem List[6]    ABnormal imaging    Plan;  RADHA Wills MD  2025  10:11 AM       [1]   Past Medical History:   Abdominal pain    Left side liwer abdomen pain    Anaphylactic reaction due to adverse effect of correct drug or medicament properly administered, initial encounter    Anemia    Since 1st kidney transplant    Arthritis    Rheumatoid    Cancer (HCC)    Thyroid cancer    Change in hair    Due to dialysis    Closed fracture of distal end of left femur, unspecified fracture morphology, initial encounter ([facility])    Constipation    Dialysis patient    Diverticulosis of large intestine    Easy bruising    ESRD on hemodialysis (HCC)    kidney transplant -no longer on dialysis    Exposure to medical diagnostic radiation    completed     Fatigue    Frequent use of laxatives    Gout    Hearing loss    History of blood transfusion    few years ago    History  of total left knee replacement    Hypertension    Itch of skin    Due to dialysis    Kidney disorder    Kidney failure    Kidney replaced by transplant (HCC)    Nephritis and nephropathy, not specified as acute or chronic, with unspecified pathological lesion in kidney    Painful total knee replacement, sequela    Papillary thyroid carcinoma (HCC)    Dx in 1/2015: tx with surgery and MANZO    PONV (postoperative nausea and vomiting)    s/p kidney transplant    Postlaminectomy syndrome, cervical region    Log Date: 12/12/2012     Protein-calorie malnutrition (HCC)    Rheumatoid arthritis (HCC)    Sleep apnea    Status post cervical spinal fusion    Stented coronary artery    Syncope    Trigger finger (acquired)    Unspecified hemorrhoids without mention of complication    Wears glasses    For reading and distance    Weight gain    Transplant meds   [2]   Past Surgical History:  Procedure Laterality Date    Anesth,kidney transplant      Angioplasty (coronary)  2015    Appendectomy  2003    Appendectomy      Back surgery      Cath percutaneous  transluminal coronary angioplasty  2016    Cath pv  12/11/2015    Done at -patient states requires future angioplasty.    Cholecystectomy  2003    Colonoscopy      2003?    Colonoscopy N/A 05/01/2015    Procedure: COLONOSCOPY;  Surgeon: Cuauhtemoc Carty MD;  Location:  ENDOSCOPY    Colonoscopy N/A 10/23/2018    Procedure: COLONOSCOPY;  Surgeon: Andres Wills MD;  Location:  ENDOSCOPY    Colonoscopy N/A 11/14/2023    Procedure: COLONOSCOPY with forcep polypectomy;  Surgeon: Andres Wills MD;  Location:  ENDOSCOPY    Femur fracture surgery Left 02/2023    Femur/knee surg unlisted  1994    right knee repair of ligament cruciate anterior    Knee replacement surgery      Knee surgery      Laminectomy,facetectomy,lumbar  05/29/2012    foraminotomy cervic seg    Or/kidney trans  10-28-97    Organ transplant      2nd kidney transplant 2024    Other surgical history       arthrodesis cervical ACDF at C5-6    Other surgical history Right 1997    renal transplant - Done at [facility]    Revise median n/carpal tunnel surg  2009    neuroplasty decompression median     Thyroidectomy      On 2/2/2015: total thyroidectomy for papillary thyroid carcinoma   [3]   Family History  Problem Relation Age of Onset    Breast Cancer Mother 65    Other (bipolar disorder) Mother     Other (glomerulonephritis) Mother     Kidney Disease Mother     Stroke Maternal Grandmother     Other (glomerulonephritis) Maternal Grandmother     Cancer Paternal Uncle         prostate CA    Heart Disorder Paternal Uncle         CAD/Aortic disease    Heart Attack Maternal Uncle     Cancer Son         Mother- Breast cancer Uncle-Prostate cancern    Cancer Son     Cancer Son         Mother- Breast cancer Uncle-Prostate cancern   [4]   Allergies  Allergen Reactions    Carvedilol HIVES and SWELLING     TABS    Ciprofloxacin HIVES and UNKNOWN    Ciprofloxacin Hcl HIVES and ITCHING     TABS    Duloxetine OTHER (SEE COMMENTS)    Fosinopril OTHER (SEE COMMENTS)    Gabapentin SWELLING     Foot swelling    Hydralazine HIVES and UNKNOWN    Metoprolol OTHER (SEE COMMENTS) and UNKNOWN    Monopril [Fosinopril Sodium] HIVES and ITCHING    Pravastatin Sodium SWELLING     TABS    Nortriptyline DIARRHEA, NAUSEA AND VOMITING and OTHER (SEE COMMENTS)    Minoxidil OTHER (SEE COMMENTS)     Chest pain   [5]   Current Facility-Administered Medications:     sodium chloride 0.9% infusion, , Intravenous, Continuous    Facility-Administered Medications Ordered in Other Encounters:     lactated ringers infusion, , Intravenous, Continuous  [6]   Patient Active Problem List  Diagnosis    Esophageal reflux    Pulmonary HTN (HCC)    RA (rheumatoid arthritis) (HCC)    Chronic fatigue syndrome    Primary hypertension    Postsurgical hypothyroidism    Primary osteoarthritis of right knee    RAQUEL on CPAP    Gout    Functional gait abnormality    History of fusion  of cervical spine    Painful total knee replacement, sequela    Chronic pain of multiple joints    Opioid dependence on agonist therapy (HCC)    Thrombocytopenia    Peripheral polyneuropathy    Immunosuppressed status ([facility]) - Long-term current use of immunosuppressive medication    Coronary artery disease involving native coronary artery of native heart without angina pectoris    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

## 2025-05-21 NOTE — OPERATIVE REPORT
Suresh Prather Patient Status:  Hospital Outpatient Surgery    3/31/1965 MRN [id number]   [facility] Attending Andres Wills MD   Date 2025 PCP Codi Loyola MD     PREOPERATIVE DIAGNOSIS/INDICATION: Abnormal imaging: filling defects. If clinically warranted, consider EUS/ERCP for evaluation of the ampullary region.  Status post cholecystectomy. Normal LFT's  POSTOPERTATIVE DIAGNOSIS: Same, hiatal hernia  PROCEDURE PERFORMED: EUS/EGD biopsy  TIME OUT WAS PERFORMED    SEDATION: MAC sedation provided by General Anesthesia    INFORMED CONSENT: Risks, benefits and alternatives to the procedure were explained to the patient including but not limited to bleeding, infection, perforation, adverse drug reactions, pancreatitis and the need for hospitalization and surgery if this occurs, the patient understands and agrees to procedure.  PROCEDURE DESCRIPTION: The upper endoscope and later the therapeutic side viewing echoendoscope were introduced into the patient’s mouth, hypo pharynx, esophagus, stomach and the first and second portion of the duodenum, straightening of the endoscope was performed to obtain a direct view of the major ampulla, retroflexion was performed in the stomach with the EGD scope only. Careful examination of the above described areas was performed on withdrawal of the endoscope. The patient tolerated the procedure well and there were no immediate complications noted during the procedure, the patient was transported to the recovery area in stable condition.  FINDINGS:  ESOPHAGUS: Normal  GEJ: Small hiatal hernia  STOMACH: Normal  DUODENUM: Normal  MAJOR AMPULLA: Normal  ECHO: Imaging was performed through the esophagus, stomach and duodenum. The celiac axis and the left adrenal gland appear wnl, the visualized portions of the left hepatic lobe showed dilated intrahepatics, the visualized pancreatic parenchyma appear wnl, the main PD appear  prominent at the head, the GB was absent, the biliary tree was dilated to 12 mm to the ampulla, no mass or stricture or stone noted, the confluence of the portal vein, superior mesenteric vein and splenic vein appear wnl.  THERAPEUTICS: Cold forceps biopsies were performed from duodenum, antrum,   RECOMMENDATIONS:   Post EUS/EGD precautions, watch for bleeding, infection, perforation, adverse drug reactions and pancreatitis.  Call status report post procedure.  Follow biopsies.    Andres Wills MD  5/21/2025  11:09 AM

## 2025-05-21 NOTE — ANESTHESIA PREPROCEDURE EVALUATION
PRE-OP EVALUATION    Patient Name: Suresh Prather    Admit Diagnosis: Abnormal findings on diagnostic imaging of other parts of digestive tract [R93.3]    Pre-op Diagnosis: Abnormal findings on diagnostic imaging of other parts of digestive tract [R93.3]    ESOPHAGOGASTRODUODENOSCOPY (EGD), ENDOSCOPIC ULTRASOUND (EUS)    Anesthesia Procedure: ESOPHAGOGASTRODUODENOSCOPY (EGD), ENDOSCOPIC ULTRASOUND (EUS)  ENDOSCOPIC ULTRASOUND (EUS)    Surgeons and Role:     * Andres Wills MD - Primary    Pre-op vitals reviewed.  Temp: 98 °F (36.7 °C)  Pulse: 88  Resp: 20  BP: 133/82  SpO2: 100 %  Body mass index is 34.02 kg/m².    Current medications reviewed.  Hospital Medications:  Current Medications[1]    Outpatient Medications:   Prescriptions Prior to Admission[2]    Allergies: Carvedilol, Ciprofloxacin, Ciprofloxacin hcl, Duloxetine, Fosinopril, Gabapentin, Hydralazine, Metoprolol, Monopril [fosinopril sodium], Pravastatin sodium, Nortriptyline, and Minoxidil      Anesthesia Evaluation    Patient summary reviewed.    Anesthetic Complications  (+) history of anesthetic complications  History of: PONV       GI/Hepatic/Renal      (+) GERD       (+) chronic renal disease                    Cardiovascular      ECG reviewed.          (+) obesity  (+) hypertension     (+) CAD  (-) past MI  (+) CABG/stent         (+) dysrhythmias and PVC  (-) CHF  (-) angina              Endo/Other                           (+) arthritis       Pulmonary      (-) asthma  (-) COPD            (+) sleep apnea       Neuro/Psych          (-) CVA   (-) TIA    (+) neuromuscular disease                     Past Surgical History[3]  Social Hx on file[4]  History   Drug Use No     Available pre-op labs reviewed.  Lab Results   Component Value Date    WBC 7.3 04/21/2025    RBC 4.79 04/21/2025    HGB 12.7 (L) 04/21/2025    HCT 38.1 (L) 04/21/2025    MCV 79.5 (L) 04/21/2025    MCH 26.5 04/21/2025    MCHC 33.3 04/21/2025    RDW 16.2 04/21/2025    PLT  160.0 04/21/2025     Lab Results   Component Value Date     04/21/2025    K 4.0 04/21/2025     04/21/2025    CO2 23.0 04/21/2025    BUN 17 04/21/2025    CREATSERUM 1.29 04/21/2025    GLU 98 04/21/2025    CA 9.7 04/21/2025            Airway      Mallampati: III  Mouth opening: >3 FB    Neck ROM: full Cardiovascular    Cardiovascular exam normal.         Dental    Dentition appears grossly intact         Pulmonary    Pulmonary exam normal.                 Other findings              ASA: 3   Plan: MAC  NPO status verified and Patient does not meet NPO guidelines.          Plan/risks discussed with: patient                Present on Admission:  **None**             [1]    sodium chloride 0.9% infusion   Intravenous Continuous    lactated ringers infusion   Intravenous Continuous   [2]   Medications Prior to Admission   Medication Sig Dispense Refill Last Dose/Taking    VITAMIN D OR Take by mouth daily.   5/20/2025    predniSONE 10 MG Oral Tab Take 0.5 tablets (5 mg total) by mouth daily.   5/21/2025 Morning    tacrolimus 1 MG Oral Cap Take 1 capsule (1 mg total) by mouth 2 (two) times daily. 1mg am and 0.5 in pm   5/21/2025 Morning    allopurinol 100 MG Oral Tab Take 1 tablet (100 mg total) by mouth daily. 90 tablet 1 5/20/2025    sirolimus 1 MG Oral Tab daily.   5/21/2025 Morning    Magnesium Oxide -Mg Supplement (MAGNESIUM-OXIDE) 400 (240 Mg) MG Oral Tab    Taking    famotidine 20 MG Oral Tab Take 1 tablet (20 mg total) by mouth as needed.   5/20/2025    potassium chloride 20 MEQ Oral Tab CR Take 1 tablet (20 mEq total) by mouth in the morning.   5/20/2025    sulfamethoxazole-trimethoprim -160 MG Oral Tab per tablet Take 0.5 tablets by mouth 3 (three) times a week. Monday, Wednesday, Friday 5/19/2025    torsemide 20 MG Oral Tab Take 1 tablet (20 mg total) by mouth every morning.   5/20/2025    levothyroxine 150 MCG Oral Tab Take 1 tablet (150 mcg total) by mouth before breakfast.   5/21/2025  Morning    Buprenorphine HCl-Naloxone HCl 8-2 MG Sublingual SL Tab Place 0.5 tablets under the tongue 2 (two) times a day. 30 tablet 0 5/20/2025    lactulose 10 GM/15ML Oral Solution Take 30 mL (20 g total) by mouth as needed in the morning and 30 mL (20 g total) as needed in the evening.   Taking As Needed    atorvastatin 40 MG Oral Tab Take 1 tablet (40 mg total) by mouth nightly.   5/20/2025    aspirin 81 MG Oral Tab EC Take 1 tablet (81 mg total) by mouth in the morning.   5/19/2025    Naloxone HCl 4 MG/0.1ML Nasal Liquid 4 mg by Nasal route as needed.       amoxicillin 500 MG Oral Tab Take 4 tablets (2,000 mg total) by mouth As Directed. Take 2000 mg before dental procedures 30 tablet 0    [3]   Past Surgical History:  Procedure Laterality Date    Anesth,kidney transplant      Angioplasty (coronary)  2015    Appendectomy  2003    Appendectomy      Back surgery      Cath percutaneous  transluminal coronary angioplasty  2016    Cath pv  12/11/2015    Done at -patient states requires future angioplasty.    Cholecystectomy  2003    Colonoscopy      2003?    Colonoscopy N/A 05/01/2015    Procedure: COLONOSCOPY;  Surgeon: Cuauhtemoc Carty MD;  Location:  ENDOSCOPY    Colonoscopy N/A 10/23/2018    Procedure: COLONOSCOPY;  Surgeon: Andres Wills MD;  Location:  ENDOSCOPY    Colonoscopy N/A 11/14/2023    Procedure: COLONOSCOPY with forcep polypectomy;  Surgeon: Andres Wills MD;  Location:  ENDOSCOPY    Femur fracture surgery Left 02/2023    Femur/knee surg unlisted  1994    right knee repair of ligament cruciate anterior    Knee replacement surgery      Knee surgery      Laminectomy,facetectomy,lumbar  05/29/2012    foraminotomy cervic seg    Or/kidney trans  10-28-97    Organ transplant      2nd kidney transplant 2024    Other surgical history      arthrodesis cervical ACDF at C5-6    Other surgical history Right 1997    renal transplant - Done at [facility]    Revise median n/carpal tunnel surg  2009     neuroplasty decompression median     Thyroidectomy      On 2/2/2015: total thyroidectomy for papillary thyroid carcinoma   [4]   Social History  Socioeconomic History    Marital status:    Tobacco Use    Smoking status: Never    Smokeless tobacco: Never   Vaping Use    Vaping status: Never Used   Substance and Sexual Activity    Alcohol use: No    Drug use: No   Other Topics Concern    Caffeine Concern No    Stress Concern No    Weight Concern Yes    Special Diet No    Exercise Yes     Comment: Daily stretching    Seat Belt Yes

## 2025-05-26 NOTE — PROGRESS NOTES
Date: 2025    To: Suresh Prather  : 3/31/1965    I hope this letter finds you doing well.  I am writing to inform you of the following:     The biopsies obtained at the time of your recent endoscopic procedure were benign and showed no evidence of infection or malignancy.       Please call the office at [phone] if there are any questions.    Regards,    Andres Wills M.D.   Consent: Written consent was obtained and risks were reviewed including but not limited to scarring, infection, bleeding, scabbing, incomplete removal, nerve damage and allergy to anesthesia.

## 2025-05-30 ENCOUNTER — TELEPHONE (OUTPATIENT)
Dept: TRANSPLANT | Age: 60
End: 2025-05-30

## 2025-05-30 DIAGNOSIS — E83.42 HYPOMAGNESEMIA: ICD-10-CM

## 2025-05-30 DIAGNOSIS — Z94.0 RENAL TRANSPLANT, STATUS POST (CMD): Primary | ICD-10-CM

## 2025-05-30 DIAGNOSIS — D84.9 IMMUNOSUPPRESSION  (CMD): ICD-10-CM

## 2025-05-30 DIAGNOSIS — E83.39 HYPOPHOSPHATEMIA: ICD-10-CM

## 2025-05-30 DIAGNOSIS — R82.79 BK VIRURIA: ICD-10-CM

## 2025-06-02 ENCOUNTER — APPOINTMENT (OUTPATIENT)
Dept: LAB | Age: 60
End: 2025-06-02

## 2025-06-02 DIAGNOSIS — R82.79 BK VIRURIA: ICD-10-CM

## 2025-06-02 DIAGNOSIS — E83.39 HYPOPHOSPHATEMIA: ICD-10-CM

## 2025-06-02 DIAGNOSIS — D84.9 IMMUNOSUPPRESSION  (CMD): ICD-10-CM

## 2025-06-02 DIAGNOSIS — Z94.0 RENAL TRANSPLANT, STATUS POST (CMD): ICD-10-CM

## 2025-06-02 DIAGNOSIS — E83.42 HYPOMAGNESEMIA: ICD-10-CM

## 2025-06-02 LAB
ALBUMIN SERPL-MCNC: 3.2 G/DL (ref 3.4–5)
ALBUMIN/GLOB SERPL: 0.9 {RATIO} (ref 1–2.4)
ALP SERPL-CCNC: 83 UNITS/L (ref 45–117)
ALT SERPL-CCNC: 22 UNITS/L
ANION GAP SERPL CALC-SCNC: 10 MMOL/L (ref 7–19)
APPEARANCE UR: CLEAR
AST SERPL-CCNC: 18 UNITS/L
BACTERIA #/AREA URNS HPF: ABNORMAL /HPF
BASOPHILS # BLD: 0 K/MCL (ref 0–0.3)
BASOPHILS NFR BLD: 1 %
BILIRUB SERPL-MCNC: 0.5 MG/DL (ref 0.2–1)
BILIRUB UR QL STRIP: NEGATIVE
BUN SERPL-MCNC: 20 MG/DL (ref 6–20)
BUN/CREAT SERPL: 18 (ref 7–25)
CALCIUM SERPL-MCNC: 8.7 MG/DL (ref 8.4–10.2)
CHLORIDE SERPL-SCNC: 107 MMOL/L (ref 97–110)
CO2 SERPL-SCNC: 26 MMOL/L (ref 21–32)
COLOR UR: COLORLESS
CREAT SERPL-MCNC: 1.1 MG/DL (ref 0.67–1.17)
CREAT UR-MCNC: 26 MG/DL
DEPRECATED RDW RBC: 53.3 FL (ref 39–50)
EGFRCR SERPLBLD CKD-EPI 2021: 77 ML/MIN/{1.73_M2}
EOSINOPHIL # BLD: 0.8 K/MCL (ref 0–0.5)
EOSINOPHIL NFR BLD: 9 %
ERYTHROCYTE [DISTWIDTH] IN BLOOD: 17.2 % (ref 11–15)
FASTING DURATION TIME PATIENT: 12 HOURS (ref 0–999)
GLOBULIN SER-MCNC: 3.4 G/DL (ref 2–4)
GLUCOSE SERPL-MCNC: 99 MG/DL (ref 70–99)
GLUCOSE UR STRIP-MCNC: NEGATIVE MG/DL
HCT VFR BLD CALC: 41.1 % (ref 39–51)
HGB BLD-MCNC: 12.9 G/DL (ref 13–17)
HGB UR QL STRIP: NEGATIVE
HYALINE CASTS #/AREA URNS LPF: ABNORMAL /LPF
IMM GRANULOCYTES # BLD AUTO: 0 K/MCL (ref 0–0.2)
IMM GRANULOCYTES # BLD: 0 %
KETONES UR STRIP-MCNC: NEGATIVE MG/DL
LEUKOCYTE ESTERASE UR QL STRIP: NEGATIVE
LYMPHOCYTES # BLD: 1.3 K/MCL (ref 1–4)
LYMPHOCYTES NFR BLD: 16 %
MAGNESIUM SERPL-MCNC: 1.9 MG/DL (ref 1.7–2.4)
MCH RBC QN AUTO: 26.5 PG (ref 26–34)
MCHC RBC AUTO-ENTMCNC: 31.4 G/DL (ref 32–36.5)
MCV RBC AUTO: 84.6 FL (ref 78–100)
MONOCYTES # BLD: 0.9 K/MCL (ref 0.3–0.9)
MONOCYTES NFR BLD: 11 %
NEUTROPHILS # BLD: 5.1 K/MCL (ref 1.8–7.7)
NEUTROPHILS NFR BLD: 63 %
NITRITE UR QL STRIP: NEGATIVE
NRBC BLD MANUAL-RTO: 0 /100 WBC
PH UR STRIP: 7.5 [PH] (ref 5–7)
PHOSPHATE SERPL-MCNC: 4.2 MG/DL (ref 2.4–4.7)
PLATELET # BLD AUTO: 168 K/MCL (ref 140–450)
POTASSIUM SERPL-SCNC: 3.5 MMOL/L (ref 3.4–5.1)
PROT SERPL-MCNC: 6.6 G/DL (ref 6.4–8.2)
PROT UR STRIP-MCNC: NEGATIVE MG/DL
PROT UR-MCNC: 9 MG/DL
PROT/CREAT UR: 346 MGPR/GCR
RBC # BLD: 4.86 MIL/MCL (ref 4.5–5.9)
RBC #/AREA URNS HPF: ABNORMAL /HPF
SODIUM SERPL-SCNC: 139 MMOL/L (ref 135–145)
SP GR UR STRIP: 1.01 (ref 1–1.03)
SQUAMOUS #/AREA URNS HPF: ABNORMAL /HPF
TACROLIMUS BLD-MCNC: 3.4 NG/ML (ref 5–20)
UROBILINOGEN UR STRIP-MCNC: 0.2 MG/DL
WBC # BLD: 8.2 K/MCL (ref 4.2–11)
WBC #/AREA URNS HPF: ABNORMAL /HPF

## 2025-06-02 PROCEDURE — 82570 ASSAY OF URINE CREATININE: CPT | Performed by: CLINICAL MEDICAL LABORATORY

## 2025-06-02 PROCEDURE — 87799 DETECT AGENT NOS DNA QUANT: CPT | Performed by: CLINICAL MEDICAL LABORATORY

## 2025-06-02 PROCEDURE — 80053 COMPREHEN METABOLIC PANEL: CPT | Performed by: CLINICAL MEDICAL LABORATORY

## 2025-06-02 PROCEDURE — 85025 COMPLETE CBC W/AUTO DIFF WBC: CPT | Performed by: CLINICAL MEDICAL LABORATORY

## 2025-06-02 PROCEDURE — 36415 COLL VENOUS BLD VENIPUNCTURE: CPT | Performed by: INTERNAL MEDICINE

## 2025-06-02 PROCEDURE — 80195 ASSAY OF SIROLIMUS: CPT | Performed by: CLINICAL MEDICAL LABORATORY

## 2025-06-02 PROCEDURE — 80197 ASSAY OF TACROLIMUS: CPT | Performed by: CLINICAL MEDICAL LABORATORY

## 2025-06-02 PROCEDURE — 83735 ASSAY OF MAGNESIUM: CPT | Performed by: CLINICAL MEDICAL LABORATORY

## 2025-06-02 PROCEDURE — 84156 ASSAY OF PROTEIN URINE: CPT | Performed by: CLINICAL MEDICAL LABORATORY

## 2025-06-02 PROCEDURE — 84100 ASSAY OF PHOSPHORUS: CPT | Performed by: CLINICAL MEDICAL LABORATORY

## 2025-06-02 PROCEDURE — 81001 URINALYSIS AUTO W/SCOPE: CPT | Performed by: CLINICAL MEDICAL LABORATORY

## 2025-06-03 LAB
BKV DNA # SPEC NAA+PROBE: NOT DETECTED {COPIES}/ML
BKV DNA SPEC NAA+PROBE-LOG#: NOT DETECTED {LOG_COPIES}/ML
SERVICE CMNT-IMP: NORMAL
SIROLIMUS BLD-MCNC: 4.1 NG/ML (ref 5–20)

## 2025-06-04 LAB
BKV DNA # UR NAA+PROBE: 1710 IU/ML
BKV DNA SPEC NAA+PROBE-LOG#: 3.23 LOG
SERVICE CMNT-IMP: ABNORMAL

## 2025-06-09 ENCOUNTER — OFFICE VISIT (OUTPATIENT)
Dept: TRANSPLANT | Age: 60
End: 2025-06-09
Attending: INTERNAL MEDICINE

## 2025-06-09 ENCOUNTER — APPOINTMENT (OUTPATIENT)
Dept: TRANSPLANT | Age: 60
End: 2025-06-09
Attending: INTERNAL MEDICINE

## 2025-06-09 VITALS
SYSTOLIC BLOOD PRESSURE: 132 MMHG | DIASTOLIC BLOOD PRESSURE: 68 MMHG | HEIGHT: 74 IN | HEART RATE: 56 BPM | BODY MASS INDEX: 35.45 KG/M2 | RESPIRATION RATE: 16 BRPM | TEMPERATURE: 97.7 F | WEIGHT: 276.24 LBS

## 2025-06-09 VITALS
RESPIRATION RATE: 16 BRPM | DIASTOLIC BLOOD PRESSURE: 68 MMHG | HEART RATE: 56 BPM | BODY MASS INDEX: 35.45 KG/M2 | HEIGHT: 74 IN | WEIGHT: 276.24 LBS | SYSTOLIC BLOOD PRESSURE: 132 MMHG | TEMPERATURE: 97.7 F

## 2025-06-09 DIAGNOSIS — E83.42 HYPOMAGNESEMIA: ICD-10-CM

## 2025-06-09 DIAGNOSIS — E83.39 HYPOPHOSPHATEMIA: ICD-10-CM

## 2025-06-09 DIAGNOSIS — Z94.0 RENAL TRANSPLANT, STATUS POST (CMD): Primary | ICD-10-CM

## 2025-06-09 DIAGNOSIS — Z94.0 STATUS POST KIDNEY TRANSPLANT (CMD): ICD-10-CM

## 2025-06-09 DIAGNOSIS — D84.9 IMMUNOSUPPRESSION  (CMD): ICD-10-CM

## 2025-06-09 DIAGNOSIS — I25.10 CORONARY ARTERY DISEASE INVOLVING NATIVE CORONARY ARTERY OF NATIVE HEART WITHOUT ANGINA PECTORIS: ICD-10-CM

## 2025-06-09 DIAGNOSIS — Z94.0 STATUS POST KIDNEY TRANSPLANT (CMD): Primary | ICD-10-CM

## 2025-06-09 DIAGNOSIS — I10 BENIGN ESSENTIAL HTN: Primary | ICD-10-CM

## 2025-06-09 DIAGNOSIS — R82.79 BK VIRURIA: ICD-10-CM

## 2025-06-09 PROCEDURE — 99212 OFFICE O/P EST SF 10 MIN: CPT

## 2025-06-09 RX ORDER — PREDNISONE 2.5 MG/1
2.5 TABLET ORAL DAILY
COMMUNITY

## 2025-06-09 RX ORDER — LIDOCAINE 40 MG/G
1 CREAM TOPICAL 3 TIMES DAILY PRN
Qty: 30 G | Refills: 1 | Status: SHIPPED | OUTPATIENT
Start: 2025-06-09 | End: 2025-06-09 | Stop reason: ALTCHOICE

## 2025-06-09 RX ORDER — LIDOCAINE 40 MG/G
1 CREAM TOPICAL 3 TIMES DAILY PRN
Qty: 30 G | Refills: 1 | Status: SHIPPED | OUTPATIENT
Start: 2025-06-09

## 2025-06-09 ASSESSMENT — PAIN SCALES - GENERAL
PAINLEVEL_OUTOF10: 9
PAINLEVEL_OUTOF10: 9

## 2025-06-10 ENCOUNTER — RESULTS FOLLOW-UP (OUTPATIENT)
Dept: TRANSPLANT | Age: 60
End: 2025-06-10

## 2025-06-16 ENCOUNTER — LAB ENCOUNTER (OUTPATIENT)
Dept: LAB | Age: 60
End: 2025-06-16
Attending: INTERNAL MEDICINE
Payer: MEDICARE

## 2025-06-16 DIAGNOSIS — C73 MALIGNANT NEOPLASM OF THYROID GLAND (HCC): Primary | ICD-10-CM

## 2025-06-16 DIAGNOSIS — E89.0 POSTSURGICAL HYPOTHYROIDISM: ICD-10-CM

## 2025-06-16 LAB
T4 FREE SERPL-MCNC: 1.5 NG/DL (ref 0.8–1.7)
TSI SER-ACNC: 5.8 UIU/ML (ref 0.55–4.78)

## 2025-06-16 PROCEDURE — 36415 COLL VENOUS BLD VENIPUNCTURE: CPT

## 2025-06-16 PROCEDURE — 84439 ASSAY OF FREE THYROXINE: CPT

## 2025-06-16 PROCEDURE — 86800 THYROGLOBULIN ANTIBODY: CPT

## 2025-06-16 PROCEDURE — 84443 ASSAY THYROID STIM HORMONE: CPT

## 2025-06-19 DIAGNOSIS — D84.9 IMMUNOSUPPRESSION  (CMD): ICD-10-CM

## 2025-06-19 DIAGNOSIS — Z94.0 STATUS POST KIDNEY TRANSPLANT (CMD): Primary | ICD-10-CM

## 2025-06-23 RX ORDER — ALLOPURINOL 100 MG/1
100 TABLET ORAL DAILY
Qty: 90 TABLET | Refills: 1 | Status: SHIPPED | OUTPATIENT
Start: 2025-06-23

## 2025-06-25 RX ORDER — POTASSIUM CHLORIDE 1500 MG/1
20 TABLET, EXTENDED RELEASE ORAL DAILY
Qty: 90 TABLET | Refills: 3 | Status: SHIPPED | OUTPATIENT
Start: 2025-06-25

## 2025-06-26 ENCOUNTER — TELEPHONE (OUTPATIENT)
Dept: TRANSPLANT | Age: 60
End: 2025-06-26

## 2025-06-26 DIAGNOSIS — E83.39 HYPOPHOSPHATEMIA: ICD-10-CM

## 2025-06-26 DIAGNOSIS — Z94.0 IMMUNOSUPPRESSIVE MANAGEMENT ENCOUNTER FOLLOWING KIDNEY TRANSPLANT (CMD): ICD-10-CM

## 2025-06-26 DIAGNOSIS — Z94.0 STATUS POST KIDNEY TRANSPLANT (CMD): Primary | ICD-10-CM

## 2025-06-26 DIAGNOSIS — E83.42 HYPOMAGNESEMIA: ICD-10-CM

## 2025-06-26 DIAGNOSIS — Z79.899 IMMUNOSUPPRESSIVE MANAGEMENT ENCOUNTER FOLLOWING KIDNEY TRANSPLANT (CMD): ICD-10-CM

## 2025-06-26 DIAGNOSIS — D84.9 IMMUNOSUPPRESSION  (CMD): ICD-10-CM

## 2025-06-26 DIAGNOSIS — R82.79 BK VIRURIA: ICD-10-CM

## 2025-06-30 ENCOUNTER — OFFICE VISIT (OUTPATIENT)
Dept: ORTHOPEDICS CLINIC | Facility: CLINIC | Age: 60
End: 2025-06-30
Payer: MEDICARE

## 2025-06-30 VITALS — HEIGHT: 74 IN | WEIGHT: 265 LBS | BODY MASS INDEX: 34.01 KG/M2

## 2025-06-30 DIAGNOSIS — M65.341 TRIGGER RING FINGER OF RIGHT HAND: Primary | ICD-10-CM

## 2025-06-30 PROCEDURE — 99213 OFFICE O/P EST LOW 20 MIN: CPT | Performed by: ORTHOPAEDIC SURGERY

## 2025-06-30 PROCEDURE — 3008F BODY MASS INDEX DOCD: CPT | Performed by: ORTHOPAEDIC SURGERY

## 2025-06-30 PROCEDURE — 20550 NJX 1 TENDON SHEATH/LIGAMENT: CPT | Performed by: ORTHOPAEDIC SURGERY

## 2025-06-30 RX ORDER — LIDOCAINE 40 MG/G
1 CREAM TOPICAL
COMMUNITY
Start: 2025-06-09

## 2025-06-30 RX ORDER — BETAMETHASONE SODIUM PHOSPHATE AND BETAMETHASONE ACETATE 3; 3 MG/ML; MG/ML
6 INJECTION, SUSPENSION INTRA-ARTICULAR; INTRALESIONAL; INTRAMUSCULAR; SOFT TISSUE ONCE
Status: COMPLETED | OUTPATIENT
Start: 2025-06-30 | End: 2025-06-30

## 2025-06-30 RX ADMIN — BETAMETHASONE SODIUM PHOSPHATE AND BETAMETHASONE ACETATE 6 MG: 3; 3 INJECTION, SUSPENSION INTRA-ARTICULAR; INTRALESIONAL; INTRAMUSCULAR; SOFT TISSUE at 08:29:00

## 2025-06-30 NOTE — H&P
Clinic Note       Assessment/Plan:  60 year old with     Triggering of right ring -  I discussed with the patient surgical and non-surgical treatment options and their success rate/risks. Using a shared decision-making process, patient elected to proceed with a corticosteroid injection.     CSI #1 was performed today    Follow Up:  6 weeks, okay to cancel appointment if symptoms are 100% resolved    Procedure:  Written consent was obtained.  Skin was prepped with ChloraPrep.  1 mL of 6 mg betamethasone and 1 mL of 1% lidocaine was injected into the subcutaneous tissue overlying the A1 pulley. Patient tolerated the procedure.  No complications were encountered.  Band-Aid was applied.    Physical Exam:    Ht 6' 2\" (1.88 m)   Wt 265 lb (120.2 kg)   BMI 34.02 kg/m²     Right Upper Extremity:   Inspection: Skin Intact. No skin lesions. No gross deformity.   Palpation:  (+) TTP over A1 pulley   Motion: Elbow: normal bilateral symmetric ext/flex  Wrist: normal bilateral symmetric ext/flex/sup/pro  Finger: full flexion   Special Tests: (+) active triggering/subtle catch.  (-) PIPJ contracture. Normally sensate digit. Normally perfused digit.     CC: Triggering of right ring finger     HPI: 60 year old RHD male presents with triggering of the above digit. Started March 2025. Locking +triggering. Severe pain.  Previously has had surgery for trigger finger.  Does have history of carpal tunnel release and first dorsal compartment release.    Occupation: Retired    Past Medical History[1]  Past Surgical History[2]  Current Medications[3]  Allergies[4]  Family History[5]  Social History     Occupational History    Not on file   Tobacco Use    Smoking status: Never    Smokeless tobacco: Never   Vaping Use    Vaping status: Never Used   Substance and Sexual Activity    Alcohol use: No    Drug use: No    Sexual activity: Not on file        Review of Systems (negative unless bolded):  General: fevers, chills, fatigue  CV:  chest  pain, palpitations, leg swelling  Msk: bodyaches, neck pain, neck stiffness  Skin: rashes, open wounds, nonhealing ulcers  Hem: bleeds easily, bruise easily, immunocompromised  Neuro: dizziness, light headedness, headaches  Psych: anxious, depressed, anger issues    Mckinley Melton MD   Hand, Wrist, & Elbow Surgery  [email or web address]  t: [phone]  f: [phone]       [1]   Past Medical History:   Abdominal pain    Left side liwer abdomen pain    Anaphylactic reaction due to adverse effect of correct drug or medicament properly administered, initial encounter    Anemia    Since 1st kidney transplant    Arthritis    Rheumatoid    Cancer (HCC)    Thyroid cancer    Change in hair    Due to dialysis    Closed fracture of distal end of left femur, unspecified fracture morphology, initial encounter ([facility])    Constipation    Dialysis patient    Diverticulosis of large intestine    Easy bruising    ESRD on hemodialysis (HCC)    kidney transplant 2024-no longer on dialysis    Exposure to medical diagnostic radiation    completed 2016    Fatigue    Frequent use of laxatives    Gout    Hearing loss    History of blood transfusion    few years ago    History of total left knee replacement    Hypertension    Itch of skin    Due to dialysis    Kidney disorder    Kidney failure    Kidney replaced by transplant (HCC)    Nephritis and nephropathy, not specified as acute or chronic, with unspecified pathological lesion in kidney    Painful total knee replacement, sequela    Papillary thyroid carcinoma (HCC)    Dx in 1/2015: tx with surgery and MANZO    PONV (postoperative nausea and vomiting)    s/p kidney transplant    Postlaminectomy syndrome, cervical region    Log Date: 12/12/2012     Protein-calorie malnutrition (HCC)    Rheumatoid arthritis (HCC)    Sleep apnea    Status post cervical spinal fusion    Stented coronary artery    Syncope    Trigger finger (acquired)    Unspecified hemorrhoids without mention of  complication    Wears glasses    For reading and distance    Weight gain    Transplant meds   [2]   Past Surgical History:  Procedure Laterality Date    Anesth,kidney transplant      Angioplasty (coronary)  2015    Appendectomy  2003    Appendectomy      Back surgery      Cath percutaneous  transluminal coronary angioplasty  2016    Cath pv  12/11/2015    Done at -patient states requires future angioplasty.    Cholecystectomy  2003    Colonoscopy      2003?    Colonoscopy N/A 05/01/2015    Procedure: COLONOSCOPY;  Surgeon: Cuauhtemoc Carty MD;  Location:  ENDOSCOPY    Colonoscopy N/A 10/23/2018    Procedure: COLONOSCOPY;  Surgeon: Andres Wills MD;  Location:  ENDOSCOPY    Colonoscopy N/A 11/14/2023    Procedure: COLONOSCOPY with forcep polypectomy;  Surgeon: Andres Wills MD;  Location:  ENDOSCOPY    Femur fracture surgery Left 02/2023    Femur/knee surg unlisted  1994    right knee repair of ligament cruciate anterior    Knee replacement surgery      Knee surgery      Laminectomy,facetectomy,lumbar  05/29/2012    foraminotomy cervic seg    Or/kidney trans  10-28-97    Organ transplant      2nd kidney transplant 2024    Other surgical history      arthrodesis cervical ACDF at C5-6    Other surgical history Right 1997    renal transplant - Done at [facility]    Revise median n/carpal tunnel surg  2009    neuroplasty decompression median     Thyroidectomy      On 2/2/2015: total thyroidectomy for papillary thyroid carcinoma   [3]   Current Outpatient Medications   Medication Sig Dispense Refill    Lidocaine 4 % External Cream Apply 1 Application topically.      ALLOPURINOL 100 MG Oral Tab TAKE 1 TABLET(100 MG) BY MOUTH DAILY 90 tablet 1    VITAMIN D OR Take by mouth daily.      predniSONE 10 MG Oral Tab Take 0.5 tablets (5 mg total) by mouth daily.      tacrolimus 1 MG Oral Cap Take 1 capsule (1 mg total) by mouth 2 (two) times daily. 1mg am and 0.5 in pm      sirolimus 1 MG Oral Tab daily.      Naloxone  HCl 4 MG/0.1ML Nasal Liquid 4 mg by Nasal route as needed.      Magnesium Oxide -Mg Supplement (MAGNESIUM-OXIDE) 400 (240 Mg) MG Oral Tab       amoxicillin 500 MG Oral Tab Take 4 tablets (2,000 mg total) by mouth As Directed. Take 2000 mg before dental procedures 30 tablet 0    famotidine 20 MG Oral Tab Take 1 tablet (20 mg total) by mouth as needed.      potassium chloride 20 MEQ Oral Tab CR Take 1 tablet (20 mEq total) by mouth in the morning.      sulfamethoxazole-trimethoprim -160 MG Oral Tab per tablet Take 0.5 tablets by mouth 3 (three) times a week. Monday, Wednesday, Friday      torsemide 20 MG Oral Tab Take 1 tablet (20 mg total) by mouth every morning.      levothyroxine 150 MCG Oral Tab Take 1 tablet (150 mcg total) by mouth before breakfast.      Buprenorphine HCl-Naloxone HCl 8-2 MG Sublingual SL Tab Place 0.5 tablets under the tongue 2 (two) times a day. 30 tablet 0    lactulose 10 GM/15ML Oral Solution Take 30 mL (20 g total) by mouth as needed in the morning and 30 mL (20 g total) as needed in the evening.      atorvastatin 40 MG Oral Tab Take 1 tablet (40 mg total) by mouth nightly.      aspirin 81 MG Oral Tab EC Take 1 tablet (81 mg total) by mouth in the morning.     [4]   Allergies  Allergen Reactions    Carvedilol HIVES and SWELLING     TABS    Ciprofloxacin HIVES and UNKNOWN    Ciprofloxacin Hcl HIVES and ITCHING     TABS    Duloxetine OTHER (SEE COMMENTS)    Fosinopril OTHER (SEE COMMENTS)    Gabapentin SWELLING     Foot swelling    Hydralazine HIVES and UNKNOWN    Metoprolol OTHER (SEE COMMENTS) and UNKNOWN    Monopril [Fosinopril Sodium] HIVES and ITCHING    Pravastatin Sodium SWELLING     TABS    Nortriptyline DIARRHEA, NAUSEA AND VOMITING and OTHER (SEE COMMENTS)    Minoxidil OTHER (SEE COMMENTS)     Chest pain   [5]   Family History  Problem Relation Age of Onset    Breast Cancer Mother 65    Other (bipolar disorder) Mother     Other (glomerulonephritis) Mother     Kidney Disease  Mother     Stroke Maternal Grandmother     Other (glomerulonephritis) Maternal Grandmother     Cancer Paternal Uncle         prostate CA    Heart Disorder Paternal Uncle         CAD/Aortic disease    Heart Attack Maternal Uncle     Cancer Son         Mother- Breast cancer Uncle-Prostate cancern    Cancer Son     Cancer Son         Mother- Breast cancer Uncle-Prostate cancern

## 2025-07-01 ENCOUNTER — LAB SERVICES (OUTPATIENT)
Dept: LAB | Age: 60
End: 2025-07-01

## 2025-07-01 DIAGNOSIS — E83.42 HYPOMAGNESEMIA: ICD-10-CM

## 2025-07-01 DIAGNOSIS — Z94.0 IMMUNOSUPPRESSIVE MANAGEMENT ENCOUNTER FOLLOWING KIDNEY TRANSPLANT (CMD): ICD-10-CM

## 2025-07-01 DIAGNOSIS — Z94.0 STATUS POST KIDNEY TRANSPLANT (CMD): ICD-10-CM

## 2025-07-01 DIAGNOSIS — D84.9 IMMUNOSUPPRESSION  (CMD): ICD-10-CM

## 2025-07-01 DIAGNOSIS — E83.39 HYPOPHOSPHATEMIA: ICD-10-CM

## 2025-07-01 DIAGNOSIS — R82.79 BK VIRURIA: ICD-10-CM

## 2025-07-01 DIAGNOSIS — Z79.899 IMMUNOSUPPRESSIVE MANAGEMENT ENCOUNTER FOLLOWING KIDNEY TRANSPLANT (CMD): ICD-10-CM

## 2025-07-01 LAB
25(OH)D3+25(OH)D2 SERPL-MCNC: 36 NG/ML (ref 30–100)
ALBUMIN SERPL-MCNC: 3.3 G/DL (ref 3.4–5)
ALBUMIN/GLOB SERPL: 1 {RATIO} (ref 1–2.4)
ALP SERPL-CCNC: 82 UNITS/L (ref 45–117)
ALT SERPL-CCNC: 27 UNITS/L
ANION GAP SERPL CALC-SCNC: 11 MMOL/L (ref 7–19)
APPEARANCE UR: CLEAR
AST SERPL-CCNC: 19 UNITS/L
BACTERIA #/AREA URNS HPF: NORMAL /HPF
BASOPHILS # BLD: 0 K/MCL (ref 0–0.3)
BASOPHILS NFR BLD: 0 %
BILIRUB SERPL-MCNC: 0.5 MG/DL (ref 0.2–1)
BILIRUB UR QL STRIP: NEGATIVE
BUN SERPL-MCNC: 23 MG/DL (ref 6–20)
BUN/CREAT SERPL: 19 (ref 7–25)
CALCIUM SERPL-MCNC: 9.1 MG/DL (ref 8.4–10.2)
CHLORIDE SERPL-SCNC: 109 MMOL/L (ref 97–110)
CO2 SERPL-SCNC: 23 MMOL/L (ref 21–32)
COLOR UR: NORMAL
CREAT SERPL-MCNC: 1.24 MG/DL (ref 0.67–1.17)
CREAT UR-MCNC: 60.7 MG/DL
DEPRECATED RDW RBC: 50.7 FL (ref 39–50)
EGFRCR SERPLBLD CKD-EPI 2021: 67 ML/MIN/{1.73_M2}
EOSINOPHIL # BLD: 0 K/MCL (ref 0–0.5)
EOSINOPHIL NFR BLD: 0 %
ERYTHROCYTE [DISTWIDTH] IN BLOOD: 16.8 % (ref 11–15)
FASTING DURATION TIME PATIENT: 8 HOURS (ref 0–999)
GLOBULIN SER-MCNC: 3.4 G/DL (ref 2–4)
GLUCOSE SERPL-MCNC: 106 MG/DL (ref 70–99)
GLUCOSE UR STRIP-MCNC: NEGATIVE MG/DL
HBA1C MFR BLD: 5.3 % (ref 4.5–5.6)
HBV CORE IGG+IGM SER QL: NEGATIVE
HBV SURFACE AG SER QL: NEGATIVE
HCT VFR BLD CALC: 41.9 % (ref 39–51)
HCV AB SER QL: NEGATIVE
HGB BLD-MCNC: 13.5 G/DL (ref 13–17)
HGB UR QL STRIP: NEGATIVE
HIV 1+2 AB+HIV1 P24 AG SERPL QL IA: NONREACTIVE
HYALINE CASTS #/AREA URNS LPF: NORMAL /LPF
IMM GRANULOCYTES # BLD AUTO: 0 K/MCL (ref 0–0.2)
IMM GRANULOCYTES # BLD: 0 %
KETONES UR STRIP-MCNC: NEGATIVE MG/DL
LEUKOCYTE ESTERASE UR QL STRIP: NEGATIVE
LYMPHOCYTES # BLD: 1 K/MCL (ref 1–4)
LYMPHOCYTES NFR BLD: 11 %
MAGNESIUM SERPL-MCNC: 2.3 MG/DL (ref 1.7–2.4)
MCH RBC QN AUTO: 26.8 PG (ref 26–34)
MCHC RBC AUTO-ENTMCNC: 32.2 G/DL (ref 32–36.5)
MCV RBC AUTO: 83.3 FL (ref 78–100)
MONOCYTES # BLD: 0.9 K/MCL (ref 0.3–0.9)
MONOCYTES NFR BLD: 9 %
NEUTROPHILS # BLD: 7.3 K/MCL (ref 1.8–7.7)
NEUTROPHILS NFR BLD: 80 %
NITRITE UR QL STRIP: NEGATIVE
NRBC BLD MANUAL-RTO: 0 /100 WBC
PH UR STRIP: 7 [PH] (ref 5–7)
PHOSPHATE SERPL-MCNC: 3.5 MG/DL (ref 2.4–4.7)
PLATELET # BLD AUTO: 167 K/MCL (ref 140–450)
POTASSIUM SERPL-SCNC: 3.7 MMOL/L (ref 3.4–5.1)
PROT SERPL-MCNC: 6.7 G/DL (ref 6.4–8.2)
PROT UR STRIP-MCNC: NEGATIVE MG/DL
PROT UR-MCNC: 13 MG/DL
PROT/CREAT UR: 214 MGPR/GCR
RBC # BLD: 5.03 MIL/MCL (ref 4.5–5.9)
RBC #/AREA URNS HPF: NORMAL /HPF
SODIUM SERPL-SCNC: 139 MMOL/L (ref 135–145)
SP GR UR STRIP: 1.01 (ref 1–1.03)
SQUAMOUS #/AREA URNS HPF: NORMAL /HPF
TACROLIMUS BLD-MCNC: 3.8 NG/ML (ref 5–20)
UROBILINOGEN UR STRIP-MCNC: 0.2 MG/DL
WBC # BLD: 9.2 K/MCL (ref 4.2–11)
WBC #/AREA URNS HPF: NORMAL /HPF

## 2025-07-01 PROCEDURE — 36415 COLL VENOUS BLD VENIPUNCTURE: CPT | Performed by: INTERNAL MEDICINE

## 2025-07-02 LAB
CMV DNA # SPEC NAA+PROBE: NOT DETECTED {COPIES}/ML
CMV DNA SERPL NAA+PROBE-ACNC: NOT DETECTED [IU]/ML
CMV IGG SERPL IA-ACNC: 2.55 ISR
CMV IGM SERPL IA-ACNC: 0.47 OD RATIO
HCV RNA SERPL NAA+PROBE-ACNC: NOT DETECTED K[IU]/ML
HCV RNA SERPL NAA+PROBE-LOG IU: NOT DETECTED {LOG_IU}/ML
HIV1 RNA # SERPL NAA+PROBE: NOT DETECTED {COPIES}/ML
HIV1 RNA SERPL NAA+PROBE-LOG#: NOT DETECTED {LOG_COPIES}/ML
PTH-INTACT SERPL-MCNC: 135 PG/ML (ref 19–88)
SERVICE CMNT-IMP: NORMAL
SIROLIMUS BLD-MCNC: 4.4 NG/ML (ref 5–20)

## 2025-07-03 LAB
BKV DNA # SPEC NAA+PROBE: NOT DETECTED {COPIES}/ML
BKV DNA # UR NAA+PROBE: 2550 IU/ML
BKV DNA SPEC NAA+PROBE-LOG#: 3.41 LOG
BKV DNA SPEC NAA+PROBE-LOG#: NOT DETECTED {LOG_COPIES}/ML
EBV DNA # SPEC NAA+PROBE: NOT DETECTED {COPIES}/ML
EBV DNA SPEC NAA+PROBE-LOG#: NOT DETECTED {LOG_COPIES}/ML
HBV DNA SERPL NAA+PROBE-ACNC: NOT DETECTED [IU]/ML
HBV DNA SERPL NAA+PROBE-LOG IU: NOT DETECTED {LOG_IU}/ML
SERVICE CMNT-IMP: ABNORMAL
SERVICE CMNT-IMP: NORMAL

## 2025-07-08 LAB — CMV IGG AVIDITY SERPL IA-RTO: 0.9 %

## 2025-07-10 ENCOUNTER — OFFICE VISIT (OUTPATIENT)
Dept: NEPHROLOGY | Facility: CLINIC | Age: 60
End: 2025-07-10
Payer: MEDICARE

## 2025-07-10 VITALS — BODY MASS INDEX: 36 KG/M2 | SYSTOLIC BLOOD PRESSURE: 128 MMHG | WEIGHT: 279.19 LBS | DIASTOLIC BLOOD PRESSURE: 80 MMHG

## 2025-07-10 DIAGNOSIS — I10 ESSENTIAL HYPERTENSION: ICD-10-CM

## 2025-07-10 DIAGNOSIS — Z94.0 RENAL TRANSPLANT RECIPIENT (HCC): Primary | ICD-10-CM

## 2025-07-10 DIAGNOSIS — N18.31 CKD STAGE 3A, GFR 45-59 ML/MIN (HCC): ICD-10-CM

## 2025-07-10 PROCEDURE — 99214 OFFICE O/P EST MOD 30 MIN: CPT | Performed by: INTERNAL MEDICINE

## 2025-07-10 PROCEDURE — 3079F DIAST BP 80-89 MM HG: CPT | Performed by: INTERNAL MEDICINE

## 2025-07-10 PROCEDURE — 3074F SYST BP LT 130 MM HG: CPT | Performed by: INTERNAL MEDICINE

## 2025-07-10 NOTE — PROGRESS NOTES
Nephrology Progress Note      Suresh Prather is a 60 year old male.    HPI:     Chief Complaint   Patient presents with    Renal Transplant       Mr. Prather was seen in the nephrology clinic today in follow-up for management of chronic kidney disease stage IIIa in the setting of renal transplant performed July 23, 2024 at [facility].  Since his last clinic visit he has been generally well although does continue to have lower abdominal pain the etiology of which is not clear.  Extensive evaluation thus far has been unrevealing with imaging with ultrasound and CAT scan, MRCP and upper endoscopy.  He is scheduled to have a nerve block in the near future.  Of note he has seen gastroenterology, urology and his transplant team.  His other concern is that he has had weight gain despite having attention to his diet and increasing his exercise level      HISTORY:  Past Medical History[1]   Past Surgical History[2]   Family History[3]   Social History: Short Social Hx on File[4]     Medications (Active prior to today's visit):  Current Medications[5]    Allergies:  Allergies[6]      ROS:     Denies fever/chills  + wt gain  Denies HA or visual changes  Denies CP or palpitations  Denies SOB/cough/hemoptysis  Denies abd or flank pain  Denies N/V/D  Denies change in urinary habits or gross hematuria  Denies LE edema  Denies skin rashes/myalgias/arthralgias      PHYSICAL EXAM:   Wt 279 lb 3 oz (126.6 kg)   BMI 35.85 kg/m²   Wt Readings from Last 6 Encounters:   07/10/25 279 lb 3 oz (126.6 kg)   06/30/25 265 lb (120.2 kg)   05/21/25 265 lb (120.2 kg)   05/08/25 264 lb 12.8 oz (120.1 kg)   04/21/25 265 lb (120.2 kg)   04/10/25 271 lb 3.2 oz (123 kg)       General: Alert and oriented in no apparent distress.  HEENT: No scleral icterus, MMM  Neck: Supple, no LISY or thyromegaly  Cardiac: Regular rate and rhythm, S1, S2 normal, no murmur or rub  Lungs: Clear without wheezes, rales, rhonchi.    Extremities: Without  clubbing, cyanosis or edema.  Neurologic: Alert and oriented, normal affect, cranial nerves grossly intact, moving all extremities  Skin: Warm and dry, no rashes      LABS:     Labs from  show creatinine 1.24 mg/dL  Tacrolimus level 3.8    ASSESSMENT/PLAN:     #1.  Chronic kidney disease stage IIIa-he has a modest elevation in creatinine since his transplant with most recent creatinine at 1.2 mg/dL or so.  We will continue to monitor the renal function moving forward    #2.  Renal transplant recipient-he underwent  donor transplant 2024 at [facility].  Immunosuppression consists of prednisone 2.5 mg daily, sirolimus 1 mg daily and Prograf    #3.  Left lower quadrant abdominal pain-despite extensive evaluation etiology remains unclear and continues to follow with multiple specialist.    Thank you again for allowing me to participate in care of your patient.  Please do not hesitate to call with any question or concerns.      Brant Vaz MD  7/10/2025  8:56 AM             [1]   Past Medical History:   Abdominal pain    Left side liwer abdomen pain    Anaphylactic reaction due to adverse effect of correct drug or medicament properly administered, initial encounter    Anemia    Since 1st kidney transplant    Arthritis    Rheumatoid    Cancer (HCC)    Thyroid cancer    Change in hair    Due to dialysis    Closed fracture of distal end of left femur, unspecified fracture morphology, initial encounter ([facility])    Constipation    Dialysis patient    Diverticulosis of large intestine    Easy bruising    ESRD on hemodialysis (HCC)    kidney transplant -no longer on dialysis    Exposure to medical diagnostic radiation    completed     Fatigue    Frequent use of laxatives    Gout    Hearing loss    History of blood transfusion    few years ago    History of total left knee replacement    Hypertension    Itch of skin    Due to dialysis    Kidney disorder    Kidney failure    Kidney  replaced by transplant (HCC)    Nephritis and nephropathy, not specified as acute or chronic, with unspecified pathological lesion in kidney    Painful total knee replacement, sequela    Papillary thyroid carcinoma (HCC)    Dx in 1/2015: tx with surgery and MANZO    PONV (postoperative nausea and vomiting)    s/p kidney transplant    Postlaminectomy syndrome, cervical region    Log Date: 12/12/2012     Protein-calorie malnutrition (HCC)    Rheumatoid arthritis (HCC)    Sleep apnea    Status post cervical spinal fusion    Stented coronary artery    Syncope    Trigger finger (acquired)    Unspecified hemorrhoids without mention of complication    Wears glasses    For reading and distance    Weight gain    Transplant meds   [2]   Past Surgical History:  Procedure Laterality Date    Anesth,kidney transplant      Angioplasty (coronary)  2015    Appendectomy  2003    Appendectomy      Back surgery      Cath percutaneous  transluminal coronary angioplasty  2016    Cath pv  12/11/2015    Done at -patient states requires future angioplasty.    Cholecystectomy  2003    Colonoscopy      2003?    Colonoscopy N/A 05/01/2015    Procedure: COLONOSCOPY;  Surgeon: Cuauhtemoc Carty MD;  Location:  ENDOSCOPY    Colonoscopy N/A 10/23/2018    Procedure: COLONOSCOPY;  Surgeon: Andres Wills MD;  Location:  ENDOSCOPY    Colonoscopy N/A 11/14/2023    Procedure: COLONOSCOPY with forcep polypectomy;  Surgeon: Andres Wills MD;  Location:  ENDOSCOPY    Femur fracture surgery Left 02/2023    Femur/knee surg unlisted  1994    right knee repair of ligament cruciate anterior    Knee replacement surgery      Knee surgery      Laminectomy,facetectomy,lumbar  05/29/2012    foraminotomy cervic seg    Or/kidney trans  10-28-97    Organ transplant      2nd kidney transplant 2024    Other surgical history      arthrodesis cervical ACDF at C5-6    Other surgical history Right 1997    renal transplant - Done at [facility]    Revise median  n/carpal tunnel surg  2009    neuroplasty decompression median     Thyroidectomy      On 2/2/2015: total thyroidectomy for papillary thyroid carcinoma   [3]   Family History  Problem Relation Age of Onset    Breast Cancer Mother 65    Other (bipolar disorder) Mother     Other (glomerulonephritis) Mother     Kidney Disease Mother     Stroke Maternal Grandmother     Other (glomerulonephritis) Maternal Grandmother     Cancer Paternal Uncle         prostate CA    Heart Disorder Paternal Uncle         CAD/Aortic disease    Heart Attack Maternal Uncle     Cancer Son         Mother- Breast cancer Uncle-Prostate cancern    Cancer Son     Cancer Son         Mother- Breast cancer Uncle-Prostate cancern   [4]   Social History  Socioeconomic History    Marital status:    Tobacco Use    Smoking status: Never    Smokeless tobacco: Never   Vaping Use    Vaping status: Never Used   Substance and Sexual Activity    Alcohol use: No    Drug use: No   Other Topics Concern    Caffeine Concern No    Stress Concern No    Weight Concern Yes    Special Diet No    Exercise Yes     Comment: Daily stretching    Seat Belt Yes   Social History Narrative    ** Merged History Encounter **          Social Drivers of Health     Food Insecurity: Low Risk  (8/12/2024)    Received from [facility]    Food Insecurity     Within the past 12 months, you worried that your food would run out before you got money to buy more.  : Never true     Within the past 12 months, the food you bought just didn't last and you didn't have money to get more. : Never true   Transportation Needs: At Risk (8/12/2024)    Received from [facility]    Transportation Needs     In the past 12 months, has lack of reliable transportation kept you from medical appointments, meetings, work or from getting things needed for daily living? : Yes   Stress: Low Risk  (8/12/2024)    Received from [facility]    Stress     Stress is when someone feels  tense, nervous, anxious, or can't sleep at night because their mind is troubled. How stressed are you? : Not at all   [5]   Current Outpatient Medications   Medication Sig Dispense Refill    Lidocaine 4 % External Cream Apply 1 Application topically.      ALLOPURINOL 100 MG Oral Tab TAKE 1 TABLET(100 MG) BY MOUTH DAILY 90 tablet 1    VITAMIN D OR Take by mouth daily.      predniSONE 10 MG Oral Tab Take 0.5 tablets (5 mg total) by mouth daily.      tacrolimus 1 MG Oral Cap Take 1 capsule (1 mg total) by mouth 2 (two) times daily. 1mg am and 0.5 in pm      sirolimus 1 MG Oral Tab daily.      Naloxone HCl 4 MG/0.1ML Nasal Liquid 4 mg by Nasal route as needed.      Magnesium Oxide -Mg Supplement (MAGNESIUM-OXIDE) 400 (240 Mg) MG Oral Tab       amoxicillin 500 MG Oral Tab Take 4 tablets (2,000 mg total) by mouth As Directed. Take 2000 mg before dental procedures 30 tablet 0    famotidine 20 MG Oral Tab Take 1 tablet (20 mg total) by mouth as needed.      potassium chloride 20 MEQ Oral Tab CR Take 1 tablet (20 mEq total) by mouth in the morning.      sulfamethoxazole-trimethoprim -160 MG Oral Tab per tablet Take 0.5 tablets by mouth 3 (three) times a week. Monday, Wednesday, Friday      torsemide 20 MG Oral Tab Take 1 tablet (20 mg total) by mouth every morning.      levothyroxine 150 MCG Oral Tab Take 1 tablet (150 mcg total) by mouth before breakfast.      Buprenorphine HCl-Naloxone HCl 8-2 MG Sublingual SL Tab Place 0.5 tablets under the tongue 2 (two) times a day. 30 tablet 0    lactulose 10 GM/15ML Oral Solution Take 30 mL (20 g total) by mouth as needed in the morning and 30 mL (20 g total) as needed in the evening.      atorvastatin 40 MG Oral Tab Take 1 tablet (40 mg total) by mouth nightly.      aspirin 81 MG Oral Tab EC Take 1 tablet (81 mg total) by mouth in the morning.     [6]   Allergies  Allergen Reactions    Carvedilol HIVES and SWELLING     TABS    Ciprofloxacin HIVES and UNKNOWN    Ciprofloxacin  Hcl HIVES and ITCHING     TABS    Duloxetine OTHER (SEE COMMENTS)    Fosinopril OTHER (SEE COMMENTS)    Gabapentin SWELLING     Foot swelling    Hydralazine HIVES and UNKNOWN    Metoprolol OTHER (SEE COMMENTS) and UNKNOWN    Monopril [Fosinopril Sodium] HIVES and ITCHING    Pravastatin Sodium SWELLING     TABS    Nortriptyline DIARRHEA, NAUSEA AND VOMITING and OTHER (SEE COMMENTS)    Minoxidil OTHER (SEE COMMENTS)     Chest pain

## 2025-07-11 ENCOUNTER — LAB SERVICES (OUTPATIENT)
Dept: LAB | Age: 60
End: 2025-07-11

## 2025-07-11 DIAGNOSIS — Z94.0 KIDNEY TRANSPLANT STATUS (CMD): ICD-10-CM

## 2025-07-11 LAB — BKR KIT TESTING DISCLAIMER STATEMENT: NORMAL

## 2025-07-11 PROCEDURE — 36415 COLL VENOUS BLD VENIPUNCTURE: CPT | Performed by: INTERNAL MEDICINE

## 2025-07-21 ENCOUNTER — TELEPHONE (OUTPATIENT)
Dept: TRANSPLANT | Age: 60
End: 2025-07-21

## 2025-07-21 DIAGNOSIS — D84.9 IMMUNOSUPPRESSION  (CMD): ICD-10-CM

## 2025-07-21 DIAGNOSIS — Z94.0 STATUS POST KIDNEY TRANSPLANT (CMD): Primary | ICD-10-CM

## 2025-07-22 ENCOUNTER — OFFICE VISIT (OUTPATIENT)
Dept: TRANSPLANT | Age: 60
End: 2025-07-22
Attending: INTERNAL MEDICINE

## 2025-07-22 ENCOUNTER — LAB SERVICES (OUTPATIENT)
Dept: LAB | Age: 60
End: 2025-07-22

## 2025-07-22 ENCOUNTER — APPOINTMENT (OUTPATIENT)
Dept: LAB | Age: 60
End: 2025-07-22

## 2025-07-22 VITALS
DIASTOLIC BLOOD PRESSURE: 79 MMHG | SYSTOLIC BLOOD PRESSURE: 141 MMHG | BODY MASS INDEX: 35.79 KG/M2 | TEMPERATURE: 98.1 F | RESPIRATION RATE: 16 BRPM | WEIGHT: 278.88 LBS | HEIGHT: 74 IN | HEART RATE: 83 BPM

## 2025-07-22 VITALS
RESPIRATION RATE: 16 BRPM | HEIGHT: 74 IN | BODY MASS INDEX: 35.79 KG/M2 | SYSTOLIC BLOOD PRESSURE: 141 MMHG | HEART RATE: 83 BPM | DIASTOLIC BLOOD PRESSURE: 79 MMHG | TEMPERATURE: 98.1 F | WEIGHT: 278.88 LBS

## 2025-07-22 DIAGNOSIS — I25.10 CORONARY ARTERY DISEASE INVOLVING NATIVE CORONARY ARTERY OF NATIVE HEART WITHOUT ANGINA PECTORIS: ICD-10-CM

## 2025-07-22 DIAGNOSIS — Z94.0 STATUS POST KIDNEY TRANSPLANT (CMD): ICD-10-CM

## 2025-07-22 DIAGNOSIS — R82.79 BK VIRURIA: ICD-10-CM

## 2025-07-22 DIAGNOSIS — I10 BENIGN ESSENTIAL HTN: Primary | ICD-10-CM

## 2025-07-22 DIAGNOSIS — D84.9 IMMUNOSUPPRESSION  (CMD): ICD-10-CM

## 2025-07-22 DIAGNOSIS — Z94.0 STATUS POST KIDNEY TRANSPLANT (CMD): Primary | ICD-10-CM

## 2025-07-22 DIAGNOSIS — E83.42 HYPOMAGNESEMIA: ICD-10-CM

## 2025-07-22 DIAGNOSIS — Z94.0 RENAL TRANSPLANT, STATUS POST (CMD): Primary | ICD-10-CM

## 2025-07-22 DIAGNOSIS — E83.39 HYPOPHOSPHATEMIA: ICD-10-CM

## 2025-07-22 PROCEDURE — 36415 COLL VENOUS BLD VENIPUNCTURE: CPT | Performed by: INTERNAL MEDICINE

## 2025-07-22 PROCEDURE — 99212 OFFICE O/P EST SF 10 MIN: CPT

## 2025-07-22 RX ORDER — LEVOTHYROXINE SODIUM 175 UG/1
175 TABLET ORAL DAILY
COMMUNITY
Start: 2025-07-01

## 2025-07-22 RX ORDER — TORSEMIDE 20 MG/1
20 TABLET ORAL DAILY
Qty: 90 TABLET | Refills: 1 | Status: SHIPPED | OUTPATIENT
Start: 2025-07-22

## 2025-07-22 ASSESSMENT — PAIN SCALES - GENERAL
PAINLEVEL_OUTOF10: 0
PAINLEVEL_OUTOF10: 9

## 2025-07-25 LAB — SERVICE CMNT-IMP: NORMAL

## 2025-09-03 ENCOUNTER — TELEPHONE (OUTPATIENT)
Dept: TRANSPLANT | Age: 60
End: 2025-09-03

## 2025-09-03 DIAGNOSIS — D84.9 IMMUNOSUPPRESSION  (CMD): ICD-10-CM

## 2025-09-03 DIAGNOSIS — E87.6 HYPOKALEMIA: ICD-10-CM

## 2025-09-03 DIAGNOSIS — E83.42 HYPOMAGNESEMIA: ICD-10-CM

## 2025-09-03 DIAGNOSIS — R82.79 BK VIRURIA: ICD-10-CM

## 2025-09-03 DIAGNOSIS — E83.39 HYPOPHOSPHATEMIA: ICD-10-CM

## 2025-09-03 DIAGNOSIS — Z94.0 STATUS POST KIDNEY TRANSPLANT (CMD): Primary | ICD-10-CM

## 2025-09-08 ENCOUNTER — APPOINTMENT (OUTPATIENT)
Dept: LAB | Age: 60
End: 2025-09-08

## (undated) DIAGNOSIS — Z99.2 ESRD ON HEMODIALYSIS (HCC): Primary | ICD-10-CM

## (undated) DIAGNOSIS — N18.6 ESRD ON HEMODIALYSIS (HCC): Primary | ICD-10-CM

## (undated) DEVICE — SUTURE PRMHND 0 30IN SILK BRAID TIES 6 STRN PCUT NABSB BLK A306H

## (undated) DEVICE — 3M™ RED DOT™ MONITORING ELECTRODE WITH FOAM TAPE AND STICKY GEL, 50/BAG, 20/CASE, 72/PLT 2570: Brand: RED DOT™

## (undated) DEVICE — SET IRR .188IN 82IN 4.5IN CONTINUOUS OVRHD DIST FLXB CNCT

## (undated) DEVICE — WAX BN 2.5GM HMST AGENT LUKENS WHT

## (undated) DEVICE — CATHETER LBRCTH 18FR 30CC FOLEY 3W 2 STAGGER DRN EYE MED RND

## (undated) DEVICE — HEMOCLIP MED 24 CLIP/CARTRIDGE

## (undated) DEVICE — SUTURE POLYDEK 4-0 TIES 6-932

## (undated) DEVICE — STAPLER SKIN 3.9X6.9MM WIDE 35 CNT FX HEAD RCHT STRL LF

## (undated) DEVICE — BITEBLOCK ENDOSCP 60FR MAXI STRP

## (undated) DEVICE — STOCKINETTE HYDROMED 8X6

## (undated) DEVICE — CHLORAPREP ORANGE TINT 10.5ML

## (undated) DEVICE — SOL  .9 1000ML BTL

## (undated) DEVICE — GUIDEWIRE LCP 2.5MM 200MM ORTHO DRILL TIP SS NS TMFX OSTM

## (undated) DEVICE — BIT DRILL 2.5MM 110MM QC NS GOLD REPRO

## (undated) DEVICE — STANDARD HYPODERMIC NEEDLE,POLYPROPYLENE HUB: Brand: MONOJECT

## (undated) DEVICE — KIT RM TURNOVER CSTM IC DISP NS LF

## (undated) DEVICE — 2% CHLORHEXIDINE SKIN PREP ORANGE 26ML

## (undated) DEVICE — SUTURE PRMHND 4-0 30IN SILK BRAID TIES 12 STRN PCUT NABSB A303H

## (undated) DEVICE — GLOVE SURG 7.5 PROTEXIS PI LF CRM PF BEAD CUFF STRL PLISPRN

## (undated) DEVICE — GLOVE SURG SENSICARE SZ 8

## (undated) DEVICE — 10FT COMBINED O2 DELIVERY/CO2 MONITORING. FILTER WITH MICROSTREAM TYPE LUER: Brand: DUAL ADULT NASAL CANNULA

## (undated) DEVICE — TOWEL OR WHT 16X26IN 4PK

## (undated) DEVICE — Device

## (undated) DEVICE — 1200CC GUARDIAN II: Brand: GUARDIAN

## (undated) DEVICE — GEL AQUASONIC 100 20GR

## (undated) DEVICE — SUTURE VICRYL 2-0 CT2 27IN BRAID COAT ABS UNDYED J269H

## (undated) DEVICE — CONVERTORS STOCKINETTE: Brand: CONVERTORS

## (undated) DEVICE — TRAY CATH CMP CR LUBRI-SIL IC STLK SURESTEP 16FR FOLEY URMTR

## (undated) DEVICE — GAUZE SPONGES,12 PLY: Brand: CURITY

## (undated) DEVICE — DRAPE 2 PCKT POUCH ADH STRIP 11X7IN TRANS SURGI-KIT STRL LF

## (undated) DEVICE — BIT DRILL 3.2MM 300MM QC CALIBRATE PERC

## (undated) DEVICE — SYRINGE 50ML GRAD N-PYRG DEHP-FR PVC FREE STRL MED LF DISP

## (undated) DEVICE — GLOVE SURG 8 PREMIERPRO LF NATURAL PF TXTR SMTH STRL

## (undated) DEVICE — DRAIN RELIAVAC W/DRN MED 1/8

## (undated) DEVICE — GLOVE SURG TRIUMPH SZ 8

## (undated) DEVICE — HEMOSTAT ABS 8X4IN SURGICEL

## (undated) DEVICE — HANDPIECE SCT MDVC YNKR BLBS TIP CLR STRL LF DISP

## (undated) DEVICE — DRAPE 2 INCS FILM ANTIMICROBIAL 33X23IN SURG IOBAN STRL

## (undated) DEVICE — ENDOSCOPY PACK - LOWER: Brand: MEDLINE INDUSTRIES, INC.

## (undated) DEVICE — DRESSING PETRO 9X5IN NADH OCL IMPREGNATE CRD XEROFORM CTN

## (undated) DEVICE — SUTURE ETHIBOND EXCEL 3-0 SH

## (undated) DEVICE — SUTURE VICRYL 0 CT-1

## (undated) DEVICE — KIT VLV 5 PC AIR H2O SUCT BX ENDOGATOR CONN

## (undated) DEVICE — SUTURE VICRYL 3-0 PS-1

## (undated) DEVICE — DRAPE,EXTREMITY,89X128,STERILE: Brand: MEDLINE

## (undated) DEVICE — GIJAW SINGLE-USE BIOPSY FORCEPS WITH NEEDLE: Brand: GIJAW

## (undated) DEVICE — GLOVE SURG 8 PROTEXIS PI LF CRM PF BEAD CUFF STRL PLISPRN

## (undated) DEVICE — SUTURE VICRYL 1 CT1 27IN BRAID COAT ABS UNDYED J261H

## (undated) DEVICE — SUTURE PROLENE PP MONO NABSB BLUE STRL LF 8720ZH

## (undated) DEVICE — FILTERLINE NASAL ADULT O2/CO2

## (undated) DEVICE — 3M™ IOBAN™ 2 ANTIMICROBIAL INCISE DRAPE 6650EZ: Brand: IOBAN™ 2

## (undated) DEVICE — BANDAGE GAUZE BLK2 4.1YDX4.5IN 6 PLY OPEN WEAVE TIGHT FNSH

## (undated) DEVICE — SPONGE LAP 18X18IN STRL

## (undated) DEVICE — PACK SURG KNDY TRNSPT LF

## (undated) DEVICE — GOWN SURG LG L4 RAGLAN SLV BRTHBL STRL LF DISP SMARTGOWN

## (undated) DEVICE — UNDYED BRAIDED (POLYGLACTIN 910), SYNTHETIC ABSORBABLE SUTURE: Brand: COATED VICRYL

## (undated) DEVICE — KIT CUSTOM ENDOPROCEDURE STERIS

## (undated) DEVICE — SPONGE GAUZE 4X4IN CTN 16 PLY WOVEN FOLD EDGE STRL LF

## (undated) DEVICE — BIT DRILL 4.3MM 300MM QC CALIBRATE PERC NS

## (undated) DEVICE — DRAPE REINFORCE SPLIT ABS TUBE HLDR UNV 120X77IN SURG CNVRT

## (undated) DEVICE — POUCH INST 11X7IN 2 ADH STRIP 2 CMPRT STRL STRDRP PLASTIC

## (undated) DEVICE — SOL  .9 500ML

## (undated) DEVICE — SOLUTION IRR 1000ML 0.9% NACL PLASTIC POUR BTL ISTNC N-PYRG

## (undated) DEVICE — Device: Brand: DEFENDO AIR/WATER/SUCTION AND BIOPSY VALVE

## (undated) DEVICE — DRAIN SILICONE FLAT 10MM

## (undated) DEVICE — HANDPIECE SCT MDVC FLX-CLR HI CAPACITY FLXB CLR STRL LF DISP

## (undated) DEVICE — SKIN AFFIX .4ML

## (undated) DEVICE — GLOVE SURG 7.5 PROTEXIS LF BLUE PF SMTH BEAD CUFF INTLK STRL

## (undated) DEVICE — V2 SPECIMEN COLLECTION MANIFOLD KIT: Brand: NEPTUNE

## (undated) DEVICE — SUTURE VICRYL 4-0 SH 27IN BRAID COAT ABS UNDYED J415H

## (undated) DEVICE — SUTURE PROLENE 6-0 C-1

## (undated) DEVICE — DRESSING TRANS 4.75X4IN ADH HPOAL WTPRF TEGADERM PU STD STRL

## (undated) DEVICE — SUTURE PROLENE 7-0 CC

## (undated) DEVICE — BALLOON HEMOSTATIC EUS LINEAR

## (undated) DEVICE — PADDING CAST SOFT ROLL 4\"

## (undated) DEVICE — GLOVE SURG 7 PROTEXIS LF BLUE PF SMTH BEAD CUFF INTLK STRL

## (undated) DEVICE — HANDPIECE ESURG 10FT BNABM CORD 6IN STRL LF DISP

## (undated) DEVICE — TRANSPOSAL ULTRAFLEX DUO/QUAD ULTRA CART MANIFOLD

## (undated) DEVICE — NEEDLE HPO 16GA 1.5IN REG WALL REG BVL LL HUB DEHP-FR STRL

## (undated) DEVICE — FAN SPRAY KIT: Brand: PULSAVAC®

## (undated) DEVICE — REM POLYHESIVE ADULT PATIENT RETURN ELECTRODE: Brand: VALLEYLAB

## (undated) DEVICE — SUTURE VICRYL 2-0 CT-1

## (undated) DEVICE — GOWN SURG AERO CHROME XXL

## (undated) DEVICE — SYRINGE 30ML CONC TIP GRAD N-PYRG DEHP-FR STRL MED LF DISP

## (undated) DEVICE — SUTURE VICRYL 3-0 CT-1

## (undated) DEVICE — TOWEL OR BLU 16X26 STRL

## (undated) DEVICE — SUTURE PRMHND 2-0 SH 18IN SILK CNTRL RELS BRAID 8 STRN NABSB C012D

## (undated) DEVICE — GOWN,SIRUS,FABRIC-REINFORCED,X-LARGE: Brand: MEDLINE

## (undated) DEVICE — CV PACK-LF: Brand: MEDLINE INDUSTRIES, INC.

## (undated) DEVICE — DRAPE 2 INCS FILM ANTIMICROBIAL 23X17IN SURG IOBAN STRL

## (undated) DEVICE — BLADE SURG 15 STRL PRSNA + PLMR

## (undated) DEVICE — GLOVE SURG 6.5 PREMIERPRO LF NATURAL PF TXTR SMTH STRL

## (undated) DEVICE — TUBE FEED ENTRL 5F 90CM

## (undated) DEVICE — PAD ABD 9X5IN CELLULOSE ABS NONWOVEN HDRPHB BACK SEAL EDGE

## (undated) DEVICE — DRAPE U STRIP IMPRV ADH SPLIT 72X60IN 21X6IN SURG CNVRT STRL

## (undated) DEVICE — BAG ISL 20X20IN VDRP VINYL MOIST PROOF SRFC FLXB PNCT RST

## (undated) DEVICE — SYRINGE 20ML GRAD STRL MED DISP LL

## (undated) DEVICE — CLEANER ESURG TIP 5X5CM DEVON LG ADH BACK CAUT PLSHR RADOPQ

## (undated) DEVICE — BANDAGE ROLL,100% COTTON, 6 PLY, LARGE: Brand: KERLIX

## (undated) DEVICE — KENDALL SCD EXPRESS SLEEVES, KNEE LENGTH, MEDIUM: Brand: KENDALL SCD

## (undated) DEVICE — SUTURE PRMHND 2-0 30IN SILK BRAID TIES 12 STRN PCUT NABSB A305H

## (undated) DEVICE — GLOVE SURG 7 PREMIERPRO LF NATURAL PF TXTR SMTH STRL

## (undated) DEVICE — SUTURE ETHILON 2-0 FS

## (undated) DEVICE — DRAPE SLUSH WRMR RND BASIN 66X44IN EQUIPMENT STRL

## (undated) DEVICE — PUNCH SHORT HNDL 4MM HANCOCK AOR

## (undated) DEVICE — TOWEL: OR BLU 80/CS: Brand: MEDICAL ACTION INDUSTRIES

## (undated) DEVICE — HEMOCLIP HORIZON SM MULTI

## (undated) DEVICE — STERIS KITS

## (undated) DEVICE — SUTURE PDS2 1 TP-1 96IN MONO LOOP ABS VIOL

## (undated) DEVICE — GLOVE SURG 7 PROTEXIS PI LF CRM PF BEAD CUFF STRL PLISPRN

## (undated) DEVICE — 3M™ STERI-STRIP™ REINFORCED ADHESIVE SKIN CLOSURES, R1547, 1/2 IN X 4 IN (12 MM X 100 MM), 6 STRIPS/ENVELOPE: Brand: 3M™ STERI-STRIP™

## (undated) DEVICE — GLOVE SURG 8.5 PROTEXIS ESTM LF CRM PF SMTH BEAD CUFF INTLK

## (undated) DEVICE — BASIC DOUBLE BASIN 1-LF: Brand: MEDLINE INDUSTRIES, INC.

## (undated) DEVICE — SPONGE LAPAROTOMY 18X18IN STERILE 5 PK

## (undated) DEVICE — TRAP 4 CPTR CHMBR N EZ INLN

## (undated) DEVICE — TOWEL OR BLU 16X26IN 4PK

## (undated) DEVICE — PREMIUM WET SKIN PREP TRAY: Brand: MEDLINE INDUSTRIES, INC.

## (undated) DEVICE — GOWN SURG XL L3 NONREINFORCE SET IN SLV STRL LF DISP BLUE

## (undated) DEVICE — GLOVE SURG TRIUMPH SZ 71/2

## (undated) DEVICE — DRAPE .75 SHT FNFLD 76X52IN SURG CNVRT STRL LF DISP TIBURON

## (undated) DEVICE — SUTURE VICRYL 3-0 SH 27IN BRAID COAT ABS VIOL J316H

## (undated) DEVICE — SOLUTION PREP 70% ISO ALC 4OZ LF

## (undated) DEVICE — GLOVE SURG 6.5 PROTEXIS LF BLUE PF SMTH BEAD CUFF INTLK STRL

## (undated) DEVICE — DRAPE EXPAND CLPSBL C ARM FLRSCP EQUIPMENT C-ARMOR STRL

## (undated) DEVICE — STOCKINETTE ORTHO 48X12IN IMPRV PULL TAB HLW LF STRL

## (undated) DEVICE — SUTURE PROLENE 6-0 C-1 24IN 2 ARM MONO NABSB BLUE 8726H

## (undated) DEVICE — MEDI-VAC SUCTION FINE CAPACITY: Brand: CARDINAL HEALTH

## (undated) DEVICE — HOOD SRG T7PLUS PEEL AWAY FACE SHLD STRL LF DISP

## (undated) DEVICE — NEEDLE HPO 22GA 1.5IN REG WALL REG BVL LL SHLD MECH DEHP-FR

## (undated) DEVICE — CLIP RESOLUTION 235CM

## (undated) DEVICE — BIT DRILL 3.2MM 145MM QC SS NS GOLD

## (undated) DEVICE — GLOVE SURG 7.5 PREMIERPRO LF NATURAL PF TXTR SMTH STRL

## (undated) DEVICE — DRAPE PK PLATE PRTC FTSWTCH XRY TUBE EQUIPMENT TIDI STRL

## (undated) DEVICE — DECANTER BAG 9": Brand: MEDLINE INDUSTRIES, INC.

## (undated) DEVICE — SNARE CAPTIFLEX MICRO-OVL OLY

## (undated) DEVICE — SUTURE PERMAHAND 4-0 RB1 18IN SILK CNTRL RELS BRAID NABSB C054D

## (undated) DEVICE — GOWN SURG LG L3 NONREINFORCE SET IN SLV STRL LF DISP BLUE

## (undated) DEVICE — SYRINGE 10ML LL TIP

## (undated) DEVICE — STRL PENROSE DRAIN 18" X 1/2": Brand: CARDINAL HEALTH

## (undated) DEVICE — SUTURE PROLENE 6-0 C-1 30IN 2 ARM MONO NABSB BLUE

## (undated) DEVICE — DRAIN RELIAVAC 100CC

## (undated) DEVICE — BRACE REBOUND ONETOUCH SMART-FIT UNV KN POSTOP TOOL FREE

## (undated) DEVICE — LOWER EXTREMITY CDS-LF: Brand: MEDLINE INDUSTRIES, INC.

## (undated) DEVICE — ELECTRODE PT RTN C30- LB 15FT CORD ADH STRIP VALLEYLAB REM

## (undated) DEVICE — 3M™ STERI-DRAPE™ U-DRAPE 1015: Brand: STERI-DRAPE™

## (undated) DEVICE — SUTURE VICRYL 1 OS-6

## (undated) NOTE — LETTER
Yasmin Lo M.D., F.A.C.S. Sergey Mendieta M.D., F.A.C.S. Indigo Mcgregor M.D., [facility]. WM Corey M.D., F.A.C.S. [facility]. Julio Chamberlain M.D., F.A.C.S. MARIO Oliver M. Leah Moody A.D. Aurelio Santee, M.D., F. chance to have all of your questions and concerns answered. If there are any issues which have not been adequately addressed, we ask you to bring them forward so that we can thoroughly address them.     A patient who is fully informed and understands their treatment, among other options and the risks and benefits of the different treatment options:    Yes _____ No _____    A [facility] surgeon as explained to me that if I should so desire, he/she is willing to explain my case and the surgical and non-surgical optio

## (undated) NOTE — MR AVS SNAPSHOT
[place]  [address] [facility] [address]  [address] [facility] [address] [phone]               Thank you for choosing us for your health care visit with Ruth Peralta MD.  We are glad to serve you and happy to provide you with this summa [facility]   [phone]              Allergies as of Jan 07, 2017     Carvedilol Hives, Itching    Carvedilol Swelling    TABS    Ciprofloxacin Hives    Ciprofloxacin Hcl Hives, Itching    TABS    Gabapentin Swelling Generic drug:  Potassium Chloride ER   TAKE 2 TABLETS BY MOUTH THREE TIMES DAILY WITH FOOD           * Potassium Chloride ER 10 MEQ Tbcr   Take 2 tablets (20 mEq total) by mouth 3 (three) times daily.    Commonly known as:  K-DUR           * Levothyroxine S - methylPREDNISolone 4 MG Tbpk            Today's Orders     XR ANKLE (MIN 3 VIEWS), RIGHT (CPT=73610)    Complete by:  Jan 07, 2017 (Approximate)        XR ANKLE (MIN 3 VIEWS), LEFT (CPT=73610)    Complete by:  Jan 07, 2017 (Approximate)              Nelly Visit [facility] online at  [email or web address]

## (undated) NOTE — MR AVS SNAPSHOT
[address], [address] [place] [address] Avenue   Freddie Baez [phone]               Thank you for choosing us for your health care visit with Kandace Ritter MD.  We are glad to serve you and happy to provide you with [facility] ([facility])    [address] [place] [address] [phone]              Allergies as of Jan 09, 2017     Carvedilol Hives, Itching    Carvedilol Swelling    TABS    Ciprofloxaci What changed:  Another medication with the same name was added. Make sure you understand how and when to take each. Commonly known as:  LASIX           * furosemide 40 MG Tabs   Take 1 tablet (40 mg total) by mouth daily. What changed:   You were alread * Notice: This list has 8 medication(s) that are the same as other medications prescribed for you. Read the directions carefully, and ask your doctor or other care provider to review them with you.          Where to Get Your Medications      These medicat Call [phone] for help. Information [facility] is NOT to be used for urgent needs. For medical emergencies, dial 911.            Visit [facility] online at  [email or web address]

## (undated) NOTE — MR AVS SNAPSHOT
[place]  [address] [address]  [address] [facility] [address] [phone]               Thank you for choosing us for your health care visit with Annel Vo MD.  We are glad to serve you and happy to provide you with this summa · Other high fat foods such as gravy, whole milk, and high fat cheeses  · Vegetables such as asparagus, cauliflower, spinach, and mushrooms used to be thought to contribute to an increased risk for a gout attack, but recent studies show that high purine ve and certain foods can trigger a gout attack. Below are some guidelines for changing your diet to help you manage gout. Your healthcare provider can work with you to determine the best eating plan for you. Know that diet is only one part of managing gout.  Kassie Teran · Low in fat (no more than 30% of calories should come from fat, with only 10% coming from animal fat). Date Last Reviewed: 6/17/2015  © 3587-2550 [facility] [address] [place] [address], [address] [place] [address]. All rights reserved.  This information Toprol Xl [Metoprolol Succinate] Swelling    TB24    Hydralazine Hcl     TABS Drowsiness    Pravachol [Pravastatin Sodium] Hives, Itching                Today's Vital Signs     BP Pulse Temp Height Weight BMI    140/88 mmHg 67 98 °F (36.7 °C) (Oral) 74\" taking this medication, and follow the directions you see here. Levothyroxine Sodium 200 MCG Tabs   Take 1 tablet (200 mcg total) by mouth daily.    Commonly known as:  SYNTHROID           Losartan Potassium-HCTZ 100-25 MG Tabs   TAKE 1 TABLET BY

## (undated) NOTE — Clinical Note
KIMBERLY, TCM call made, see notes. NCM attempted to schedule TCM HFU, patient states that he was recently in to see Dr. Mavis Wyman and does not want to schedule a follow up at this time. Message sent to MD's office.

## (undated) NOTE — ED AVS SNAPSHOT
[facility] Emergency Department    [facility]  [address] [address]    Phone:  [phone]    Fax:  [phone]           Saad Plascenciabertha   MRN: [id number]    Department:  [facility] Emergency Department   Date of Visit:  5/1 IF THERE IS ANY CHANGE OR WORSENING OF YOUR CONDITION, CALL YOUR PRIMARY CARE PHYSICIAN AT ONCE OR RETURN IMMEDIATELY TO THE EMERGENCY DEPARTMENT.     If you have been prescribed any medication(s), please fill your prescription right away and begin taking t

## (undated) NOTE — ED AVS SNAPSHOT
Alisson Aleyda   MRN: [id number]    Department:  [facility] Emergency Department   Date of Visit:  2/11/2018           Disclosure     Insurance plans vary and the physician(s) referred by the ER may not be covered by your plan.  Please contact yo tell this physician (or your personal doctor if your instructions are to return to your personal doctor) about any new or lasting problems. The primary care or specialist physician will see patients referred from the [facility] Emergency Department.  Fortunato Kyle

## (undated) NOTE — MR AVS SNAPSHOT
[place]  [address] [facility] [place] [facility]  [address] [facility] [address] [phone]               Thank you for choosing us for your health care visit with Emeli Aragon MD.  We are glad to serve you and happy to provide you with this s - If you are not better within a few hours, go to the nearest emergency department. It was a pleasure seeing you in the clinic today. Thank you for choosing the [facility] office for your healthcare needs.  Please call at [phone] Commonly known as:  VOLTAREN           docusate sodium 100 MG Caps   Take 100 mg by mouth 2 (two) times daily.    Commonly known as:  COLACE           Doxazosin Mesylate 8 MG Tabs   TAKE 2 TABLETS BY MOUTH DAILY   Commonly known as:  Kike Mason * Notice: This list has 4 medication(s) that are the same as other medications prescribed for you. Read the directions carefully, and ask your doctor or other care provider to review them with you.          Where to Get Your Medications      These medicat

## (undated) NOTE — IP AVS SNAPSHOT
[address]            (For Outpatient Use Only) Initial Admit Date: 2023   Inpt/Obs Admit Date: Inpt: 23 / Obs: N/A   Discharge Date:    Elsy Castle:  [de-identified]   MRN: [de-identified]   CSN: [id number]   CEID: [id number]        ENCOUNTER  Patient Class: Inpatient Admitting Provider: Joni Bishop MD Unit: [facility] 3SW-A   Hospital Service: Ortho/Spine Attending Provider: Tosha Mckeon MD   Bed: 354-A   Visit Type:   Referring Physician: No ref. provider found Billing Flag:    Admit Diagnosis: Closed fracture of distal end of left femur, unspecified fracture morphology, initial encounter [facility]*      PATIENT  Legal Name:   Maverick Eagle   Legal Sex: Male  Gender ID: Male             [address] [facility],[address] Name:   LEONIE PCP:  Jean-Paul Butler MD Home: [phone]   Address:  [address] [place] [address] 1* : 3/31/1965 (57 yrs) Mobile: [phone]         City/[facility]/Zip: [facility] [address] Marital:  Language: [address] [place] [address] Drive: Will SSN4: [id number] Alevism: [address] Not Duaine Canavan*     Race: White Ethnicity: Non  Or  O*   EMERGENCY CONTACT   Name Relationship Legal Guardian? Home Phone Work Phone Mobile Phone   1. Kayley Prather  2. *No Contact Specified* Spouse            [phone]       GUARANTOR  Guarantor: Maverick Eagle : 3/31/1965 Home Phone: [phone]   Address: [address] [place] [address]  Sex:  Male Work Phone: [phone]   City/State/Zip: [facility] [address]   Rel. to Patient: Self Guarantor ID: [id number]   [address]   Employer:  Status: RETIRED     COVERAGE  PRIMARY INSURANCE   Payor: MEDICARE Plan: MEDICARE PART A&B   Group Number:  Insurance Type: INDEMNITY   Subscriber Name: Mikie Apley : 1965   Subscriber ID: [id number] Pt Rel to Subscriber: Self   SECONDARY INSURANCE   Payor: COMMERCIAL Plan: [facility]   Group Number:  Insurance Type: [id number] Name: Maverick Eagle Subscriber : 3/31/1965   Subscriber ID: [id number] Pt Rel to Subscriber: SELF   TERTIARY INSURANCE   Payor:  Plan:    Group Number:  Insurance Type:    Subscriber Name:  Subscriber :    Subscriber ID:  Pt Rel to Subscriber:    Hospital Account Financial Class: Medicare    2023

## (undated) NOTE — LETTER
[facility]  Sidney Scherer 61 [address] [facility], [address] [place] [address]    Consent for Operation    Date: __________________    Time: _______________    1.  I authorize the performance upon Alisson Carry the following operation:      left arteriovenous fistula creat videotape. The [facility] will not be responsible for storage or maintenance of this tape. 6. For the purpose of advancing medical education, I consent to the admittance of observers to the Operating Room.     7. I authorize the use of any specimen, organs Signature of Patient:   ___________________________    When the patient is a minor or mentally incompetent to give consent:  Signature of person authorized to consent for patient: ___________________________   Relationship to patient: _____________________ drugs/illegal medications). Failure to inform my anesthesiologist about these medicines may increase my risk of anesthetic complications. · If I am allergic to anything or have had a reaction to anesthesia before.     3. I understand how the anesthesia med I have discussed the procedure and information above with the patient (or patient’s representative) and answered their questions. The patient or their representative has agreed to have anesthesia services.     _______________________________________________

## (undated) NOTE — MR AVS SNAPSHOT
[address], [address] [address] [phone]               Thank you for choosing us for your health care visit with German Tidwell MD.  We are glad to serve you and happy to provide you with [facility] Carvedilol Hives, Itching    Carvedilol Swelling    TABS    Ciprofloxacin Hives    Ciprofloxacin Hcl Hives, Itching    TABS    Gabapentin Swelling    Foot swelling    Hydralazine Hives    Monopril [Fosinopril Sodium] Hives, Itching    Monopril [Fosinopril Take 1 tablet (200 mcg total) by mouth daily.    Commonly known as:  SYNTHROID           * Levothyroxine Sodium 25 MCG Tabs   Take 200mcg tablet plus 25mcg tablet for a total of 225mcg daily   Commonly known as:  SYNTHROID, LEVOTHROID           Losartan Pot You can access your MyChart to more actively manage your health care and view more details from this visit by going to [email or web address].   If you've recently had a stay at the Hospital you can access your discharge instructions in [address] by [email or web address]

## (undated) NOTE — LETTER
Karol Greene M.D., F.A.C.S. Roque Payne M.D., F.A.C.S. Tim Kasper M.D., Megan Ruiz. WM Ha M.D., F.A.C.S. Sharif Babb. Landon Downey M.D., F.A.C.S. MARIO Lopes M. Edwena Proud A.D. Luvenia End, M.D., F. chance to have all of your questions and concerns answered. If there are any issues which have not been adequately addressed, we ask you to bring them forward so that we can thoroughly address them.     A patient who is fully informed and understands their treatment, among other options and the risks and benefits of the different treatment options:    Yes _____ No _____    A [facility] surgeon as explained to me that if I should so desire, he/she is willing to explain my case and the surgical and non-surgical optio

## (undated) NOTE — LETTER
[facility] [address], [address]  Consent for Procedure/Sedation    Date: June 26, 2018    Time: 8:00AM      1. I authorize the performance upon Ole Milder the following:  Perma Cath Insertion     2.  I authorize Dr. Lucinda Frankel ________________________________    ___Self__________    Witness: _________________________      Date: ____________    Printed: 2018   7:50 AM  Patient Name: Seha Berumen        : 3/31/1965       Medical Record #: [id number]

## (undated) NOTE — LETTER
[facility]  Martínsarah Barahonarobert 61 [address] [facility], [address] [place] [address]    Consent for Operation    Date: __________________    Time: _______________    1.  I authorize the performance upon Mary Sher the following operation:    Procedure(s):  IRRIGATION AND DEBR procedure has been videotaped, the surgeon will obtain the original videotape. The hospital will not be responsible for storage or maintenance of this tape.     6. For the purpose of advancing medical education, I consent to the admittance of observers to t STATEMENTS REQUIRING INSERTION OR COMPLETION WERE FILLED IN.     Signature of Patient:   ___________________________    When the patient is a minor or mentally incompetent to give consent:  Signature of person authorized to consent for patient: ____________ drugs/illegal medications). Failure to inform my anesthesiologist about these medicines may increase my risk of anesthetic complications. · If I am allergic to anything or have had a reaction to anesthesia before.     3. I understand how the anesthesia med I have discussed the procedure and information above with the patient (or patient’s representative) and answered their questions. The patient or their representative has agreed to have anesthesia services.     _______________________________________________

## (undated) NOTE — LETTER
[facility]  Sidney Scherer 61 8514 [facility], [address] [place] [address]    Consent for Operation    Date: _______7/4/18___________    Time: _____1400__________    1.  I authorize the performance upon Nidhi Pressley the following operation:    Procedure(s):  INCISION procedure has been videotaped, the surgeon will obtain the original videotape. The hospital will not be responsible for storage or maintenance of this tape.     6. For the purpose of advancing medical education, I consent to the admittance of observers to t STATEMENTS REQUIRING INSERTION OR COMPLETION WERE FILLED IN.     Signature of Patient:   ___________________________    When the patient is a minor or mentally incompetent to give consent:  Signature of person authorized to consent for patient: ____________ drugs/illegal medications). Failure to inform my anesthesiologist about these medicines may increase my risk of anesthetic complications. · If I am allergic to anything or have had a reaction to anesthesia before.     3. I understand how the anesthesia med I have discussed the procedure and information above with the patient (or patient’s representative) and answered their questions. The patient or their representative has agreed to have anesthesia services.     _______________________________________________

## (undated) NOTE — MR AVS SNAPSHOT
[place]  [address] [facility]  [address] [facility] [address] [phone]               Thank you for choosing us for your health care visit with Aravind Lopez MD.  We are glad to serve you and happy to provide you with this summa Referral Details     Referred By    Referred To    Shahriar Andres MD   [address] [place] [address] 100   [address]   Phone:  [phone]   Fax:  [phone]    Diagnoses:  Chronic neck pain   Chronic cervical radiculopathy   History of fusion of cervical Chronic neck pain    Coronary artery disease involving native coronary artery of native heart without angina pectoris    Essential hypertension, benign    History of fusion of cervical spine    Long term current use of antithrombotics/antiplatelets    Vin Take 100 mg by mouth 2 (two) times daily.    Commonly known as:  COLACE           Doxazosin Mesylate 8 MG Tabs   TAKE 2 TABLETS BY MOUTH DAILY   Commonly known as:  CARDURA           Ferrous Sulfate 325 (65 Fe) MG Tabs   Take 1 tablet (325 mg total) by mout medications prescribed for you. Read the directions carefully, and ask your doctor or other care provider to review them with you.             MyChart     Visit [employer]  You can access your MyChart to more actively manage your health care and view more deta ? Always wear good shoes with proper support and traction. ? Always use hand rails on stairs and escalators. ? Cover porch steps with gritty weather proof paint. ? Pay attention to curbs and other changes in surfaces when out in the community.   ? Take c

## (undated) NOTE — ED AVS SNAPSHOT
Rhona Monzon   MRN: [id number]    Department:  [facility] Emergency Department   Date of Visit:  8/3/2018           Disclosure     Insurance plans vary and the physician(s) referred by the ER may not be covered by your plan.  Please contact you tell this physician (or your personal doctor if your instructions are to return to your personal doctor) about any new or lasting problems. The primary care or specialist physician will see patients referred from the [facility] Emergency Department.  Emily Pardo

## (undated) NOTE — ED AVS SNAPSHOT
Omaira Didier   MRN: [id number]    Department:  [facility] Emergency Department   Date of Visit:  12/26/2017           Disclosure     Insurance plans vary and the physician(s) referred by the ER may not be covered by your plan.  Please contact y tell this physician (or your personal doctor if your instructions are to return to your personal doctor) about any new or lasting problems. The primary care or specialist physician will see patients referred from the [facility] Emergency Department.  Cristela Houser

## (undated) NOTE — LETTER
Irena Puckett 182 [address] [address]  [place], [address] [facility]    Consent for Operation  Date: __________________                                Time: _______________    1.  I authorize the performance upon Mary Sehr the following operation:    2. rev procedure has been videotaped, the surgeon will obtain the original videotape. The hospital will not be responsible for storage or maintenance of this tape.   7. For the purpose of advancing medical education, I consent to the admittance of observers to the STATEMENTS REQUIRING INSERTION OR COMPLETION WERE FILLED IN.     Signature of Patient:   ___________________________    When the patient is a minor or mentally incompetent to give consent:  Signature of person authorized to consent for patient: ____________ supplements, and pills I can buy without a prescription (including street drugs/illegal medications). Failure to inform my anesthesiologist about these medicines may increase my risk of anesthetic complications. iv.  If I am allergic to anything or have ha Anesthesiologist Signature     Date   Time  I have discussed the procedure and information above with the patient (or patient’s representative) and answered their questions. The patient or their representative has agreed to have anesthesia services.     ___

## (undated) NOTE — Clinical Note
Pt will be coming in for HFU appt on 11/14/17. He would like to review his abdominal x-ray results with you at that time as well. Thank you!

## (undated) NOTE — ED AVS SNAPSHOT
[facility] Emergency Department    [place]  [address] [address]    Phone:  [phone]    Fax:  [phone]           Heidi Rangel   MRN: [id number]    Department:  [facility] Emergency Department   Date of Visit:  5/1 Follow the directions for taking your medications provided by your doctor. Please ask your health care provider, pharmacist or nurse if you have any questions regarding your home medications, including potential side effects.               Medication List a detailed feedback survey mailed to them a week after the visit. If you receive this, we would really appreciate it if you could take the time to complete it. Thank you! You were examined and treated today on an urgent basis only.   This was not a duckworth [facility] ([address]) [facility] [address]. [address] [facility]. ([address]) [facility] [address] (Calvin Collins 112) [address] [facility]  Jay Stevenson ([facility] states that he has had posterior neck pain that radiates to his right shoulder for 5 days. He is unable to abduct his right arm due to his pain, and he had a C5-C6 fusion in 2012. FINDINGS:  No acute fracture or subluxation of the cervical spine.